# Patient Record
Sex: FEMALE | Race: WHITE | NOT HISPANIC OR LATINO
[De-identification: names, ages, dates, MRNs, and addresses within clinical notes are randomized per-mention and may not be internally consistent; named-entity substitution may affect disease eponyms.]

---

## 2017-02-10 ENCOUNTER — RESULT REVIEW (OUTPATIENT)
Age: 82
End: 2017-02-10

## 2017-04-18 ENCOUNTER — RESULT REVIEW (OUTPATIENT)
Age: 82
End: 2017-04-18

## 2017-04-19 ENCOUNTER — OUTPATIENT (OUTPATIENT)
Dept: OUTPATIENT SERVICES | Facility: HOSPITAL | Age: 82
LOS: 1 days | Discharge: HOME | End: 2017-04-19

## 2017-04-19 DIAGNOSIS — D64.9 ANEMIA, UNSPECIFIED: ICD-10-CM

## 2017-04-19 DIAGNOSIS — Z90.710 ACQUIRED ABSENCE OF BOTH CERVIX AND UTERUS: Chronic | ICD-10-CM

## 2017-04-23 ENCOUNTER — RESULT REVIEW (OUTPATIENT)
Age: 82
End: 2017-04-23

## 2017-04-24 ENCOUNTER — APPOINTMENT (OUTPATIENT)
Dept: HEMATOLOGY ONCOLOGY | Facility: CLINIC | Age: 82
End: 2017-04-24

## 2017-04-24 VITALS
SYSTOLIC BLOOD PRESSURE: 171 MMHG | RESPIRATION RATE: 16 BRPM | WEIGHT: 150.58 LBS | BODY MASS INDEX: 28.45 KG/M2 | OXYGEN SATURATION: 96 % | TEMPERATURE: 97.6 F | DIASTOLIC BLOOD PRESSURE: 81 MMHG | HEART RATE: 74 BPM

## 2017-04-24 VITALS — SYSTOLIC BLOOD PRESSURE: 140 MMHG | DIASTOLIC BLOOD PRESSURE: 82 MMHG

## 2017-04-24 LAB
BASOPHILS # BLD AUTO: 0 K/UL — SIGNIFICANT CHANGE UP (ref 0–0.2)
BASOPHILS NFR BLD AUTO: 0.4 % — SIGNIFICANT CHANGE UP (ref 0–2)
EOSINOPHIL # BLD AUTO: 0.1 K/UL — SIGNIFICANT CHANGE UP (ref 0–0.5)
EOSINOPHIL NFR BLD AUTO: 1.2 % — SIGNIFICANT CHANGE UP (ref 0–6)
HCT VFR BLD CALC: 35.2 % — SIGNIFICANT CHANGE UP (ref 34.5–45)
HGB BLD-MCNC: 12.2 G/DL — SIGNIFICANT CHANGE UP (ref 11.5–15.5)
LYMPHOCYTES # BLD AUTO: 1 K/UL — SIGNIFICANT CHANGE UP (ref 1–3.3)
LYMPHOCYTES # BLD AUTO: 19.2 % — SIGNIFICANT CHANGE UP (ref 13–44)
MCHC RBC-ENTMCNC: 30.7 PG — SIGNIFICANT CHANGE UP (ref 27–34)
MCHC RBC-ENTMCNC: 34.7 G/DL — SIGNIFICANT CHANGE UP (ref 32–36)
MCV RBC AUTO: 88.4 FL — SIGNIFICANT CHANGE UP (ref 80–100)
MONOCYTES # BLD AUTO: 0.4 K/UL — SIGNIFICANT CHANGE UP (ref 0–0.9)
MONOCYTES NFR BLD AUTO: 8.4 % — SIGNIFICANT CHANGE UP (ref 2–14)
NEUTROPHILS # BLD AUTO: 3.5 K/UL — SIGNIFICANT CHANGE UP (ref 1.8–7.4)
NEUTROPHILS NFR BLD AUTO: 70.8 % — SIGNIFICANT CHANGE UP (ref 43–77)
PLATELET # BLD AUTO: 171 K/UL — SIGNIFICANT CHANGE UP (ref 150–400)
RBC # BLD: 3.99 M/UL — SIGNIFICANT CHANGE UP (ref 3.8–5.2)
RBC # FLD: 11.1 % — SIGNIFICANT CHANGE UP (ref 10.3–14.5)
WBC # BLD: 5 K/UL — SIGNIFICANT CHANGE UP (ref 3.8–10.5)
WBC # FLD AUTO: 5 K/UL — SIGNIFICANT CHANGE UP (ref 3.8–10.5)

## 2017-04-25 ENCOUNTER — RESULT REVIEW (OUTPATIENT)
Age: 82
End: 2017-04-25

## 2017-04-25 LAB
ALBUMIN SERPL ELPH-MCNC: 4.1 G/DL
ALP BLD-CCNC: 79 U/L
ALT SERPL-CCNC: 18 U/L
ANION GAP SERPL CALC-SCNC: 20 MMOL/L
AST SERPL-CCNC: 27 U/L
BILIRUB SERPL-MCNC: 0.4 MG/DL
BUN SERPL-MCNC: 14 MG/DL
CALCIUM SERPL-MCNC: 9.6 MG/DL
CANCER AG125 SERPL-ACNC: 16 U/ML
CHLORIDE SERPL-SCNC: 93 MMOL/L
CO2 SERPL-SCNC: 19 MMOL/L
CREAT SERPL-MCNC: 1.19 MG/DL
GLUCOSE SERPL-MCNC: 59 MG/DL
LDH SERPL-CCNC: 274 U/L
POTASSIUM SERPL-SCNC: 4.6 MMOL/L
PROT SERPL-MCNC: 6.7 G/DL
SODIUM SERPL-SCNC: 132 MMOL/L

## 2017-04-30 ENCOUNTER — RESULT REVIEW (OUTPATIENT)
Age: 82
End: 2017-04-30

## 2017-05-01 ENCOUNTER — APPOINTMENT (OUTPATIENT)
Dept: HEMATOLOGY ONCOLOGY | Facility: CLINIC | Age: 82
End: 2017-05-01

## 2017-05-04 ENCOUNTER — OTHER (OUTPATIENT)
Age: 82
End: 2017-05-04

## 2017-05-04 ENCOUNTER — APPOINTMENT (OUTPATIENT)
Dept: OBGYN | Facility: CLINIC | Age: 82
End: 2017-05-04

## 2017-05-04 VITALS
HEART RATE: 74 BPM | BODY MASS INDEX: 28.18 KG/M2 | HEIGHT: 61 IN | WEIGHT: 149.25 LBS | DIASTOLIC BLOOD PRESSURE: 79 MMHG | SYSTOLIC BLOOD PRESSURE: 143 MMHG

## 2017-05-04 DIAGNOSIS — Z87.440 PERSONAL HISTORY OF URINARY (TRACT) INFECTIONS: ICD-10-CM

## 2017-05-04 LAB
BILIRUB UR QL STRIP: NEGATIVE
CLARITY UR: CLEAR
COLLECTION METHOD: NORMAL
GLUCOSE UR-MCNC: NEGATIVE
HCG UR QL: 0.2 EU/DL
HGB UR QL STRIP.AUTO: NEGATIVE
KETONES UR-MCNC: NEGATIVE
LEUKOCYTE ESTERASE UR QL STRIP: NORMAL
NITRITE UR QL STRIP: NEGATIVE
PH UR STRIP: 5.5
PROT UR STRIP-MCNC: NORMAL
SP GR UR STRIP: 1.01

## 2017-05-04 RX ORDER — CLOTRIMAZOLE 10 MG/ML
1 SOLUTION TOPICAL
Qty: 30 | Refills: 0 | Status: ACTIVE | COMMUNITY
Start: 2017-04-05

## 2017-05-04 RX ORDER — ERGOCALCIFEROL 1.25 MG/1
1.25 MG CAPSULE, LIQUID FILLED ORAL
Qty: 4 | Refills: 0 | Status: ACTIVE | COMMUNITY
Start: 2017-01-18

## 2017-05-08 ENCOUNTER — OTHER (OUTPATIENT)
Age: 82
End: 2017-05-08

## 2017-05-08 LAB — BACTERIA UR CULT: NORMAL

## 2017-07-05 ENCOUNTER — RESULT REVIEW (OUTPATIENT)
Age: 82
End: 2017-07-05

## 2017-07-05 LAB
ALBUMIN SERPL ELPH-MCNC: 3.9 G/DL
ALP BLD-CCNC: 71 U/L
ALT SERPL-CCNC: 22 U/L
ANION GAP SERPL CALC-SCNC: 13 MMOL/L
AST SERPL-CCNC: 31 U/L
BILIRUB SERPL-MCNC: 0.4 MG/DL
BUN SERPL-MCNC: 13 MG/DL
CALCIUM SERPL-MCNC: 9.2 MG/DL
CHLORIDE SERPL-SCNC: 93 MMOL/L
CO2 SERPL-SCNC: 27 MMOL/L
CREAT SERPL-MCNC: 1.1 MG/DL
GLUCOSE SERPL-MCNC: 84 MG/DL
LDH SERPL-CCNC: 229 U/L
POTASSIUM SERPL-SCNC: 4.9 MMOL/L
PROT SERPL-MCNC: 6.4 G/DL
SODIUM SERPL-SCNC: 133 MMOL/L

## 2017-08-10 ENCOUNTER — APPOINTMENT (OUTPATIENT)
Dept: OPHTHALMOLOGY | Facility: CLINIC | Age: 82
End: 2017-08-10
Payer: MEDICARE

## 2017-08-10 DIAGNOSIS — H25.11 AGE-RELATED NUCLEAR CATARACT, RIGHT EYE: ICD-10-CM

## 2017-08-10 PROCEDURE — 92136 OPHTHALMIC BIOMETRY: CPT | Mod: 26,RT

## 2017-08-10 PROCEDURE — 92014 COMPRE OPH EXAM EST PT 1/>: CPT

## 2017-10-17 ENCOUNTER — MEDICATION RENEWAL (OUTPATIENT)
Age: 82
End: 2017-10-17

## 2017-10-17 ENCOUNTER — OUTPATIENT (OUTPATIENT)
Dept: OUTPATIENT SERVICES | Facility: HOSPITAL | Age: 82
LOS: 1 days | Discharge: HOME | End: 2017-10-17

## 2017-10-17 DIAGNOSIS — D64.9 ANEMIA, UNSPECIFIED: ICD-10-CM

## 2017-10-17 DIAGNOSIS — Z90.710 ACQUIRED ABSENCE OF BOTH CERVIX AND UTERUS: Chronic | ICD-10-CM

## 2017-10-17 RX ORDER — PREDNISOLONE ACETATE 10 MG/ML
1 SUSPENSION/ DROPS OPHTHALMIC 4 TIMES DAILY
Qty: 10 | Refills: 1 | Status: ACTIVE | COMMUNITY
Start: 2017-10-17 | End: 1900-01-01

## 2017-10-17 RX ORDER — MOXIFLOXACIN OPHTHALMIC 5 MG/ML
0.5 SOLUTION/ DROPS OPHTHALMIC 4 TIMES DAILY
Qty: 3 | Refills: 1 | Status: ACTIVE | COMMUNITY
Start: 2017-10-17 | End: 1900-01-01

## 2017-10-19 ENCOUNTER — APPOINTMENT (OUTPATIENT)
Dept: OPHTHALMOLOGY | Facility: HOSPITAL | Age: 82
End: 2017-10-19

## 2017-10-19 ENCOUNTER — OUTPATIENT (OUTPATIENT)
Dept: OUTPATIENT SERVICES | Facility: HOSPITAL | Age: 82
LOS: 1 days | End: 2017-10-19
Payer: MEDICARE

## 2017-10-19 VITALS
HEART RATE: 55 BPM | TEMPERATURE: 98 F | HEIGHT: 60 IN | OXYGEN SATURATION: 100 % | SYSTOLIC BLOOD PRESSURE: 169 MMHG | WEIGHT: 149.47 LBS | DIASTOLIC BLOOD PRESSURE: 67 MMHG | RESPIRATION RATE: 14 BRPM

## 2017-10-19 VITALS
HEART RATE: 62 BPM | OXYGEN SATURATION: 100 % | SYSTOLIC BLOOD PRESSURE: 146 MMHG | DIASTOLIC BLOOD PRESSURE: 59 MMHG | RESPIRATION RATE: 20 BRPM

## 2017-10-19 DIAGNOSIS — Z90.710 ACQUIRED ABSENCE OF BOTH CERVIX AND UTERUS: Chronic | ICD-10-CM

## 2017-10-19 DIAGNOSIS — Z98.49 CATARACT EXTRACTION STATUS, UNSPECIFIED EYE: Chronic | ICD-10-CM

## 2017-10-19 DIAGNOSIS — H25.811 COMBINED FORMS OF AGE-RELATED CATARACT, RIGHT EYE: ICD-10-CM

## 2017-10-19 PROCEDURE — 66984 XCAPSL CTRC RMVL W/O ECP: CPT | Mod: RT

## 2017-10-19 PROCEDURE — V2632: CPT

## 2017-10-19 NOTE — ASU PATIENT PROFILE, ADULT - WHEN FALL OCCURRED
last six months/slipped on kitchen floor few months ago - did not sustain injury or seek medical attention

## 2017-10-19 NOTE — ASU DISCHARGE PLAN (ADULT/PEDIATRIC). - SPECIAL INSTRUCTIONS
You may take Tylenol for pain/discomfort. Do not take Aspirin, Aleve, Motrin, or Advil as these can increase your chances of bleeding. You may take Tylenol for pain/discomfort. Do not take Aspirin, Aleve, Motrin, or Advil as these can increase your chances of bleeding.    Begin drops - 3 times today in operative eye (Tan and pink tops - vigamox and prednisolone).

## 2017-10-19 NOTE — ASU PATIENT PROFILE, ADULT - PMH
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

## 2017-10-19 NOTE — ASU DISCHARGE PLAN (ADULT/PEDIATRIC). - NOTIFY
Fever greater than 101/Bleeding that does not stop/Persistent Nausea and Vomiting/Pain not relieved by Medications Swelling that continues/Persistent Nausea and Vomiting/Fever greater than 101/Bleeding that does not stop/Pain not relieved by Medications

## 2017-10-19 NOTE — ASU PATIENT PROFILE, ADULT - PSH
History of Breast Lump/Mass Excision- [facility]- left- 1971    History of Colonoscopy- 2011/Sept    History of D&C- 8/12/11    Status post cataract extraction  OS  Status post hysterectomy  endometrial cancer

## 2017-10-20 ENCOUNTER — TRANSCRIPTION ENCOUNTER (OUTPATIENT)
Age: 82
End: 2017-10-20

## 2017-10-20 ENCOUNTER — APPOINTMENT (OUTPATIENT)
Dept: OPHTHALMOLOGY | Facility: CLINIC | Age: 82
End: 2017-10-20
Payer: MEDICARE

## 2017-10-20 PROCEDURE — 99024 POSTOP FOLLOW-UP VISIT: CPT

## 2017-10-23 ENCOUNTER — RESULT REVIEW (OUTPATIENT)
Age: 82
End: 2017-10-23

## 2017-10-23 ENCOUNTER — APPOINTMENT (OUTPATIENT)
Dept: HEMATOLOGY ONCOLOGY | Facility: CLINIC | Age: 82
End: 2017-10-23
Payer: MEDICARE

## 2017-10-23 VITALS
TEMPERATURE: 97.3 F | HEART RATE: 61 BPM | RESPIRATION RATE: 16 BRPM | OXYGEN SATURATION: 98 % | WEIGHT: 149.91 LBS | SYSTOLIC BLOOD PRESSURE: 155 MMHG | DIASTOLIC BLOOD PRESSURE: 81 MMHG | BODY MASS INDEX: 28.33 KG/M2

## 2017-10-23 DIAGNOSIS — C54.3 MALIGNANT NEOPLASM OF FUNDUS UTERI: ICD-10-CM

## 2017-10-23 LAB
ALBUMIN SERPL ELPH-MCNC: 4.2 G/DL
ALP BLD-CCNC: 76 U/L
ALT SERPL-CCNC: 17 U/L
ANION GAP SERPL CALC-SCNC: 15 MMOL/L
AST SERPL-CCNC: 28 U/L
BASOPHILS # BLD AUTO: 0 K/UL — SIGNIFICANT CHANGE UP (ref 0–0.2)
BASOPHILS NFR BLD AUTO: 0.5 % — SIGNIFICANT CHANGE UP (ref 0–2)
BILIRUB SERPL-MCNC: 0.5 MG/DL
BUN SERPL-MCNC: 18 MG/DL
CALCIUM SERPL-MCNC: 9.5 MG/DL
CHLORIDE SERPL-SCNC: 94 MMOL/L
CO2 SERPL-SCNC: 25 MMOL/L
CREAT SERPL-MCNC: 1.21 MG/DL
EOSINOPHIL # BLD AUTO: 0.1 K/UL — SIGNIFICANT CHANGE UP (ref 0–0.5)
EOSINOPHIL NFR BLD AUTO: 1 % — SIGNIFICANT CHANGE UP (ref 0–6)
GLUCOSE SERPL-MCNC: 83 MG/DL
HCT VFR BLD CALC: 35.9 % — SIGNIFICANT CHANGE UP (ref 34.5–45)
HGB BLD-MCNC: 12.6 G/DL — SIGNIFICANT CHANGE UP (ref 11.5–15.5)
LYMPHOCYTES # BLD AUTO: 0.9 K/UL — LOW (ref 1–3.3)
LYMPHOCYTES # BLD AUTO: 14.2 % — SIGNIFICANT CHANGE UP (ref 13–44)
MCHC RBC-ENTMCNC: 31.6 PG — SIGNIFICANT CHANGE UP (ref 27–34)
MCHC RBC-ENTMCNC: 35 G/DL — SIGNIFICANT CHANGE UP (ref 32–36)
MCV RBC AUTO: 90.2 FL — SIGNIFICANT CHANGE UP (ref 80–100)
MONOCYTES # BLD AUTO: 0.4 K/UL — SIGNIFICANT CHANGE UP (ref 0–0.9)
MONOCYTES NFR BLD AUTO: 6.7 % — SIGNIFICANT CHANGE UP (ref 2–14)
NEUTROPHILS # BLD AUTO: 4.8 K/UL — SIGNIFICANT CHANGE UP (ref 1.8–7.4)
NEUTROPHILS NFR BLD AUTO: 77.6 % — HIGH (ref 43–77)
PLATELET # BLD AUTO: 158 K/UL — SIGNIFICANT CHANGE UP (ref 150–400)
POTASSIUM SERPL-SCNC: 5 MMOL/L
PROT SERPL-MCNC: 6.6 G/DL
RBC # BLD: 3.97 M/UL — SIGNIFICANT CHANGE UP (ref 3.8–5.2)
RBC # FLD: 11.1 % — SIGNIFICANT CHANGE UP (ref 10.3–14.5)
SODIUM SERPL-SCNC: 134 MMOL/L
WBC # BLD: 6.2 K/UL — SIGNIFICANT CHANGE UP (ref 3.8–10.5)
WBC # FLD AUTO: 6.2 K/UL — SIGNIFICANT CHANGE UP (ref 3.8–10.5)

## 2017-10-23 PROCEDURE — 99213 OFFICE O/P EST LOW 20 MIN: CPT

## 2017-10-26 ENCOUNTER — APPOINTMENT (OUTPATIENT)
Dept: OPHTHALMOLOGY | Facility: CLINIC | Age: 82
End: 2017-10-26
Payer: MEDICARE

## 2017-10-26 PROCEDURE — 99024 POSTOP FOLLOW-UP VISIT: CPT

## 2017-10-27 ENCOUNTER — RESULT REVIEW (OUTPATIENT)
Age: 82
End: 2017-10-27

## 2017-10-27 LAB
CANCER AG125 SERPL-ACNC: 15 U/ML
LDH SERPL-CCNC: 257 U/L

## 2017-12-05 ENCOUNTER — APPOINTMENT (OUTPATIENT)
Dept: OPHTHALMOLOGY | Facility: CLINIC | Age: 82
End: 2017-12-05
Payer: MEDICARE

## 2017-12-05 DIAGNOSIS — H25.11 AGE-RELATED NUCLEAR CATARACT, RIGHT EYE: ICD-10-CM

## 2017-12-05 DIAGNOSIS — H35.30 UNSPECIFIED MACULAR DEGENERATION: ICD-10-CM

## 2017-12-05 PROCEDURE — 99024 POSTOP FOLLOW-UP VISIT: CPT

## 2017-12-07 ENCOUNTER — APPOINTMENT (OUTPATIENT)
Dept: OPHTHALMOLOGY | Facility: CLINIC | Age: 82
End: 2017-12-07

## 2018-03-22 ENCOUNTER — INPATIENT (INPATIENT)
Facility: HOSPITAL | Age: 83
LOS: 7 days | Discharge: NURSING, REHAB OR LONG-TERM CARE | End: 2018-03-30
Attending: INTERNAL MEDICINE | Admitting: INTERNAL MEDICINE
Payer: MEDICARE

## 2018-03-22 VITALS
TEMPERATURE: 98 F | WEIGHT: 143.3 LBS | SYSTOLIC BLOOD PRESSURE: 118 MMHG | DIASTOLIC BLOOD PRESSURE: 69 MMHG | HEART RATE: 93 BPM | RESPIRATION RATE: 16 BRPM | OXYGEN SATURATION: 87 %

## 2018-03-22 DIAGNOSIS — R29.6 REPEATED FALLS: ICD-10-CM

## 2018-03-22 DIAGNOSIS — R50.9 FEVER, UNSPECIFIED: ICD-10-CM

## 2018-03-22 DIAGNOSIS — E78.4 OTHER HYPERLIPIDEMIA: ICD-10-CM

## 2018-03-22 DIAGNOSIS — R74.8 ABNORMAL LEVELS OF OTHER SERUM ENZYMES: ICD-10-CM

## 2018-03-22 DIAGNOSIS — F51.01 PRIMARY INSOMNIA: ICD-10-CM

## 2018-03-22 DIAGNOSIS — N39.0 URINARY TRACT INFECTION, SITE NOT SPECIFIED: ICD-10-CM

## 2018-03-22 DIAGNOSIS — F41.9 ANXIETY DISORDER, UNSPECIFIED: ICD-10-CM

## 2018-03-22 DIAGNOSIS — I10 ESSENTIAL (PRIMARY) HYPERTENSION: ICD-10-CM

## 2018-03-22 DIAGNOSIS — Z90.710 ACQUIRED ABSENCE OF BOTH CERVIX AND UTERUS: Chronic | ICD-10-CM

## 2018-03-22 DIAGNOSIS — Z98.49 CATARACT EXTRACTION STATUS, UNSPECIFIED EYE: Chronic | ICD-10-CM

## 2018-03-22 LAB
ALBUMIN SERPL ELPH-MCNC: 3.9 G/DL — SIGNIFICANT CHANGE UP (ref 3.3–5)
ALP SERPL-CCNC: 61 U/L — SIGNIFICANT CHANGE UP (ref 40–120)
ALT FLD-CCNC: 42 U/L — HIGH (ref 4–33)
APPEARANCE UR: CLEAR — SIGNIFICANT CHANGE UP
APTT BLD: 24.4 SEC — LOW (ref 27.5–37.4)
AST SERPL-CCNC: 129 U/L — HIGH (ref 4–32)
BASE EXCESS BLDV CALC-SCNC: 1.5 MMOL/L — SIGNIFICANT CHANGE UP
BASOPHILS # BLD AUTO: 0.02 K/UL — SIGNIFICANT CHANGE UP (ref 0–0.2)
BASOPHILS NFR BLD AUTO: 0.1 % — SIGNIFICANT CHANGE UP (ref 0–2)
BILIRUB SERPL-MCNC: 1.1 MG/DL — SIGNIFICANT CHANGE UP (ref 0.2–1.2)
BILIRUB UR-MCNC: NEGATIVE — SIGNIFICANT CHANGE UP
BLD GP AB SCN SERPL QL: NEGATIVE — SIGNIFICANT CHANGE UP
BLOOD GAS VENOUS - CREATININE: 1.18 MG/DL — SIGNIFICANT CHANGE UP (ref 0.5–1.3)
BLOOD UR QL VISUAL: HIGH
BUN SERPL-MCNC: 33 MG/DL — HIGH (ref 7–23)
CALCIUM SERPL-MCNC: 9.1 MG/DL — SIGNIFICANT CHANGE UP (ref 8.4–10.5)
CHLORIDE BLDV-SCNC: 94 MMOL/L — LOW (ref 96–108)
CHLORIDE SERPL-SCNC: 88 MMOL/L — LOW (ref 98–107)
CK MB BLD-MCNC: 1.2 — SIGNIFICANT CHANGE UP (ref 0–2.5)
CK MB BLD-MCNC: 1.7 — SIGNIFICANT CHANGE UP (ref 0–2.5)
CK MB BLD-MCNC: 85.27 NG/ML — HIGH (ref 1–4.7)
CK MB BLD-MCNC: 92.66 NG/ML — HIGH (ref 1–4.7)
CK SERPL-CCNC: 5364 U/L — HIGH (ref 25–170)
CK SERPL-CCNC: 7059 U/L — HIGH (ref 25–170)
CO2 SERPL-SCNC: 24 MMOL/L — SIGNIFICANT CHANGE UP (ref 22–31)
COLOR SPEC: YELLOW — SIGNIFICANT CHANGE UP
CREAT SERPL-MCNC: 1.26 MG/DL — SIGNIFICANT CHANGE UP (ref 0.5–1.3)
EOSINOPHIL # BLD AUTO: 0 K/UL — SIGNIFICANT CHANGE UP (ref 0–0.5)
EOSINOPHIL NFR BLD AUTO: 0 % — SIGNIFICANT CHANGE UP (ref 0–6)
GAS PNL BLDV: 123 MMOL/L — LOW (ref 136–146)
GLUCOSE BLDV-MCNC: 128 — HIGH (ref 70–99)
GLUCOSE SERPL-MCNC: 126 MG/DL — HIGH (ref 70–99)
GLUCOSE UR-MCNC: NEGATIVE — SIGNIFICANT CHANGE UP
HCO3 BLDV-SCNC: 24 MMOL/L — SIGNIFICANT CHANGE UP (ref 20–27)
HCT VFR BLD CALC: 33.6 % — LOW (ref 34.5–45)
HCT VFR BLDV CALC: 35.9 % — SIGNIFICANT CHANGE UP (ref 34.5–45)
HGB BLD-MCNC: 11.5 G/DL — SIGNIFICANT CHANGE UP (ref 11.5–15.5)
HGB BLDV-MCNC: 11.7 G/DL — SIGNIFICANT CHANGE UP (ref 11.5–15.5)
HYALINE CASTS # UR AUTO: SIGNIFICANT CHANGE UP (ref 0–?)
IMM GRANULOCYTES # BLD AUTO: 0.07 # — SIGNIFICANT CHANGE UP
IMM GRANULOCYTES NFR BLD AUTO: 0.5 % — SIGNIFICANT CHANGE UP (ref 0–1.5)
INR BLD: 1.14 — SIGNIFICANT CHANGE UP (ref 0.88–1.17)
KETONES UR-MCNC: SIGNIFICANT CHANGE UP
LACTATE BLDV-MCNC: 2 MMOL/L — SIGNIFICANT CHANGE UP (ref 0.5–2)
LEUKOCYTE ESTERASE UR-ACNC: SIGNIFICANT CHANGE UP
LYMPHOCYTES # BLD AUTO: 0.44 K/UL — LOW (ref 1–3.3)
LYMPHOCYTES # BLD AUTO: 2.9 % — LOW (ref 13–44)
MCHC RBC-ENTMCNC: 30.4 PG — SIGNIFICANT CHANGE UP (ref 27–34)
MCHC RBC-ENTMCNC: 34.2 % — SIGNIFICANT CHANGE UP (ref 32–36)
MCV RBC AUTO: 88.9 FL — SIGNIFICANT CHANGE UP (ref 80–100)
MONOCYTES # BLD AUTO: 0.96 K/UL — HIGH (ref 0–0.9)
MONOCYTES NFR BLD AUTO: 6.2 % — SIGNIFICANT CHANGE UP (ref 2–14)
MUCOUS THREADS # UR AUTO: SIGNIFICANT CHANGE UP
NEUTROPHILS # BLD AUTO: 13.93 K/UL — HIGH (ref 1.8–7.4)
NEUTROPHILS NFR BLD AUTO: 90.3 % — HIGH (ref 43–77)
NITRITE UR-MCNC: NEGATIVE — SIGNIFICANT CHANGE UP
NON-SQ EPI CELLS # UR AUTO: <1 — SIGNIFICANT CHANGE UP
NRBC # FLD: 0 — SIGNIFICANT CHANGE UP
NT-PROBNP SERPL-SCNC: SIGNIFICANT CHANGE UP PG/ML
PCO2 BLDV: 41 MMHG — SIGNIFICANT CHANGE UP (ref 41–51)
PH BLDV: 7.42 PH — SIGNIFICANT CHANGE UP (ref 7.32–7.43)
PH UR: 6 — SIGNIFICANT CHANGE UP (ref 4.6–8)
PLATELET # BLD AUTO: 194 K/UL — SIGNIFICANT CHANGE UP (ref 150–400)
PMV BLD: 10.7 FL — SIGNIFICANT CHANGE UP (ref 7–13)
PO2 BLDV: < 24 MMHG — LOW (ref 35–40)
POTASSIUM BLDV-SCNC: 4.4 MMOL/L — SIGNIFICANT CHANGE UP (ref 3.4–4.5)
POTASSIUM SERPL-MCNC: 5.1 MMOL/L — SIGNIFICANT CHANGE UP (ref 3.5–5.3)
POTASSIUM SERPL-SCNC: 5.1 MMOL/L — SIGNIFICANT CHANGE UP (ref 3.5–5.3)
PROT SERPL-MCNC: 6.8 G/DL — SIGNIFICANT CHANGE UP (ref 6–8.3)
PROT UR-MCNC: 100 MG/DL — HIGH
PROTHROM AB SERPL-ACNC: 13.1 SEC — SIGNIFICANT CHANGE UP (ref 9.8–13.1)
RBC # BLD: 3.78 M/UL — LOW (ref 3.8–5.2)
RBC # FLD: 12.8 % — SIGNIFICANT CHANGE UP (ref 10.3–14.5)
RBC CASTS # UR COMP ASSIST: SIGNIFICANT CHANGE UP (ref 0–?)
RH IG SCN BLD-IMP: NEGATIVE — SIGNIFICANT CHANGE UP
SAO2 % BLDV: 24 % — LOW (ref 60–85)
SODIUM SERPL-SCNC: 128 MMOL/L — LOW (ref 135–145)
SP GR SPEC: 1.02 — SIGNIFICANT CHANGE UP (ref 1–1.04)
TROPONIN T SERPL-MCNC: 0.1 NG/ML — HIGH (ref 0–0.06)
TROPONIN T SERPL-MCNC: 0.12 NG/ML — HIGH (ref 0–0.06)
UROBILINOGEN FLD QL: NORMAL MG/DL — SIGNIFICANT CHANGE UP
WBC # BLD: 15.42 K/UL — HIGH (ref 3.8–10.5)
WBC # FLD AUTO: 15.42 K/UL — HIGH (ref 3.8–10.5)
WBC UR QL: HIGH (ref 0–?)

## 2018-03-22 PROCEDURE — 73564 X-RAY EXAM KNEE 4 OR MORE: CPT | Mod: 26,LT

## 2018-03-22 PROCEDURE — 71045 X-RAY EXAM CHEST 1 VIEW: CPT | Mod: 26

## 2018-03-22 PROCEDURE — 73030 X-RAY EXAM OF SHOULDER: CPT | Mod: 26,RT

## 2018-03-22 PROCEDURE — 70450 CT HEAD/BRAIN W/O DYE: CPT | Mod: 26

## 2018-03-22 PROCEDURE — 73060 X-RAY EXAM OF HUMERUS: CPT | Mod: 26,RT

## 2018-03-22 PROCEDURE — 71250 CT THORAX DX C-: CPT | Mod: 26

## 2018-03-22 PROCEDURE — 72125 CT NECK SPINE W/O DYE: CPT | Mod: 26

## 2018-03-22 RX ORDER — ALPRAZOLAM 0.25 MG
0.5 TABLET ORAL THREE TIMES A DAY
Qty: 0 | Refills: 0 | Status: DISCONTINUED | OUTPATIENT
Start: 2018-03-22 | End: 2018-03-29

## 2018-03-22 RX ORDER — PIPERACILLIN AND TAZOBACTAM 4; .5 G/20ML; G/20ML
3.38 INJECTION, POWDER, LYOPHILIZED, FOR SOLUTION INTRAVENOUS ONCE
Qty: 0 | Refills: 0 | Status: COMPLETED | OUTPATIENT
Start: 2018-03-22 | End: 2018-03-22

## 2018-03-22 RX ORDER — ERGOCALCIFEROL 1.25 MG/1
50000 CAPSULE ORAL
Qty: 0 | Refills: 0 | Status: DISCONTINUED | OUTPATIENT
Start: 2018-03-22 | End: 2018-03-22

## 2018-03-22 RX ORDER — SODIUM CHLORIDE 9 MG/ML
1000 INJECTION INTRAMUSCULAR; INTRAVENOUS; SUBCUTANEOUS ONCE
Qty: 0 | Refills: 0 | Status: COMPLETED | OUTPATIENT
Start: 2018-03-22 | End: 2018-03-22

## 2018-03-22 RX ORDER — MIRTAZAPINE 45 MG/1
15 TABLET, ORALLY DISINTEGRATING ORAL AT BEDTIME
Qty: 0 | Refills: 0 | Status: DISCONTINUED | OUTPATIENT
Start: 2018-03-22 | End: 2018-03-30

## 2018-03-22 RX ORDER — CEFTRIAXONE 500 MG/1
1 INJECTION, POWDER, FOR SOLUTION INTRAMUSCULAR; INTRAVENOUS ONCE
Qty: 0 | Refills: 0 | Status: COMPLETED | OUTPATIENT
Start: 2018-03-22 | End: 2018-03-22

## 2018-03-22 RX ORDER — FUROSEMIDE 40 MG
20 TABLET ORAL ONCE
Qty: 0 | Refills: 0 | Status: COMPLETED | OUTPATIENT
Start: 2018-03-22 | End: 2018-03-22

## 2018-03-22 RX ORDER — ERGOCALCIFEROL 1.25 MG/1
50000 CAPSULE ORAL
Qty: 0 | Refills: 0 | Status: DISCONTINUED | OUTPATIENT
Start: 2018-03-22 | End: 2018-03-30

## 2018-03-22 RX ORDER — CEFTRIAXONE 500 MG/1
1 INJECTION, POWDER, FOR SOLUTION INTRAMUSCULAR; INTRAVENOUS EVERY 24 HOURS
Qty: 0 | Refills: 0 | Status: DISCONTINUED | OUTPATIENT
Start: 2018-03-23 | End: 2018-03-23

## 2018-03-22 RX ORDER — ZOLPIDEM TARTRATE 10 MG/1
5 TABLET ORAL AT BEDTIME
Qty: 0 | Refills: 0 | Status: DISCONTINUED | OUTPATIENT
Start: 2018-03-22 | End: 2018-03-26

## 2018-03-22 RX ORDER — ZOLPIDEM TARTRATE 10 MG/1
5 TABLET ORAL AT BEDTIME
Qty: 0 | Refills: 0 | Status: DISCONTINUED | OUTPATIENT
Start: 2018-03-22 | End: 2018-03-29

## 2018-03-22 RX ORDER — ASPIRIN/CALCIUM CARB/MAGNESIUM 324 MG
162 TABLET ORAL ONCE
Qty: 0 | Refills: 0 | Status: COMPLETED | OUTPATIENT
Start: 2018-03-22 | End: 2018-03-22

## 2018-03-22 RX ORDER — ACETAMINOPHEN 500 MG
1000 TABLET ORAL ONCE
Qty: 0 | Refills: 0 | Status: COMPLETED | OUTPATIENT
Start: 2018-03-22 | End: 2018-03-22

## 2018-03-22 RX ORDER — SODIUM CHLORIDE 9 MG/ML
1000 INJECTION INTRAMUSCULAR; INTRAVENOUS; SUBCUTANEOUS
Qty: 0 | Refills: 0 | Status: DISCONTINUED | OUTPATIENT
Start: 2018-03-22 | End: 2018-03-27

## 2018-03-22 RX ORDER — ASPIRIN/CALCIUM CARB/MAGNESIUM 324 MG
81 TABLET ORAL DAILY
Qty: 0 | Refills: 0 | Status: DISCONTINUED | OUTPATIENT
Start: 2018-03-22 | End: 2018-03-30

## 2018-03-22 RX ORDER — ATORVASTATIN CALCIUM 80 MG/1
10 TABLET, FILM COATED ORAL AT BEDTIME
Qty: 0 | Refills: 0 | Status: DISCONTINUED | OUTPATIENT
Start: 2018-03-22 | End: 2018-03-30

## 2018-03-22 RX ORDER — SODIUM CHLORIDE 9 MG/ML
3 INJECTION INTRAMUSCULAR; INTRAVENOUS; SUBCUTANEOUS EVERY 8 HOURS
Qty: 0 | Refills: 0 | Status: DISCONTINUED | OUTPATIENT
Start: 2018-03-22 | End: 2018-03-30

## 2018-03-22 RX ORDER — HEPARIN SODIUM 5000 [USP'U]/ML
5000 INJECTION INTRAVENOUS; SUBCUTANEOUS EVERY 12 HOURS
Qty: 0 | Refills: 0 | Status: DISCONTINUED | OUTPATIENT
Start: 2018-03-22 | End: 2018-03-27

## 2018-03-22 RX ADMIN — PIPERACILLIN AND TAZOBACTAM 200 GRAM(S): 4; .5 INJECTION, POWDER, LYOPHILIZED, FOR SOLUTION INTRAVENOUS at 15:44

## 2018-03-22 RX ADMIN — CEFTRIAXONE 100 GRAM(S): 500 INJECTION, POWDER, FOR SOLUTION INTRAMUSCULAR; INTRAVENOUS at 13:46

## 2018-03-22 RX ADMIN — SODIUM CHLORIDE 3 MILLILITER(S): 9 INJECTION INTRAMUSCULAR; INTRAVENOUS; SUBCUTANEOUS at 21:30

## 2018-03-22 RX ADMIN — SODIUM CHLORIDE 1000 MILLILITER(S): 9 INJECTION INTRAMUSCULAR; INTRAVENOUS; SUBCUTANEOUS at 12:13

## 2018-03-22 RX ADMIN — MIRTAZAPINE 15 MILLIGRAM(S): 45 TABLET, ORALLY DISINTEGRATING ORAL at 21:30

## 2018-03-22 RX ADMIN — ATORVASTATIN CALCIUM 10 MILLIGRAM(S): 80 TABLET, FILM COATED ORAL at 21:30

## 2018-03-22 RX ADMIN — ZOLPIDEM TARTRATE 5 MILLIGRAM(S): 10 TABLET ORAL at 21:49

## 2018-03-22 RX ADMIN — SODIUM CHLORIDE 100 MILLILITER(S): 9 INJECTION INTRAMUSCULAR; INTRAVENOUS; SUBCUTANEOUS at 19:42

## 2018-03-22 RX ADMIN — Medication 400 MILLIGRAM(S): at 12:13

## 2018-03-22 RX ADMIN — Medication 162 MILLIGRAM(S): at 15:44

## 2018-03-22 NOTE — H&P ADULT - PROBLEM SELECTOR PLAN 1
Patient with unwitnessed  fall, will monitor on telemetry for any arrhythmia, will give IVF with NS @ 100 cc/hr, Obtain Echo, Hold all BP meds for now, Do orthostatic BP when able

## 2018-03-22 NOTE — SOCIAL WORK INITIAL EVALUATION ADULT - PLAN
SW met with 89 year old , , Muslim female and her daughter at bedside.     Patient is alert and oriented x3.      Patient lives in a PH alone.      Patient reports that there are 4 steps from the pavement to the front door.     Patient reports she has several small flights of stairs in the house and daughter is investigating stairlift for flight to bedroom and bathroom.    Patient has 2 canes, a walker, shower chair and shower bars.       Patient's daughter provides transportation to the doctor's office.       Patient receives [employer] 5 days a week.      Patient's daughter, Sade ([phone]), is her HCP.  Family interested in [facility], [facility] and [facility] lists provided.  Patient's first choice is [facility], explained they will need additional choices and PT will need to evaluate.    Will need PT eval when appropriate.   Stair lift information and private hire home care lists given to daughter.  SW contact information provided.      Written by VIKTORIA Lenz  Reviewed by Concepción Barker LMSW

## 2018-03-22 NOTE — H&P ADULT - HISTORY OF PRESENT ILLNESS
89 y/o F with history of HTN, HLD, Palpitation, insomnia, anxiety present with unwitnessed fall. Patient was found on the bathroom floor by her daughter. Unknown how long patient was on the floor. Patient states she only remembers waking up from the floor. Patient c/o back, right shoulder pain and left knee pain. Patient denies chest pain, palpitation at the time, sob, cough, n/v/d/, fever, chills, no dizziness or weakness. In ED patient was found to have UTI and was given one dose of Ceftriaxone and Zosyn. Also was found to have CK of 5364 and Trop of 0.10 and was given bolus of 1L of NS.

## 2018-03-22 NOTE — H&P ADULT - PSH
History of Breast Lump/Mass Excision- [facility]- left- 1971    History of Colonoscopy- 2011/Sept    History of D&C- 8/12/11    Status post cataract extraction  OS  Status post hysterectomy  endometrial cancer

## 2018-03-22 NOTE — ED ADULT NURSE NOTE - CHIEF COMPLAINT QUOTE
ELVIRA c/o lower back pain s/p fall. Daughter states she found pt. on floor this AM. Unknown LOC or head trauma. Appears slightly confused to family. No blood thinner use. PMHx HTN, HLD

## 2018-03-22 NOTE — ED PROVIDER NOTE - PSH
History of Breast Lump/Mass Excision- [facility]- left- 1971    History of Colonoscopy- 2011/Sept    History of D&C- 8/12/11    Status post cataract extraction  OS  Status post hysterectomy  endometrial cancer

## 2018-03-22 NOTE — ED PROVIDER NOTE - PHYSICAL EXAMINATION
BACK: no midline tenderness, +pain to mid back/right side, no palpable step offs, NVI, sensate intact  R shoulder: + ecchymosis, ttp, FROM, NVI, sensate intact, no obvious deformity  L knee: no swelling or ecchymosis no deformity, generalized ttp able to ROM however c/o discomfort.   NEURO: EOMi, PERRLA, visual fields intact, tongue midline, negative romberg, strength 5/5 UE and LE bilaterally, unable to assess gait, facial expressions intact, point to point intact, rapid alternating movements intact, sensate intact to UE and LE bilaterally. NVI

## 2018-03-22 NOTE — ED PROVIDER NOTE - OBJECTIVE STATEMENT
89 yo F here for unwitnessed fall? pt reports that her daughter found her on the floor of the bathroom this morning. reports that she does not remember falling or passing out, only remembers waking up on the floor. Reports when she woke up she felt back pain, right shoulder pain, and left knee pain. Also notes urinary frequency with hesitancy. No meds taken. Denies chills vomiting diarrhea cp sob weakness ha dizziness rash 89 yo F here for unwitnessed fall? pt reports that her daughter found her on the floor of the bathroom this morning. reports that she does not remember falling or passing out, only remembers waking up on the floor. Reports when she woke up she felt back pain, right shoulder pain, and left knee pain. Also notes urinary frequency with hesitancy. No meds taken. Of note pt was 87% on room air upon arrival, unsure if this is baseline or new. Denies chills vomiting diarrhea cp sob weakness ha dizziness rash

## 2018-03-22 NOTE — ED PROVIDER NOTE - ATTENDING CONTRIBUTION TO CARE
I performed a face to face evaluation of this patient and obtained a history and performed a full exam.  I agree with the history, physical exam and plan of the PA.  Pt s/p fall c/o right humerus, knee, upper back pain, no weakness, +frequency at home.  No cp, awake with bruising rUE, no midline ttp, heart a1,s2 rrr, lungs poor effort, equal, abd soft nontender, neuro nonfocal strength and sensation wnl- right knee small abrasion, FROM- fall- r/o infection as cause, metabolic, bleed.  Labs, CT, xray, ua,  ekg, monitor, admit

## 2018-03-22 NOTE — H&P ADULT - PROBLEM SELECTOR PLAN 7
Will continue with Alprazolam PRN, Buspirone  Case discussed with Attending. continue with Alprazolam PRN, Buspirone

## 2018-03-22 NOTE — H&P ADULT - ASSESSMENT
89 y/o F with history of HTN, HLD, Palpitation, insomnia, anxiety present with unwitnessed fall. Patient was found on the bathroom floor by her daughter.  Patient c/o back, right shoulder pain and left knee pain.  In ED patient was found to have UTI and was given one dose of Ceftriaxone and Zosyn. Also was found to have CK of 5364 and Trop of 0.10 and was given bolus of 1L of NS

## 2018-03-22 NOTE — H&P ADULT - RS GEN PE MLT RESP DETAILS PC
clear to auscultation bilaterally/normal/breath sounds equal/respirations non-labored/good air movement

## 2018-03-22 NOTE — ED ADULT NURSE NOTE - OBJECTIVE STATEMENT
Receive pt in spot 8 alert and oriented x 3 s/p fall at home unwitnessed.  Pt with recent falls.  As per pt and daughter pt was found in the BR on the floor asleep.  Pt doesn't remember falling.  Denies chest pain, sob, dizziness.  Ecchymosis noted on R inner upper arm, skin tear noted to L pos scapula area and redness note to R knee.  Pt c/o pain on palpation of  mid back/rib area.  IV placed.  LAbs sent.  Respirations even and unlabored.  Lung sounds clear.  Pt straight cath for urine.

## 2018-03-22 NOTE — ED PROVIDER NOTE - PROGRESS NOTE DETAILS
FRANCES Moralez: call from lab for +troponin, will hold AC pending CT results. FRANCES Moralez: Ct negative for bleed, pending PT/PTT and will start heparin, ASA ordered, also BNP 16963's will give lasix for possible chf given noted desaturation upon arrival. 1L bolus given on arrival for fever but will hold any further fluids. Currently 97% on 3 L NC. FRANCES Moralez: pt doing well, however BP 100s over 50s, will hold lasix given that patient is satting appropriately on 3L NC and with not short of breath or having difficulty breathing. FRANCES Moralez: d/w tele doc of day Dr. Mccartney, recc holding heparin and will wait for 2nd set. accepted to tele for admission.

## 2018-03-22 NOTE — CONSULT NOTE ADULT - SUBJECTIVE AND OBJECTIVE BOX
CARDIOLOGY CONSULT NOTE - DR. HANSEN    CHIEF COMPLAINT/REASON FOR CONSULT:    HPI:      PAST MEDICAL & SURGICAL     Patient is a 89 year old woman with history of Hyperlipemia, Hypertension, Insomnia admitted with unwitnessed fall vs syncope.  Patient was found on the floor by her daughter.     Patient does not recall any  preceding events or symptoms   now feels at baseline  c/o some right arm pain at trauma site  in er, o2 sat 87%  ck elevated  + fever    Patient denies any chest pain, dyspnea, palpitations, cough, syncope, edema, exertional symptoms, nausea, abdominal pain, fever, chills,  or rash.  Denies any history of CAD, MI, valve disease, cardiomyopathy, or congenital heart disease.    Hypertension  Hyperlipidemia  Insomnia  Breast Lump  Seasonal Allergies  Hyperlipemia  Anxiety: palpitations  Status post cataract extraction: OS  Status post hysterectomy: endometrial cancer  History of D&C- 8/12/11  History of Colonoscopy- 2011/Sept  [facility]        PREVIOUS DIAGNOSTIC TESTING:    [ ] Echocardiogram:  [ ]  Catheterization:  [ ] Stress Test:  	    MEDICATIONS:  MEDICATIONS  (STANDING):      FAMILY HISTORY:      SOCIAL HISTORY:    [x ] Non-smoker  [ ] Smoker  [ ] Alcohol    Allergies    Eye cream from the 1960s Neocortef ?spelling caused eyes to becomes swollen (Unknown)  neomycin (Unknown)  soaps and creams causes rash uses only sensitive skin soap (Rash)  sulfa drugs (Unknown)  Voltaren (Rash)    Intolerances    	    REVIEW OF SYSTEMS:  CONSTITUTIONAL: No fever, weight loss, or fatigue  EYES: No eye pain, visual disturbances, or discharge  ENMT:  No difficulty hearing, tinnitus, vertigo; No sinus or throat pain  NECK: No pain or stiffness  RESPIRATORY: No cough, wheezing, chills or hemoptysis; No Shortness of Breath  CARDIOVASCULAR: No chest pain, palpitations, passing out, dizziness, or leg swelling  GASTROINTESTINAL: No abdominal or epigastric pain. No nausea, vomiting, or hematemesis; No diarrhea or constipation. No melena or hematochezia.  GENITOURINARY: No dysuria, frequency, hematuria, or incontinence  NEUROLOGICAL: No headaches, memory loss, loss of strength, numbness, or tremors  SKIN: No itching, burning, rashes, or lesions   	    [ ] All others negative	  [ ] Unable to obtain    PHYSICAL EXAM:  T(C): 36.8 (03-22-18 @ 13:53), Max: 38.3 (03-22-18 @ 11:42)  HR: 92 (03-22-18 @ 15:44) (86 - 97)  BP: 106/44 (03-22-18 @ 15:44) (106/44 - 157/61)  RR: 18 (03-22-18 @ 15:44) (16 - 20)  SpO2: 95% (03-22-18 @ 15:44) (87% - 99%)  Wt(kg): --  I&O's Summary      Appearance: Normal	  Psychiatry: A & O x 3, Mood & affect appropriate  HEENT:   Normal oral mucosa, PERRL, EOMI	  Lymphatic: No lymphadenopathy  Cardiovascular: Normal S1 S2,RRR, sm  Respiratory: scattered wheeze  Gastrointestinal:  Soft, Non-tender, + BS	  Skin: No rashes, No ecchymoses, No cyanosis	  Neurologic: Non-focal  Extremities: Normal range of motion, min edema b/l  TELEMETRY: 	    ECG:  	rbbb, fasic block   RADIOLOGY:  OTHER: 	  	  LABS:	 	    CARDIAC MARKERS:      CKMB: 92.66 ng/mL (03-22 @ 11:33)    CKMB Relative Index: 1.7 (03-22 @ 11:33)                            11.5   15.42 )-----------( 194      ( 22 Mar 2018 11:33 )             33.6     03-22    128<L>  |  88<L>  |  33<H>  ----------------------------<  126<H>  5.1   |  24  |  1.26    Ca    9.1      22 Mar 2018 11:33    TPro  6.8  /  Alb  3.9  /  TBili  1.1  /  DBili  x   /  AST  129<H>  /  ALT  42<H>  /  AlkPhos  61  03-22    PT/INR - ( 22 Mar 2018 14:50 )   PT: 13.1 SEC;   INR: 1.14          PTT - ( 22 Mar 2018 14:50 )  PTT:24.4 SEC  proBNP: Serum Pro-Brain Natriuretic Peptide: 79532 pg/mL (03-22 @ 11:33)    Lipid Profile:   HgA1c:   TSH:     ASSESSMENT/PLAN:

## 2018-03-22 NOTE — ED PROVIDER NOTE - MEDICAL DECISION MAKING DETAILS
89 yo F here for unwitnessed fall vs. syncope vs. other. pt found to be rectally febrile to 100.9, will obtain labs urine cultures CE CT xr and reassess, admit 89 yo F here for unwitnessed fall vs. syncope vs. other. pt found to be rectally febrile to 100.9, will obtain labs urine cultures CE CT xr and reassess, admit, will continue to monitor oxygen status, pro BNP ordered consider lasix if fluid overloaded considering desaturation although it is unclear what patients baseline is.

## 2018-03-22 NOTE — ED ADULT NURSE NOTE - PSH
History of Breast Lump/Mass Excision- [facility]- left- 1971    History of Colonoscopy- 2011/Sept    History of D&C- 8/12/11    Status post cataract extraction  OS  Status post hysterectomy  endometrial cancer

## 2018-03-23 DIAGNOSIS — R80.8 OTHER PROTEINURIA: ICD-10-CM

## 2018-03-23 DIAGNOSIS — N17.9 ACUTE KIDNEY FAILURE, UNSPECIFIED: ICD-10-CM

## 2018-03-23 DIAGNOSIS — E87.1 HYPO-OSMOLALITY AND HYPONATREMIA: ICD-10-CM

## 2018-03-23 DIAGNOSIS — R91.8 OTHER NONSPECIFIC ABNORMAL FINDING OF LUNG FIELD: ICD-10-CM

## 2018-03-23 DIAGNOSIS — T79.6XXS TRAUMATIC ISCHEMIA OF MUSCLE, SEQUELA: ICD-10-CM

## 2018-03-23 DIAGNOSIS — N18.3 CHRONIC KIDNEY DISEASE, STAGE 3 (MODERATE): ICD-10-CM

## 2018-03-23 DIAGNOSIS — I12.9 HYPERTENSIVE CHRONIC KIDNEY DISEASE WITH STAGE 1 THROUGH STAGE 4 CHRONIC KIDNEY DISEASE, OR UNSPECIFIED CHRONIC KIDNEY DISEASE: ICD-10-CM

## 2018-03-23 LAB
ALBUMIN SERPL ELPH-MCNC: 3.1 G/DL — LOW (ref 3.3–5)
ALP SERPL-CCNC: 85 U/L — SIGNIFICANT CHANGE UP (ref 40–120)
ALT FLD-CCNC: 95 U/L — HIGH (ref 4–33)
AST SERPL-CCNC: 222 U/L — HIGH (ref 4–32)
BACTERIA UR CULT: SIGNIFICANT CHANGE UP
BASOPHILS # BLD AUTO: 0.01 K/UL — SIGNIFICANT CHANGE UP (ref 0–0.2)
BASOPHILS NFR BLD AUTO: 0.1 % — SIGNIFICANT CHANGE UP (ref 0–2)
BILIRUB SERPL-MCNC: 0.6 MG/DL — SIGNIFICANT CHANGE UP (ref 0.2–1.2)
BUN SERPL-MCNC: 31 MG/DL — HIGH (ref 7–23)
CALCIUM SERPL-MCNC: 7.9 MG/DL — LOW (ref 8.4–10.5)
CHLORIDE SERPL-SCNC: 96 MMOL/L — LOW (ref 98–107)
CHOLEST SERPL-MCNC: 119 MG/DL — LOW (ref 120–199)
CK SERPL-CCNC: 6077 U/L — HIGH (ref 25–170)
CO2 SERPL-SCNC: 19 MMOL/L — LOW (ref 22–31)
CREAT SERPL-MCNC: 1.08 MG/DL — SIGNIFICANT CHANGE UP (ref 0.5–1.3)
EOSINOPHIL # BLD AUTO: 0.01 K/UL — SIGNIFICANT CHANGE UP (ref 0–0.5)
EOSINOPHIL NFR BLD AUTO: 0.1 % — SIGNIFICANT CHANGE UP (ref 0–6)
GLUCOSE SERPL-MCNC: 115 MG/DL — HIGH (ref 70–99)
HBA1C BLD-MCNC: 5.7 % — HIGH (ref 4–5.6)
HCT VFR BLD CALC: 30.5 % — LOW (ref 34.5–45)
HDLC SERPL-MCNC: 67 MG/DL — HIGH (ref 45–65)
HGB BLD-MCNC: 10.2 G/DL — LOW (ref 11.5–15.5)
IMM GRANULOCYTES # BLD AUTO: 0.05 # — SIGNIFICANT CHANGE UP
IMM GRANULOCYTES NFR BLD AUTO: 0.4 % — SIGNIFICANT CHANGE UP (ref 0–1.5)
LIPID PNL WITH DIRECT LDL SERPL: 39 MG/DL — SIGNIFICANT CHANGE UP
LYMPHOCYTES # BLD AUTO: 0.72 K/UL — LOW (ref 1–3.3)
LYMPHOCYTES # BLD AUTO: 6 % — LOW (ref 13–44)
MCHC RBC-ENTMCNC: 30.4 PG — SIGNIFICANT CHANGE UP (ref 27–34)
MCHC RBC-ENTMCNC: 33.4 % — SIGNIFICANT CHANGE UP (ref 32–36)
MCV RBC AUTO: 90.8 FL — SIGNIFICANT CHANGE UP (ref 80–100)
MONOCYTES # BLD AUTO: 1.04 K/UL — HIGH (ref 0–0.9)
MONOCYTES NFR BLD AUTO: 8.7 % — SIGNIFICANT CHANGE UP (ref 2–14)
NEUTROPHILS # BLD AUTO: 10.15 K/UL — HIGH (ref 1.8–7.4)
NEUTROPHILS NFR BLD AUTO: 84.7 % — HIGH (ref 43–77)
NRBC # FLD: 0 — SIGNIFICANT CHANGE UP
PLATELET # BLD AUTO: 150 K/UL — SIGNIFICANT CHANGE UP (ref 150–400)
PMV BLD: 11.4 FL — SIGNIFICANT CHANGE UP (ref 7–13)
POTASSIUM SERPL-MCNC: 4.4 MMOL/L — SIGNIFICANT CHANGE UP (ref 3.5–5.3)
POTASSIUM SERPL-SCNC: 4.4 MMOL/L — SIGNIFICANT CHANGE UP (ref 3.5–5.3)
PROT SERPL-MCNC: 5.8 G/DL — LOW (ref 6–8.3)
RBC # BLD: 3.36 M/UL — LOW (ref 3.8–5.2)
RBC # FLD: 12.9 % — SIGNIFICANT CHANGE UP (ref 10.3–14.5)
SODIUM SERPL-SCNC: 130 MMOL/L — LOW (ref 135–145)
SPECIMEN SOURCE: SIGNIFICANT CHANGE UP
TRIGL SERPL-MCNC: 63 MG/DL — SIGNIFICANT CHANGE UP (ref 10–149)
TROPONIN T SERPL-MCNC: 0.08 NG/ML — HIGH (ref 0–0.06)
WBC # BLD: 11.98 K/UL — HIGH (ref 3.8–10.5)
WBC # FLD AUTO: 11.98 K/UL — HIGH (ref 3.8–10.5)

## 2018-03-23 RX ORDER — METOPROLOL TARTRATE 50 MG
12.5 TABLET ORAL
Qty: 0 | Refills: 0 | Status: DISCONTINUED | OUTPATIENT
Start: 2018-03-23 | End: 2018-03-24

## 2018-03-23 RX ORDER — METOPROLOL TARTRATE 50 MG
5 TABLET ORAL ONCE
Qty: 0 | Refills: 0 | Status: COMPLETED | OUTPATIENT
Start: 2018-03-23 | End: 2018-03-23

## 2018-03-23 RX ORDER — METOPROLOL TARTRATE 50 MG
25 TABLET ORAL ONCE
Qty: 0 | Refills: 0 | Status: COMPLETED | OUTPATIENT
Start: 2018-03-23 | End: 2018-03-23

## 2018-03-23 RX ORDER — METOPROLOL TARTRATE 50 MG
25 TABLET ORAL DAILY
Qty: 0 | Refills: 0 | Status: DISCONTINUED | OUTPATIENT
Start: 2018-03-23 | End: 2018-03-23

## 2018-03-23 RX ADMIN — SODIUM CHLORIDE 3 MILLILITER(S): 9 INJECTION INTRAMUSCULAR; INTRAVENOUS; SUBCUTANEOUS at 13:31

## 2018-03-23 RX ADMIN — Medication 5 MILLIGRAM(S): at 01:25

## 2018-03-23 RX ADMIN — Medication 12.5 MILLIGRAM(S): at 17:20

## 2018-03-23 RX ADMIN — Medication 10 MILLIGRAM(S): at 17:20

## 2018-03-23 RX ADMIN — Medication 10 MILLIGRAM(S): at 05:37

## 2018-03-23 RX ADMIN — HEPARIN SODIUM 5000 UNIT(S): 5000 INJECTION INTRAVENOUS; SUBCUTANEOUS at 05:37

## 2018-03-23 RX ADMIN — Medication 0.5 MILLIGRAM(S): at 21:24

## 2018-03-23 RX ADMIN — Medication 25 MILLIGRAM(S): at 00:48

## 2018-03-23 RX ADMIN — Medication 0.5 MILLIGRAM(S): at 00:49

## 2018-03-23 RX ADMIN — HEPARIN SODIUM 5000 UNIT(S): 5000 INJECTION INTRAVENOUS; SUBCUTANEOUS at 17:20

## 2018-03-23 RX ADMIN — SODIUM CHLORIDE 3 MILLILITER(S): 9 INJECTION INTRAMUSCULAR; INTRAVENOUS; SUBCUTANEOUS at 05:29

## 2018-03-23 RX ADMIN — CEFTRIAXONE 100 GRAM(S): 500 INJECTION, POWDER, FOR SOLUTION INTRAMUSCULAR; INTRAVENOUS at 05:21

## 2018-03-23 RX ADMIN — Medication 81 MILLIGRAM(S): at 17:20

## 2018-03-23 RX ADMIN — Medication 0.5 MILLIGRAM(S): at 10:38

## 2018-03-23 RX ADMIN — ATORVASTATIN CALCIUM 10 MILLIGRAM(S): 80 TABLET, FILM COATED ORAL at 21:24

## 2018-03-23 RX ADMIN — SODIUM CHLORIDE 100 MILLILITER(S): 9 INJECTION INTRAMUSCULAR; INTRAVENOUS; SUBCUTANEOUS at 21:25

## 2018-03-23 RX ADMIN — SODIUM CHLORIDE 3 MILLILITER(S): 9 INJECTION INTRAMUSCULAR; INTRAVENOUS; SUBCUTANEOUS at 21:23

## 2018-03-23 RX ADMIN — SODIUM CHLORIDE 100 MILLILITER(S): 9 INJECTION INTRAMUSCULAR; INTRAVENOUS; SUBCUTANEOUS at 10:38

## 2018-03-23 RX ADMIN — MIRTAZAPINE 15 MILLIGRAM(S): 45 TABLET, ORALLY DISINTEGRATING ORAL at 21:24

## 2018-03-23 NOTE — CONSULT NOTE ADULT - PROBLEM SELECTOR PROBLEM 4
Acute UTI
Hypertensive kidney disease with chronic kidney disease, stage 1 through stage 4 or unspecified

## 2018-03-23 NOTE — CONSULT NOTE ADULT - SUBJECTIVE AND OBJECTIVE BOX
[facility] NEPHROLOGY- CONSULTATION NOTE    89 year old female with history of below presents with syncope. Nephrology consulted for elevated Scr.    Patient noted to have mild hyponatremia and ALLAN in setting of rhabdomyolysis on admission for which she was started on IVF. Scr and serum sodium noted to improve this morning however CK levels remain elevated. Patient denies any prior history of kidney disease or hyponatremia. Patient denies any diuretic use at home. Patient was found by daughter at home however unsure of how long she had remained on floor.    REVIEW OF SYSTEMS:  Gen: no changes in weight  HEENT: no rhinorrhea  Neck: no sore throat  Cards: no chest pain  Resp: no dyspnea  GI: no nausea or vomiting or diarrhea  : no dysuria or hematuria  Vascular: no LE edema  Derm: no rashes  Neuro: no numbness/tingling    Eye cream from the 1960s Neocortef ?spelling caused eyes to becomes swollen (Unknown)  neomycin (Unknown)  soaps and creams causes rash uses only sensitive skin soap (Rash)  sulfa drugs (Unknown)  Voltaren (Rash)      Home Medications Reviewed  Hospital Medications:   MEDICATIONS  (STANDING):  aspirin enteric coated 81 milliGRAM(s) Oral daily  atorvastatin 10 milliGRAM(s) Oral at bedtime  busPIRone 10 milliGRAM(s) Oral two times a day  cefTRIAXone   IVPB 1 Gram(s) IV Intermittent every 24 hours  ergocalciferol 86295 Unit(s) Oral <User Schedule>  heparin  Injectable 5000 Unit(s) SubCutaneous every 12 hours  metoprolol succinate ER 25 milliGRAM(s) Oral daily  mirtazapine 15 milliGRAM(s) Oral at bedtime  sodium chloride 0.9% lock flush 3 milliLiter(s) IV Push every 8 hours  sodium chloride 0.9%. 1000 milliLiter(s) (100 mL/Hr) IV Continuous <Continuous>      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  Hypertension  Hyperlipidemia  Insomnia  Breast Lump  Seasonal Allergies  Hyperlipemia  Anxiety: palpitations  Status post cataract extraction: OS  Status post hysterectomy: endometrial cancer  History of D&C- 11  History of Colonoscopy-   History of Breast Lump/Mass Excision- [facility]- left-       FAMILY HISTORY:  Noncontributory    SOCIAL HISTORY:  Denies toxic substance use     VITALS:  T(F): 97.9 (18 @ 13:51), Max: 98.6 (18 @ 18:59)  HR: 154 (18 @ 13:51)  BP: 119/82 (18 @ 13:51)  RR: 17 (18 @ 13:51)  SpO2: 94% (18 @ 13:51)  Wt(kg): --        PHYSICAL EXAM:  Gen: NAD, calm  HEENT: dry MM  Neck: no JVD  Cards: RRR, +S1/S2, no M/G/R  Resp: CTA B/L  GI: soft, NT/ND, NABS  : no CVA tenderness  Vascular: no LE edema B/L  Derm: no rashes  Neuro: non-focal    LABS:      130<L>  |  96<L>  |  31<H>  ----------------------------<  115<H>  4.4   |  19<L>  |  1.08    Ca    7.9<L>      23 Mar 2018 06:46    TPro  5.8<L>  /  Alb  3.1<L>  /  TBili  0.6  /  DBili      /  AST  222<H>  /  ALT  95<H>  /  AlkPhos  85      Creatinine Trend: 1.08 <--, 1.26 <--                        10.2   11.98 )-----------( 150      ( 23 Mar 2018 06:46 )             30.5     Urine Studies:  Urinalysis Basic - ( 22 Mar 2018 11:17 )    Color: YELLOW / Appearance: CLEAR / S.022 / pH: 6.0  Gluc: NEGATIVE / Ketone: TRACE  / Bili: NEGATIVE / Urobili: NORMAL mg/dL   Blood: MODERATE / Protein: 100 mg/dL / Nitrite: NEGATIVE   Leuk Esterase: TRACE / RBC: 0-2 / WBC 5-10   Sq Epi:  / Non Sq Epi:  / Bacteria:           RADIOLOGY & ADDITIONAL STUDIES:  < from: CT Chest No Cont (18 @ 12:57) >  IMPRESSION: No sequela of acute trauma in the chest.    < end of copied text >

## 2018-03-23 NOTE — CONSULT NOTE ADULT - SUBJECTIVE AND OBJECTIVE BOX
Patient is a 89y old  Female who presents with a chief complaint of S/P Fall- found on the floor by daughter (22 Mar 2018 16:49)      HPI:  89 y/o F with history of HTN, HLD, Palpitation, insomnia, anxiety present with unwitnessed fall. Patient was found on the bathroom floor by her daughter. Unknown how long patient was on the floor. Patient states she only remembers waking up from the floor. Patient c/o back, right shoulder pain and left knee pain. Patient denies chest pain, palpitation at the time, sob, cough, n/v/d/, fever, chills, no dizziness or weakness. In ED patient was found to have UTI and was given one dose of Ceftriaxone and Zosyn. Also was found to have CK of 5364 and Trop of 0.10 and was given bolus of 1L of NS. (22 Mar 2018 16:49)   I have seen and examined the patient and interviewed her " She has no underlying pulmonary history and came in afer she was found on floor by  her daughter: She never had any chest pain or coughing episoe prior to this event:     ?FOLLOWING PRESENT  [ ] Hx of PE/DVT, [ ] Hx COPD, [ ] Hx of Asthma, [ ] Hx of Hospitalization, [ ]  Hx of BiPAP/CPAP use, [ ] Hx of CHICO    Allergies    Eye cream from the 1960s Neocortef ?spelling caused eyes to becomes swollen (Unknown)  neomycin (Unknown)  soaps and creams causes rash uses only sensitive skin soap (Rash)  sulfa drugs (Unknown)  Voltaren (Rash)    Intolerances        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  Hypertension  Hyperlipidemia  Insomnia  Breast Lump  Seasonal Allergies  Hyperlipemia  Anxiety: palpitations  Status post cataract extraction: OS  Status post hysterectomy: endometrial cancer  History of D&C- 11  History of Colonoscopy-   History of Breast Lump/Mass Excision- benighn- left-       FAMILY HISTORY:      Social History: [ x ] TOBACCO                  [  x] ETOH                                 [  x] IVDA/DRUGS    REVIEW OF SYSTEMS      General:	x  x  Skin/Breast:  	  Ophthalmologic:x  	  ENMT:	x    Respiratory and Thorax:x  	  Cardiovascular:	x    Gastrointestinal:	x    Genitourinary:	x    Musculoskeletal:	x    Neurological:	syncope    Psychiatric:	x    Hematology/Lymphatics:	x    Endocrine:	x    Allergic/Immunologic:	x    MEDICATIONS  (STANDING):  aspirin enteric coated 81 milliGRAM(s) Oral daily  atorvastatin 10 milliGRAM(s) Oral at bedtime  busPIRone 10 milliGRAM(s) Oral two times a day  cefTRIAXone   IVPB 1 Gram(s) IV Intermittent every 24 hours  ergocalciferol 75613 Unit(s) Oral <User Schedule>  heparin  Injectable 5000 Unit(s) SubCutaneous every 12 hours  metoprolol succinate ER 25 milliGRAM(s) Oral daily  mirtazapine 15 milliGRAM(s) Oral at bedtime  sodium chloride 0.9% lock flush 3 milliLiter(s) IV Push every 8 hours  sodium chloride 0.9%. 1000 milliLiter(s) (100 mL/Hr) IV Continuous <Continuous>    MEDICATIONS  (PRN):  ALPRAZolam 0.5 milliGRAM(s) Oral three times a day PRN Anxiety  zolpidem 5 milliGRAM(s) Oral at bedtime PRN Insomnia  zolpidem 5 milliGRAM(s) Oral at bedtime PRN Insomnia       Vital Signs Last 24 Hrs  T(C): 36.7 (23 Mar 2018 12:02), Max: 37 (22 Mar 2018 18:59)  T(F): 98 (23 Mar 2018 12:02), Max: 98.6 (22 Mar 2018 18:59)  HR: 160 (23 Mar 2018 12:02) (86 - 160)  BP: 98/57 (23 Mar 2018 12:02) (98/57 - 149/85)  BP(mean): --  RR: 20 (23 Mar 2018 12:02) (16 - 20)  SpO2: 95% (23 Mar 2018 12:02) (95% - 98%)        I&O's Summary      Physical Exam:   GENERAL: NAD, well-groomed, well-developed  HEENT: SOCORRO/   Atraumatic, Normocephalic  ENMT: No tonsillar erythema, exudates, or enlargement; Moist mucous membranes, Good dentition, No lesions  NECK: Supple, No JVD, Normal thyroid  CHEST/LUNG: Clear to auscultation bilaterally; No rales, rhonchi, wheezing, or rubs  CVS: Regular rate and rhythm; No murmurs, rubs, or gallops  GI: : Soft, Nontender, Nondistended; Bowel sounds present  NERVOUS SYSTEM:  Alert & Oriented X3, Good concentration; Motor Strength 5/5 B/L upper and lower extremities; DTRs 2+ intact and symmetric  EXTREMITIES:  2+ Peripheral Pulses, No clubbing, cyanosis, or edema  LYMPH: No lymphadenopathy noted  SKIN: No rashes or lesions  ENDOCRINOLOGY: No Thyromegaly  PSYCH: Appropriate    Labs:  1.5<41<4>>< 24<<7.425>>1.5<<3><<4><<5<<< 249>>  CARDIAC MARKERS ( 23 Mar 2018 06:46 )  x     / 0.08 ng/mL / 6077 u/L / x     / x      CARDIAC MARKERS ( 22 Mar 2018 19:37 )  x     / 0.12 ng/mL / 7059 u/L / 85.27 ng/mL / x      CARDIAC MARKERS ( 22 Mar 2018 11:33 )  x     / 0.10 ng/mL / 5364 u/L / 92.66 ng/mL / x                                10.2   11.98 )-----------( 150      ( 23 Mar 2018 06:46 )             30.5                         11.5   15.42 )-----------( 194      ( 22 Mar 2018 11:33 )             33.6     03-23    130<L>  |  96<L>  |  31<H>  ----------------------------<  115<H>  4.4   |  19<L>  |  1.08  03-22    128<L>  |  88<L>  |  33<H>  ----------------------------<  126<H>  5.1   |  24  |  1.26    Ca    7.9<L>      23 Mar 2018 06:46  Ca    9.1      22 Mar 2018 11:33    TPro  5.8<L>  /  Alb  3.1<L>  /  TBili  0.6  /  DBili  x   /  AST  222<H>  /  ALT  95<H>  /  AlkPhos  85  03-23  TPro  6.8  /  Alb  3.9  /  TBili  1.1  /  DBili  x   /  AST  129<H>  /  ALT  42<H>  /  AlkPhos  61  03-22    CAPILLARY BLOOD GLUCOSE        LIVER FUNCTIONS - ( 23 Mar 2018 06:46 )  Alb: 3.1 g/dL / Pro: 5.8 g/dL / ALK PHOS: 85 u/L / ALT: 95 u/L / AST: 222 u/L / GGT: x           PT/INR - ( 22 Mar 2018 14:50 )   PT: 13.1 SEC;   INR: 1.14          PTT - ( 22 Mar 2018 14:50 )  PTT:24.4 SEC  Urinalysis Basic - ( 22 Mar 2018 11:17 )    Color: YELLOW / Appearance: CLEAR / S.022 / pH: 6.0  Gluc: NEGATIVE / Ketone: TRACE  / Bili: NEGATIVE / Urobili: NORMAL mg/dL   Blood: MODERATE / Protein: 100 mg/dL / Nitrite: NEGATIVE   Leuk Esterase: TRACE / RBC: 0-2 / WBC 5-10   Sq Epi: x / Non Sq Epi: x / Bacteria: x      D DImer  Serum Pro-Brain Natriuretic Peptide: 55800 pg/mL ( @ 11:33)    Cultures:           Wound culture:                 @ 12:50  Organism --  Culture w/ gram stain --  Specimen Source URINE MIDSTREAM      Abscess culture:              @ 12:50  Organism --  Gram Stain --  Specimen Source URINE MIDSTREAM        Tissue culture:            @ 12:50  Organism --  Gram Stain --  Specimen Source URINE MIDSTREAM      Body Fluid Smear & Culture:                         @ 12:50  AFB Smear  --  Culture Acid Fast Body Fluid w/ Smear  --  Culture Acid Fast Smear Concentrated   --    Culture Results:     --  Specimen Source URINE MIDSTREAM        < from: Xray Chest 1 View- PORTABLE-Urgent (18 @ 13:42) >  EXAM:  XR CHEST PORTABLE URGENT 1V        PROCEDURE DATE:  Mar 22 2018         INTERPRETATION:  CLINICAL INDICATION: fever    EXAM:  Supine frontal chest from 3/22/2013 at 1342. Compared to chest CT from   earlier the same day.    Rotated left.    IMPRESSION:  Stable cardiac and mediastinal silhouettes including left retrocardiac   hiatal hernia shadow.    Vague hazy patchy opacity again seen in medial right upper lung[. Clear   remaining visualized lungs. No pleural effusions or pneumothorax.    Trachea projects to left of midline but proportionate to degree of   patient rotation.    Generalized osteopenia and spinal degenerative changes. No acute or   focally aggressive appearing osseous abnormalities.              < from: CT Chest No Cont (18 @ 12:57) >        INTERPRETATION:  CLINICAL INFORMATION: Fall. Weakness.    COMPARISON: None.    PROCEDURE:   CT of the Chest was performed without intravenous contrast.  Sagittal and coronal reformats wereperformed.    FINDINGS:    LUNGS AND LARGE AIRWAYS: Patent central airways. Scattered subcentimeter   pulmonary nodules. For example:  *  Right lower lobe (series 2, image 72), measuring 0.3 cm.  PLEURA: Trace bilateral pleural effusions.  VESSELS: Atherosclerotic calcifications.   HEART: Heart size is normal. No pericardial effusion.  MEDIASTINUM AND CHERI: Small hiatal hernia.  CHEST WALL AND LOWER NECK: Subcentimeter hypodense lesions in both   thyroid lobes, too small to characterize.  VISUALIZED UPPER ABDOMEN: Within normal limits.  BONES: Degenerative changes of the spine.    IMPRESSION: No sequela of acute trauma in the chest.                  IDALMIS JOSEPH M.D., ATTENDING RADIOLOGIST  This document has been electronically signed. Mar 22 2018  1:34PM        < end of copied text >      KEE RODRIGUEZ M.D.,ATTENDING RADIOLOGIST  This document has been electronically signed. Mar 22 2018  2:03PM        < end of copied text >      Studies  Chest X-RAY  CT SCAN Chest   CT Abdomen  Venous Dopplers: LE:   Others

## 2018-03-23 NOTE — PHYSICAL THERAPY INITIAL EVALUATION ADULT - ADDITIONAL COMMENTS
Patient lives alone in a home with steps to enter; patient uses a Single Axis Cane or rolling walker

## 2018-03-23 NOTE — CONSULT NOTE ADULT - ATTENDING COMMENTS
[facility] NEPHROLOGY  Chester Rosa M.D.  Esteban Rouse D.O.  Marti Huber M.D.  Marielena Saenz, MSN, ANP-C    Telephone: [phone]  Facsimile: [phone]    [address] [place] [address]

## 2018-03-23 NOTE — PROGRESS NOTE ADULT - ASSESSMENT
89 year old woman with history of Hyperlipemia, Hypertension, Insomnia admitted with unwitnessed fall vs syncope.  Patient was found on the floor by her daughter.       1. Syncope vs fall  appears clinically hypovolemic  hydrate  tele, brief PAT noted, otherwise NSR/PAC  pending echo r/o cmp, r/o valve disease  will consider eps eval in setting of conduction  disease and possible  syncope     2.htn   bp low normal   continue to  Hold metoprolol    3. Rhabdo   hydrate  trend CK   renal eval  for jimmy, rhabdo    4. fever, uti  iv abx  med/id eval   blood cultured neg     5. pulmo f/u for hypoxia  ct chest noted   Pulmonary nodules/lesions  chronic ??  no other signs of pna  no overt chf    dvt ppx 89 year old woman with history of Hyperlipemia, Hypertension, Insomnia admitted with unwitnessed fall vs syncope.  Patient was found on the floor by her daughter.       1. Syncope vs fall  appears clinically hypovolemic  hydrate  tele, brief PAT noted, otherwise NSR/PAC  elevate trop and CK noted likely demand ischemia in the setting of recent fall/ rhabdo   pending echo r/o cmp, r/o valve disease  will consider eps eval in setting of conduction  disease and possible  syncope     2.htn   bp low normal   continue to  Hold metoprolol    3. Rhabdo   hydrate  trend CK   renal eval  for jimmy, rhabdo    4. fever, uti  iv abx  med/id eval   blood cultured neg     5. pulmo f/u for hypoxia  ct chest noted   Pulmonary nodules/lesions  chronic ??  no other signs of pna  no overt chf    dvt ppx 89 year old woman with history of Hyperlipemia, Hypertension, Insomnia admitted with unwitnessed fall vs syncope.  Patient was found on the floor by her daughter.       1. Syncope vs fall  appears clinically hypovolemic  hydrate  tele, brief PATs noted, otherwise NSR/PAC  restart metoprolol 12.5 mg BID   elevate trop and CK noted likely demand ischemia in the setting of recent fall/ rhabdo   pending echo r/o cmp, r/o valve disease  will consider eps eval in setting of conduction  disease and possible  syncope     2.htn   restarting BB at a lower dose of ectopy on tele   monitor BP     3. Rhabdo   hydrate  trend CK   renal eval  for jimmy, rhabdo    4. fever, uti  iv abx  med/id eval   blood cultured neg     5. pulmo f/u for hypoxia  ct chest noted   Pulmonary nodules/lesions  chronic ??  no other signs of pna  no overt chf    dvt ppx

## 2018-03-23 NOTE — CONSULT NOTE ADULT - ASSESSMENT
89 y/o F with history of HTN, HLD, Palpitation, insomnia, anxiety present with unwitnessed fall. Patient was found on the bathroom floor by her daughter.  Patient c/o back, right shoulder pain and left knee pain.  In ED patient was found to have UTI and was given one dose of Ceftriaxone and Zosyn. Also was found to have CK of 5364 and Trop of 0.10 and was given bolus of 1L of NS
89 year old female with history of HTN presents with syncope. Nephrology consulted for elevated Scr.
89 year old woman with history of Hyperlipemia, Hypertension, Insomnia admitted with unwitnessed fall vs syncope.  Patient was found on the floor by her daughter.       1. Syncope vs fall  appears clinically hypovolemic  hydrate  tele  check echo r/o cmp, r/o valve disease  will consider eps eval in setting of cond disease and poss syncope     2. Hold metoprolol  sbp borderline now    3. Rhabdo   hydrate  renal eval  for jimmy, rhabdo    4. fever, uti  iv abx  med/id eval   f/u bcx    5. pulmo eval for hypoxia  chronic ??  no other signs of pna  no overt chf    dvt ppx
89 y/o F with history of HTN, HLD, Palpitation, insomnia, anxiety present with unwitnessed fall. Patient was found on the bathroom floor by her daughter. Unknown how long patient was on the floor. Patient states she only remembers waking up from the floor. Patient c/o back, right shoulder pain and left knee pain. Patient denies chest pain, palpitation at the time, sob, cough, n/v/d/, fever, chills, no dizziness or weakness. In ED patient was found to have UTI and was given one dose of Ceftriaxone and Zosyn. Also was found to have CK of 5364 and Trop of 0.10 and was given bolus of 1L of NS. (22 Mar 2018 16:49)    Pt finished a 5 day course of cipro for UTI about a month ago.  She currently does not complaint of burning, urgency or increased frequency of urination.  No suprapubic discomfort.  UA (+) blood, neg for nit/trace LE.  Urine cultures have been negative.  WBC and temp curve improved.  CPK improved from yesterday.    Problem/Plan - 1:    ·	Fever/Leukocytosis    - UA and urine cultures negative for UTI.  CT chest without evidence for pna.  Pt currently without other focal signs on exam suggestive of infectious process.    - Suspect leukocytosis and mild fever likely reactive to rhabomyolysis.  D/C antibiotics for now observe off.    - Cont fluid hydration, monitor CPK levels.  Monitor CBC and temp curve.    Will follow,    Dara Mccartney  [phone]

## 2018-03-23 NOTE — CONSULT NOTE ADULT - SUBJECTIVE AND OBJECTIVE BOX
Patient is a 89y old  Female who presents with a chief complaint of S/P Fall- found on the floor by daughter (22 Mar 2018 16:49)      HPI:  89 y/o F with history of HTN, HLD, Palpitation, insomnia, anxiety present with unwitnessed fall. Patient was found on the bathroom floor by her daughter. Unknown how long patient was on the floor. Patient states she only remembers waking up from the floor. Patient c/o back, right shoulder pain and left knee pain. Patient denies chest pain, palpitation at the time, sob, cough, n/v/d/, fever, chills, no dizziness or weakness. In ED patient was found to have UTI and was given one dose of Ceftriaxone and Zosyn. Also was found to have CK of 5364 and Trop of 0.10 and was given bolus of 1L of NS. (22 Mar 2018 16:49)      REVIEW OF SYSTEMS:    CONSTITUTIONAL: No fever, weight loss, or fatigue  EYES: No eye pain, visual disturbances, or discharge  ENMT:  No sore throat  NECK: No pain or stiffness  RESPIRATORY: No cough, wheezing, chills or hemoptysis; No shortness of breath  CARDIOVASCULAR: No chest pain, palpitations, dizziness, or leg swelling  GASTROINTESTINAL: No abdominal or epigastric pain. No nausea, vomiting, or hematemesis; No diarrhea or constipation. No melena or hematochezia.  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  Hypertension  Hyperlipidemia  Insomnia  Breast Lump  Seasonal Allergies  Hyperlipemia  Anxiety: palpitations  Status post cataract extraction: OS  Status post hysterectomy: endometrial cancer  History of D&C- 11  History of Colonoscopy-   History of Breast Lump/Mass Excision- benighn- left-       Allergies    Eye cream from the  Neocortef ?spelling caused eyes to becomes swollen (Unknown)  neomycin (Unknown)  soaps and creams causes rash uses only sensitive skin soap (Rash)  sulfa drugs (Unknown)  Voltaren (Rash)    Intolerances        FAMILY HISTORY:  No sig history    SOCIAL HISTORY:    No reported ivdu, etoh, smoking    MEDICATIONS  (STANDING):  aspirin enteric coated 81 milliGRAM(s) Oral daily  atorvastatin 10 milliGRAM(s) Oral at bedtime  busPIRone 10 milliGRAM(s) Oral two times a day  cefTRIAXone   IVPB 1 Gram(s) IV Intermittent every 24 hours  ergocalciferol 15960 Unit(s) Oral <User Schedule>  heparin  Injectable 5000 Unit(s) SubCutaneous every 12 hours  metoprolol succinate ER 25 milliGRAM(s) Oral daily  mirtazapine 15 milliGRAM(s) Oral at bedtime  sodium chloride 0.9% lock flush 3 milliLiter(s) IV Push every 8 hours  sodium chloride 0.9%. 1000 milliLiter(s) (100 mL/Hr) IV Continuous <Continuous>    MEDICATIONS  (PRN):  ALPRAZolam 0.5 milliGRAM(s) Oral three times a day PRN Anxiety  zolpidem 5 milliGRAM(s) Oral at bedtime PRN Insomnia  zolpidem 5 milliGRAM(s) Oral at bedtime PRN Insomnia      Vital Signs Last 24 Hrs  T(C): 36.7 (23 Mar 2018 12:02), Max: 37 (22 Mar 2018 18:59)  T(F): 98 (23 Mar 2018 12:02), Max: 98.6 (22 Mar 2018 18:59)  HR: 160 (23 Mar 2018 12:02) (86 - 160)  BP: 98/57 (23 Mar 2018 12:02) (98/57 - 149/85)  BP(mean): --  RR: 20 (23 Mar 2018 12:02) (16 - 20)  SpO2: 95% (23 Mar 2018 12:02) (95% - 98%)    PHYSICAL EXAM:    GENERAL: NAD, well-groomed  HEAD:  Atraumatic, Normocephalic  EYES: EOMI, PERRLA, conjunctiva and sclera clear  ENMT: No tonsillar erythema, exudates, or enlargement; Moist mucous membranes  NECK: Supple, No JVD  CHEST/LUNG: Clear to percussion bilaterally; No rales, rhonchi, wheezing, or rubs  HEART: Regular rate and rhythm; No murmurs, rubs, or gallops  ABDOMEN: Soft, Nontender, Nondistended; Bowel sounds present  EXTREMITIES:  2+ Peripheral Pulses, No clubbing, cyanosis, or edema  LYMPH: No lymphadenopathy noted  SKIN: No rashes or lesions    LABS:  CBC Full  -  ( 23 Mar 2018 06:46 )  WBC Count : 11.98 K/uL  Hemoglobin : 10.2 g/dL  Hematocrit : 30.5 %  Platelet Count - Automated : 150 K/uL  Mean Cell Volume : 90.8 fL  Mean Cell Hemoglobin : 30.4 pg  Mean Cell Hemoglobin Concentration : 33.4 %  Auto Neutrophil # : 10.15 K/uL  Auto Lymphocyte # : 0.72 K/uL  Auto Monocyte # : 1.04 K/uL  Auto Eosinophil # : 0.01 K/uL  Auto Basophil # : 0.01 K/uL  Auto Neutrophil % : 84.7 %  Auto Lymphocyte % : 6.0 %  Auto Monocyte % : 8.7 %  Auto Eosinophil % : 0.1 %  Auto Basophil % : 0.1 %          130<L>  |  96<L>  |  31<H>  ----------------------------<  115<H>  4.4   |  19<L>  |  1.08    Ca    7.9<L>      23 Mar 2018 06:46    TPro  5.8<L>  /  Alb  3.1<L>  /  TBili  0.6  /  DBili  x   /  AST  222<H>  /  ALT  95<H>  /  AlkPhos  85  03-23      LIVER FUNCTIONS - ( 23 Mar 2018 06:46 )  Alb: 3.1 g/dL / Pro: 5.8 g/dL / ALK PHOS: 85 u/L / ALT: 95 u/L / AST: 222 u/L / GGT: x                 MICROBIOLOGY:    Urinalysis Basic - ( 22 Mar 2018 11:17 )  Color: YELLOW / Appearance: CLEAR / S.022 / pH: 6.0  Gluc: NEGATIVE / Ketone: TRACE  / Bili: NEGATIVE / Urobili: NORMAL mg/dL   Blood: MODERATE / Protein: 100 mg/dL / Nitrite: NEGATIVE   Leuk Esterase: TRACE / RBC: 0-2 / WBC 5-10   Sq Epi: x / Non Sq Epi: x / Bacteria: x    Culture - Urine (18 @ 12:50)    Culture - Urine:   NO GROWTH TO DATE    Specimen Source: URINE MIDSTREAM                RADIOLOGY:    < from: Xray Humerus, Right (03.22.18 @ 13:44) >  IMPRESSION:  No fractures, dislocations, or AC separation.    Preserved shoulder and elbow joint spaces.     Laterally downsloped acromial orientation could correlate with or   predispose to a subacromial impingement process.     Generalized osteopenia otherwise no discrete lytic or blastic lesions.    No periarticular soft tissue calcifications.     IV cannulaand antecubital region.    < end of copied text >      < from: Xray Knee 4 Views, Left (18 @ 13:43) >  IMPRESSION:  No fracture, dislocation, or joint effusion.    Preserved joint spaces with smooth and intact articular surfaces. No   joint margin erosions or intra-articular or periarticular calcifications.    Small superior patellar enthesophyte otherwise unremarkable quadriceps   and patellar tendon shadows.    No destructive osseous lesions or periosteal reaction.    < end of copied text >      < from: Xray Shoulder 2 Views, Right (18 @ 13:43) >  IMPRESSION:  No fractures, dislocations, or AC separation.    Preserved shoulder and elbow joint spaces.     Laterally downsloped acromial orientation could correlate with or   predispose to a subacromial impingement process.     Generalized osteopenia otherwise no discrete lytic or blastic lesions.    No periarticular soft tissue calcifications.     IV cannulaand antecubital region.    < end of copied text >      < from: Xray Chest 1 View- PORTABLE-Urgent (18 @ 13:42) >  IMPRESSION:  Stable cardiac and mediastinal silhouettes including left retrocardiac   hiatal hernia shadow.    Vague hazy patchy opacity again seen in medial right upper lung[. Clear   remaining visualized lungs. No pleural effusions or pneumothorax.    Trachea projects to left of midline but proportionate to degree of   patient rotation.    Generalized osteopenia and spinal degenerative changes. No acute or   focally aggressive appearing osseous abnormalities.    < end of copied text >      < from: CT Cervical Spine No Cont (18 @ 13:00) >  IMPRESSION:  CT head: No acute intracranial hemorrhage..  CT cervical spine: Normal alignment. No acute fracture.     For thoracic findings please see report of CT chest performed   concurrently.    < end of copied text >      < from: CT Chest No Cont (18 @ 12:57) >  FINDINGS:    LUNGS AND LARGE AIRWAYS: Patent central airways. Scattered subcentimeter   pulmonary nodules. For example:  *  Right lower lobe (series 2, image 72), measuring 0.3 cm.  PLEURA: Trace bilateral pleural effusions.  VESSELS: Atherosclerotic calcifications.   HEART: Heart size is normal. No pericardial effusion.  MEDIASTINUM AND CHERI: Small hiatal hernia.  CHEST WALL AND LOWER NECK: Subcentimeter hypodense lesions in both   thyroid lobes, too small to characterize.  VISUALIZED UPPER ABDOMEN: Within normal limits.  BONES: Degenerative changes of the spine.    IMPRESSION: No sequela of acute trauma in the chest.    < end of copied text >      < from: CT Head No Cont (18 @ 12:54) >    IMPRESSION:  CT head: No acute intracranial hemorrhage..  CT cervical spine: Normal alignment. No acute fracture.     For thoracic findings please see report of CT chest performed   concurrently.    < end of copied text > Patient is a 89y old  Female who presents with a chief complaint of S/P Fall- found on the floor by daughter (22 Mar 2018 16:49)      HPI:  89 y/o F with history of HTN, HLD, Palpitation, insomnia, anxiety present with unwitnessed fall. Patient was found on the bathroom floor by her daughter. Unknown how long patient was on the floor. Patient states she only remembers waking up from the floor. Patient c/o back, right shoulder pain and left knee pain. Patient denies chest pain, palpitation at the time, sob, cough, n/v/d/, fever, chills, no dizziness or weakness. In ED patient was found to have UTI and was given one dose of Ceftriaxone and Zosyn. Also was found to have CK of 5364 and Trop of 0.10 and was given bolus of 1L of NS. (22 Mar 2018 16:49)    Pt finished a 5 day course of cipro for UTI about a month ago.  She currently does not complaint of burning, urgency or increased frequency of urination.  No suprapubic discomfort.  UA (+) blood, neg for nit/trace LE.  Urine cultures have been negative.  WBC and temp curve improved.  CPK improved from yesterday.      REVIEW OF SYSTEMS:    CONSTITUTIONAL: No fever, weight loss, or fatigue  EYES: No eye pain, visual disturbances, or discharge  ENMT:  No sore throat  NECK: No pain or stiffness  RESPIRATORY: No cough, wheezing, chills or hemoptysis; No shortness of breath  CARDIOVASCULAR: No chest pain, palpitations, dizziness, or leg swelling  GASTROINTESTINAL: No abdominal or epigastric pain. No nausea, vomiting, or hematemesis; No diarrhea or constipation. No melena or hematochezia.  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  Hypertension  Hyperlipidemia  Insomnia  Breast Lump  Seasonal Allergies  Hyperlipemia  Anxiety: palpitations  Status post cataract extraction: OS  Status post hysterectomy: endometrial cancer  History of D&C- 11  History of Colonoscopy- 2011/Sept  History of Breast Lump/Mass Excision- benighn- left-       Allergies    Eye cream from the 1960s Neocortef ?spelling caused eyes to becomes swollen (Unknown)  neomycin (Unknown)  soaps and creams causes rash uses only sensitive skin soap (Rash)  sulfa drugs (Unknown)  Voltaren (Rash)    Intolerances        FAMILY HISTORY:  No sig history    SOCIAL HISTORY:    No reported ivdu, etoh, smoking    MEDICATIONS  (STANDING):  aspirin enteric coated 81 milliGRAM(s) Oral daily  atorvastatin 10 milliGRAM(s) Oral at bedtime  busPIRone 10 milliGRAM(s) Oral two times a day  cefTRIAXone   IVPB 1 Gram(s) IV Intermittent every 24 hours  ergocalciferol 50028 Unit(s) Oral <User Schedule>  heparin  Injectable 5000 Unit(s) SubCutaneous every 12 hours  metoprolol succinate ER 25 milliGRAM(s) Oral daily  mirtazapine 15 milliGRAM(s) Oral at bedtime  sodium chloride 0.9% lock flush 3 milliLiter(s) IV Push every 8 hours  sodium chloride 0.9%. 1000 milliLiter(s) (100 mL/Hr) IV Continuous <Continuous>    MEDICATIONS  (PRN):  ALPRAZolam 0.5 milliGRAM(s) Oral three times a day PRN Anxiety  zolpidem 5 milliGRAM(s) Oral at bedtime PRN Insomnia  zolpidem 5 milliGRAM(s) Oral at bedtime PRN Insomnia      Vital Signs Last 24 Hrs  T(C): 36.7 (23 Mar 2018 12:02), Max: 37 (22 Mar 2018 18:59)  T(F): 98 (23 Mar 2018 12:02), Max: 98.6 (22 Mar 2018 18:59)  HR: 160 (23 Mar 2018 12:02) (86 - 160)  BP: 98/57 (23 Mar 2018 12:02) (98/57 - 149/85)  BP(mean): --  RR: 20 (23 Mar 2018 12:02) (16 - 20)  SpO2: 95% (23 Mar 2018 12:02) (95% - 98%)    PHYSICAL EXAM:    GENERAL: NAD, well-groomed  HEAD:  Atraumatic, Normocephalic  EYES: EOMI, PERRLA, conjunctiva and sclera clear  ENMT: No tonsillar erythema, exudates, or enlargement; Moist mucous membranes  NECK: Supple, No JVD  CHEST/LUNG: Clear to percussion bilaterally; No rales, rhonchi, wheezing, or rubs  HEART: Regular rate and rhythm; No murmurs, rubs, or gallops  ABDOMEN: Soft, Nontender, Nondistended; Bowel sounds present  EXTREMITIES:  2+ Peripheral Pulses, No clubbing, cyanosis, or edema  LYMPH: No lymphadenopathy noted  SKIN: No rashes or lesions    LABS:  CBC Full  -  ( 23 Mar 2018 06:46 )  WBC Count : 11.98 K/uL  Hemoglobin : 10.2 g/dL  Hematocrit : 30.5 %  Platelet Count - Automated : 150 K/uL  Mean Cell Volume : 90.8 fL  Mean Cell Hemoglobin : 30.4 pg  Mean Cell Hemoglobin Concentration : 33.4 %  Auto Neutrophil # : 10.15 K/uL  Auto Lymphocyte # : 0.72 K/uL  Auto Monocyte # : 1.04 K/uL  Auto Eosinophil # : 0.01 K/uL  Auto Basophil # : 0.01 K/uL  Auto Neutrophil % : 84.7 %  Auto Lymphocyte % : 6.0 %  Auto Monocyte % : 8.7 %  Auto Eosinophil % : 0.1 %  Auto Basophil % : 0.1 %          130<L>  |  96<L>  |  31<H>  ----------------------------<  115<H>  4.4   |  19<L>  |  1.08    Ca    7.9<L>      23 Mar 2018 06:46    TPro  5.8<L>  /  Alb  3.1<L>  /  TBili  0.6  /  DBili  x   /  AST  222<H>  /  ALT  95<H>  /  AlkPhos  85  03-23      LIVER FUNCTIONS - ( 23 Mar 2018 06:46 )  Alb: 3.1 g/dL / Pro: 5.8 g/dL / ALK PHOS: 85 u/L / ALT: 95 u/L / AST: 222 u/L / GGT: x                 MICROBIOLOGY:    Urinalysis Basic - ( 22 Mar 2018 11:17 )  Color: YELLOW / Appearance: CLEAR / S.022 / pH: 6.0  Gluc: NEGATIVE / Ketone: TRACE  / Bili: NEGATIVE / Urobili: NORMAL mg/dL   Blood: MODERATE / Protein: 100 mg/dL / Nitrite: NEGATIVE   Leuk Esterase: TRACE / RBC: 0-2 / WBC 5-10   Sq Epi: x / Non Sq Epi: x / Bacteria: x    Culture - Urine (18 @ 12:50)    Culture - Urine:   NO GROWTH TO DATE    Specimen Source: URINE MIDSTREAM                RADIOLOGY:    < from: Xray Humerus, Right (18 @ 13:44) >  IMPRESSION:  No fractures, dislocations, or AC separation.    Preserved shoulder and elbow joint spaces.     Laterally downsloped acromial orientation could correlate with or   predispose to a subacromial impingement process.     Generalized osteopenia otherwise no discrete lytic or blastic lesions.    No periarticular soft tissue calcifications.     IV cannulaand antecubital region.    < end of copied text >      < from: Xray Knee 4 Views, Left (18 @ 13:43) >  IMPRESSION:  No fracture, dislocation, or joint effusion.    Preserved joint spaces with smooth and intact articular surfaces. No   joint margin erosions or intra-articular or periarticular calcifications.    Small superior patellar enthesophyte otherwise unremarkable quadriceps   and patellar tendon shadows.    No destructive osseous lesions or periosteal reaction.    < end of copied text >      < from: Xray Shoulder 2 Views, Right (18 @ 13:43) >  IMPRESSION:  No fractures, dislocations, or AC separation.    Preserved shoulder and elbow joint spaces.     Laterally downsloped acromial orientation could correlate with or   predispose to a subacromial impingement process.     Generalized osteopenia otherwise no discrete lytic or blastic lesions.    No periarticular soft tissue calcifications.     IV cannulaand antecubital region.    < end of copied text >      < from: Xray Chest 1 View- PORTABLE-Urgent (18 @ 13:42) >  IMPRESSION:  Stable cardiac and mediastinal silhouettes including left retrocardiac   hiatal hernia shadow.    Vague hazy patchy opacity again seen in medial right upper lung[. Clear   remaining visualized lungs. No pleural effusions or pneumothorax.    Trachea projects to left of midline but proportionate to degree of   patient rotation.    Generalized osteopenia and spinal degenerative changes. No acute or   focally aggressive appearing osseous abnormalities.    < end of copied text >      < from: CT Cervical Spine No Cont (18 @ 13:00) >  IMPRESSION:  CT head: No acute intracranial hemorrhage..  CT cervical spine: Normal alignment. No acute fracture.     For thoracic findings please see report of CT chest performed   concurrently.    < end of copied text >      < from: CT Chest No Cont (18 @ 12:57) >  FINDINGS:    LUNGS AND LARGE AIRWAYS: Patent central airways. Scattered subcentimeter   pulmonary nodules. For example:  *  Right lower lobe (series 2, image 72), measuring 0.3 cm.  PLEURA: Trace bilateral pleural effusions.  VESSELS: Atherosclerotic calcifications.   HEART: Heart size is normal. No pericardial effusion.  MEDIASTINUM AND CHERI: Small hiatal hernia.  CHEST WALL AND LOWER NECK: Subcentimeter hypodense lesions in both   thyroid lobes, too small to characterize.  VISUALIZED UPPER ABDOMEN: Within normal limits.  BONES: Degenerative changes of the spine.    IMPRESSION: No sequela of acute trauma in the chest.    < end of copied text >      < from: CT Head No Cont (18 @ 12:54) >    IMPRESSION:  CT head: No acute intracranial hemorrhage..  CT cervical spine: Normal alignment. No acute fracture.     For thoracic findings please see report of CT chest performed   concurrently.    < end of copied text >

## 2018-03-23 NOTE — CONSULT NOTE ADULT - PROBLEM SELECTOR RECOMMENDATION 3
Baseline Scr unknown however suspect patient with underlying CKD-3 likely age appropriate. Check spot urine TP/CR given proteinuria on UA. Avoid nephrotoxins. Monitor electrolytes. Baseline Scr unknown however suspect patient with underlying CKD-3 likely age appropriate. Check spot urine TP/CR given proteinuria on UA. UA with pyuria but urine culture negative and patient asymptomatic. Abx as per ID. Avoid nephrotoxins. Monitor electrolytes.

## 2018-03-23 NOTE — PROGRESS NOTE ADULT - SUBJECTIVE AND OBJECTIVE BOX
CARDIOLOGY FOLLOW UP - Dr. Mccartney    CC no chest pain or sob       PHYSICAL EXAM:  T(C): 36.6 (03-23-18 @ 13:51), Max: 37 (03-22-18 @ 18:59)  HR: 154 (03-23-18 @ 13:51) (86 - 160)  BP: 119/82 (03-23-18 @ 13:51) (98/57 - 149/85)  RR: 17 (03-23-18 @ 13:51) (16 - 20)  SpO2: 94% (03-23-18 @ 13:51) (94% - 98%)  Wt(kg): --  I&O's Summary      Appearance: Normal	  Cardiovascular: Normal S1 S2,RRR, No JVD, No murmurs  Respiratory: Lungs clear to auscultation	  Gastrointestinal:  Soft, Non-tender, + BS	  Extremities: Normal range of motion, No clubbing, cyanosis or edema        MEDICATIONS  (STANDING):  aspirin enteric coated 81 milliGRAM(s) Oral daily  atorvastatin 10 milliGRAM(s) Oral at bedtime  busPIRone 10 milliGRAM(s) Oral two times a day  cefTRIAXone   IVPB 1 Gram(s) IV Intermittent every 24 hours  ergocalciferol 98095 Unit(s) Oral <User Schedule>  heparin  Injectable 5000 Unit(s) SubCutaneous every 12 hours  metoprolol succinate ER 25 milliGRAM(s) Oral daily  mirtazapine 15 milliGRAM(s) Oral at bedtime  sodium chloride 0.9% lock flush 3 milliLiter(s) IV Push every 8 hours  sodium chloride 0.9%. 1000 milliLiter(s) (100 mL/Hr) IV Continuous <Continuous>      TELEMETRY: NSR  brief PAT 	    ECG:  	  RADIOLOGY:   DIAGNOSTIC TESTING:  [ ] Echocardiogram:  [ ]  Catheterization:  [ ] Stress Test:    OTHER: 	    LABS:	 	        CKMB: 85.27 ng/mL (03-22 @ 19:37)                          10.2   11.98 )-----------( 150      ( 23 Mar 2018 06:46 )             30.5     03-23    130<L>  |  96<L>  |  31<H>  ----------------------------<  115<H>  4.4   |  19<L>  |  1.08    Ca    7.9<L>      23 Mar 2018 06:46    TPro  5.8<L>  /  Alb  3.1<L>  /  TBili  0.6  /  DBili  x   /  AST  222<H>  /  ALT  95<H>  /  AlkPhos  85  03-23    PT/INR - ( 22 Mar 2018 14:50 )   PT: 13.1 SEC;   INR: 1.14          PTT - ( 22 Mar 2018 14:50 )  PTT:24.4 SEC

## 2018-03-24 DIAGNOSIS — E87.2 ACIDOSIS: ICD-10-CM

## 2018-03-24 LAB
BUN SERPL-MCNC: 36 MG/DL — HIGH (ref 7–23)
CALCIUM SERPL-MCNC: 8.2 MG/DL — LOW (ref 8.4–10.5)
CHLORIDE SERPL-SCNC: 98 MMOL/L — SIGNIFICANT CHANGE UP (ref 98–107)
CK SERPL-CCNC: 3661 U/L — HIGH (ref 25–170)
CO2 SERPL-SCNC: 16 MMOL/L — LOW (ref 22–31)
CREAT SERPL-MCNC: 1.04 MG/DL — SIGNIFICANT CHANGE UP (ref 0.5–1.3)
GLUCOSE SERPL-MCNC: 126 MG/DL — HIGH (ref 70–99)
HCT VFR BLD CALC: 30 % — LOW (ref 34.5–45)
HGB BLD-MCNC: 10.1 G/DL — LOW (ref 11.5–15.5)
MAGNESIUM SERPL-MCNC: 2.1 MG/DL — SIGNIFICANT CHANGE UP (ref 1.6–2.6)
MCHC RBC-ENTMCNC: 29.9 PG — SIGNIFICANT CHANGE UP (ref 27–34)
MCHC RBC-ENTMCNC: 33.7 % — SIGNIFICANT CHANGE UP (ref 32–36)
MCV RBC AUTO: 88.8 FL — SIGNIFICANT CHANGE UP (ref 80–100)
NRBC # FLD: 0 — SIGNIFICANT CHANGE UP
PHOSPHATE SERPL-MCNC: 3.2 MG/DL — SIGNIFICANT CHANGE UP (ref 2.5–4.5)
PLATELET # BLD AUTO: 214 K/UL — SIGNIFICANT CHANGE UP (ref 150–400)
PMV BLD: 11.5 FL — SIGNIFICANT CHANGE UP (ref 7–13)
POTASSIUM SERPL-MCNC: 4.7 MMOL/L — SIGNIFICANT CHANGE UP (ref 3.5–5.3)
POTASSIUM SERPL-SCNC: 4.7 MMOL/L — SIGNIFICANT CHANGE UP (ref 3.5–5.3)
RBC # BLD: 3.38 M/UL — LOW (ref 3.8–5.2)
RBC # FLD: 13.2 % — SIGNIFICANT CHANGE UP (ref 10.3–14.5)
SODIUM SERPL-SCNC: 133 MMOL/L — LOW (ref 135–145)
WBC # BLD: 18.51 K/UL — HIGH (ref 3.8–10.5)
WBC # FLD AUTO: 18.51 K/UL — HIGH (ref 3.8–10.5)

## 2018-03-24 PROCEDURE — 93010 ELECTROCARDIOGRAM REPORT: CPT

## 2018-03-24 RX ORDER — METOPROLOL TARTRATE 50 MG
25 TABLET ORAL
Qty: 0 | Refills: 0 | Status: DISCONTINUED | OUTPATIENT
Start: 2018-03-24 | End: 2018-03-30

## 2018-03-24 RX ORDER — METOPROLOL TARTRATE 50 MG
5 TABLET ORAL ONCE
Qty: 0 | Refills: 0 | Status: COMPLETED | OUTPATIENT
Start: 2018-03-24 | End: 2018-03-24

## 2018-03-24 RX ADMIN — ZOLPIDEM TARTRATE 5 MILLIGRAM(S): 10 TABLET ORAL at 00:53

## 2018-03-24 RX ADMIN — HEPARIN SODIUM 5000 UNIT(S): 5000 INJECTION INTRAVENOUS; SUBCUTANEOUS at 17:34

## 2018-03-24 RX ADMIN — Medication 10 MILLIGRAM(S): at 17:34

## 2018-03-24 RX ADMIN — SODIUM CHLORIDE 3 MILLILITER(S): 9 INJECTION INTRAMUSCULAR; INTRAVENOUS; SUBCUTANEOUS at 21:18

## 2018-03-24 RX ADMIN — ATORVASTATIN CALCIUM 10 MILLIGRAM(S): 80 TABLET, FILM COATED ORAL at 21:19

## 2018-03-24 RX ADMIN — Medication 5 MILLIGRAM(S): at 23:35

## 2018-03-24 RX ADMIN — SODIUM CHLORIDE 3 MILLILITER(S): 9 INJECTION INTRAMUSCULAR; INTRAVENOUS; SUBCUTANEOUS at 13:27

## 2018-03-24 RX ADMIN — Medication 25 MILLIGRAM(S): at 17:34

## 2018-03-24 RX ADMIN — MIRTAZAPINE 15 MILLIGRAM(S): 45 TABLET, ORALLY DISINTEGRATING ORAL at 21:19

## 2018-03-24 RX ADMIN — HEPARIN SODIUM 5000 UNIT(S): 5000 INJECTION INTRAVENOUS; SUBCUTANEOUS at 05:29

## 2018-03-24 RX ADMIN — Medication 5 MILLIGRAM(S): at 02:46

## 2018-03-24 RX ADMIN — Medication 81 MILLIGRAM(S): at 15:53

## 2018-03-24 RX ADMIN — Medication 5 MILLIGRAM(S): at 21:14

## 2018-03-24 RX ADMIN — Medication 0.5 MILLIGRAM(S): at 21:24

## 2018-03-24 RX ADMIN — SODIUM CHLORIDE 3 MILLILITER(S): 9 INJECTION INTRAMUSCULAR; INTRAVENOUS; SUBCUTANEOUS at 05:29

## 2018-03-24 RX ADMIN — Medication 12.5 MILLIGRAM(S): at 05:29

## 2018-03-24 RX ADMIN — Medication 10 MILLIGRAM(S): at 05:29

## 2018-03-24 NOTE — PROGRESS NOTE ADULT - ASSESSMENT
89 year old woman with history of Hyperlipemia, Hypertension, Insomnia admitted with unwitnessed fall vs syncope.  Patient was found on the floor by her daughter.       1. Syncope vs fall  hydrate  await echo     2. Tachycardia  Afl vs SVT  check EKG  iv caridizem 10mg ivp now  inc bb to 25mg bid  hold off on a/c for now  asa 81 qd  elevate trop and CK noted likely demand ischemia in the setting of recent fall/ rhabdo     3. Htn   cont bb, inc dose     4. Rhabdo   hydrate  trend CK   renal f/u    5. fever  off abx, id f/u    6. pulmo f/u   ct results noted     7. Elevated LFTS  ruq sono    dvt ppx  d./w dtr at bedside  d/w with floor pa

## 2018-03-24 NOTE — PROGRESS NOTE ADULT - ASSESSMENT
89 y/o F with history of HTN, HLD, Palpitation, insomnia, anxiety present with unwitnessed fall. Patient was found on the bathroom floor by her daughter.  Patient c/o back, right shoulder pain and left knee pain.  In ED patient was found to have UTI and was given one dose of Ceftriaxone and Zosyn. Also was found to have CK of 5364 and Trop of 0.10 and was given bolus of 1L of NS

## 2018-03-24 NOTE — PROGRESS NOTE ADULT - SUBJECTIVE AND OBJECTIVE BOX
Infectious Diseases progress note:    Subjective:  Pr reportedly more sleepy today "I'm trying to keep myself awake".  Denies fever, chills, abd pain, dysuria, diarrhea.  WBC elevated today.  Daughter at bedside    ROS:  CONSTITUTIONAL:  No fever, chills, rigors  CARDIOVASCULAR:  No chest pain or palpitations  RESPIRATORY:   No SOB, cough, dyspnea on exertion.  No wheezing  GASTROINTESTINAL:  No abd pain, N/V, diarrhea/constipation  EXTREMITIES:  No swelling or joint pain  GENITOURINARY:  No burning on urination, increased frequency or urgency.  No flank pain  NEUROLOGIC:  No HA, visual disturbances  SKIN: No rashes    Allergies    Eye cream from the 1960s Neocortef ?spelling caused eyes to becomes swollen (Unknown)  neomycin (Unknown)  soaps and creams causes rash uses only sensitive skin soap (Rash)  sulfa drugs (Unknown)  Voltaren (Rash)    Intolerances        ANTIBIOTICS/RELEVANT:  antimicrobials    immunologic:    OTHER:  ALPRAZolam 0.5 milliGRAM(s) Oral three times a day PRN  aspirin enteric coated 81 milliGRAM(s) Oral daily  atorvastatin 10 milliGRAM(s) Oral at bedtime  busPIRone 10 milliGRAM(s) Oral two times a day  ergocalciferol 04396 Unit(s) Oral <User Schedule>  heparin  Injectable 5000 Unit(s) SubCutaneous every 12 hours  metoprolol tartrate 25 milliGRAM(s) Oral two times a day  mirtazapine 15 milliGRAM(s) Oral at bedtime  sodium chloride 0.9% lock flush 3 milliLiter(s) IV Push every 8 hours  sodium chloride 0.9%. 1000 milliLiter(s) IV Continuous <Continuous>  zolpidem 5 milliGRAM(s) Oral at bedtime PRN  zolpidem 5 milliGRAM(s) Oral at bedtime PRN      Objective:  Vital Signs Last 24 Hrs  T(C): 36.6 (24 Mar 2018 14:20), Max: 36.6 (24 Mar 2018 14:20)  T(F): 97.9 (24 Mar 2018 14:20), Max: 97.9 (24 Mar 2018 14:20)  HR: 95 (24 Mar 2018 17:33) (78 - 172)  BP: 119/76 (24 Mar 2018 17:33) (118/78 - 153/74)  BP(mean): --  RR: 16 (24 Mar 2018 17:33) (16 - 18)  SpO2: 96% (24 Mar 2018 17:33) (93% - 100%)    PHYSICAL EXAM:  Constitutional:NAD  Eyes:SOCORRO, EOMI  Ear/Nose/Throat: no thrush, mucositis.  Moist mucous membranes	  Neck:no JVD, no lymphadenopathy, supple  Respiratory: CTA ada  Cardiovascular: S1S2 RRR, no murmurs  Gastrointestinal:soft, nontender,  nondistended (+) BS  Extremities:no e/e/c  Skin:  eccymosis b/l UE        LABS:                        10.1   18.51 )-----------( 214      ( 24 Mar 2018 06:20 )             30.0     03-24    133<L>  |  98  |  36<H>  ----------------------------<  126<H>  4.7   |  16<L>  |  1.04    Ca    8.2<L>      24 Mar 2018 06:20  Phos  3.2     03-24  Mg     2.1     03-24    TPro  5.8<L>  /  Alb  3.1<L>  /  TBili  0.6  /  DBili  x   /  AST  222<H>  /  ALT  95<H>  /  AlkPhos  85  03-23            MICROBIOLOGY:    Culture - Urine (03.22.18 @ 12:50)    Culture - Urine:   NO GROWTH AT 24 HOURS    Specimen Source: URINE MIDSTREAM    Culture - Blood (03.22.18 @ 12:43)    Culture - Blood:   NO ORGANISMS ISOLATED  NO ORGANISMS ISOLATED AT 48 HRS.    Specimen Source: BLOOD PERIPHERAL    Culture - Blood (03.22.18 @ 12:43)    Culture - Blood:   NO ORGANISMS ISOLATED  NO ORGANISMS ISOLATED AT 48 HRS.    Specimen Source: BLOOD          RADIOLOGY & ADDITIONAL STUDIES:

## 2018-03-24 NOTE — PROGRESS NOTE ADULT - SUBJECTIVE AND OBJECTIVE BOX
Patient is a 89y old  Female who presents with a chief complaint of S/P Fall- found on the floor by daughter (22 Mar 2018 16:49)      SUBJECTIVE / OVERNIGHT EVENTS:   Feels better.  Denies CP/SOB/Palpitation/HA.    MEDICATIONS  (STANDING):  aspirin enteric coated 81 milliGRAM(s) Oral daily  atorvastatin 10 milliGRAM(s) Oral at bedtime  busPIRone 10 milliGRAM(s) Oral two times a day  ergocalciferol 51414 Unit(s) Oral <User Schedule>  heparin  Injectable 5000 Unit(s) SubCutaneous every 12 hours  metoprolol tartrate 25 milliGRAM(s) Oral two times a day  mirtazapine 15 milliGRAM(s) Oral at bedtime  sodium chloride 0.9% lock flush 3 milliLiter(s) IV Push every 8 hours  sodium chloride 0.9%. 1000 milliLiter(s) (100 mL/Hr) IV Continuous <Continuous>    MEDICATIONS  (PRN):  ALPRAZolam 0.5 milliGRAM(s) Oral three times a day PRN Anxiety  zolpidem 5 milliGRAM(s) Oral at bedtime PRN Insomnia  zolpidem 5 milliGRAM(s) Oral at bedtime PRN Insomnia        CAPILLARY BLOOD GLUCOSE        I&O's Summary    23 Mar 2018 07:01  -  24 Mar 2018 07:00  --------------------------------------------------------  IN: 0 mL / OUT: 2 mL / NET: -2 mL        PHYSICAL EXAM:  GENERAL: NAD, well-developed  HEAD:  Atraumatic, Normocephalic  NECK: Supple, No JVD  CHEST/LUNG: Clear to auscultation bilaterally; No wheezing.  HEART: Regular rate and rhythm; No murmurs, rubs, or gallops  ABDOMEN: Soft, Nontender, Nondistended; Bowel sounds present  EXTREMITIES:   No clubbing, cyanosis, or edema  NEUROLOGY: AAO X 3  SKIN: No rashes    LABS:                        10.1   18.51 )-----------( 214      ( 24 Mar 2018 06:20 )             30.0     03-24    133<L>  |  98  |  36<H>  ----------------------------<  126<H>  4.7   |  16<L>  |  1.04    Ca    8.2<L>      24 Mar 2018 06:20  Phos  3.2     03-24  Mg     2.1     03-24    TPro  5.8<L>  /  Alb  3.1<L>  /  TBili  0.6  /  DBili  x   /  AST  222<H>  /  ALT  95<H>  /  AlkPhos  85  03-23      CARDIAC MARKERS ( 24 Mar 2018 06:20 )  x     / x     / 3661 u/L / x     / x      CARDIAC MARKERS ( 23 Mar 2018 06:46 )  x     / 0.08 ng/mL / 6077 u/L / x     / x            CAPILLARY BLOOD GLUCOSE        03-22 @ 12:50  Culture-urine   NO GROWTH AT 24 HOURS  Culture results --  method type --  Organism --  Organism Identification --  Specimen source URINE MIDSTREAM  03-22 @ 12:43  Culture-urine --  Culture results --  method type --  Organism --  Organism Identification --  Specimen source BLOOD           03-22 @ 12:50  Culture blood --  Culture results --  Gram stain --  Gram stain blood --  Method type --  Organism --  Organism identification --  Specimen source URINE MIDSTREAM   03-22 @ 12:43  Culture blood   NO ORGANISMS ISOLATED  NO ORGANISMS ISOLATED AT 48 HRS.  Culture results --  Gram stain --  Gram stain blood --  Method type --  Organism --  Organism identification --  Specimen source BLOOD      RADIOLOGY & ADDITIONAL TESTS:    Imaging Personally Reviewed:    Consultant(s) Notes Reviewed:      Care Discussed with Consultants/Other Providers:

## 2018-03-24 NOTE — PROGRESS NOTE ADULT - SUBJECTIVE AND OBJECTIVE BOX
[facility] NEPHROLOGY- PROGRESS NOTE, Follow up     Patient is a 89y Female with history of below presents with syncope. Nephrology consulted for elevated Scr. Patient noted to have mild hyponatremia and ALLAN in setting of rhabdomyolysis on admission for which she was started on IVF. Scr and serum sodium noted to improve this 3/23/18however CK levels remain elevated. Patient was found by daughter at home however unsure of how long she had remained on floor.     3/24/18 increased Lethargy     Allergy:  Eye cream from the 1960s Neocortef ?spelling caused eyes to becomes swollen (Unknown)  neomycin (Unknown)  soaps and creams causes rash uses only sensitive skin soap (Rash)  sulfa drugs (Unknown)  Voltaren (Rash)    Hospital Medications:   MEDICATIONS  (STANDING):  aspirin enteric coated 81 milliGRAM(s) Oral daily  atorvastatin 10 milliGRAM(s) Oral at bedtime  busPIRone 10 milliGRAM(s) Oral two times a day  diltiazem Injectable 10 milliGRAM(s) IV Push once  ergocalciferol 37754 Unit(s) Oral <User Schedule>  heparin  Injectable 5000 Unit(s) SubCutaneous every 12 hours  metoprolol tartrate 25 milliGRAM(s) Oral two times a day  mirtazapine 15 milliGRAM(s) Oral at bedtime  sodium chloride 0.9% lock flush 3 milliLiter(s) IV Push every 8 hours  sodium chloride 0.9%. 1000 milliLiter(s) (100 mL/Hr) IV Continuous <Continuous>    REVIEW OF SYSTEMS: Limited 2/2 lethargy and confusion.  Daughter at bedside. Per nurse at bedside awake most of the night, required prn Xanax . Denies pain.   VITALS:  T(F): 97.4 (18 @ 05:28), Max: 97.7 (18 @ 21:28)  HR: 78 (18 @ 14:20)  BP: 118/78 (18 @ 14:20)  RR: 16 (18 @ 14:20)  SpO2: 96% (18 @ 14:20)  Wt(kg): --     @ 07:01  -   @ 07:00  --------------------------------------------------------  IN: 0 mL / OUT: 2 mL / NET: -2 mL        PHYSICAL EXAM:  Gen: Lethargic but arousable and verbal. Disoriented to time place and recognition of her daughter   HEENT: dry MM  Neck: no JVD  Cards: RRR, +S1/S2, no M/G/R  Resp: CTA B/L  GI: soft, NT/ND, NABS  : no CVA tenderness  Vascular: no LE edema B/L  Derm: no rashes  Neuro: non-focal    LABS:      133<L>  |  98  |  36<H>  ----------------------------<  126<H>  4.7   |  16<L>  |  1.04    Ca    8.2<L>      24 Mar 2018 06:20  Phos  3.2       Mg     2.1     -24    TPro  5.8<L>  /  Alb  3.1<L>  /  TBili  0.6  /  DBili  x   /  AST  222<H>  /  ALT  95<H>  /  AlkPhos  85      Creatinine Trend: 1.04 <--, 1.08 <--, 1.26 <--                        10.1   18.51 )-----------( 214      ( 24 Mar 2018 06:20 )             30.0     Urine Studies:  Urinalysis Basic - ( 22 Mar 2018 11:17 )    Color: YELLOW / Appearance: CLEAR / S.022 / pH: 6.0  Gluc: NEGATIVE / Ketone: TRACE  / Bili: NEGATIVE / Urobili: NORMAL mg/dL   Blood: MODERATE / Protein: 100 mg/dL / Nitrite: NEGATIVE   Leuk Esterase: TRACE / RBC: 0-2 / WBC 5-10   Sq Epi:  / Non Sq Epi:  / Bacteria:         RADIOLOGY & ADDITIONAL STUDIES: [facility] NEPHROLOGY- PROGRESS NOTE, Follow up     Patient is a 89y Female with history of below presents with syncope. Nephrology consulted for elevated Scr. Patient noted to have mild hyponatremia and ALLAN in setting of rhabdomyolysis on admission for which she was started on IVF. Scr and serum sodium noted to improve this 3/23/18however CK levels remain elevated. Patient was found by daughter at home however unsure of how long she had remained on floor.     3/24/18 increased Lethargy     Allergy:  Eye cream from the 1960s Neocortef ?spelling caused eyes to becomes swollen (Unknown)  neomycin (Unknown)  soaps and creams causes rash uses only sensitive skin soap (Rash)  sulfa drugs (Unknown)  Voltaren (Rash)    Hospital Medications:   MEDICATIONS  (STANDING):  aspirin enteric coated 81 milliGRAM(s) Oral daily  atorvastatin 10 milliGRAM(s) Oral at bedtime  busPIRone 10 milliGRAM(s) Oral two times a day  diltiazem Injectable 10 milliGRAM(s) IV Push once  ergocalciferol 47049 Unit(s) Oral <User Schedule>  heparin  Injectable 5000 Unit(s) SubCutaneous every 12 hours  metoprolol tartrate 25 milliGRAM(s) Oral two times a day  mirtazapine 15 milliGRAM(s) Oral at bedtime  sodium chloride 0.9% lock flush 3 milliLiter(s) IV Push every 8 hours  sodium chloride 0.9%. 1000 milliLiter(s) (100 mL/Hr) IV Continuous <Continuous>    REVIEW OF SYSTEMS: Limited 2/2 lethargy and confusion.  Daughter at bedside. Per nurse at bedside awake most of the night, required prn Xanax . Denies pain.   VITALS:  T(F): 97.4 (18 @ 05:28), Max: 97.7 (18 @ 21:28)  HR: 78 (18 @ 14:20)  BP: 118/78 (18 @ 14:20)  RR: 16 (18 @ 14:20)  SpO2: 96% (18 @ 14:20)  Wt(kg): --     @ 07:01  -   @ 07:00  --------------------------------------------------------  IN: 0 mL / OUT: 2 mL / NET: -2 mL        PHYSICAL EXAM:  Gen: Lethargic but arousable and verbal. Disoriented to time place and recognition of her daughter   HEENT: dry MM  Neck: no JVD  Cards: RRR, +S1/S2, no M/G/R  Resp: CTA B/L  GI: soft, NT/ND, NABS  : no CVA tenderness  Vascular: no LE edema B/L  Derm: no rashes  Neuro: non-focal    LABS:      133<L>  |  98  |  36<H>  ----------------------------<  126<H>  4.7   |  16<L>  |  1.04    Ca    8.2<L>      24 Mar 2018 06:20  Phos  3.2     -  Mg     2.1     -24    Creatine Kinase, Serum (18 @ 06:20)    Creatine Kinase, Serum: 3661            TPro  5.8<L>  /  Alb  3.1<L>  /  TBili  0.6  /  DBili  x   /  AST  222<H>  /  ALT  95<H>  /  AlkPhos  85  03-23    Creatinine Trend: 1.04 <--, 1.08 <--, 1.26 <--                        10.1   18.51 )-----------( 214      ( 24 Mar 2018 06:20 )             30.0     Urine Studies:  Urinalysis Basic - ( 22 Mar 2018 11:17 )    Color: YELLOW / Appearance: CLEAR / S.022 / pH: 6.0  Gluc: NEGATIVE / Ketone: TRACE  / Bili: NEGATIVE / Urobili: NORMAL mg/dL   Blood: MODERATE / Protein: 100 mg/dL / Nitrite: NEGATIVE   Leuk Esterase: TRACE / RBC: 0-2 / WBC 5-10   Sq Epi:  / Non Sq Epi:  / Bacteria:         RADIOLOGY & ADDITIONAL STUDIES:

## 2018-03-24 NOTE — PROGRESS NOTE ADULT - ASSESSMENT
89 year old female with history of HTN presents with syncope. Nephrology consulted for elevated Scr.

## 2018-03-24 NOTE — PROGRESS NOTE ADULT - ASSESSMENT
89 y/o F with history of HTN, HLD, Palpitation, insomnia, anxiety present with unwitnessed fall. Patient was found on the bathroom floor by her daughter. Unknown how long patient was on the floor. Patient states she only remembers waking up from the floor. Patient c/o back, right shoulder pain and left knee pain. Patient denies chest pain, palpitation at the time, sob, cough, n/v/d/, fever, chills, no dizziness or weakness. In ED patient was found to have UTI and was given one dose of Ceftriaxone and Zosyn. Also was found to have CK of 5364 and Trop of 0.10 and was given bolus of 1L of NS. (22 Mar 2018 16:49)    Pt finished a 5 day course of cipro for UTI about a month ago.  She currently does not complaint of burning, urgency or increased frequency of urination.  No suprapubic discomfort.  UA (+) blood, neg for nit/trace LE.  Urine cultures have been negative.  WBC and temp curve improved.  CPK improved from yesterday.    Problem/Plan - 1:    ·	Fever/Leukocytosis    - UA and urine cultures negative for UTI.  CT chest without evidence for pna.  Pt currently without other focal signs on exam suggestive of infectious process.    - Suspect leukocytosis and mild fever likely reactive to rhabomyolysis.      -  WBC elevated today.  No new fevers.  f/u repeat cbc in AM    Will follow,    Dara Mccartney  [phone]

## 2018-03-24 NOTE — CHART NOTE - NSCHARTNOTEFT_GEN_A_CORE
Patient with HR sustaining over> 160 . currently laying in bed asymptomatic. Denies chest pain , palpitations or shortness of breath . Lopressor ordered

## 2018-03-24 NOTE — PROGRESS NOTE ADULT - PROBLEM SELECTOR PLAN 1
Likely hemodynamically mediated due to rhabdomyolysis and poor PO intake during episode of syncope. Patient appears dry despite elevated pro-BNP. Agree with continuing with IVF for now given persistently elevated CK. Monitor for volume overload. Avoid nephrotoxins.

## 2018-03-24 NOTE — PROGRESS NOTE ADULT - ASSESSMENT
· Assessment	  89 y/o F with history of HTN, HLD, Palpitation, insomnia, anxiety present with unwitnessed fall. Patient was found on the bathroom floor by her daughter.  Patient c/o back, right shoulder pain and left knee pain.  In ED patient was found to have UTI and was given one dose of Ceftriaxone and Zosyn. Also was found to have CK of 5364 and Trop of 0.10 and was given bolus of 1L of NS     Problem/Plan - 1:  ·  Problem: Unwitnessed fall.  Plan: Patient with unwitnessed  fall, will monitor on telemetry for any arrhythmia, Cardio f/up     Problem/Plan - 2:  IVF/ F/up with CPK.     Problem/Plan - 4:  ·  Problem: Essential hypertension.  Plan: Cont current Med.     Problem/Plan - 5:  ·  Problem: Other hyperlipidemia.  Plan: Continue with Lipitor.

## 2018-03-24 NOTE — PROGRESS NOTE ADULT - PROBLEM SELECTOR PLAN 1
I don't think there is any pulmonary reason falling down: She appeared dehydrated n admission: as HE HAS NO CHEST PAIN OR sob :  ct chest without any evidence of injury!  3/24 fall precaution.

## 2018-03-24 NOTE — PROGRESS NOTE ADULT - SUBJECTIVE AND OBJECTIVE BOX
CARDIOLOGY FOLLOW UP NOTE - DR. HANSEN    Subjective:    no cp, sob  no palps  tachy in 160's    PHYSICAL EXAM:  T(C): 36.3 (03-24-18 @ 05:28), Max: 36.6 (03-23-18 @ 13:51)  HR: 88 (03-24-18 @ 05:28) (85 - 172)  BP: 133/76 (03-24-18 @ 05:28) (108/61 - 153/74)  RR: 18 (03-24-18 @ 05:28) (17 - 18)  SpO2: 96% (03-24-18 @ 05:28) (93% - 100%)  Wt(kg): --  I&O's Summary    23 Mar 2018 07:01  -  24 Mar 2018 07:00  --------------------------------------------------------  IN: 0 mL / OUT: 2 mL / NET: -2 mL        Appearance: Normal	  Cardiovascular: Normal S1 S2, tachycardic  Respiratory: Lungs clear to auscultation	  Gastrointestinal:  Soft, Non-tender, + BS	  Extremities: Normal range of motion, No clubbing, cyanosis or edema  Vascular: Peripheral pulses palpable 2+ bilaterally    MEDICATIONS  (STANDING):  aspirin enteric coated 81 milliGRAM(s) Oral daily  atorvastatin 10 milliGRAM(s) Oral at bedtime  busPIRone 10 milliGRAM(s) Oral two times a day  diltiazem Injectable 10 milliGRAM(s) IV Push once  ergocalciferol 62349 Unit(s) Oral <User Schedule>  heparin  Injectable 5000 Unit(s) SubCutaneous every 12 hours  metoprolol tartrate 25 milliGRAM(s) Oral two times a day  mirtazapine 15 milliGRAM(s) Oral at bedtime  sodium chloride 0.9% lock flush 3 milliLiter(s) IV Push every 8 hours  sodium chloride 0.9%. 1000 milliLiter(s) (100 mL/Hr) IV Continuous <Continuous>      TELEMETRY: 	    ECG:  	  RADIOLOGY:   DIAGNOSTIC TESTING:  [ ] Echocardiogram:  [ ] Catheterization:  [ ] Stress Test:    OTHER: 	    LABS:	 	    CARDIAC MARKERS:                                10.1   18.51 )-----------( 214      ( 24 Mar 2018 06:20 )             30.0     03-24    133<L>  |  98  |  36<H>  ----------------------------<  126<H>  4.7   |  16<L>  |  1.04    Ca    8.2<L>      24 Mar 2018 06:20  Phos  3.2     03-24  Mg     2.1     03-24    TPro  5.8<L>  /  Alb  3.1<L>  /  TBili  0.6  /  DBili  x   /  AST  222<H>  /  ALT  95<H>  /  AlkPhos  85  03-23    proBNP:   PT/INR - ( 22 Mar 2018 14:50 )   PT: 13.1 SEC;   INR: 1.14          PTT - ( 22 Mar 2018 14:50 )  PTT:24.4 SEC  Lipid Profile:   HgA1c:

## 2018-03-24 NOTE — PROGRESS NOTE ADULT - SUBJECTIVE AND OBJECTIVE BOX
Patient is a 89y old  Female who presents with a chief complaint of S/P Fall- found on the floor by daughter (22 Mar 2018 16:49)    Any change in ROS: sleeping. drowsy. Pt's conditions discussed with a primary RN. No acute respiratory distress.     MEDICATIONS  (STANDING):  aspirin enteric coated 81 milliGRAM(s) Oral daily  atorvastatin 10 milliGRAM(s) Oral at bedtime  busPIRone 10 milliGRAM(s) Oral two times a day  ergocalciferol 69006 Unit(s) Oral <User Schedule>  heparin  Injectable 5000 Unit(s) SubCutaneous every 12 hours  metoprolol tartrate 12.5 milliGRAM(s) Oral two times a day  mirtazapine 15 milliGRAM(s) Oral at bedtime  sodium chloride 0.9% lock flush 3 milliLiter(s) IV Push every 8 hours  sodium chloride 0.9%. 1000 milliLiter(s) (100 mL/Hr) IV Continuous <Continuous>    MEDICATIONS  (PRN):  ALPRAZolam 0.5 milliGRAM(s) Oral three times a day PRN Anxiety  zolpidem 5 milliGRAM(s) Oral at bedtime PRN Insomnia  zolpidem 5 milliGRAM(s) Oral at bedtime PRN Insomnia    Vital Signs Last 24 Hrs  T(C): 36.3 (24 Mar 2018 05:28), Max: 36.6 (23 Mar 2018 13:51)  T(F): 97.4 (24 Mar 2018 05:28), Max: 97.9 (23 Mar 2018 13:51)  HR: 88 (24 Mar 2018 05:28) (85 - 172)  BP: 133/76 (24 Mar 2018 05:28) (108/61 - 153/74)  BP(mean): --  RR: 18 (24 Mar 2018 05:28) (17 - 18)  SpO2: 96% (24 Mar 2018 05:28) (93% - 100%)    I&O's Summary    23 Mar 2018 07:01  -  24 Mar 2018 07:00  --------------------------------------------------------  IN: 0 mL / OUT: 2 mL / NET: -2 mL          Physical Exam:   General: NAD, well developed, well nourished.   Eyes: PERRL, EOM intact; conjunctiva and sclera clear  Head: Normocephalic, atraumatic  ENMT: No nasal discharge, airway clear  Neck: Supple, non tender,  no masses, no JVD  Respiratory: Clear to auscultation bilaterally without wheezing, rhonchi or rales  Cardiovascular: Regular rate and rhythm, no murmurs.  Gastrointestinal: Soft non-tender non-distended, positive bowel sounds  Extremities: Normal range of motion, no clubbing, cyanosis or edema  Vascular: Peripheral pulses palpable 2+ bilaterally  Neurological: drowsy but able to answer her name and .   Skin: Warm and dry. No obvious rash  Lymph Nodes: No acute cervical or supraclavicular adenopathy  Musculoskeletal: kyphosis ( - ), Move all extremities,   Psychiatric: Calm     Labs:  24  CARDIAC MARKERS ( 24 Mar 2018 06:20 )  x     / x     / 3661 u/L / x     / x      CARDIAC MARKERS ( 23 Mar 2018 06:46 )  x     / 0.08 ng/mL / 6077 u/L / x     / x      CARDIAC MARKERS ( 22 Mar 2018 19:37 )  x     / 0.12 ng/mL / 7059 u/L / 85.27 ng/mL / x                                10.1   18.51 )-----------( 214      ( 24 Mar 2018 06:20 )             30.0                         10.2   11.98 )-----------( 150      ( 23 Mar 2018 06:46 )             30.5                         11.5   15.42 )-----------( 194      ( 22 Mar 2018 11:33 )             33.6     03-24    133<L>  |  98  |  36<H>  ----------------------------<  126<H>  4.7   |  16<L>  |  1.04  03-23    130<L>  |  96<L>  |  31<H>  ----------------------------<  115<H>  4.4   |  19<L>  |  1.08  03-22    128<L>  |  88<L>  |  33<H>  ----------------------------<  126<H>  5.1   |  24  |  1.26    Ca    8.2<L>      24 Mar 2018 06:20  Ca    7.9<L>      23 Mar 2018 06:46  Phos  3.2       Mg     2.1         TPro  5.8<L>  /  Alb  3.1<L>  /  TBili  0.6  /  DBili  x   /  AST  222<H>  /  ALT  95<H>  /  AlkPhos  85    TPro  6.8  /  Alb  3.9  /  TBili  1.1  /  DBili  x   /  AST  129<H>  /  ALT  42<H>  /  AlkPhos  61      CAPILLARY BLOOD GLUCOSE          LIVER FUNCTIONS - ( 23 Mar 2018 06:46 )  Alb: 3.1 g/dL / Pro: 5.8 g/dL / ALK PHOS: 85 u/L / ALT: 95 u/L / AST: 222 u/L / GGT: x           PT/INR - ( 22 Mar 2018 14:50 )   PT: 13.1 SEC;   INR: 1.14          PTT - ( 22 Mar 2018 14:50 )  PTT:24.4 SEC    Serum Pro-Brain Natriuretic Peptide: 09602 pg/mL ( @ 11:33)  Cultures:           Wound culture:                 @ 12:50  Organism --  Culture w/ gram stain --  Specimen Source URINE MIDSTREAM    Wound culture:                 @ 12:43  Organism --  Culture w/ gram stain --  Specimen Source BLOOD      Abscess culture:              @ 12:50  Organism --  Gram Stain --  Specimen Source URINE MIDSTREAM    Abscess culture:              @ 12:43  Organism --  Gram Stain --  Specimen Source BLOOD        Tissue culture:            @ 12:50  Organism --  Gram Stain --  Specimen Source URINE MIDSTREAM    Tissue culture:            @ 12:43  Organism --  Gram Stain --  Specimen Source BLOOD      Body Fluid Smear & Culture:                         @ 12:50  AFB Smear  --  Culture Acid Fast Body Fluid w/ Smear  --  Culture Acid Fast Smear Concentrated   --    Culture Results:     --  Specimen Source URINE MIDSTREAM    Body Fluid Smear & Culture:                         @ 12:43  AFB Smear  --  Culture Acid Fast Body Fluid w/ Smear  --  Culture Acid Fast Smear Concentrated   --    Culture Results:     --  Specimen Source BLOOD    Studies  Chest X-RAY  < from: Xray Chest 1 View- PORTABLE-Urgent (18 @ 13:42) >  EXAM:  XR CHEST PORTABLE URGENT 1V        PROCEDURE DATE:  Mar 22 2018         INTERPRETATION:  CLINICAL INDICATION: fever    EXAM:  Supine frontal chest from 3/22/2013 at 1342. Compared to chest CT from   earlier the same day.    Rotated left.    IMPRESSION:  Stable cardiac and mediastinal silhouettes including left retrocardiac   hiatal hernia shadow.    Vague hazy patchy opacity again seen in medial right upper lung[. Clear   remaining visualized lungs. No pleural effusions or pneumothorax.    Trachea projects to left of midline but proportionate to degree of   patient rotation.    Generalized osteopenia and spinal degenerative changes. No acute or   focally aggressive appearing osseous abnormalities.    < end of copied text >    CT SCAN Chest   < from: CT Chest No Cont (18 @ 12:57) >  EXAM:  CT CHEST        PROCEDURE DATE:  Mar 22 2018         INTERPRETATION:  CLINICAL INFORMATION: Fall. Weakness.    COMPARISON: None.    PROCEDURE:   CT of the Chest was performed without intravenous contrast.  Sagittal and coronal reformats wereperformed.    FINDINGS:    LUNGS AND LARGE AIRWAYS: Patent central airways. Scattered subcentimeter   pulmonary nodules. For example:  *  Right lower lobe (series 2, image 72), measuring 0.3 cm.  PLEURA: Trace bilateral pleural effusions.  VESSELS: Atherosclerotic calcifications.   HEART: Heart size is normal. No pericardial effusion.  MEDIASTINUM AND CHERI: Small hiatal hernia.  CHEST WALL AND LOWER NECK: Subcentimeter hypodense lesions in both   thyroid lobes, too small to characterize.  VISUALIZED UPPER ABDOMEN: Within normal limits.  BONES: Degenerative changes of the spine.    IMPRESSION: No sequela of acute trauma in the chest.      < end of copied text >    Venous Dopplers: LE:     CT Abdomen  Others

## 2018-03-25 LAB
APPEARANCE UR: CLEAR — SIGNIFICANT CHANGE UP
BACTERIA # UR AUTO: SIGNIFICANT CHANGE UP
BILIRUB UR-MCNC: NEGATIVE — SIGNIFICANT CHANGE UP
BLOOD UR QL VISUAL: HIGH
BUN SERPL-MCNC: 35 MG/DL — HIGH (ref 7–23)
CALCIUM SERPL-MCNC: 8 MG/DL — LOW (ref 8.4–10.5)
CHLORIDE SERPL-SCNC: 103 MMOL/L — SIGNIFICANT CHANGE UP (ref 98–107)
CK SERPL-CCNC: 1159 U/L — HIGH (ref 25–170)
CO2 SERPL-SCNC: 16 MMOL/L — LOW (ref 22–31)
COLOR SPEC: YELLOW — SIGNIFICANT CHANGE UP
CREAT SERPL-MCNC: 1.06 MG/DL — SIGNIFICANT CHANGE UP (ref 0.5–1.3)
GLUCOSE SERPL-MCNC: 105 MG/DL — HIGH (ref 70–99)
GLUCOSE UR-MCNC: NEGATIVE — SIGNIFICANT CHANGE UP
HCT VFR BLD CALC: 32.7 % — LOW (ref 34.5–45)
HGB BLD-MCNC: 9.8 G/DL — LOW (ref 11.5–15.5)
HYALINE CASTS # UR AUTO: SIGNIFICANT CHANGE UP (ref 0–?)
KETONES UR-MCNC: NEGATIVE — SIGNIFICANT CHANGE UP
LEUKOCYTE ESTERASE UR-ACNC: HIGH
MAGNESIUM SERPL-MCNC: 2.2 MG/DL — SIGNIFICANT CHANGE UP (ref 1.6–2.6)
MCHC RBC-ENTMCNC: 29.7 PG — SIGNIFICANT CHANGE UP (ref 27–34)
MCHC RBC-ENTMCNC: 30 % — LOW (ref 32–36)
MCV RBC AUTO: 99.1 FL — SIGNIFICANT CHANGE UP (ref 80–100)
MUCOUS THREADS # UR AUTO: SIGNIFICANT CHANGE UP
NITRITE UR-MCNC: NEGATIVE — SIGNIFICANT CHANGE UP
NRBC # FLD: 0 — SIGNIFICANT CHANGE UP
PH UR: 6 — SIGNIFICANT CHANGE UP (ref 4.6–8)
PHOSPHATE SERPL-MCNC: 3.3 MG/DL — SIGNIFICANT CHANGE UP (ref 2.5–4.5)
PLATELET # BLD AUTO: 165 K/UL — SIGNIFICANT CHANGE UP (ref 150–400)
PMV BLD: 10.8 FL — SIGNIFICANT CHANGE UP (ref 7–13)
POTASSIUM SERPL-MCNC: 4.8 MMOL/L — SIGNIFICANT CHANGE UP (ref 3.5–5.3)
POTASSIUM SERPL-SCNC: 4.8 MMOL/L — SIGNIFICANT CHANGE UP (ref 3.5–5.3)
PROT UR-MCNC: 100 MG/DL — HIGH
RBC # BLD: 3.3 M/UL — LOW (ref 3.8–5.2)
RBC # FLD: 13.3 % — SIGNIFICANT CHANGE UP (ref 10.3–14.5)
RBC CASTS # UR COMP ASSIST: HIGH (ref 0–?)
SODIUM SERPL-SCNC: 135 MMOL/L — SIGNIFICANT CHANGE UP (ref 135–145)
SP GR SPEC: 1.02 — SIGNIFICANT CHANGE UP (ref 1–1.04)
SQUAMOUS # UR AUTO: SIGNIFICANT CHANGE UP
UROBILINOGEN FLD QL: NORMAL MG/DL — SIGNIFICANT CHANGE UP
WBC # BLD: 11.65 K/UL — HIGH (ref 3.8–10.5)
WBC # FLD AUTO: 11.65 K/UL — HIGH (ref 3.8–10.5)
WBC UR QL: HIGH (ref 0–?)

## 2018-03-25 PROCEDURE — 71045 X-RAY EXAM CHEST 1 VIEW: CPT | Mod: 26

## 2018-03-25 RX ORDER — ACETAMINOPHEN 500 MG
650 TABLET ORAL ONCE
Qty: 0 | Refills: 0 | Status: COMPLETED | OUTPATIENT
Start: 2018-03-25 | End: 2018-03-25

## 2018-03-25 RX ORDER — SENNA PLUS 8.6 MG/1
2 TABLET ORAL AT BEDTIME
Qty: 0 | Refills: 0 | Status: DISCONTINUED | OUTPATIENT
Start: 2018-03-25 | End: 2018-03-30

## 2018-03-25 RX ORDER — POLYETHYLENE GLYCOL 3350 17 G/17G
17 POWDER, FOR SOLUTION ORAL DAILY
Qty: 0 | Refills: 0 | Status: DISCONTINUED | OUTPATIENT
Start: 2018-03-25 | End: 2018-03-30

## 2018-03-25 RX ORDER — DOCUSATE SODIUM 100 MG
100 CAPSULE ORAL THREE TIMES A DAY
Qty: 0 | Refills: 0 | Status: DISCONTINUED | OUTPATIENT
Start: 2018-03-25 | End: 2018-03-30

## 2018-03-25 RX ADMIN — MIRTAZAPINE 15 MILLIGRAM(S): 45 TABLET, ORALLY DISINTEGRATING ORAL at 21:49

## 2018-03-25 RX ADMIN — Medication 25 MILLIGRAM(S): at 17:38

## 2018-03-25 RX ADMIN — ATORVASTATIN CALCIUM 10 MILLIGRAM(S): 80 TABLET, FILM COATED ORAL at 21:49

## 2018-03-25 RX ADMIN — HEPARIN SODIUM 5000 UNIT(S): 5000 INJECTION INTRAVENOUS; SUBCUTANEOUS at 17:38

## 2018-03-25 RX ADMIN — ZOLPIDEM TARTRATE 5 MILLIGRAM(S): 10 TABLET ORAL at 01:29

## 2018-03-25 RX ADMIN — Medication 100 MILLIGRAM(S): at 21:49

## 2018-03-25 RX ADMIN — SODIUM CHLORIDE 3 MILLILITER(S): 9 INJECTION INTRAMUSCULAR; INTRAVENOUS; SUBCUTANEOUS at 21:42

## 2018-03-25 RX ADMIN — Medication 0.5 MILLIGRAM(S): at 21:45

## 2018-03-25 RX ADMIN — Medication 650 MILLIGRAM(S): at 01:29

## 2018-03-25 RX ADMIN — HEPARIN SODIUM 5000 UNIT(S): 5000 INJECTION INTRAVENOUS; SUBCUTANEOUS at 06:27

## 2018-03-25 RX ADMIN — SODIUM CHLORIDE 3 MILLILITER(S): 9 INJECTION INTRAMUSCULAR; INTRAVENOUS; SUBCUTANEOUS at 06:18

## 2018-03-25 RX ADMIN — Medication 25 MILLIGRAM(S): at 06:27

## 2018-03-25 RX ADMIN — Medication 81 MILLIGRAM(S): at 12:24

## 2018-03-25 RX ADMIN — SENNA PLUS 2 TABLET(S): 8.6 TABLET ORAL at 21:49

## 2018-03-25 RX ADMIN — SODIUM CHLORIDE 100 MILLILITER(S): 9 INJECTION INTRAMUSCULAR; INTRAVENOUS; SUBCUTANEOUS at 12:23

## 2018-03-25 RX ADMIN — SODIUM CHLORIDE 100 MILLILITER(S): 9 INJECTION INTRAMUSCULAR; INTRAVENOUS; SUBCUTANEOUS at 21:49

## 2018-03-25 RX ADMIN — Medication 10 MILLIGRAM(S): at 17:38

## 2018-03-25 RX ADMIN — SODIUM CHLORIDE 3 MILLILITER(S): 9 INJECTION INTRAMUSCULAR; INTRAVENOUS; SUBCUTANEOUS at 13:18

## 2018-03-25 RX ADMIN — Medication 10 MILLIGRAM(S): at 06:27

## 2018-03-25 NOTE — PROGRESS NOTE ADULT - ASSESSMENT
89 year old female with history of HTN presents with syncope. Nephrology consulted for elevated Scr.    3/25/18 Labs (P) 89 year old female with history of HTN presents with syncope. Nephrology consulted for elevated Scr.

## 2018-03-25 NOTE — PROGRESS NOTE ADULT - PROBLEM SELECTOR PLAN 1
I don't think there is any pulmonary reason falling down: She appeared dehydrated n admission: as HE HAS NO CHEST PAIN OR sob :  ct chest without any evidence of injury!  3/24 fall precaution.  3/25 s/p fall at home. fall precaution. I don't think there is any pulmonary reason falling down: She appeared dehydrated n admission: as HE HAS NO CHEST PAIN OR sob :  ct chest without any evidence of injury!  3/24 fall precaution.  3/25 s/p fall at home. fall precaution.: echo awaited

## 2018-03-25 NOTE — PROGRESS NOTE ADULT - SUBJECTIVE AND OBJECTIVE BOX
Patient is a 89y old  Female who presents with a chief complaint of S/P Fall- found on the floor by daughter (22 Mar 2018 16:49)      SUBJECTIVE / OVERNIGHT EVENTS:   Feels better.  Denies CP/SOB/Palpitation/HA.    MEDICATIONS  (STANDING):  aspirin enteric coated 81 milliGRAM(s) Oral daily  atorvastatin 10 milliGRAM(s) Oral at bedtime  busPIRone 10 milliGRAM(s) Oral two times a day  diltiazem    Tablet 30 milliGRAM(s) Oral four times a day  docusate sodium 100 milliGRAM(s) Oral three times a day  ergocalciferol 51910 Unit(s) Oral <User Schedule>  heparin  Injectable 5000 Unit(s) SubCutaneous every 12 hours  metoprolol tartrate 25 milliGRAM(s) Oral two times a day  mirtazapine 15 milliGRAM(s) Oral at bedtime  polyethylene glycol 3350 17 Gram(s) Oral daily  senna 2 Tablet(s) Oral at bedtime  sodium chloride 0.9% lock flush 3 milliLiter(s) IV Push every 8 hours  sodium chloride 0.9%. 1000 milliLiter(s) (100 mL/Hr) IV Continuous <Continuous>    MEDICATIONS  (PRN):  ALPRAZolam 0.5 milliGRAM(s) Oral three times a day PRN Anxiety  zolpidem 5 milliGRAM(s) Oral at bedtime PRN Insomnia  zolpidem 5 milliGRAM(s) Oral at bedtime PRN Insomnia        CAPILLARY BLOOD GLUCOSE        I&O's Summary      PHYSICAL EXAM:  GENERAL: NAD, well-developed  HEAD:  Atraumatic, Normocephalic  NECK: Supple, No JVD  CHEST/LUNG: Clear to auscultation bilaterally; No wheezing.  HEART: Regular rate and rhythm; No murmurs, rubs, or gallops  ABDOMEN: Soft, Nontender, Nondistended; Bowel sounds present  EXTREMITIES:   No clubbing, cyanosis, or edema  NEUROLOGY: AAO X 3  SKIN: No rashes    LABS:                        9.8    11.65 )-----------( 165      ( 25 Mar 2018 07:55 )             32.7         135  |  103  |  35<H>  ----------------------------<  105<H>  4.8   |  16<L>  |  1.06    Ca    8.0<L>      25 Mar 2018 07:50  Phos  3.3       Mg     2.2     -25        CARDIAC MARKERS ( 25 Mar 2018 07:50 )  x     / x     / 1159 u/L / x     / x      CARDIAC MARKERS ( 24 Mar 2018 06:20 )  x     / x     / 3661 u/L / x     / x          Urinalysis Basic - ( 25 Mar 2018 10:20 )    Color: YELLOW / Appearance: CLEAR / S.019 / pH: 6.0  Gluc: NEGATIVE / Ketone: NEGATIVE  / Bili: NEGATIVE / Urobili: NORMAL mg/dL   Blood: TRACE / Protein: 100 mg/dL / Nitrite: NEGATIVE   Leuk Esterase: MODERATE / RBC: 5-10 / WBC 5-10   Sq Epi: OCC / Non Sq Epi: x / Bacteria: FEW      CAPILLARY BLOOD GLUCOSE         @ 12:50  Culture-urine   NO GROWTH AT 24 HOURS  Culture results --  method type --  Organism --  Organism Identification --  Specimen source URINE MIDSTREAM   @ 12:43  Culture-urine --  Culture results --  method type --  Organism --  Organism Identification --  Specimen source BLOOD            @ 12:50  Culture blood --  Culture results --  Gram stain --  Gram stain blood --  Method type --  Organism --  Organism identification --  Specimen source URINE MIDSTREAM    @ 12:43  Culture blood   NO ORGANISMS ISOLATED  NO ORGANISMS ISOLATED AT 72 HRS.  Culture results --  Gram stain --  Gram stain blood --  Method type --  Organism --  Organism identification --  Specimen source BLOOD      RADIOLOGY & ADDITIONAL TESTS:    Imaging Personally Reviewed:    Consultant(s) Notes Reviewed:      Care Discussed with Consultants/Other Providers:

## 2018-03-25 NOTE — PROGRESS NOTE ADULT - SUBJECTIVE AND OBJECTIVE BOX
CARDIOLOGY FOLLOW UP NOTE - DR. HANSEN    Subjective:    no new complaints      PHYSICAL EXAM:  T(C): 36.7 (03-25-18 @ 09:10), Max: 37.9 (03-25-18 @ 00:59)  HR: 69 (03-25-18 @ 09:15) (60 - 171)  BP: 100/69 (03-25-18 @ 09:10) (100/69 - 139/66)  RR: 20 (03-25-18 @ 09:10) (16 - 20)  SpO2: 100% (03-25-18 @ 09:10) (96% - 100%)  Wt(kg): --  I&O's Summary      Appearance: Normal	  Cardiovascular: Normal S1 S2,RRR, No JVD, No murmurs  Respiratory: Lungs clear to auscultation	  Gastrointestinal:  Soft, Non-tender, + BS	  Extremities: Normal range of motion, no inc edema    MEDICATIONS  (STANDING):  aspirin enteric coated 81 milliGRAM(s) Oral daily  atorvastatin 10 milliGRAM(s) Oral at bedtime  busPIRone 10 milliGRAM(s) Oral two times a day  ergocalciferol 95762 Unit(s) Oral <User Schedule>  heparin  Injectable 5000 Unit(s) SubCutaneous every 12 hours  metoprolol tartrate 25 milliGRAM(s) Oral two times a day  mirtazapine 15 milliGRAM(s) Oral at bedtime  sodium chloride 0.9% lock flush 3 milliLiter(s) IV Push every 8 hours  sodium chloride 0.9%. 1000 milliLiter(s) (100 mL/Hr) IV Continuous <Continuous>      TELEMETRY: 	    ECG:  	  RADIOLOGY:   DIAGNOSTIC TESTING:  [ ] Echocardiogram:  [ ] Catheterization:  [ ] Stress Test:    OTHER: 	    LABS:	 	    CARDIAC MARKERS:                                9.8    11.65 )-----------( 165      ( 25 Mar 2018 07:55 )             32.7     03-25    135  |  103  |  35<H>  ----------------------------<  105<H>  4.8   |  16<L>  |  1.06    Ca    8.0<L>      25 Mar 2018 07:50  Phos  3.3     03-25  Mg     2.2     03-25      proBNP:     Lipid Profile:   HgA1c:

## 2018-03-25 NOTE — PROGRESS NOTE ADULT - SUBJECTIVE AND OBJECTIVE BOX
Patient is a 89y old  Female who presents with a chief complaint of S/P Fall- found on the floor by daughter (22 Mar 2018 16:49)    Any change in ROS: awake, alert. denies sob, cough, sputum. on nasal cannular     MEDICATIONS  (STANDING):  aspirin enteric coated 81 milliGRAM(s) Oral daily  atorvastatin 10 milliGRAM(s) Oral at bedtime  busPIRone 10 milliGRAM(s) Oral two times a day  diltiazem    Tablet 30 milliGRAM(s) Oral four times a day  ergocalciferol 09694 Unit(s) Oral <User Schedule>  heparin  Injectable 5000 Unit(s) SubCutaneous every 12 hours  metoprolol tartrate 25 milliGRAM(s) Oral two times a day  mirtazapine 15 milliGRAM(s) Oral at bedtime  sodium chloride 0.9% lock flush 3 milliLiter(s) IV Push every 8 hours  sodium chloride 0.9%. 1000 milliLiter(s) (100 mL/Hr) IV Continuous <Continuous>    MEDICATIONS  (PRN):  ALPRAZolam 0.5 milliGRAM(s) Oral three times a day PRN Anxiety  zolpidem 5 milliGRAM(s) Oral at bedtime PRN Insomnia  zolpidem 5 milliGRAM(s) Oral at bedtime PRN Insomnia    Vital Signs Last 24 Hrs  T(C): 36.7 (25 Mar 2018 09:10), Max: 37.9 (25 Mar 2018 00:59)  T(F): 98 (25 Mar 2018 09:10), Max: 100.3 (25 Mar 2018 00:59)  HR: 69 (25 Mar 2018 09:15) (60 - 171)  BP: 100/69 (25 Mar 2018 09:10) (100/69 - 139/66)  BP(mean): --  RR: 20 (25 Mar 2018 09:10) (16 - 20)  SpO2: 100% (25 Mar 2018 09:10) (96% - 100%)    I&O's Summary      Physical Exam:   General: NAD, well developed, well nourished. obese  Eyes: PERRL, EOM intact; conjunctiva and sclera clear  Head: Normocephalic, atraumatic  ENMT: No nasal discharge, airway clear  Neck: Supple, non tender,  no masses, no JVD  Respiratory: Clear to auscultation bilaterally without wheezing, rhonchi or rales  Cardiovascular: Regular rate and rhythm, no murmurs.  Gastrointestinal: Soft non-tender non-distended, positive bowel sounds  Extremities: Normal range of motion, no clubbing, cyanosis or edema  Vascular: Peripheral pulses palpable.   Neurological: Alert, able to tell her , follows commands, answers questions appropriately.   Skin: Warm and dry. No obvious rash, multiple bruise   Lymph Nodes: No acute cervical or supraclavicular adenopathy  Musculoskeletal: kyphosis ( - ), Move all extremities,   Psychiatric: Cooperative and appropriate mood    Labs:  24  CARDIAC MARKERS ( 25 Mar 2018 07:50 )  x     / x     / 1159 u/L / x     / x      CARDIAC MARKERS ( 24 Mar 2018 06:20 )  x     / x     / 3661 u/L / x     / x                                9.8    11.65 )-----------( 165      ( 25 Mar 2018 07:55 )             32.7                         10.1   18.51 )-----------( 214      ( 24 Mar 2018 06:20 )             30.0                         10.2   11.98 )-----------( 150      ( 23 Mar 2018 06:46 )             30.5                         11.5   15.42 )-----------( 194      ( 22 Mar 2018 11:33 )             33.6     03-25    135  |  103  |  35<H>  ----------------------------<  105<H>  4.8   |  16<L>  |  1.06  03-24    133<L>  |  98  |  36<H>  ----------------------------<  126<H>  4.7   |  16<L>  |  1.04  03-23    130<L>  |  96<L>  |  31<H>  ----------------------------<  115<H>  4.4   |  19<L>  |  1.08  03-22    128<L>  |  88<L>  |  33<H>  ----------------------------<  126<H>  5.1   |  24  |  1.26    Ca    8.0<L>      25 Mar 2018 07:50  Ca    8.2<L>      24 Mar 2018 06:20  Phos  3.3       Phos  3.2       Mg     2.2       Mg     2.1         TPro  5.8<L>  /  Alb  3.1<L>  /  TBili  0.6  /  DBili  x   /  AST  222<H>  /  ALT  95<H>  /  AlkPhos  85    TPro  6.8  /  Alb  3.9  /  TBili  1.1  /  DBili  x   /  AST  129<H>  /  ALT  42<H>  /  AlkPhos  61      CAPILLARY BLOOD GLUCOSE              Urinalysis Basic - ( 25 Mar 2018 10:20 )    Color: YELLOW / Appearance: CLEAR / S.019 / pH: 6.0  Gluc: NEGATIVE / Ketone: NEGATIVE  / Bili: NEGATIVE / Urobili: NORMAL mg/dL   Blood: TRACE / Protein: 100 mg/dL / Nitrite: NEGATIVE   Leuk Esterase: MODERATE / RBC: 5-10 / WBC 5-10   Sq Epi: OCC / Non Sq Epi: x / Bacteria: FEW      Cultures:           Wound culture:                 @ 12:50  Organism --  Culture w/ gram stain --  Specimen Source URINE MIDSTREAM    Wound culture:                 @ 12:43  Organism --  Culture w/ gram stain --  Specimen Source BLOOD      Abscess culture:              @ 12:50  Organism --  Gram Stain --  Specimen Source URINE MIDSTREAM    Abscess culture:              @ 12:43  Organism --  Gram Stain --  Specimen Source BLOOD        Tissue culture:            @ 12:50  Organism --  Gram Stain --  Specimen Source URINE MIDSTREAM    Tissue culture:            @ 12:43  Organism --  Gram Stain --  Specimen Source BLOOD      Body Fluid Smear & Culture:                         @ 12:50  AFB Smear  --  Culture Acid Fast Body Fluid w/ Smear  --  Culture Acid Fast Smear Concentrated   --    Culture Results:     --  Specimen Source URINE MIDSTREAM    Body Fluid Smear & Culture:                         @ 12:43  AFB Smear  --  Culture Acid Fast Body Fluid w/ Smear  --  Culture Acid Fast Smear Concentrated   --    Culture Results:     --  Specimen Source BLOOD      Studies  Chest X-RAY < from: Xray Chest 1 View- PORTABLE-Urgent (18 @ 13:42) >  EXAM:  XR CHEST PORTABLE URGENT 1V        PROCEDURE DATE:  Mar 22 2018         INTERPRETATION:  CLINICAL INDICATION: fever    EXAM:  Supine frontal chest from 3/22/2013 at 1342. Compared to chest CT from   earlier the same day.    Rotated left.    IMPRESSION:  Stable cardiac and mediastinal silhouettes including left retrocardiac   hiatal hernia shadow.    Vague hazy patchy opacity again seen in medial right upper lung[. Clear   remaining visualized lungs. No pleural effusions or pneumothorax.    Trachea projects to left of midline but proportionate to degree of   patient rotation.    Generalized osteopenia and spinal degenerative changes. No acute or   focally aggressive appearing osseous abnormalities.    < end of copied text >    CT SCAN Chest < from: CT Chest No Cont (18 @ 12:57) >  EXAM:  CT CHEST        PROCEDURE DATE:  Mar 22 2018         INTERPRETATION:  CLINICAL INFORMATION: Fall. Weakness.    COMPARISON: None.    PROCEDURE:   CT of the Chest was performed without intravenous contrast.  Sagittal and coronal reformats wereperformed.    FINDINGS:    LUNGS AND LARGE AIRWAYS: Patent central airways. Scattered subcentimeter   pulmonary nodules. For example:  *  Right lower lobe (series 2, image 72), measuring 0.3 cm.  PLEURA: Trace bilateral pleural effusions.  VESSELS: Atherosclerotic calcifications.   HEART: Heart size is normal. No pericardial effusion.  MEDIASTINUM AND CHERI: Small hiatal hernia.  CHEST WALL AND LOWER NECK: Subcentimeter hypodense lesions in both   thyroid lobes, too small to characterize.  VISUALIZED UPPER ABDOMEN: Within normal limits.  BONES: Degenerative changes of the spine.    IMPRESSION: No sequela of acute trauma in the chest.    < end of copied text >    Venous Dopplers: LE:   CT Abdomen   Others

## 2018-03-25 NOTE — PROGRESS NOTE ADULT - ASSESSMENT
89 year old woman with history of Hyperlipemia, Hypertension, Insomnia admitted with unwitnessed fall vs syncope.  Patient was found on the floor by her daughter.       1. Syncope vs fall  await echo     2. PSVT  cont metoprolol 25mg bid   asa 81 qd  start cardizem 30mg q6     3. Htn   cont bb, add ccb  monitor bp     4. Rhabdo   ck improved    5. fever  off abx, id f/u    6. Elevated LFTS  ruq sono    dvt ppx

## 2018-03-25 NOTE — PROGRESS NOTE ADULT - ASSESSMENT
89 y/o F with history of HTN, HLD, Palpitation, insomnia, anxiety present with unwitnessed fall. Patient was found on the bathroom floor by her daughter. Unknown how long patient was on the floor. Patient states she only remembers waking up from the floor. Patient c/o back, right shoulder pain and left knee pain. Patient denies chest pain, palpitation at the time, sob, cough, n/v/d/, fever, chills, no dizziness or weakness. In ED patient was found to have UTI and was given one dose of Ceftriaxone and Zosyn. Also was found to have CK of 5364 and Trop of 0.10 and was given bolus of 1L of NS. (22 Mar 2018 16:49)    Pt finished a 5 day course of cipro for UTI about a month ago.  She currently does not complaint of burning, urgency or increased frequency of urination.  No suprapubic discomfort.  UA (+) blood, neg for nit/trace LE.  Urine cultures have been negative.  WBC and temp curve improved.  CPK improved from yesterday.    Problem/Plan - 1:    ·	Fever/Leukocytosis    - UA and urine cultures negative for UTI.  CT chest without evidence for pna.  Pt currently without other focal signs on exam suggestive of infectious process.    - Suspect leukocytosis and mild fever likely reactive to rhabomyolysis.  CPK improving    - Pt with low grade temp 100.3 today.  Repeat UA, urine cultures, blood cultures sent.  Repeat chest xray without infiltrate/consolidations      Will follow,    Dara Mccartney  [phone] 89 y/o F with history of HTN, HLD, Palpitation, insomnia, anxiety present with unwitnessed fall. Patient was found on the bathroom floor by her daughter. Unknown how long patient was on the floor. Patient states she only remembers waking up from the floor. Patient c/o back, right shoulder pain and left knee pain. Patient denies chest pain, palpitation at the time, sob, cough, n/v/d/, fever, chills, no dizziness or weakness. In ED patient was found to have UTI and was given one dose of Ceftriaxone and Zosyn. Also was found to have CK of 5364 and Trop of 0.10 and was given bolus of 1L of NS. (22 Mar 2018 16:49)    Pt finished a 5 day course of cipro for UTI about a month ago.  She currently does not complaint of burning, urgency or increased frequency of urination.  No suprapubic discomfort.  UA (+) blood, neg for nit/trace LE.  Urine cultures have been negative.  WBC and temp curve improved.  CPK improved from yesterday.    Problem/Plan - 1:    ·	Fever/Leukocytosis    - UA and urine cultures negative for UTI.  CT chest without evidence for pna.  Pt currently without other focal signs on exam suggestive of infectious process.    - Suspect leukocytosis and mild fever likely reactive to rhabomyolysis.  CPK improving    - Pt with low grade temp 100.3 today.  Repeat UA, urine cultures, blood cultures sent.  Repeat chest xray without infiltrate/consolidations    - f/u RUQ sono. LFTS elevated in the setting of rhabdomyolysis - possibly secondary to skeletal muscle injury.  R/o liver pathology.      Will follow,    Dara Mccartney  [phone]

## 2018-03-25 NOTE — PROGRESS NOTE ADULT - ASSESSMENT
· Assessment	  89 y/o F with history of HTN, HLD, Palpitation, insomnia, anxiety present with unwitnessed fall. Patient was found on the bathroom floor by her daughter.  Patient c/o back, right shoulder pain and left knee pain.  In ED patient was found to have UTI and was given one dose of Ceftriaxone and Zosyn. Also was found to have CK of 5364 and Trop of 0.10 and was given bolus of 1L of NS     Problem/Plan - 1:  ·  Problem: Unwitnessed fall.  Plan: Patient with unwitnessed  fall, will monitor on telemetry for any arrhythmia, Cardio f/up     Problem/Plan - 2: Mild Rhabdomyolysis:    IVF/ F/up with CPK.     Problem/Plan - 4:  ·  Problem: Essential hypertension.  Plan: Cont current Med.     Problem/Plan - 5:  ·  Problem: Other hyperlipidemia.  Plan: Continue with Lipitor.

## 2018-03-25 NOTE — PROGRESS NOTE ADULT - PROBLEM SELECTOR PLAN 1
Likely hemodynamically mediated due to rhabdomyolysis and poor PO intake during episode of syncope. Patient appears dry despite elevated pro-BNP. Agree with continuing with IVF for now given persistently elevated CK. Monitor for volume overload. Avoid nephrotoxins. Monitor electrolytes and CPK downtrending.

## 2018-03-25 NOTE — PROGRESS NOTE ADULT - SUBJECTIVE AND OBJECTIVE BOX
[facility] NEPHROLOGY- PROGRESS NOTE, Follow up     Patient is a 89y Female with history of below presents with syncope. Nephrology consulted for elevated Scr. Patient noted to have mild hyponatremia and ALLAN in setting of rhabdomyolysis on admission for which she was started on IVF. Scr and serum sodium noted to improve this 3/23/18however CK levels remain elevated. Patient was found by daughter at home however unsure of how long she had remained on floor.   Allergy:  Eye cream from the 1960s Neocortef ?spelling caused eyes to becomes swollen (Unknown)  neomycin (Unknown)  soaps and creams causes rash uses only sensitive skin soap (Rash)  sulfa drugs (Unknown)  Voltaren (Rash)    Hospital Medications:   MEDICATIONS  (STANDING):  aspirin enteric coated 81 milliGRAM(s) Oral daily  atorvastatin 10 milliGRAM(s) Oral at bedtime  busPIRone 10 milliGRAM(s) Oral two times a day  ergocalciferol 41794 Unit(s) Oral <User Schedule>  heparin  Injectable 5000 Unit(s) SubCutaneous every 12 hours  metoprolol tartrate 25 milliGRAM(s) Oral two times a day  mirtazapine 15 milliGRAM(s) Oral at bedtime  sodium chloride 0.9% lock flush 3 milliLiter(s) IV Push every 8 hours  sodium chloride 0.9%. 1000 milliLiter(s) (100 mL/Hr) IV Continuous <Continuous>    REVIEW OF SYSTEMS:  Limited 2/2 lethargy and confusion.       VITALS:  T(F): 100.3 (18 @ 00:59), Max: 100.3 (18 @ 00:59)  HR: 64 (18 @ 23:44)  BP: 126/78 (18 @ 23:44)  RR: 18 (18 @ 21:04)  SpO2: 99% (18 @ 21:04)  Wt(kg): --     @ 07:01  -   @ 07:00  --------------------------------------------------------  IN: 0 mL / OUT: 2 mL / NET: -2 mL        PHYSICAL EXAM:  Gen: Lethargic but arousable and verbal. Disoriented to time and place    HEENT: dry MM  Neck: no JVD  Cards: RRR, +S1/S2, no M/G/R  Resp: CTA B/L  GI: soft, NT/ND, NABS  : no CVA tenderness  Vascular: no LE edema B/L  Derm: no rashes      LABS:      133<L>  |  98  |  36<H>  ----------------------------<  126<H>  4.7   |  16<L>  |  1.04    Ca    8.2<L>      24 Mar 2018 06:20  Phos  3.2     -  Mg     2.1     -24    TPro  5.8<L>  /  Alb  3.1<L>  /  TBili  0.6  /  DBili  x   /  AST  222<H>  /  ALT  95<H>  /  AlkPhos  85  03-    Creatinine Trend: 1.04 <--, 1.08 <--, 1.26 <--                        10.1   18.51 )-----------( 214      ( 24 Mar 2018 06:20 )             30.0     Urine Studies:  Urinalysis Basic - ( 22 Mar 2018 11:17 )    Color: YELLOW / Appearance: CLEAR / S.022 / pH: 6.0  Gluc: NEGATIVE / Ketone: TRACE  / Bili: NEGATIVE / Urobili: NORMAL mg/dL   Blood: MODERATE / Protein: 100 mg/dL / Nitrite: NEGATIVE   Leuk Esterase: TRACE / RBC: 0-2 / WBC 5-10   Sq Epi:  / Non Sq Epi:  / Bacteria:         RADIOLOGY & ADDITIONAL STUDIES: [facility] NEPHROLOGY- PROGRESS NOTE, Follow up     Patient is a 89y Female with history of below presents with syncope. Nephrology consulted for elevated Scr. Patient noted to have mild hyponatremia and ALLAN in setting of rhabdomyolysis on admission for which she was started on IVF. Scr and serum sodium noted to improve this 3/23/18however CK levels remain elevated. Patient was found by daughter at home however unsure of how long she had remained on floor.   Allergy:  Eye cream from the 1960s Neocortef ?spelling caused eyes to becomes swollen (Unknown)  neomycin (Unknown)  soaps and creams causes rash uses only sensitive skin soap (Rash)  sulfa drugs (Unknown)  Voltaren (Rash)    Hospital Medications:   MEDICATIONS  (STANDING):  aspirin enteric coated 81 milliGRAM(s) Oral daily  atorvastatin 10 milliGRAM(s) Oral at bedtime  busPIRone 10 milliGRAM(s) Oral two times a day  ergocalciferol 75615 Unit(s) Oral <User Schedule>  heparin  Injectable 5000 Unit(s) SubCutaneous every 12 hours  metoprolol tartrate 25 milliGRAM(s) Oral two times a day  mirtazapine 15 milliGRAM(s) Oral at bedtime  sodium chloride 0.9% lock flush 3 milliLiter(s) IV Push every 8 hours  sodium chloride 0.9%. 1000 milliLiter(s) (100 mL/Hr) IV Continuous <Continuous>    REVIEW OF SYSTEMS:  Limited 2/2 lethargy and confusion.       VITALS:  T(F): 100.3 (18 @ 00:59), Max: 100.3 (18 @ 00:59)  HR: 64 (18 @ 23:44)  BP: 126/78 (18 @ 23:44)  RR: 18 (18 @ 21:04)  SpO2: 99% (18 @ 21:04)  Wt(kg): --     @ 07:01  -   @ 07:00  --------------------------------------------------------  IN: 0 mL / OUT: 2 mL / NET: -2 mL        PHYSICAL EXAM:  Gen: Lethargic but arousable and verbal. Disoriented to time and place    HEENT: dry MM  Neck: no JVD  Cards: RRR, +S1/S2, no M/G/R  Resp: CTA B/L  GI: soft, NT/ND, NABS  : no CVA tenderness  Vascular: no LE edema B/L  Derm: no rashes      LABS:    LABS:      135  |  103  |  35<H>  ----------------------------<  105<H>  4.8   |  16<L>  |  1.06    Ca    8.0<L>      25 Mar 2018 07:50  Phos  3.3       Mg     2.2           Creatinine Trend: 1.06 <--, 1.04 <--, 1.08 <--, 1.26 <--                        9.8    11.65 )-----------( 165      ( 25 Mar 2018 07:55 )             32.7     Urine Studies:  Urinalysis Basic - ( 25 Mar 2018 10:20 )    Color: YELLOW / Appearance: CLEAR / S.019 / pH: 6.0  Gluc: NEGATIVE / Ketone: NEGATIVE  / Bili: NEGATIVE / Urobili: NORMAL mg/dL   Blood: TRACE / Protein: 100 mg/dL / Nitrite: NEGATIVE   Leuk Esterase: MODERATE / RBC: 5-10 / WBC 5-10   Sq Epi: OCC / Non Sq Epi:  / Bacteria: FEW          Creatine Kinase, Serum: 1159      RADIOLOGY & ADDITIONAL STUDIES:

## 2018-03-25 NOTE — PROGRESS NOTE ADULT - SUBJECTIVE AND OBJECTIVE BOX
Infectious Diseases progress note:    Subjective:   WBC improved today.  Had low grade temp 100.3 early today.   No new somatic complaints, however appears more lethargic, arousable and answers questions.  No bedside cough, abd pain, dysuria, diarrhea    ROS:  CONSTITUTIONAL:  No fever, chills, rigors  CARDIOVASCULAR:  No chest pain or palpitations  RESPIRATORY:   No SOB, cough, dyspnea on exertion.  No wheezing  GASTROINTESTINAL:  No abd pain, N/V, diarrhea/constipation  EXTREMITIES:  No swelling or joint pain  GENITOURINARY:  No burning on urination, increased frequency or urgency.  No flank pain  NEUROLOGIC:  No HA, visual disturbances  SKIN: No rashes    Allergies    Eye cream from the 1960s Neocortef ?spelling caused eyes to becomes swollen (Unknown)  neomycin (Unknown)  soaps and creams causes rash uses only sensitive skin soap (Rash)  sulfa drugs (Unknown)  Voltaren (Rash)    Intolerances        ANTIBIOTICS/RELEVANT:  antimicrobials    immunologic:    OTHER:  ALPRAZolam 0.5 milliGRAM(s) Oral three times a day PRN  aspirin enteric coated 81 milliGRAM(s) Oral daily  atorvastatin 10 milliGRAM(s) Oral at bedtime  busPIRone 10 milliGRAM(s) Oral two times a day  diltiazem    Tablet 30 milliGRAM(s) Oral four times a day  ergocalciferol 22568 Unit(s) Oral <User Schedule>  heparin  Injectable 5000 Unit(s) SubCutaneous every 12 hours  metoprolol tartrate 25 milliGRAM(s) Oral two times a day  mirtazapine 15 milliGRAM(s) Oral at bedtime  sodium chloride 0.9% lock flush 3 milliLiter(s) IV Push every 8 hours  sodium chloride 0.9%. 1000 milliLiter(s) IV Continuous <Continuous>  zolpidem 5 milliGRAM(s) Oral at bedtime PRN  zolpidem 5 milliGRAM(s) Oral at bedtime PRN      Objective:  Vital Signs Last 24 Hrs  T(C): 36.8 (25 Mar 2018 12:18), Max: 37.9 (25 Mar 2018 00:59)  T(F): 98.2 (25 Mar 2018 12:18), Max: 100.3 (25 Mar 2018 00:59)  HR: 72 (25 Mar 2018 17:33) (60 - 171)  BP: 140/60 (25 Mar 2018 17:33) (100/69 - 140/60)  BP(mean): --  RR: 18 (25 Mar 2018 12:18) (17 - 20)  SpO2: 100% (25 Mar 2018 12:18) (99% - 100%)    PHYSICAL EXAM:  Constitutional:NAD  Eyes:SOCORRO, EOMI  Ear/Nose/Throat: no thrush, mucositis.  Moist mucous membranes	  Neck:no JVD, no lymphadenopathy, supple  Respiratory: CTA ada  Cardiovascular: S1S2 RRR, no murmurs  Gastrointestinal:soft, nontender,  nondistended (+) BS  Extremities: RUE eccymosis  Skin:  no rashes, open wounds or ulcerations        LABS:                        9.8    11.65 )-----------( 165      ( 25 Mar 2018 07:55 )             32.7         135  |  103  |  35<H>  ----------------------------<  105<H>  4.8   |  16<L>  |  1.06    Ca    8.0<L>      25 Mar 2018 07:50  Phos  3.3       Mg     2.2             Urinalysis Basic - ( 25 Mar 2018 10:20 )    Color: YELLOW / Appearance: CLEAR / S.019 / pH: 6.0  Gluc: NEGATIVE / Ketone: NEGATIVE  / Bili: NEGATIVE / Urobili: NORMAL mg/dL   Blood: TRACE / Protein: 100 mg/dL / Nitrite: NEGATIVE   Leuk Esterase: MODERATE / RBC: 5-10 / WBC 5-10   Sq Epi: OCC / Non Sq Epi: x / Bacteria: FEW      MICROBIOLOGY:    Culture - Urine (18 @ 12:50)    Culture - Urine:   NO GROWTH AT 24 HOURS    Specimen Source: URINE MIDSTREAM    Culture - Blood (18 @ 12:43)    Culture - Blood:   NO ORGANISMS ISOLATED  NO ORGANISMS ISOLATED AT 72 HRS.    Specimen Source: BLOOD PERIPHERAL    Culture - Blood (18 @ 12:43)    Culture - Blood:   NO ORGANISMS ISOLATED  NO ORGANISMS ISOLATED AT 72 HRS.    Specimen Source: BLOOD          RADIOLOGY & ADDITIONAL STUDIES:    < from: Xray Chest 1 View- PORTABLE-Urgent (18 @ 11:54) >  IMPRESSION: The study is limited secondary to patient rotation. The heart   size is likely enlarged. There is a hiatal hernia. There is suggestion of   a trace left pleural effusion given the limitations of the study. There   is no pneumothorax.    < end of copied text >

## 2018-03-26 DIAGNOSIS — R06.02 SHORTNESS OF BREATH: ICD-10-CM

## 2018-03-26 LAB
BUN SERPL-MCNC: 25 MG/DL — HIGH (ref 7–23)
CALCIUM SERPL-MCNC: 7.8 MG/DL — LOW (ref 8.4–10.5)
CHLORIDE SERPL-SCNC: 103 MMOL/L — SIGNIFICANT CHANGE UP (ref 98–107)
CK SERPL-CCNC: 428 U/L — HIGH (ref 25–170)
CO2 SERPL-SCNC: 19 MMOL/L — LOW (ref 22–31)
CREAT SERPL-MCNC: 0.79 MG/DL — SIGNIFICANT CHANGE UP (ref 0.5–1.3)
GLUCOSE SERPL-MCNC: 98 MG/DL — SIGNIFICANT CHANGE UP (ref 70–99)
HCT VFR BLD CALC: 27.6 % — LOW (ref 34.5–45)
HGB BLD-MCNC: 9.1 G/DL — LOW (ref 11.5–15.5)
MAGNESIUM SERPL-MCNC: 2.1 MG/DL — SIGNIFICANT CHANGE UP (ref 1.6–2.6)
MCHC RBC-ENTMCNC: 30.7 PG — SIGNIFICANT CHANGE UP (ref 27–34)
MCHC RBC-ENTMCNC: 33 % — SIGNIFICANT CHANGE UP (ref 32–36)
MCV RBC AUTO: 93.2 FL — SIGNIFICANT CHANGE UP (ref 80–100)
NRBC # FLD: 0 — SIGNIFICANT CHANGE UP
PLATELET # BLD AUTO: 193 K/UL — SIGNIFICANT CHANGE UP (ref 150–400)
PMV BLD: 10.9 FL — SIGNIFICANT CHANGE UP (ref 7–13)
POTASSIUM SERPL-MCNC: 4 MMOL/L — SIGNIFICANT CHANGE UP (ref 3.5–5.3)
POTASSIUM SERPL-SCNC: 4 MMOL/L — SIGNIFICANT CHANGE UP (ref 3.5–5.3)
RBC # BLD: 2.96 M/UL — LOW (ref 3.8–5.2)
RBC # FLD: 13.4 % — SIGNIFICANT CHANGE UP (ref 10.3–14.5)
SODIUM SERPL-SCNC: 135 MMOL/L — SIGNIFICANT CHANGE UP (ref 135–145)
SPECIMEN SOURCE: SIGNIFICANT CHANGE UP
SPECIMEN SOURCE: SIGNIFICANT CHANGE UP
WBC # BLD: 11.36 K/UL — HIGH (ref 3.8–10.5)
WBC # FLD AUTO: 11.36 K/UL — HIGH (ref 3.8–10.5)

## 2018-03-26 RX ADMIN — ZOLPIDEM TARTRATE 5 MILLIGRAM(S): 10 TABLET ORAL at 22:42

## 2018-03-26 RX ADMIN — ATORVASTATIN CALCIUM 10 MILLIGRAM(S): 80 TABLET, FILM COATED ORAL at 21:58

## 2018-03-26 RX ADMIN — MIRTAZAPINE 15 MILLIGRAM(S): 45 TABLET, ORALLY DISINTEGRATING ORAL at 21:58

## 2018-03-26 RX ADMIN — Medication 25 MILLIGRAM(S): at 05:18

## 2018-03-26 RX ADMIN — POLYETHYLENE GLYCOL 3350 17 GRAM(S): 17 POWDER, FOR SOLUTION ORAL at 12:03

## 2018-03-26 RX ADMIN — Medication 10 MILLIGRAM(S): at 18:15

## 2018-03-26 RX ADMIN — SODIUM CHLORIDE 3 MILLILITER(S): 9 INJECTION INTRAMUSCULAR; INTRAVENOUS; SUBCUTANEOUS at 13:57

## 2018-03-26 RX ADMIN — SODIUM CHLORIDE 3 MILLILITER(S): 9 INJECTION INTRAMUSCULAR; INTRAVENOUS; SUBCUTANEOUS at 05:24

## 2018-03-26 RX ADMIN — Medication 100 MILLIGRAM(S): at 05:18

## 2018-03-26 RX ADMIN — Medication 10 MILLIGRAM(S): at 05:18

## 2018-03-26 RX ADMIN — Medication 0.5 MILLIGRAM(S): at 14:02

## 2018-03-26 RX ADMIN — ZOLPIDEM TARTRATE 5 MILLIGRAM(S): 10 TABLET ORAL at 00:01

## 2018-03-26 RX ADMIN — SODIUM CHLORIDE 3 MILLILITER(S): 9 INJECTION INTRAMUSCULAR; INTRAVENOUS; SUBCUTANEOUS at 21:00

## 2018-03-26 RX ADMIN — Medication 81 MILLIGRAM(S): at 12:03

## 2018-03-26 RX ADMIN — Medication 25 MILLIGRAM(S): at 18:15

## 2018-03-26 RX ADMIN — HEPARIN SODIUM 5000 UNIT(S): 5000 INJECTION INTRAVENOUS; SUBCUTANEOUS at 18:15

## 2018-03-26 RX ADMIN — HEPARIN SODIUM 5000 UNIT(S): 5000 INJECTION INTRAVENOUS; SUBCUTANEOUS at 05:19

## 2018-03-26 NOTE — PROGRESS NOTE ADULT - SUBJECTIVE AND OBJECTIVE BOX
CARDIOLOGY FOLLOW UP - Dr. Mccartney    CC c/o cough, denies cp/sob      PHYSICAL EXAM:  T(C): 36.9 (03-26-18 @ 05:16), Max: 36.9 (03-26-18 @ 05:16)  HR: 72 (03-26-18 @ 05:35) (63 - 72)  BP: 127/58 (03-26-18 @ 05:35) (104/62 - 140/60)  RR: 18 (03-26-18 @ 05:35) (17 - 18)  SpO2: 100% (03-26-18 @ 05:35) (98% - 100%)  Wt(kg): --  I&O's Summary    25 Mar 2018 07:01  -  26 Mar 2018 07:00  --------------------------------------------------------  IN: 0 mL / OUT: 500 mL / NET: -500 mL        Appearance: Normal	  Cardiovascular: Normal S1 S2,RRR, No JVD, No murmurs  Respiratory: Expiratory wheeze heard b/l bases	  Gastrointestinal:  Soft, Non-tender, + BS	  Extremities: Normal range of motion, No clubbing, cyanosis or edema        MEDICATIONS  (STANDING):  aspirin enteric coated 81 milliGRAM(s) Oral daily  atorvastatin 10 milliGRAM(s) Oral at bedtime  busPIRone 10 milliGRAM(s) Oral two times a day  diltiazem    Tablet 30 milliGRAM(s) Oral four times a day  docusate sodium 100 milliGRAM(s) Oral three times a day  ergocalciferol 64503 Unit(s) Oral <User Schedule>  heparin  Injectable 5000 Unit(s) SubCutaneous every 12 hours  metoprolol tartrate 25 milliGRAM(s) Oral two times a day  mirtazapine 15 milliGRAM(s) Oral at bedtime  polyethylene glycol 3350 17 Gram(s) Oral daily  senna 2 Tablet(s) Oral at bedtime  sodium chloride 0.9% lock flush 3 milliLiter(s) IV Push every 8 hours  sodium chloride 0.9%. 1000 milliLiter(s) (100 mL/Hr) IV Continuous <Continuous>      TELEMETRY: SR  brief SVT noted last night	    ECG:  	  RADIOLOGY:   DIAGNOSTIC TESTING:  [ ] Echocardiogram:  [ ]  Catheterization:  [ ] Stress Test:    OTHER: 	    LABS:	 	                                9.1    11.36 )-----------( 193      ( 26 Mar 2018 07:21 )             27.6     03-26    135  |  103  |  25<H>  ----------------------------<  98  4.0   |  19<L>  |  0.79    Ca    7.8<L>      26 Mar 2018 07:21  Phos  3.3     03-25  Mg     2.1     03-26 CARDIOLOGY FOLLOW UP - Dr. Mccartney    CC c/o cough, denies cp/sob      PHYSICAL EXAM:  T(C): 36.9 (03-26-18 @ 05:16), Max: 36.9 (03-26-18 @ 05:16)  HR: 72 (03-26-18 @ 05:35) (63 - 72)  BP: 127/58 (03-26-18 @ 05:35) (104/62 - 140/60)  RR: 18 (03-26-18 @ 05:35) (17 - 18)  SpO2: 100% (03-26-18 @ 05:35) (98% - 100%)  Wt(kg): --  I&O's Summary    25 Mar 2018 07:01  -  26 Mar 2018 07:00  --------------------------------------------------------  IN: 0 mL / OUT: 500 mL / NET: -500 mL        Appearance: Normal	  Cardiovascular: Normal S1 S2,RRR, No JVD, No murmurs  Respiratory: Expiratory wheeze heard b/l bases	  Gastrointestinal:  Soft, Non-tender, + BS	  Extremities: Normal range of motion, No clubbing, cyanosis or edema        MEDICATIONS  (STANDING):  aspirin enteric coated 81 milliGRAM(s) Oral daily  atorvastatin 10 milliGRAM(s) Oral at bedtime  busPIRone 10 milliGRAM(s) Oral two times a day  diltiazem    Tablet 30 milliGRAM(s) Oral four times a day  docusate sodium 100 milliGRAM(s) Oral three times a day  ergocalciferol 49284 Unit(s) Oral <User Schedule>  heparin  Injectable 5000 Unit(s) SubCutaneous every 12 hours  metoprolol tartrate 25 milliGRAM(s) Oral two times a day  mirtazapine 15 milliGRAM(s) Oral at bedtime  polyethylene glycol 3350 17 Gram(s) Oral daily  senna 2 Tablet(s) Oral at bedtime  sodium chloride 0.9% lock flush 3 milliLiter(s) IV Push every 8 hours  sodium chloride 0.9%. 1000 milliLiter(s) (100 mL/Hr) IV Continuous <Continuous>      TELEMETRY: SR  brief SVT noted last night	    ECG:  	  RADIOLOGY:   DIAGNOSTIC TESTING:  [ ] Echocardiogram:  [ ]  Catheterization:  [ ] Stress Test:    OTHER: 	    LABS:	 	    < from: Xray Chest 1 View- PORTABLE-Urgent (03.25.18 @ 11:54) >  IMPRESSION: The study is limited secondary to patient rotation. The heart   size is likely enlarged. There is a hiatal hernia. There is suggestion of   a trace left pleural effusion given the limitations of the study. There   is no pneumothorax.      < end of copied text >                              9.1    11.36 )-----------( 193      ( 26 Mar 2018 07:21 )             27.6     03-26    135  |  103  |  25<H>  ----------------------------<  98  4.0   |  19<L>  |  0.79    Ca    7.8<L>      26 Mar 2018 07:21  Phos  3.3     03-25  Mg     2.1     03-26

## 2018-03-26 NOTE — PROVIDER CONTACT NOTE (OTHER) - RECOMMENDATIONS
No further interventions at this time. Continuing to monitor.
As per catheter protocol, patient to be straight cathed for second time. Will continue to monitor for urine output.
IVP metoprolol ordered to be given. No further interventions at this time. Continuing to monitor.
No further interventions at this time. Continuing to monitor.
No further interventions at this time. Continuing to monitor.
continue to monitor.
follow [facility] protocol to straight cath
none at this time
straight cath as per protocol, continue to monitor.

## 2018-03-26 NOTE — PROVIDER CONTACT NOTE (OTHER) - SITUATION
Pt with sinus tachycardia to 150s bmp.
Patient has not voided in 8hours, >999 mL upon bladder scan.
Patient with burst of SVT to 175 bmp on tele monitor.
Patient with sinus tachy to 174 bpm on tele monitor
Pt with no urine output in 8 hours since last bladder scan and straight catheterization
Pt with quick burst of SVT to 170bpm
patient HR of 160 on tele
patient has not been able to void in 8 hours since last straight cath'ed. patient
patients HR intp 160s intermittently

## 2018-03-26 NOTE — PROVIDER CONTACT NOTE (OTHER) - ACTION/TREATMENT ORDERED:
No further interventions at this time. Continuing to monitor.
As per catheter protocol, patient to be straight cathed for second time. Will continue to monitor for urine output.
IVP metoprolol ordered to be given. No further interventions at this time. Continuing to monitor.
No further interventions at this time. Continuing to monitor.
No further interventions at this time. Continuing to monitor.
continue to monitor
continue to monitor.
follow [facility] protocol to straight cath, continue to monitor.
straight cath as per protocol, continue to monitor.

## 2018-03-26 NOTE — PROGRESS NOTE ADULT - ASSESSMENT
89 y/o F with history of HTN, HLD, Palpitation, insomnia, anxiety present with unwitnessed fall. Patient was found on the bathroom floor by her daughter. Unknown how long patient was on the floor. Patient states she only remembers waking up from the floor. Patient c/o back, right shoulder pain and left knee pain. Patient denies chest pain, palpitation at the time, sob, cough, n/v/d/, fever, chills, no dizziness or weakness. In ED patient was found to have UTI and was given one dose of Ceftriaxone and Zosyn. Also was found to have CK of 5364 and Trop of 0.10 and was given bolus of 1L of NS. (22 Mar 2018 16:49)    Pt finished a 5 day course of cipro for UTI about a month ago.  She currently does not complaint of burning, urgency or increased frequency of urination.  No suprapubic discomfort.  UA (+) blood, neg for nit/trace LE.  Urine cultures have been negative.  WBC and temp curve improved.  CPK improved from yesterday.    Problem/Plan - 1:    ·	Fever/Leukocytosis    - UA and urine cultures negative for UTI.  CT chest without evidence for pna.  Pt currently without other focal signs on exam suggestive of infectious process.    - Suspect leukocytosis and mild fever likely reactive to rhabomyolysis.  CPK improving    - Pt with low grade temp 100.3 today.  Repeat UA, urine cultures, blood cultures sent (negative so far).  Repeat chest xray without infiltrate/consolidations    - f/u RUQ sono. LFTS elevated in the setting of rhabdomyolysis - possibly secondary to skeletal muscle injury.  R/o liver pathology.    - No need for abx at this time.    Will follow,    Daar Mccartney  [phone]

## 2018-03-26 NOTE — PROGRESS NOTE ADULT - SUBJECTIVE AND OBJECTIVE BOX
Patient is a 89y old  Female who presents with a chief complaint of S/P Fall- found on the floor by daughter (22 Mar 2018 16:49)      Any change in ROS:     MEDICATIONS  (STANDING):  aspirin enteric coated 81 milliGRAM(s) Oral daily  atorvastatin 10 milliGRAM(s) Oral at bedtime  busPIRone 10 milliGRAM(s) Oral two times a day  diltiazem    Tablet 60 milliGRAM(s) Oral three times a day  docusate sodium 100 milliGRAM(s) Oral three times a day  ergocalciferol 20669 Unit(s) Oral <User Schedule>  heparin  Injectable 5000 Unit(s) SubCutaneous every 12 hours  metoprolol tartrate 25 milliGRAM(s) Oral two times a day  mirtazapine 15 milliGRAM(s) Oral at bedtime  polyethylene glycol 3350 17 Gram(s) Oral daily  senna 2 Tablet(s) Oral at bedtime  sodium chloride 0.9% lock flush 3 milliLiter(s) IV Push every 8 hours  sodium chloride 0.9%. 1000 milliLiter(s) (100 mL/Hr) IV Continuous <Continuous>    MEDICATIONS  (PRN):  ALPRAZolam 0.5 milliGRAM(s) Oral three times a day PRN Anxiety  zolpidem 5 milliGRAM(s) Oral at bedtime PRN Insomnia  zolpidem 5 milliGRAM(s) Oral at bedtime PRN Insomnia    Vital Signs Last 24 Hrs  T(C): 36.7 (26 Mar 2018 14:20), Max: 36.9 (26 Mar 2018 05:16)  T(F): 98 (26 Mar 2018 14:20), Max: 98.4 (26 Mar 2018 05:16)  HR: 170 (26 Mar 2018 14:20) (63 - 170)  BP: 120/67 (26 Mar 2018 14:20) (104/62 - 140/60)  BP(mean): --  RR: 18 (26 Mar 2018 14:20) (17 - 20)  SpO2: 97% (26 Mar 2018 14:20) (97% - 100%)    I&O's Summary    25 Mar 2018 07:01  -  26 Mar 2018 07:00  --------------------------------------------------------  IN: 0 mL / OUT: 500 mL / NET: -500 mL          Physical Exam:   GENERAL: NAD, well-groomed, well-developed  HEENT: SOCORRO/   Atraumatic, Normocephalic  ENMT: No tonsillar erythema, exudates, or enlargement; Moist mucous membranes, Good dentition, No lesions  NECK: Supple, No JVD, Normal thyroid  CHEST/LUNG: Clear to auscultaion, ; No rales, rhonchi, wheezing, or rubs  CVS: Regular rate and rhythm; No murmurs, rubs, or gallops  GI: : Soft, Nontender, Nondistended; Bowel sounds present  NERVOUS SYSTEM:  Alert & Oriented X3, Good concentration; Motor Strength 5/5 B/L upper and lower extremities; DTRs 2+ intact and symmetric  EXTREMITIES:  2+ Peripheral Pulses, No clubbing, cyanosis, or edema  LYMPH: No lymphadenopathy noted  SKIN: No rashes or lesions  ENDOCRINOLOGY: No Thyromegaly  PSYCH: Appropriate    Labs:  24  CARDIAC MARKERS ( 26 Mar 2018 07:21 )  x     / x     / 428 u/L / x     / x      CARDIAC MARKERS ( 25 Mar 2018 07:50 )  x     / x     / 1159 u/L / x     / x                                9.1    11.36 )-----------( 193      ( 26 Mar 2018 07:21 )             27.6                         9.8    11.65 )-----------( 165      ( 25 Mar 2018 07:55 )             32.7                         10.1   18.51 )-----------( 214      ( 24 Mar 2018 06:20 )             30.0                         10.2   11.98 )-----------( 150      ( 23 Mar 2018 06:46 )             30.5     03-    135  |  103  |  25<H>  ----------------------------<  98  4.0   |  19<L>  |  0.79  03-    135  |  103  |  35<H>  ----------------------------<  105<H>  4.8   |  16<L>  |  1.06  03-24    133<L>  |  98  |  36<H>  ----------------------------<  126<H>  4.7   |  16<L>  |  1.04  -23    130<L>  |  96<L>  |  31<H>  ----------------------------<  115<H>  4.4   |  19<L>  |  1.08    Ca    7.8<L>      26 Mar 2018 07:21  Ca    8.0<L>      25 Mar 2018 07:50  Phos  3.3       Mg     2.1       Mg     2.2         TPro  5.8<L>  /  Alb  3.1<L>  /  TBili  0.6  /  DBili  x   /  AST  222<H>  /  ALT  95<H>  /  AlkPhos  85      CAPILLARY BLOOD GLUCOSE              Urinalysis Basic - ( 25 Mar 2018 10:20 )    Color: YELLOW / Appearance: CLEAR / S.019 / pH: 6.0  Gluc: NEGATIVE / Ketone: NEGATIVE  / Bili: NEGATIVE / Urobili: NORMAL mg/dL   Blood: TRACE / Protein: 100 mg/dL / Nitrite: NEGATIVE   Leuk Esterase: MODERATE / RBC: 5-10 / WBC 5-10   Sq Epi: OCC / Non Sq Epi: x / Bacteria: FEW      Cultures:           Wound culture:                 @ 10:40  Organism --  Culture w/ gram stain --  Specimen Source URINE CATHETER    Wound culture:                 @ 02:23  Organism --  Culture w/ gram stain --  Specimen Source BLOOD    Wound culture:                 @ 12:50  Organism --  Culture w/ gram stain --  Specimen Source URINE MIDSTREAM    Wound culture:                 @ 12:43  Organism --  Culture w/ gram stain --  Specimen Source BLOOD      Abscess culture:              @ 10:40  Organism --  Gram Stain --  Specimen Source URINE CATHETER    Abscess culture:              @ 02:23  Organism --  Gram Stain --  Specimen Source BLOOD    Abscess culture:              @ 12:50  Organism --  Gram Stain --  Specimen Source URINE MIDSTREAM    Abscess culture:              @ 12:43  Organism --  Gram Stain --  Specimen Source BLOOD        Tissue culture:            @ 10:40  Organism --  Gram Stain --  Specimen Source URINE CATHETER    Tissue culture:            @ 02:23  Organism --  Gram Stain --  Specimen Source BLOOD    Tissue culture:            @ 12:50  Organism --  Gram Stain --  Specimen Source URINE MIDSTREAM    Tissue culture:            @ 12:43  Organism --  Gram Stain --  Specimen Source BLOOD      Body Fluid Smear & Culture:                         @ 10:40  AFB Smear  --  Culture Acid Fast Body Fluid w/ Smear  --  Culture Acid Fast Smear Concentrated   --    Culture Results:     --  Specimen Source URINE CATHETER    Body Fluid Smear & Culture:                         @ 02:23  AFB Smear  --  Culture Acid Fast Body Fluid w/ Smear  --  Culture Acid Fast Smear Concentrated   --    Culture Results:     --  Specimen Source BLOOD    Body Fluid Smear & Culture:                         @ 12:50  AFB Smear  --  Culture Acid Fast Body Fluid w/ Smear  --  Culture Acid Fast Smear Concentrated   --    Culture Results:     --  Specimen Source URINE MIDSTREAM    Body Fluid Smear & Culture:                         @ 12:43  AFB Smear  --  Culture Acid Fast Body Fluid w/ Smear  --  Culture Acid Fast Smear Concentrated   --    Culture Results:     --  Specimen Source BLOOD              Studies  Chest X-RAY  CT SCAN Chest   Venous Dopplers: LE:   CT Abdomen  Others      < from: CT Chest No Cont (18 @ 12:57) >  COMPARISON: None.    PROCEDURE:   CT of the Chest was performed without intravenous contrast.  Sagittal and coronal reformats wereperformed.    FINDINGS:    LUNGS AND LARGE AIRWAYS: Patent central airways. Scattered subcentimeter   pulmonary nodules. For example:  *  Right lower lobe (series 2, image 72), measuring 0.3 cm.  PLEURA: Trace bilateral pleural effusions.  VESSELS: Atherosclerotic calcifications.   HEART: Heart size is normal. No pericardial effusion.  MEDIASTINUM AND CHERI: Small hiatal hernia.  CHEST WALL AND LOWER NECK: Subcentimeter hypodense lesions in both   thyroid lobes, too small to characterize.  VISUALIZED UPPER ABDOMEN: Within normal limits.  BONES: Degenerative changes of the spine.    IMPRESSION: No sequela of acute trauma in the chest.          < from: CT Chest No Cont (18 @ 12:57) >  COMPARISON: None.    PROCEDURE:   CT of the Chest was performed without intravenous contrast.  Sagittal and coronal reformats wereperformed.    FINDINGS:    LUNGS AND LARGE AIRWAYS: Patent central airways. Scattered subcentimeter   pulmonary nodules. For example:  *  Right lower lobe (series 2, image 72), measuring 0.3 cm.  PLEURA: Trace bilateral pleural effusions.  VESSELS: Atherosclerotic calcifications.   HEART: Heart size is normal. No pericardial effusion.  MEDIASTINUM AND CHERI: Small hiatal hernia.  CHEST WALL AND LOWER NECK: Subcentimeter hypodense lesions in both   thyroid lobes, too small to characterize.  VISUALIZED UPPER ABDOMEN: Within normal limits.  BONES: Degenerative changes of the spine.    IMPRESSION: No sequela of acute trauma in the chest.                  IDALMIS JOSEPH M.D., ATTENDING RADIOLOGIST  This document has been electronically signed. Mar 22 2018  1:34PM        < end of copied text >          IDALMIS JOSEPH M.D., ATTENDING RADIOLOGIST  This document has been electronically signed. Mar 22 2018  1:34PM        < end of copied text >

## 2018-03-26 NOTE — PROGRESS NOTE ADULT - ASSESSMENT
· Assessment	  89 y/o F with history of HTN, HLD, Palpitation, insomnia, anxiety present with unwitnessed fall. Patient was found on the bathroom floor by her daughter.  Patient c/o back, right shoulder pain and left knee pain.  In ED patient was found to have UTI and was given one dose of Ceftriaxone and Zosyn. Also was found to have CK of 5364 and Trop of 0.10 and was given bolus of 1L of NS     Problem/Plan - 1:  ·  Problem: Unwitnessed fall.  Plan: Patient with unwitnessed  fall, will monitor on telemetry for any arrhythmia, Cardio f/up noted.    U. Retention:  [place].     Problem/Plan - 2: Rhabdomyolysis:    CPK improved.     Problem/Plan - 4:  ·  Problem: Essential hypertension.  Plan: Cont current Med.     Problem/Plan - 5:  ·  Problem: Other hyperlipidemia.  Plan: Continue with Lipitor.

## 2018-03-26 NOTE — PROGRESS NOTE ADULT - SUBJECTIVE AND OBJECTIVE BOX
Infectious Diseases progress note:    Subjective:  No new somatic complaints.  No fevers.  WBC stable    ROS:  CONSTITUTIONAL:  No fever, chills, rigors  CARDIOVASCULAR:  No chest pain or palpitations  RESPIRATORY:   No SOB, cough, dyspnea on exertion.  No wheezing  GASTROINTESTINAL:  No abd pain, N/V, diarrhea/constipation  EXTREMITIES:  No swelling or joint pain  GENITOURINARY:  No burning on urination, increased frequency or urgency.  No flank pain  NEUROLOGIC:  No HA, visual disturbances  SKIN: No rashes    Allergies    Eye cream from the 1960s Neocortef ?spelling caused eyes to becomes swollen (Unknown)  neomycin (Unknown)  soaps and creams causes rash uses only sensitive skin soap (Rash)  sulfa drugs (Unknown)  Voltaren (Rash)    Intolerances        ANTIBIOTICS/RELEVANT:  antimicrobials    immunologic:    OTHER:  ALPRAZolam 0.5 milliGRAM(s) Oral three times a day PRN  aspirin enteric coated 81 milliGRAM(s) Oral daily  atorvastatin 10 milliGRAM(s) Oral at bedtime  busPIRone 10 milliGRAM(s) Oral two times a day  diltiazem    Tablet 60 milliGRAM(s) Oral three times a day  docusate sodium 100 milliGRAM(s) Oral three times a day  ergocalciferol 35916 Unit(s) Oral <User Schedule>  heparin  Injectable 5000 Unit(s) SubCutaneous every 12 hours  metoprolol tartrate 25 milliGRAM(s) Oral two times a day  mirtazapine 15 milliGRAM(s) Oral at bedtime  polyethylene glycol 3350 17 Gram(s) Oral daily  senna 2 Tablet(s) Oral at bedtime  sodium chloride 0.9% lock flush 3 milliLiter(s) IV Push every 8 hours  sodium chloride 0.9%. 1000 milliLiter(s) IV Continuous <Continuous>  zolpidem 5 milliGRAM(s) Oral at bedtime PRN  zolpidem 5 milliGRAM(s) Oral at bedtime PRN      Objective:  Vital Signs Last 24 Hrs  T(C): 36.6 (26 Mar 2018 12:01), Max: 36.9 (26 Mar 2018 05:16)  T(F): 97.9 (26 Mar 2018 12:01), Max: 98.4 (26 Mar 2018 05:16)  HR: 88 (26 Mar 2018 13:57) (63 - 88)  BP: 135/72 (26 Mar 2018 13:57) (104/62 - 140/60)  BP(mean): --  RR: 20 (26 Mar 2018 12:01) (17 - 20)  SpO2: 100% (26 Mar 2018 12:01) (98% - 100%)    PHYSICAL EXAM:  Constitutional:NAD  Eyes:SOCORRO, EOMI  Ear/Nose/Throat: no thrush, mucositis.  Moist mucous membranes	  Neck:no JVD, no lymphadenopathy, supple  Respiratory: CTA ada  Cardiovascular: S1S2 RRR, no murmurs  Gastrointestinal:soft, nontender,  nondistended (+) BS  Extremities: RUE eccymosis  Skin:  no rashes, open wounds or ulcerations        LABS:                        9.1    11.36 )-----------( 193      ( 26 Mar 2018 07:21 )             27.6         135  |  103  |  25<H>  ----------------------------<  98  4.0   |  19<L>  |  0.79    Ca    7.8<L>      26 Mar 2018 07:21  Phos  3.3       Mg     2.1             Urinalysis Basic - ( 25 Mar 2018 10:20 )    Color: YELLOW / Appearance: CLEAR / S.019 / pH: 6.0  Gluc: NEGATIVE / Ketone: NEGATIVE  / Bili: NEGATIVE / Urobili: NORMAL mg/dL   Blood: TRACE / Protein: 100 mg/dL / Nitrite: NEGATIVE   Leuk Esterase: MODERATE / RBC: 5-10 / WBC 5-10   Sq Epi: OCC / Non Sq Epi: x / Bacteria: FEW          MICROBIOLOGY:    Culture - Urine (18 @ 10:40)    Culture - Urine:   NO GROWTH TO DATE    Specimen Source: URINE CATHETER    Culture - Blood (18 @ 02:23)    Culture - Blood:   NO ORGANISMS ISOLATED  NO ORGANISMS ISOLATED AT 24 HOURS    Specimen Source: BLOOD    Culture - Urine (18 @ 12:50)    Culture - Urine:   NO GROWTH AT 24 HOURS    Specimen Source: URINE MIDSTREAM    Culture - Blood (18 @ 12:43)    Culture - Blood:   NO ORGANISMS ISOLATED  NO ORGANISMS ISOLATED AT 96 HOURS    Specimen Source: BLOOD PERIPHERAL              RADIOLOGY & ADDITIONAL STUDIES:    < from: Xray Chest 1 View- PORTABLE-Urgent (18 @ 11:54) >  IMPRESSION: The study is limited secondary to patient rotation. The heart   size is likely enlarged. There is a hiatal hernia. There is suggestion of   a trace left pleural effusion given the limitations of the study. There   is no pneumothorax.    < end of copied text >

## 2018-03-26 NOTE — PROGRESS NOTE ADULT - SUBJECTIVE AND OBJECTIVE BOX
Patient is a 89y old  Female who presents with a chief complaint of S/P Fall- found on the floor by daughter (22 Mar 2018 16:49)      SUBJECTIVE / OVERNIGHT EVENTS:   Feels better.  Denies CP/SOB/Palpitation/HA.    MEDICATIONS  (STANDING):  aspirin enteric coated 81 milliGRAM(s) Oral daily  atorvastatin 10 milliGRAM(s) Oral at bedtime  busPIRone 10 milliGRAM(s) Oral two times a day  diltiazem    Tablet 60 milliGRAM(s) Oral three times a day  docusate sodium 100 milliGRAM(s) Oral three times a day  ergocalciferol 92262 Unit(s) Oral <User Schedule>  heparin  Injectable 5000 Unit(s) SubCutaneous every 12 hours  metoprolol tartrate 25 milliGRAM(s) Oral two times a day  mirtazapine 15 milliGRAM(s) Oral at bedtime  polyethylene glycol 3350 17 Gram(s) Oral daily  senna 2 Tablet(s) Oral at bedtime  sodium chloride 0.9% lock flush 3 milliLiter(s) IV Push every 8 hours  sodium chloride 0.9%. 1000 milliLiter(s) (100 mL/Hr) IV Continuous <Continuous>    MEDICATIONS  (PRN):  ALPRAZolam 0.5 milliGRAM(s) Oral three times a day PRN Anxiety  zolpidem 5 milliGRAM(s) Oral at bedtime PRN Insomnia  zolpidem 5 milliGRAM(s) Oral at bedtime PRN Insomnia        CAPILLARY BLOOD GLUCOSE        I&O's Summary    25 Mar 2018 07:01  -  26 Mar 2018 07:00  --------------------------------------------------------  IN: 0 mL / OUT: 500 mL / NET: -500 mL        PHYSICAL EXAM:  GENERAL: NAD, well-developed  HEAD:  Atraumatic, Normocephalic  NECK: Supple, No JVD  CHEST/LUNG: Clear to auscultation bilaterally; No wheezing.  HEART: Regular rate and rhythm; No murmurs, rubs, or gallops  ABDOMEN: Soft, Nontender, Nondistended; Bowel sounds present  EXTREMITIES:   No clubbing, cyanosis, or edema  NEUROLOGY: AAO X 3  SKIN: No rashes    LABS:                        9.1    11.36 )-----------( 193      ( 26 Mar 2018 07:21 )             27.6     03    135  |  103  |  25<H>  ----------------------------<  98  4.0   |  19<L>  |  0.79    Ca    7.8<L>      26 Mar 2018 07:21  Phos  3.3       Mg     2.1             CARDIAC MARKERS ( 26 Mar 2018 07:21 )  x     / x     / 428 u/L / x     / x      CARDIAC MARKERS ( 25 Mar 2018 07:50 )  x     / x     / 1159 u/L / x     / x          Urinalysis Basic - ( 25 Mar 2018 10:20 )    Color: YELLOW / Appearance: CLEAR / S.019 / pH: 6.0  Gluc: NEGATIVE / Ketone: NEGATIVE  / Bili: NEGATIVE / Urobili: NORMAL mg/dL   Blood: TRACE / Protein: 100 mg/dL / Nitrite: NEGATIVE   Leuk Esterase: MODERATE / RBC: 5-10 / WBC 5-10   Sq Epi: OCC / Non Sq Epi: x / Bacteria: FEW      CAPILLARY BLOOD GLUCOSE         @ 10:40  Culture-urine   NO GROWTH TO DATE  Culture results --  method type --  Organism --  Organism Identification --  Specimen source URINE CATHETER   @ 02:23  Culture-urine --  Culture results --  method type --  Organism --  Organism Identification --  Specimen source BLOOD   @ 12:50  Culture-urine   NO GROWTH AT 24 HOURS  Culture results --  method type --  Organism --  Organism Identification --  Specimen source URINE MIDSTREAM   @ 12:43  Culture-urine --  Culture results --  method type --  Organism --  Organism Identification --  Specimen source BLOOD            @ 10:40  Culture blood --  Culture results --  Gram stain --  Gram stain blood --  Method type --  Organism --  Organism identification --  Specimen source URINE CATHETER    @ 02:23  Culture blood   NO ORGANISMS ISOLATED  NO ORGANISMS ISOLATED AT 24 HOURS  Culture results --  Gram stain --  Gram stain blood --  Method type --  Organism --  Organism identification --  Specimen source BLOOD    @ 12:50  Culture blood --  Culture results --  Gram stain --  Gram stain blood --  Method type --  Organism --  Organism identification --  Specimen source URINE MIDSTREAM    @ 12:43  Culture blood   NO ORGANISMS ISOLATED  NO ORGANISMS ISOLATED AT 96 HOURS  Culture results --  Gram stain --  Gram stain blood --  Method type --  Organism --  Organism identification --  Specimen source BLOOD      RADIOLOGY & ADDITIONAL TESTS:    Imaging Personally Reviewed:    Consultant(s) Notes Reviewed:      Care Discussed with Consultants/Other Providers:

## 2018-03-26 NOTE — PROGRESS NOTE ADULT - PROBLEM SELECTOR PLAN 1
I don't think there is any pulmonary reason falling down: She appeared dehydrated n admission: as HE HAS NO CHEST PAIN OR sob :  ct chest without any evidence of injury!  3/24 fall precaution.  3/25 s/p fall at home. fall precaution.: echo awaited

## 2018-03-27 DIAGNOSIS — R05 COUGH: ICD-10-CM

## 2018-03-27 LAB
B PERT DNA SPEC QL NAA+PROBE: SIGNIFICANT CHANGE UP
BACTERIA BLD CULT: SIGNIFICANT CHANGE UP
BACTERIA BLD CULT: SIGNIFICANT CHANGE UP
BACTERIA UR CULT: SIGNIFICANT CHANGE UP
BASE EXCESS BLDV CALC-SCNC: -3.6 MMOL/L — SIGNIFICANT CHANGE UP
BUN SERPL-MCNC: 24 MG/DL — HIGH (ref 7–23)
C PNEUM DNA SPEC QL NAA+PROBE: NOT DETECTED — SIGNIFICANT CHANGE UP
CALCIUM SERPL-MCNC: 7.8 MG/DL — LOW (ref 8.4–10.5)
CHLORIDE SERPL-SCNC: 104 MMOL/L — SIGNIFICANT CHANGE UP (ref 98–107)
CK SERPL-CCNC: 233 U/L — HIGH (ref 25–170)
CO2 SERPL-SCNC: 20 MMOL/L — LOW (ref 22–31)
CREAT SERPL-MCNC: 0.94 MG/DL — SIGNIFICANT CHANGE UP (ref 0.5–1.3)
FLUAV H1 2009 PAND RNA SPEC QL NAA+PROBE: NOT DETECTED — SIGNIFICANT CHANGE UP
FLUAV H1 RNA SPEC QL NAA+PROBE: NOT DETECTED — SIGNIFICANT CHANGE UP
FLUAV H3 RNA SPEC QL NAA+PROBE: NOT DETECTED — SIGNIFICANT CHANGE UP
FLUAV SUBTYP SPEC NAA+PROBE: SIGNIFICANT CHANGE UP
FLUBV RNA SPEC QL NAA+PROBE: NOT DETECTED — SIGNIFICANT CHANGE UP
GLUCOSE SERPL-MCNC: 96 MG/DL — SIGNIFICANT CHANGE UP (ref 70–99)
HADV DNA SPEC QL NAA+PROBE: NOT DETECTED — SIGNIFICANT CHANGE UP
HCO3 BLDV-SCNC: 21 MMOL/L — SIGNIFICANT CHANGE UP (ref 20–27)
HCOV 229E RNA SPEC QL NAA+PROBE: NOT DETECTED — SIGNIFICANT CHANGE UP
HCOV HKU1 RNA SPEC QL NAA+PROBE: NOT DETECTED — SIGNIFICANT CHANGE UP
HCOV NL63 RNA SPEC QL NAA+PROBE: NOT DETECTED — SIGNIFICANT CHANGE UP
HCOV OC43 RNA SPEC QL NAA+PROBE: NOT DETECTED — SIGNIFICANT CHANGE UP
HCT VFR BLD CALC: 30 % — LOW (ref 34.5–45)
HGB BLD-MCNC: 9.7 G/DL — LOW (ref 11.5–15.5)
HMPV RNA SPEC QL NAA+PROBE: NOT DETECTED — SIGNIFICANT CHANGE UP
HPIV1 RNA SPEC QL NAA+PROBE: NOT DETECTED — SIGNIFICANT CHANGE UP
HPIV2 RNA SPEC QL NAA+PROBE: NOT DETECTED — SIGNIFICANT CHANGE UP
HPIV3 RNA SPEC QL NAA+PROBE: NOT DETECTED — SIGNIFICANT CHANGE UP
HPIV4 RNA SPEC QL NAA+PROBE: NOT DETECTED — SIGNIFICANT CHANGE UP
M PNEUMO DNA SPEC QL NAA+PROBE: NOT DETECTED — SIGNIFICANT CHANGE UP
MAGNESIUM SERPL-MCNC: 2 MG/DL — SIGNIFICANT CHANGE UP (ref 1.6–2.6)
MCHC RBC-ENTMCNC: 29.8 PG — SIGNIFICANT CHANGE UP (ref 27–34)
MCHC RBC-ENTMCNC: 32.3 % — SIGNIFICANT CHANGE UP (ref 32–36)
MCV RBC AUTO: 92.3 FL — SIGNIFICANT CHANGE UP (ref 80–100)
NRBC # FLD: 0 — SIGNIFICANT CHANGE UP
PCO2 BLDV: 39 MMHG — LOW (ref 41–51)
PH BLDV: 7.36 PH — SIGNIFICANT CHANGE UP (ref 7.32–7.43)
PLATELET # BLD AUTO: 219 K/UL — SIGNIFICANT CHANGE UP (ref 150–400)
PMV BLD: 10.8 FL — SIGNIFICANT CHANGE UP (ref 7–13)
PO2 BLDV: 40 MMHG — SIGNIFICANT CHANGE UP (ref 35–40)
POTASSIUM SERPL-MCNC: 3.7 MMOL/L — SIGNIFICANT CHANGE UP (ref 3.5–5.3)
POTASSIUM SERPL-SCNC: 3.7 MMOL/L — SIGNIFICANT CHANGE UP (ref 3.5–5.3)
RBC # BLD: 3.25 M/UL — LOW (ref 3.8–5.2)
RBC # FLD: 13.4 % — SIGNIFICANT CHANGE UP (ref 10.3–14.5)
RSV RNA SPEC QL NAA+PROBE: NOT DETECTED — SIGNIFICANT CHANGE UP
RV+EV RNA SPEC QL NAA+PROBE: NOT DETECTED — SIGNIFICANT CHANGE UP
SAO2 % BLDV: 67.5 % — SIGNIFICANT CHANGE UP (ref 60–85)
SODIUM SERPL-SCNC: 136 MMOL/L — SIGNIFICANT CHANGE UP (ref 135–145)
WBC # BLD: 8.7 K/UL — SIGNIFICANT CHANGE UP (ref 3.8–10.5)
WBC # FLD AUTO: 8.7 K/UL — SIGNIFICANT CHANGE UP (ref 3.8–10.5)

## 2018-03-27 PROCEDURE — 93306 TTE W/DOPPLER COMPLETE: CPT | Mod: 26

## 2018-03-27 PROCEDURE — 71045 X-RAY EXAM CHEST 1 VIEW: CPT | Mod: 26

## 2018-03-27 PROCEDURE — 93970 EXTREMITY STUDY: CPT | Mod: 26

## 2018-03-27 PROCEDURE — 76705 ECHO EXAM OF ABDOMEN: CPT | Mod: 26

## 2018-03-27 RX ORDER — RIVAROXABAN 15 MG-20MG
20 KIT ORAL EVERY 24 HOURS
Qty: 0 | Refills: 0 | Status: DISCONTINUED | OUTPATIENT
Start: 2018-04-18 | End: 2018-03-30

## 2018-03-27 RX ORDER — ACETAMINOPHEN 500 MG
650 TABLET ORAL ONCE
Qty: 0 | Refills: 0 | Status: COMPLETED | OUTPATIENT
Start: 2018-03-27 | End: 2018-03-27

## 2018-03-27 RX ORDER — RIVAROXABAN 15 MG-20MG
15 KIT ORAL
Qty: 0 | Refills: 0 | Status: DISCONTINUED | OUTPATIENT
Start: 2018-03-27 | End: 2018-03-30

## 2018-03-27 RX ORDER — IPRATROPIUM/ALBUTEROL SULFATE 18-103MCG
3 AEROSOL WITH ADAPTER (GRAM) INHALATION EVERY 6 HOURS
Qty: 0 | Refills: 0 | Status: DISCONTINUED | OUTPATIENT
Start: 2018-03-27 | End: 2018-03-30

## 2018-03-27 RX ADMIN — ZOLPIDEM TARTRATE 5 MILLIGRAM(S): 10 TABLET ORAL at 21:48

## 2018-03-27 RX ADMIN — SODIUM CHLORIDE 3 MILLILITER(S): 9 INJECTION INTRAMUSCULAR; INTRAVENOUS; SUBCUTANEOUS at 05:08

## 2018-03-27 RX ADMIN — Medication 0.5 MILLIGRAM(S): at 13:48

## 2018-03-27 RX ADMIN — SODIUM CHLORIDE 3 MILLILITER(S): 9 INJECTION INTRAMUSCULAR; INTRAVENOUS; SUBCUTANEOUS at 13:47

## 2018-03-27 RX ADMIN — Medication 10 MILLIGRAM(S): at 05:09

## 2018-03-27 RX ADMIN — Medication 650 MILLIGRAM(S): at 15:02

## 2018-03-27 RX ADMIN — Medication 25 MILLIGRAM(S): at 19:03

## 2018-03-27 RX ADMIN — MIRTAZAPINE 15 MILLIGRAM(S): 45 TABLET, ORALLY DISINTEGRATING ORAL at 21:47

## 2018-03-27 RX ADMIN — RIVAROXABAN 15 MILLIGRAM(S): KIT at 21:18

## 2018-03-27 RX ADMIN — Medication 650 MILLIGRAM(S): at 16:00

## 2018-03-27 RX ADMIN — ATORVASTATIN CALCIUM 10 MILLIGRAM(S): 80 TABLET, FILM COATED ORAL at 21:18

## 2018-03-27 RX ADMIN — SODIUM CHLORIDE 3 MILLILITER(S): 9 INJECTION INTRAMUSCULAR; INTRAVENOUS; SUBCUTANEOUS at 21:12

## 2018-03-27 RX ADMIN — Medication 81 MILLIGRAM(S): at 13:47

## 2018-03-27 RX ADMIN — Medication 10 MILLIGRAM(S): at 19:03

## 2018-03-27 RX ADMIN — HEPARIN SODIUM 5000 UNIT(S): 5000 INJECTION INTRAVENOUS; SUBCUTANEOUS at 05:09

## 2018-03-27 RX ADMIN — SODIUM CHLORIDE 100 MILLILITER(S): 9 INJECTION INTRAMUSCULAR; INTRAVENOUS; SUBCUTANEOUS at 05:11

## 2018-03-27 RX ADMIN — Medication 3 MILLILITER(S): at 10:16

## 2018-03-27 RX ADMIN — Medication 25 MILLIGRAM(S): at 05:09

## 2018-03-27 NOTE — PROGRESS NOTE ADULT - PROBLEM SELECTOR PLAN 1
new: chest ray reviewed: I really doubt there is any pneumonia: Would check RVP: For now symptomatic treatment. In fact her WBC has normalized and has not had fever:

## 2018-03-27 NOTE — PROGRESS NOTE ADULT - ASSESSMENT
89 y/o F with history of HTN, HLD, Palpitation, insomnia, anxiety present with unwitnessed fall. Patient was found on the bathroom floor by her daughter. Unknown how long patient was on the floor. Patient states she only remembers waking up from the floor. Patient c/o back, right shoulder pain and left knee pain. Patient denies chest pain, palpitation at the time, sob, cough, n/v/d/, fever, chills, no dizziness or weakness. In ED patient was found to have UTI and was given one dose of Ceftriaxone and Zosyn. Also was found to have CK of 5364 and Trop of 0.10 and was given bolus of 1L of NS. (22 Mar 2018 16:49)    Pt finished a 5 day course of cipro for UTI about a month ago.  She currently does not complaint of burning, urgency or increased frequency of urination.  No suprapubic discomfort.  UA (+) blood, neg for nit/trace LE.  Urine cultures have been negative.  WBC and temp curve improved.  CPK improved from yesterday.    Problem/Plan - 1:    ·	Fever/Leukocytosis    - UA and urine cultures negative for UTI.  CT chest without evidence for pna.  Pt currently without other focal signs on exam suggestive of infectious process.    - Suspect leukocytosis and mild fever likely reactive to rhabomyolysis.  CPK improving    - Pt with low grade temp 100.3 today.  Repeat UA, urine cultures, blood cultures sent (negative so far).  Repeat chest xray without infiltrate/consolidations.  RVP negative    - f/u RUQ sono. LFTS elevated in the setting of rhabdomyolysis - possibly secondary to skeletal muscle injury.  R/o liver pathology.    - No need for abx at this time.    - pt c/o RLE pain.  Check LE dopplers r/o dvt.    Will follow,    Daar Mccartney  [phone]

## 2018-03-27 NOTE — PROGRESS NOTE ADULT - ASSESSMENT
89 year old woman with history of Hyperlipemia, Hypertension, Insomnia admitted with unwitnessed fall vs syncope.  Patient was found on the floor by her daughter.       1. Syncope vs fall  await echo     2. PSVT  pending echo  cont metoprolol 25mg bid   asa 81 qd  cont  cardizem  60mg TID    3. Htn   cont bb/ccb  monitor bp     4. Rhabdo   ck improved    5. fever  off abx, id f/u  CXR noted, left trace pleural effusion   no sob, pending echo to evaluate LV function     6. Elevated LFTS  ruq sono pending    dvt ppx

## 2018-03-27 NOTE — PROGRESS NOTE ADULT - SUBJECTIVE AND OBJECTIVE BOX
CC: no events, c/o mild cough    TELEMETRY:     PHYSICAL EXAM:    T(C): 36.6 (03-27-18 @ 13:31), Max: 37 (03-27-18 @ 05:04)  HR: 76 (03-27-18 @ 13:31) (65 - 170)  BP: 124/54 (03-27-18 @ 13:31) (111/40 - 139/66)  RR: 17 (03-27-18 @ 13:31) (17 - 18)  SpO2: 99% (03-27-18 @ 13:31) (96% - 99%)  Wt(kg): --  I&O's Summary      Appearance: Normal	  Cardiovascular: Normal S1 S2,RRR, No JVD, No murmurs  Respiratory: Lungs clear to auscultation	  Gastrointestinal:  Soft, Non-tender, + BS	  Extremities: Normal range of motion, No clubbing, cyanosis or edema  Vascular: Peripheral pulses palpable 2+ bilaterally     LABS:	 	                          9.7    8.70  )-----------( 219      ( 27 Mar 2018 05:30 )             30.0     03-27    136  |  104  |  24<H>  ----------------------------<  96  3.7   |  20<L>  |  0.94    Ca    7.8<L>      27 Mar 2018 05:30  Mg     2.0     03-27            CARDIAC MARKERS:      MEDICATIONS  (STANDING):  aspirin enteric coated 81 milliGRAM(s) Oral daily  atorvastatin 10 milliGRAM(s) Oral at bedtime  busPIRone 10 milliGRAM(s) Oral two times a day  diltiazem    Tablet 60 milliGRAM(s) Oral three times a day  docusate sodium 100 milliGRAM(s) Oral three times a day  ergocalciferol 25040 Unit(s) Oral <User Schedule>  heparin  Injectable 5000 Unit(s) SubCutaneous every 12 hours  metoprolol tartrate 25 milliGRAM(s) Oral two times a day  mirtazapine 15 milliGRAM(s) Oral at bedtime  polyethylene glycol 3350 17 Gram(s) Oral daily  senna 2 Tablet(s) Oral at bedtime  sodium chloride 0.9% lock flush 3 milliLiter(s) IV Push every 8 hours

## 2018-03-27 NOTE — PROGRESS NOTE ADULT - SUBJECTIVE AND OBJECTIVE BOX
Patient is a 89y old  Female who presents with a chief complaint of S/P Fall- found on the floor by daughter (22 Mar 2018 16:49)      Any change in ROS: Started having some cough since yesterday   though remains afebrile    MEDICATIONS  (STANDING):  aspirin enteric coated 81 milliGRAM(s) Oral daily  atorvastatin 10 milliGRAM(s) Oral at bedtime  busPIRone 10 milliGRAM(s) Oral two times a day  diltiazem    Tablet 60 milliGRAM(s) Oral three times a day  docusate sodium 100 milliGRAM(s) Oral three times a day  ergocalciferol 71951 Unit(s) Oral <User Schedule>  heparin  Injectable 5000 Unit(s) SubCutaneous every 12 hours  metoprolol tartrate 25 milliGRAM(s) Oral two times a day  mirtazapine 15 milliGRAM(s) Oral at bedtime  polyethylene glycol 3350 17 Gram(s) Oral daily  senna 2 Tablet(s) Oral at bedtime  sodium chloride 0.9% lock flush 3 milliLiter(s) IV Push every 8 hours    MEDICATIONS  (PRN):  ALBUTerol/ipratropium for Nebulization 3 milliLiter(s) Nebulizer every 6 hours PRN Shortness of Breath and/or Wheezing  ALPRAZolam 0.5 milliGRAM(s) Oral three times a day PRN Anxiety  zolpidem 5 milliGRAM(s) Oral at bedtime PRN Insomnia  zolpidem 5 milliGRAM(s) Oral at bedtime PRN Insomnia    Vital Signs Last 24 Hrs  T(C): 37 (27 Mar 2018 05:04), Max: 37 (27 Mar 2018 05:04)  T(F): 98.6 (27 Mar 2018 05:04), Max: 98.6 (27 Mar 2018 05:04)  HR: 74 (27 Mar 2018 05:04) (65 - 170)  BP: 139/66 (27 Mar 2018 05:04) (111/40 - 139/66)  BP(mean): --  RR: 17 (27 Mar 2018 05:04) (17 - 18)  SpO2: 96% (27 Mar 2018 05:04) (96% - 98%)    I&O's Summary        Physical Exam:   GENERAL: NAD, well-groomed, well-developed  HEENT: SOCORRO/   Atraumatic, Normocephalic  ENMT: No tonsillar erythema, exudates, or enlargement; Moist mucous membranes, Good dentition, No lesions  NECK: Supple, No JVD, Normal thyroid  CHEST/LUNG: Clear to auscultaion, ; No rales, rhonchi, wheezing, or rubs  CVS: Regular rate and rhythm; No murmurs, rubs, or gallops  GI: : Soft, Nontender, Nondistended; Bowel sounds present  NERVOUS SYSTEM:  Alert & Oriented X3  EXTREMITIES:  2+ Peripheral Pulses, No clubbing, cyanosis, or edema  LYMPH: No lymphadenopathy noted  SKIN: No rashes or lesions  ENDOCRINOLOGY: No Thyromegaly  PSYCH: Appropriate    Labs:  21, 24  CARDIAC MARKERS ( 27 Mar 2018 05:30 )  x     / x     / 233 u/L / x     / x      CARDIAC MARKERS ( 26 Mar 2018 07:21 )  x     / x     / 428 u/L / x     / x                                9.7    8.70  )-----------( 219      ( 27 Mar 2018 05:30 )             30.0                         9.1    11.36 )-----------( 193      ( 26 Mar 2018 07:21 )             27.6                         9.8    11.65 )-----------( 165      ( 25 Mar 2018 07:55 )             32.7                         10.1   18.51 )-----------( 214      ( 24 Mar 2018 06:20 )             30.0     03-27    136  |  104  |  24<H>  ----------------------------<  96  3.7   |  20<L>  |  0.94  03-26    135  |  103  |  25<H>  ----------------------------<  98  4.0   |  19<L>  |  0.79  03-25    135  |  103  |  35<H>  ----------------------------<  105<H>  4.8   |  16<L>  |  1.06  03-24    133<L>  |  98  |  36<H>  ----------------------------<  126<H>  4.7   |  16<L>  |  1.04    Ca    7.8<L>      27 Mar 2018 05:30  Ca    7.8<L>      26 Mar 2018 07:21  Mg     2.0     03-27  Mg     2.1     03-26      CAPILLARY BLOOD GLUCOSE                Cultures:           Wound culture:                03-25 @ 10:40  Organism --  Culture w/ gram stain --  Specimen Source URINE CATHETER    Wound culture:                03-25 @ 02:23  Organism --  Culture w/ gram stain --  Specimen Source BLOOD    Wound culture:                03-22 @ 12:50  Organism --  Culture w/ gram stain --  Specimen Source URINE MIDSTREAM    Wound culture:                03-22 @ 12:43  Organism --  Culture w/ gram stain --  Specimen Source BLOOD      Abscess culture:             03-25 @ 10:40  Organism --  Gram Stain --  Specimen Source URINE CATHETER    Abscess culture:             03-25 @ 02:23  Organism --  Gram Stain --  Specimen Source BLOOD    Abscess culture:             03-22 @ 12:50  Organism --  Gram Stain --  Specimen Source URINE MIDSTREAM    Abscess culture:             03-22 @ 12:43  Organism --  Gram Stain --  Specimen Source BLOOD        Tissue culture:           03-25 @ 10:40  Organism --  Gram Stain --  Specimen Source URINE CATHETER    Tissue culture:           03-25 @ 02:23  Organism --  Gram Stain --  Specimen Source BLOOD    Tissue culture:           03-22 @ 12:50  Organism --  Gram Stain --  Specimen Source URINE MIDSTREAM    Tissue culture:           03-22 @ 12:43  Organism --  Gram Stain --  Specimen Source BLOOD      Body Fluid Smear & Culture:                        03-25 @ 10:40  AFB Smear  --  Culture Acid Fast Body Fluid w/ Smear  --  Culture Acid Fast Smear Concentrated   --    Culture Results:     --  Specimen Source URINE CATHETER    Body Fluid Smear & Culture:                        03-25 @ 02:23  AFB Smear  --  Culture Acid Fast Body Fluid w/ Smear  --  Culture Acid Fast Smear Concentrated   --    Culture Results:     --  Specimen Source BLOOD    Body Fluid Smear & Culture:                        03-22 @ 12:50  AFB Smear  --  Culture Acid Fast Body Fluid w/ Smear  --  Culture Acid Fast Smear Concentrated   --    Culture Results:     --  Specimen Source URINE MIDSTREAM    Body Fluid Smear & Culture:                        03-22 @ 12:43  AFB Smear  --  Culture Acid Fast Body Fluid w/ Smear  --  Culture Acid Fast Smear Concentrated   --    Culture Results:     --  Specimen Source BLOOD        Rapid Respiratory Viral Panel Result        03-27 @ 10:00  Rapid RVP --  Coronovirus --  Adenovirus NOT DETECTED  Bordetella Pertussis NOT DETECTED  Chlamydia Pneumonia NOT DETECTED  Entero/RhinovirusNOT DETECTED  HKU1 Coronovirus --  HMPV Coronovirus NOT DETECTED  Influenza A NOT DETECTED (any subtype)  Influenza AH1 NOT DETECTED  Influenza AH1 2009 NOT DETECTED  Influenza AH3 NOT DETECTED  Influenza B NOT DETECTED  Mycoplasma Pneumoniae NOT DETECTED This nucleic acid amplification assay was performed using  the [employer] FilmArray. The following pathogens are tested  for: Adenovirus, Coronavirus 229E, Coronavirus HKU1,  Coronavirus NL63, Coronavirus OC43, Human Metapneumovirus  (HMPV), Rhinovirus/Enterovirus, Influenza A H1, Influenza A  H1 2009 (Pandemic H1 2009), Influenza A H3, Influenza A (Flu  A) subtype not identified, Influenza B, Parainfluenza Virus  (types 1, 2, 3, 4), Respiratory Syncytial Virus (RSV),  Bordetella pertussis, Chlamydophila pneumoniae, and  Mycoplasma pneumoniae. A negative FilmArray result does not  always exclude the possibility of viral or bacterial  infection. Laboratory results should always be interpreted  in the context of clinical findings.  NL63 Coronovirus --  OC43 Coronovirus --  Parainfluenza 1 NOT DETECTED  Parainfluenza 2 NOT DETECTED  Parainfluenza 3 NOT DETECTED  Parainfluenza 4 NOT DETECTED  Resp Syncytial Virus NOT DETECTED      < from: Xray Chest 1 View- PORTABLE-Urgent (03.27.18 @ 10:30) >  CLINICAL INFORMATION: Shortness of breath.    COMPARISON:  March 25, 2018.    TECHNIQUE:   AP Portable chestx-ray. Lordotic.    INTERPRETATION:     Heart size and the mediastinum cannot be accurately evaluated on this   projection.  There is mild bibasilar subsegmental atelectasis/trace pleural effusions.  There is left retrocardiac opacity. Uncertain if this is due to a   fluid-filled hiatus hernia (noted on prior image), atelectasis, loculated   pleural effusion, and/or pneumonia.  Linear atelectasis versus scar in the left upper lung is unchanged.  No pneumothorax seen.              IMPRESSION:  Mild bibasilar subsegmental atelectasis/trace pleural   effusions.    Left retrocardiac opacity. Uncertain if this is due to a fluid-filled   hiatus hernia, atelectasis, loculated pleural effusion, and/or pneumonia.        < from: CT Chest No Cont (03.22.18 @ 12:57) >    INTERPRETATION:  CLINICAL INFORMATION: Fall. Weakness.    COMPARISON: None.    PROCEDURE:   CT of the Chest was performed without intravenous contrast.  Sagittal and coronal reformats wereperformed.    FINDINGS:    LUNGS AND LARGE AIRWAYS: Patent central airways. Scattered subcentimeter   pulmonary nodules. For example:  *  Right lower lobe (series 2, image 72), measuring 0.3 cm.  PLEURA: Trace bilateral pleural effusions.  VESSELS: Atherosclerotic calcifications.   HEART: Heart size is normal. No pericardial effusion.  MEDIASTINUM AND CHERI: Small hiatal hernia.  CHEST WALL AND LOWER NECK: Subcentimeter hypodense lesions in both   thyroid lobes, too small to characterize.  VISUALIZED UPPER ABDOMEN: Within normal limits.  BONES: Degenerative changes of the spine.    IMPRESSION: No sequela of acute trauma in the chest.                  IDALMIS JOSEPH M.D., ATTENDING RADIOLOGIST  This document has been electronically signed. Mar 22 2018  1:34PM        < end of copied text >            ESTHER WATERS M.D., ATTENDING RADIOLOGIST  This document has been electronically signed. Mar 27 2018 11:00AM        < end of copied text >      Studies  Chest X-RAY  CT SCAN Chest   Venous Dopplers: LE:   CT Abdomen  Others

## 2018-03-27 NOTE — PROGRESS NOTE ADULT - SUBJECTIVE AND OBJECTIVE BOX
Infectious Diseases progress note:    Subjective: No fevers or leukocytosis.  Pt c/o pain behind right calf, and using hot pack.  Denies cough, cp, abd pain, dysuria    ROS:  CONSTITUTIONAL:  No fever, chills, rigors  CARDIOVASCULAR:  No chest pain or palpitations  RESPIRATORY:   No SOB, cough, dyspnea on exertion.  No wheezing  GASTROINTESTINAL:  No abd pain, N/V, diarrhea/constipation  EXTREMITIES:  No swelling or joint pain  GENITOURINARY:  No burning on urination, increased frequency or urgency.  No flank pain  NEUROLOGIC:  No HA, visual disturbances  SKIN: No rashes    Allergies    Eye cream from the 1960s Neocortef ?spelling caused eyes to becomes swollen (Unknown)  neomycin (Unknown)  soaps and creams causes rash uses only sensitive skin soap (Rash)  sulfa drugs (Unknown)  Voltaren (Rash)    Intolerances        ANTIBIOTICS/RELEVANT:  antimicrobials    immunologic:    OTHER:  ALBUTerol/ipratropium for Nebulization 3 milliLiter(s) Nebulizer every 6 hours PRN  ALPRAZolam 0.5 milliGRAM(s) Oral three times a day PRN  aspirin enteric coated 81 milliGRAM(s) Oral daily  atorvastatin 10 milliGRAM(s) Oral at bedtime  busPIRone 10 milliGRAM(s) Oral two times a day  diltiazem    Tablet 60 milliGRAM(s) Oral three times a day  docusate sodium 100 milliGRAM(s) Oral three times a day  ergocalciferol 84827 Unit(s) Oral <User Schedule>  heparin  Injectable 5000 Unit(s) SubCutaneous every 12 hours  metoprolol tartrate 25 milliGRAM(s) Oral two times a day  mirtazapine 15 milliGRAM(s) Oral at bedtime  polyethylene glycol 3350 17 Gram(s) Oral daily  senna 2 Tablet(s) Oral at bedtime  sodium chloride 0.9% lock flush 3 milliLiter(s) IV Push every 8 hours  zolpidem 5 milliGRAM(s) Oral at bedtime PRN  zolpidem 5 milliGRAM(s) Oral at bedtime PRN      Objective:  Vital Signs Last 24 Hrs  T(C): 36.6 (27 Mar 2018 13:31), Max: 37 (27 Mar 2018 05:04)  T(F): 97.8 (27 Mar 2018 13:31), Max: 98.6 (27 Mar 2018 05:04)  HR: 76 (27 Mar 2018 13:31) (65 - 76)  BP: 124/54 (27 Mar 2018 13:31) (111/40 - 139/66)  BP(mean): --  RR: 17 (27 Mar 2018 13:31) (17 - 17)  SpO2: 99% (27 Mar 2018 13:31) (96% - 99%)    PHYSICAL EXAM:  Constitutional:NAD  Eyes:SOCORRO, EOMI  Ear/Nose/Throat: no thrush, mucositis.  Moist mucous membranes	  Neck:no JVD, no lymphadenopathy, supple  Respiratory: CTA ada  Cardiovascular: S1S2 RRR, no murmurs  Gastrointestinal:soft, nontender,  nondistended (+) BS  Extremities:no e/e/c  Skin:  no rashes, open wounds or ulcerations        LABS:                        9.7    8.70  )-----------( 219      ( 27 Mar 2018 05:30 )             30.0     03-27    136  |  104  |  24<H>  ----------------------------<  96  3.7   |  20<L>  |  0.94    Ca    7.8<L>      27 Mar 2018 05:30  Mg     2.0     03-27          MICROBIOLOGY:      Influenza B (RapRVP): NOT DETECTED (03-27 @ 10:00)      RADIOLOGY & ADDITIONAL STUDIES:    Culture - Urine (03.25.18 @ 10:40)    Culture - Urine:   NO GROWTH AT 24 HOURS    Specimen Source: URINE CATHETER    Culture - Blood (03.25.18 @ 02:23)    Culture - Blood:   NO ORGANISMS ISOLATED  NO ORGANISMS ISOLATED AT 48 HRS.    Specimen Source: BLOOD

## 2018-03-27 NOTE — PROGRESS NOTE ADULT - SUBJECTIVE AND OBJECTIVE BOX
Patient is a 89y old  Female who presents with a chief complaint of S/P Fall- found on the floor by daughter (22 Mar 2018 16:49)      SUBJECTIVE / OVERNIGHT EVENTS:   Feels better.  Denies CP/SOB/Palpitation/HA.    MEDICATIONS  (STANDING):  aspirin enteric coated 81 milliGRAM(s) Oral daily  atorvastatin 10 milliGRAM(s) Oral at bedtime  busPIRone 10 milliGRAM(s) Oral two times a day  diltiazem    Tablet 60 milliGRAM(s) Oral three times a day  docusate sodium 100 milliGRAM(s) Oral three times a day  ergocalciferol 95001 Unit(s) Oral <User Schedule>  heparin  Injectable 5000 Unit(s) SubCutaneous every 12 hours  metoprolol tartrate 25 milliGRAM(s) Oral two times a day  mirtazapine 15 milliGRAM(s) Oral at bedtime  polyethylene glycol 3350 17 Gram(s) Oral daily  senna 2 Tablet(s) Oral at bedtime  sodium chloride 0.9% lock flush 3 milliLiter(s) IV Push every 8 hours    MEDICATIONS  (PRN):  ALBUTerol/ipratropium for Nebulization 3 milliLiter(s) Nebulizer every 6 hours PRN Shortness of Breath and/or Wheezing  ALPRAZolam 0.5 milliGRAM(s) Oral three times a day PRN Anxiety  zolpidem 5 milliGRAM(s) Oral at bedtime PRN Insomnia  zolpidem 5 milliGRAM(s) Oral at bedtime PRN Insomnia        CAPILLARY BLOOD GLUCOSE        I&O's Summary      PHYSICAL EXAM:  GENERAL: NAD, well-developed  HEAD:  Atraumatic, Normocephalic  NECK: Supple, No JVD  CHEST/LUNG: Clear to auscultation bilaterally; No wheezing.  HEART: Regular rate and rhythm; No murmurs, rubs, or gallops  ABDOMEN: Soft, Nontender, Nondistended; Bowel sounds present  EXTREMITIES:   No clubbing, cyanosis, or edema  NEUROLOGY: AAO X 3  SKIN: No rashes    LABS:                        9.7    8.70  )-----------( 219      ( 27 Mar 2018 05:30 )             30.0     03-27    136  |  104  |  24<H>  ----------------------------<  96  3.7   |  20<L>  |  0.94    Ca    7.8<L>      27 Mar 2018 05:30  Mg     2.0     03-27        CARDIAC MARKERS ( 27 Mar 2018 05:30 )  x     / x     / 233 u/L / x     / x      CARDIAC MARKERS ( 26 Mar 2018 07:21 )  x     / x     / 428 u/L / x     / x            CAPILLARY BLOOD GLUCOSE        03-25 @ 10:40  Culture-urine   NO GROWTH AT 24 HOURS  Culture results --  method type --  Organism --  Organism Identification --  Specimen source URINE CATHETER  03-25 @ 02:23  Culture-urine --  Culture results --  method type --  Organism --  Organism Identification --  Specimen source BLOOD  03-22 @ 12:50  Culture-urine   NO GROWTH AT 24 HOURS  Culture results --  method type --  Organism --  Organism Identification --  Specimen source URINE MIDSTREAM  03-22 @ 12:43  Culture-urine --  Culture results --  method type --  Organism --  Organism Identification --  Specimen source BLOOD           03-25 @ 10:40  Culture blood --  Culture results --  Gram stain --  Gram stain blood --  Method type --  Organism --  Organism identification --  Specimen source URINE CATHETER   03-25 @ 02:23  Culture blood   NO ORGANISMS ISOLATED  NO ORGANISMS ISOLATED AT 48 HRS.  Culture results --  Gram stain --  Gram stain blood --  Method type --  Organism --  Organism identification --  Specimen source BLOOD   03-22 @ 12:50  Culture blood --  Culture results --  Gram stain --  Gram stain blood --  Method type --  Organism --  Organism identification --  Specimen source URINE MIDSTREAM   03-22 @ 12:43  Culture blood   NO ORGANISMS ISOLATED  Culture results --  Gram stain --  Gram stain blood --  Method type --  Organism --  Organism identification --  Specimen source BLOOD      RADIOLOGY & ADDITIONAL TESTS:    Imaging Personally Reviewed:    Consultant(s) Notes Reviewed:      Care Discussed with Consultants/Other Providers:

## 2018-03-27 NOTE — CHART NOTE - NSCHARTNOTEFT_GEN_A_CORE
Called by radiology #[id number].  LE dopplers are positive for bilateral LE acute DVTs in the calves.  Will f/u official report.  Discussed with Dr. Luis.  Recommended to start xarelto now.  Will continue to monitor.

## 2018-03-27 NOTE — PROGRESS NOTE ADULT - ASSESSMENT
· Assessment	  89 y/o F with history of HTN, HLD, Palpitation, insomnia, anxiety present with unwitnessed fall. Patient was found on the bathroom floor by her daughter.  Patient c/o back, right shoulder pain and left knee pain.  In ED patient was found to have UTI and was given one dose of Ceftriaxone and Zosyn. Also was found to have CK of 5364 and Trop of 0.10 and was given bolus of 1L of NS     Problem/Plan - 1:  ·  Problem: Unwitnessed fall.  Plan: Patient with unwitnessed  fall, will monitor on telemetry for any arrhythmia, Cardio f/up noted.    U. Retention:  [place].     Problem/Plan - 2: Rhabdomyolysis:    CPK improved.     Problem/Plan - 4:  ·  Problem: Essential hypertension.  Plan: Cont current Med.     Problem/Plan - 5:  ·  Problem: Other hyperlipidemia.  Plan: Continue with Lipitor.   Dc planning.

## 2018-03-28 DIAGNOSIS — R94.5 ABNORMAL RESULTS OF LIVER FUNCTION STUDIES: ICD-10-CM

## 2018-03-28 DIAGNOSIS — R19.5 OTHER FECAL ABNORMALITIES: ICD-10-CM

## 2018-03-28 LAB
ALBUMIN SERPL ELPH-MCNC: 2.4 G/DL — LOW (ref 3.3–5)
ALP SERPL-CCNC: 132 U/L — HIGH (ref 40–120)
ALT FLD-CCNC: 71 U/L — HIGH (ref 4–33)
APTT BLD: 31.1 SEC — SIGNIFICANT CHANGE UP (ref 27.5–37.4)
AST SERPL-CCNC: 40 U/L — HIGH (ref 4–32)
BILIRUB SERPL-MCNC: 0.4 MG/DL — SIGNIFICANT CHANGE UP (ref 0.2–1.2)
BUN SERPL-MCNC: 17 MG/DL — SIGNIFICANT CHANGE UP (ref 7–23)
CALCIUM SERPL-MCNC: 7.9 MG/DL — LOW (ref 8.4–10.5)
CHLORIDE SERPL-SCNC: 107 MMOL/L — SIGNIFICANT CHANGE UP (ref 98–107)
CK SERPL-CCNC: 122 U/L — SIGNIFICANT CHANGE UP (ref 25–170)
CO2 SERPL-SCNC: 23 MMOL/L — SIGNIFICANT CHANGE UP (ref 22–31)
CREAT SERPL-MCNC: 0.91 MG/DL — SIGNIFICANT CHANGE UP (ref 0.5–1.3)
GLUCOSE SERPL-MCNC: 99 MG/DL — SIGNIFICANT CHANGE UP (ref 70–99)
HCT VFR BLD CALC: 27.6 % — LOW (ref 34.5–45)
HCT VFR BLD CALC: 28.6 % — LOW (ref 34.5–45)
HGB BLD-MCNC: 8.9 G/DL — LOW (ref 11.5–15.5)
HGB BLD-MCNC: 9.1 G/DL — LOW (ref 11.5–15.5)
INR BLD: 1.68 — HIGH (ref 0.88–1.17)
MAGNESIUM SERPL-MCNC: 1.9 MG/DL — SIGNIFICANT CHANGE UP (ref 1.6–2.6)
MCHC RBC-ENTMCNC: 29.5 PG — SIGNIFICANT CHANGE UP (ref 27–34)
MCHC RBC-ENTMCNC: 29.6 PG — SIGNIFICANT CHANGE UP (ref 27–34)
MCHC RBC-ENTMCNC: 31.8 % — LOW (ref 32–36)
MCHC RBC-ENTMCNC: 32.2 % — SIGNIFICANT CHANGE UP (ref 32–36)
MCV RBC AUTO: 91.7 FL — SIGNIFICANT CHANGE UP (ref 80–100)
MCV RBC AUTO: 92.9 FL — SIGNIFICANT CHANGE UP (ref 80–100)
NRBC # FLD: 0 — SIGNIFICANT CHANGE UP
NRBC # FLD: 0 — SIGNIFICANT CHANGE UP
OB PNL STL: POSITIVE — SIGNIFICANT CHANGE UP
PHOSPHATE SERPL-MCNC: 3.8 MG/DL — SIGNIFICANT CHANGE UP (ref 2.5–4.5)
PLATELET # BLD AUTO: 250 K/UL — SIGNIFICANT CHANGE UP (ref 150–400)
PLATELET # BLD AUTO: 277 K/UL — SIGNIFICANT CHANGE UP (ref 150–400)
PMV BLD: 10.2 FL — SIGNIFICANT CHANGE UP (ref 7–13)
PMV BLD: 10.5 FL — SIGNIFICANT CHANGE UP (ref 7–13)
POTASSIUM SERPL-MCNC: 3.8 MMOL/L — SIGNIFICANT CHANGE UP (ref 3.5–5.3)
POTASSIUM SERPL-SCNC: 3.8 MMOL/L — SIGNIFICANT CHANGE UP (ref 3.5–5.3)
PROT SERPL-MCNC: 4.9 G/DL — LOW (ref 6–8.3)
PROTHROM AB SERPL-ACNC: 19.5 SEC — HIGH (ref 9.8–13.1)
RBC # BLD: 3.01 M/UL — LOW (ref 3.8–5.2)
RBC # BLD: 3.08 M/UL — LOW (ref 3.8–5.2)
RBC # FLD: 13.6 % — SIGNIFICANT CHANGE UP (ref 10.3–14.5)
RBC # FLD: 13.8 % — SIGNIFICANT CHANGE UP (ref 10.3–14.5)
SODIUM SERPL-SCNC: 142 MMOL/L — SIGNIFICANT CHANGE UP (ref 135–145)
WBC # BLD: 7.33 K/UL — SIGNIFICANT CHANGE UP (ref 3.8–10.5)
WBC # BLD: 8.32 K/UL — SIGNIFICANT CHANGE UP (ref 3.8–10.5)
WBC # FLD AUTO: 7.33 K/UL — SIGNIFICANT CHANGE UP (ref 3.8–10.5)
WBC # FLD AUTO: 8.32 K/UL — SIGNIFICANT CHANGE UP (ref 3.8–10.5)

## 2018-03-28 RX ORDER — RIVAROXABAN 15 MG-20MG
1 KIT ORAL
Qty: 0 | Refills: 0 | COMMUNITY
Start: 2018-03-28

## 2018-03-28 RX ORDER — ACETAMINOPHEN 500 MG
650 TABLET ORAL ONCE
Qty: 0 | Refills: 0 | Status: COMPLETED | OUTPATIENT
Start: 2018-03-28 | End: 2018-03-28

## 2018-03-28 RX ORDER — RIVAROXABAN 15 MG-20MG
1 KIT ORAL
Qty: 40 | Refills: 0 | OUTPATIENT
Start: 2018-03-28 | End: 2018-04-16

## 2018-03-28 RX ORDER — PANTOPRAZOLE SODIUM 20 MG/1
40 TABLET, DELAYED RELEASE ORAL
Qty: 0 | Refills: 0 | Status: DISCONTINUED | OUTPATIENT
Start: 2018-03-28 | End: 2018-03-30

## 2018-03-28 RX ADMIN — Medication 10 MILLIGRAM(S): at 06:28

## 2018-03-28 RX ADMIN — SODIUM CHLORIDE 3 MILLILITER(S): 9 INJECTION INTRAMUSCULAR; INTRAVENOUS; SUBCUTANEOUS at 06:21

## 2018-03-28 RX ADMIN — Medication 650 MILLIGRAM(S): at 01:00

## 2018-03-28 RX ADMIN — ATORVASTATIN CALCIUM 10 MILLIGRAM(S): 80 TABLET, FILM COATED ORAL at 22:16

## 2018-03-28 RX ADMIN — ZOLPIDEM TARTRATE 5 MILLIGRAM(S): 10 TABLET ORAL at 22:16

## 2018-03-28 RX ADMIN — SODIUM CHLORIDE 3 MILLILITER(S): 9 INJECTION INTRAMUSCULAR; INTRAVENOUS; SUBCUTANEOUS at 22:19

## 2018-03-28 RX ADMIN — MIRTAZAPINE 15 MILLIGRAM(S): 45 TABLET, ORALLY DISINTEGRATING ORAL at 22:16

## 2018-03-28 RX ADMIN — RIVAROXABAN 15 MILLIGRAM(S): KIT at 06:28

## 2018-03-28 RX ADMIN — Medication 650 MILLIGRAM(S): at 02:00

## 2018-03-28 RX ADMIN — Medication 25 MILLIGRAM(S): at 06:28

## 2018-03-28 RX ADMIN — Medication 10 MILLIGRAM(S): at 17:35

## 2018-03-28 RX ADMIN — RIVAROXABAN 15 MILLIGRAM(S): KIT at 17:35

## 2018-03-28 RX ADMIN — SODIUM CHLORIDE 3 MILLILITER(S): 9 INJECTION INTRAMUSCULAR; INTRAVENOUS; SUBCUTANEOUS at 12:18

## 2018-03-28 RX ADMIN — Medication 25 MILLIGRAM(S): at 17:35

## 2018-03-28 RX ADMIN — Medication 0.5 MILLIGRAM(S): at 18:47

## 2018-03-28 RX ADMIN — Medication 81 MILLIGRAM(S): at 12:21

## 2018-03-28 NOTE — PROGRESS NOTE ADULT - SUBJECTIVE AND OBJECTIVE BOX
CARDIOLOGY FOLLOW UP - Dr. Mccartney    CC no chest pain or sob        PHYSICAL EXAM:  T(C): 37.2 (03-28-18 @ 12:22), Max: 37.2 (03-28-18 @ 12:22)  HR: 68 (03-28-18 @ 12:22) (68 - 76)  BP: 120/59 (03-28-18 @ 12:22) (120/59 - 129/61)  RR: 18 (03-28-18 @ 12:22) (17 - 18)  SpO2: 98% (03-28-18 @ 12:22) (97% - 99%)  Wt(kg): --  I&O's Summary    27 Mar 2018 07:01  -  28 Mar 2018 07:00  --------------------------------------------------------  IN: 0 mL / OUT: 1250 mL / NET: -1250 mL        Appearance: Normal	  Cardiovascular: Normal S1 S2,RRR, No JVD, No murmurs  Respiratory: Lungs clear to auscultation	  Gastrointestinal:  Soft, Non-tender, + BS	  Extremities: Normal range of motion, No clubbing, cyanosis or edema        MEDICATIONS  (STANDING):  aspirin enteric coated 81 milliGRAM(s) Oral daily  atorvastatin 10 milliGRAM(s) Oral at bedtime  busPIRone 10 milliGRAM(s) Oral two times a day  diltiazem    Tablet 60 milliGRAM(s) Oral three times a day  docusate sodium 100 milliGRAM(s) Oral three times a day  ergocalciferol 58706 Unit(s) Oral <User Schedule>  metoprolol tartrate 25 milliGRAM(s) Oral two times a day  mirtazapine 15 milliGRAM(s) Oral at bedtime  polyethylene glycol 3350 17 Gram(s) Oral daily  rivaroxaban 15 milliGRAM(s) Oral two times a day  rivaroxaban 20 milliGRAM(s) Oral every 24 hours  senna 2 Tablet(s) Oral at bedtime  sodium chloride 0.9% lock flush 3 milliLiter(s) IV Push every 8 hours      TELEMETRY: 	    ECG:  NSR PAC 	  RADIOLOGY:   < from: US Duplex Venous Lower Ext Complete, Bilateral (03.27.18 @ 18:12) >    IMPRESSION:     Acute bilateral below the knee deep venous thrombosis involving the   bilateral peroneal veins and left soleal vein.     Discussed by Dr. Chaney with FRANCES Horne on March 27, 2018 at 6:36 PM with  read back.        < end of copied text >      < from: US Abdomen Limited (03.27.18 @ 18:13) >    IMPRESSION:     No abnormality in the abdomen.  Small right pleural effusion.      < end of copied text >    DIAGNOSTIC TESTING:  [x ] Echocardiogram:    < from: Transthoracic Echocardiogram (03.27.18 @ 15:06) >  CONCLUSIONS:  1. Mitral annular calcification and calcified mitral  leaflets with normal diastolic opening. Mild mitral  regurgitation.  2. Aortic valve leaflet morphology not well visualized.  Mild aortic regurgitation.  3. Endocardium not well visualized; grossly normal left  ventricular systolic function.  4. The right ventricle is not well visualized; grossly  normal right ventricular systolic function.  5. Estimated right ventricular systolic pressure equals 62  mm Hg, assuming right atrial pressure equals 10 mm Hg,  consistent with severe pulmonary hypertension.  ------------------------------------------------------------------------  Confirmed on  3/27/2018 - 16:37:36 by Jose A Mensah M.D.    < end of copied text >    [ ] Stress Test:    OTHER: 	    LABS:	 	                                8.9    7.33  )-----------( 250      ( 28 Mar 2018 07:02 )             27.6     03-28    142  |  107  |  17  ----------------------------<  99  3.8   |  23  |  0.91    Ca    7.9<L>      28 Mar 2018 07:02  Phos  3.8     03-28  Mg     1.9     03-28    TPro  4.9<L>  /  Alb  2.4<L>  /  TBili  0.4  /  DBili  x   /  AST  40<H>  /  ALT  71<H>  /  AlkPhos  132<H>  03-28

## 2018-03-28 NOTE — CONSULT NOTE ADULT - SUBJECTIVE AND OBJECTIVE BOX
Patient is a 89y old  Female who presents with a chief complaint of S/P Fall- found on the floor by daughter (22 Mar 2018 16:49)      HPI:  89 y/o F with history of HTN, HLD, Palpitation, insomnia, anxiety present with unwitnessed fall. Patient was found on the bathroom floor by her daughter. Unknown how long patient was on the floor. Patient states she only remembers waking up from the floor. Patient c/o back, right shoulder pain and left knee pain. Patient denies chest pain, palpitation at the time, sob, cough, n/v/d/, fever, chills, no dizziness or weakness. In ED patient was found to have UTI and was given one dose of Ceftriaxone and Zosyn. Also was found to have CK of 5364 and Trop of 0.10 and was given bolus of 1L of NS. (22 Mar 2018 16:49)          REVIEW OF SYSTEMS:    CONSTITUTIONAL: No fever, weight loss, or fatigue  EYES: No eye pain, visual disturbances, or discharge  ENMT:  No sore throat  NECK: No pain or stiffness  RESPIRATORY: No cough, wheezing, chills or hemoptysis; No shortness of breath  CARDIOVASCULAR: No chest pain, palpitations, dizziness, or leg swelling  GASTROINTESTINAL: No abdominal or epigastric pain. No nausea, vomiting, or hematemesis; No diarrhea or constipation. No melena or hematochezia.  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  Hypertension  Hyperlipidemia  Insomnia  Breast Lump  Seasonal Allergies  Hyperlipemia  Anxiety: palpitations  Status post cataract extraction: OS  Status post hysterectomy: endometrial cancer  History of D&C- 8/12/11  History of Colonoscopy- 2011/Sept  History of Breast Lump/Mass Excision- [facility]- left- 1971      Allergies    Eye cream from the 1960s Neocortef ?spelling caused eyes to becomes swollen (Unknown)  neomycin (Unknown)  soaps and creams causes rash uses only sensitive skin soap (Rash)  sulfa drugs (Unknown)  Voltaren (Rash)    Intolerances        FAMILY HISTORY:  No sig history    SOCIAL HISTORY:    No reported ivdu, etoh, smoking    MEDICATIONS  (STANDING):  aspirin enteric coated 81 milliGRAM(s) Oral daily  atorvastatin 10 milliGRAM(s) Oral at bedtime  busPIRone 10 milliGRAM(s) Oral two times a day  cefTRIAXone   IVPB 1 Gram(s) IV Intermittent every 24 hours  ergocalciferol 48761 Unit(s) Oral <User Schedule>  heparin  Injectable 5000 Unit(s) SubCutaneous every 12 hours  metoprolol succinate ER 25 milliGRAM(s) Oral daily  mirtazapine 15 milliGRAM(s) Oral at bedtime  sodium chloride 0.9% lock flush 3 milliLiter(s) IV Push every 8 hours  sodium chloride 0.9%. 1000 milliLiter(s) (100 mL/Hr) IV Continuous <Continuous>    MEDICATIONS  (PRN):  ALPRAZolam 0.5 milliGRAM(s) Oral three times a day PRN Anxiety  zolpidem 5 milliGRAM(s) Oral at bedtime PRN Insomnia  zolpidem 5 milliGRAM(s) Oral at bedtime PRN Insomnia      Vital Signs Last 24 Hrs  T(C): 36.7 (23 Mar 2018 12:02), Max: 37 (22 Mar 2018 18:59)  T(F): 98 (23 Mar 2018 12:02), Max: 98.6 (22 Mar 2018 18:59)  HR: 160 (23 Mar 2018 12:02) (86 - 160)  BP: 98/57 (23 Mar 2018 12:02) (98/57 - 149/85)  BP(mean): --  RR: 20 (23 Mar 2018 12:02) (16 - 20)  SpO2: 95% (23 Mar 2018 12:02) (95% - 98%)    PHYSICAL EXAM:    GENERAL: NAD, well-groomed  HEAD:  Atraumatic, Normocephalic  EYES: EOMI, PERRLA, conjunctiva and sclera clear  ENMT: No tonsillar erythema, exudates, or enlargement; Moist mucous membranes  NECK: Supple, No JVD  CHEST/LUNG: Clear to percussion bilaterally; No rales, rhonchi, wheezing, or rubs  HEART: Regular rate and rhythm; No murmurs, rubs, or gallops  ABDOMEN: Soft, Nontender, Nondistended; Bowel sounds present  EXTREMITIES:  2+ Peripheral Pulses, No clubbing, cyanosis, or edema  LYMPH: No lymphadenopathy noted  SKIN: No rashes or lesions    LABS:  CBC Full  -  ( 23 Mar 2018 06:46 )  WBC Count : 11.98 K/uL  Hemoglobin : 10.2 g/dL  Hematocrit : 30.5 %  Platelet Count - Automated : 150 K/uL  Mean Cell Volume : 90.8 fL  Mean Cell Hemoglobin : 30.4 pg  Mean Cell Hemoglobin Concentration : 33.4 %  Auto Neutrophil # : 10.15 K/uL  Auto Lymphocyte # : 0.72 K/uL  Auto Monocyte # : 1.04 K/uL  Auto Eosinophil # : 0.01 K/uL  Auto Basophil # : 0.01 K/uL  Auto Neutrophil % : 84.7 %  Auto Lymphocyte % : 6.0 %  Auto Monocyte % : 8.7 %  Auto Eosinophil % : 0.1 %  Auto Basophil % : 0.1 %      03-23    130<L>  |  96<L>  |  31<H>  ----------------------------<  115<H>  4.4   |  19<L>  |  1.08    Ca    7.9<L>      23 Mar 2018 06:46    TPro  5.8<L>  /  Alb  3.1<L>  /  TBili  0.6  /  DBili  x   /  AST  222<H>  /  ALT  95<H>  /  AlkPhos  85  03-23      LIVER FUNCTIONS - ( 23 Mar 2018 06:46 )  Alb: 3.1 g/dL / Pro: 5.8 g/dL / ALK PHOS: 85 u/L / ALT: 95 u/L / AST: 222 u/L / GGT: x                                 RADIOLOGY:    < from: Xray Humerus, Right (03.22.18 @ 13:44) >  IMPRESSION:  No fractures, dislocations, or AC separation.    Preserved shoulder and elbow joint spaces.     Laterally downsloped acromial orientation could correlate with or   predispose to a subacromial impingement process.     Generalized osteopenia otherwise no discrete lytic or blastic lesions.    No periarticular soft tissue calcifications.     IV cannulaand antecubital region.    < end of copied text >      < from: Xray Knee 4 Views, Left (03.22.18 @ 13:43) >  IMPRESSION:  No fracture, dislocation, or joint effusion.    Preserved joint spaces with smooth and intact articular surfaces. No   joint margin erosions or intra-articular or periarticular calcifications.    Small superior patellar enthesophyte otherwise unremarkable quadriceps   and patellar tendon shadows.    No destructive osseous lesions or periosteal reaction.    < end of copied text >      < from: Xray Shoulder 2 Views, Right (03.22.18 @ 13:43) >  IMPRESSION:  No fractures, dislocations, or AC separation.    Preserved shoulder and elbow joint spaces.     Laterally downsloped acromial orientation could correlate with or   predispose to a subacromial impingement process.     Generalized osteopenia otherwise no discrete lytic or blastic lesions.    No periarticular soft tissue calcifications.     IV cannulaand antecubital region.    < end of copied text >      < from: Xray Chest 1 View- PORTABLE-Urgent (03.22.18 @ 13:42) >  IMPRESSION:  Stable cardiac and mediastinal silhouettes including left retrocardiac   hiatal hernia shadow.    Vague hazy patchy opacity again seen in medial right upper lung[. Clear   remaining visualized lungs. No pleural effusions or pneumothorax.    Trachea projects to left of midline but proportionate to degree of   patient rotation.    Generalized osteopenia and spinal degenerative changes. No acute or   focally aggressive appearing osseous abnormalities.    < end of copied text >      < from: CT Cervical Spine No Cont (03.22.18 @ 13:00) >  IMPRESSION:  CT head: No acute intracranial hemorrhage..  CT cervical spine: Normal alignment. No acute fracture.     For thoracic findings please see report of CT chest performed   concurrently.    < end of copied text >      < from: CT Chest No Cont (03.22.18 @ 12:57) >  FINDINGS:    LUNGS AND LARGE AIRWAYS: Patent central airways. Scattered subcentimeter   pulmonary nodules. For example:  *  Right lower lobe (series 2, image 72), measuring 0.3 cm.  PLEURA: Trace bilateral pleural effusions.  VESSELS: Atherosclerotic calcifications.   HEART: Heart size is normal. No pericardial effusion.  MEDIASTINUM AND CHERI: Small hiatal hernia.  CHEST WALL AND LOWER NECK: Subcentimeter hypodense lesions in both   thyroid lobes, too small to characterize.  VISUALIZED UPPER ABDOMEN: Within normal limits.  BONES: Degenerative changes of the spine.    IMPRESSION: No sequela of acute trauma in the chest.    < end of copied text >      < from: CT Head No Cont (03.22.18 @ 12:54) >    IMPRESSION:  CT head: No acute intracranial hemorrhage..  CT cervical spine: Normal alignment. No acute fracture.     For thoracic findings please see report of CT chest performed   concurrently.    < end of copied text >

## 2018-03-28 NOTE — PROGRESS NOTE ADULT - SUBJECTIVE AND OBJECTIVE BOX
Infectious Diseases progress note:    Subjective:     ROS:  CONSTITUTIONAL:  No fever, chills, rigors  CARDIOVASCULAR:  No chest pain or palpitations  RESPIRATORY:   No SOB, cough, dyspnea on exertion.  No wheezing  GASTROINTESTINAL:  No abd pain, N/V, diarrhea/constipation  EXTREMITIES:  No swelling or joint pain  GENITOURINARY:  No burning on urination, increased frequency or urgency.  No flank pain  NEUROLOGIC:  No HA, visual disturbances  SKIN: No rashes    Allergies    Eye cream from the 1960s Neocortef ?spelling caused eyes to becomes swollen (Unknown)  neomycin (Unknown)  soaps and creams causes rash uses only sensitive skin soap (Rash)  sulfa drugs (Unknown)  Voltaren (Rash)    Intolerances        ANTIBIOTICS/RELEVANT:  antimicrobials    immunologic:    OTHER:  ALBUTerol/ipratropium for Nebulization 3 milliLiter(s) Nebulizer every 6 hours PRN  ALPRAZolam 0.5 milliGRAM(s) Oral three times a day PRN  aspirin enteric coated 81 milliGRAM(s) Oral daily  atorvastatin 10 milliGRAM(s) Oral at bedtime  busPIRone 10 milliGRAM(s) Oral two times a day  diltiazem    Tablet 60 milliGRAM(s) Oral three times a day  docusate sodium 100 milliGRAM(s) Oral three times a day  ergocalciferol 30565 Unit(s) Oral <User Schedule>  metoprolol tartrate 25 milliGRAM(s) Oral two times a day  mirtazapine 15 milliGRAM(s) Oral at bedtime  pantoprazole    Tablet 40 milliGRAM(s) Oral before breakfast  polyethylene glycol 3350 17 Gram(s) Oral daily  rivaroxaban 15 milliGRAM(s) Oral two times a day  rivaroxaban 20 milliGRAM(s) Oral every 24 hours  senna 2 Tablet(s) Oral at bedtime  sodium chloride 0.9% lock flush 3 milliLiter(s) IV Push every 8 hours  zolpidem 5 milliGRAM(s) Oral at bedtime PRN  zolpidem 5 milliGRAM(s) Oral at bedtime PRN      Objective:  Vital Signs Last 24 Hrs  T(C): 37.2 (28 Mar 2018 12:22), Max: 37.2 (28 Mar 2018 12:22)  T(F): 98.9 (28 Mar 2018 12:22), Max: 98.9 (28 Mar 2018 12:22)  HR: 68 (28 Mar 2018 12:22) (68 - 73)  BP: 120/59 (28 Mar 2018 12:22) (120/59 - 129/61)  BP(mean): --  RR: 18 (28 Mar 2018 12:22) (17 - 18)  SpO2: 98% (28 Mar 2018 12:22) (97% - 98%)    PHYSICAL EXAM:  Constitutional:NAD  Eyes:SOCORRO, EOMI  Ear/Nose/Throat: no thrush, mucositis.  Moist mucous membranes	  Neck:no JVD, no lymphadenopathy, supple  Respiratory: CTA ada  Cardiovascular: S1S2 RRR, no murmurs  Gastrointestinal:soft, nontender,  nondistended (+) BS  Extremities:no e/e/c  Skin:  no rashes, open wounds or ulcerations        LABS:                        8.9    7.33  )-----------( 250      ( 28 Mar 2018 07:02 )             27.6     03-28    142  |  107  |  17  ----------------------------<  99  3.8   |  23  |  0.91    Ca    7.9<L>      28 Mar 2018 07:02  Phos  3.8     03-28  Mg     1.9     03-28    TPro  4.9<L>  /  Alb  2.4<L>  /  TBili  0.4  /  DBili  x   /  AST  40<H>  /  ALT  71<H>  /  AlkPhos  132<H>  03-28                            MICROBIOLOGY:          RADIOLOGY & ADDITIONAL STUDIES: Infectious Diseases progress note:    Subjective:  (+) DVT, pt started on eliquis. No new somatic complaints.  Afebrile.  WBC stable    ROS:  CONSTITUTIONAL:  No fever, chills, rigors  CARDIOVASCULAR:  No chest pain or palpitations  RESPIRATORY:   No SOB, cough, dyspnea on exertion.  No wheezing  GASTROINTESTINAL:  No abd pain, N/V, diarrhea/constipation  EXTREMITIES:  No swelling or joint pain  GENITOURINARY:  No burning on urination, increased frequency or urgency.  No flank pain  NEUROLOGIC:  No HA, visual disturbances  SKIN: No rashes    Allergies    Eye cream from the 1960s Neocortef ?spelling caused eyes to becomes swollen (Unknown)  neomycin (Unknown)  soaps and creams causes rash uses only sensitive skin soap (Rash)  sulfa drugs (Unknown)  Voltaren (Rash)    Intolerances        ANTIBIOTICS/RELEVANT:  antimicrobials    immunologic:    OTHER:  ALBUTerol/ipratropium for Nebulization 3 milliLiter(s) Nebulizer every 6 hours PRN  ALPRAZolam 0.5 milliGRAM(s) Oral three times a day PRN  aspirin enteric coated 81 milliGRAM(s) Oral daily  atorvastatin 10 milliGRAM(s) Oral at bedtime  busPIRone 10 milliGRAM(s) Oral two times a day  diltiazem    Tablet 60 milliGRAM(s) Oral three times a day  docusate sodium 100 milliGRAM(s) Oral three times a day  ergocalciferol 68919 Unit(s) Oral <User Schedule>  metoprolol tartrate 25 milliGRAM(s) Oral two times a day  mirtazapine 15 milliGRAM(s) Oral at bedtime  pantoprazole    Tablet 40 milliGRAM(s) Oral before breakfast  polyethylene glycol 3350 17 Gram(s) Oral daily  rivaroxaban 15 milliGRAM(s) Oral two times a day  rivaroxaban 20 milliGRAM(s) Oral every 24 hours  senna 2 Tablet(s) Oral at bedtime  sodium chloride 0.9% lock flush 3 milliLiter(s) IV Push every 8 hours  zolpidem 5 milliGRAM(s) Oral at bedtime PRN  zolpidem 5 milliGRAM(s) Oral at bedtime PRN      Objective:  Vital Signs Last 24 Hrs  T(C): 37.2 (28 Mar 2018 12:22), Max: 37.2 (28 Mar 2018 12:22)  T(F): 98.9 (28 Mar 2018 12:22), Max: 98.9 (28 Mar 2018 12:22)  HR: 68 (28 Mar 2018 12:22) (68 - 73)  BP: 120/59 (28 Mar 2018 12:22) (120/59 - 129/61)  BP(mean): --  RR: 18 (28 Mar 2018 12:22) (17 - 18)  SpO2: 98% (28 Mar 2018 12:22) (97% - 98%)    PHYSICAL EXAM:  Constitutional:NAD  Eyes:SOCORRO, EOMI  Ear/Nose/Throat: no thrush, mucositis.  Moist mucous membranes	  Neck:no JVD, no lymphadenopathy, supple  Respiratory: CTA ada  Cardiovascular: S1S2 RRR, no murmurs  Gastrointestinal:soft, nontender,  nondistended (+) BS  Extremities:no e/e/c  Skin:  UE eccymosis        LABS:                        8.9    7.33  )-----------( 250      ( 28 Mar 2018 07:02 )             27.6     03-28    142  |  107  |  17  ----------------------------<  99  3.8   |  23  |  0.91    Ca    7.9<L>      28 Mar 2018 07:02  Phos  3.8     03-28  Mg     1.9     03-28    TPro  4.9<L>  /  Alb  2.4<L>  /  TBili  0.4  /  DBili  x   /  AST  40<H>  /  ALT  71<H>  /  AlkPhos  132<H>  03-28        MICROBIOLOGY:    Culture - Urine (03.25.18 @ 10:40)    Culture - Urine:   NO GROWTH AT 24 HOURS    Specimen Source: URINE CATHETER    Culture - Blood (03.25.18 @ 02:23)    Culture - Blood:   NO ORGANISMS ISOLATED  NO ORGANISMS ISOLATED AT 72 HRS.    Specimen Source: BLOOD          RADIOLOGY & ADDITIONAL STUDIES:    < from: US Abdomen Limited (03.27.18 @ 18:13) >    IMPRESSION:     No abnormality in the abdomen.  Small right pleural effusion.    < end of copied text > Infectious Diseases progress note:    Subjective:  (+) DVT, pt started on xarelto. No new somatic complaints.  Afebrile.  WBC stable    ROS:  CONSTITUTIONAL:  No fever, chills, rigors  CARDIOVASCULAR:  No chest pain or palpitations  RESPIRATORY:   No SOB, cough, dyspnea on exertion.  No wheezing  GASTROINTESTINAL:  No abd pain, N/V, diarrhea/constipation  EXTREMITIES:  No swelling or joint pain  GENITOURINARY:  No burning on urination, increased frequency or urgency.  No flank pain  NEUROLOGIC:  No HA, visual disturbances  SKIN: No rashes    Allergies    Eye cream from the 1960s Neocortef ?spelling caused eyes to becomes swollen (Unknown)  neomycin (Unknown)  soaps and creams causes rash uses only sensitive skin soap (Rash)  sulfa drugs (Unknown)  Voltaren (Rash)    Intolerances        ANTIBIOTICS/RELEVANT:  antimicrobials    immunologic:    OTHER:  ALBUTerol/ipratropium for Nebulization 3 milliLiter(s) Nebulizer every 6 hours PRN  ALPRAZolam 0.5 milliGRAM(s) Oral three times a day PRN  aspirin enteric coated 81 milliGRAM(s) Oral daily  atorvastatin 10 milliGRAM(s) Oral at bedtime  busPIRone 10 milliGRAM(s) Oral two times a day  diltiazem    Tablet 60 milliGRAM(s) Oral three times a day  docusate sodium 100 milliGRAM(s) Oral three times a day  ergocalciferol 60393 Unit(s) Oral <User Schedule>  metoprolol tartrate 25 milliGRAM(s) Oral two times a day  mirtazapine 15 milliGRAM(s) Oral at bedtime  pantoprazole    Tablet 40 milliGRAM(s) Oral before breakfast  polyethylene glycol 3350 17 Gram(s) Oral daily  rivaroxaban 15 milliGRAM(s) Oral two times a day  rivaroxaban 20 milliGRAM(s) Oral every 24 hours  senna 2 Tablet(s) Oral at bedtime  sodium chloride 0.9% lock flush 3 milliLiter(s) IV Push every 8 hours  zolpidem 5 milliGRAM(s) Oral at bedtime PRN  zolpidem 5 milliGRAM(s) Oral at bedtime PRN      Objective:  Vital Signs Last 24 Hrs  T(C): 37.2 (28 Mar 2018 12:22), Max: 37.2 (28 Mar 2018 12:22)  T(F): 98.9 (28 Mar 2018 12:22), Max: 98.9 (28 Mar 2018 12:22)  HR: 68 (28 Mar 2018 12:22) (68 - 73)  BP: 120/59 (28 Mar 2018 12:22) (120/59 - 129/61)  BP(mean): --  RR: 18 (28 Mar 2018 12:22) (17 - 18)  SpO2: 98% (28 Mar 2018 12:22) (97% - 98%)    PHYSICAL EXAM:  Constitutional:NAD  Eyes:SOCORRO, EOMI  Ear/Nose/Throat: no thrush, mucositis.  Moist mucous membranes	  Neck:no JVD, no lymphadenopathy, supple  Respiratory: CTA ada  Cardiovascular: S1S2 RRR, no murmurs  Gastrointestinal:soft, nontender,  nondistended (+) BS  Extremities:no e/e/c  Skin:  UE eccymosis        LABS:                        8.9    7.33  )-----------( 250      ( 28 Mar 2018 07:02 )             27.6     03-28    142  |  107  |  17  ----------------------------<  99  3.8   |  23  |  0.91    Ca    7.9<L>      28 Mar 2018 07:02  Phos  3.8     03-28  Mg     1.9     03-28    TPro  4.9<L>  /  Alb  2.4<L>  /  TBili  0.4  /  DBili  x   /  AST  40<H>  /  ALT  71<H>  /  AlkPhos  132<H>  03-28        MICROBIOLOGY:    Culture - Urine (03.25.18 @ 10:40)    Culture - Urine:   NO GROWTH AT 24 HOURS    Specimen Source: URINE CATHETER    Culture - Blood (03.25.18 @ 02:23)    Culture - Blood:   NO ORGANISMS ISOLATED  NO ORGANISMS ISOLATED AT 72 HRS.    Specimen Source: BLOOD          RADIOLOGY & ADDITIONAL STUDIES:    < from: US Abdomen Limited (03.27.18 @ 18:13) >    IMPRESSION:     No abnormality in the abdomen.  Small right pleural effusion.    < end of copied text >

## 2018-03-28 NOTE — PROGRESS NOTE ADULT - SUBJECTIVE AND OBJECTIVE BOX
Patient is a 89y old  Female who presents with a chief complaint of S/P Fall- found on the floor by daughter (22 Mar 2018 16:49)      SUBJECTIVE / OVERNIGHT EVENTS:    DVT BLE  Denies CP/SOB/Palpitation/HA.    MEDICATIONS  (STANDING):  aspirin enteric coated 81 milliGRAM(s) Oral daily  atorvastatin 10 milliGRAM(s) Oral at bedtime  busPIRone 10 milliGRAM(s) Oral two times a day  diltiazem    Tablet 60 milliGRAM(s) Oral three times a day  docusate sodium 100 milliGRAM(s) Oral three times a day  ergocalciferol 85894 Unit(s) Oral <User Schedule>  metoprolol tartrate 25 milliGRAM(s) Oral two times a day  mirtazapine 15 milliGRAM(s) Oral at bedtime  pantoprazole    Tablet 40 milliGRAM(s) Oral before breakfast  polyethylene glycol 3350 17 Gram(s) Oral daily  rivaroxaban 15 milliGRAM(s) Oral two times a day  rivaroxaban 20 milliGRAM(s) Oral every 24 hours  senna 2 Tablet(s) Oral at bedtime  sodium chloride 0.9% lock flush 3 milliLiter(s) IV Push every 8 hours    MEDICATIONS  (PRN):  ALBUTerol/ipratropium for Nebulization 3 milliLiter(s) Nebulizer every 6 hours PRN Shortness of Breath and/or Wheezing  ALPRAZolam 0.5 milliGRAM(s) Oral three times a day PRN Anxiety  zolpidem 5 milliGRAM(s) Oral at bedtime PRN Insomnia  zolpidem 5 milliGRAM(s) Oral at bedtime PRN Insomnia        CAPILLARY BLOOD GLUCOSE        I&O's Summary    27 Mar 2018 07:01  -  28 Mar 2018 07:00  --------------------------------------------------------  IN: 0 mL / OUT: 1250 mL / NET: -1250 mL    28 Mar 2018 07:01  -  28 Mar 2018 17:13  --------------------------------------------------------  IN: 0 mL / OUT: 1400 mL / NET: -1400 mL        PHYSICAL EXAM:    NECK: Supple, No JVD  CHEST/LUNG: Clear to auscultation bilaterally; No wheezing.  HEART: Regular rate and rhythm; No murmurs, rubs, or gallops  ABDOMEN: Soft, Nontender, Nondistended; Bowel sounds present  EXTREMITIES:   No clubbing, cyanosis, or edema  NEUROLOGY: Awake.  SKIN: No rashes    LABS:                        8.9    7.33  )-----------( 250      ( 28 Mar 2018 07:02 )             27.6     03-28    142  |  107  |  17  ----------------------------<  99  3.8   |  23  |  0.91    Ca    7.9<L>      28 Mar 2018 07:02  Phos  3.8     03-28  Mg     1.9     03-28    TPro  4.9<L>  /  Alb  2.4<L>  /  TBili  0.4  /  DBili  x   /  AST  40<H>  /  ALT  71<H>  /  AlkPhos  132<H>  03-28      CARDIAC MARKERS ( 28 Mar 2018 07:02 )  x     / x     / 122 u/L / x     / x      CARDIAC MARKERS ( 27 Mar 2018 05:30 )  x     / x     / 233 u/L / x     / x            CAPILLARY BLOOD GLUCOSE        03-25 @ 10:40  Culture-urine   NO GROWTH AT 24 HOURS  Culture results --  method type --  Organism --  Organism Identification --  Specimen source URINE CATHETER  03-25 @ 02:23  Culture-urine --  Culture results --  method type --  Organism --  Organism Identification --  Specimen source BLOOD  03-22 @ 12:50  Culture-urine   NO GROWTH AT 24 HOURS  Culture results --  method type --  Organism --  Organism Identification --  Specimen source URINE MIDSTREAM  03-22 @ 12:43  Culture-urine --  Culture results --  method type --  Organism --  Organism Identification --  Specimen source BLOOD           03-25 @ 10:40  Culture blood --  Culture results --  Gram stain --  Gram stain blood --  Method type --  Organism --  Organism identification --  Specimen source URINE CATHETER   03-25 @ 02:23  Culture blood   NO ORGANISMS ISOLATED  NO ORGANISMS ISOLATED AT 72 HRS.  Culture results --  Gram stain --  Gram stain blood --  Method type --  Organism --  Organism identification --  Specimen source BLOOD   03-22 @ 12:50  Culture blood --  Culture results --  Gram stain --  Gram stain blood --  Method type --  Organism --  Organism identification --  Specimen source URINE MIDSTREAM   03-22 @ 12:43  Culture blood   NO ORGANISMS ISOLATED  Culture results --  Gram stain --  Gram stain blood --  Method type --  Organism --  Organism identification --  Specimen source BLOOD      RADIOLOGY & ADDITIONAL TESTS:    Imaging Personally Reviewed:    Consultant(s) Notes Reviewed:      Care Discussed with Consultants/Other Providers:

## 2018-03-28 NOTE — PROGRESS NOTE ADULT - ASSESSMENT
89 y/o F with history of HTN, HLD, Palpitation, insomnia, anxiety present with unwitnessed fall. Patient was found on the bathroom floor by her daughter. Unknown how long patient was on the floor. Patient states she only remembers waking up from the floor. Patient c/o back, right shoulder pain and left knee pain. Patient denies chest pain, palpitation at the time, sob, cough, n/v/d/, fever, chills, no dizziness or weakness. In ED patient was found to have UTI and was given one dose of Ceftriaxone and Zosyn. Also was found to have CK of 5364 and Trop of 0.10 and was given bolus of 1L of NS. (22 Mar 2018 16:49)    Pt finished a 5 day course of cipro for UTI about a month ago.  She currently does not complaint of burning, urgency or increased frequency of urination.  No suprapubic discomfort.  UA (+) blood, neg for nit/trace LE.  Urine cultures have been negative.  WBC and temp curve improved.  CPK improved from yesterday.    Problem/Plan - 1:    ·	Fever/Leukocytosis    - UA and urine cultures negative for UTI.  CT chest without evidence for pna.  Pt currently without other focal signs on exam suggestive of infectious process.    - Suspect leukocytosis and mild fever likely reactive to rhabomyolysis.  CPK improving    - Pt with low grade temp 100.3 today.  Repeat UA, urine cultures, blood cultures sent (negative so far).  Repeat chest xray without infiltrate/consolidations.  RVP negative    - f/u RUQ sono. LFTS elevated in the setting of rhabdomyolysis - possibly secondary to skeletal muscle injury.  R/o liver pathology.    - No need for abx at this time.    - LE dopplers (+) DVT, pt started on anticoagulation    Will follow,    Dara Mccartney  [phone]

## 2018-03-28 NOTE — PROGRESS NOTE ADULT - SUBJECTIVE AND OBJECTIVE BOX
Patient is a 89y old  Female who presents with a chief complaint of S/P Fall- found on the floor by daughter (22 Mar 2018 16:49)      Any change in ROS: pt says , her cough has resolved today :  she is not SOB : no respiratory events overnight     MEDICATIONS  (STANDING):  aspirin enteric coated 81 milliGRAM(s) Oral daily  atorvastatin 10 milliGRAM(s) Oral at bedtime  busPIRone 10 milliGRAM(s) Oral two times a day  diltiazem    Tablet 60 milliGRAM(s) Oral three times a day  docusate sodium 100 milliGRAM(s) Oral three times a day  ergocalciferol 42448 Unit(s) Oral <User Schedule>  metoprolol tartrate 25 milliGRAM(s) Oral two times a day  mirtazapine 15 milliGRAM(s) Oral at bedtime  polyethylene glycol 3350 17 Gram(s) Oral daily  rivaroxaban 15 milliGRAM(s) Oral two times a day  rivaroxaban 20 milliGRAM(s) Oral every 24 hours  senna 2 Tablet(s) Oral at bedtime  sodium chloride 0.9% lock flush 3 milliLiter(s) IV Push every 8 hours    MEDICATIONS  (PRN):  ALBUTerol/ipratropium for Nebulization 3 milliLiter(s) Nebulizer every 6 hours PRN Shortness of Breath and/or Wheezing  ALPRAZolam 0.5 milliGRAM(s) Oral three times a day PRN Anxiety  zolpidem 5 milliGRAM(s) Oral at bedtime PRN Insomnia  zolpidem 5 milliGRAM(s) Oral at bedtime PRN Insomnia    Vital Signs Last 24 Hrs  T(C): 37.2 (28 Mar 2018 12:22), Max: 37.2 (28 Mar 2018 12:22)  T(F): 98.9 (28 Mar 2018 12:22), Max: 98.9 (28 Mar 2018 12:22)  HR: 68 (28 Mar 2018 12:22) (68 - 76)  BP: 120/59 (28 Mar 2018 12:22) (120/59 - 129/61)  BP(mean): --  RR: 18 (28 Mar 2018 12:22) (17 - 18)  SpO2: 98% (28 Mar 2018 12:22) (97% - 99%)    I&O's Summary    27 Mar 2018 07:01  -  28 Mar 2018 07:00  --------------------------------------------------------  IN: 0 mL / OUT: 1250 mL / NET: -1250 mL          Physical Exam:   GENERAL: NAD, well-groomed, well-developed  HEENT: SOCORRO/   Atraumatic, Normocephalic  ENMT: No tonsillar erythema, exudates, or enlargement; Moist mucous membranes, Good dentition, No lesions  NECK: Supple, No JVD, Normal thyroid  CHEST/LUNG: Clear to auscultaion, ; No rales, rhonchi, wheezing, or rubs  CVS: Regular rate and rhythm; No murmurs, rubs, or gallops  GI: : Soft, Nontender, Nondistended; Bowel sounds present  NERVOUS SYSTEM:  Alert & Oriented X3  EXTREMITIES:  -edema  LYMPH: No lymphadenopathy noted  SKIN: No rashes or lesions  ENDOCRINOLOGY: No Thyromegaly  PSYCH: Appropriate    Labs:  21, 24  CARDIAC MARKERS ( 28 Mar 2018 07:02 )  x     / x     / 122 u/L / x     / x      CARDIAC MARKERS ( 27 Mar 2018 05:30 )  x     / x     / 233 u/L / x     / x                                8.9    7.33  )-----------( 250      ( 28 Mar 2018 07:02 )             27.6                         9.7    8.70  )-----------( 219      ( 27 Mar 2018 05:30 )             30.0                         9.1    11.36 )-----------( 193      ( 26 Mar 2018 07:21 )             27.6                         9.8    11.65 )-----------( 165      ( 25 Mar 2018 07:55 )             32.7     03-28    142  |  107  |  17  ----------------------------<  99  3.8   |  23  |  0.91  03-27    136  |  104  |  24<H>  ----------------------------<  96  3.7   |  20<L>  |  0.94  03-26    135  |  103  |  25<H>  ----------------------------<  98  4.0   |  19<L>  |  0.79  03-25    135  |  103  |  35<H>  ----------------------------<  105<H>  4.8   |  16<L>  |  1.06    Ca    7.9<L>      28 Mar 2018 07:02  Ca    7.8<L>      27 Mar 2018 05:30  Phos  3.8     03-28  Mg     1.9     03-28  Mg     2.0     03-27    TPro  4.9<L>  /  Alb  2.4<L>  /  TBili  0.4  /  DBili  x   /  AST  40<H>  /  ALT  71<H>  /  AlkPhos  132<H>  03-28    CAPILLARY BLOOD GLUCOSE          LIVER FUNCTIONS - ( 28 Mar 2018 07:02 )  Alb: 2.4 g/dL / Pro: 4.9 g/dL / ALK PHOS: 132 u/L / ALT: 71 u/L / AST: 40 u/L / GGT: x               Cultures:           Wound culture:                03-25 @ 10:40  Organism --  Culture w/ gram stain --  Specimen Source URINE CATHETER    Wound culture:                03-25 @ 02:23  Organism --  Culture w/ gram stain --  Specimen Source BLOOD    Wound culture:                03-22 @ 12:50  Organism --  Culture w/ gram stain --  Specimen Source URINE MIDSTREAM    Wound culture:                03-22 @ 12:43  Organism --  Culture w/ gram stain --  Specimen Source BLOOD      Abscess culture:             03-25 @ 10:40  Organism --  Gram Stain --  Specimen Source URINE CATHETER    Abscess culture:             03-25 @ 02:23  Organism --  Gram Stain --  Specimen Source BLOOD    Abscess culture:             03-22 @ 12:50  Organism --  Gram Stain --  Specimen Source URINE MIDSTREAM    Abscess culture:             03-22 @ 12:43  Organism --  Gram Stain --  Specimen Source BLOOD        Tissue culture:           03-25 @ 10:40  Organism --  Gram Stain --  Specimen Source URINE CATHETER    Tissue culture:           03-25 @ 02:23  Organism --  Gram Stain --  Specimen Source BLOOD    Tissue culture:           03-22 @ 12:50  Organism --  Gram Stain --  Specimen Source URINE MIDSTREAM    Tissue culture:           03-22 @ 12:43  Organism --  Gram Stain --  Specimen Source BLOOD      Body Fluid Smear & Culture:                        03-25 @ 10:40  AFB Smear  --  Culture Acid Fast Body Fluid w/ Smear  --  Culture Acid Fast Smear Concentrated   --    Culture Results:     --  Specimen Source URINE CATHETER    Body Fluid Smear & Culture:                        03-25 @ 02:23  AFB Smear  --  Culture Acid Fast Body Fluid w/ Smear  --  Culture Acid Fast Smear Concentrated   --    Culture Results:     --  Specimen Source BLOOD    Body Fluid Smear & Culture:                        03-22 @ 12:50  AFB Smear  --  Culture Acid Fast Body Fluid w/ Smear  --  Culture Acid Fast Smear Concentrated   --    Culture Results:     --  Specimen Source URINE MIDSTREAM    Body Fluid Smear & Culture:                        03-22 @ 12:43  AFB Smear  --  Culture Acid Fast Body Fluid w/ Smear  --  Culture Acid Fast Smear Concentrated   --    Culture Results:     --  Specimen Source BLOOD        Rapid Respiratory Viral Panel Result        03-27 @ 10:00  Rapid RVP --  Coronovirus --  Adenovirus NOT DETECTED  Bordetella Pertussis NOT DETECTED  Chlamydia Pneumonia NOT DETECTED  Entero/RhinovirusNOT DETECTED  HKU1 Coronovirus --  HMPV Coronovirus NOT DETECTED  Influenza A NOT DETECTED (any subtype)  Influenza AH1 NOT DETECTED  Influenza AH1 2009 NOT DETECTED  Influenza AH3 NOT DETECTED  Influenza B NOT DETECTED  Mycoplasma Pneumoniae NOT DETECTED This nucleic acid amplification assay was performed using  the [employer]. The following pathogens are tested  for: Adenovirus, Coronavirus 229E, Coronavirus HKU1,  Coronavirus NL63, Coronavirus OC43, Human Metapneumovirus  (HMPV), Rhinovirus/Enterovirus, Influenza A H1, Influenza A  H1 2009 (Pandemic H1 2009), Influenza A H3, Influenza A (Flu  A) subtype not identified, Influenza B, Parainfluenza Virus  (types 1, 2, 3, 4), Respiratory Syncytial Virus (RSV),  Bordetella pertussis, Chlamydophila pneumoniae, and  Mycoplasma pneumoniae. A negative FilmArray result does not  always exclude the possibility of viral or bacterial  infection. Laboratory results should always be interpreted  in the context of clinical findings.  NL63 Coronovirus --  OC43 Coronovirus --  Parainfluenza 1 NOT DETECTED  Parainfluenza 2 NOT DETECTED  Parainfluenza 3 NOT DETECTED  Parainfluenza 4 NOT DETECTED  Resp Syncytial Virus NOT DETECTED      < from: US Abdomen Limited (03.27.18 @ 18:13) >  mal limits.    Bile ducts: Normal caliber. Common hepatic duct measures 2 mm.     Gallbladder: Within normal limits.        Pancreas: Visualized portions are within normal limits.    Right kidney: 10 cm. No hydronephrosis.    Ascites: None.  Small right pleural effusion.    IVC: Visualized portions are within normal limits.    IMPRESSION:     No abnormality in the abdomen.  Small right pleural effusion.        < from: CT Chest No Cont (03.22.18 @ 12:57) >  ON:  CLINICAL INFORMATION: Fall. Weakness.    COMPARISON: None.    PROCEDURE:   CT of the Chest was performed without intravenous contrast.  Sagittal and coronal reformats wereperformed.    FINDINGS:    LUNGS AND LARGE AIRWAYS: Patent central airways. Scattered subcentimeter   pulmonary nodules. For example:  *  Right lower lobe (series 2, image 72), measuring 0.3 cm.  PLEURA: Trace bilateral pleural effusions.  VESSELS: Atherosclerotic calcifications.   HEART: Heart size is normal. No pericardial effusion.  MEDIASTINUM AND CHERI: Small hiatal hernia.  CHEST WALL AND LOWER NECK: Subcentimeter hypodense lesions in both   thyroid lobes, too small to characterize.  VISUALIZED UPPER ABDOMEN: Within normal limits.  BONES: Degenerative changes of the spine.    IMPRESSION: No sequela of acute trauma in the chest.                  IDALMIS JOSEPH M.D., ATTENDING RADIOLOGIST  This document has been electronically signed. Mar 22 2018  1:34PM        < end of copied text >            ALISON GODOY M.D. ATTENDING RADIOLOGIST  This document has been electronically signed.Mar 28 2018  8:37AM        < end of copied text >      Studies  Chest X-RAY  CT SCAN Chest   Venous Dopplers: LE:   CT Abdomen  Others

## 2018-03-28 NOTE — PROGRESS NOTE ADULT - PROBLEM SELECTOR PLAN 1
new: chest ray reviewed: I really doubt there is any pneumonia: Would check RVP: For now symptomatic treatment. In fact her WBC has normalized and has not had fever:  3/28: resolved:

## 2018-03-28 NOTE — CONSULT NOTE ADULT - PROBLEM SELECTOR RECOMMENDATION 9
" reason: I don't think there is any pulmonary reason falling down: She appeared dehydrated n admission: as HE HAS NO CHEST PAIN OR sob :  ct chest without any evidence of injury!
fobt positive but no overt gi bleed  hemoglobin stable  given acute dvt cont a/c for now  no need for  upper gastrointestinal endoscopy   protonix 40mg /d
Likely hemodynamically mediated due to rhabdomyolysis and poor PO intake during episode of syncope. Patient appears dry despite elevated pro-BNP. Agree with continuing with IVF for now given persistently elevated CK. Monitor for volume overload. Avoid nephrotoxins.

## 2018-03-28 NOTE — CONSULT NOTE ADULT - PROBLEM SELECTOR PROBLEM 2
Pulmonary nodules/lesions, multiple
Liver function test abnormality
Traumatic rhabdomyolysis, sequela

## 2018-03-28 NOTE — PROGRESS NOTE ADULT - ASSESSMENT
89 year old woman with history of Hyperlipemia, Hypertension, Insomnia admitted with unwitnessed fall vs syncope. Noted to now have bl dvt     1. Syncope vs fall  echo revealing grossly normal lv sys fx , severe pulm htn , mild MR/AR   LE dopplers +acute bl DVT   a/c started  on Xarelto per med      2. PSVT  resolved , no events on tele   cont metoprolol 25mg bid   asa 81 qd  cont  cardizem  60mg TID    3. Htn   cont bb/ccb  monitor bp     4. Rhabdo   ck improved    5. fever  off abx, id f/u  CXR noted, left trace pleural effusion   no sob,  echo with normal LV sys fx     6. Elevated LFTS  abd US noted   med f/u     dvt ppx

## 2018-03-28 NOTE — PROGRESS NOTE ADULT - ASSESSMENT
· Assessment	  89 y/o F with history of HTN, HLD, Palpitation, insomnia, anxiety present with unwitnessed fall. Patient was found on the bathroom floor by her daughter.  Patient c/o back, right shoulder pain and left knee pain.  In ED patient was found to have UTI and was given one dose of Ceftriaxone and Zosyn. Also was found to have CK of 5364 and Trop of 0.10 and was given bolus of 1L of NS      BL LE DVT:    Xarelto.  Hem eval called.    FOBT +:    GI eval called.     Problem/Plan - 1:  ·  Problem: Unwitnessed fall.  Plan: Patient with unwitnessed  fall, will monitor on telemetry for any arrhythmia, Cardio f/up noted.    U. Retention:  [place].     Problem/Plan - 2: Rhabdomyolysis:    CPK improved.     Problem/Plan - 4:  ·  Problem: Essential hypertension.  Plan: Cont current Med.     Problem/Plan - 5:  ·  Problem: Other hyperlipidemia.  Plan: Continue with Lipitor.   Dc planning.

## 2018-03-29 DIAGNOSIS — I82.409 ACUTE EMBOLISM AND THROMBOSIS OF UNSPECIFIED DEEP VEINS OF UNSPECIFIED LOWER EXTREMITY: ICD-10-CM

## 2018-03-29 LAB
ALBUMIN SERPL ELPH-MCNC: 2.4 G/DL — LOW (ref 3.3–5)
ALP SERPL-CCNC: 122 U/L — HIGH (ref 40–120)
ALT FLD-CCNC: 55 U/L — HIGH (ref 4–33)
AST SERPL-CCNC: 31 U/L — SIGNIFICANT CHANGE UP (ref 4–32)
BASOPHILS # BLD AUTO: 0.02 K/UL — SIGNIFICANT CHANGE UP (ref 0–0.2)
BASOPHILS NFR BLD AUTO: 0.3 % — SIGNIFICANT CHANGE UP (ref 0–2)
BILIRUB SERPL-MCNC: 0.5 MG/DL — SIGNIFICANT CHANGE UP (ref 0.2–1.2)
BUN SERPL-MCNC: 15 MG/DL — SIGNIFICANT CHANGE UP (ref 7–23)
CALCIUM SERPL-MCNC: 8.1 MG/DL — LOW (ref 8.4–10.5)
CHLORIDE SERPL-SCNC: 104 MMOL/L — SIGNIFICANT CHANGE UP (ref 98–107)
CO2 SERPL-SCNC: 27 MMOL/L — SIGNIFICANT CHANGE UP (ref 22–31)
CREAT SERPL-MCNC: 0.88 MG/DL — SIGNIFICANT CHANGE UP (ref 0.5–1.3)
EOSINOPHIL # BLD AUTO: 0.35 K/UL — SIGNIFICANT CHANGE UP (ref 0–0.5)
EOSINOPHIL NFR BLD AUTO: 4.5 % — SIGNIFICANT CHANGE UP (ref 0–6)
GLUCOSE SERPL-MCNC: 92 MG/DL — SIGNIFICANT CHANGE UP (ref 70–99)
HCT VFR BLD CALC: 29 % — LOW (ref 34.5–45)
HGB BLD-MCNC: 9.3 G/DL — LOW (ref 11.5–15.5)
IMM GRANULOCYTES # BLD AUTO: 0.1 # — SIGNIFICANT CHANGE UP
IMM GRANULOCYTES NFR BLD AUTO: 1.3 % — SIGNIFICANT CHANGE UP (ref 0–1.5)
LYMPHOCYTES # BLD AUTO: 0.93 K/UL — LOW (ref 1–3.3)
LYMPHOCYTES # BLD AUTO: 12 % — LOW (ref 13–44)
MAGNESIUM SERPL-MCNC: 1.7 MG/DL — SIGNIFICANT CHANGE UP (ref 1.6–2.6)
MCHC RBC-ENTMCNC: 29.8 PG — SIGNIFICANT CHANGE UP (ref 27–34)
MCHC RBC-ENTMCNC: 32.1 % — SIGNIFICANT CHANGE UP (ref 32–36)
MCV RBC AUTO: 92.9 FL — SIGNIFICANT CHANGE UP (ref 80–100)
MONOCYTES # BLD AUTO: 0.64 K/UL — SIGNIFICANT CHANGE UP (ref 0–0.9)
MONOCYTES NFR BLD AUTO: 8.3 % — SIGNIFICANT CHANGE UP (ref 2–14)
NEUTROPHILS # BLD AUTO: 5.7 K/UL — SIGNIFICANT CHANGE UP (ref 1.8–7.4)
NEUTROPHILS NFR BLD AUTO: 73.6 % — SIGNIFICANT CHANGE UP (ref 43–77)
NRBC # FLD: 0 — SIGNIFICANT CHANGE UP
PLATELET # BLD AUTO: 294 K/UL — SIGNIFICANT CHANGE UP (ref 150–400)
PMV BLD: 10.3 FL — SIGNIFICANT CHANGE UP (ref 7–13)
POTASSIUM SERPL-MCNC: 3.9 MMOL/L — SIGNIFICANT CHANGE UP (ref 3.5–5.3)
POTASSIUM SERPL-SCNC: 3.9 MMOL/L — SIGNIFICANT CHANGE UP (ref 3.5–5.3)
PROT SERPL-MCNC: 4.9 G/DL — LOW (ref 6–8.3)
RBC # BLD: 3.12 M/UL — LOW (ref 3.8–5.2)
RBC # FLD: 13.6 % — SIGNIFICANT CHANGE UP (ref 10.3–14.5)
SODIUM SERPL-SCNC: 140 MMOL/L — SIGNIFICANT CHANGE UP (ref 135–145)
WBC # BLD: 7.74 K/UL — SIGNIFICANT CHANGE UP (ref 3.8–10.5)
WBC # FLD AUTO: 7.74 K/UL — SIGNIFICANT CHANGE UP (ref 3.8–10.5)

## 2018-03-29 PROCEDURE — 71045 X-RAY EXAM CHEST 1 VIEW: CPT | Mod: 26

## 2018-03-29 PROCEDURE — 78582 LUNG VENTILAT&PERFUS IMAGING: CPT | Mod: 26,GC

## 2018-03-29 RX ADMIN — SODIUM CHLORIDE 3 MILLILITER(S): 9 INJECTION INTRAMUSCULAR; INTRAVENOUS; SUBCUTANEOUS at 22:30

## 2018-03-29 RX ADMIN — RIVAROXABAN 15 MILLIGRAM(S): KIT at 05:51

## 2018-03-29 RX ADMIN — Medication 25 MILLIGRAM(S): at 05:51

## 2018-03-29 RX ADMIN — Medication 0.5 MILLIGRAM(S): at 12:27

## 2018-03-29 RX ADMIN — RIVAROXABAN 15 MILLIGRAM(S): KIT at 17:19

## 2018-03-29 RX ADMIN — Medication 81 MILLIGRAM(S): at 13:45

## 2018-03-29 RX ADMIN — Medication 10 MILLIGRAM(S): at 17:19

## 2018-03-29 RX ADMIN — SODIUM CHLORIDE 3 MILLILITER(S): 9 INJECTION INTRAMUSCULAR; INTRAVENOUS; SUBCUTANEOUS at 05:51

## 2018-03-29 RX ADMIN — Medication 10 MILLIGRAM(S): at 05:52

## 2018-03-29 RX ADMIN — PANTOPRAZOLE SODIUM 40 MILLIGRAM(S): 20 TABLET, DELAYED RELEASE ORAL at 05:55

## 2018-03-29 RX ADMIN — Medication 25 MILLIGRAM(S): at 17:19

## 2018-03-29 RX ADMIN — ATORVASTATIN CALCIUM 10 MILLIGRAM(S): 80 TABLET, FILM COATED ORAL at 22:30

## 2018-03-29 RX ADMIN — SODIUM CHLORIDE 3 MILLILITER(S): 9 INJECTION INTRAMUSCULAR; INTRAVENOUS; SUBCUTANEOUS at 14:14

## 2018-03-29 RX ADMIN — MIRTAZAPINE 15 MILLIGRAM(S): 45 TABLET, ORALLY DISINTEGRATING ORAL at 22:31

## 2018-03-29 RX ADMIN — ZOLPIDEM TARTRATE 5 MILLIGRAM(S): 10 TABLET ORAL at 22:31

## 2018-03-29 RX ADMIN — ERGOCALCIFEROL 50000 UNIT(S): 1.25 CAPSULE ORAL at 22:30

## 2018-03-29 RX ADMIN — Medication 100 MILLIGRAM(S): at 13:45

## 2018-03-29 NOTE — PROGRESS NOTE ADULT - SUBJECTIVE AND OBJECTIVE BOX
CARDIOLOGY FOLLOW UP - Dr. Mccartney    CC no chest pain or sob        PHYSICAL EXAM:  T(C): 36.7 (03-29-18 @ 13:58), Max: 36.8 (03-29-18 @ 05:49)  HR: 80 (03-29-18 @ 13:58) (67 - 83)  BP: 125/55 (03-29-18 @ 13:58) (125/55 - 154/73)  RR: 17 (03-29-18 @ 13:58) (17 - 18)  SpO2: 96% (03-29-18 @ 13:58) (96% - 100%)  Wt(kg): --  I&O's Summary    28 Mar 2018 07:01  -  29 Mar 2018 07:00  --------------------------------------------------------  IN: 0 mL / OUT: 1400 mL / NET: -1400 mL        Appearance: Normal	  Cardiovascular: Normal S1 S2,RRR, No JVD, No murmurs  Respiratory: Lungs clear to auscultation	  Gastrointestinal:  Soft, Non-tender, + BS	  Extremities: Normal range of motion, No clubbing, cyanosis or edema        MEDICATIONS  (STANDING):  aspirin enteric coated 81 milliGRAM(s) Oral daily  atorvastatin 10 milliGRAM(s) Oral at bedtime  busPIRone 10 milliGRAM(s) Oral two times a day  diltiazem    Tablet 60 milliGRAM(s) Oral three times a day  docusate sodium 100 milliGRAM(s) Oral three times a day  ergocalciferol 89874 Unit(s) Oral <User Schedule>  metoprolol tartrate 25 milliGRAM(s) Oral two times a day  mirtazapine 15 milliGRAM(s) Oral at bedtime  pantoprazole    Tablet 40 milliGRAM(s) Oral before breakfast  polyethylene glycol 3350 17 Gram(s) Oral daily  rivaroxaban 15 milliGRAM(s) Oral two times a day  rivaroxaban 20 milliGRAM(s) Oral every 24 hours  senna 2 Tablet(s) Oral at bedtime  sodium chloride 0.9% lock flush 3 milliLiter(s) IV Push every 8 hours      TELEMETRY: 	    ECG:  	  RADIOLOGY:   DIAGNOSTIC TESTING:  [ ] Echocardiogram:  [ ]  Catheterization:  [ ] Stress Test:    OTHER: 	    LABS:	 	                                9.3    7.74  )-----------( 294      ( 29 Mar 2018 06:10 )             29.0     03-29    140  |  104  |  15  ----------------------------<  92  3.9   |  27  |  0.88    Ca    8.1<L>      29 Mar 2018 06:10  Phos  3.8     03-28  Mg     1.7     03-29    TPro  4.9<L>  /  Alb  2.4<L>  /  TBili  0.5  /  DBili  x   /  AST  31  /  ALT  55<H>  /  AlkPhos  122<H>  03-29    PT/INR - ( 28 Mar 2018 16:30 )   PT: 19.5 SEC;   INR: 1.68          PTT - ( 28 Mar 2018 16:30 )  PTT:31.1 SEC

## 2018-03-29 NOTE — PROGRESS NOTE ADULT - ASSESSMENT
89 y/o F with history of HTN, HLD, Palpitation, insomnia, anxiety present with unwitnessed fall. Patient was found on the bathroom floor by her daughter. Unknown how long patient was on the floor. Patient states she only remembers waking up from the floor. Patient c/o back, right shoulder pain and left knee pain. Patient denies chest pain, palpitation at the time, sob, cough, n/v/d/, fever, chills, no dizziness or weakness. In ED patient was found to have UTI and was given one dose of Ceftriaxone and Zosyn. Also was found to have CK of 5364 and Trop of 0.10 and was given bolus of 1L of NS. (22 Mar 2018 16:49)    Pt finished a 5 day course of cipro for UTI about a month ago.  She currently does not complaint of burning, urgency or increased frequency of urination.  No suprapubic discomfort.  UA (+) blood, neg for nit/trace LE.  Urine cultures have been negative.  WBC and temp curve improved.  CPK improved from yesterday.    Problem/Plan - 1:    ·	Fever/Leukocytosis    - UA and urine cultures negative for UTI.  CT chest without evidence for pna.  Pt currently without other focal signs on exam suggestive of infectious process.    - Suspect leukocytosis and mild fever likely reactive to rhabomyolysis.  CPK improving    - Pt with low grade temp 100.3 today.  Repeat UA, urine cultures, blood cultures sent (negative so far).  Repeat chest xray without infiltrate/consolidations.  RVP negative    - f/u RUQ sono. LFTS elevated in the setting of rhabdomyolysis - possibly secondary to skeletal muscle injury. UA abd no acute pathology    - No need for abx at this time.    - LE dopplers (+) DVT, pt started on anticoagulation, for possible VQ scan.  Pulm f/u noted.    Will follow,    Dara Mccartney  [phone]

## 2018-03-29 NOTE — PROGRESS NOTE ADULT - SUBJECTIVE AND OBJECTIVE BOX
Infectious Diseases progress note:    Subjective:  No new fevers.  WBC stable.  Had fobt positive.  No occult GIB    ROS:  CONSTITUTIONAL:  No fever, chills, rigors  CARDIOVASCULAR:  No chest pain or palpitations  RESPIRATORY:   No SOB, cough, dyspnea on exertion.  No wheezing  GASTROINTESTINAL:  No abd pain, N/V, diarrhea/constipation  EXTREMITIES:  No swelling or joint pain  GENITOURINARY:  No burning on urination, increased frequency or urgency.  No flank pain  NEUROLOGIC:  No HA, visual disturbances  SKIN: No rashes    Allergies    Eye cream from the 1960s Neocortef ?spelling caused eyes to becomes swollen (Unknown)  neomycin (Unknown)  soaps and creams causes rash uses only sensitive skin soap (Rash)  sulfa drugs (Unknown)  Voltaren (Rash)    Intolerances        ANTIBIOTICS/RELEVANT:  antimicrobials    immunologic:    OTHER:  ALBUTerol/ipratropium for Nebulization 3 milliLiter(s) Nebulizer every 6 hours PRN  ALPRAZolam 0.5 milliGRAM(s) Oral three times a day PRN  aspirin enteric coated 81 milliGRAM(s) Oral daily  atorvastatin 10 milliGRAM(s) Oral at bedtime  busPIRone 10 milliGRAM(s) Oral two times a day  diltiazem    Tablet 60 milliGRAM(s) Oral three times a day  docusate sodium 100 milliGRAM(s) Oral three times a day  ergocalciferol 37526 Unit(s) Oral <User Schedule>  metoprolol tartrate 25 milliGRAM(s) Oral two times a day  mirtazapine 15 milliGRAM(s) Oral at bedtime  pantoprazole    Tablet 40 milliGRAM(s) Oral before breakfast  polyethylene glycol 3350 17 Gram(s) Oral daily  rivaroxaban 15 milliGRAM(s) Oral two times a day  rivaroxaban 20 milliGRAM(s) Oral every 24 hours  senna 2 Tablet(s) Oral at bedtime  sodium chloride 0.9% lock flush 3 milliLiter(s) IV Push every 8 hours  zolpidem 5 milliGRAM(s) Oral at bedtime PRN  zolpidem 5 milliGRAM(s) Oral at bedtime PRN      Objective:  Vital Signs Last 24 Hrs  T(C): 36.7 (29 Mar 2018 13:58), Max: 36.8 (29 Mar 2018 05:49)  T(F): 98 (29 Mar 2018 13:58), Max: 98.2 (29 Mar 2018 05:49)  HR: 80 (29 Mar 2018 13:58) (67 - 83)  BP: 125/55 (29 Mar 2018 13:58) (125/55 - 154/73)  BP(mean): --  RR: 17 (29 Mar 2018 13:58) (17 - 18)  SpO2: 96% (29 Mar 2018 13:58) (96% - 100%)    PHYSICAL EXAM:  Constitutional:NAD  Eyes:SOCORRO, EOMI  Ear/Nose/Throat: no thrush, mucositis.  Moist mucous membranes	  Neck:no JVD, no lymphadenopathy, supple  Respiratory: CTA ada  Cardiovascular: S1S2 RRR, no murmurs  Gastrointestinal:soft, nontender,  nondistended (+) BS  Extremities:no e/e/c  Skin:  no rashes, open wounds or ulcerations        LABS:                        9.3    7.74  )-----------( 294      ( 29 Mar 2018 06:10 )             29.0     03-29    140  |  104  |  15  ----------------------------<  92  3.9   |  27  |  0.88    Ca    8.1<L>      29 Mar 2018 06:10  Phos  3.8     03-28  Mg     1.7     03-29    TPro  4.9<L>  /  Alb  2.4<L>  /  TBili  0.5  /  DBili  x   /  AST  31  /  ALT  55<H>  /  AlkPhos  122<H>  03-29    PT/INR - ( 28 Mar 2018 16:30 )   PT: 19.5 SEC;   INR: 1.68          PTT - ( 28 Mar 2018 16:30 )  PTT:31.1 SEC            MICROBIOLOGY:          RADIOLOGY & ADDITIONAL STUDIES:

## 2018-03-29 NOTE — PROGRESS NOTE ADULT - PROBLEM SELECTOR PLAN 1
on AC : dopplers noted: They are below knee: However she has severe pulmonary hypertension: not much reserve: check vq scan:

## 2018-03-29 NOTE — PROGRESS NOTE ADULT - SUBJECTIVE AND OBJECTIVE BOX
Patient is a 89y old  Female who presents with a chief complaint of S/P Fall- found on the floor by daughter (22 Mar 2018 16:49)      SUBJECTIVE / OVERNIGHT EVENTS:   Feels better.  Denies CP/SOB/Palpitation/HA.    MEDICATIONS  (STANDING):  aspirin enteric coated 81 milliGRAM(s) Oral daily  atorvastatin 10 milliGRAM(s) Oral at bedtime  busPIRone 10 milliGRAM(s) Oral two times a day  diltiazem    Tablet 60 milliGRAM(s) Oral three times a day  docusate sodium 100 milliGRAM(s) Oral three times a day  ergocalciferol 29991 Unit(s) Oral <User Schedule>  metoprolol tartrate 25 milliGRAM(s) Oral two times a day  mirtazapine 15 milliGRAM(s) Oral at bedtime  pantoprazole    Tablet 40 milliGRAM(s) Oral before breakfast  polyethylene glycol 3350 17 Gram(s) Oral daily  rivaroxaban 15 milliGRAM(s) Oral two times a day  rivaroxaban 20 milliGRAM(s) Oral every 24 hours  senna 2 Tablet(s) Oral at bedtime  sodium chloride 0.9% lock flush 3 milliLiter(s) IV Push every 8 hours    MEDICATIONS  (PRN):  ALBUTerol/ipratropium for Nebulization 3 milliLiter(s) Nebulizer every 6 hours PRN Shortness of Breath and/or Wheezing  ALPRAZolam 0.5 milliGRAM(s) Oral three times a day PRN Anxiety  zolpidem 5 milliGRAM(s) Oral at bedtime PRN Insomnia  zolpidem 5 milliGRAM(s) Oral at bedtime PRN Insomnia        CAPILLARY BLOOD GLUCOSE        I&O's Summary    28 Mar 2018 07:01  -  29 Mar 2018 07:00  --------------------------------------------------------  IN: 0 mL / OUT: 1400 mL / NET: -1400 mL        PHYSICAL EXAM:  GENERAL: NAD, well-developed  HEAD:  Atraumatic, Normocephalic  NECK: Supple, No JVD  CHEST/LUNG: Clear to auscultation bilaterally; No wheezing.  HEART: Regular rate and rhythm; No murmurs, rubs, or gallops  ABDOMEN: Soft, Nontender, Nondistended; Bowel sounds present  EXTREMITIES:   No clubbing, cyanosis, or edema  NEUROLOGY: AAO X 3  SKIN: No rashes    LABS:                        9.3    7.74  )-----------( 294      ( 29 Mar 2018 06:10 )             29.0     03-29    140  |  104  |  15  ----------------------------<  92  3.9   |  27  |  0.88    Ca    8.1<L>      29 Mar 2018 06:10  Phos  3.8     03-28  Mg     1.7     03-29    TPro  4.9<L>  /  Alb  2.4<L>  /  TBili  0.5  /  DBili  x   /  AST  31  /  ALT  55<H>  /  AlkPhos  122<H>  03-29    PT/INR - ( 28 Mar 2018 16:30 )   PT: 19.5 SEC;   INR: 1.68          PTT - ( 28 Mar 2018 16:30 )  PTT:31.1 SEC  CARDIAC MARKERS ( 28 Mar 2018 07:02 )  x     / x     / 122 u/L / x     / x            CAPILLARY BLOOD GLUCOSE        03-25 @ 10:40  Culture-urine   NO GROWTH AT 24 HOURS  Culture results --  method type --  Organism --  Organism Identification --  Specimen source URINE CATHETER  03-25 @ 02:23  Culture-urine --  Culture results --  method type --  Organism --  Organism Identification --  Specimen source BLOOD           03-25 @ 10:40  Culture blood --  Culture results --  Gram stain --  Gram stain blood --  Method type --  Organism --  Organism identification --  Specimen source URINE CATHETER   03-25 @ 02:23  Culture blood   NO ORGANISMS ISOLATED  NO ORGANISMS ISOLATED AT 96 HOURS  Culture results --  Gram stain --  Gram stain blood --  Method type --  Organism --  Organism identification --  Specimen source BLOOD      RADIOLOGY & ADDITIONAL TESTS:    Imaging Personally Reviewed:    Consultant(s) Notes Reviewed:      Care Discussed with Consultants/Other Providers:

## 2018-03-29 NOTE — PROGRESS NOTE ADULT - PROBLEM SELECTOR PLAN 1
- fobt positive but no overt gi bleed  - hemoglobin stable  - given acute dvt cont a/c for now  - no need for upper gastrointestinal endoscopy   - cont protonix 40mg qd while on xarelto for gi ppx

## 2018-03-29 NOTE — PROGRESS NOTE ADULT - SUBJECTIVE AND OBJECTIVE BOX
Patient is a 89y old  Female who presents with a chief complaint of S/P Fall- found on the floor by daughter (22 Mar 2018 16:49)      Any change in ROS: doping ok : no SOB     MEDICATIONS  (STANDING):  aspirin enteric coated 81 milliGRAM(s) Oral daily  atorvastatin 10 milliGRAM(s) Oral at bedtime  busPIRone 10 milliGRAM(s) Oral two times a day  diltiazem    Tablet 60 milliGRAM(s) Oral three times a day  docusate sodium 100 milliGRAM(s) Oral three times a day  ergocalciferol 78911 Unit(s) Oral <User Schedule>  metoprolol tartrate 25 milliGRAM(s) Oral two times a day  mirtazapine 15 milliGRAM(s) Oral at bedtime  pantoprazole    Tablet 40 milliGRAM(s) Oral before breakfast  polyethylene glycol 3350 17 Gram(s) Oral daily  rivaroxaban 15 milliGRAM(s) Oral two times a day  rivaroxaban 20 milliGRAM(s) Oral every 24 hours  senna 2 Tablet(s) Oral at bedtime  sodium chloride 0.9% lock flush 3 milliLiter(s) IV Push every 8 hours    MEDICATIONS  (PRN):  ALBUTerol/ipratropium for Nebulization 3 milliLiter(s) Nebulizer every 6 hours PRN Shortness of Breath and/or Wheezing  ALPRAZolam 0.5 milliGRAM(s) Oral three times a day PRN Anxiety  zolpidem 5 milliGRAM(s) Oral at bedtime PRN Insomnia  zolpidem 5 milliGRAM(s) Oral at bedtime PRN Insomnia    Vital Signs Last 24 Hrs  T(C): 36.8 (29 Mar 2018 05:49), Max: 36.8 (29 Mar 2018 05:49)  T(F): 98.2 (29 Mar 2018 05:49), Max: 98.2 (29 Mar 2018 05:49)  HR: 83 (29 Mar 2018 05:49) (67 - 83)  BP: 141/61 (29 Mar 2018 05:49) (141/61 - 154/73)  BP(mean): --  RR: 17 (29 Mar 2018 05:49) (17 - 18)  SpO2: 97% (29 Mar 2018 05:49) (97% - 100%)    I&O's Summary    28 Mar 2018 07:01  -  29 Mar 2018 07:00  --------------------------------------------------------  IN: 0 mL / OUT: 1400 mL / NET: -1400 mL          Physical Exam:   GENERAL: NAD, well-groomed, well-developed  HEENT: [place]/   Atraumatic, Normocephalic  ENMT: No tonsillar erythema, exudates, or enlargement; Moist mucous membranes, Good dentition, No lesions  NECK: Supple, No JVD, Normal thyroid  CHEST/LUNG: Clear to auscultaion, ; No rales, rhonchi, wheezing, or rubs  CVS: Regular rate and rhythm; No murmurs, rubs, or gallops  GI: : Soft, Nontender, Nondistended; Bowel sounds present  NERVOUS SYSTEM:  Alert & Oriented X3  EXTREMITIES:  2+ Peripheral Pulses, No clubbing, cyanosis, or edema  LYMPH: No lymphadenopathy noted  SKIN: No rashes or lesions  ENDOCRINOLOGY: No Thyromegaly  PSYCH: Appropriate    Labs:  21  CARDIAC MARKERS ( 28 Mar 2018 07:02 )  x     / x     / 122 u/L / x     / x                                9.3    7.74  )-----------( 294      ( 29 Mar 2018 06:10 )             29.0                         9.1    8.32  )-----------( 277      ( 28 Mar 2018 16:30 )             28.6                         8.9    7.33  )-----------( 250      ( 28 Mar 2018 07:02 )             27.6                         9.7    8.70  )-----------( 219      ( 27 Mar 2018 05:30 )             30.0                         9.1    11.36 )-----------( 193      ( 26 Mar 2018 07:21 )             27.6     03-29    140  |  104  |  15  ----------------------------<  92  3.9   |  27  |  0.88  03-28    142  |  107  |  17  ----------------------------<  99  3.8   |  23  |  0.91  03-27    136  |  104  |  24<H>  ----------------------------<  96  3.7   |  20<L>  |  0.94  03-26    135  |  103  |  25<H>  ----------------------------<  98  4.0   |  19<L>  |  0.79    Ca    8.1<L>      29 Mar 2018 06:10  Ca    7.9<L>      28 Mar 2018 07:02  Phos  3.8     03-28  Mg     1.7     03-29  Mg     1.9     03-28    TPro  4.9<L>  /  Alb  2.4<L>  /  TBili  0.5  /  DBili  x   /  AST  31  /  ALT  55<H>  /  AlkPhos  122<H>  03-29  TPro  4.9<L>  /  Alb  2.4<L>  /  TBili  0.4  /  DBili  x   /  AST  40<H>  /  ALT  71<H>  /  AlkPhos  132<H>  03-28    CAPILLARY BLOOD GLUCOSE          LIVER FUNCTIONS - ( 29 Mar 2018 06:10 )  Alb: 2.4 g/dL / Pro: 4.9 g/dL / ALK PHOS: 122 u/L / ALT: 55 u/L / AST: 31 u/L / GGT: x           PT/INR - ( 28 Mar 2018 16:30 )   PT: 19.5 SEC;   INR: 1.68          PTT - ( 28 Mar 2018 16:30 )  PTT:31.1 SEC    Cultures:           Wound culture:                03-25 @ 10:40  Organism --  Culture w/ gram stain --  Specimen Source URINE CATHETER    Wound culture:                03-25 @ 02:23  Organism --  Culture w/ gram stain --  Specimen Source BLOOD      Abscess culture:             03-25 @ 10:40  Organism --  Gram Stain --  Specimen Source URINE CATHETER    Abscess culture:             03-25 @ 02:23  Organism --  Gram Stain --  Specimen Source BLOOD        Tissue culture:           03-25 @ 10:40  Organism --  Gram Stain --  Specimen Source URINE CATHETER    Tissue culture:           03-25 @ 02:23  Organism --  Gram Stain --  Specimen Source BLOOD      Body Fluid Smear & Culture:                        03-25 @ 10:40  AFB Smear  --  Culture Acid Fast Body Fluid w/ Smear  --  Culture Acid Fast Smear Concentrated   --    Culture Results:     --  Specimen Source URINE CATHETER    Body Fluid Smear & Culture:                        03-25 @ 02:23  AFB Smear  --  Culture Acid Fast Body Fluid w/ Smear  --  Culture Acid Fast Smear Concentrated   --    Culture Results:     --  Specimen Source BLOOD        Rapid Respiratory Viral Panel Result        03-27 @ 10:00  Rapid RVP --  Coronovirus --  Adenovirus NOT DETECTED  Bordetella Pertussis NOT DETECTED  Chlamydia Pneumonia NOT DETECTED  Entero/RhinovirusNOT DETECTED  HKU1 Coronovirus --  HMPV Coronovirus NOT DETECTED  Influenza A NOT DETECTED (any subtype)  Influenza AH1 NOT DETECTED  Influenza AH1 2009 NOT DETECTED  Influenza AH3 NOT DETECTED  Influenza B NOT DETECTED  Mycoplasma Pneumoniae NOT DETECTED This nucleic acid amplification assay was performed using  the [employer] Film[employer]. The following pathogens are tested  for: Adenovirus, Coronavirus 229E, Coronavirus HKU1,  Coronavirus NL63, Coronavirus OC43, Human Metapneumovirus  (HMPV), Rhinovirus/Enterovirus, Influenza A H1, Influenza A  H1 2009 (Pandemic H1 2009), Influenza A H3, Influenza A (Flu  A) subtype not identified, Influenza B, Parainfluenza Virus  (types 1, 2, 3, 4), Respiratory Syncytial Virus (RSV),  Bordetella pertussis, Chlamydophila pneumoniae, and  Mycoplasma pneumoniae. A negative FilmArray result does not  always exclude the possibility of viral or bacterial  infection. Laboratory results should always be interpreted  in the context of clinical findings.  NL63 Coronovirus --  OC43 Coronovirus --  Parainfluenza 1 NOT DETECTED  Parainfluenza 2 NOT DETECTED  Parainfluenza 3 NOT DETECTED  Parainfluenza 4 NOT DETECTED  Resp Syncytial Virus NOT DETECTED      < from: US Duplex Venous Lower Ext Complete, Bilateral (03.27.18 @ 18:12) >      INTERPRETATION:  CLINICAL INFORMATION: Bilateral leg pain. Shortness of   breath.    COMPARISON: None available.    TECHNIQUE: Duplex sonography of the BILATERAL LOWER extremities with   color and spectral Doppler, with and without compression.      FINDINGS:    Right lower extremity: Normal compressibility of the common femoral,   femoral, popliteal, and posterior tibial veins. There is acute occlusive   thrombosis of the right peroneal vein.     Left lower extremity: Normal compressibility of the common femoral,   femoral, popliteal and posterior tibial veins. There is acute occlusive   thrombosis of the left peroneal and soleal veins.     Miscellaneous: Bilateral lower cavity subcutaneous edema.    IMPRESSION:     Acute bilateral below the knee deep venous thrombosis involving the   bilateral peroneal veins and left soleal vein.     Discussed by Dr. Donahue with FRANCES Horne on March 27, 2018 at 6:36 PM with  read back.              CHERRY DONAHUE M.D., RADIOLOGY RESIDENT  This document has been electronically signed.  CHAVEZ DAVEY M.D., ATTENDING RADIOLOGIST  This document has been electronically signed. Mar 27 2018  6:51PM            < from: US Abdomen Limited (03.27.18 @ 18:13) >  EXAM:  US ABDOMEN LIMITED        PROCEDURE DATE:  Mar 27 2018         INTERPRETATION:  CLINICAL INFORMATION: Transaminitis.    COMPARISON: None available.    TECHNIQUE: Sonography of the right upper quadrant.     FINDINGS:    Liver: Within normal limits.    Bile ducts: Normal caliber. Common hepatic duct measures 2 mm.     Gallbladder: Within normal limits.        Pancreas: Visualized portions are within normal limits.    Right kidney: 10 cm. No hydronephrosis.    Ascites: None.  Small right pleural effusion.    IVC: Visualized portions are within normal limits.    IMPRESSION:     No abnormality in the abdomen.  Small right pleural effusion.                  ALISON GODOY M.D. ATTENDING RADIOLOGIST  This document has been electronically signed.Mar 28 2018  8:37AM              < end of copied text >    < end of copied text >      Studies  Chest X-RAY  CT SCAN Chest   Venous Dopplers: LE:   CT Abdomen  Others        < from: Transthoracic Echocardiogram (03.27.18 @ 15:06) >  mitral leaflets with normal diastolic opening. Mild mitral  regurgitation.  Aortic Root: Normal aortic root.  Aortic Valve: Aortic valve leaflet morphology not well  visualized. Mild aortic regurgitation.  Left Atrium: Dilated left atrium.  Left Ventricle: Endocardium not well visualized; grossly  normal left ventricular systolicfunction.  Right Heart: Normal right atrium. The right ventricle is  not well visualized; grossly normal right ventricular  systolic function. Normal tricuspid valve.  Moderate  tricuspid regurgitation. Normal pulmonic valve.  Pericardium/PleuraNormal pericardium with no pericardial  effusion.  Hemodynamic: Estimated right ventricular systolic pressure  equals 62 mm Hg, assuming right atrial pressure equals 10  mm Hg, consistent with severe pulmonary hypertension.  ------------------------------------------------------------------------  CONCLUSIONS:  1. Mitral annular calcification and calcified mitral  leaflets with normal diastolic opening. Mild mitral  regurgitation.  2. Aortic valve leaflet morphology not well visualized.  Mild aortic regurgitation.  3. Endocardium not well visualized; grossly normal left  ventricular systolic function.  4. The right ventricle is not well visualized; grossly  normal right ventricular systolic function.  5. Estimated right ventricular systolic pressure equals 62  mm Hg, assuming right atrial pressure equals 10 mm Hg,  consistent with severe pulmonary hypertension.  ------------------------------------------------------------------------  Confirmed on  3/27/2018 - 16:37:36 by Jose A Mensah M.D.  ------------------------------------------------------------------------    < end of copied text >

## 2018-03-29 NOTE — PROGRESS NOTE ADULT - ASSESSMENT
· Assessment	  89 y/o F with history of HTN, HLD, Palpitation, insomnia, anxiety present with unwitnessed fall. Patient was found on the bathroom floor by her daughter.  Patient c/o back, right shoulder pain and left knee pain.  In ED patient was found to have UTI and was given one dose of Ceftriaxone and Zosyn. Also was found to have CK of 5364 and Trop of 0.10 and was given bolus of 1L of NS      BL LE DVT:    Xarelto.  Hem eval called.    FOBT +:    GI eval noted.  No need for EGD at present.     Problem/Plan - 1:  ·  Problem: Unwitnessed fall.  Plan: Patient with unwitnessed  fall, will monitor on telemetry for any arrhythmia, Cardio f/up noted.    U. Retention:  [place].     Problem/Plan - 2: Rhabdomyolysis:    CPK improved.     Problem/Plan - 4:  ·  Problem: Essential hypertension.  Plan: Cont current Med.     Problem/Plan - 5:  ·  Problem: Other hyperlipidemia.  Plan: Continue with Lipitor.

## 2018-03-29 NOTE — PROGRESS NOTE ADULT - ASSESSMENT
89 year old woman with history of Hyperlipemia, Hypertension, Insomnia admitted with unwitnessed fall vs syncope. Noted to now have bl dvt     1. Syncope vs fall  echo revealing grossly normal lv sys fx , severe pulm htn , mild MR/AR   LE dopplers +acute bl DVT   a/c on Xarelto per med      2. PSVT  resolved , no events on tele   cont metoprolol 25mg bid   asa 81 qd  cont  cardizem  60mg TID    3. Htn   cont bb/ccb  monitor bp     4. Rhabdo   ck improved    5. fever  off abx, id f/u  CXR noted, left trace pleural effusion   no sob,  echo with normal LV sys fx     6. Elevated LFTS  abd US noted   med f/u     7. Acute DVT   a/c started  on Xarelto per med   pending VQ scan

## 2018-03-29 NOTE — PROGRESS NOTE ADULT - SUBJECTIVE AND OBJECTIVE BOX
INTERVAL HPI/OVERNIGHT EVENTS:    bm overnight, no gi bleeding   no abd pain   no n/v  eating well    MEDICATIONS  (STANDING):  aspirin enteric coated 81 milliGRAM(s) Oral daily  atorvastatin 10 milliGRAM(s) Oral at bedtime  busPIRone 10 milliGRAM(s) Oral two times a day  diltiazem    Tablet 60 milliGRAM(s) Oral three times a day  docusate sodium 100 milliGRAM(s) Oral three times a day  ergocalciferol 18905 Unit(s) Oral <User Schedule>  metoprolol tartrate 25 milliGRAM(s) Oral two times a day  mirtazapine 15 milliGRAM(s) Oral at bedtime  pantoprazole    Tablet 40 milliGRAM(s) Oral before breakfast  polyethylene glycol 3350 17 Gram(s) Oral daily  rivaroxaban 15 milliGRAM(s) Oral two times a day  rivaroxaban 20 milliGRAM(s) Oral every 24 hours  senna 2 Tablet(s) Oral at bedtime  sodium chloride 0.9% lock flush 3 milliLiter(s) IV Push every 8 hours    MEDICATIONS  (PRN):  ALBUTerol/ipratropium for Nebulization 3 milliLiter(s) Nebulizer every 6 hours PRN Shortness of Breath and/or Wheezing  ALPRAZolam 0.5 milliGRAM(s) Oral three times a day PRN Anxiety  zolpidem 5 milliGRAM(s) Oral at bedtime PRN Insomnia  zolpidem 5 milliGRAM(s) Oral at bedtime PRN Insomnia      Allergies    Eye cream from the 1960s Neocortef ?spelling caused eyes to becomes swollen (Unknown)  neomycin (Unknown)  soaps and creams causes rash uses only sensitive skin soap (Rash)  sulfa drugs (Unknown)  Voltaren (Rash)    Intolerances        Review of Systems:    General:  No wt loss, fevers, chills, night sweats, fatigue   Eyes:  Good vision, no reported pain  ENT:  No sore throat, pain, runny nose, dysphagia  CV:  No pain, palpitations, hypo/hypertension  Resp:  No dyspnea, cough, tachypnea, wheezing  GI:  No pain, No nausea, No vomiting, No diarrhea, No constipation, No weight loss, No fever, No pruritis, No rectal bleeding, No melena, No dysphagia  :  No pain, bleeding, incontinence, nocturia  Muscle:  No pain, weakness  Neuro:  No weakness, tingling, memory problems  Psych:  No fatigue, insomnia, mood problems, depression  Endocrine:  No polyuria, polydypsia, cold/heat intolerance  Heme:  No petechiae, ecchymosis, easy bruisability  Skin:  No rash, tattoos, scars, edema      Vital Signs Last 24 Hrs  T(C): 36.8 (29 Mar 2018 05:49), Max: 37.2 (28 Mar 2018 12:22)  T(F): 98.2 (29 Mar 2018 05:49), Max: 98.9 (28 Mar 2018 12:22)  HR: 83 (29 Mar 2018 05:49) (67 - 83)  BP: 141/61 (29 Mar 2018 05:49) (120/59 - 154/73)  BP(mean): --  RR: 17 (29 Mar 2018 05:49) (17 - 18)  SpO2: 97% (29 Mar 2018 05:49) (97% - 100%)    PHYSICAL EXAM:    Constitutional: NAD  HEENT: EOMI, throat clear  Neck: No LAD, supple  Respiratory: CTA and P  Cardiovascular: S1 and S2, RRR, no M  Gastrointestinal: BS+, soft, NT/ND, neg HSM,  Extremities: No peripheral edema, neg clubbing, cyanosis  Vascular: 2+ peripheral pulses  Neurological: A/O x 3, no focal deficits  Psychiatric: Normal mood, normal affect  Skin: No rashes      LABS:                        9.3    7.74  )-----------( 294      ( 29 Mar 2018 06:10 )             29.0     03-29    140  |  104  |  15  ----------------------------<  92  3.9   |  27  |  0.88    Ca    8.1<L>      29 Mar 2018 06:10  Phos  3.8     03-28  Mg     1.7     03-29    TPro  4.9<L>  /  Alb  2.4<L>  /  TBili  0.5  /  DBili  x   /  AST  31  /  ALT  55<H>  /  AlkPhos  122<H>  03-29    PT/INR - ( 28 Mar 2018 16:30 )   PT: 19.5 SEC;   INR: 1.68          PTT - ( 28 Mar 2018 16:30 )  PTT:31.1 SEC      RADIOLOGY & ADDITIONAL TESTS:

## 2018-03-30 ENCOUNTER — TRANSCRIPTION ENCOUNTER (OUTPATIENT)
Age: 83
End: 2018-03-30

## 2018-03-30 VITALS
TEMPERATURE: 98 F | DIASTOLIC BLOOD PRESSURE: 68 MMHG | SYSTOLIC BLOOD PRESSURE: 127 MMHG | RESPIRATION RATE: 18 BRPM | OXYGEN SATURATION: 98 % | HEART RATE: 88 BPM

## 2018-03-30 DIAGNOSIS — K59.01 SLOW TRANSIT CONSTIPATION: ICD-10-CM

## 2018-03-30 LAB
BACTERIA BLD CULT: SIGNIFICANT CHANGE UP
BASOPHILS # BLD AUTO: 0.02 K/UL — SIGNIFICANT CHANGE UP (ref 0–0.2)
BASOPHILS NFR BLD AUTO: 0.2 % — SIGNIFICANT CHANGE UP (ref 0–2)
BUN SERPL-MCNC: 14 MG/DL — SIGNIFICANT CHANGE UP (ref 7–23)
CALCIUM SERPL-MCNC: 7.9 MG/DL — LOW (ref 8.4–10.5)
CHLORIDE SERPL-SCNC: 103 MMOL/L — SIGNIFICANT CHANGE UP (ref 98–107)
CO2 SERPL-SCNC: 30 MMOL/L — SIGNIFICANT CHANGE UP (ref 22–31)
CREAT SERPL-MCNC: 0.93 MG/DL — SIGNIFICANT CHANGE UP (ref 0.5–1.3)
EOSINOPHIL # BLD AUTO: 0.34 K/UL — SIGNIFICANT CHANGE UP (ref 0–0.5)
EOSINOPHIL NFR BLD AUTO: 4.1 % — SIGNIFICANT CHANGE UP (ref 0–6)
GLUCOSE SERPL-MCNC: 102 MG/DL — HIGH (ref 70–99)
HCT VFR BLD CALC: 29.9 % — LOW (ref 34.5–45)
HGB BLD-MCNC: 9.7 G/DL — LOW (ref 11.5–15.5)
IMM GRANULOCYTES # BLD AUTO: 0.09 # — SIGNIFICANT CHANGE UP
IMM GRANULOCYTES NFR BLD AUTO: 1.1 % — SIGNIFICANT CHANGE UP (ref 0–1.5)
LYMPHOCYTES # BLD AUTO: 0.91 K/UL — LOW (ref 1–3.3)
LYMPHOCYTES # BLD AUTO: 10.9 % — LOW (ref 13–44)
MAGNESIUM SERPL-MCNC: 1.6 MG/DL — SIGNIFICANT CHANGE UP (ref 1.6–2.6)
MCHC RBC-ENTMCNC: 29.8 PG — SIGNIFICANT CHANGE UP (ref 27–34)
MCHC RBC-ENTMCNC: 32.4 % — SIGNIFICANT CHANGE UP (ref 32–36)
MCV RBC AUTO: 92 FL — SIGNIFICANT CHANGE UP (ref 80–100)
MONOCYTES # BLD AUTO: 0.63 K/UL — SIGNIFICANT CHANGE UP (ref 0–0.9)
MONOCYTES NFR BLD AUTO: 7.5 % — SIGNIFICANT CHANGE UP (ref 2–14)
NEUTROPHILS # BLD AUTO: 6.39 K/UL — SIGNIFICANT CHANGE UP (ref 1.8–7.4)
NEUTROPHILS NFR BLD AUTO: 76.2 % — SIGNIFICANT CHANGE UP (ref 43–77)
NRBC # FLD: 0 — SIGNIFICANT CHANGE UP
PLATELET # BLD AUTO: 305 K/UL — SIGNIFICANT CHANGE UP (ref 150–400)
PMV BLD: 9.9 FL — SIGNIFICANT CHANGE UP (ref 7–13)
POTASSIUM SERPL-MCNC: 3.4 MMOL/L — LOW (ref 3.5–5.3)
POTASSIUM SERPL-SCNC: 3.4 MMOL/L — LOW (ref 3.5–5.3)
RBC # BLD: 3.25 M/UL — LOW (ref 3.8–5.2)
RBC # FLD: 13.5 % — SIGNIFICANT CHANGE UP (ref 10.3–14.5)
SODIUM SERPL-SCNC: 142 MMOL/L — SIGNIFICANT CHANGE UP (ref 135–145)
WBC # BLD: 8.38 K/UL — SIGNIFICANT CHANGE UP (ref 3.8–10.5)
WBC # FLD AUTO: 8.38 K/UL — SIGNIFICANT CHANGE UP (ref 3.8–10.5)

## 2018-03-30 RX ORDER — METOPROLOL TARTRATE 50 MG
1 TABLET ORAL
Qty: 0 | Refills: 0 | COMMUNITY

## 2018-03-30 RX ORDER — POTASSIUM CHLORIDE 20 MEQ
40 PACKET (EA) ORAL ONCE
Qty: 0 | Refills: 0 | Status: COMPLETED | OUTPATIENT
Start: 2018-03-30 | End: 2018-03-30

## 2018-03-30 RX ORDER — MAGNESIUM OXIDE 400 MG ORAL TABLET 241.3 MG
400 TABLET ORAL
Qty: 0 | Refills: 0 | Status: DISCONTINUED | OUTPATIENT
Start: 2018-03-30 | End: 2018-03-30

## 2018-03-30 RX ORDER — GLYCERIN ADULT
1 SUPPOSITORY, RECTAL RECTAL DAILY
Qty: 0 | Refills: 0 | Status: DISCONTINUED | OUTPATIENT
Start: 2018-03-30 | End: 2018-03-30

## 2018-03-30 RX ORDER — POLYETHYLENE GLYCOL 3350 17 G/17G
17 POWDER, FOR SOLUTION ORAL
Qty: 0 | Refills: 0 | Status: DISCONTINUED | OUTPATIENT
Start: 2018-03-30 | End: 2018-03-30

## 2018-03-30 RX ORDER — PANTOPRAZOLE SODIUM 20 MG/1
1 TABLET, DELAYED RELEASE ORAL
Qty: 0 | Refills: 0 | COMMUNITY
Start: 2018-03-30

## 2018-03-30 RX ORDER — RIVAROXABAN 15 MG-20MG
1 KIT ORAL
Qty: 0 | Refills: 0 | COMMUNITY
Start: 2018-03-30

## 2018-03-30 RX ORDER — DOCUSATE SODIUM 100 MG
1 CAPSULE ORAL
Qty: 0 | Refills: 0 | DISCHARGE
Start: 2018-03-30

## 2018-03-30 RX ORDER — ASPIRIN/CALCIUM CARB/MAGNESIUM 324 MG
1 TABLET ORAL
Qty: 0 | Refills: 0 | COMMUNITY
Start: 2018-03-30

## 2018-03-30 RX ORDER — SENNA PLUS 8.6 MG/1
2 TABLET ORAL
Qty: 0 | Refills: 0 | DISCHARGE
Start: 2018-03-30

## 2018-03-30 RX ORDER — DILTIAZEM HCL 120 MG
1 CAPSULE, EXT RELEASE 24 HR ORAL
Qty: 0 | Refills: 0 | COMMUNITY
Start: 2018-03-30

## 2018-03-30 RX ORDER — POLYETHYLENE GLYCOL 3350 17 G/17G
17 POWDER, FOR SOLUTION ORAL
Qty: 0 | Refills: 0 | COMMUNITY
Start: 2018-03-30

## 2018-03-30 RX ORDER — ALPRAZOLAM 0.25 MG
1 TABLET ORAL
Qty: 0 | Refills: 0 | COMMUNITY

## 2018-03-30 RX ORDER — PANTOPRAZOLE SODIUM 20 MG/1
40 TABLET, DELAYED RELEASE ORAL
Qty: 0 | Refills: 0 | Status: DISCONTINUED | OUTPATIENT
Start: 2018-03-30 | End: 2018-03-30

## 2018-03-30 RX ORDER — ZOLPIDEM TARTRATE 10 MG/1
1 TABLET ORAL
Qty: 0 | Refills: 0 | COMMUNITY

## 2018-03-30 RX ORDER — METOPROLOL TARTRATE 50 MG
1 TABLET ORAL
Qty: 0 | Refills: 0 | COMMUNITY
Start: 2018-03-30

## 2018-03-30 RX ADMIN — Medication 40 MILLIEQUIVALENT(S): at 13:16

## 2018-03-30 RX ADMIN — MAGNESIUM OXIDE 400 MG ORAL TABLET 400 MILLIGRAM(S): 241.3 TABLET ORAL at 13:16

## 2018-03-30 RX ADMIN — SODIUM CHLORIDE 3 MILLILITER(S): 9 INJECTION INTRAMUSCULAR; INTRAVENOUS; SUBCUTANEOUS at 05:51

## 2018-03-30 RX ADMIN — Medication 100 MILLIGRAM(S): at 05:52

## 2018-03-30 RX ADMIN — Medication 25 MILLIGRAM(S): at 05:51

## 2018-03-30 RX ADMIN — PANTOPRAZOLE SODIUM 40 MILLIGRAM(S): 20 TABLET, DELAYED RELEASE ORAL at 05:52

## 2018-03-30 RX ADMIN — SODIUM CHLORIDE 3 MILLILITER(S): 9 INJECTION INTRAMUSCULAR; INTRAVENOUS; SUBCUTANEOUS at 13:12

## 2018-03-30 RX ADMIN — Medication 10 MILLIGRAM(S): at 05:52

## 2018-03-30 RX ADMIN — RIVAROXABAN 15 MILLIGRAM(S): KIT at 05:52

## 2018-03-30 RX ADMIN — Medication 81 MILLIGRAM(S): at 13:16

## 2018-03-30 NOTE — DISCHARGE NOTE ADULT - PATIENT PORTAL LINK FT
You can access the [facility] Patient Portal, offered by [facility], by registering with the following website: [email or web address]

## 2018-03-30 NOTE — PROGRESS NOTE ADULT - PROBLEM SELECTOR PROBLEM 5
Elevated CPK
Shortness of breath
Acute UTI
Elevated CPK
Essential hypertension
Essential hypertension
Nephrogenous proteinuria
Nephrogenous proteinuria
Shortness of breath

## 2018-03-30 NOTE — DISCHARGE NOTE ADULT - HOSPITAL COURSE
89 y/o F with history of HTN, HLD, Palpitation, insomnia, anxiety present with unwitnessed fall. Patient was found on the bathroom floor by her daughter. Unknown how long patient was on the floor. Patient states she only remembers waking up from the floor. Patient c/o back, right shoulder pain and left knee pain. Patient denies chest pain, palpitation at the time, sob, cough, n/v/d/, fever, chills, no dizziness or weakness. In ED patient was found to have UTI and was given one dose of Ceftriaxone and Zosyn. Also was found to have CK of 5364 and Trop of 0.10 and was given bolus of 1L of NS.    +Rhabdo-s/p IVF, Resolved  +Multiple episodes of SVTs would self convert-Cards following, metoprolol restarted 3/23  + Transaminitis  + Severe pulmonary HTN on echo   + B/L acute LE DVTs: started on xarelto  + Occult positive     CT head: No acute intracranial hemorrhage..  CT cervical spine: Normal alignment. No acute fracture.   CT Chest No sequela of acute trauma in the chest.  BNP 12830  CK 5314 > 7059 > 6077  Trop 0.07 > 0.12 > 0.08  WBC 15.52 > 11.98  CXR Vague hazy patchy opacity again seen in medial right upper lung. Clear remaining visualized lungs. No pleural effusions or pneumothorax.   -Knee Xray  No fracture, dislocation, or joint effusion.   -Humeral Xray No fractures, dislocations, or AC separation.  -R Shoulder XRay No fractures, dislocations, or AC separation.  Blood cx neg, urine cx neg  3/27 Repeat CXR: Mild bibasilar subsegmental atelectasis/trace pleural   effusions. Left retrocardiac opacity. Uncertain if this is due to a fluid-filled hiatus hernia, atelectasis, loculated pleural effusion, and/or pneumonia.  RVP negative   3/25 Blood cultures and Urine culture: ngtd  3/27 Echo: 1. Mitral annular calcification and calcified mitral leaflets with normal diastolic opening. Mild mitral regurgitation. 2. Aortic valve leaflet morphology not well visualized. Mild aortic regurgitation. 3. Endocardium not well visualized; grossly normal left ventricular systolic function. 4. The right ventricle is not well visualized; grossly normal right ventricular systolic function. 5. Estimated right ventricular systolic pressure equals 62 mm Hg, assuming right atrial pressure equals 10 mm Hg, consistent with severe pulmonary hypertension.  venous doppler: Acute bilateral below the knee deep venous thrombosis involving the bilateral peroneal veins and left soleal vein.   3/27 RUQ US: No abnormality in the abdomen.Small right pleural effusion.  3/27 LE dopplers: Acute bilateral below the knee deep venous thrombosis involving the bilateral peroneal veins and left soleal vein.   3/29 VQ scan: Abnormal ventilation/perfusion lung scan. Intermediate probability of pulmonary embolism.     3/23 Pulm([place]) I don't think there is any pulmonary reason falling down: She appeared dehydrated n admission  3/23 Cards: cv stable, restart bb, await echo, hydrate, med/pulmo/renal f/u.  3/23 Renal: ALLAN: Likely hemodynamically mediated due to rhabdomyolysis and poor PO intake during episode of syncope. Patient appears dry despite elevated pro-BNP. Agree with continuing with IVF for now given persistently elevated CK. Monitor for volume overload. Avoid nephrotoxins. Traumatic rhabdomyolysis: IVF as above. CKD stage III: Baseline Scr unknown however suspect patient with underlying CKD-3 likely age appropriate. Check spot urine TP/CR given proteinuria on UA. UA with pyuria but urine culture negative and patient asymptomatic. Abx as per ID. Avoid nephrotoxins. Monitor electrolytes.Nephrogenous proteinuria: Check spot urine TP/CR to quantify proteinuria on UA. Hyponatremia: Mild and likely due to hypovolemia as improving on IVF. Monitor serum sodium.    3/25 med;  Unwitnessed fall -  monitor on telemetry for any arrhythmia, Cardio f/up   Mild Rhabdomyolysis: IVF/ F/up with CPK.  3/25 ID: Fever/Leukocytosis - UA and urine cultures negative for UTI.  CT chest without evidence for pna.  Pt currently without other focal signs on exam suggestive of infectious process. - Suspect leukocytosis and mild fever likely reactive to rhabomyolysis.  CPK improving - Pt with low grade temp 100.3 today.  Repeat UA, urine cultures, blood cultures sent.  Repeat chest xray without infiltrate/consolidations - f/u RUQ sono. LFTS elevated in the setting of rhabdomyolysis - possibly secondary to skeletal muscle injury.  R/o liver pathology.  3/25 pulm;  no SOB : no acute pulmonary issues overnight: Work for fall is under progress!!    3/25 cardio [place];  1. Syncope vs fall await echo   2. PSVT cont metoprolol 25mg bid   asa 81 qd start cardizem 30mg q6  3. Htn  cont bb, add ccb monitor bp  4. Rhabdo   ck improved 5. fever off abx, id f/u  3/25 renal; ALLAN   Likely hemodynamically mediated due to rhabdomyolysis and poor PO intake during episode of syncope. Patient appears dry despite elevated pro-BNP. Agree with continuing with IVF for now given persistently elevated CK. Monitor for volume overload. Avoid nephrotoxins. Monitor electrolytes and CPK downtrending.   3/27 ID: Fever/Leukocytosis - UA and urine cultures negative for UTI.  CT chest without evidence for pna.  Pt currently without other focal signs on exam suggestive of infectious process. - Suspect leukocytosis and mild fever likely reactive to rhabomyolysis.  CPK improving - Pt with low grade temp 100.3 today.  Repeat UA, urine cultures, blood cultures sent (negative so far).  Repeat chest xray without infiltrate/consolidations.  RVP negative - f/u RUQ sono. LFTS elevated in the setting of rhabdomyolysis - possibly secondary to skeletal muscle injury.  R/o liver pathology. No need for abx at this time. pt c/o RLE pain.  Check LE dopplers r/o dvt.  3/29 GI: hemoglobin stable, given acute dvt cont a/c for now, no need for upper gastrointestinal endoscopy, cont protonix 40mg qd while on xarelto for gi ppx.   3/29 Pulm: do VQ scan.   3/29 Med: Heme eval called.  3/29 ID: No need for abx at this time.  3/29 Cardio: PSVT resolved , no events on tele, cont metoprolol 25mg bid, asa 81 qd, cont cardizem  60mg TID  3/30 D/w Dr. Luis patient stable for d/c to rehab

## 2018-03-30 NOTE — PROGRESS NOTE ADULT - ASSESSMENT
89 year old woman with history of Hyperlipemia, Hypertension, Insomnia admitted with unwitnessed fall vs syncope. Noted to now have bl dvt     1. Syncope vs fall  echo revealing grossly normal lv sys fx , severe pulm htn , mild MR/AR   LE dopplers +acute bl DVT   a/c on Xarelto per med      2. PSVT  resolved , no events on tele   cont metoprolol 25mg bid   asa 81 qd  cont  cardizem  60mg TID    3. Htn   cont bb/ccb  monitor bp     4. Rhabdo   ck improved    5. fever  off abx, id f/u  CXR noted, left trace pleural effusion   no sob,  echo with normal LV sys fx     6. Elevated LFTS  abd US noted   med f/u     7. Acute DVT   Vq scan abnormal - pulm f.u   a/c continue on Xarelto per med       dc planning per primary team

## 2018-03-30 NOTE — PROGRESS NOTE ADULT - PROBLEM SELECTOR PLAN 7
: on antibiotics:  3/24 urine culture so far negative. ABTs dc'd per ID. Management defer to ID  3/25 off ABT. afebrile. asymptomatic. ID on board
check orthostasis.  3/25 stable BP.
: on antibiotics:  3/24 urine culture so far negative. ABTs dc'd per ID. Management defer to ID  3/25 off ABT. afebrile. asymptomatic. ID on board
CO2 decreased to 16 3/24/18from  19 3/23/18. Ideally check ABG if unable check VBG. Check Lactate. Patient with leukocytosis WBC uptrending,  Tmax overnight 100.3 & long end expiration .
CO2 decreased to 16 today from 19 yesterday. Ideally check ABG if unable check VBG. Check Lactate.
check orthostasis.  3/25 stable BP.

## 2018-03-30 NOTE — PROGRESS NOTE ADULT - PROBLEM SELECTOR PLAN 6
: on antibiotics:  3/24 urine culture so far negative. ABTs dc'd per ID. Management defer to ID  3/25 off ABT. afebrile. asymptomatic. ID on board
?secondary to fall: trending down: iv hydration:  3/24 improving  3/25 continue to improve. 1159 today from 7059 on admission  3/26: CPK's have improved significantly:  3/27: Improved  3/28: Imrpoved  3/30: Resolved
: on antibiotics:  3/24 urine culture so far negative. ABTs dc'd per ID. Management defer to ID  3/25 off ABT. afebrile. asymptomatic. ID on board
?secondary to fall: trending down: iv hydration:  3/24 improving  3/25 continue to improve. 1159 today from 7059 on admission  3/26: CPK's have improved significantly:  3/27: Improved  3/28: Imrpoved
Mild and likely due to hypovolemia as improving on IVF .  Monitor serum sodium (P)
Mild and likely due to hypovolemia as improving on IVF today at 133 yesterday was 130. Monitor serum sodium.
check orthostasis.  3/25 stable BP.

## 2018-03-30 NOTE — PROGRESS NOTE ADULT - SUBJECTIVE AND OBJECTIVE BOX
Infectious Diseases progress note:    Subjective:  NAD, afebrile.  No cough, abd pain, dysuria    ROS:  CONSTITUTIONAL:  No fever, chills, rigors  CARDIOVASCULAR:  No chest pain or palpitations  RESPIRATORY:   No SOB, cough, dyspnea on exertion.  No wheezing  GASTROINTESTINAL:  No abd pain, N/V, diarrhea/constipation  EXTREMITIES:  No swelling or joint pain  GENITOURINARY:  No burning on urination, increased frequency or urgency.  No flank pain  NEUROLOGIC:  No HA, visual disturbances  SKIN: No rashes    Allergies    Eye cream from the 1960s Neocortef ?spelling caused eyes to becomes swollen (Unknown)  neomycin (Unknown)  soaps and creams causes rash uses only sensitive skin soap (Rash)  sulfa drugs (Unknown)  Voltaren (Rash)    Intolerances        ANTIBIOTICS/RELEVANT:  antimicrobials    immunologic:    OTHER:  ALBUTerol/ipratropium for Nebulization 3 milliLiter(s) Nebulizer every 6 hours PRN  aspirin enteric coated 81 milliGRAM(s) Oral daily  atorvastatin 10 milliGRAM(s) Oral at bedtime  busPIRone 10 milliGRAM(s) Oral two times a day  diltiazem    Tablet 60 milliGRAM(s) Oral three times a day  docusate sodium 100 milliGRAM(s) Oral three times a day  ergocalciferol 83822 Unit(s) Oral <User Schedule>  glycerin Suppository - Adult 1 Suppository(s) Rectal daily  magnesium oxide 400 milliGRAM(s) Oral three times a day with meals  metoprolol tartrate 25 milliGRAM(s) Oral two times a day  mirtazapine 15 milliGRAM(s) Oral at bedtime  pantoprazole    Tablet 40 milliGRAM(s) Oral two times a day  polyethylene glycol 3350 17 Gram(s) Oral two times a day  rivaroxaban 15 milliGRAM(s) Oral two times a day  rivaroxaban 20 milliGRAM(s) Oral every 24 hours  senna 2 Tablet(s) Oral at bedtime  sodium chloride 0.9% lock flush 3 milliLiter(s) IV Push every 8 hours      Objective:  Vital Signs Last 24 Hrs  T(C): 36.9 (30 Mar 2018 13:13), Max: 37 (30 Mar 2018 05:49)  T(F): 98.4 (30 Mar 2018 13:13), Max: 98.6 (30 Mar 2018 05:49)  HR: 74 (30 Mar 2018 13:13) (74 - 89)  BP: 142/60 (30 Mar 2018 13:13) (126/71 - 142/60)  BP(mean): --  RR: 18 (30 Mar 2018 13:13) (16 - 18)  SpO2: 99% (30 Mar 2018 13:13) (99% - 99%)    PHYSICAL EXAM:  Constitutional:NAD  Eyes:SOCORRO, EOMI  Ear/Nose/Throat: no thrush, mucositis.  Moist mucous membranes	  Neck:no JVD, no lymphadenopathy, supple  Respiratory: CTA ada  Cardiovascular: S1S2 RRR, no murmurs  Gastrointestinal:soft, nontender,  nondistended (+) BS  Extremities:no e/e/c  Skin:  no rashes, open wounds or ulcerations        LABS:                        9.7    8.38  )-----------( 305      ( 30 Mar 2018 07:13 )             29.9     03-30    142  |  103  |  14  ----------------------------<  102<H>  3.4<L>   |  30  |  0.93    Ca    7.9<L>      30 Mar 2018 07:13  Mg     1.6     03-30    TPro  4.9<L>  /  Alb  2.4<L>  /  TBili  0.5  /  DBili  x   /  AST  31  /  ALT  55<H>  /  AlkPhos  122<H>  03-29    PT/INR - ( 28 Mar 2018 16:30 )   PT: 19.5 SEC;   INR: 1.68          PTT - ( 28 Mar 2018 16:30 )  PTT:31.1 SEC                        MICROBIOLOGY:          RADIOLOGY & ADDITIONAL STUDIES:

## 2018-03-30 NOTE — PROGRESS NOTE ADULT - PROBLEM SELECTOR PROBLEM 4
Pulmonary nodules/lesions, multiple
Shortness of breath
DVT (deep venous thrombosis)
Acute UTI
Acute UTI
Elevated CPK
Hypertensive kidney disease with chronic kidney disease, stage 1 through stage 4 or unspecified 
Hypertensive kidney disease with chronic kidney disease, stage 1 through stage 4 or unspecified 
Pulmonary nodules/lesions, multiple
Shortness of breath

## 2018-03-30 NOTE — PROGRESS NOTE ADULT - PROVIDER SPECIALTY LIST ADULT
Cardiology
Gastroenterology
Infectious Disease
Internal Medicine
Nephrology
Nephrology
Pulmonology
Infectious Disease
Internal Medicine
Cardiology
Infectious Disease
Internal Medicine
Gastroenterology
Pulmonology
Pulmonology

## 2018-03-30 NOTE — DISCHARGE NOTE ADULT - CARE PROVIDERS DIRECT ADDRESSES
,DirectAddress_Unknown,DirectAddress_Unknown,DirectAddress_Unknown,[email or web address]

## 2018-03-30 NOTE — DISCHARGE NOTE ADULT - ADDITIONAL INSTRUCTIONS
follow up with Urologist Dr. Ruby in 1-2 weeks   follow up with PCP/ cardiologist in 1-2 week  follow up with Pulmonologist Dr. Ybarra in 1-2 weeks   follow up with GI Dr. Eaton in 1-2 weeks   follow up with Dr. Hobbs Oncologist in 1-2 weeks

## 2018-03-30 NOTE — PROGRESS NOTE ADULT - SUBJECTIVE AND OBJECTIVE BOX
INTERVAL HPI/OVERNIGHT EVENTS:    pt seen this am on bedpan c/o constipation   denies n/v, abdominal pain, melena or brbpr     MEDICATIONS  (STANDING):  aspirin enteric coated 81 milliGRAM(s) Oral daily  atorvastatin 10 milliGRAM(s) Oral at bedtime  busPIRone 10 milliGRAM(s) Oral two times a day  diltiazem    Tablet 60 milliGRAM(s) Oral three times a day  docusate sodium 100 milliGRAM(s) Oral three times a day  ergocalciferol 06727 Unit(s) Oral <User Schedule>  magnesium oxide 400 milliGRAM(s) Oral three times a day with meals  metoprolol tartrate 25 milliGRAM(s) Oral two times a day  mirtazapine 15 milliGRAM(s) Oral at bedtime  pantoprazole    Tablet 40 milliGRAM(s) Oral two times a day  polyethylene glycol 3350 17 Gram(s) Oral daily  potassium chloride    Tablet ER 40 milliEquivalent(s) Oral once  rivaroxaban 15 milliGRAM(s) Oral two times a day  rivaroxaban 20 milliGRAM(s) Oral every 24 hours  senna 2 Tablet(s) Oral at bedtime  sodium chloride 0.9% lock flush 3 milliLiter(s) IV Push every 8 hours    MEDICATIONS  (PRN):  ALBUTerol/ipratropium for Nebulization 3 milliLiter(s) Nebulizer every 6 hours PRN Shortness of Breath and/or Wheezing      Allergies    Eye cream from the 1960s Neocortef ?spelling caused eyes to becomes swollen (Unknown)  neomycin (Unknown)  soaps and creams causes rash uses only sensitive skin soap (Rash)  sulfa drugs (Unknown)  Voltaren (Rash)    Intolerances        Review of Systems:    General:  No wt loss, fevers, chills, night sweats, fatigue   Eyes:  Good vision, no reported pain  ENT:  No sore throat, pain, runny nose, dysphagia  CV:  No pain, palpitations, hypo/hypertension  Resp:  No dyspnea, cough, tachypnea, wheezing  GI:  No pain, No nausea, No vomiting, No diarrhea, +constipation, No weight loss, No fever, No pruritis, No rectal bleeding, No melena, No dysphagia  :  No pain, bleeding, incontinence, nocturia  Muscle:  No pain, weakness  Neuro:  No weakness, tingling, memory problems  Psych:  No fatigue, insomnia, mood problems, depression  Endocrine:  No polyuria, polydypsia, cold/heat intolerance  Heme:  No petechiae, ecchymosis, easy bruisability  Skin:  No rash, tattoos, scars, edema      Vital Signs Last 24 Hrs  T(C): 37 (30 Mar 2018 05:49), Max: 37 (30 Mar 2018 05:49)  T(F): 98.6 (30 Mar 2018 05:49), Max: 98.6 (30 Mar 2018 05:49)  HR: 89 (30 Mar 2018 05:49) (80 - 89)  BP: 132/52 (30 Mar 2018 05:49) (125/55 - 135/69)  BP(mean): --  RR: 18 (30 Mar 2018 05:49) (16 - 18)  SpO2: 99% (30 Mar 2018 05:49) (96% - 99%)    PHYSICAL EXAM:    Constitutional: NAD  HEENT: EOMI, throat clear  Neck: No LAD, supple  Respiratory: CTA and P  Cardiovascular: S1 and S2, RRR, no M  Gastrointestinal: BS+, soft, NT/ND, neg HSM,  Extremities: No peripheral edema, neg clubbing, cyanosis  Vascular: 2+ peripheral pulses  Neurological: A/O x 3, no focal deficits  Psychiatric: Normal mood, normal affect  Skin: No rashes      LABS:                        9.7    8.38  )-----------( 305      ( 30 Mar 2018 07:13 )             29.9     03-30    142  |  103  |  14  ----------------------------<  102<H>  3.4<L>   |  30  |  0.93    Ca    7.9<L>      30 Mar 2018 07:13  Mg     1.6     03-30    TPro  4.9<L>  /  Alb  2.4<L>  /  TBili  0.5  /  DBili  x   /  AST  31  /  ALT  55<H>  /  AlkPhos  122<H>  03-29    PT/INR - ( 28 Mar 2018 16:30 )   PT: 19.5 SEC;   INR: 1.68          PTT - ( 28 Mar 2018 16:30 )  PTT:31.1 SEC      RADIOLOGY & ADDITIONAL TESTS:

## 2018-03-30 NOTE — PROGRESS NOTE ADULT - ATTENDING COMMENTS
Agree with above NP note.  cv stable  cont current tx  d/c planning   cont a/c
Agree with above NP note.  cv stable  normal lv fxn on echo   ccb/bb for psvt  cont a/c for dvt per med/pulmo
Patient seen and examined, agree with the above assessment and plan by HITESH Crespo.  Continue AC for DVT  Continue BB for SVT
Agree with above NP note.  cv stable  cont ccb/bb for psvt  await echo
Agree with above NP note.  cv stable  restart bb  await echo   hydrate  med/pulmo/renal f./u
Advanced care planning was discussed with patient and family.  Advanced care planning forms were reviewed and discussed.  Risks, benefits and alternatives of gastroenterologic procedures were discussed in detail and all questions were answered.    25 minutes spent.
3/24: Little drowsy today: no respiratory distress: PER DAUGHTER SHE HAS NO PULMONARY ISSUES FROM BEFORE: For now cont curetn care: tHERE IS NO PNEUMONIA ON CT scan chest
3/25: no SOB : no acute pulmonary issues overnight: Work for fall is under progress!!
3/25: no SOB : no acute pulmonary issues overnight: Work for fall is under progress!!  3/26: check morning ABG  3/27: ABG today with mild hypoxia ? done on oxygen or Room air ? venous: check her room air o2 sao2  3/28: stable:
3/25: no SOB : no acute pulmonary issues overnight: Work for fall is under progress!!  3/26: check morning ABG
3/25: no SOB : no acute pulmonary issues overnight: Work for fall is under progress!!  3/26: check morning ABG  3/27: ABG today with mild hypoxia ? done on oxygen or Room air ? venous: check her room air o2 sao2  3/28: stable:  3/29;' do VQ scan  3/30: Intermediate prob   VQ scan: likely with PE
3/25: no SOB : no acute pulmonary issues overnight: Work for fall is under progress!!  3/26: check morning ABG  3/27: ABG today with mild hypoxia ? done on oxygen or Room air ? venous: check her room air o2 sao2
[facility] NEPHROLOGY  Chester Rosa M.D.  Esteban Rouse D.O.  Marti Huber M.D.  Marielena Saenz, MSN, ANP-C    Telephone: [phone]  Facsimile: [phone]    [address] [place] [address]
[facility] NEPHROLOGY  Chester Rosa M.D.  Esteban Rouse D.O.  Marti Huber M.D.  Marielena Saenz, MSN, ANP-C    Telephone: [phone]  Facsimile: [phone]    [address] [place] [address]
3/25: no SOB : no acute pulmonary issues overnight: Work for fall is under progress!!  3/26: check morning ABG  3/27: ABG today with mild hypoxia ? done on oxygen or Room air ? venous: check her room air o2 sao2  3/28: stable:  3/29;' do VQ scan

## 2018-03-30 NOTE — PROGRESS NOTE ADULT - SUBJECTIVE AND OBJECTIVE BOX
CARDIOLOGY FOLLOW UP - Dr. Mccartney    CC no chest pain or sob        PHYSICAL EXAM:  T(C): 36.9 (03-30-18 @ 13:13), Max: 37 (03-30-18 @ 05:49)  HR: 74 (03-30-18 @ 13:13) (74 - 89)  BP: 142/60 (03-30-18 @ 13:13) (125/55 - 142/60)  RR: 18 (03-30-18 @ 13:13) (16 - 18)  SpO2: 99% (03-30-18 @ 13:13) (96% - 99%)  Wt(kg): --  I&O's Summary    29 Mar 2018 07:01  -  30 Mar 2018 07:00  --------------------------------------------------------  IN: 0 mL / OUT: 600 mL / NET: -600 mL    30 Mar 2018 07:01  -  30 Mar 2018 13:56  --------------------------------------------------------  IN: 0 mL / OUT: 1000 mL / NET: -1000 mL        Appearance: Normal	  Cardiovascular: Normal S1 S2,RRR, No JVD, No murmurs  Respiratory: Lungs clear to auscultation	  Gastrointestinal:  Soft, Non-tender, + BS	  Extremities: Normal range of motion, No clubbing, cyanosis or edema        MEDICATIONS  (STANDING):  aspirin enteric coated 81 milliGRAM(s) Oral daily  atorvastatin 10 milliGRAM(s) Oral at bedtime  busPIRone 10 milliGRAM(s) Oral two times a day  diltiazem    Tablet 60 milliGRAM(s) Oral three times a day  docusate sodium 100 milliGRAM(s) Oral three times a day  ergocalciferol 60437 Unit(s) Oral <User Schedule>  glycerin Suppository - Adult 1 Suppository(s) Rectal daily  magnesium oxide 400 milliGRAM(s) Oral three times a day with meals  metoprolol tartrate 25 milliGRAM(s) Oral two times a day  mirtazapine 15 milliGRAM(s) Oral at bedtime  pantoprazole    Tablet 40 milliGRAM(s) Oral two times a day  polyethylene glycol 3350 17 Gram(s) Oral two times a day  rivaroxaban 15 milliGRAM(s) Oral two times a day  rivaroxaban 20 milliGRAM(s) Oral every 24 hours  senna 2 Tablet(s) Oral at bedtime  sodium chloride 0.9% lock flush 3 milliLiter(s) IV Push every 8 hours      TELEMETRY: 	    ECG:  	  RADIOLOGY:   DIAGNOSTIC TESTING:  [ ] Echocardiogram:  [ ]  Catheterization:  [ ] Stress Test:    OTHER: 	  < from: NM Pulmonary Ventilation/Perfusion Scan (03.29.18 @ 18:39) >  IMPRESSION: Abnormal ventilation/perfusion lung scan. Intermediate   probability of pulmonary embolism.       These findings were discussed with FRANCES Alexander at 3/29/2018 8:08 PM by Dr. Trejo with read back confirmation.           < end of copied text >    LABS:	 	                                9.7    8.38  )-----------( 305      ( 30 Mar 2018 07:13 )             29.9     03-30    142  |  103  |  14  ----------------------------<  102<H>  3.4<L>   |  30  |  0.93    Ca    7.9<L>      30 Mar 2018 07:13  Mg     1.6     03-30    TPro  4.9<L>  /  Alb  2.4<L>  /  TBili  0.5  /  DBili  x   /  AST  31  /  ALT  55<H>  /  AlkPhos  122<H>  03-29    PT/INR - ( 28 Mar 2018 16:30 )   PT: 19.5 SEC;   INR: 1.68          PTT - ( 28 Mar 2018 16:30 )  PTT:31.1 SEC

## 2018-03-30 NOTE — PROGRESS NOTE ADULT - PROBLEM SELECTOR PROBLEM 3
Pulmonary nodules/lesions, multiple
Unwitnessed fall
Constipation by delayed colonic transit
CKD (chronic kidney disease), stage III
CKD (chronic kidney disease), stage III
Elevated CPK
Elevated CPK
Pulmonary nodules/lesions, multiple
Shortness of breath
Unwitnessed fall

## 2018-03-30 NOTE — PROGRESS NOTE ADULT - PROBLEM SELECTOR PROBLEM 1
Fecal occult blood test positive
Fecal occult blood test positive
Acute kidney injury
Acute kidney injury
Cough
DVT (deep venous thrombosis)
Unwitnessed fall
Cough
DVT (deep venous thrombosis)

## 2018-03-30 NOTE — PROGRESS NOTE ADULT - PROBLEM SELECTOR PLAN 5
?secondary to fall: trending down: iv hydration:  3/24 improving  3/25 continue to improve. 1159 today from 7059 on admission  3/26: CPK's have improved significantly:  3/27: Improved  3/28: Imrpoved
seemed little SOB today : The VBG is noted: has normal ph: chest xray as well as ct noted: trace effusions: echo is awaited: No underlying pulmonary history: has scattered nodules which are not responsible for SOB :  3/27: echo awaited: she seems good: there is not much on ct scan chest to explain her SOB  3/28: Resolved  3/29: no SOB any more: She has DVT and has already been on AC   3/30: Resolved, likely has PE too, given vq scan finding: Cont AC.   :
: on antibiotics:  3/24 urine culture so far negative. ABTs dc'd per ID. Management defer to ID  3/25 off ABT. afebrile. asymptomatic. ID on board
?secondary to fall: trending down: iv hydration:  3/24 improving  3/25 continue to improve. 1159 today from 7059 on admission  3/26: CPK's have improved significantly:  3/27: Improved
Check spot urine TP/CR to quantify proteinuria on UA.
Check spot urine TP/CR to quantify proteinuria on UA.
check orthostasis.
check orthostasis.  3/25 stable BP.
seemed little SOB today : The VBG is noted: has normal ph: chest xray as well as ct noted: trace effusions: echo is awaited: No underlying pulmonary history: has scattered nodules which are not responsible for SOB :  3/27: echo awaited: she seems good: there is not much on ct scan chest to explain her SOB  3/28: Resolved  3/29: no SOB any more: She has DVT and has already been on AC   :

## 2018-03-30 NOTE — DISCHARGE NOTE ADULT - PLAN OF CARE
multiple episodes of SVT on telemetry continue cardizem and Metoprolol continue Protonix for GI ppx, monitor stools closely, follow up with GI Dr. Eaton tiwari placed, follow up with Urologist Dr. Ruby in 1-2 weeks resolution of clot continue anticoagulation as prescribed complete 21 days of [id number] BID then switch to xarelto 20mg qd daily continue anticoagulation as prescribed complete 21 days of [id number] BID then switch to xarelto 20mg qd daily

## 2018-03-30 NOTE — DISCHARGE NOTE ADULT - CARE PROVIDER_API CALL
Brian Ybarra), Critical Care Medicine; Internal Medicine; Pulmonary Disease  [address] [place] [address]  Phone: [phone]  Fax: [phone]    Stas Mccartney), Cardiology; Internal Medicine  [address] [facility]  [address]  [place] [address]  Phone: [phone]  Fax: [phone]    Aaron Arias (), Gastroenterology; Internal Medicine  [address] [place] [address]  Phone: [phone]  Fax: [phone]    Christian Luis), Internal Medicine  [address] [address] [address]  Phone: [phone]  Fax: [phone]

## 2018-03-30 NOTE — DISCHARGE NOTE ADULT - CARE PLAN
Principal Discharge DX:	DVT (deep venous thrombosis)  Goal:	resolution of clot  Assessment and plan of treatment:	continue anticoagulation as prescribed complete 21 days of [id number] BID then switch to xarelto 20mg qd daily  Secondary Diagnosis:	Pulmonary embolism  Assessment and plan of treatment:	continue anticoagulation as prescribed complete 21 days of [id number] BID then switch to xarelto 20mg qd daily  Secondary Diagnosis:	Supraventricular tachycardia  Assessment and plan of treatment:	multiple episodes of SVT on telemetry continue cardizem and Metoprolol  Secondary Diagnosis:	Fecal occult blood test positive  Assessment and plan of treatment:	continue Protonix for GI ppx, monitor stools closely, follow up with GI Dr. Eaton  Secondary Diagnosis:	Urinary retention  Assessment and plan of treatment:	tiwari placed, follow up with Urologist Dr. Ruby in 1-2 weeks

## 2018-03-30 NOTE — PROGRESS NOTE ADULT - PROBLEM SELECTOR PLAN 2
I don't think there is any pulmonary reason falling down: She appeared dehydrated n admission: as HE HAS NO CHEST PAIN OR sob :  ct chest without any evidence of injury!  3/24 fall precaution.  3/25 s/p fall at home. fall precaution.: echo awaited  3/28: PT/OT
new: chest ray reviewed: I really doubt there is any pneumonia: Would check RVP: For now symptomatic treatment. In fact her WBC has normalized and has not had fever:  3/28: resolved:  3/29: Resolved  3/30: No more coughing: no respiratory distress:
- likely 2/2 rhabdo  - US reviewed  - lfts trending down
- likely 2/2 rhabdo  - US reviewed  - lfts trending down
I don't think there is any pulmonary reason falling down: She appeared dehydrated n admission: as HE HAS NO CHEST PAIN OR sob :  ct chest without any evidence of injury!  3/24 fall precaution.  3/25 s/p fall at home. fall precaution.: echo awaited
IVF as ordered. Syncope work up as per cardiology.
IVF as ordered. Syncope work up as per cardiology.
new: chest ray reviewed: I really doubt there is any pneumonia: Would check RVP: For now symptomatic treatment. In fact her WBC has normalized and has not had fever:  3/28: resolved:  3/29: Resolved
und to have , scattered subcm , nodules, incidentally: pt has no underlying hx of cancer and a remote smoker: These can be followed up as an outpatient:  3/24 outpatient follow up
und to have , scattered subcm , nodules, incidentally: pt has no underlying hx of cancer and a remote smoker: These can be followed up as an outpatient:  3/24 outpatient follow up  3/25 outpatient follow up. no intervention
und to have , scattered subcm , nodules, incidentally: pt has no underlying hx of cancer and a remote smoker: These can be followed up as an outpatient:  3/24 outpatient follow up  3/25 outpatient follow up. no intervention

## 2018-03-30 NOTE — DISCHARGE NOTE ADULT - COMMUNITY RESOURCES
[facility] for rehab address: [address] [place] [address] tel # [phone], 2pm  via [facility] tel # [phone]

## 2018-03-30 NOTE — PROGRESS NOTE ADULT - PROBLEM SELECTOR PLAN 1
- fobt positive but no overt gi bleed  - hemoglobin remains stable  - given acute dvt cont a/c for now  - no need for upper gastrointestinal endoscopy   - cont protonix 40mg bid while on xarelto for gi ppx

## 2018-03-30 NOTE — PROGRESS NOTE ADULT - PROBLEM SELECTOR PROBLEM 2
Cough
Unwitnessed fall
Liver function test abnormality
Liver function test abnormality
Cough
Pulmonary nodules/lesions, multiple
Traumatic rhabdomyolysis, sequela
Traumatic rhabdomyolysis, sequela
Unwitnessed fall

## 2018-03-30 NOTE — PROGRESS NOTE ADULT - ASSESSMENT
89 y/o F with history of HTN, HLD, Palpitation, insomnia, anxiety present with unwitnessed fall. Patient was found on the bathroom floor by her daughter. Unknown how long patient was on the floor. Patient states she only remembers waking up from the floor. Patient c/o back, right shoulder pain and left knee pain. Patient denies chest pain, palpitation at the time, sob, cough, n/v/d/, fever, chills, no dizziness or weakness. In ED patient was found to have UTI and was given one dose of Ceftriaxone and Zosyn. Also was found to have CK of 5364 and Trop of 0.10 and was given bolus of 1L of NS. (22 Mar 2018 16:49)    Pt finished a 5 day course of cipro for UTI about a month ago.  She currently does not complaint of burning, urgency or increased frequency of urination.  No suprapubic discomfort.  UA (+) blood, neg for nit/trace LE.  Urine cultures have been negative.  WBC and temp curve improved.  CPK improved from yesterday.    Problem/Plan - 1:    ·	Fever/Leukocytosis    - UA and urine cultures negative for UTI.  CT chest without evidence for pna.  Pt currently without other focal signs on exam suggestive of infectious process.    - Suspect leukocytosis and mild fever likely reactive to rhabomyolysis.  CPK improving    - Pt with low grade temp 100.3 today.  Repeat UA, urine cultures, blood cultures sent (negative so far).  Repeat chest xray without infiltrate/consolidations.  RVP negative    - f/u RUQ sono. LFTS elevated in the setting of rhabdomyolysis - possibly secondary to skeletal muscle injury. UA abd no acute pathology    - No need for abx at this time.    - LE dopplers (+) DVT, pt started on anticoagulation, VQ scan with intermediate probability for PE.  Pulm f/u noted.    No further ID w/u at this time.    Will follow,    Dara Mccartney  [phone]

## 2018-03-30 NOTE — PROGRESS NOTE ADULT - SUBJECTIVE AND OBJECTIVE BOX
Patient is a 89y old  Female who presents with a chief complaint of S/P Fall- found on the floor by daughter (22 Mar 2018 16:49)      Any change in ROS: " I have no SOB "      MEDICATIONS  (STANDING):  aspirin enteric coated 81 milliGRAM(s) Oral daily  atorvastatin 10 milliGRAM(s) Oral at bedtime  busPIRone 10 milliGRAM(s) Oral two times a day  diltiazem    Tablet 60 milliGRAM(s) Oral three times a day  docusate sodium 100 milliGRAM(s) Oral three times a day  ergocalciferol 76088 Unit(s) Oral <User Schedule>  glycerin Suppository - Adult 1 Suppository(s) Rectal daily  magnesium oxide 400 milliGRAM(s) Oral three times a day with meals  metoprolol tartrate 25 milliGRAM(s) Oral two times a day  mirtazapine 15 milliGRAM(s) Oral at bedtime  pantoprazole    Tablet 40 milliGRAM(s) Oral two times a day  polyethylene glycol 3350 17 Gram(s) Oral two times a day  potassium chloride    Tablet ER 40 milliEquivalent(s) Oral once  rivaroxaban 15 milliGRAM(s) Oral two times a day  rivaroxaban 20 milliGRAM(s) Oral every 24 hours  senna 2 Tablet(s) Oral at bedtime  sodium chloride 0.9% lock flush 3 milliLiter(s) IV Push every 8 hours    MEDICATIONS  (PRN):  ALBUTerol/ipratropium for Nebulization 3 milliLiter(s) Nebulizer every 6 hours PRN Shortness of Breath and/or Wheezing    Vital Signs Last 24 Hrs  T(C): 37 (30 Mar 2018 05:49), Max: 37 (30 Mar 2018 05:49)  T(F): 98.6 (30 Mar 2018 05:49), Max: 98.6 (30 Mar 2018 05:49)  HR: 89 (30 Mar 2018 05:49) (80 - 89)  BP: 132/52 (30 Mar 2018 05:49) (125/55 - 135/69)  BP(mean): --  RR: 18 (30 Mar 2018 05:49) (16 - 18)  SpO2: 99% (30 Mar 2018 05:49) (96% - 99%)    I&O's Summary    29 Mar 2018 07:01  -  30 Mar 2018 07:00  --------------------------------------------------------  IN: 0 mL / OUT: 600 mL / NET: -600 mL          Physical Exam:   GENERAL: NAD, well-groomed, well-developed  HEENT: SOCORRO/   Atraumatic, Normocephalic  ENMT: No tonsillar erythema, exudates, or enlargement; Moist mucous membranes, Good dentition, No lesions  NECK: Supple, No JVD, Normal thyroid  CHEST/LUNG: - wheezing  CVS: Regular rate and rhythm; No murmurs, rubs, or gallops  GI: : Soft, Nontender, Nondistended; Bowel sounds present  NERVOUS SYSTEM:  Alert & Oriented X3  EXTREMITIES:  2+ Peripheral Pulses, No clubbing, cyanosis, or edema  LYMPH: No lymphadenopathy noted  SKIN: No rashes or lesions  ENDOCRINOLOGY: No Thyromegaly  PSYCH: Appropriate    Labs:  21                            9.7    8.38  )-----------( 305      ( 30 Mar 2018 07:13 )             29.9                         9.3    7.74  )-----------( 294      ( 29 Mar 2018 06:10 )             29.0                         9.1    8.32  )-----------( 277      ( 28 Mar 2018 16:30 )             28.6                         8.9    7.33  )-----------( 250      ( 28 Mar 2018 07:02 )             27.6                         9.7    8.70  )-----------( 219      ( 27 Mar 2018 05:30 )             30.0     03-30    142  |  103  |  14  ----------------------------<  102<H>  3.4<L>   |  30  |  0.93  03-29    140  |  104  |  15  ----------------------------<  92  3.9   |  27  |  0.88  03-28    142  |  107  |  17  ----------------------------<  99  3.8   |  23  |  0.91  03-27    136  |  104  |  24<H>  ----------------------------<  96  3.7   |  20<L>  |  0.94    Ca    7.9<L>      30 Mar 2018 07:13  Ca    8.1<L>      29 Mar 2018 06:10  Mg     1.6     03-30  Mg     1.7     03-29    TPro  4.9<L>  /  Alb  2.4<L>  /  TBili  0.5  /  DBili  x   /  AST  31  /  ALT  55<H>  /  AlkPhos  122<H>  03-29  TPro  4.9<L>  /  Alb  2.4<L>  /  TBili  0.4  /  DBili  x   /  AST  40<H>  /  ALT  71<H>  /  AlkPhos  132<H>  03-28    CAPILLARY BLOOD GLUCOSE          LIVER FUNCTIONS - ( 29 Mar 2018 06:10 )  Alb: 2.4 g/dL / Pro: 4.9 g/dL / ALK PHOS: 122 u/L / ALT: 55 u/L / AST: 31 u/L / GGT: x           PT/INR - ( 28 Mar 2018 16:30 )   PT: 19.5 SEC;   INR: 1.68          PTT - ( 28 Mar 2018 16:30 )  PTT:31.1 SEC    Cultures:           Wound culture:                03-25 @ 10:40  Organism --  Culture w/ gram stain --  Specimen Source URINE CATHETER    Wound culture:                03-25 @ 02:23  Organism --  Culture w/ gram stain --  Specimen Source BLOOD      Abscess culture:             03-25 @ 10:40  Organism --  Gram Stain --  Specimen Source URINE CATHETER    Abscess culture:             03-25 @ 02:23  Organism --  Gram Stain --  Specimen Source BLOOD        Tissue culture:           03-25 @ 10:40  Organism --  Gram Stain --  Specimen Source URINE CATHETER    Tissue culture:           03-25 @ 02:23  Organism --  Gram Stain --  Specimen Source BLOOD      Body Fluid Smear & Culture:                        03-25 @ 10:40  AFB Smear  --  Culture Acid Fast Body Fluid w/ Smear  --  Culture Acid Fast Smear Concentrated   --    Culture Results:     --  Specimen Source URINE CATHETER    Body Fluid Smear & Culture:                        03-25 @ 02:23  AFB Smear  --  Culture Acid Fast Body Fluid w/ Smear  --  Culture Acid Fast Smear Concentrated   --    Culture Results:     --  Specimen Source BLOOD        Rapid Respiratory Viral Panel Result        03-27 @ 10:00  Rapid RVP --  Coronovirus --  Adenovirus NOT DETECTED  Bordetella Pertussis NOT DETECTED  Chlamydia Pneumonia NOT DETECTED  Entero/RhinovirusNOT DETECTED  HKU1 Coronovirus --  HMPV Coronovirus NOT DETECTED  Influenza A NOT DETECTED (any subtype)  Influenza AH1 NOT DETECTED  Influenza AH1 2009 NOT DETECTED  Influenza AH3 NOT DETECTED  Influenza B NOT DETECTED  Mycoplasma Pneumoniae NOT DETECTED This nucleic acid amplification assay was performed using  the [employer] FilmArray. The following pathogens are tested  for: Adenovirus, Coronavirus 229E, Coronavirus HKU1,  Coronavirus NL63, Coronavirus OC43, Human Metapneumovirus  (HMPV), Rhinovirus/Enterovirus, Influenza A H1, Influenza A  H1 2009 (Pandemic H1 2009), Influenza A H3, Influenza A (Flu  A) subtype not identified, Influenza B, Parainfluenza Virus  (types 1, 2, 3, 4), Respiratory Syncytial Virus (RSV),  Bordetella pertussis, Chlamydophila pneumoniae, and  Mycoplasma pneumoniae. A negative FilmArray result does not  always exclude the possibility of viral or bacterial  infection. Laboratory results should always be interpreted  in the context of clinical findings.  NL63 Coronovirus --  OC43 Coronovirus --  Parainfluenza 1 NOT DETECTED  Parainfluenza 2 NOT DETECTED  Parainfluenza 3 NOT DETECTED  Parainfluenza 4 NOT DETECTED  Resp Syncytial Virus NOT DETECTED          Studies  Chest X-RAY  CT SCAN Chest   Venous Dopplers: LE:   CT Abdomen  Others    < from: NM Pulmonary Ventilation/Perfusion Scan (03.29.18 @ 18:39) >      INTERPRETATION:  RADIOPHARMACEUTICAL: 1.0 mCi Tc-99m-DTPA via inhalation;   6.6 mCi Tc-99m-MAA, I.V.    CLINICAL INFORMATION: 89 year old female with shortness of breath and   DVT; referred to evaluate for pulmonary embolism.    TECHNIQUE:  Ventilation and perfusion images of the lungs were obtained   following administration of Tc-99m-DTPA and Tc-99m-MAA. Images were   obtained in the anterior, posterior, both lateral, and all 4 oblique   projections. The study was interpreted in conjunction with a chest   radiograph of 3/29/2018.    COMPARISON: None.    FINDINGS: There is a mismatched defect in the superior segment of the   right lower lobe. There is heterogenous distribution of   radiopharmaceutical in both lungs on the ventilation and perfusion   images.     IMPRESSION: Abnormal ventilation/perfusion lung scan. Intermediate   probability of pulmonary embolism.       These findings were discussed with PA Benjamin at 3/29/2018 8:08 PM by Dr. Tavarez with read back confirmation.               ZAY TAVAREZ M.D., RADIOLOGY RESIDENT  This document has been electronically signed.  OLIVER RAMOS M.D., NUCLEAR MEDICINE ATTENDING  This document has been electronically signed. Mar 29 2018  8:50PM    < end of copied text >  < from: NM Pulmonary Ventilation/Perfusion Scan (03.29.18 @ 18:39) >      INTERPRETATION:  RADIOPHARMACEUTICAL: 1.0 mCi Tc-99m-DTPA via inhalation;   6.6 mCi Tc-99m-MAA, I.V.    CLINICAL INFORMATION: 89 year old female with shortness of breath and   DVT; referred to evaluate for pulmonary embolism.    TECHNIQUE:  Ventilation and perfusion images of the lungs were obtained   following administration of Tc-99m-DTPA and Tc-99m-MAA. Images were   obtained in the anterior, posterior, both lateral, and all 4 oblique   projections. The study was interpreted in conjunction with a chest   radiograph of 3/29/2018.    COMPARISON: None.    FINDINGS: There is a mismatched defect in the superior segment of the   right lower lobe. There is heterogenous distribution of   radiopharmaceutical in both lungs on the ventilation and perfusion   images.     IMPRESSION: Abnormal ventilation/perfusion lung scan. Intermediate   probability of pulmonary embolism.       These findings were discussed with PA Benjamin at 3/29/2018 8:08 PM by Dr. Tavarez with read back confirmation.               ZAY TAVAREZ M.D., RADIOLOGY RESIDENT  This document has been electronically signed.  OLIVER RAMOS M.D., NUCLEAR MEDICINE ATTENDING  This document has been electronically signed. Mar 29 2018  8:50PM    < end of copied text >

## 2018-03-30 NOTE — DISCHARGE NOTE ADULT - MEDICATION SUMMARY - MEDICATIONS TO TAKE
I will START or STAY ON the medications listed below when I get home from the hospital:    aspirin 81 mg oral delayed release tablet  -- 1 tab(s) by mouth once a day  -- Indication: For Cardio protective     dilTIAZem 60 mg oral tablet  -- 1 tab(s) by mouth 3 times a day  -- Indication: For SVT     rivaroxaban 20 mg oral tablet  -- 1 tab(s) by mouth every 24 hours  -- Indication: For PE/DVT     rivaroxaban 15 mg oral tablet  -- 1 tab(s) by mouth 2 times a day x 36 doses   -- Indication: For PE/ DVT     Remeron 15 mg oral tablet  -- 1 tab(s) by mouth once a day (at bedtime)  -- Indication: For Depression     Lipitor 10 mg oral tablet  -- 1 tab(s) by mouth once a day  -- Indication: For Hyperlipidemia     busPIRone 10 mg oral tablet  -- 1 tab(s) by mouth 2 times a day  -- Indication: For Anxiety    metoprolol tartrate 25 mg oral tablet  -- 1 tab(s) by mouth 2 times a day  -- Indication: For SVT    docusate sodium 100 mg oral capsule  -- 1 cap(s) by mouth 3 times a day  -- Indication: For Stool softner     polyethylene glycol 3350 oral powder for reconstitution  -- 17 gram(s) by mouth 2 times a day  -- Indication: For Stool softner     senna oral tablet  -- 2 tab(s) by mouth once a day (at bedtime)  -- Indication: For Stool softner     pantoprazole 40 mg oral delayed release tablet  -- 1 tab(s) by mouth 2 times a day  -- Indication: For GERD    Vitamin D3 50,000 intl units oral capsule  -- 1 cap(s) by mouth once a week  -- Indication: For Supplement

## 2018-03-30 NOTE — PROGRESS NOTE ADULT - PROBLEM SELECTOR PROBLEM 7
Acute UTI
Essential hypertension
Acute UTI
Essential hypertension
Metabolic acidosis
Metabolic acidosis

## 2018-03-30 NOTE — PROGRESS NOTE ADULT - PROBLEM SELECTOR PLAN 3
I don't think there is any pulmonary reason falling down: She appeared dehydrated n admission: as HE HAS NO CHEST PAIN OR sob :  ct chest without any evidence of injury!  3/24 fall precaution.  3/25 s/p fall at home. fall precaution.: echo awaited  3/28: PT/OT
und to have , scattered subcm , nodules, incidentally: pt has no underlying hx of cancer and a remote smoker: These can be followed up as an outpatient:  3/24 outpatient follow up  3/25 outpatient follow up. no intervention
- senna/colace  - miralax bid   - glycerin suppository
?secondary to fall: trending down: iv hydration:  3/24 improving
?secondary to fall: trending down: iv hydration:  3/24 improving  3/25 continue to improve. 1159 today from 7059 on admission
Baseline Scr unknown however suspect patient with underlying CKD-3 likely age appropriate. Check spot urine TP/CR given proteinuria on UA. UA with pyuria but urine culture negative and patient asymptomatic. Abx as per ID. Avoid nephrotoxins. Monitor electrolytes.
Baseline Scr unknown however suspect patient with underlying CKD-3 likely age appropriate. Check spot urine TP/CR given proteinuria on UA. UA with pyuria but urine culture negative and patient asymptomatic. Abx as per ID. Avoid nephrotoxins. Monitor electrolytes.
I don't think there is any pulmonary reason falling down: She appeared dehydrated n admission: as HE HAS NO CHEST PAIN OR sob :  ct chest without any evidence of injury!  3/24 fall precaution.  3/25 s/p fall at home. fall precaution.: echo awaited  3/28: PT/OT
seemed little SOB today : The VBG is noted: has normal ph: chest xray as well as ct noted: trace effusions: echo is awaited: No underlying pulmonary history: has scattered nodules which are not responsible for SOB :
und to have , scattered subcm , nodules, incidentally: pt has no underlying hx of cancer and a remote smoker: These can be followed up as an outpatient:  3/24 outpatient follow up  3/25 outpatient follow up. no intervention

## 2018-03-30 NOTE — PROGRESS NOTE ADULT - PROBLEM SELECTOR PLAN 1
on AC : dopplers noted: They are below knee: However she has severe pulmonary hypertension: not much reserve: check vq scan:  3/30: vq scan is intermediate probability: with DVT : She is already on AC: likely has PE too:

## 2018-04-02 ENCOUNTER — EMERGENCY (EMERGENCY)
Facility: HOSPITAL | Age: 83
LOS: 1 days | Discharge: HOME | End: 2018-04-02
Attending: EMERGENCY MEDICINE
Payer: MEDICARE

## 2018-04-02 VITALS
SYSTOLIC BLOOD PRESSURE: 135 MMHG | OXYGEN SATURATION: 100 % | HEART RATE: 91 BPM | DIASTOLIC BLOOD PRESSURE: 70 MMHG | TEMPERATURE: 98 F

## 2018-04-02 VITALS
SYSTOLIC BLOOD PRESSURE: 117 MMHG | OXYGEN SATURATION: 95 % | RESPIRATION RATE: 18 BRPM | DIASTOLIC BLOOD PRESSURE: 63 MMHG | HEART RATE: 78 BPM

## 2018-04-02 DIAGNOSIS — Z98.49 CATARACT EXTRACTION STATUS, UNSPECIFIED EYE: Chronic | ICD-10-CM

## 2018-04-02 DIAGNOSIS — Z90.710 ACQUIRED ABSENCE OF BOTH CERVIX AND UTERUS: Chronic | ICD-10-CM

## 2018-04-02 LAB
ALBUMIN SERPL ELPH-MCNC: 2.9 G/DL — LOW (ref 3.3–5)
ALP SERPL-CCNC: 119 U/L — SIGNIFICANT CHANGE UP (ref 40–120)
ALT FLD-CCNC: 32 U/L RC — SIGNIFICANT CHANGE UP (ref 10–45)
ANION GAP SERPL CALC-SCNC: 11 MMOL/L — SIGNIFICANT CHANGE UP (ref 5–17)
APPEARANCE UR: CLEAR — SIGNIFICANT CHANGE UP
APTT BLD: 33.8 SEC — SIGNIFICANT CHANGE UP (ref 27.5–37.4)
AST SERPL-CCNC: 34 U/L — SIGNIFICANT CHANGE UP (ref 10–40)
BACTERIA # UR AUTO: ABNORMAL /HPF
BASOPHILS # BLD AUTO: 0 K/UL — SIGNIFICANT CHANGE UP (ref 0–0.2)
BASOPHILS NFR BLD AUTO: 0.3 % — SIGNIFICANT CHANGE UP (ref 0–2)
BILIRUB SERPL-MCNC: 0.5 MG/DL — SIGNIFICANT CHANGE UP (ref 0.2–1.2)
BILIRUB UR-MCNC: NEGATIVE — SIGNIFICANT CHANGE UP
BUN SERPL-MCNC: 19 MG/DL — SIGNIFICANT CHANGE UP (ref 7–23)
CALCIUM SERPL-MCNC: 8.6 MG/DL — SIGNIFICANT CHANGE UP (ref 8.4–10.5)
CHLORIDE SERPL-SCNC: 97 MMOL/L — SIGNIFICANT CHANGE UP (ref 96–108)
CO2 SERPL-SCNC: 28 MMOL/L — SIGNIFICANT CHANGE UP (ref 22–31)
COLOR SPEC: COLORLESS — SIGNIFICANT CHANGE UP
CREAT SERPL-MCNC: 1.03 MG/DL — SIGNIFICANT CHANGE UP (ref 0.5–1.3)
DIFF PNL FLD: NEGATIVE — SIGNIFICANT CHANGE UP
EOSINOPHIL # BLD AUTO: 0.2 K/UL — SIGNIFICANT CHANGE UP (ref 0–0.5)
EOSINOPHIL NFR BLD AUTO: 1.4 % — SIGNIFICANT CHANGE UP (ref 0–6)
EPI CELLS # UR: SIGNIFICANT CHANGE UP /HPF
GAS PNL BLDV: SIGNIFICANT CHANGE UP
GLUCOSE SERPL-MCNC: 86 MG/DL — SIGNIFICANT CHANGE UP (ref 70–99)
GLUCOSE UR QL: NEGATIVE — SIGNIFICANT CHANGE UP
HCT VFR BLD CALC: 31.1 % — LOW (ref 34.5–45)
HGB BLD-MCNC: 10.3 G/DL — LOW (ref 11.5–15.5)
INR BLD: 1.98 RATIO — HIGH (ref 0.88–1.16)
KETONES UR-MCNC: NEGATIVE — SIGNIFICANT CHANGE UP
LEUKOCYTE ESTERASE UR-ACNC: NEGATIVE — SIGNIFICANT CHANGE UP
LYMPHOCYTES # BLD AUTO: 1 K/UL — SIGNIFICANT CHANGE UP (ref 1–3.3)
LYMPHOCYTES # BLD AUTO: 9.5 % — LOW (ref 13–44)
MCHC RBC-ENTMCNC: 30.5 PG — SIGNIFICANT CHANGE UP (ref 27–34)
MCHC RBC-ENTMCNC: 33 GM/DL — SIGNIFICANT CHANGE UP (ref 32–36)
MCV RBC AUTO: 92.5 FL — SIGNIFICANT CHANGE UP (ref 80–100)
MONOCYTES # BLD AUTO: 0.9 K/UL — SIGNIFICANT CHANGE UP (ref 0–0.9)
MONOCYTES NFR BLD AUTO: 8.1 % — SIGNIFICANT CHANGE UP (ref 2–14)
NEUTROPHILS # BLD AUTO: 8.5 K/UL — HIGH (ref 1.8–7.4)
NEUTROPHILS NFR BLD AUTO: 80.7 % — HIGH (ref 43–77)
NITRITE UR-MCNC: NEGATIVE — SIGNIFICANT CHANGE UP
PH UR: 7 — SIGNIFICANT CHANGE UP (ref 5–8)
PLATELET # BLD AUTO: 356 K/UL — SIGNIFICANT CHANGE UP (ref 150–400)
POTASSIUM SERPL-MCNC: 4 MMOL/L — SIGNIFICANT CHANGE UP (ref 3.5–5.3)
POTASSIUM SERPL-SCNC: 4 MMOL/L — SIGNIFICANT CHANGE UP (ref 3.5–5.3)
PROT SERPL-MCNC: 6 G/DL — SIGNIFICANT CHANGE UP (ref 6–8.3)
PROT UR-MCNC: NEGATIVE — SIGNIFICANT CHANGE UP
PROTHROM AB SERPL-ACNC: 21.9 SEC — HIGH (ref 9.8–12.7)
RBC # BLD: 3.37 M/UL — LOW (ref 3.8–5.2)
RBC # FLD: 12.6 % — SIGNIFICANT CHANGE UP (ref 10.3–14.5)
RBC CASTS # UR COMP ASSIST: SIGNIFICANT CHANGE UP /HPF (ref 0–2)
SODIUM SERPL-SCNC: 136 MMOL/L — SIGNIFICANT CHANGE UP (ref 135–145)
SP GR SPEC: 1 — LOW (ref 1.01–1.02)
UROBILINOGEN FLD QL: NEGATIVE — SIGNIFICANT CHANGE UP
WBC # BLD: 10.5 K/UL — SIGNIFICANT CHANGE UP (ref 3.8–10.5)
WBC # FLD AUTO: 10.5 K/UL — SIGNIFICANT CHANGE UP (ref 3.8–10.5)
WBC UR QL: SIGNIFICANT CHANGE UP /HPF (ref 0–5)

## 2018-04-02 PROCEDURE — 85027 COMPLETE CBC AUTOMATED: CPT

## 2018-04-02 PROCEDURE — 27786 TREATMENT OF ANKLE FRACTURE: CPT | Mod: LT

## 2018-04-02 PROCEDURE — 99284 EMERGENCY DEPT VISIT MOD MDM: CPT | Mod: 25

## 2018-04-02 PROCEDURE — 84295 ASSAY OF SERUM SODIUM: CPT

## 2018-04-02 PROCEDURE — 81001 URINALYSIS AUTO W/SCOPE: CPT

## 2018-04-02 PROCEDURE — 85014 HEMATOCRIT: CPT

## 2018-04-02 PROCEDURE — 73610 X-RAY EXAM OF ANKLE: CPT

## 2018-04-02 PROCEDURE — 82330 ASSAY OF CALCIUM: CPT

## 2018-04-02 PROCEDURE — 85730 THROMBOPLASTIN TIME PARTIAL: CPT

## 2018-04-02 PROCEDURE — 85610 PROTHROMBIN TIME: CPT

## 2018-04-02 PROCEDURE — 73630 X-RAY EXAM OF FOOT: CPT | Mod: 26,LT

## 2018-04-02 PROCEDURE — 99284 EMERGENCY DEPT VISIT MOD MDM: CPT | Mod: GC

## 2018-04-02 PROCEDURE — 83605 ASSAY OF LACTIC ACID: CPT

## 2018-04-02 PROCEDURE — 80053 COMPREHEN METABOLIC PANEL: CPT

## 2018-04-02 PROCEDURE — 73610 X-RAY EXAM OF ANKLE: CPT | Mod: 26,LT

## 2018-04-02 PROCEDURE — 73630 X-RAY EXAM OF FOOT: CPT

## 2018-04-02 PROCEDURE — 84132 ASSAY OF SERUM POTASSIUM: CPT

## 2018-04-02 PROCEDURE — 82435 ASSAY OF BLOOD CHLORIDE: CPT

## 2018-04-02 PROCEDURE — 82947 ASSAY GLUCOSE BLOOD QUANT: CPT

## 2018-04-02 PROCEDURE — 82803 BLOOD GASES ANY COMBINATION: CPT

## 2018-04-02 RX ORDER — MIRTAZAPINE 45 MG/1
15 TABLET, ORALLY DISINTEGRATING ORAL ONCE
Qty: 0 | Refills: 0 | Status: COMPLETED | OUTPATIENT
Start: 2018-04-02 | End: 2018-04-02

## 2018-04-02 RX ORDER — METOPROLOL TARTRATE 50 MG
25 TABLET ORAL ONCE
Qty: 0 | Refills: 0 | Status: COMPLETED | OUTPATIENT
Start: 2018-04-02 | End: 2018-04-02

## 2018-04-02 RX ORDER — ATORVASTATIN CALCIUM 80 MG/1
10 TABLET, FILM COATED ORAL AT BEDTIME
Qty: 0 | Refills: 0 | Status: DISCONTINUED | OUTPATIENT
Start: 2018-04-02 | End: 2018-04-06

## 2018-04-02 RX ADMIN — MIRTAZAPINE 15 MILLIGRAM(S): 45 TABLET, ORALLY DISINTEGRATING ORAL at 22:34

## 2018-04-02 RX ADMIN — Medication 25 MILLIGRAM(S): at 21:48

## 2018-04-02 RX ADMIN — Medication 10 MILLIGRAM(S): at 20:23

## 2018-04-02 NOTE — ED PROVIDER NOTE - PSH
History of Breast Lump/Mass Excision- [facility]- left- 1971    History of Colonoscopy- 2011/Sept    History of D&C- 8/12/11    Status post cataract extraction  OS  Status post hysterectomy  endometrial cancer

## 2018-04-02 NOTE — CONSULT NOTE ADULT - SUBJECTIVE AND OBJECTIVE BOX
89y Female community ambulatory with walker presents c/o L ankle pain. patient demented at baseline and can not remember how she received the injury but does note today her ankle pain was worse and was having trouble ambulating at rehab. Denies HS/LOC. Denies numbness/tingling. No other pain/injuries. Denies fevers/chills.     PAST MEDICAL & SURGICAL HISTORY:  Neuropathy   Hypertension  Hyperlipidemia  Insomnia  Seasonal Allergies  Hyperlipemia  Anxiety: palpitations  Status post cataract extraction: OS  Status post hysterectomy: endometrial cancer          MEDICATIONS  (STANDING):  atorvastatin 10 milliGRAM(s) Oral at bedtime  mirtazapine 15 milliGRAM(s) Oral Once    Allergies    Eye cream from the 1960s Neocortef ?spelling caused eyes to becomes swollen (Unknown)  neomycin (Unknown)  soaps and creams causes rash uses only sensitive skin soap (Rash)  sulfa drugs (Unknown)  Voltaren (Rash)    Intolerances                            10.3   10.5  )-----------( 356      ( 02 Apr 2018 18:51 )             31.1     02 Apr 2018 18:51    136    |  97     |  19     ----------------------------<  86     4.0     |  28     |  1.03     Ca    8.6        02 Apr 2018 18:51    TPro  6.0    /  Alb  2.9    /  TBili  0.5    /  DBili  x      /  AST  34     /  ALT  32     /  AlkPhos  119    02 Apr 2018 18:51    PT/INR - ( 02 Apr 2018 18:51 )   PT: 21.9 sec;   INR: 1.98 ratio         PTT - ( 02 Apr 2018 18:51 )  PTT:33.8 sec  Vital Signs Last 24 Hrs  T(C): 36.5 (04-02-18 @ 19:20), Max: 36.9 (04-02-18 @ 17:29)  T(F): 97.7 (04-02-18 @ 19:20), Max: 98.5 (04-02-18 @ 17:51)  HR: 91 (04-02-18 @ 19:20) (76 - 91)  BP: 135/70 (04-02-18 @ 19:20) (105/60 - 135/70)  BP(mean): --  RR: 20 (04-02-18 @ 17:51) (18 - 20)  SpO2: 100% (04-02-18 @ 19:20) (95% - 100%)    Imaging: XR L ankle: lateral mal fx  Stress view: view rotated but no opening with stress view, joint line preserved     Physical Exam  Gen: Nad  L LE: Skin intact, mild bruising over lateral ankle and dorsum of foot, +TTP over lateral malleolus, no TTP over medial mal, no bony ttp elsewhere along knee or hip or long bones, +ehl/fhl/ta/gs function, dp/pt pulse intact, negative log roll, able to SLR, compartments soft/compressive, extremity warm/well perfused  Secondary Survey: Benign, no bony ttp elsewhere, NV intact    Procedure: soft case applied to LLE, NVI post procedure    A/P: 89y Female with L ankle fracture  Pain control  DVT ppx  WBAT L LE in air cast, walker as need for ambulation  Ice/elevation  D/w Dr Hyman, no orthopedic sx intervention at this time  Follow up with Dr. Hyman within 1 week, call office for appointment, [phone]  Ortho stable

## 2018-04-02 NOTE — ED ADULT NURSE REASSESSMENT NOTE - NS ED NURSE REASSESS COMMENT FT1
Received report from OMAR Arevalo. Pt lying on assigned stretcher shows no signs of any distress, VS WNL. Awaiting proper disposition.

## 2018-04-02 NOTE — ED PROVIDER NOTE - MEDICAL DECISION MAKING DETAILS
Evaluated by orthopedics for ankle fracture. Splint placed, no weight bearing when splint not in place. Recommendations as per Orthopedics. Discharge back to [facility]. Evaluated by orthopedics for ankle fracture. Splint placed, no weight bearing when splint not in place. Recommendations as per Orthopedics. Discharge back to [facility].    Dr. Cortes: Female patient brought to ED from [facility] due to X-ray performed there showed left ankle fracture. Exam revealed neurovascularly intact left lower extremity. X-rays revealed distal fibula fracture. Orthopedics service consulted for evaluation/recommendations, with soft air cast placed by consultant, describing no surgical management required at this time. Patient will be discharged, with [facility].

## 2018-04-02 NOTE — ED PROVIDER NOTE - PROGRESS NOTE DETAILS
X-ray demonstrating distal fibula fracture. Seen by Orthopedics, splint applied. No role for surgery, admission, or other intervention. Plan to discharge back to [facility]. Patient was evaluated by me, found in no acute distress, calm, speaking in complete sentences. Exam revealed neurovascularly intact left lower extremity. X-rays revealed distal fibula fracture. Orthopedics service consulted for evaluation/recommendations, with soft air cast placed by consultant, describing no surgical management required at this time. Patient will be discharged, with patient to be taken back to [facility]. I agree with resident assessment and plan. -Dr. Karthikeyan Cortes

## 2018-04-02 NOTE — ED PROVIDER NOTE - PLAN OF CARE
Splint applied by ortho Plan for non-weight bearing on LLE when not wearing splint until further evaluation. Orthopedic note and recommendations sent with discharge paperwork. Plan for non-weight bearing on LLE when not wearing splint until further evaluation. Orthopedic evaluation with recommendation for Pain control  DVT ppx WBAT L LE in air cast, walker as need for ambulation. Ice/elevation. No orthopedic sx intervention at this time. Follow up with Dr. Hyman within 1 week, call office for appointment, [phone]

## 2018-04-02 NOTE — ED ADULT NURSE NOTE - PSH
History of Breast Lump/Mass Excision- [facility]- left- 1971    History of Colonoscopy- 2011/Sept    History of D&C- 8/12/11    Status post cataract extraction  OS  Status post hysterectomy  endometrial cancer

## 2018-04-02 NOTE — ED PROVIDER NOTE - CARE PLAN
Principal Discharge DX:	Fibula fracture  Goal:	Splint applied by ortho  Assessment and plan of treatment:	Plan for non-weight bearing on LLE when not wearing splint until further evaluation. Orthopedic note and recommendations sent with discharge paperwork. Principal Discharge DX:	Fibula fracture  Goal:	Splint applied by ortho  Assessment and plan of treatment:	Plan for non-weight bearing on LLE when not wearing splint until further evaluation. Orthopedic evaluation with recommendation for Pain control  DVT ppx WBAT L LE in air cast, walker as need for ambulation. Ice/elevation. No orthopedic sx intervention at this time. Follow up with Dr. Hyman within 1 week, call office for appointment, [phone] Principal Discharge DX:	Other closed fracture of distal end of left fibula, initial encounter  Goal:	Splint applied by ortho  Assessment and plan of treatment:	Plan for non-weight bearing on LLE when not wearing splint until further evaluation. Orthopedic evaluation with recommendation for Pain control  DVT ppx WBAT L LE in air cast, walker as need for ambulation. Ice/elevation. No orthopedic sx intervention at this time. Follow up with Dr. Hyman within 1 week, call office for appointment, [phone]

## 2018-04-02 NOTE — ED PROVIDER NOTE - OBJECTIVE STATEMENT
89F PMH of HTN, HLD, SVT, insomnia, anxiety, and recent admission (3/22 - 3/20) for unwitnessed fall hospital course c/b UTI, rhabdo, SVT, transaminitis, and bilateral DVT w/intermediate probability PE on V/Q scan started on Xarelto, she presents to the ED today for L ankle pain. She denies any fall since discharge from [facility] on Friday, but states that she has noted pain in her L ankle since Saturday. She feels that she can barely put any pressure on her leg, thus a bedside x-ray was performed at rehab and she reports it demonstrated a fracture and was sent in. Does not recall what she was doing when first noticing the pain. Denies CP, sob, abdominal pain, N/V, diarrhea (has chronic constipation), fevers, chills, new rash. Denies loss of sensation or color change in the LLE

## 2018-04-02 NOTE — ED ADULT NURSE NOTE - OBJECTIVE STATEMENT
89 year old female presents to the ED complaining of pain in the right ankle that was confirmed via xray this afternoon at [facility]. As per EMS the Pt fell at home in march and was treated at [facility] for a UTI, and 2 subsequent DVTs (on Xarelto). As per patient she's had pain in her left ankle and on xray today it showed a fracture. Pt was sent from [facility] for an orthopaedic evaluation. Pt arrived with indwelling catheter secured and drained, EMS unable to give the date of insertion, clear yellow urine draining from bag. Pt left ankle in soft spline, pt had PPP and neurosensory intact below site of injury. Pt is A&O x 4, VSS, afebrile, ambulating independently. Pt denies fever, chills, NVD, SOB, or chest pain. IV placed, labs drawn, bed in low position, safety measures in place.

## 2018-04-05 ENCOUNTER — APPOINTMENT (OUTPATIENT)
Dept: OBGYN | Facility: CLINIC | Age: 83
End: 2018-04-05

## 2018-04-13 ENCOUNTER — INPATIENT (INPATIENT)
Facility: HOSPITAL | Age: 83
LOS: 16 days | Discharge: HOME | DRG: 291 | End: 2018-04-30
Attending: INTERNAL MEDICINE | Admitting: INTERNAL MEDICINE
Payer: MEDICARE

## 2018-04-13 VITALS — OXYGEN SATURATION: 88 %

## 2018-04-13 DIAGNOSIS — I47.1 SUPRAVENTRICULAR TACHYCARDIA: ICD-10-CM

## 2018-04-13 DIAGNOSIS — Z90.710 ACQUIRED ABSENCE OF BOTH CERVIX AND UTERUS: Chronic | ICD-10-CM

## 2018-04-13 DIAGNOSIS — Z98.49 CATARACT EXTRACTION STATUS, UNSPECIFIED EYE: Chronic | ICD-10-CM

## 2018-04-13 DIAGNOSIS — J96.91 RESPIRATORY FAILURE, UNSPECIFIED WITH HYPOXIA: ICD-10-CM

## 2018-04-13 DIAGNOSIS — R06.02 SHORTNESS OF BREATH: ICD-10-CM

## 2018-04-13 DIAGNOSIS — E87.1 HYPO-OSMOLALITY AND HYPONATREMIA: ICD-10-CM

## 2018-04-13 DIAGNOSIS — G47.00 INSOMNIA, UNSPECIFIED: ICD-10-CM

## 2018-04-13 DIAGNOSIS — O22.30 DEEP PHLEBOTHROMBOSIS IN PREGNANCY, UNSPECIFIED TRIMESTER: ICD-10-CM

## 2018-04-13 DIAGNOSIS — F41.9 ANXIETY DISORDER, UNSPECIFIED: ICD-10-CM

## 2018-04-13 DIAGNOSIS — E78.5 HYPERLIPIDEMIA, UNSPECIFIED: ICD-10-CM

## 2018-04-13 DIAGNOSIS — I10 ESSENTIAL (PRIMARY) HYPERTENSION: ICD-10-CM

## 2018-04-13 DIAGNOSIS — Z29.9 ENCOUNTER FOR PROPHYLACTIC MEASURES, UNSPECIFIED: ICD-10-CM

## 2018-04-13 DIAGNOSIS — J11.1 INFLUENZA DUE TO UNIDENTIFIED INFLUENZA VIRUS WITH OTHER RESPIRATORY MANIFESTATIONS: ICD-10-CM

## 2018-04-13 LAB
ALBUMIN SERPL ELPH-MCNC: 2.6 G/DL — LOW (ref 3.3–5)
ALP SERPL-CCNC: 166 U/L — HIGH (ref 40–120)
ALT FLD-CCNC: 22 U/L RC — SIGNIFICANT CHANGE UP (ref 10–45)
ANION GAP SERPL CALC-SCNC: 10 MMOL/L — SIGNIFICANT CHANGE UP (ref 5–17)
ANION GAP SERPL CALC-SCNC: 9 MMOL/L — SIGNIFICANT CHANGE UP (ref 5–17)
ANION GAP SERPL CALC-SCNC: 9 MMOL/L — SIGNIFICANT CHANGE UP (ref 5–17)
APPEARANCE UR: ABNORMAL
APTT BLD: 34 SEC — SIGNIFICANT CHANGE UP (ref 27.5–37.4)
AST SERPL-CCNC: 26 U/L — SIGNIFICANT CHANGE UP (ref 10–40)
BASOPHILS # BLD AUTO: 0.1 K/UL — SIGNIFICANT CHANGE UP (ref 0–0.2)
BASOPHILS NFR BLD AUTO: 1 % — SIGNIFICANT CHANGE UP (ref 0–2)
BILIRUB SERPL-MCNC: 0.6 MG/DL — SIGNIFICANT CHANGE UP (ref 0.2–1.2)
BILIRUB UR-MCNC: NEGATIVE — SIGNIFICANT CHANGE UP
BUN SERPL-MCNC: 17 MG/DL — SIGNIFICANT CHANGE UP (ref 7–23)
BUN SERPL-MCNC: 18 MG/DL — SIGNIFICANT CHANGE UP (ref 7–23)
BUN SERPL-MCNC: 22 MG/DL — SIGNIFICANT CHANGE UP (ref 7–23)
CALCIUM SERPL-MCNC: 8.4 MG/DL — SIGNIFICANT CHANGE UP (ref 8.4–10.5)
CALCIUM SERPL-MCNC: 8.6 MG/DL — SIGNIFICANT CHANGE UP (ref 8.4–10.5)
CALCIUM SERPL-MCNC: 8.9 MG/DL — SIGNIFICANT CHANGE UP (ref 8.4–10.5)
CHLORIDE SERPL-SCNC: 82 MMOL/L — LOW (ref 96–108)
CHLORIDE SERPL-SCNC: 82 MMOL/L — LOW (ref 96–108)
CHLORIDE SERPL-SCNC: 84 MMOL/L — LOW (ref 96–108)
CO2 SERPL-SCNC: 30 MMOL/L — SIGNIFICANT CHANGE UP (ref 22–31)
CO2 SERPL-SCNC: 30 MMOL/L — SIGNIFICANT CHANGE UP (ref 22–31)
CO2 SERPL-SCNC: 31 MMOL/L — SIGNIFICANT CHANGE UP (ref 22–31)
COLOR SPEC: YELLOW — SIGNIFICANT CHANGE UP
CREAT ?TM UR-MCNC: 66 MG/DL — SIGNIFICANT CHANGE UP
CREAT SERPL-MCNC: 0.71 MG/DL — SIGNIFICANT CHANGE UP (ref 0.5–1.3)
CREAT SERPL-MCNC: 0.71 MG/DL — SIGNIFICANT CHANGE UP (ref 0.5–1.3)
CREAT SERPL-MCNC: 0.84 MG/DL — SIGNIFICANT CHANGE UP (ref 0.5–1.3)
DIFF PNL FLD: ABNORMAL
EOSINOPHIL # BLD AUTO: 0 K/UL — SIGNIFICANT CHANGE UP (ref 0–0.5)
EOSINOPHIL NFR BLD AUTO: 0.1 % — SIGNIFICANT CHANGE UP (ref 0–6)
GAS PNL BLDV: SIGNIFICANT CHANGE UP
GLUCOSE SERPL-MCNC: 105 MG/DL — HIGH (ref 70–99)
GLUCOSE SERPL-MCNC: 107 MG/DL — HIGH (ref 70–99)
GLUCOSE SERPL-MCNC: 143 MG/DL — HIGH (ref 70–99)
GLUCOSE UR QL: NEGATIVE — SIGNIFICANT CHANGE UP
HCT VFR BLD CALC: 26.6 % — LOW (ref 34.5–45)
HGB BLD-MCNC: 8.9 G/DL — LOW (ref 11.5–15.5)
INR BLD: 2.1 RATIO — HIGH (ref 0.88–1.16)
KETONES UR-MCNC: ABNORMAL
LEUKOCYTE ESTERASE UR-ACNC: ABNORMAL
LYMPHOCYTES # BLD AUTO: 0.5 K/UL — LOW (ref 1–3.3)
LYMPHOCYTES # BLD AUTO: 4.6 % — LOW (ref 13–44)
MCHC RBC-ENTMCNC: 29.6 PG — SIGNIFICANT CHANGE UP (ref 27–34)
MCHC RBC-ENTMCNC: 33.4 GM/DL — SIGNIFICANT CHANGE UP (ref 32–36)
MCV RBC AUTO: 88.8 FL — SIGNIFICANT CHANGE UP (ref 80–100)
MONOCYTES # BLD AUTO: 1.1 K/UL — HIGH (ref 0–0.9)
MONOCYTES NFR BLD AUTO: 9.7 % — SIGNIFICANT CHANGE UP (ref 2–14)
NEUTROPHILS # BLD AUTO: 9.7 K/UL — HIGH (ref 1.8–7.4)
NEUTROPHILS NFR BLD AUTO: 84.5 % — HIGH (ref 43–77)
NITRITE UR-MCNC: POSITIVE
NT-PROBNP SERPL-SCNC: 4053 PG/ML — HIGH (ref 0–300)
OSMOLALITY SERPL: 260 MOS/KG — LOW (ref 275–300)
OSMOLALITY UR: 453 MOS/KG — SIGNIFICANT CHANGE UP (ref 300–900)
PH UR: 6 — SIGNIFICANT CHANGE UP (ref 5–8)
PLAT MORPH BLD: NORMAL — SIGNIFICANT CHANGE UP
PLATELET # BLD AUTO: 522 K/UL — HIGH (ref 150–400)
POTASSIUM SERPL-MCNC: 4.4 MMOL/L — SIGNIFICANT CHANGE UP (ref 3.5–5.3)
POTASSIUM SERPL-MCNC: 4.5 MMOL/L — SIGNIFICANT CHANGE UP (ref 3.5–5.3)
POTASSIUM SERPL-MCNC: 4.7 MMOL/L — SIGNIFICANT CHANGE UP (ref 3.5–5.3)
POTASSIUM SERPL-SCNC: 4.4 MMOL/L — SIGNIFICANT CHANGE UP (ref 3.5–5.3)
POTASSIUM SERPL-SCNC: 4.5 MMOL/L — SIGNIFICANT CHANGE UP (ref 3.5–5.3)
POTASSIUM SERPL-SCNC: 4.7 MMOL/L — SIGNIFICANT CHANGE UP (ref 3.5–5.3)
PROT SERPL-MCNC: 6.5 G/DL — SIGNIFICANT CHANGE UP (ref 6–8.3)
PROT UR-MCNC: 100 MG/DL
PROTHROM AB SERPL-ACNC: 23 SEC — HIGH (ref 9.8–12.7)
RAPID RVP RESULT: SIGNIFICANT CHANGE UP
RBC # BLD: 3 M/UL — LOW (ref 3.8–5.2)
RBC # FLD: 13.1 % — SIGNIFICANT CHANGE UP (ref 10.3–14.5)
RBC BLD AUTO: SIGNIFICANT CHANGE UP
SODIUM SERPL-SCNC: 121 MMOL/L — LOW (ref 135–145)
SODIUM SERPL-SCNC: 122 MMOL/L — LOW (ref 135–145)
SODIUM SERPL-SCNC: 124 MMOL/L — LOW (ref 135–145)
SODIUM UR-SCNC: 21 MMOL/L — SIGNIFICANT CHANGE UP
SP GR SPEC: 1.02 — SIGNIFICANT CHANGE UP (ref 1.01–1.02)
TROPONIN T SERPL-MCNC: 0.04 NG/ML — SIGNIFICANT CHANGE UP (ref 0–0.06)
UROBILINOGEN FLD QL: NEGATIVE — SIGNIFICANT CHANGE UP
WBC # BLD: 11.5 K/UL — HIGH (ref 3.8–10.5)
WBC # FLD AUTO: 11.5 K/UL — HIGH (ref 3.8–10.5)

## 2018-04-13 PROCEDURE — 99223 1ST HOSP IP/OBS HIGH 75: CPT

## 2018-04-13 PROCEDURE — 71045 X-RAY EXAM CHEST 1 VIEW: CPT | Mod: 26

## 2018-04-13 PROCEDURE — 99285 EMERGENCY DEPT VISIT HI MDM: CPT | Mod: 25,GC

## 2018-04-13 PROCEDURE — 71275 CT ANGIOGRAPHY CHEST: CPT | Mod: 26

## 2018-04-13 PROCEDURE — 93010 ELECTROCARDIOGRAM REPORT: CPT

## 2018-04-13 RX ORDER — RIVAROXABAN 15 MG-20MG
20 KIT ORAL EVERY 24 HOURS
Qty: 0 | Refills: 0 | Status: DISCONTINUED | OUTPATIENT
Start: 2018-04-13 | End: 2018-04-30

## 2018-04-13 RX ORDER — FUROSEMIDE 40 MG
10 TABLET ORAL ONCE
Qty: 0 | Refills: 0 | Status: DISCONTINUED | OUTPATIENT
Start: 2018-04-13 | End: 2018-04-13

## 2018-04-13 RX ORDER — PIPERACILLIN AND TAZOBACTAM 4; .5 G/20ML; G/20ML
3.38 INJECTION, POWDER, LYOPHILIZED, FOR SOLUTION INTRAVENOUS ONCE
Qty: 0 | Refills: 0 | Status: DISCONTINUED | OUTPATIENT
Start: 2018-04-13 | End: 2018-04-13

## 2018-04-13 RX ORDER — MIRTAZAPINE 45 MG/1
15 TABLET, ORALLY DISINTEGRATING ORAL AT BEDTIME
Qty: 0 | Refills: 0 | Status: DISCONTINUED | OUTPATIENT
Start: 2018-04-13 | End: 2018-04-17

## 2018-04-13 RX ORDER — LEVALBUTEROL 1.25 MG/.5ML
0.63 SOLUTION, CONCENTRATE RESPIRATORY (INHALATION) ONCE
Qty: 0 | Refills: 0 | Status: COMPLETED | OUTPATIENT
Start: 2018-04-13 | End: 2018-04-13

## 2018-04-13 RX ORDER — FUROSEMIDE 40 MG
20 TABLET ORAL
Qty: 0 | Refills: 0 | Status: DISCONTINUED | OUTPATIENT
Start: 2018-04-13 | End: 2018-04-16

## 2018-04-13 RX ORDER — POLYETHYLENE GLYCOL 3350 17 G/17G
17 POWDER, FOR SOLUTION ORAL
Qty: 0 | Refills: 0 | Status: DISCONTINUED | OUTPATIENT
Start: 2018-04-13 | End: 2018-04-30

## 2018-04-13 RX ORDER — IPRATROPIUM/ALBUTEROL SULFATE 18-103MCG
3 AEROSOL WITH ADAPTER (GRAM) INHALATION ONCE
Qty: 0 | Refills: 0 | Status: DISCONTINUED | OUTPATIENT
Start: 2018-04-13 | End: 2018-04-13

## 2018-04-13 RX ORDER — VANCOMYCIN HCL 1 G
1000 VIAL (EA) INTRAVENOUS EVERY 24 HOURS
Qty: 0 | Refills: 0 | Status: DISCONTINUED | OUTPATIENT
Start: 2018-04-13 | End: 2018-04-15

## 2018-04-13 RX ORDER — PANTOPRAZOLE SODIUM 20 MG/1
40 TABLET, DELAYED RELEASE ORAL
Qty: 0 | Refills: 0 | Status: DISCONTINUED | OUTPATIENT
Start: 2018-04-13 | End: 2018-04-23

## 2018-04-13 RX ORDER — IPRATROPIUM/ALBUTEROL SULFATE 18-103MCG
3 AEROSOL WITH ADAPTER (GRAM) INHALATION ONCE
Qty: 0 | Refills: 0 | Status: COMPLETED | OUTPATIENT
Start: 2018-04-13 | End: 2018-04-13

## 2018-04-13 RX ORDER — FUROSEMIDE 40 MG
20 TABLET ORAL ONCE
Qty: 0 | Refills: 0 | Status: DISCONTINUED | OUTPATIENT
Start: 2018-04-13 | End: 2018-04-13

## 2018-04-13 RX ORDER — VANCOMYCIN HCL 1 G
1000 VIAL (EA) INTRAVENOUS ONCE
Qty: 0 | Refills: 0 | Status: DISCONTINUED | OUTPATIENT
Start: 2018-04-13 | End: 2018-04-13

## 2018-04-13 RX ORDER — ATORVASTATIN CALCIUM 80 MG/1
10 TABLET, FILM COATED ORAL AT BEDTIME
Qty: 0 | Refills: 0 | Status: DISCONTINUED | OUTPATIENT
Start: 2018-04-13 | End: 2018-04-30

## 2018-04-13 RX ORDER — ASPIRIN/CALCIUM CARB/MAGNESIUM 324 MG
81 TABLET ORAL DAILY
Qty: 0 | Refills: 0 | Status: DISCONTINUED | OUTPATIENT
Start: 2018-04-13 | End: 2018-04-30

## 2018-04-13 RX ORDER — CHOLECALCIFEROL (VITAMIN D3) 125 MCG
1000 CAPSULE ORAL DAILY
Qty: 0 | Refills: 0 | Status: DISCONTINUED | OUTPATIENT
Start: 2018-04-13 | End: 2018-04-30

## 2018-04-13 RX ORDER — DOCUSATE SODIUM 100 MG
100 CAPSULE ORAL THREE TIMES A DAY
Qty: 0 | Refills: 0 | Status: DISCONTINUED | OUTPATIENT
Start: 2018-04-13 | End: 2018-04-30

## 2018-04-13 RX ORDER — PIPERACILLIN AND TAZOBACTAM 4; .5 G/20ML; G/20ML
3.38 INJECTION, POWDER, LYOPHILIZED, FOR SOLUTION INTRAVENOUS EVERY 8 HOURS
Qty: 0 | Refills: 0 | Status: DISCONTINUED | OUTPATIENT
Start: 2018-04-13 | End: 2018-04-16

## 2018-04-13 RX ORDER — FUROSEMIDE 40 MG
10 TABLET ORAL
Qty: 0 | Refills: 0 | Status: DISCONTINUED | OUTPATIENT
Start: 2018-04-13 | End: 2018-04-13

## 2018-04-13 RX ORDER — VANCOMYCIN HCL 1 G
1000 VIAL (EA) INTRAVENOUS ONCE
Qty: 0 | Refills: 0 | Status: COMPLETED | OUTPATIENT
Start: 2018-04-13 | End: 2018-04-13

## 2018-04-13 RX ORDER — PIPERACILLIN AND TAZOBACTAM 4; .5 G/20ML; G/20ML
3.38 INJECTION, POWDER, LYOPHILIZED, FOR SOLUTION INTRAVENOUS ONCE
Qty: 0 | Refills: 0 | Status: COMPLETED | OUTPATIENT
Start: 2018-04-13 | End: 2018-04-13

## 2018-04-13 RX ORDER — SODIUM CHLORIDE 9 MG/ML
2000 INJECTION, SOLUTION INTRAVENOUS ONCE
Qty: 0 | Refills: 0 | Status: DISCONTINUED | OUTPATIENT
Start: 2018-04-13 | End: 2018-04-13

## 2018-04-13 RX ORDER — SENNA PLUS 8.6 MG/1
2 TABLET ORAL AT BEDTIME
Qty: 0 | Refills: 0 | Status: DISCONTINUED | OUTPATIENT
Start: 2018-04-13 | End: 2018-04-30

## 2018-04-13 RX ADMIN — ATORVASTATIN CALCIUM 10 MILLIGRAM(S): 80 TABLET, FILM COATED ORAL at 21:20

## 2018-04-13 RX ADMIN — PIPERACILLIN AND TAZOBACTAM 25 GRAM(S): 4; .5 INJECTION, POWDER, LYOPHILIZED, FOR SOLUTION INTRAVENOUS at 21:15

## 2018-04-13 RX ADMIN — MIRTAZAPINE 15 MILLIGRAM(S): 45 TABLET, ORALLY DISINTEGRATING ORAL at 21:21

## 2018-04-13 RX ADMIN — PIPERACILLIN AND TAZOBACTAM 200 GRAM(S): 4; .5 INJECTION, POWDER, LYOPHILIZED, FOR SOLUTION INTRAVENOUS at 10:21

## 2018-04-13 RX ADMIN — Medication 20 MILLIGRAM(S): at 16:51

## 2018-04-13 RX ADMIN — Medication 3 MILLILITER(S): at 09:09

## 2018-04-13 RX ADMIN — LEVALBUTEROL 0.63 MILLIGRAM(S): 1.25 SOLUTION, CONCENTRATE RESPIRATORY (INHALATION) at 21:36

## 2018-04-13 RX ADMIN — Medication 250 MILLIGRAM(S): at 11:18

## 2018-04-13 RX ADMIN — POLYETHYLENE GLYCOL 3350 17 GRAM(S): 17 POWDER, FOR SOLUTION ORAL at 21:15

## 2018-04-13 RX ADMIN — RIVAROXABAN 20 MILLIGRAM(S): KIT at 19:10

## 2018-04-13 RX ADMIN — Medication 100 MILLIGRAM(S): at 21:22

## 2018-04-13 RX ADMIN — Medication 10 MILLIGRAM(S): at 21:35

## 2018-04-13 RX ADMIN — SENNA PLUS 2 TABLET(S): 8.6 TABLET ORAL at 21:21

## 2018-04-13 RX ADMIN — Medication 100 MILLIGRAM(S): at 21:29

## 2018-04-13 NOTE — ED PROVIDER NOTE - PSH
History of Breast Lump/Mass Excision- [facility]- left- 1971    History of Colonoscopy- 2011/Sept    History of D&C- 8/12/11    Status post cataract extraction  OS  Status post hysterectomy  endometrial cancer

## 2018-04-13 NOTE — H&P ADULT - PROBLEM SELECTOR PLAN 3
Pt in aflutter last admission, currently in sinus rhythm  - may continue diltiazem 60mg po tid with hold parameters hold for sbp < 110 and/or HR < 60  - hold metoprolol for now  - continue xarelto 20mg po daily

## 2018-04-13 NOTE — ED PROVIDER NOTE - MEDICAL DECISION MAKING DETAILS
89 Y F tx from rehab with increasing SOB and WOB in setting of recent flu positive illness. DDx: MRSA PNA, Viral syndrome, intersitial lung disease. Will get imaging, labs, start abx given level of dyspnea and recent flu positive concern for MRSA pna.

## 2018-04-13 NOTE — ED PROVIDER NOTE - CARE PLAN
Principal Discharge DX:	Shortness of breath Principal Discharge DX:	Shortness of breath  Secondary Diagnosis:	Hyponatremia

## 2018-04-13 NOTE — H&P ADULT - HISTORY OF PRESENT ILLNESS
89f hx HTN, HLD, insomnia, recent admission for syncopal fall and rhabdomyolysis, recent dx of aflutter, recently diagnosed bilateral DVT and L ankle fracture, presenting from [facility] for increasing work of breathing and shortness of breath over the last 3-4 days. History gathered from patient and rehab documentation, which reveals patient had been recently diagnosed with flu, treated with tamiflu which finished on 4/9/18, and subsequently developed increased work of breathing and shortness of breath associated with cough productive of whitish sputum and wheezing. No changes to mentation, no documented or reported fevers/chills. No chest pain/palpitations.     In ED: 98.6, 146/85, 18, 99 RA. Given zosyn 3.375gmiv x 1 (1000), vancomycin 1gm iv (1100), duoneb x 1, placed on nonrebreather mask.

## 2018-04-13 NOTE — H&P ADULT - PROBLEM SELECTOR PLAN 10
- ppx: continue xarelto  - diet: fluid restriction, dash/tlc  - dispo: telemetry floor - ppx: continue xarelto  - diet: fluid restriction, dash/tlc  - dispo: telemetry floor    11. L ankle fracture- likely due to prior fall. s/p ankle brace placed by ortho last admission  - continue current care  - recommend OP ortho follow up

## 2018-04-13 NOTE — H&P ADULT - PROBLEM SELECTOR PLAN 1
Pt hypoxic with ronchi and wheezes on exam, cxr with pleural effusions and pulmonary edema, progressive hyponatremia over the last several days and hepatojugular reflux with edema all pointing toward volume overload and CHF vs. possible bacterial pneumonia superimposed on recent flu illness vs. possibility of xarelto failure and PE given recent bilatearl DVTs  - would treat with gentle diuresis given hyponatremia and mixed clinical picture- start with lasix 10mg iv bid, monitor i/o, monitor respiratory exam, monitor bmp q6 hours  - would continue antibiotics for now, zosyn 3.375gm iv q8, vancomycin 1gm iv q12, check vanc trough before 4th dose, f/u blood and sputum cultures, may de-escalate if patient improves with diuresis  - would not fluid hydrate at this time as exam more consistent with volume overload  - continue nonrebreather for now, may use bipap if additional ventilatory support needed  - would check CTA r/o PE and also better evaluate lung parenchyma  - monitor on continuous pulse ox, low threshold for ICU consult if decompensates Pt hypoxic with ronchi and wheezes on exam, cxr with pleural effusions and pulmonary edema, progressive hyponatremia over the last several days and hepatojugular reflux with edema all pointing toward volume overload and CHF vs. possible bacterial pneumonia superimposed on recent flu illness vs. possibility of xarelto failure and PE given recent bilatearl DVTs  - would treat with gentle diuresis given hyponatremia and mixed clinical picture- start with lasix 10mg iv bid, monitor i/o, monitor respiratory exam, monitor bmp q6 hours  - would continue antibiotics for now, zosyn 3.375gm iv q8, vancomycin 1gm iv q24 renally dosed, check vanc trough before 4th dose, f/u blood and sputum cultures, may de-escalate if patient improves with diuresis  - would not fluid hydrate at this time as exam more consistent with volume overload  - continue nonrebreather for now, may use bipap if additional ventilatory support needed  - would check CTA r/o PE and also better evaluate lung parenchyma  - monitor on continuous pulse ox, low threshold for ICU consult if decompensates

## 2018-04-13 NOTE — ED PROVIDER NOTE - OBJECTIVE STATEMENT
Pt is 89 Y F with hx or resp issues on O2 at nursing home, HTN, HLD who presents from [facility] for rehabilitation for increasing SOB. Per Ems pt was diagnosed with flu recently, note dated 4/11 states that she finished a course of Tamiflu med rec appears she finished on 9th, started on the 5th, appears that supplemental O2 may have been started for Flu and not underlying lung disease. Reason for transfer here not document on tx note, EMS says that she was having increasing WOB and decreasing saturations so she was sent. She was in the facility for L ankle fx, she is on blood Xarelto pt states for hx of blood clots. She admits to cough with productive sputum, SOB, wheezing. She has a Campo in place. She denies fever, chills, nausea, vomiting, LOC. She says that he whole body feels weak.

## 2018-04-13 NOTE — H&P ADULT - PSH
History of Breast Lump/Mass Excision- [facility]- left- 1971    History of Colonoscopy- 2011/Sept    History of D&C- 8/12/11    Status post cataract extraction  OS  Status post hysterectomy  endometrial cancer

## 2018-04-13 NOTE — H&P ADULT - PROBLEM SELECTOR PLAN 6
normotensive at present  - may continue diltiazem with hold parameters  - would hold metoprolol for now

## 2018-04-13 NOTE — ED ADULT NURSE NOTE - OBJECTIVE STATEMENT
89 y/o female presents to ED via EMS c/o SOB from [facility]. Patient who is Flu +, has dyspnea on exertion, unable to complete full sentences b/l crackles in lungs, nonproductive cough. Patient has recent history of blood clots. Patient denies CP, n/v/d. Patient A&Ox4, labored breathing, airway patent, b/l crackles in lungs, abdomen nontender, +pulses, cap refill <2 seconds. Patient on NRB @ 10L receiving breathing treatment, EKG completed. MDs at bedside. Plan of care explained. 89 y/o female presents to ED via EMS c/o SOB from [facility]. Patient who is Flu +, has dyspnea on exertion, unable to complete full sentences b/l crackles in lungs, nonproductive cough. Patient has recent history of blood clots. Patient denies CP, n/v/d. Patient A&Ox4, labored breathing, airway patent, b/l crackles in lungs, abdomen nontender, +pulses, cap refill <2 seconds. Patient arrived with indwelling tiwari catheter and stage 2 pressure ulcers to sacrum and lower back. Patient on NRB @ 10L receiving breathing treatment, EKG completed. MDs at bedside. Plan of care explained. 89 y/o female presents to ED via EMS c/o SOB from [facility]. Patient who is Flu +, has dyspnea on exertion, unable to complete full sentences b/l crackles in lungs, nonproductive cough. Patient has recent history of blood clots, patient on Xarelto, recently hospitalized at [facility] for unwitnessed fall with LOC. Patient denies CP, n/v/d. Patient A&Ox4, labored breathing, airway patent, b/l crackles in lungs, abdomen nontender, +pulses, cap refill <2 seconds. Patient arrived with indwelling Campo catheter and stage 2 pressure ulcers to sacrum and lower back. Patient on NRB @ 10L receiving breathing treatment, EKG completed. MDs at bedside. Plan of care explained.

## 2018-04-13 NOTE — H&P ADULT - PROBLEM SELECTOR PLAN 5
Severe at 121, without focal neurologic signs or changes to mentation. Chronic given progression over < 48 hours. Picture consistent with hypervolemic hyponatremia likely from CHF  - continue gentle diuresis lasix 10mg iv bid  - monitor bmp q6 hours, aim for Na increase of 8-10 mmol q day for now  - f/u renal consult

## 2018-04-13 NOTE — ED ADULT NURSE REASSESSMENT NOTE - NS ED NURSE REASSESS COMMENT FT1
Upon arrival, patient had feces in diaper and on tiwari catheter tube. New indwelling tiwari catheter placed with 2nd RN at bedside, sterile technique maintained, patient tolerated well. Tiwari checklist placed in binder.

## 2018-04-13 NOTE — ED PROVIDER NOTE - ATTENDING CONTRIBUTION TO CARE
Patient is a 89 yo F with history of HTN, hyperlipidemia sent in from [facility] for evaluation of shortness of breath. Patient was recently diagnosed and treated for the flu (completed a course of Tamiflu on 4/9/18). Patient was noted to be desaturating at [facility]. She is not on oxygen at baseline. She has been receiving O2 via nasal cannula however since flu diagnosis as well as neb treatments. Patient arrives, appearing elderly and fatigued. She is able to answer questions. + cough, shortness of breath. Denies f/c/n/v/chest pain.  VS noted  Gen. elderly, fatigued  HEENT: EOMI, mmm  Lungs: wheezes  CVS: RRR   Abd; Soft non tender, non distended   Ext: no edema  Skin: no rash  Neuro AAOx3 non focal clear speech  a/p:   - Graham YOUNG Patient is a 89 yo F with history of HTN, hyperlipidemia sent in from [facility] for evaluation of shortness of breath. Patient was recently diagnosed and treated for the flu (completed a course of Tamiflu on 4/9/18). Patient was noted to be desaturating at [facility]. She is not on oxygen at baseline. She has been receiving O2 via nasal cannula however since flu diagnosis as well as neb treatments. Patient arrives, appearing elderly and fatigued. She is able to answer questions. + cough, shortness of breath. Denies f/c/n/v/chest pain.  VS noted  Gen. elderly, fatigued  HEENT: EOMI, mmm  Lungs: diffuse rhonchi  CVS: RRR   Abd; Soft non tender, non distended   Ext: +2 pitting edema  Skin: no rash  Neuro AAOx3 non focal clear speech  a/p: respiratory distress, r/o pneumonia given recent influenza. Sepsis workup - labs, abx, cultures, CXR, u/a. Will check cardiac enzymes, trop/probnp and ekg. Pt to be admitted.   - Graham YOUNG

## 2018-04-13 NOTE — ED ADULT NURSE NOTE - PSH
History of Breast Lump/Mass Excision- [facility]- left- 1971    History of Colonoscopy- 2011/Sept    History of D&C- 8/12/11    Status post cataract extraction  OS  Status post hysterectomy  endometrial cancer

## 2018-04-13 NOTE — H&P ADULT - NSHPLABSRESULTS_GEN_ALL_CORE
.  LABS:                         8.9    11.5  )-----------( 522      ( 2018 09:23 )             26.6         121<L>  |  82<L>  |  22  ----------------------------<  105<H>  4.7   |  30  |  0.71    Ca    8.9      2018 09:23    TPro  6.5  /  Alb  2.6<L>  /  TBili  0.6  /  DBili  x   /  AST  26  /  ALT  22  /  AlkPhos  166<H>      PT/INR - ( 2018 09:23 )   PT: 23.0 sec;   INR: 2.10 ratio         PTT - ( 2018 09:23 )  PTT:34.0 sec  Urinalysis Basic - ( 2018 09:23 )    Color: Yellow / Appearance: SL Turbid / S.024 / pH: x  Gluc: x / Ketone: Trace  / Bili: Negative / Urobili: Negative   Blood: x / Protein: 100 mg/dL / Nitrite: Positive   Leuk Esterase: Large / RBC: 2-5 /HPF / WBC >50 /HPF   Sq Epi: x / Non Sq Epi: x / Bacteria: Few /HPF      Serum Pro-Brain Natriuretic Peptide: 4053 pg/mL ( @ 09:23)        CXR personally reviewed: bilateral pleural effusions and pulmonary edema  EKG personally reviewed: NSR with LAFB and incomplete RBBB

## 2018-04-14 LAB
ANION GAP SERPL CALC-SCNC: 11 MMOL/L — SIGNIFICANT CHANGE UP (ref 5–17)
ANION GAP SERPL CALC-SCNC: 9 MMOL/L — SIGNIFICANT CHANGE UP (ref 5–17)
BUN SERPL-MCNC: 13 MG/DL — SIGNIFICANT CHANGE UP (ref 7–23)
BUN SERPL-MCNC: 16 MG/DL — SIGNIFICANT CHANGE UP (ref 7–23)
CALCIUM SERPL-MCNC: 8.7 MG/DL — SIGNIFICANT CHANGE UP (ref 8.4–10.5)
CALCIUM SERPL-MCNC: 9.1 MG/DL — SIGNIFICANT CHANGE UP (ref 8.4–10.5)
CHLORIDE SERPL-SCNC: 80 MMOL/L — LOW (ref 96–108)
CHLORIDE SERPL-SCNC: 84 MMOL/L — LOW (ref 96–108)
CO2 SERPL-SCNC: 32 MMOL/L — HIGH (ref 22–31)
CO2 SERPL-SCNC: 33 MMOL/L — HIGH (ref 22–31)
CREAT SERPL-MCNC: 0.81 MG/DL — SIGNIFICANT CHANGE UP (ref 0.5–1.3)
CREAT SERPL-MCNC: 0.81 MG/DL — SIGNIFICANT CHANGE UP (ref 0.5–1.3)
GLUCOSE SERPL-MCNC: 102 MG/DL — HIGH (ref 70–99)
GLUCOSE SERPL-MCNC: 113 MG/DL — HIGH (ref 70–99)
HCT VFR BLD CALC: 24.8 % — LOW (ref 34.5–45)
HGB BLD-MCNC: 8.2 G/DL — LOW (ref 11.5–15.5)
MCHC RBC-ENTMCNC: 29.4 PG — SIGNIFICANT CHANGE UP (ref 27–34)
MCHC RBC-ENTMCNC: 33.2 GM/DL — SIGNIFICANT CHANGE UP (ref 32–36)
MCV RBC AUTO: 88.5 FL — SIGNIFICANT CHANGE UP (ref 80–100)
PLATELET # BLD AUTO: 512 K/UL — HIGH (ref 150–400)
POTASSIUM SERPL-MCNC: 4.1 MMOL/L — SIGNIFICANT CHANGE UP (ref 3.5–5.3)
POTASSIUM SERPL-MCNC: 4.4 MMOL/L — SIGNIFICANT CHANGE UP (ref 3.5–5.3)
POTASSIUM SERPL-SCNC: 4.1 MMOL/L — SIGNIFICANT CHANGE UP (ref 3.5–5.3)
POTASSIUM SERPL-SCNC: 4.4 MMOL/L — SIGNIFICANT CHANGE UP (ref 3.5–5.3)
RBC # BLD: 2.8 M/UL — LOW (ref 3.8–5.2)
RBC # FLD: 13.2 % — SIGNIFICANT CHANGE UP (ref 10.3–14.5)
SODIUM SERPL-SCNC: 124 MMOL/L — LOW (ref 135–145)
SODIUM SERPL-SCNC: 125 MMOL/L — LOW (ref 135–145)
WBC # BLD: 10.3 K/UL — SIGNIFICANT CHANGE UP (ref 3.8–10.5)
WBC # FLD AUTO: 10.3 K/UL — SIGNIFICANT CHANGE UP (ref 3.8–10.5)

## 2018-04-14 RX ORDER — LANOLIN ALCOHOL/MO/W.PET/CERES
3 CREAM (GRAM) TOPICAL ONCE
Qty: 0 | Refills: 0 | Status: COMPLETED | OUTPATIENT
Start: 2018-04-14 | End: 2018-04-14

## 2018-04-14 RX ADMIN — Medication 20 MILLIGRAM(S): at 05:52

## 2018-04-14 RX ADMIN — MIRTAZAPINE 15 MILLIGRAM(S): 45 TABLET, ORALLY DISINTEGRATING ORAL at 21:29

## 2018-04-14 RX ADMIN — RIVAROXABAN 20 MILLIGRAM(S): KIT at 17:36

## 2018-04-14 RX ADMIN — Medication 20 MILLIGRAM(S): at 17:37

## 2018-04-14 RX ADMIN — ATORVASTATIN CALCIUM 10 MILLIGRAM(S): 80 TABLET, FILM COATED ORAL at 21:29

## 2018-04-14 RX ADMIN — Medication 1000 UNIT(S): at 12:19

## 2018-04-14 RX ADMIN — Medication 81 MILLIGRAM(S): at 12:16

## 2018-04-14 RX ADMIN — PANTOPRAZOLE SODIUM 40 MILLIGRAM(S): 20 TABLET, DELAYED RELEASE ORAL at 05:52

## 2018-04-14 RX ADMIN — PIPERACILLIN AND TAZOBACTAM 25 GRAM(S): 4; .5 INJECTION, POWDER, LYOPHILIZED, FOR SOLUTION INTRAVENOUS at 05:52

## 2018-04-14 RX ADMIN — PIPERACILLIN AND TAZOBACTAM 25 GRAM(S): 4; .5 INJECTION, POWDER, LYOPHILIZED, FOR SOLUTION INTRAVENOUS at 12:17

## 2018-04-14 RX ADMIN — Medication 100 MILLIGRAM(S): at 21:29

## 2018-04-14 RX ADMIN — Medication 100 MILLIGRAM(S): at 05:56

## 2018-04-14 RX ADMIN — Medication 10 MILLIGRAM(S): at 21:29

## 2018-04-14 RX ADMIN — Medication 3 MILLIGRAM(S): at 21:32

## 2018-04-14 RX ADMIN — Medication 250 MILLIGRAM(S): at 11:15

## 2018-04-14 RX ADMIN — Medication 10 MILLIGRAM(S): at 12:18

## 2018-04-14 RX ADMIN — PIPERACILLIN AND TAZOBACTAM 25 GRAM(S): 4; .5 INJECTION, POWDER, LYOPHILIZED, FOR SOLUTION INTRAVENOUS at 21:37

## 2018-04-14 NOTE — PROGRESS NOTE ADULT - SUBJECTIVE AND OBJECTIVE BOX
Patient is a 89y old  Female who presents with a chief complaint of SOB (2018 20:35)      SUBJECTIVE / OVERNIGHT EVENTS:   Feels better.  Denies CP/SOB/Palpitation/HA.    MEDICATIONS  (STANDING):  aspirin enteric coated 81 milliGRAM(s) Oral daily  atorvastatin 10 milliGRAM(s) Oral at bedtime  busPIRone 10 milliGRAM(s) Oral two times a day  cholecalciferol 1000 Unit(s) Oral daily  diltiazem    Tablet 60 milliGRAM(s) Oral three times a day  docusate sodium 100 milliGRAM(s) Oral three times a day  furosemide   Injectable 20 milliGRAM(s) IV Push two times a day  mirtazapine 15 milliGRAM(s) Oral at bedtime  pantoprazole    Tablet 40 milliGRAM(s) Oral before breakfast  piperacillin/tazobactam IVPB. 3.375 Gram(s) IV Intermittent every 8 hours  polyethylene glycol 3350 17 Gram(s) Oral two times a day  rivaroxaban 20 milliGRAM(s) Oral every 24 hours  senna 2 Tablet(s) Oral at bedtime  vancomycin  IVPB 1000 milliGRAM(s) IV Intermittent every 24 hours    MEDICATIONS  (PRN):        CAPILLARY BLOOD GLUCOSE        I&O's Summary    2018 07:  -  2018 07:00  --------------------------------------------------------  IN: 320 mL / OUT: 2000 mL / NET: -1680 mL    2018 07:  -  15 Apr 2018 00:45  --------------------------------------------------------  IN: 810 mL / OUT: 2750 mL / NET: -1940 mL        PHYSICAL EXAM:  GENERAL: NAD, well-developed  HEAD:  Atraumatic, Normocephalic  NECK: Supple, No JVD  CHEST/LUNG: Clear to auscultation bilaterally; No wheezing.  HEART: Regular rate and rhythm; No murmurs, rubs, or gallops  ABDOMEN: Soft, Nontender, Nondistended; Bowel sounds present  EXTREMITIES:   No clubbing, cyanosis, or edema  NEUROLOGY: AAO X 3  SKIN: No rashes    LABS:                        8.2    10.3  )-----------( 512      ( 2018 05:44 )             24.8         124<L>  |  80<L>  |  13  ----------------------------<  113<H>  4.1   |  33<H>  |  0.81    Ca    8.7      2018 18:58    TPro  6.5  /  Alb  2.6<L>  /  TBili  0.6  /  DBili  x   /  AST  26  /  ALT  22  /  AlkPhos  166<H>      PT/INR - ( 2018 09:23 )   PT: 23.0 sec;   INR: 2.10 ratio         PTT - ( 2018 09:23 )  PTT:34.0 sec  CARDIAC MARKERS ( 2018 09:23 )  x     / 0.04 ng/mL / x     / x     / x          Urinalysis Basic - ( 2018 09:23 )    Color: Yellow / Appearance: SL Turbid / S.024 / pH: x  Gluc: x / Ketone: Trace  / Bili: Negative / Urobili: Negative   Blood: x / Protein: 100 mg/dL / Nitrite: Positive   Leuk Esterase: Large / RBC: 2-5 /HPF / WBC >50 /HPF   Sq Epi: x / Non Sq Epi: x / Bacteria: Few /HPF      CAPILLARY BLOOD GLUCOSE         @ 12:01  Culture-urine --  Culture results   >100,000 CFU/ml Escherichia coli  method type --  Organism --  Organism Identification --  Specimen source .Urine Catheterized            @ 12:01  Culture blood --  Culture results   >100,000 CFU/ml Escherichia coli  Gram stain --  Gram stain blood --  Method type --  Organism --  Organism identification --  Specimen source .Urine Catheterized      RADIOLOGY & ADDITIONAL TESTS:    Imaging Personally Reviewed:    Consultant(s) Notes Reviewed:      Care Discussed with Consultants/Other Providers:

## 2018-04-14 NOTE — PROGRESS NOTE ADULT - ASSESSMENT
· Assessment	  89f hx as above presenting with hypoxic respiratory failure     Problem/Plan - 1:  ·  Problem: Respiratory failure with hypoxia.  Plan: Pt hypoxic with ronchi and wheezes on exam, cxr with pleural effusions and pulmonary edema, progressive hyponatremia over the last several days and hepatojugular reflux with edema all pointing toward volume overload and CHF vs. possible bacterial pneumonia superimposed on recent flu illness vs. possibility of xarelto failure and PE given recent bilatearl DVTs  - would treat with gentle diuresis given hyponatremia and mixed clinical picture- start with lasix 10mg iv bid, monitor i/o, monitor respiratory exam, monitor bmp q6 hours  - would continue antibiotics for now, zosyn 3.375gm iv q8, vancomycin 1gm iv q24 renally dosed, check vanc trough before 4th dose, f/u blood and sputum cultures, may de-escalate if patient improves with diuresis  - would not fluid hydrate at this time as exam more consistent with volume overload  - continue nonrebreather for now, may use bipap if additional ventilatory support needed  - would check CTA r/o PE and also better evaluate lung parenchyma  - monitor on continuous pulse ox, low threshold for ICU consult if decompensates.     Problem/Plan - 2:  ·  Problem: Flu.  Plan: s/p tamiflu as documented in rehab notes  - continue monitoring respiratory status as above.      Problem/Plan - 3:  ·  Problem: SVT (supraventricular tachycardia).  Plan: Pt in aflutter last admission, currently in sinus rhythm  - may continue diltiazem 60mg po tid with hold parameters hold for sbp < 110 and/or HR < 60  - hold metoprolol for now  - continue xarelto 20mg po daily.      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  Plan: - continue xarelto 20mg po daily.      Problem/Plan - 5:  ·  Problem: Hyponatremia.  Plan: Severe at 121, without focal neurologic signs or changes to mentation. Chronic given progression over < 48 hours. Picture consistent with hypervolemic hyponatremia likely from CHF  - continue gentle diuresis lasix 10mg iv bid  - monitor bmp q6 hours, aim for Na increase of 8-10 mmol q day for now  - f/u renal consult.      Problem/Plan - 6:  Problem: Hypertension. Plan: normotensive at present  - may continue diltiazem with hold parameters  - would hold metoprolol for now.     Problem/Plan - 7:  ·  Problem: Anxiety.  Plan: continue buspirone and remeron.      Problem/Plan - 8:  ·  Problem: Hyperlipidemia.  Plan: continue lipitor 10mg po qhs.      Problem/Plan - 9:  ·  Problem: Insomnia.  Plan: Would hold ambien while inpatient, may trial melatonin 3mg qhs PRN if pt experiences insomnia.      Problem/Plan - 10:  Problem: Prophylactic measure. Plan; - ppx: continue xarelto  - diet: fluid restriction, dash/tlc  - dispo: telemetry floor    11. L ankle fracture- likely due to prior fall. s/p ankle brace placed by ortho last admission  - continue current care  - recommend OP ortho follow up.

## 2018-04-15 DIAGNOSIS — I10 ESSENTIAL (PRIMARY) HYPERTENSION: ICD-10-CM

## 2018-04-15 DIAGNOSIS — N39.0 URINARY TRACT INFECTION, SITE NOT SPECIFIED: ICD-10-CM

## 2018-04-15 DIAGNOSIS — R60.0 LOCALIZED EDEMA: ICD-10-CM

## 2018-04-15 LAB
-  AMIKACIN: SIGNIFICANT CHANGE UP
-  AMOXICILLIN/CLAVULANIC ACID: SIGNIFICANT CHANGE UP
-  AMPICILLIN/SULBACTAM: SIGNIFICANT CHANGE UP
-  AMPICILLIN: SIGNIFICANT CHANGE UP
-  AZTREONAM: SIGNIFICANT CHANGE UP
-  CEFAZOLIN: SIGNIFICANT CHANGE UP
-  CEFEPIME: SIGNIFICANT CHANGE UP
-  CEFOXITIN: SIGNIFICANT CHANGE UP
-  CEFTRIAXONE: SIGNIFICANT CHANGE UP
-  CIPROFLOXACIN: SIGNIFICANT CHANGE UP
-  ERTAPENEM: SIGNIFICANT CHANGE UP
-  GENTAMICIN: SIGNIFICANT CHANGE UP
-  IMIPENEM: SIGNIFICANT CHANGE UP
-  LEVOFLOXACIN: SIGNIFICANT CHANGE UP
-  MEROPENEM: SIGNIFICANT CHANGE UP
-  NITROFURANTOIN: SIGNIFICANT CHANGE UP
-  PIPERACILLIN/TAZOBACTAM: SIGNIFICANT CHANGE UP
-  TIGECYCLINE: SIGNIFICANT CHANGE UP
-  TOBRAMYCIN: SIGNIFICANT CHANGE UP
-  TRIMETHOPRIM/SULFAMETHOXAZOLE: SIGNIFICANT CHANGE UP
ANION GAP SERPL CALC-SCNC: 14 MMOL/L — SIGNIFICANT CHANGE UP (ref 5–17)
ANION GAP SERPL CALC-SCNC: 22 MMOL/L — HIGH (ref 5–17)
BUN SERPL-MCNC: 11 MG/DL — SIGNIFICANT CHANGE UP (ref 7–23)
BUN SERPL-MCNC: 11 MG/DL — SIGNIFICANT CHANGE UP (ref 7–23)
CALCIUM SERPL-MCNC: 7.2 MG/DL — LOW (ref 8.4–10.5)
CALCIUM SERPL-MCNC: 8.1 MG/DL — LOW (ref 8.4–10.5)
CHLORIDE SERPL-SCNC: 82 MMOL/L — LOW (ref 96–108)
CHLORIDE SERPL-SCNC: 85 MMOL/L — LOW (ref 96–108)
CO2 SERPL-SCNC: 28 MMOL/L — SIGNIFICANT CHANGE UP (ref 22–31)
CO2 SERPL-SCNC: 32 MMOL/L — HIGH (ref 22–31)
CREAT SERPL-MCNC: 0.71 MG/DL — SIGNIFICANT CHANGE UP (ref 0.5–1.3)
CREAT SERPL-MCNC: 0.77 MG/DL — SIGNIFICANT CHANGE UP (ref 0.5–1.3)
CULTURE RESULTS: SIGNIFICANT CHANGE UP
GLUCOSE SERPL-MCNC: 104 MG/DL — HIGH (ref 70–99)
GLUCOSE SERPL-MCNC: 91 MG/DL — SIGNIFICANT CHANGE UP (ref 70–99)
HCT VFR BLD CALC: 24.2 % — LOW (ref 34.5–45)
HGB BLD-MCNC: 8.2 G/DL — LOW (ref 11.5–15.5)
MCHC RBC-ENTMCNC: 29.9 PG — SIGNIFICANT CHANGE UP (ref 27–34)
MCHC RBC-ENTMCNC: 33.8 GM/DL — SIGNIFICANT CHANGE UP (ref 32–36)
MCV RBC AUTO: 88.6 FL — SIGNIFICANT CHANGE UP (ref 80–100)
METHOD TYPE: SIGNIFICANT CHANGE UP
ORGANISM # SPEC MICROSCOPIC CNT: SIGNIFICANT CHANGE UP
ORGANISM # SPEC MICROSCOPIC CNT: SIGNIFICANT CHANGE UP
PLATELET # BLD AUTO: 524 K/UL — HIGH (ref 150–400)
POTASSIUM SERPL-MCNC: 3.2 MMOL/L — LOW (ref 3.5–5.3)
POTASSIUM SERPL-MCNC: 3.6 MMOL/L — SIGNIFICANT CHANGE UP (ref 3.5–5.3)
POTASSIUM SERPL-SCNC: 3.2 MMOL/L — LOW (ref 3.5–5.3)
POTASSIUM SERPL-SCNC: 3.6 MMOL/L — SIGNIFICANT CHANGE UP (ref 3.5–5.3)
RBC # BLD: 2.73 M/UL — LOW (ref 3.8–5.2)
RBC # FLD: 13.4 % — SIGNIFICANT CHANGE UP (ref 10.3–14.5)
SODIUM SERPL-SCNC: 128 MMOL/L — LOW (ref 135–145)
SODIUM SERPL-SCNC: 135 MMOL/L — SIGNIFICANT CHANGE UP (ref 135–145)
SPECIMEN SOURCE: SIGNIFICANT CHANGE UP
WBC # BLD: 12.6 K/UL — HIGH (ref 3.8–10.5)
WBC # FLD AUTO: 12.6 K/UL — HIGH (ref 3.8–10.5)

## 2018-04-15 RX ORDER — POTASSIUM CHLORIDE 20 MEQ
40 PACKET (EA) ORAL EVERY 4 HOURS
Qty: 0 | Refills: 0 | Status: COMPLETED | OUTPATIENT
Start: 2018-04-15 | End: 2018-04-15

## 2018-04-15 RX ORDER — ONDANSETRON 8 MG/1
4 TABLET, FILM COATED ORAL EVERY 8 HOURS
Qty: 0 | Refills: 0 | Status: DISCONTINUED | OUTPATIENT
Start: 2018-04-15 | End: 2018-04-30

## 2018-04-15 RX ORDER — VANCOMYCIN HCL 1 G
1000 VIAL (EA) INTRAVENOUS EVERY 24 HOURS
Qty: 0 | Refills: 0 | Status: DISCONTINUED | OUTPATIENT
Start: 2018-04-16 | End: 2018-04-16

## 2018-04-15 RX ORDER — ONDANSETRON 8 MG/1
4 TABLET, FILM COATED ORAL ONCE
Qty: 0 | Refills: 0 | Status: COMPLETED | OUTPATIENT
Start: 2018-04-15 | End: 2018-04-15

## 2018-04-15 RX ADMIN — ONDANSETRON 4 MILLIGRAM(S): 8 TABLET, FILM COATED ORAL at 15:25

## 2018-04-15 RX ADMIN — Medication 1000 UNIT(S): at 13:08

## 2018-04-15 RX ADMIN — PIPERACILLIN AND TAZOBACTAM 25 GRAM(S): 4; .5 INJECTION, POWDER, LYOPHILIZED, FOR SOLUTION INTRAVENOUS at 05:36

## 2018-04-15 RX ADMIN — Medication 10 MILLIGRAM(S): at 08:48

## 2018-04-15 RX ADMIN — Medication 100 MILLIGRAM(S): at 00:57

## 2018-04-15 RX ADMIN — Medication 20 MILLIGRAM(S): at 17:24

## 2018-04-15 RX ADMIN — Medication 10 MILLIGRAM(S): at 22:00

## 2018-04-15 RX ADMIN — PIPERACILLIN AND TAZOBACTAM 25 GRAM(S): 4; .5 INJECTION, POWDER, LYOPHILIZED, FOR SOLUTION INTRAVENOUS at 13:09

## 2018-04-15 RX ADMIN — ONDANSETRON 4 MILLIGRAM(S): 8 TABLET, FILM COATED ORAL at 03:37

## 2018-04-15 RX ADMIN — ATORVASTATIN CALCIUM 10 MILLIGRAM(S): 80 TABLET, FILM COATED ORAL at 22:00

## 2018-04-15 RX ADMIN — MIRTAZAPINE 15 MILLIGRAM(S): 45 TABLET, ORALLY DISINTEGRATING ORAL at 22:00

## 2018-04-15 RX ADMIN — PIPERACILLIN AND TAZOBACTAM 25 GRAM(S): 4; .5 INJECTION, POWDER, LYOPHILIZED, FOR SOLUTION INTRAVENOUS at 21:59

## 2018-04-15 RX ADMIN — Medication 20 MILLIGRAM(S): at 05:36

## 2018-04-15 RX ADMIN — Medication 250 MILLIGRAM(S): at 11:51

## 2018-04-15 RX ADMIN — Medication 40 MILLIEQUIVALENT(S): at 03:39

## 2018-04-15 RX ADMIN — RIVAROXABAN 20 MILLIGRAM(S): KIT at 17:25

## 2018-04-15 RX ADMIN — PANTOPRAZOLE SODIUM 40 MILLIGRAM(S): 20 TABLET, DELAYED RELEASE ORAL at 05:39

## 2018-04-15 RX ADMIN — Medication 40 MILLIEQUIVALENT(S): at 07:26

## 2018-04-15 RX ADMIN — Medication 81 MILLIGRAM(S): at 13:08

## 2018-04-15 NOTE — PROGRESS NOTE ADULT - ASSESSMENT
· Assessment	  89f hx as above presenting with hypoxic respiratory failure     Problem/Plan - 1:  ·  Problem: Respiratory failure with hypoxia.  Plan:  Clinically improving.                                                                  Likely from HCAP                                                                    IV Abx     Problem/Plan - 2:  ·  Problem: Flu.  Plan: s/p tamiflu as documented in rehab notes  - continue monitoring respiratory status as above.      Problem/Plan - 3:  ·  Problem: SVT (supraventricular tachycardia).  Plan: Pt in aflutter last admission, currently in sinus rhythm  - may continue diltiazem 60mg po tid with hold parameters hold for sbp < 110 and/or HR < 60  - hold metoprolol for now  - continue xarelto 20mg po daily.      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  Plan: - continue xarelto 20mg po daily.      Problem/Plan - 5:  ·  Problem: Hyponatremia.  Plan: Nephro eval noted. BMP     Problem/Plan - 6:  Problem: Hypertension. Plan:   - continue diltiazem with hold parameters       Problem/Plan - 7:  ·  Problem: Anxiety.  Plan: continue buspirone and remeron.      Problem/Plan - 8:  ·  Problem: Hyperlipidemia.  Plan: continue lipitor 10mg po qhs.       L ankle fracture- likely due to prior fall. s/p ankle brace placed by ortho last admission  - continue current care  -  OP ortho follow up.

## 2018-04-15 NOTE — CONSULT NOTE ADULT - SUBJECTIVE AND OBJECTIVE BOX
[facility] NEPHROLOGY- CONSULTATION NOTE    89 year old female with history of below presents with SOB. Nephrology consulted for hyponatremia.    Patient appears to have hyponatremia on prior admission. Hyponatremia on current admission thought to be due to fluid overload for which patient started on IV lasix with appropriate improvement in serum sodium to normal this morning. Patient does not appear to be on diuretic as outpatient or SSRI. Patient is on remeron as outpatient however urine sodium noted to be low on admission.    REVIEW OF SYSTEMS:  Gen: no changes in weight  HEENT: no rhinorrhea  Neck: no sore throat  Cards: no chest pain  Resp: + dyspnea improving  GI: no nausea or vomiting or diarrhea  : no dysuria or hematuria  Vascular: + LE edema improving  Derm: no rashes  Neuro: no numbness/tingling    Eye cream from the 1960s Neocortef ?spelling caused eyes to becomes swollen (Unknown)  neomycin (Unknown)  soaps and creams causes rash uses only sensitive skin soap (Rash)  sulfa drugs (Unknown)  Voltaren (Rash)      Home Medications Reviewed  Hospital Medications:   MEDICATIONS  (STANDING):  aspirin enteric coated 81 milliGRAM(s) Oral daily  atorvastatin 10 milliGRAM(s) Oral at bedtime  busPIRone 10 milliGRAM(s) Oral two times a day  cholecalciferol 1000 Unit(s) Oral daily  diltiazem    Tablet 60 milliGRAM(s) Oral three times a day  docusate sodium 100 milliGRAM(s) Oral three times a day  furosemide   Injectable 20 milliGRAM(s) IV Push two times a day  mirtazapine 15 milliGRAM(s) Oral at bedtime  pantoprazole    Tablet 40 milliGRAM(s) Oral before breakfast  piperacillin/tazobactam IVPB. 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  Hypertension  Hyperlipidemia  Insomnia  Breast Lump  Seasonal Allergies  Hyperlipemia  Anxiety: palpitations  Status post cataract extraction: OS  Status post hysterectomy: endometrial cancer  History of D&C- 11  History of Colonoscopy-   [facility]       FAMILY HISTORY:  No pertinent family history in first degree relatives      SOCIAL HISTORY:  Denies toxic substance use     VITALS:  T(F): 98.1 (04-15-18 @ 04:18), Max: 98.1 (18 @ 20:41)  HR: 83 (04-15-18 @ 04:18)  BP: 129/67 (04-15-18 @ 04:18)  RR: 20 (04-15-18 @ 04:18)  SpO2: 94% (04-15-18 @ 04:18)  Wt(kg): --     @ 07:01  -  04-15 @ 07:00  --------------------------------------------------------  IN: 1030 mL / OUT: 3025 mL / NET: -1995 mL      PHYSICAL EXAM:  Gen: NAD, calm  HEENT: MMM  Neck: no JVD  Cards: RRR, +S1/S2, no M/G/R  Resp: Decreased BS @ bases B/L  GI: soft, NT/ND, NABS  : no CVA tenderness  Vascular: no LE edema B/L  Derm: no rashes  Neuro: non-focal    LABS:  04-15    135  |  85<L>  |  11  ----------------------------<  91  3.6   |  28  |  0.71    Ca    7.2<L>      15 Apr 2018 07:12      Creatinine Trend: 0.71 <--, 0.77 <--, 0.81 <--, 0.81 <--, 0.84 <--, 0.71 <--, 0.71 <--, 0.71 <--, 0.68 <--                        8.2    12.6  )-----------( 524      ( 15 Apr 2018 07:12 )             24.2     Urine Studies:  Urinalysis Basic - ( 2018 09:23 )    Color: Yellow / Appearance: SL Turbid / S.024 / pH:   Gluc:  / Ketone: Trace  / Bili: Negative / Urobili: Negative   Blood:  / Protein: 100 mg/dL / Nitrite: Positive   Leuk Esterase: Large / RBC: 2-5 /HPF / WBC >50 /HPF   Sq Epi:  / Non Sq Epi:  / Bacteria: Few /HPF      Sodium, Random Urine: 21 mmol/L ( @ 11:44)  Osmolality, Random Urine: 453 mos/kg ( @ 11:44)  Creatinine, Random Urine: 66 mg/dL ( @ 11:44)      RADIOLOGY & ADDITIONAL STUDIES:  < from: CT Angio Chest w/ IV Cont (18 @ 17:57) >  IMPRESSION:     Pulmonary edema. Small right and small-to-moderate loculated left pleural   effusions associated with compressive atelectasis.    Filling defect in the right middle lobar pulmonary artery at the   bifurcation of the segmental pulmonary arteries which is thought to   represent unopacified pulmonary arteries secondary to inadequate contrast   opacification and less likely pulmonary embolism.     < end of copied text >

## 2018-04-15 NOTE — CONSULT NOTE ADULT - PROBLEM SELECTOR RECOMMENDATION 3
Improving. Continue with IV lasix today. Anticipate change to PO in next 24 hours for which patient would need to be discharged on medication. Monitor UO.

## 2018-04-15 NOTE — PROGRESS NOTE ADULT - SUBJECTIVE AND OBJECTIVE BOX
Patient is a 89y old  Female who presents with a chief complaint of SOB (13 Apr 2018 20:35)      SUBJECTIVE / OVERNIGHT EVENTS:   Feels better.  Denies CP/SOB/Palpitation/HA.    MEDICATIONS  (STANDING):  aspirin enteric coated 81 milliGRAM(s) Oral daily  atorvastatin 10 milliGRAM(s) Oral at bedtime  busPIRone 10 milliGRAM(s) Oral two times a day  cholecalciferol 1000 Unit(s) Oral daily  diltiazem    Tablet 60 milliGRAM(s) Oral three times a day  docusate sodium 100 milliGRAM(s) Oral three times a day  furosemide   Injectable 20 milliGRAM(s) IV Push two times a day  mirtazapine 15 milliGRAM(s) Oral at bedtime  pantoprazole    Tablet 40 milliGRAM(s) Oral before breakfast  piperacillin/tazobactam IVPB. 3.375 Gram(s) IV Intermittent every 8 hours  polyethylene glycol 3350 17 Gram(s) Oral two times a day  rivaroxaban 20 milliGRAM(s) Oral every 24 hours  senna 2 Tablet(s) Oral at bedtime    MEDICATIONS  (PRN):  ondansetron Injectable 4 milliGRAM(s) IV Push every 8 hours PRN Nausea and/or Vomiting        CAPILLARY BLOOD GLUCOSE        I&O's Summary    14 Apr 2018 07:01  -  15 Apr 2018 07:00  --------------------------------------------------------  IN: 1030 mL / OUT: 3025 mL / NET: -1995 mL    15 Apr 2018 07:01  -  15 Apr 2018 22:14  --------------------------------------------------------  IN: 710 mL / OUT: 1550 mL / NET: -840 mL        PHYSICAL EXAM:  GENERAL: NAD, well-developed  HEAD:  Atraumatic, Normocephalic  NECK: Supple, No JVD  CHEST/LUNG: Clear to auscultation bilaterally; No wheezing.  HEART: Regular rate and rhythm; No murmurs, rubs, or gallops  ABDOMEN: Soft, Nontender, Nondistended; Bowel sounds present  EXTREMITIES:   No clubbing, cyanosis, or edema  NEUROLOGY: AAO X 3  SKIN: No rashes    LABS:                        8.2    12.6  )-----------( 524      ( 15 Apr 2018 07:12 )             24.2     04-15    135  |  85<L>  |  11  ----------------------------<  91  3.6   |  28  |  0.71    Ca    7.2<L>      15 Apr 2018 07:12              CAPILLARY BLOOD GLUCOSE        04-13 @ 12:01  Culture-urine --  Culture results   >100,000 CFU/ml Escherichia coli  <10,000 CFU/ml Normal Urogenital aristeo present  method type MENA  Organism Escherichia coli  Organism Identification Escherichia coli  Specimen source .Urine Catheterized           04-13 @ 12:01  Culture blood --  Culture results   >100,000 CFU/ml Escherichia coli  <10,000 CFU/ml Normal Urogenital aristeo present  Gram stain --  Gram stain blood --  Method type MENA  Organism Escherichia coli  Organism identification Escherichia coli  Specimen source .Urine Catheterized      RADIOLOGY & ADDITIONAL TESTS:    Imaging Personally Reviewed:    Consultant(s) Notes Reviewed:      Care Discussed with Consultants/Other Providers:

## 2018-04-15 NOTE — CONSULT NOTE ADULT - PROBLEM SELECTOR RECOMMENDATION 9
Patient with hyponatremia likely due to volume overload given appropriate improvement in serum sodium to normal today on IV diuretic therapy. Patient also on remeron which can cause hyponatremia in less than 1% of patients however unlikely cause of hyponatremia given volume status on admission and low urine sodium. Change vancomycin to NS to limit free water intake. Monitor serum sodium.

## 2018-04-16 ENCOUNTER — APPOINTMENT (OUTPATIENT)
Dept: HEMATOLOGY ONCOLOGY | Facility: CLINIC | Age: 83
End: 2018-04-16

## 2018-04-16 DIAGNOSIS — R82.79 OTHER ABNORMAL FINDINGS ON MICROBIOLOGICAL EXAMINATION OF URINE: ICD-10-CM

## 2018-04-16 DIAGNOSIS — I50.9 HEART FAILURE, UNSPECIFIED: ICD-10-CM

## 2018-04-16 LAB
ANION GAP SERPL CALC-SCNC: 8 MMOL/L — SIGNIFICANT CHANGE UP (ref 5–17)
ANION GAP SERPL CALC-SCNC: 9 MMOL/L — SIGNIFICANT CHANGE UP (ref 5–17)
BUN SERPL-MCNC: 12 MG/DL — SIGNIFICANT CHANGE UP (ref 7–23)
BUN SERPL-MCNC: 12 MG/DL — SIGNIFICANT CHANGE UP (ref 7–23)
CALCIUM SERPL-MCNC: 8.4 MG/DL — SIGNIFICANT CHANGE UP (ref 8.4–10.5)
CALCIUM SERPL-MCNC: 8.8 MG/DL — SIGNIFICANT CHANGE UP (ref 8.4–10.5)
CHLORIDE SERPL-SCNC: 77 MMOL/L — LOW (ref 96–108)
CHLORIDE SERPL-SCNC: 78 MMOL/L — LOW (ref 96–108)
CO2 SERPL-SCNC: 35 MMOL/L — HIGH (ref 22–31)
CO2 SERPL-SCNC: 36 MMOL/L — HIGH (ref 22–31)
CREAT SERPL-MCNC: 0.81 MG/DL — SIGNIFICANT CHANGE UP (ref 0.5–1.3)
CREAT SERPL-MCNC: 0.82 MG/DL — SIGNIFICANT CHANGE UP (ref 0.5–1.3)
GLUCOSE SERPL-MCNC: 179 MG/DL — HIGH (ref 70–99)
GLUCOSE SERPL-MCNC: 90 MG/DL — SIGNIFICANT CHANGE UP (ref 70–99)
HCT VFR BLD CALC: 24.9 % — LOW (ref 34.5–45)
HGB BLD-MCNC: 8.3 G/DL — LOW (ref 11.5–15.5)
MCHC RBC-ENTMCNC: 29.3 PG — SIGNIFICANT CHANGE UP (ref 27–34)
MCHC RBC-ENTMCNC: 33.4 GM/DL — SIGNIFICANT CHANGE UP (ref 32–36)
MCV RBC AUTO: 87.8 FL — SIGNIFICANT CHANGE UP (ref 80–100)
NT-PROBNP SERPL-SCNC: 3575 PG/ML — HIGH (ref 0–300)
OSMOLALITY UR: 227 MOS/KG — LOW (ref 300–900)
PLATELET # BLD AUTO: 508 K/UL — HIGH (ref 150–400)
POTASSIUM SERPL-MCNC: 3.7 MMOL/L — SIGNIFICANT CHANGE UP (ref 3.5–5.3)
POTASSIUM SERPL-MCNC: 4.4 MMOL/L — SIGNIFICANT CHANGE UP (ref 3.5–5.3)
POTASSIUM SERPL-SCNC: 3.7 MMOL/L — SIGNIFICANT CHANGE UP (ref 3.5–5.3)
POTASSIUM SERPL-SCNC: 4.4 MMOL/L — SIGNIFICANT CHANGE UP (ref 3.5–5.3)
RBC # BLD: 2.83 M/UL — LOW (ref 3.8–5.2)
RBC # FLD: 13.5 % — SIGNIFICANT CHANGE UP (ref 10.3–14.5)
SODIUM SERPL-SCNC: 121 MMOL/L — LOW (ref 135–145)
SODIUM SERPL-SCNC: 122 MMOL/L — LOW (ref 135–145)
SODIUM UR-SCNC: 69 MMOL/L — SIGNIFICANT CHANGE UP
VANCOMYCIN TROUGH SERPL-MCNC: 9.7 UG/ML — LOW (ref 10–20)
WBC # BLD: 9.7 K/UL — SIGNIFICANT CHANGE UP (ref 3.8–10.5)
WBC # FLD AUTO: 9.7 K/UL — SIGNIFICANT CHANGE UP (ref 3.8–10.5)

## 2018-04-16 PROCEDURE — 99222 1ST HOSP IP/OBS MODERATE 55: CPT

## 2018-04-16 RX ORDER — SODIUM CHLORIDE 9 MG/ML
2 INJECTION INTRAMUSCULAR; INTRAVENOUS; SUBCUTANEOUS THREE TIMES A DAY
Qty: 0 | Refills: 0 | Status: DISCONTINUED | OUTPATIENT
Start: 2018-04-16 | End: 2018-04-17

## 2018-04-16 RX ORDER — ALBUTEROL 90 UG/1
2.5 AEROSOL, METERED ORAL ONCE
Qty: 0 | Refills: 0 | Status: COMPLETED | OUTPATIENT
Start: 2018-04-16 | End: 2018-04-16

## 2018-04-16 RX ORDER — ACETAMINOPHEN 500 MG
1000 TABLET ORAL ONCE
Qty: 0 | Refills: 0 | Status: COMPLETED | OUTPATIENT
Start: 2018-04-16 | End: 2018-04-16

## 2018-04-16 RX ADMIN — SODIUM CHLORIDE 2 GRAM(S): 9 INJECTION INTRAMUSCULAR; INTRAVENOUS; SUBCUTANEOUS at 17:25

## 2018-04-16 RX ADMIN — MIRTAZAPINE 15 MILLIGRAM(S): 45 TABLET, ORALLY DISINTEGRATING ORAL at 21:54

## 2018-04-16 RX ADMIN — Medication 1000 UNIT(S): at 11:57

## 2018-04-16 RX ADMIN — Medication 400 MILLIGRAM(S): at 22:59

## 2018-04-16 RX ADMIN — ONDANSETRON 4 MILLIGRAM(S): 8 TABLET, FILM COATED ORAL at 17:25

## 2018-04-16 RX ADMIN — Medication 81 MILLIGRAM(S): at 11:57

## 2018-04-16 RX ADMIN — Medication 10 MILLIGRAM(S): at 21:54

## 2018-04-16 RX ADMIN — Medication 20 MILLIGRAM(S): at 05:37

## 2018-04-16 RX ADMIN — Medication 10 MILLIGRAM(S): at 11:58

## 2018-04-16 RX ADMIN — Medication 250 MILLIGRAM(S): at 11:55

## 2018-04-16 RX ADMIN — ALBUTEROL 2.5 MILLIGRAM(S): 90 AEROSOL, METERED ORAL at 01:26

## 2018-04-16 RX ADMIN — SODIUM CHLORIDE 2 GRAM(S): 9 INJECTION INTRAMUSCULAR; INTRAVENOUS; SUBCUTANEOUS at 21:54

## 2018-04-16 RX ADMIN — PANTOPRAZOLE SODIUM 40 MILLIGRAM(S): 20 TABLET, DELAYED RELEASE ORAL at 05:36

## 2018-04-16 RX ADMIN — POLYETHYLENE GLYCOL 3350 17 GRAM(S): 17 POWDER, FOR SOLUTION ORAL at 11:57

## 2018-04-16 RX ADMIN — PIPERACILLIN AND TAZOBACTAM 25 GRAM(S): 4; .5 INJECTION, POWDER, LYOPHILIZED, FOR SOLUTION INTRAVENOUS at 05:35

## 2018-04-16 RX ADMIN — ATORVASTATIN CALCIUM 10 MILLIGRAM(S): 80 TABLET, FILM COATED ORAL at 21:54

## 2018-04-16 RX ADMIN — Medication 100 MILLIGRAM(S): at 05:37

## 2018-04-16 RX ADMIN — PIPERACILLIN AND TAZOBACTAM 25 GRAM(S): 4; .5 INJECTION, POWDER, LYOPHILIZED, FOR SOLUTION INTRAVENOUS at 13:17

## 2018-04-16 RX ADMIN — RIVAROXABAN 20 MILLIGRAM(S): KIT at 17:25

## 2018-04-16 NOTE — CONSULT NOTE ADULT - PROBLEM SELECTOR RECOMMENDATION 2
BP controlled. Continue with current medications. Monitor BP.
-no  symptoms  -likely asymptomatic bacteruria   -got more than 3 days zosyn already  -DC abx

## 2018-04-16 NOTE — PROGRESS NOTE ADULT - ASSESSMENT
· Assessment	  89f hx as above presenting with hypoxic respiratory failure     Problem/Plan - 1:  ·  Problem: Respiratory failure with hypoxia.  Plan:  Clinically improving.                                                                  Suspected HCAP                                                                    IV Abx     Problem/Plan - 2:  ·  Problem: Flu.  Plan: s/p tamiflu as documented in rehab notes  - continue monitoring respiratory status as above.      Problem/Plan - 3:  ·  Problem: SVT (supraventricular tachycardia).  Plan:   - may continue diltiazem 60mg po tid with hold parameters hold for sbp < 110 and/or HR < 60  - hold metoprolol for now  - continue xarelto 20mg po daily.      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  Plan: - continue xarelto 20mg po daily.      Problem/Plan - 5:  ·  Problem: Hyponatremia.  Plan: Nephro eval noted. BMP     Problem/Plan - 6:  Problem: Hypertension. Plan:   - continue diltiazem with hold parameters       Problem/Plan - 7:  ·  Problem: Anxiety.  Plan: continue buspirone and remeron.      Problem/Plan - 8:  ·  Problem: Hyperlipidemia.  Plan: continue lipitor 10mg po qhs.       L ankle fracture- likely due to prior fall. s/p ankle brace placed by ortho last admission  - continue current care  -  OP ortho follow up.

## 2018-04-16 NOTE — PROGRESS NOTE ADULT - SUBJECTIVE AND OBJECTIVE BOX
Patient is a 89y old  Female who presents with a chief complaint of SOB (13 Apr 2018 20:35)      SUBJECTIVE / OVERNIGHT EVENTS:   Feels better.  Denies CP/SOB/Palpitation/HA.    MEDICATIONS  (STANDING):  aspirin enteric coated 81 milliGRAM(s) Oral daily  atorvastatin 10 milliGRAM(s) Oral at bedtime  busPIRone 10 milliGRAM(s) Oral two times a day  cholecalciferol 1000 Unit(s) Oral daily  diltiazem    Tablet 60 milliGRAM(s) Oral three times a day  docusate sodium 100 milliGRAM(s) Oral three times a day  mirtazapine 15 milliGRAM(s) Oral at bedtime  pantoprazole    Tablet 40 milliGRAM(s) Oral before breakfast  piperacillin/tazobactam IVPB. 3.375 Gram(s) IV Intermittent every 8 hours  polyethylene glycol 3350 17 Gram(s) Oral two times a day  rivaroxaban 20 milliGRAM(s) Oral every 24 hours  senna 2 Tablet(s) Oral at bedtime  vancomycin  IVPB 1000 milliGRAM(s) IV Intermittent every 24 hours    MEDICATIONS  (PRN):  ondansetron Injectable 4 milliGRAM(s) IV Push every 8 hours PRN Nausea and/or Vomiting        CAPILLARY BLOOD GLUCOSE        I&O's Summary    15 Apr 2018 07:01  -  16 Apr 2018 07:00  --------------------------------------------------------  IN: 930 mL / OUT: 1900 mL / NET: -970 mL    16 Apr 2018 07:01  -  16 Apr 2018 16:01  --------------------------------------------------------  IN: 950 mL / OUT: 1000 mL / NET: -50 mL        PHYSICAL EXAM:  GENERAL: NAD, well-developed  HEAD:  Atraumatic, Normocephalic  NECK: Supple, No JVD  CHEST/LUNG: Clear to auscultation bilaterally; No wheezing.  HEART: Regular rate and rhythm; No murmurs, rubs, or gallops  ABDOMEN: Soft, Nontender, Nondistended; Bowel sounds present  EXTREMITIES:   No clubbing, cyanosis, or edema  NEUROLOGY: AAO X 3  SKIN: No rashes    LABS:                        8.3    9.7   )-----------( 508      ( 16 Apr 2018 05:40 )             24.9     04-16    121<L>  |  77<L>  |  12  ----------------------------<  179<H>  3.7   |  36<H>  |  0.82    Ca    8.8      16 Apr 2018 11:41              CAPILLARY BLOOD GLUCOSE        04-13 @ 12:01  Culture-urine --  Culture results   >100,000 CFU/ml Escherichia coli  <10,000 CFU/ml Normal Urogenital aristeo present  method type MENA  Organism Escherichia coli  Organism Identification Escherichia coli  Specimen source .Urine Catheterized           04-13 @ 12:01  Culture blood --  Culture results   >100,000 CFU/ml Escherichia coli  <10,000 CFU/ml Normal Urogenital aristeo present  Gram stain --  Gram stain blood --  Method type MENA  Organism Escherichia coli  Organism identification Escherichia coli  Specimen source .Urine Catheterized      RADIOLOGY & ADDITIONAL TESTS:    Imaging Personally Reviewed:    Consultant(s) Notes Reviewed:      Care Discussed with Consultants/Other Providers:

## 2018-04-16 NOTE — CONSULT NOTE ADULT - ASSESSMENT
89 year old female with history of Hyperlipemia, Hypertension, Insomnia, PSVT, bl DVT  admitted with SOB     1. SOB   multifactorial, recent viral illness/ acute CHF   clinically improving   abx per primary team   IV diuretics   med f/u    ekg no evidence of ACS     2. Acute Diastolic congestive heart failure   likely in the setting of recent viral illness   CTA chest revealing pulm edema , less likely PE   chest xray revealing pulm edema   clinically improving   continue with lasix 20 mg IVP BID, change to PO tomorrow   f/u echo to reeval LV fx       3. PSVT ( HX)  currently in NSR, cv stable   asa 81 qd  cont  cardizem  60mg TID    4. Htn   cont ccb  monitor bp     5. bl DVT (hx)  continue with xarelto

## 2018-04-16 NOTE — PROGRESS NOTE ADULT - SUBJECTIVE AND OBJECTIVE BOX
Patient is a 89y old  Female who presents with a chief complaint of SOB (13 Apr 2018 20:35)      SUBJECTIVE / OVERNIGHT EVENTS:   Feels better.  Denies CP/SOB/Palpitation/HA.    MEDICATIONS  (STANDING):  aspirin enteric coated 81 milliGRAM(s) Oral daily  atorvastatin 10 milliGRAM(s) Oral at bedtime  busPIRone 10 milliGRAM(s) Oral two times a day  cholecalciferol 1000 Unit(s) Oral daily  diltiazem    Tablet 60 milliGRAM(s) Oral three times a day  docusate sodium 100 milliGRAM(s) Oral three times a day  mirtazapine 15 milliGRAM(s) Oral at bedtime  pantoprazole    Tablet 40 milliGRAM(s) Oral before breakfast  piperacillin/tazobactam IVPB. 3.375 Gram(s) IV Intermittent every 8 hours  polyethylene glycol 3350 17 Gram(s) Oral two times a day  rivaroxaban 20 milliGRAM(s) Oral every 24 hours  senna 2 Tablet(s) Oral at bedtime  vancomycin  IVPB 1000 milliGRAM(s) IV Intermittent every 24 hours    MEDICATIONS  (PRN):  ondansetron Injectable 4 milliGRAM(s) IV Push every 8 hours PRN Nausea and/or Vomiting        CAPILLARY BLOOD GLUCOSE        I&O's Summary    15 Apr 2018 07:01  -  16 Apr 2018 07:00  --------------------------------------------------------  IN: 930 mL / OUT: 1900 mL / NET: -970 mL    16 Apr 2018 07:01  -  16 Apr 2018 16:05  --------------------------------------------------------  IN: 950 mL / OUT: 1000 mL / NET: -50 mL        PHYSICAL EXAM:  GENERAL: NAD, well-developed  HEAD:  Atraumatic, Normocephalic  NECK: Supple, No JVD  CHEST/LUNG: Clear to auscultation bilaterally; No wheezing.  HEART: Regular rate and rhythm; No murmurs, rubs, or gallops  ABDOMEN: Soft, Nontender, Nondistended; Bowel sounds present  EXTREMITIES:   No clubbing, cyanosis, or edema  NEUROLOGY: AAO X 3  SKIN: No rashes    LABS:                        8.3    9.7   )-----------( 508      ( 16 Apr 2018 05:40 )             24.9     04-16    121<L>  |  77<L>  |  12  ----------------------------<  179<H>  3.7   |  36<H>  |  0.82    Ca    8.8      16 Apr 2018 11:41              CAPILLARY BLOOD GLUCOSE        04-13 @ 12:01  Culture-urine --  Culture results   >100,000 CFU/ml Escherichia coli  <10,000 CFU/ml Normal Urogenital aristeo present  method type MENA  Organism Escherichia coli  Organism Identification Escherichia coli  Specimen source .Urine Catheterized           04-13 @ 12:01  Culture blood --  Culture results   >100,000 CFU/ml Escherichia coli  <10,000 CFU/ml Normal Urogenital aristeo present  Gram stain --  Gram stain blood --  Method type MENA  Organism Escherichia coli  Organism identification Escherichia coli  Specimen source .Urine Catheterized      RADIOLOGY & ADDITIONAL TESTS:    Imaging Personally Reviewed:    Consultant(s) Notes Reviewed:      Care Discussed with Consultants/Other Providers:

## 2018-04-16 NOTE — CONSULT NOTE ADULT - SUBJECTIVE AND OBJECTIVE BOX
DELVIS MCBRIDE 89y Female  MRN-[id number]    Patient is a 89y old  Female who presents with a chief complaint of SOB (13 Apr 2018 20:35)      HPI:  89f hx HTN, HLD, insomnia, recent admission for syncopal fall and rhabdomyolysis, recent dx of aflutter, recently diagnosed bilateral DVT and L ankle fracture, presenting from [facility] for increasing work of breathing and shortness of breath over the last 3-4 days. History gathered from patient and rehab documentation, which reveals patient had been recently diagnosed with flu, treated with tamiflu which finished on 4/9/18, and subsequently developed increased work of breathing and shortness of breath associated with cough productive of whitish sputum and wheezing. No changes to mentation, no documented or reported fevers/chills. No chest pain/palpitations.     In ED: 98.6, 146/85, 18, 99 RA. Given zosyn 3.375gmiv x 1 (1000), vancomycin 1gm iv (1100), duoneb x 1, placed on nonrebreather mask. (13 Apr 2018 14:45)    No fever.    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  Hypertension  Hyperlipidemia  Insomnia  Breast Lump  Seasonal Allergies  Hyperlipemia  Anxiety: palpitations  Status post cataract extraction: OS  Status post hysterectomy: endometrial cancer  History of D&C- 8/12/11  History of Colonoscopy- 2011/Sept  [facility]      Allergies    Eye cream from the 1960s Neocortef ?spelling caused eyes to becomes swollen (Unknown)  neomycin (Unknown)  soaps and creams causes rash uses only sensitive skin soap (Rash)  sulfa drugs (Unknown)  Voltaren (Rash)    Intolerances        ANTIMICROBIALS:  piperacillin/tazobactam IVPB. 3.375 every 8 hours  vancomycin  IVPB 1000 every 24 hours      MEDICATIONS  (STANDING):  aspirin enteric coated 81 milliGRAM(s) Oral daily  atorvastatin 10 milliGRAM(s) Oral at bedtime  busPIRone 10 milliGRAM(s) Oral two times a day  cholecalciferol 1000 Unit(s) Oral daily  diltiazem    Tablet 60 milliGRAM(s) Oral three times a day  docusate sodium 100 milliGRAM(s) Oral three times a day  mirtazapine 15 milliGRAM(s) Oral at bedtime  pantoprazole    Tablet 40 milliGRAM(s) Oral before breakfast  piperacillin/tazobactam IVPB. 3.375 Gram(s) IV Intermittent every 8 hours  polyethylene glycol 3350 17 Gram(s) Oral two times a day  rivaroxaban 20 milliGRAM(s) Oral every 24 hours  senna 2 Tablet(s) Oral at bedtime  vancomycin  IVPB 1000 milliGRAM(s) IV Intermittent every 24 hours      Social History  Smoking: former  Etoh: no  Drug use: no      FAMILY HISTORY:  No pertinent family history in first degree relatives      Vital Signs Last 24 Hrs  T(C): 36.8 (16 Apr 2018 14:48), Max: 37.1 (16 Apr 2018 04:13)  T(F): 98.2 (16 Apr 2018 14:48), Max: 98.7 (16 Apr 2018 04:13)  HR: 80 (16 Apr 2018 14:48) (80 - 85)  BP: 112/60 (16 Apr 2018 14:48) (105/59 - 138/79)  BP(mean): --  RR: 19 (16 Apr 2018 14:48) (18 - 20)  SpO2: 95% (16 Apr 2018 14:48) (92% - 95%)    CBC Full  -  ( 16 Apr 2018 05:40 )  WBC Count : 9.7 K/uL  Hemoglobin : 8.3 g/dL  Hematocrit : 24.9 %  Platelet Count - Automated : 508 K/uL  Mean Cell Volume : 87.8 fl  Mean Cell Hemoglobin : 29.3 pg  Mean Cell Hemoglobin Concentration : 33.4 gm/dL  Auto Neutrophil # : x  Auto Lymphocyte # : x  Auto Monocyte # : x  Auto Eosinophil # : x  Auto Basophil # : x  Auto Neutrophil % : x  Auto Lymphocyte % : x  Auto Monocyte % : x  Auto Eosinophil % : x  Auto Basophil % : x    04-16    121<L>  |  77<L>  |  12  ----------------------------<  179<H>  3.7   |  36<H>  |  0.82    Ca    8.8      16 Apr 2018 11:41      Serum Pro-Brain Natriuretic Peptide (04.16.18 @ 05:40)    Serum Pro-Brain Natriuretic Peptide: 3575 pg/mL          MICROBIOLOGY:  .Urine Catheterized  04-13-18   >100,000 CFU/ml Escherichia coli  <10,000 CFU/ml Normal Urogenital aristeo present  --  Escherichia coli      URINE CATHETER  03-25-18 --  --  --      BLOOD  03-25-18 --  --  --      URINE MIDSTREAM  03-22-18 --  --  --      BLOOD  03-22-18 --  --  --              Vancomycin Level, Trough: 9.7 ug/mL (04-16-18 @ 11:41)  v    Rapid RVP Result: NotDetec (04-13 @ 09:23)    RADIOLOGY  < from: CT Angio Chest w/ IV Cont (04.13.18 @ 17:57) >  Pulmonary edema. Small right and small-to-moderate loculated left pleural   effusions associated with compressive atelectasis.    Filling defect in the right middle lobar pulmonary artery at the   bifurcation of the segmental pulmonary arteries which is thought to   represent unopacified pulmonary arteries secondary to inadequate contrast   opacification and less likely pulmonary embolism.

## 2018-04-16 NOTE — CHART NOTE - NSCHARTNOTEFT_GEN_A_CORE
DELVIS MCBRIDE    Noted patient  sodium 122 in am repeat 121 p  seen and examined patient Axox3   no focal deficit  Denies CP , SOB, palpitations, lightheadness, N/v/D  Vital Signs Last 24 Hrs  T(C): 36.8 (16 Apr 2018 14:48), Max: 37.1 (16 Apr 2018 04:13)  T(F): 98.2 (16 Apr 2018 14:48), Max: 98.7 (16 Apr 2018 04:13)  HR: 80 (16 Apr 2018 14:48) (80 - 85)  BP: 112/60 (16 Apr 2018 14:48) (105/59 - 138/79)  BP(mean): --  RR: 19 (16 Apr 2018 14:48) (18 - 20)  SpO2: 95% (16 Apr 2018 14:48) (92% - 95%)                        8.3    9.7   )-----------( 508      ( 16 Apr 2018 05:40 )             24.9   04-16    121<L>  |  77<L>  |  12  ----------------------------<  179<H>  3.7   |  36<H>  |  0.82    Ca    8.8      16 Apr 2018 11:41        Interventions taken   repeat [facility]  Nephrology appreciated  Stat Urine Osm, urine Sodium   Discussed with Dr Luis above result made aware   will sign out to night HITESH MALAGON

## 2018-04-16 NOTE — CONSULT NOTE ADULT - ASSESSMENT
89f hx HTN, HLD, insomnia, recent admission for syncopal fall and rhabdomyolysis, recent dx of aflutter, recently diagnosed bilateral DVT and L ankle fracture, presenting from [facility] for increasing work of breathing and shortness of breath over the last 3-4 days. Suspect CHF more than PNA with positive urine cx

## 2018-04-16 NOTE — PROGRESS NOTE ADULT - SUBJECTIVE AND OBJECTIVE BOX
[facility] NEPHROLOGY- PROGRESS NOTE    89 year old female with history of HTN presents with SOB. Nephrology consulted for hyponatremia.    REVIEW OF SYSTEMS:  Gen: no changes in weight  Cards: no chest pain  Resp: no dyspnea  GI: + nausea without vomiting or diarrhea, + poor PO intake  Vascular: no LE edema    Eye cream from the 1960s Neocortef ?spelling caused eyes to becomes swollen (Unknown)  neomycin (Unknown)  soaps and creams causes rash uses only sensitive skin soap (Rash)  sulfa drugs (Unknown)  Voltaren (Rash)      Hospital Medications: Medications reviewed    VITALS:  T(F): 98.7 (18 @ 04:13), Max: 98.7 (18 @ 04:13)  HR: 84 (18 @ 05:44)  BP: 131/72 (18 @ 05:44)  RR: 18 (18 @ 05:44)  SpO2: 94% (18 @ 05:44)  Wt(kg): --  Height (cm): 157.48 ( @ 20:30), 157.48 ( @ 12:18)  Weight (kg): 72 ( @ 20:30), 65 ( @ 22:05), 65 ( @ 10:20)  BMI (kg/m2): 29 ( @ 20:30), 26.2 ( @ 22:05), 26.2 ( @ 12:18)  BSA (m2): 1.73 ( @ 20:30), 1.66 ( @ 22:05), 1.66 ( @ 12:18)    04-15 @ 07:01  -   @ 07:00  --------------------------------------------------------  IN: 930 mL / OUT: 1900 mL / NET: -970 mL     @ 07:01  -   @ 14:43  --------------------------------------------------------  IN: 360 mL / OUT: 0 mL / NET: 360 mL        PHYSICAL EXAM:    Gen: NAD, calm  Cards: RRR, +S1/S2, no M/G/R  Resp: CTA B/L  GI: soft, NT/ND, NABS  Vascular: no LE edema B/L    LABS:      121<L>  |  77<L>  |  12  ----------------------------<  179<H>  3.7   |  36<H>  |  0.82    Ca    8.8      2018 11:41      Creatinine Trend: 0.82 <--, 0.81 <--, 0.71 <--, 0.77 <--, 0.81 <--, 0.81 <--, 0.84 <--, 0.71 <--, 0.71 <--, 0.71 <--, 0.68 <--                        8.3    9.7   )-----------( 508      ( 2018 05:40 )             24.9     Urine Studies:  Urinalysis Basic - ( 2018 09:23 )    Color: Yellow / Appearance: SL Turbid / S.024 / pH:   Gluc:  / Ketone: Trace  / Bili: Negative / Urobili: Negative   Blood:  / Protein: 100 mg/dL / Nitrite: Positive   Leuk Esterase: Large / RBC: 2-5 /HPF / WBC >50 /HPF   Sq Epi:  / Non Sq Epi:  / Bacteria: Few /HPF      Sodium, Random Urine: 21 mmol/L ( @ 11:44)  Osmolality, Random Urine: 453 mos/kg ( @ 11:44)  Creatinine, Random Urine: 66 mg/dL ( @ 11:44)

## 2018-04-16 NOTE — CONSULT NOTE ADULT - SUBJECTIVE AND OBJECTIVE BOX
CARDIOLOGY CONSULT - Dr. Mccartney     CHIEF COMPLAINT:    HPI:  89f hx Pmed hx as mentioned below, recent admission for syncopal fall and rhabdomyolysis, presenting from [facility] for increasing work of breathing and shortness of breath over the last 3-4 days. History gathered from patient and rehab documentation, which reveals patient had been recently diagnosed with flu, treated with tamiflu which finished on 4/9/18, and subsequently developed increased work of breathing and shortness of breath associated with cough productive of whitish sputum and wheezing. No changes to mentation, no documented or reported fevers/chills. No chest pain/palpitations.         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 Mitral annular calcification and calcified mitral  leaflets with normal diastolic opening. Mild mitral  regurgitation.  2. Aortic valve leaflet morphology not well visualized.  Mild aortic regurgitation.  3. Endocardium not well visualized; grossly normal left  ventricular systolic function.  4. The right ventricle is not well visualized; grossly  normal right ventricular systolic function.  5. Estimated right ventricular systolic pressure equals 62  mm Hg, assuming right atrial pressure equals 10 mm Hg,  consistent with severe pulmonary hypertension.    < end of copied text >  [ ]  Catheterization:    [ ] Stress Test:  	    Home Medications:   * Patient Currently Takes Medications as of 30-Mar-2018 12:46 documented in Structured Notes  ·rivaroxaban 15 mg oral tablet: 1 tab(s) orally 2 times a day x 36 doses   ·busPIRone 10 mg oral tablet: 1 tab(s) orally 2 times a day  ·metoprolol tartrate 25 mg oral tablet: 1 tab(s) orally 2 times a day  ·pantoprazole 40 mg oral delayed release tablet: 1 tab(s) orally 2 times a day  · senna oral tablet: 2 tab(s) orally once a day (at bedtime)  ·polyethylene glycol 3350 oral powder for reconstitution: 17 gram(s) orally 2 times a day  ·docusate sodium 100 mg oral capsule: 1 cap(s) orally 3 times a day  ·dilTIAZem 60 mg oral tablet: 1 tab(s) orally 3 times a day  ·aspirin 81 mg oral delayed release tablet: 1 tab(s) orally once a day   roxaban 20 mg oral tablet: 1 tab(s) orally every 24 hours  ·Remeron 15 mg oral tablet: 1 tab(s) orally once a day (at bedtime)  · Vitamin D3 50,000 intl units oral capsule: 1 cap(s) orally once a week  ·Lipitor 10 mg oral tablet: 1 tab(s) orally once a day    MEDICATIONS:  MEDICATIONS  (STANDING):  aspirin enteric coated 81 milliGRAM(s) Oral daily  atorvastatin 10 milliGRAM(s) Oral at bedtime  busPIRone 10 milliGRAM(s) Oral two times a day  cholecalciferol 1000 Unit(s) Oral daily  diltiazem    Tablet 60 milliGRAM(s) Oral three times a day  docusate sodium 100 milliGRAM(s) Oral three times a day  furosemide   Injectable 20 milliGRAM(s) IV Push two times a day  mirtazapine 15 milliGRAM(s) Oral at bedtime  pantoprazole    Tablet 40 milliGRAM(s) Oral before breakfast  piperacillin/tazobactam IVPB. 3.375 Gram(s) IV Intermittent every 8 hours  polyethylene glycol 3350 17 Gram(s) Oral two times a day  rivaroxaban 20 milliGRAM(s) Oral every 24 hours  senna 2 Tablet(s) Oral at bedtime  vancomycin  IVPB 1000 milliGRAM(s) IV Intermittent every 24 hours      FAMILY HISTORY:  No pertinent family history in first degree relatives      SOCIAL HISTORY:    [ ] Non-smoker  [ ] Smoker  [ ] Alcohol    Allergies    Eye cream from the 1960s Neocortef ?spelling caused eyes to becomes swollen (Unknown)  neomycin (Unknown)  soaps and creams causes rash uses only sensitive skin soap (Rash)  sulfa drugs (Unknown)  Voltaren (Rash)    Intolerances    	    REVIEW OF SYSTEMS:  CONSTITUTIONAL: No fever, weight loss, or fatigue  EYES: No eye pain, visual disturbances, or discharge  ENMT:  No difficulty hearing, tinnitus, vertigo; No sinus or throat pain  NECK: No pain or stiffness  RESPIRATORY: No cough, wheezing, chills or hemoptysis; No Shortness of Breath  CARDIOVASCULAR: No chest pain, palpitations, passing out, dizziness, or leg swelling  GASTROINTESTINAL: No abdominal or epigastric pain. No nausea, vomiting, or hematemesis; No diarrhea or constipation. No melena or hematochezia.  GENITOURINARY: No dysuria, frequency, hematuria, or incontinence  NEUROLOGICAL: No headaches, memory loss, loss of strength, numbness, or tremors  SKIN: No itching, burning, rashes, or lesions   	    [ ] All others negative	  [ ] Unable to obtain    PHYSICAL EXAM:  T(C): 37.1 (04-16-18 @ 04:13), Max: 37.1 (04-16-18 @ 04:13)  HR: 84 (04-16-18 @ 05:44) (80 - 85)  BP: 131/72 (04-16-18 @ 05:44) (105/59 - 138/79)  RR: 18 (04-16-18 @ 05:44) (18 - 20)  SpO2: 94% (04-16-18 @ 05:44) (92% - 95%)  Wt(kg): --  I&O's Summary    15 Apr 2018 07:01  -  16 Apr 2018 07:00  --------------------------------------------------------  IN: 930 mL / OUT: 1900 mL / NET: -970 mL        Appearance: Normal	  Psychiatry: A & O x 3, Mood & affect appropriate  HEENT:   Normal oral mucosa, PERRL, EOMI	  Lymphatic: No lymphadenopathy  Cardiovascular: Normal S1 S2,RRR, No JVD, No murmurs  Respiratory: Lungs clear to auscultation	  Gastrointestinal:  Soft, Non-tender, + BS	  Skin: No rashes, No ecchymoses, No cyanosis	  Neurologic: Non-focal  Extremities: Normal range of motion, No clubbing, cyanosis or edema  Vascular: Peripheral pulses palpable 2+ bilaterally    TELEMETRY: 	    ECG:  	  RADIOLOGY:  < from: Xray Chest 1 View- PORTABLE-Urgent (04.13.18 @ 10:07) >    Impression: The cardiac silhouette is normal in size. There are small   bilateral pleural effusions. There is moderate pulmonary edema.      < end of copied text >    OTHER: 	  < from: CT Angio Chest w/ IV Cont (04.13.18 @ 17:57) >      CHEST:     LUNGS AND LARGE AIRWAYS: Patent central airways.  Groundglass opacities   and interlobular septal thickening, most predominant in the upper lobes   compatible with pulmonary edema.  PLEURA: Small right and small-to-moderate loculated left pleural   effusions associated withcompressive atelectasis.  VESSELS: Filling defect in the right middle lobar pulmonary artery at the   bifurcation of the segmental pulmonary arteries (6:304) which is thought   to represent unopacified pulmonary arteries secondary to inadequate   contrast opacification and less likely pulmonary embolism. Mildly   enlarged main pulmonary artery which can be visualized and pulmonary   arterial hypertension. Atheromatous disease of the aorta and its branches.  HEART: Cardiomegaly with left atrial enlargement. Aortic valvular and   mitral annular calcifications. Calcified coronary artery plaque. No   pericardial effusion.  MEDIASTINUM AND CHERI: No lymphadenopathy.  CHEST WALL AND LOWER NECK: Within normal limits.  VISUALIZED UPPER ABDOMEN: Moderate hiatal hernia.  BONES: Spinal degenerative changes          IMPRESSION:     Pulmonary edema. Small right and small-to-moderate loculated left pleural   effusions associated with compressive atelectasis.    Filling defect in the right middle lobar pulmonary artery at the   bifurcation of the segmental pulmonary arteries which is thought to   represent unopacified pulmonary arteries secondary to inadequate contrast   opacification and less likely pulmonary embolism.               < end of copied text >  	  LABS:	 	    CARDIAC MARKERS:          Serum Pro-Brain Natriuretic Peptide (04.16.18 @ 05:40) 3575 pg/mL                            8.3    9.7   )-----------( 508      ( 16 Apr 2018 05:40 )             24.9     04-16    122<L>  |  78<L>  |  12  ----------------------------<  90  4.4   |  35<H>  |  0.81    Ca    8.4      16 Apr 2018 05:40        proBNP: Serum Pro-Brain Natriuretic Peptide: 3575 pg/mL (04-16 @ 05:40)    Lipid Profile:   HgA1c:   TSH: CARDIOLOGY CONSULT - Dr. Mccartney     CHIEF COMPLAINT:    HPI:  89f hx Pmed hx as mentioned below, recent admission for syncopal fall and rhabdomyolysis, presenting from [facility] for increasing work of breathing and shortness of breath over the last 3-4 days. Patient forgetful, history gathered from h/p which reveals patient had been recently diagnosed with flu, treated with tamiflu which finished on 4/9/18, and subsequently developed increased work of breathing and shortness of breath associated with cough productive of whitish sputum and wheezing. cardiac history includes   PSVT, recently diagnoses with dvt on ·rivaroxaban , htn, hld. recent echo revealing normal LV sys fx, severe pulm htn. Denies  chest pain, dizziness orthopnea, le edema, fever or chills. 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 Mitral annular calcification and calcified mitral  leaflets with normal diastolic opening. Mild mitral  regurgitation.  2. Aortic valve leaflet morphology not well visualized.  Mild aortic regurgitation.  3. Endocardium not well visualized; grossly normal left  ventricular systolic function.  4. The right ventricle is not well visualized; grossly  normal right ventricular systolic function.  5. Estimated right ventricular systolic pressure equals 62  mm Hg, assuming right atrial pressure equals 10 mm Hg,  consistent with severe pulmonary hypertension.    < end of copied text >  [ ]  Catheterization:    [ ] Stress Test:  	    Home Medications:   * Patient Currently Takes Medications as of 30-Mar-2018 12:46 documented in Structured Notes  ·rivaroxaban 15 mg oral tablet: 1 tab(s) orally 2 times a day x 36 doses   ·busPIRone 10 mg oral tablet: 1 tab(s) orally 2 times a day  ·metoprolol tartrate 25 mg oral tablet: 1 tab(s) orally 2 times a day  ·pantoprazole 40 mg oral delayed release tablet: 1 tab(s) orally 2 times a day  · senna oral tablet: 2 tab(s) orally once a day (at bedtime)  ·polyethylene glycol 3350 oral powder for reconstitution: 17 gram(s) orally 2 times a day  ·docusate sodium 100 mg oral capsule: 1 cap(s) orally 3 times a day  ·dilTIAZem 60 mg oral tablet: 1 tab(s) orally 3 times a day  ·aspirin 81 mg oral delayed release tablet: 1 tab(s) orally once a day   roxaban 20 mg oral tablet: 1 tab(s) orally every 24 hours  ·Remeron 15 mg oral tablet: 1 tab(s) orally once a day (at bedtime)  · Vitamin D3 50,000 intl units oral capsule: 1 cap(s) orally once a week  ·Lipitor 10 mg oral tablet: 1 tab(s) orally once a day    MEDICATIONS:  MEDICATIONS  (STANDING):  aspirin enteric coated 81 milliGRAM(s) Oral daily  atorvastatin 10 milliGRAM(s) Oral at bedtime  busPIRone 10 milliGRAM(s) Oral two times a day  cholecalciferol 1000 Unit(s) Oral daily  diltiazem    Tablet 60 milliGRAM(s) Oral three times a day  docusate sodium 100 milliGRAM(s) Oral three times a day  furosemide   Injectable 20 milliGRAM(s) IV Push two times a day  mirtazapine 15 milliGRAM(s) Oral at bedtime  pantoprazole    Tablet 40 milliGRAM(s) Oral before breakfast  piperacillin/tazobactam IVPB. 3.375 Gram(s) IV Intermittent every 8 hours  polyethylene glycol 3350 17 Gram(s) Oral two times a day  rivaroxaban 20 milliGRAM(s) Oral every 24 hours  senna 2 Tablet(s) Oral at bedtime  vancomycin  IVPB 1000 milliGRAM(s) IV Intermittent every 24 hours      FAMILY HISTORY:  No pertinent family history in first degree relatives      SOCIAL HISTORY:    [ ] Non-smoker  [x ] Smoker : former   [ ] Alcohol    Allergies    Eye cream from the 1960s Neocortef ?spelling caused eyes to becomes swollen (Unknown)  neomycin (Unknown)  soaps and creams causes rash uses only sensitive skin soap (Rash)  sulfa drugs (Unknown)  Voltaren (Rash)    Intolerances    	    REVIEW OF SYSTEMS:  CONSTITUTIONAL: No fever, weight loss, or fatigue  EYES: No eye pain, visual disturbances, or discharge  ENMT:  No difficulty hearing, tinnitus, vertigo; No sinus or throat pain  NECK: No pain or stiffness  RESPIRATORY: No cough, wheezing, chills or hemoptysis; + Shortness of Breath  CARDIOVASCULAR: No chest pain, palpitations, passing out, dizziness, or leg swelling  GASTROINTESTINAL: No abdominal or epigastric pain. No nausea, vomiting, or hematemesis; No diarrhea or constipation. No melena or hematochezia.  GENITOURINARY: No dysuria, frequency, hematuria, or incontinence  NEUROLOGICAL: No headaches, memory loss, loss of strength, numbness, or tremors  SKIN: No itching, burning, rashes, or lesions   	    [ x] All others negative	  [ ] Unable to obtain    PHYSICAL EXAM:  T(C): 37.1 (04-16-18 @ 04:13), Max: 37.1 (04-16-18 @ 04:13)  HR: 84 (04-16-18 @ 05:44) (80 - 85)  BP: 131/72 (04-16-18 @ 05:44) (105/59 - 138/79)  RR: 18 (04-16-18 @ 05:44) (18 - 20)  SpO2: 94% (04-16-18 @ 05:44) (92% - 95%)  Wt(kg): --  I&O's Summary    15 Apr 2018 07:01  -  16 Apr 2018 07:00  --------------------------------------------------------  IN: 930 mL / OUT: 1900 mL / NET: -970 mL        Appearance: Normal	  Psychiatry: A & O x 2, Mood & affect appropriate  HEENT:   Normal oral mucosa, PERRL, EOMI	  Lymphatic: No lymphadenopathy  Cardiovascular: Normal S1 S2,RRR   Respiratory: Lungs clear to auscultation/ diminished at base 	  Gastrointestinal:  Soft, Non-tender, + BS	  Skin: No rashes, No ecchymoses, No cyanosis	  Neurologic: Non-focal  Extremities: Normal range of motion, No clubbing, cyanosis or edema + Left LE   splint   Vascular: Peripheral pulses palpable 2+ bilaterally    TELEMETRY: NSR	    ECG:  NSR with incomplete RBBB 	  RADIOLOGY:  < from: Xray Chest 1 View- PORTABLE-Urgent (04.13.18 @ 10:07) >    Impression: The cardiac silhouette is normal in size. There are small   bilateral pleural effusions. There is moderate pulmonary edema.      < end of copied text >    OTHER: 	  < from: CT Angio Chest w/ IV Cont (04.13.18 @ 17:57) >      CHEST:     LUNGS AND LARGE AIRWAYS: Patent central airways.  Groundglass opacities   and interlobular septal thickening, most predominant in the upper lobes   compatible with pulmonary edema.  PLEURA: Small right and small-to-moderate loculated left pleural   effusions associated withcompressive atelectasis.  VESSELS: Filling defect in the right middle lobar pulmonary artery at the   bifurcation of the segmental pulmonary arteries (6:304) which is thought   to represent unopacified pulmonary arteries secondary to inadequate   contrast opacification and less likely pulmonary embolism. Mildly   enlarged main pulmonary artery which can be visualized and pulmonary   arterial hypertension. Atheromatous disease of the aorta and its branches.  HEART: Cardiomegaly with left atrial enlargement. Aortic valvular and   mitral annular calcifications. Calcified coronary artery plaque. No   pericardial effusion.  MEDIASTINUM AND CHERI: No lymphadenopathy.  CHEST WALL AND LOWER NECK: Within normal limits.  VISUALIZED UPPER ABDOMEN: Moderate hiatal hernia.  BONES: Spinal degenerative changes          IMPRESSION:     Pulmonary edema. Small right and small-to-moderate loculated left pleural   effusions associated with compressive atelectasis.    Filling defect in the right middle lobar pulmonary artery at the   bifurcation of the segmental pulmonary arteries which is thought to   represent unopacified pulmonary arteries secondary to inadequate contrast   opacification and less likely pulmonary embolism.               < end of copied text >  	  LABS:	 	    CARDIAC MARKERS:          Serum Pro-Brain Natriuretic Peptide (04.16.18 @ 05:40) 3575 pg/mL                            8.3    9.7   )-----------( 508      ( 16 Apr 2018 05:40 )             24.9     04-16    122<L>  |  78<L>  |  12  ----------------------------<  90  4.4   |  35<H>  |  0.81    Ca    8.4      16 Apr 2018 05:40        proBNP: Serum Pro-Brain Natriuretic Peptide: 3575 pg/mL (04-16 @ 05:40)    Lipid Profile:   HgA1c:   TSH: CARDIOLOGY CONSULT - Dr. Mccartney     CHIEF COMPLAINT: SOB      HPI:  89f hx Pmed hx as mentioned below, recent admission for syncopal fall and rhabdomyolysis, presenting from [facility] for increasing work of breathing and shortness of breath over the last 3-4 days. Patient forgetful, history gathered from h/p which reveals patient had been recently diagnosed with flu, treated with tamiflu which finished on 4/9/18, and subsequently developed increased work of breathing and shortness of breath associated with cough productive of whitish sputum and wheezing. cardiac history includes   PSVT, recently diagnoses with dvt on ·rivaroxaban , htn, hld. recent echo revealing normal LV sys fx, severe pulm htn. Denies  chest pain, dizziness orthopnea, le edema, fever or chills. 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 Mitral annular calcification and calcified mitral  leaflets with normal diastolic opening. Mild mitral  regurgitation.  2. Aortic valve leaflet morphology not well visualized.  Mild aortic regurgitation.  3. Endocardium not well visualized; grossly normal left  ventricular systolic function.  4. The right ventricle is not well visualized; grossly  normal right ventricular systolic function.  5. Estimated right ventricular systolic pressure equals 62  mm Hg, assuming right atrial pressure equals 10 mm Hg,  consistent with severe pulmonary hypertension.    < end of copied text >  [ ]  Catheterization:    [ ] Stress Test:  	    Home Medications:   * Patient Currently Takes Medications as of 30-Mar-2018 12:46 documented in Structured Notes  ·rivaroxaban 15 mg oral tablet: 1 tab(s) orally 2 times a day x 36 doses   ·busPIRone 10 mg oral tablet: 1 tab(s) orally 2 times a day  ·metoprolol tartrate 25 mg oral tablet: 1 tab(s) orally 2 times a day  ·pantoprazole 40 mg oral delayed release tablet: 1 tab(s) orally 2 times a day  · senna oral tablet: 2 tab(s) orally once a day (at bedtime)  ·polyethylene glycol 3350 oral powder for reconstitution: 17 gram(s) orally 2 times a day  ·docusate sodium 100 mg oral capsule: 1 cap(s) orally 3 times a day  ·dilTIAZem 60 mg oral tablet: 1 tab(s) orally 3 times a day  ·aspirin 81 mg oral delayed release tablet: 1 tab(s) orally once a day   roxaban 20 mg oral tablet: 1 tab(s) orally every 24 hours  ·Remeron 15 mg oral tablet: 1 tab(s) orally once a day (at bedtime)  · Vitamin D3 50,000 intl units oral capsule: 1 cap(s) orally once a week  ·Lipitor 10 mg oral tablet: 1 tab(s) orally once a day    MEDICATIONS:  MEDICATIONS  (STANDING):  aspirin enteric coated 81 milliGRAM(s) Oral daily  atorvastatin 10 milliGRAM(s) Oral at bedtime  busPIRone 10 milliGRAM(s) Oral two times a day  cholecalciferol 1000 Unit(s) Oral daily  diltiazem    Tablet 60 milliGRAM(s) Oral three times a day  docusate sodium 100 milliGRAM(s) Oral three times a day  furosemide   Injectable 20 milliGRAM(s) IV Push two times a day  mirtazapine 15 milliGRAM(s) Oral at bedtime  pantoprazole    Tablet 40 milliGRAM(s) Oral before breakfast  piperacillin/tazobactam IVPB. 3.375 Gram(s) IV Intermittent every 8 hours  polyethylene glycol 3350 17 Gram(s) Oral two times a day  rivaroxaban 20 milliGRAM(s) Oral every 24 hours  senna 2 Tablet(s) Oral at bedtime  vancomycin  IVPB 1000 milliGRAM(s) IV Intermittent every 24 hours      FAMILY HISTORY:  No pertinent family history in first degree relatives      SOCIAL HISTORY:    [ ] Non-smoker  [x ] Smoker : former   [ ] Alcohol    Allergies    Eye cream from the 1960s Neocortef ?spelling caused eyes to becomes swollen (Unknown)  neomycin (Unknown)  soaps and creams causes rash uses only sensitive skin soap (Rash)  sulfa drugs (Unknown)  Voltaren (Rash)    Intolerances    	    REVIEW OF SYSTEMS:  CONSTITUTIONAL: No fever, weight loss, or fatigue  EYES: No eye pain, visual disturbances, or discharge  ENMT:  No difficulty hearing, tinnitus, vertigo; No sinus or throat pain  NECK: No pain or stiffness  RESPIRATORY: No cough, wheezing, chills or hemoptysis; + Shortness of Breath  CARDIOVASCULAR: No chest pain, palpitations, passing out, dizziness, or leg swelling  GASTROINTESTINAL: No abdominal or epigastric pain. No nausea, vomiting, or hematemesis; No diarrhea or constipation. No melena or hematochezia.  GENITOURINARY: No dysuria, frequency, hematuria, or incontinence  NEUROLOGICAL: No headaches, memory loss, loss of strength, numbness, or tremors  SKIN: No itching, burning, rashes, or lesions   	    [ x] All others negative	  [ ] Unable to obtain    PHYSICAL EXAM:  T(C): 37.1 (04-16-18 @ 04:13), Max: 37.1 (04-16-18 @ 04:13)  HR: 84 (04-16-18 @ 05:44) (80 - 85)  BP: 131/72 (04-16-18 @ 05:44) (105/59 - 138/79)  RR: 18 (04-16-18 @ 05:44) (18 - 20)  SpO2: 94% (04-16-18 @ 05:44) (92% - 95%)  Wt(kg): --  I&O's Summary    15 Apr 2018 07:01  -  16 Apr 2018 07:00  --------------------------------------------------------  IN: 930 mL / OUT: 1900 mL / NET: -970 mL        Appearance: Normal	  Psychiatry: A & O x 2, Mood & affect appropriate  HEENT:   Normal oral mucosa, PERRL, EOMI	  Lymphatic: No lymphadenopathy  Cardiovascular: Normal S1 S2,RRR   Respiratory: Lungs clear to auscultation/ diminished at base 	  Gastrointestinal:  Soft, Non-tender, + BS	  Skin: No rashes, No ecchymoses, No cyanosis	  Neurologic: Non-focal  Extremities: Normal range of motion, No clubbing, cyanosis or edema + Left LE   splint   Vascular: Peripheral pulses palpable 2+ bilaterally    TELEMETRY: NSR	    ECG:  NSR with incomplete RBBB   left ant fascicular block 	  RADIOLOGY:  < from: Xray Chest 1 View- PORTABLE-Urgent (04.13.18 @ 10:07) >    Impression: The cardiac silhouette is normal in size. There are small   bilateral pleural effusions. There is moderate pulmonary edema.      < end of copied text >    OTHER: 	  < from: CT Angio Chest w/ IV Cont (04.13.18 @ 17:57) >      CHEST:     LUNGS AND LARGE AIRWAYS: Patent central airways.  Groundglass opacities   and interlobular septal thickening, most predominant in the upper lobes   compatible with pulmonary edema.  PLEURA: Small right and small-to-moderate loculated left pleural   effusions associated withcompressive atelectasis.  VESSELS: Filling defect in the right middle lobar pulmonary artery at the   bifurcation of the segmental pulmonary arteries (6:304) which is thought   to represent unopacified pulmonary arteries secondary to inadequate   contrast opacification and less likely pulmonary embolism. Mildly   enlarged main pulmonary artery which can be visualized and pulmonary   arterial hypertension. Atheromatous disease of the aorta and its branches.  HEART: Cardiomegaly with left atrial enlargement. Aortic valvular and   mitral annular calcifications. Calcified coronary artery plaque. No   pericardial effusion.  MEDIASTINUM AND CHERI: No lymphadenopathy.  CHEST WALL AND LOWER NECK: Within normal limits.  VISUALIZED UPPER ABDOMEN: Moderate hiatal hernia.  BONES: Spinal degenerative changes          IMPRESSION:     Pulmonary edema. Small right and small-to-moderate loculated left pleural   effusions associated with compressive atelectasis.    Filling defect in the right middle lobar pulmonary artery at the   bifurcation of the segmental pulmonary arteries which is thought to   represent unopacified pulmonary arteries secondary to inadequate contrast   opacification and less likely pulmonary embolism.               < end of copied text >  	  LABS:	 	    CARDIAC MARKERS:          Serum Pro-Brain Natriuretic Peptide (04.16.18 @ 05:40) 3575 pg/mL                            8.3    9.7   )-----------( 508      ( 16 Apr 2018 05:40 )             24.9     04-16    122<L>  |  78<L>  |  12  ----------------------------<  90  4.4   |  35<H>  |  0.81    Ca    8.4      16 Apr 2018 05:40        proBNP: Serum Pro-Brain Natriuretic Peptide: 3575 pg/mL (04-16 @ 05:40)    Lipid Profile:   HgA1c:   TSH: CARDIOLOGY CONSULT - Dr. Mccartney     CHIEF COMPLAINT: SOB      HPI:  89f hx Pmed hx as mentioned below, recent admission for syncopal fall and rhabdomyolysis, presenting from [facility] for increasing work of breathing and shortness of breath over the last 3-4 days. Patient forgetful, history gathered from h/p which reveals patient had been recently diagnosed with flu, treated with tamiflu which finished on 4/9/18, and subsequently developed increased work of breathing and shortness of breath associated with cough productive of whitish sputum and wheezing. cardiac history includes   PSVT, recently diagnosed with dvt on ·rivaroxaban , htn and hld. Recent echo revealing normal LV sys fx, severe pulm htn. Denies  chest pain, dizziness orthopnea, le edema, fever or chills. 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 Mitral annular calcification and calcified mitral  leaflets with normal diastolic opening. Mild mitral  regurgitation.  2. Aortic valve leaflet morphology not well visualized.  Mild aortic regurgitation.  3. Endocardium not well visualized; grossly normal left  ventricular systolic function.  4. The right ventricle is not well visualized; grossly  normal right ventricular systolic function.  5. Estimated right ventricular systolic pressure equals 62  mm Hg, assuming right atrial pressure equals 10 mm Hg,  consistent with severe pulmonary hypertension.    < end of copied text >  [ ]  Catheterization:    [ ] Stress Test:  	    Home Medications:   * Patient Currently Takes Medications as of 30-Mar-2018 12:46 documented in Structured Notes  ·rivaroxaban 15 mg oral tablet: 1 tab(s) orally 2 times a day x 36 doses   ·busPIRone 10 mg oral tablet: 1 tab(s) orally 2 times a day  ·metoprolol tartrate 25 mg oral tablet: 1 tab(s) orally 2 times a day  ·pantoprazole 40 mg oral delayed release tablet: 1 tab(s) orally 2 times a day  · senna oral tablet: 2 tab(s) orally once a day (at bedtime)  ·polyethylene glycol 3350 oral powder for reconstitution: 17 gram(s) orally 2 times a day  ·docusate sodium 100 mg oral capsule: 1 cap(s) orally 3 times a day  ·dilTIAZem 60 mg oral tablet: 1 tab(s) orally 3 times a day  ·aspirin 81 mg oral delayed release tablet: 1 tab(s) orally once a day   roxaban 20 mg oral tablet: 1 tab(s) orally every 24 hours  ·Remeron 15 mg oral tablet: 1 tab(s) orally once a day (at bedtime)  · Vitamin D3 50,000 intl units oral capsule: 1 cap(s) orally once a week  ·Lipitor 10 mg oral tablet: 1 tab(s) orally once a day    MEDICATIONS:  MEDICATIONS  (STANDING):  aspirin enteric coated 81 milliGRAM(s) Oral daily  atorvastatin 10 milliGRAM(s) Oral at bedtime  busPIRone 10 milliGRAM(s) Oral two times a day  cholecalciferol 1000 Unit(s) Oral daily  diltiazem    Tablet 60 milliGRAM(s) Oral three times a day  docusate sodium 100 milliGRAM(s) Oral three times a day  furosemide   Injectable 20 milliGRAM(s) IV Push two times a day  mirtazapine 15 milliGRAM(s) Oral at bedtime  pantoprazole    Tablet 40 milliGRAM(s) Oral before breakfast  piperacillin/tazobactam IVPB. 3.375 Gram(s) IV Intermittent every 8 hours  polyethylene glycol 3350 17 Gram(s) Oral two times a day  rivaroxaban 20 milliGRAM(s) Oral every 24 hours  senna 2 Tablet(s) Oral at bedtime  vancomycin  IVPB 1000 milliGRAM(s) IV Intermittent every 24 hours      FAMILY HISTORY:  No pertinent family history in first degree relatives      SOCIAL HISTORY:    [ ] Non-smoker  [x ] Smoker : former   [ ] Alcohol    Allergies    Eye cream from the 1960s Neocortef ?spelling caused eyes to becomes swollen (Unknown)  neomycin (Unknown)  soaps and creams causes rash uses only sensitive skin soap (Rash)  sulfa drugs (Unknown)  Voltaren (Rash)    Intolerances    	    REVIEW OF SYSTEMS:  CONSTITUTIONAL: No fever, weight loss, or fatigue  EYES: No eye pain, visual disturbances, or discharge  ENMT:  No difficulty hearing, tinnitus, vertigo; No sinus or throat pain  NECK: No pain or stiffness  RESPIRATORY: No cough, wheezing, chills or hemoptysis; + Shortness of Breath  CARDIOVASCULAR: No chest pain, palpitations, passing out, dizziness, or leg swelling  GASTROINTESTINAL: No abdominal or epigastric pain. No nausea, vomiting, or hematemesis; No diarrhea or constipation. No melena or hematochezia.  GENITOURINARY: No dysuria, frequency, hematuria, or incontinence  NEUROLOGICAL: No headaches, memory loss, loss of strength, numbness, or tremors  SKIN: No itching, burning, rashes, or lesions   	    [ x] All others negative	  [ ] Unable to obtain    PHYSICAL EXAM:  T(C): 37.1 (04-16-18 @ 04:13), Max: 37.1 (04-16-18 @ 04:13)  HR: 84 (04-16-18 @ 05:44) (80 - 85)  BP: 131/72 (04-16-18 @ 05:44) (105/59 - 138/79)  RR: 18 (04-16-18 @ 05:44) (18 - 20)  SpO2: 94% (04-16-18 @ 05:44) (92% - 95%)  Wt(kg): --  I&O's Summary    15 Apr 2018 07:01  -  16 Apr 2018 07:00  --------------------------------------------------------  IN: 930 mL / OUT: 1900 mL / NET: -970 mL        Appearance: Normal	  Psychiatry: A & O x 2, Mood & affect appropriate  HEENT:   Normal oral mucosa, PERRL, EOMI	  Lymphatic: No lymphadenopathy  Cardiovascular: Normal S1 S2,RRR   Respiratory: Lungs clear to auscultation/ diminished at base 	  Gastrointestinal:  Soft, Non-tender, + BS	  Skin: No rashes, No ecchymoses, No cyanosis	  Neurologic: Non-focal  Extremities: Normal range of motion, No clubbing, cyanosis or edema + Left LE   splint   Vascular: Peripheral pulses palpable 2+ bilaterally    TELEMETRY: NSR	    ECG:  NSR with incomplete RBBB   left ant fascicular block 	  RADIOLOGY:  < from: Xray Chest 1 View- PORTABLE-Urgent (04.13.18 @ 10:07) >    Impression: The cardiac silhouette is normal in size. There are small   bilateral pleural effusions. There is moderate pulmonary edema.      < end of copied text >    OTHER: 	  < from: CT Angio Chest w/ IV Cont (04.13.18 @ 17:57) >      CHEST:     LUNGS AND LARGE AIRWAYS: Patent central airways.  Groundglass opacities   and interlobular septal thickening, most predominant in the upper lobes   compatible with pulmonary edema.  PLEURA: Small right and small-to-moderate loculated left pleural   effusions associated withcompressive atelectasis.  VESSELS: Filling defect in the right middle lobar pulmonary artery at the   bifurcation of the segmental pulmonary arteries (6:304) which is thought   to represent unopacified pulmonary arteries secondary to inadequate   contrast opacification and less likely pulmonary embolism. Mildly   enlarged main pulmonary artery which can be visualized and pulmonary   arterial hypertension. Atheromatous disease of the aorta and its branches.  HEART: Cardiomegaly with left atrial enlargement. Aortic valvular and   mitral annular calcifications. Calcified coronary artery plaque. No   pericardial effusion.  MEDIASTINUM AND CHERI: No lymphadenopathy.  CHEST WALL AND LOWER NECK: Within normal limits.  VISUALIZED UPPER ABDOMEN: Moderate hiatal hernia.  BONES: Spinal degenerative changes          IMPRESSION:     Pulmonary edema. Small right and small-to-moderate loculated left pleural   effusions associated with compressive atelectasis.    Filling defect in the right middle lobar pulmonary artery at the   bifurcation of the segmental pulmonary arteries which is thought to   represent unopacified pulmonary arteries secondary to inadequate contrast   opacification and less likely pulmonary embolism.               < end of copied text >  	  LABS:	 	    CARDIAC MARKERS:          Serum Pro-Brain Natriuretic Peptide (04.16.18 @ 05:40) 3575 pg/mL                            8.3    9.7   )-----------( 508      ( 16 Apr 2018 05:40 )             24.9     04-16    122<L>  |  78<L>  |  12  ----------------------------<  90  4.4   |  35<H>  |  0.81    Ca    8.4      16 Apr 2018 05:40        proBNP: Serum Pro-Brain Natriuretic Peptide: 3575 pg/mL (04-16 @ 05:40)    Lipid Profile:   HgA1c:   TSH:

## 2018-04-16 NOTE — CONSULT NOTE ADULT - CONSTITUTIONAL
detailed exam 18 y/o M with no significant PMHx presents to ED c/o cough and congestion x 2 days. Pt denies any history of asthma or smoking. Pt is in ED with concern for bronchitis. Pt also denies fever, chills, shortness of breath or any other complaints. NKDA.

## 2018-04-16 NOTE — PROGRESS NOTE ADULT - PROBLEM SELECTOR PLAN 1
Initially thought to be due to volume overload given improvement in serum sodium with IV diuretic therapy with acute decrease today in setting of nausea and poor PO intake. Differential diagnosis includes secondary to pre-renal azotemia (as has been diuresed and now appears dry) versus poor osmolar intake given poor PO intake versus nausea induced increased ADH state. Patient also on remeron which can cause hyponatremia in less than 1% of patients however doubt contributing to current hyponatremia. Check urine sodium and urine osm STAT (fresh sample). Further management pending results. Monitor serum sodium.

## 2018-04-17 LAB
ANION GAP SERPL CALC-SCNC: 8 MMOL/L — SIGNIFICANT CHANGE UP (ref 5–17)
ANION GAP SERPL CALC-SCNC: 8 MMOL/L — SIGNIFICANT CHANGE UP (ref 5–17)
BUN SERPL-MCNC: 11 MG/DL — SIGNIFICANT CHANGE UP (ref 7–23)
BUN SERPL-MCNC: 14 MG/DL — SIGNIFICANT CHANGE UP (ref 7–23)
CALCIUM SERPL-MCNC: 8.5 MG/DL — SIGNIFICANT CHANGE UP (ref 8.4–10.5)
CALCIUM SERPL-MCNC: 9.2 MG/DL — SIGNIFICANT CHANGE UP (ref 8.4–10.5)
CHLORIDE SERPL-SCNC: 77 MMOL/L — LOW (ref 96–108)
CHLORIDE SERPL-SCNC: 79 MMOL/L — LOW (ref 96–108)
CO2 SERPL-SCNC: 35 MMOL/L — HIGH (ref 22–31)
CO2 SERPL-SCNC: 37 MMOL/L — HIGH (ref 22–31)
CREAT SERPL-MCNC: 0.64 MG/DL — SIGNIFICANT CHANGE UP (ref 0.5–1.3)
CREAT SERPL-MCNC: 0.71 MG/DL — SIGNIFICANT CHANGE UP (ref 0.5–1.3)
GLUCOSE SERPL-MCNC: 115 MG/DL — HIGH (ref 70–99)
GLUCOSE SERPL-MCNC: 85 MG/DL — SIGNIFICANT CHANGE UP (ref 70–99)
HCT VFR BLD CALC: 24.9 % — LOW (ref 34.5–45)
HGB BLD-MCNC: 8.5 G/DL — LOW (ref 11.5–15.5)
MCHC RBC-ENTMCNC: 29.8 PG — SIGNIFICANT CHANGE UP (ref 27–34)
MCHC RBC-ENTMCNC: 34.2 GM/DL — SIGNIFICANT CHANGE UP (ref 32–36)
MCV RBC AUTO: 87.1 FL — SIGNIFICANT CHANGE UP (ref 80–100)
OSMOLALITY UR: 391 MOS/KG — SIGNIFICANT CHANGE UP (ref 300–900)
PLATELET # BLD AUTO: 514 K/UL — HIGH (ref 150–400)
POTASSIUM SERPL-MCNC: 3.7 MMOL/L — SIGNIFICANT CHANGE UP (ref 3.5–5.3)
POTASSIUM SERPL-MCNC: 3.9 MMOL/L — SIGNIFICANT CHANGE UP (ref 3.5–5.3)
POTASSIUM SERPL-SCNC: 3.7 MMOL/L — SIGNIFICANT CHANGE UP (ref 3.5–5.3)
POTASSIUM SERPL-SCNC: 3.9 MMOL/L — SIGNIFICANT CHANGE UP (ref 3.5–5.3)
RBC # BLD: 2.86 M/UL — LOW (ref 3.8–5.2)
RBC # FLD: 13.7 % — SIGNIFICANT CHANGE UP (ref 10.3–14.5)
SODIUM SERPL-SCNC: 120 MMOL/L — CRITICAL LOW (ref 135–145)
SODIUM SERPL-SCNC: 124 MMOL/L — LOW (ref 135–145)
SODIUM UR-SCNC: 26 MMOL/L — SIGNIFICANT CHANGE UP
WBC # BLD: 9.9 K/UL — SIGNIFICANT CHANGE UP (ref 3.8–10.5)
WBC # FLD AUTO: 9.9 K/UL — SIGNIFICANT CHANGE UP (ref 3.8–10.5)

## 2018-04-17 PROCEDURE — 99232 SBSQ HOSP IP/OBS MODERATE 35: CPT

## 2018-04-17 PROCEDURE — 93306 TTE W/DOPPLER COMPLETE: CPT | Mod: 26

## 2018-04-17 RX ORDER — ACETAMINOPHEN 500 MG
650 TABLET ORAL ONCE
Qty: 0 | Refills: 0 | Status: COMPLETED | OUTPATIENT
Start: 2018-04-17 | End: 2018-04-17

## 2018-04-17 RX ORDER — SODIUM CHLORIDE 5 G/100ML
1000 INJECTION, SOLUTION INTRAVENOUS
Qty: 0 | Refills: 0 | Status: DISCONTINUED | OUTPATIENT
Start: 2018-04-17 | End: 2018-04-18

## 2018-04-17 RX ADMIN — PANTOPRAZOLE SODIUM 40 MILLIGRAM(S): 20 TABLET, DELAYED RELEASE ORAL at 06:04

## 2018-04-17 RX ADMIN — SODIUM CHLORIDE 2 GRAM(S): 9 INJECTION INTRAMUSCULAR; INTRAVENOUS; SUBCUTANEOUS at 06:04

## 2018-04-17 RX ADMIN — Medication 100 MILLIGRAM(S): at 22:00

## 2018-04-17 RX ADMIN — ONDANSETRON 4 MILLIGRAM(S): 8 TABLET, FILM COATED ORAL at 17:11

## 2018-04-17 RX ADMIN — Medication 650 MILLIGRAM(S): at 23:00

## 2018-04-17 RX ADMIN — SODIUM CHLORIDE 2 GRAM(S): 9 INJECTION INTRAMUSCULAR; INTRAVENOUS; SUBCUTANEOUS at 17:10

## 2018-04-17 RX ADMIN — Medication 100 MILLIGRAM(S): at 23:48

## 2018-04-17 RX ADMIN — Medication 1000 MILLIGRAM(S): at 00:07

## 2018-04-17 RX ADMIN — Medication 10 MILLIGRAM(S): at 13:09

## 2018-04-17 RX ADMIN — Medication 81 MILLIGRAM(S): at 13:09

## 2018-04-17 RX ADMIN — Medication 10 MILLIGRAM(S): at 23:47

## 2018-04-17 RX ADMIN — Medication 1000 UNIT(S): at 13:09

## 2018-04-17 RX ADMIN — RIVAROXABAN 20 MILLIGRAM(S): KIT at 17:11

## 2018-04-17 RX ADMIN — SODIUM CHLORIDE 50 MILLILITER(S): 5 INJECTION, SOLUTION INTRAVENOUS at 23:48

## 2018-04-17 RX ADMIN — ATORVASTATIN CALCIUM 10 MILLIGRAM(S): 80 TABLET, FILM COATED ORAL at 23:47

## 2018-04-17 RX ADMIN — Medication 650 MILLIGRAM(S): at 22:00

## 2018-04-17 NOTE — PROGRESS NOTE ADULT - SUBJECTIVE AND OBJECTIVE BOX
[facility] NEPHROLOGY- PROGRESS NOTE    89 year old female with history of HTN presents with SOB. Nephrology consulted for hyponatremia.    REVIEW OF SYSTEMS:  Gen: no changes in weight  Cards: no chest pain  Resp: no dyspnea  GI: + nausea without vomiting or diarrhea, + poor PO intake  Vascular: no LE edema    Eye cream from the 1960s Neocortef ?spelling caused eyes to becomes swollen (Unknown)  neomycin (Unknown)  soaps and creams causes rash uses only sensitive skin soap (Rash)  sulfa drugs (Unknown)  Voltaren (Rash)      Hospital Medications: Medications reviewed      VITALS:  T(F): 97.2 (18 @ 04:29), Max: 98.2 (18 @ 14:48)  HR: 80 (18 @ 04:29)  BP: 129/70 (18 @ 04:29)  RR: 18 (18 @ 04:29)  SpO2: 98% (18 @ 04:29)  Wt(kg): --     @ 07:01  -   @ 07:00  --------------------------------------------------------  IN: 1410 mL / OUT: 1200 mL / NET: 210 mL        PHYSICAL EXAM:    Gen: NAD, calm  Cards: RRR, +S1/S2, no M/G/R  Resp: CTA B/L  GI: soft, NT/ND, NABS  Vascular: no LE edema B/L      LABS:      124<L>  |  79<L>  |  11  ----------------------------<  85  3.7   |  37<H>  |  0.71    Ca    9.2      2018 06:51      Creatinine Trend: 0.71 <--, 0.82 <--, 0.81 <--, 0.71 <--, 0.77 <--, 0.81 <--, 0.81 <--, 0.84 <--, 0.71 <--, 0.71 <--, 0.71 <--                        8.5    9.9   )-----------( 514      ( 2018 06:51 )             24.9     Urine Studies:  Urinalysis Basic - ( 2018 09:23 )    Color: Yellow / Appearance: SL Turbid / S.024 / pH:   Gluc:  / Ketone: Trace  / Bili: Negative / Urobili: Negative   Blood:  / Protein: 100 mg/dL / Nitrite: Positive   Leuk Esterase: Large / RBC: 2-5 /HPF / WBC >50 /HPF   Sq Epi:  / Non Sq Epi:  / Bacteria: Few /HPF      Sodium, Random Urine: 69 mmol/L ( @ 14:31)  Osmolality, Random Urine: 227 mos/kg ( @ 14:31)  Sodium, Random Urine: 21 mmol/L ( @ 11:44)  Osmolality, Random Urine: 453 mos/kg ( @ 11:44)  Creatinine, Random Urine: 66 mg/dL ( @ 11:44)

## 2018-04-17 NOTE — PROGRESS NOTE ADULT - SUBJECTIVE AND OBJECTIVE BOX
DELVIS MCBRIDE 89y [id number]    Patient is a 89y old  Female who presents with a chief complaint of SOB (13 Apr 2018 20:35)      Follow Up/CC:  ID following for SOB    Interval History/ROS: feels ok, cough better    Allergies    Eye cream from the 1960s Neocortef ?spelling caused eyes to becomes swollen (Unknown)  neomycin (Unknown)  soaps and creams causes rash uses only sensitive skin soap (Rash)  sulfa drugs (Unknown)  Voltaren (Rash)    Intolerances        ANTIMICROBIALS:      MEDICATIONS  (STANDING):  aspirin enteric coated 81 milliGRAM(s) Oral daily  atorvastatin 10 milliGRAM(s) Oral at bedtime  busPIRone 10 milliGRAM(s) Oral two times a day  cholecalciferol 1000 Unit(s) Oral daily  diltiazem    Tablet 90 milliGRAM(s) Oral three times a day  docusate sodium 100 milliGRAM(s) Oral three times a day  mirtazapine 15 milliGRAM(s) Oral at bedtime  pantoprazole    Tablet 40 milliGRAM(s) Oral before breakfast  polyethylene glycol 3350 17 Gram(s) Oral two times a day  rivaroxaban 20 milliGRAM(s) Oral every 24 hours  senna 2 Tablet(s) Oral at bedtime  sodium chloride 2 Gram(s) Oral three times a day    MEDICATIONS  (PRN):  ondansetron Injectable 4 milliGRAM(s) IV Push every 8 hours PRN Nausea and/or Vomiting        Vital Signs Last 24 Hrs  T(C): 36.2 (17 Apr 2018 04:29), Max: 36.8 (16 Apr 2018 14:48)  T(F): 97.2 (17 Apr 2018 04:29), Max: 98.2 (16 Apr 2018 14:48)  HR: 80 (17 Apr 2018 04:29) (80 - 80)  BP: 129/70 (17 Apr 2018 04:29) (112/60 - 129/70)  BP(mean): --  RR: 18 (17 Apr 2018 04:29) (18 - 19)  SpO2: 98% (17 Apr 2018 04:29) (95% - 98%)    CBC Full  -  ( 17 Apr 2018 06:51 )  WBC Count : 9.9 K/uL  Hemoglobin : 8.5 g/dL  Hematocrit : 24.9 %  Platelet Count - Automated : 514 K/uL  Mean Cell Volume : 87.1 fl  Mean Cell Hemoglobin : 29.8 pg  Mean Cell Hemoglobin Concentration : 34.2 gm/dL  Auto Neutrophil # : x  Auto Lymphocyte # : x  Auto Monocyte # : x  Auto Eosinophil # : x  Auto Basophil # : x  Auto Neutrophil % : x  Auto Lymphocyte % : x  Auto Monocyte % : x  Auto Eosinophil % : x  Auto Basophil % : x    04-17    124<L>  |  79<L>  |  11  ----------------------------<  85  3.7   |  37<H>  |  0.71    Ca    9.2      17 Apr 2018 06:51            MICROBIOLOGY:  .Urine Catheterized  04-13-18   >100,000 CFU/ml Escherichia coli  <10,000 CFU/ml Normal Urogenital aristeo present  --  Escherichia coli      URINE CATHETER  03-25-18 --  --  --      BLOOD  03-25-18 --  --  --      URINE MIDSTREAM  03-22-18 --  --  --      BLOOD  03-22-18 --  --  --    Rapid RVP Result: Dagmar (04-13 @ 09:23)      RADIOLOGY

## 2018-04-17 NOTE — PROGRESS NOTE ADULT - PROBLEM SELECTOR PLAN 1
-per primary/cardio team  -low suspicion of PNA - CTA without consolidation, no fever or leukocytosis  -hold off abx

## 2018-04-17 NOTE — PROGRESS NOTE ADULT - SUBJECTIVE AND OBJECTIVE BOX
Patient is a 89y old  Female who presents with a chief complaint of SOB (13 Apr 2018 20:35)      SUBJECTIVE / OVERNIGHT EVENTS:   Feels better.  Denies CP/SOB/Palpitation/HA.    MEDICATIONS  (STANDING):  acetaminophen   Tablet. 650 milliGRAM(s) Oral once  aspirin enteric coated 81 milliGRAM(s) Oral daily  atorvastatin 10 milliGRAM(s) Oral at bedtime  benzonatate 100 milliGRAM(s) Oral once  busPIRone 10 milliGRAM(s) Oral two times a day  cholecalciferol 1000 Unit(s) Oral daily  diltiazem    Tablet 90 milliGRAM(s) Oral three times a day  docusate sodium 100 milliGRAM(s) Oral three times a day  pantoprazole    Tablet 40 milliGRAM(s) Oral before breakfast  polyethylene glycol 3350 17 Gram(s) Oral two times a day  rivaroxaban 20 milliGRAM(s) Oral every 24 hours  senna 2 Tablet(s) Oral at bedtime  sodium chloride 2% . 1000 milliLiter(s) (50 mL/Hr) IV Continuous <Continuous>    MEDICATIONS  (PRN):  ondansetron Injectable 4 milliGRAM(s) IV Push every 8 hours PRN Nausea and/or Vomiting        CAPILLARY BLOOD GLUCOSE        I&O's Summary    16 Apr 2018 07:01  -  17 Apr 2018 07:00  --------------------------------------------------------  IN: 1410 mL / OUT: 1200 mL / NET: 210 mL    17 Apr 2018 07:01  -  17 Apr 2018 22:16  --------------------------------------------------------  IN: 480 mL / OUT: 0 mL / NET: 480 mL        PHYSICAL EXAM:  GENERAL: NAD, well-developed  HEAD:  Atraumatic, Normocephalic  NECK: Supple, No JVD  CHEST/LUNG: Clear to auscultation bilaterally; No wheezing.  HEART: Regular rate and rhythm; No murmurs, rubs, or gallops  ABDOMEN: Soft, Nontender, Nondistended; Bowel sounds present  EXTREMITIES:   No clubbing, cyanosis, or edema  NEUROLOGY: AAO X 3  SKIN: No rashes    LABS:                        8.5    9.9   )-----------( 514      ( 17 Apr 2018 06:51 )             24.9     04-17    120<LL>  |  77<L>  |  14  ----------------------------<  115<H>  3.9   |  35<H>  |  0.64    Ca    8.5      17 Apr 2018 18:44              CAPILLARY BLOOD GLUCOSE        04-13 @ 12:01  Culture-urine --  Culture results   >100,000 CFU/ml Escherichia coli  <10,000 CFU/ml Normal Urogenital aristeo present  method type MENA  Organism Escherichia coli  Organism Identification Escherichia coli  Specimen source .Urine Catheterized           04-13 @ 12:01  Culture blood --  Culture results   >100,000 CFU/ml Escherichia coli  <10,000 CFU/ml Normal Urogenital aristeo present  Gram stain --  Gram stain blood --  Method type MENA  Organism Escherichia coli  Organism identification Escherichia coli  Specimen source .Urine Catheterized      RADIOLOGY & ADDITIONAL TESTS:    Imaging Personally Reviewed:    Consultant(s) Notes Reviewed:      Care Discussed with Consultants/Other Providers:

## 2018-04-17 NOTE — PROGRESS NOTE ADULT - PROBLEM SELECTOR PLAN 3
Greatly improved. Holding further diuretics for now. Can check repeat CXR to assess for improvement in pulmonary edema. Monitor UO.

## 2018-04-17 NOTE — CONSULT NOTE ADULT - SUBJECTIVE AND OBJECTIVE BOX
pt is a 89 y/o female seen for lesion left foot  pedal pulses weak TG WNL Cft 3 sec x 10  epciritic sensation intact  left lateral 5ht met head and base darken lesions noted hemmorahgic lesions intact skin intact no erythema no edema  recommend betadine paint with ABD and ace bandage and Air cast for left ankle fx  thank you for consult   will follow weekly if foot worsen please call [phone] ASAP

## 2018-04-17 NOTE — PROGRESS NOTE ADULT - SUBJECTIVE AND OBJECTIVE BOX
CARDIOLOGY FOLLOW UP - Dr. Mccartney    CC no cp/sob  events noted - sodium 122 - nephro f/u       PHYSICAL EXAM:  T(C): 36.2 (04-17-18 @ 04:29), Max: 36.8 (04-16-18 @ 14:48)  HR: 80 (04-17-18 @ 04:29) (80 - 80)  BP: 129/70 (04-17-18 @ 04:29) (112/60 - 129/70)  RR: 18 (04-17-18 @ 04:29) (18 - 19)  SpO2: 98% (04-17-18 @ 04:29) (95% - 98%)  Wt(kg): --  I&O's Summary    16 Apr 2018 07:01  -  17 Apr 2018 07:00  --------------------------------------------------------  IN: 1410 mL / OUT: 1200 mL / NET: 210 mL        Appearance: Normal	  Cardiovascular: Normal S1 S2,RRR, No JVD, No murmurs  Respiratory: Lungs clear to auscultation/diminished at bases   Gastrointestinal:  Soft, Non-tender, + BS	  Extremities: Normal range of motion, No clubbing, cyanosis or edema        MEDICATIONS  (STANDING):  aspirin enteric coated 81 milliGRAM(s) Oral daily  atorvastatin 10 milliGRAM(s) Oral at bedtime  busPIRone 10 milliGRAM(s) Oral two times a day  cholecalciferol 1000 Unit(s) Oral daily  diltiazem    Tablet 60 milliGRAM(s) Oral three times a day  docusate sodium 100 milliGRAM(s) Oral three times a day  mirtazapine 15 milliGRAM(s) Oral at bedtime  pantoprazole    Tablet 40 milliGRAM(s) Oral before breakfast  polyethylene glycol 3350 17 Gram(s) Oral two times a day  rivaroxaban 20 milliGRAM(s) Oral every 24 hours  senna 2 Tablet(s) Oral at bedtime  sodium chloride 2 Gram(s) Oral three times a day      TELEMETRY: SR 80s , brief episode of PAT noted     ECG:  	  RADIOLOGY:   DIAGNOSTIC TESTING:  [ ] Echocardiogram:   [ ]  Catheterization:  [ ] Stress Test:    OTHER: 	    LABS:	 	                                8.5    9.9   )-----------( 514      ( 17 Apr 2018 06:51 )             24.9     04-17    124<L>  |  79<L>  |  11  ----------------------------<  85  3.7   |  37<H>  |  0.71    Ca    9.2      17 Apr 2018 06:51

## 2018-04-17 NOTE — PROGRESS NOTE ADULT - ASSESSMENT
89 year old female with history of Hyperlipemia, Hypertension, Insomnia, PSVT, bl DVT  admitted with SOB     1. SOB   multifactorial, recent viral illness/ acute CHF   clinically improving   abx per primary team   hold diuretics per nephrology   med f/u    ekg no evidence of ACS     2. Acute Diastolic congestive heart failure   likely in the setting of recent viral illness   CTA chest revealing pulm edema , less likely PE   chest xray revealing pulm edema   clinically improving, no s/s of fluid overload on exam   sodium noted to be low, hold further diuresis per neprhology  f/u echo to reeval LV fx       3. PSVT ( HX)  currently in NSR, brief episode of PAT noted, pt. asymptomatic   asa 81 qd  cont  cardizem  60mg TID    4. Htn   cont ccb  monitor bp     5. bl DVT (hx)  continue with xarelto 89 year old female with history of Hyperlipemia, Hypertension, Insomnia, PSVT, bl DVT  admitted with SOB     1. SOB   multifactorial, recent viral illness/ acute CHF   clinically improving   abx per primary team   hold diuretics per nephrology   med f/u    ekg no evidence of ACS     2. Acute Diastolic congestive heart failure   likely in the setting of recent viral illness   CTA chest revealing pulm edema , less likely PE   chest xray revealing pulm edema   clinically improving, no s/s of fluid overload on exam   sodium noted to be low, hold further diuresis per neprhology  f/u echo to reeval LV fx       3. PSVT ( HX)  currently in NSR, frequent episodes of PAT noted, pt. asymptomatic   asa 81 qd  increase cardizem  90mg TID    4. Htn   cont ccb  monitor bp     5. bl DVT (hx)  continue with xarelto

## 2018-04-17 NOTE — CHART NOTE - NSCHARTNOTEFT_GEN_A_CORE
Afternoon BMP with NA of 120. Dr. Rouse notified and aware. Patient ordered for 2% NS x 6 hours, was instructed to recheck BMP s/p IVF and was also instructed to give IV lasix 20mg x 1 if patient has complaints of SOB during IVF administration.     - will continue to monitor   - will endorse to day team     Aman Flores PA-C   Department of Medicine Afternoon BMP with NA of 120. Dr. Rouse notified and aware. Patient ordered for 2% NS x 6 hours, was instructed to recheck BMP s/p IVF and was also instructed to give IV lasix 20mg x 1 if patient has complaints of SOB during IVF administration.     - will continue to monitor   - RN made aware of plan   - will endorse to day team     Aman Flores PA-C   Department of Medicine

## 2018-04-17 NOTE — PROGRESS NOTE ADULT - PROBLEM SELECTOR PLAN 1
Currently secondary to low osmolar intake given low urine osm in setting of poor PO intake and excess free water intake. Continue with sodium chloride 2 grams TID and ensure to increase osmolar intake. Patient advised to limit free water intake. Check urine sodium and urine osm (fresh sample) and repeat serum BMP at 6PM. Monitor serum sodium.

## 2018-04-17 NOTE — PROGRESS NOTE ADULT - ASSESSMENT
· Assessment	  89f hx as above presenting with hypoxic respiratory failure     Problem/Plan - 1:  ·  Problem: Respiratory failure with hypoxia.  Plan:  Clinically improving.                                                               Dc abx as per ID                                                                     Problem/Plan -   ·  Problem: Hyponatremia.  Plan: Nephro f/up noted. BMP /IVF NS 2%.        Problem/Plan - 3:  ·  Problem: SVT (supraventricular tachycardia).  Plan:   - may continue diltiazem 60mg po tid with hold parameters hold for sbp < 110 and/or HR < 60  - hold metoprolol for now  - continue xarelto 20mg po daily.      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  Plan: - continue xarelto 20mg po daily.        Problem/Plan - 6:  Problem: Hypertension. Plan:   - continue diltiazem with hold parameters       Problem/Plan - 7:  ·  Problem: Anxiety.  Plan: continue buspirone and remeron.      Problem/Plan - 8:  ·  Problem: Hyperlipidemia.  Plan: continue lipitor 10mg po qhs.       L ankle fracture- likely due to prior fall. s/p ankle brace placed by ortho last admission  - continue current care  -  OP ortho follow up.

## 2018-04-18 DIAGNOSIS — E87.2 ACIDOSIS: ICD-10-CM

## 2018-04-18 LAB
ANION GAP SERPL CALC-SCNC: 11 MMOL/L — SIGNIFICANT CHANGE UP (ref 5–17)
ANION GAP SERPL CALC-SCNC: 12 MMOL/L — SIGNIFICANT CHANGE UP (ref 5–17)
BASE EXCESS BLDV CALC-SCNC: 10.6 MMOL/L — HIGH (ref -2–2)
BUN SERPL-MCNC: 13 MG/DL — SIGNIFICANT CHANGE UP (ref 7–23)
BUN SERPL-MCNC: 15 MG/DL — SIGNIFICANT CHANGE UP (ref 7–23)
CALCIUM SERPL-MCNC: 8.6 MG/DL — SIGNIFICANT CHANGE UP (ref 8.4–10.5)
CALCIUM SERPL-MCNC: 8.9 MG/DL — SIGNIFICANT CHANGE UP (ref 8.4–10.5)
CHLORIDE SERPL-SCNC: 78 MMOL/L — LOW (ref 96–108)
CHLORIDE SERPL-SCNC: 80 MMOL/L — LOW (ref 96–108)
CO2 BLDV-SCNC: 39 MMOL/L — HIGH (ref 22–30)
CO2 SERPL-SCNC: 31 MMOL/L — SIGNIFICANT CHANGE UP (ref 22–31)
CO2 SERPL-SCNC: 32 MMOL/L — HIGH (ref 22–31)
CREAT SERPL-MCNC: 0.61 MG/DL — SIGNIFICANT CHANGE UP (ref 0.5–1.3)
CREAT SERPL-MCNC: 0.67 MG/DL — SIGNIFICANT CHANGE UP (ref 0.5–1.3)
GAS PNL BLDV: SIGNIFICANT CHANGE UP
GLUCOSE SERPL-MCNC: 140 MG/DL — HIGH (ref 70–99)
GLUCOSE SERPL-MCNC: 88 MG/DL — SIGNIFICANT CHANGE UP (ref 70–99)
HCO3 BLDV-SCNC: 37 MMOL/L — HIGH (ref 21–29)
HCT VFR BLD CALC: 25.7 % — LOW (ref 34.5–45)
HGB BLD-MCNC: 8.6 G/DL — LOW (ref 11.5–15.5)
MCHC RBC-ENTMCNC: 29.1 PG — SIGNIFICANT CHANGE UP (ref 27–34)
MCHC RBC-ENTMCNC: 33.4 GM/DL — SIGNIFICANT CHANGE UP (ref 32–36)
MCV RBC AUTO: 87.1 FL — SIGNIFICANT CHANGE UP (ref 80–100)
OSMOLALITY UR: 395 MOS/KG — SIGNIFICANT CHANGE UP (ref 300–900)
PCO2 BLDV: 68 MMHG — HIGH (ref 35–50)
PH BLDV: 7.36 — SIGNIFICANT CHANGE UP (ref 7.35–7.45)
PLATELET # BLD AUTO: 578 K/UL — HIGH (ref 150–400)
PO2 BLDV: 27 MMHG — SIGNIFICANT CHANGE UP (ref 25–45)
POTASSIUM SERPL-MCNC: 3.9 MMOL/L — SIGNIFICANT CHANGE UP (ref 3.5–5.3)
POTASSIUM SERPL-MCNC: 4.1 MMOL/L — SIGNIFICANT CHANGE UP (ref 3.5–5.3)
POTASSIUM SERPL-SCNC: 3.9 MMOL/L — SIGNIFICANT CHANGE UP (ref 3.5–5.3)
POTASSIUM SERPL-SCNC: 4.1 MMOL/L — SIGNIFICANT CHANGE UP (ref 3.5–5.3)
RBC # BLD: 2.95 M/UL — LOW (ref 3.8–5.2)
RBC # FLD: 13.6 % — SIGNIFICANT CHANGE UP (ref 10.3–14.5)
SAO2 % BLDV: 38 % — LOW (ref 67–88)
SODIUM SERPL-SCNC: 122 MMOL/L — LOW (ref 135–145)
SODIUM SERPL-SCNC: 122 MMOL/L — LOW (ref 135–145)
SODIUM UR-SCNC: 47 MMOL/L — SIGNIFICANT CHANGE UP
WBC # BLD: 12.3 K/UL — HIGH (ref 3.8–10.5)
WBC # FLD AUTO: 12.3 K/UL — HIGH (ref 3.8–10.5)

## 2018-04-18 PROCEDURE — 71045 X-RAY EXAM CHEST 1 VIEW: CPT | Mod: 26

## 2018-04-18 RX ORDER — TOLVAPTAN 15 MG/1
15 TABLET ORAL ONCE
Qty: 0 | Refills: 0 | Status: COMPLETED | OUTPATIENT
Start: 2018-04-18 | End: 2018-04-18

## 2018-04-18 RX ADMIN — TOLVAPTAN 15 MILLIGRAM(S): 15 TABLET ORAL at 17:07

## 2018-04-18 RX ADMIN — Medication 81 MILLIGRAM(S): at 13:57

## 2018-04-18 RX ADMIN — ATORVASTATIN CALCIUM 10 MILLIGRAM(S): 80 TABLET, FILM COATED ORAL at 21:59

## 2018-04-18 RX ADMIN — PANTOPRAZOLE SODIUM 40 MILLIGRAM(S): 20 TABLET, DELAYED RELEASE ORAL at 05:40

## 2018-04-18 RX ADMIN — Medication 10 MILLIGRAM(S): at 21:59

## 2018-04-18 RX ADMIN — RIVAROXABAN 20 MILLIGRAM(S): KIT at 17:09

## 2018-04-18 RX ADMIN — ONDANSETRON 4 MILLIGRAM(S): 8 TABLET, FILM COATED ORAL at 19:06

## 2018-04-18 RX ADMIN — Medication 10 MILLIGRAM(S): at 13:49

## 2018-04-18 RX ADMIN — Medication 1000 UNIT(S): at 13:56

## 2018-04-18 RX ADMIN — Medication 100 MILLIGRAM(S): at 05:40

## 2018-04-18 RX ADMIN — ONDANSETRON 4 MILLIGRAM(S): 8 TABLET, FILM COATED ORAL at 10:00

## 2018-04-18 NOTE — PROGRESS NOTE ADULT - PROBLEM SELECTOR PLAN 3
Greatly improved. Holding further diuretics for now as plan for tolvaptan today. Follow up repeat CXR performed this morning. Monitor UO.

## 2018-04-18 NOTE — PHYSICAL THERAPY INITIAL EVALUATION ADULT - IMPAIRMENTS CONTRIBUTING TO GAIT DEVIATIONS, PT EVAL
cognition/narrow base of support/impaired motor control/impaired balance/impaired postural control/decreased flexibility/decreased strength/impaired coordination

## 2018-04-18 NOTE — PHYSICAL THERAPY INITIAL EVALUATION ADULT - TRANSFER SAFETY CONCERNS NOTED: SIT/STAND, REHAB EVAL
decreased weight-shifting ability/decreased sequencing ability/decreased step length/decreased balance during turns

## 2018-04-18 NOTE — PROGRESS NOTE ADULT - ASSESSMENT
89 year old female with history of Hyperlipemia, Hypertension, Insomnia, PSVT, bl DVT  admitted with SOB     1. SOB   multifactorial, recent viral illness/ acute CHF   clinically improving   abx per primary team   hold diuretics per nephrology   med f/u    ekg no evidence of ACS     2. Acute Diastolic congestive heart failure   likely in the setting of recent viral illness   CTA chest revealing pulm edema , less likely PE   chest xray revealing pulm edema   clinically improving, no s/s of fluid overload on exam   sodium noted to be low, s/p IVF, slightly improved  hold further diuresis, renal f/u   f/u echo to reeval LV fx       3. PSVT ( HX)  currently in NSR, less episodes of PAT noted   asa 81 qd  continue cardizem  90mg TID    4. Htn   cont ccb  monitor bp     5. bl DVT (hx)  continue with xarelto

## 2018-04-18 NOTE — PHYSICAL THERAPY INITIAL EVALUATION ADULT - PERTINENT HX OF CURRENT PROBLEM, REHAB EVAL
Pt is 89 Y F with hx or resp issues on O2 at nursing home, HTN, HLD who presents from [facility] for rehabilitation for increasing SOB. Per Ems pt was diagnosed with flu recently, note dated 4/11 states that she finished a course of Tamiflu

## 2018-04-18 NOTE — PHYSICAL THERAPY INITIAL EVALUATION ADULT - ADDITIONAL COMMENTS
Pt. admitted from [facility] ([facility]). Prior to [facility] Patient was living at home alone.  was independent in ADL's and ambulation.

## 2018-04-18 NOTE — PROGRESS NOTE ADULT - SUBJECTIVE AND OBJECTIVE BOX
CARDIOLOGY FOLLOW UP - Dr. Mccartney    CC no cp/sob,   sodium 120 noted, renal f/u       PHYSICAL EXAM:  T(C): 36.7 (04-18-18 @ 04:14), Max: 36.7 (04-17-18 @ 13:51)  HR: 82 (04-18-18 @ 04:14) (73 - 94)  BP: 138/60 (04-18-18 @ 04:14) (119/59 - 138/60)  RR: 18 (04-18-18 @ 04:14) (16 - 18)  SpO2: 97% (04-18-18 @ 04:14) (76% - 97%)  Wt(kg): --  I&O's Summary    17 Apr 2018 07:01  -  18 Apr 2018 07:00  --------------------------------------------------------  IN: 1120 mL / OUT: 1200 mL / NET: -80 mL        Appearance: Normal	  Cardiovascular: Normal S1 S2,RRR, No JVD, No murmurs  Respiratory: Lungs clear to auscultation	  Gastrointestinal:  Soft, Non-tender, + BS	  Extremities: Normal range of motion, No clubbing, cyanosis or edema        MEDICATIONS  (STANDING):  aspirin enteric coated 81 milliGRAM(s) Oral daily  atorvastatin 10 milliGRAM(s) Oral at bedtime  busPIRone 10 milliGRAM(s) Oral two times a day  cholecalciferol 1000 Unit(s) Oral daily  diltiazem    Tablet 90 milliGRAM(s) Oral three times a day  docusate sodium 100 milliGRAM(s) Oral three times a day  pantoprazole    Tablet 40 milliGRAM(s) Oral before breakfast  polyethylene glycol 3350 17 Gram(s) Oral two times a day  rivaroxaban 20 milliGRAM(s) Oral every 24 hours  senna 2 Tablet(s) Oral at bedtime  sodium chloride 2% . 1000 milliLiter(s) (50 mL/Hr) IV Continuous <Continuous>      TELEMETRY: SR 80-90s, PACs 	    ECG:  	  RADIOLOGY:   DIAGNOSTIC TESTING:  [ ] Echocardiogram:  [ ]  Catheterization:  [ ] Stress Test:    OTHER: 	    LABS:	 	                                8.6    12.3  )-----------( 578      ( 18 Apr 2018 06:32 )             25.7     04-18    122<L>  |  80<L>  |  13  ----------------------------<  88  3.9   |  31  |  0.61    Ca    8.6      18 Apr 2018 06:23

## 2018-04-18 NOTE — PROGRESS NOTE ADULT - ASSESSMENT
· Assessment	  89f hx as above presenting with hypoxic respiratory failure                                                                     Problem/Plan -   ·  Problem: Hyponatremia.  Plan: Nephro f/up noted. BMP /IVF NS 2%.        Problem/Plan - 3:  ·  Problem: SVT (supraventricular tachycardia).  Plan:   - may continue diltiazem 60mg po tid with hold parameters hold for sbp < 110 and/or HR < 60  - continue xarelto 20mg po daily.      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  Plan: - continue xarelto 20mg po daily.        Problem/Plan - 6:  Problem: Hypertension. Plan:   - continue diltiazem with hold parameters       Problem/Plan - 7:  ·  Problem: Anxiety.  Plan: continue buspirone and remeron.      Problem/Plan - 8:  ·  Problem: Hyperlipidemia.  Plan: continue lipitor 10mg po qhs.     Dw family.

## 2018-04-18 NOTE — PROGRESS NOTE ADULT - SUBJECTIVE AND OBJECTIVE BOX
Patient is a 89y old  Female who presents with a chief complaint of SOB (13 Apr 2018 20:35)      SUBJECTIVE / OVERNIGHT EVENTS:   Feels better.  Denies CP/SOB/Palpitation/HA.    MEDICATIONS  (STANDING):  aspirin enteric coated 81 milliGRAM(s) Oral daily  atorvastatin 10 milliGRAM(s) Oral at bedtime  busPIRone 10 milliGRAM(s) Oral two times a day  cholecalciferol 1000 Unit(s) Oral daily  diltiazem    Tablet 90 milliGRAM(s) Oral three times a day  docusate sodium 100 milliGRAM(s) Oral three times a day  pantoprazole    Tablet 40 milliGRAM(s) Oral before breakfast  polyethylene glycol 3350 17 Gram(s) Oral two times a day  rivaroxaban 20 milliGRAM(s) Oral every 24 hours  senna 2 Tablet(s) Oral at bedtime    MEDICATIONS  (PRN):  ondansetron Injectable 4 milliGRAM(s) IV Push every 8 hours PRN Nausea and/or Vomiting        CAPILLARY BLOOD GLUCOSE        I&O's Summary    17 Apr 2018 07:01  -  18 Apr 2018 07:00  --------------------------------------------------------  IN: 1120 mL / OUT: 1200 mL / NET: -80 mL    18 Apr 2018 07:01  -  18 Apr 2018 22:57  --------------------------------------------------------  IN: 970 mL / OUT: 850 mL / NET: 120 mL        PHYSICAL EXAM:    NECK: Supple, No JVD  CHEST/LUNG: Clear to auscultation bilaterally; No wheezing.  HEART: Regular rate and rhythm; No murmurs, rubs, or gallops  ABDOMEN: Soft, Nontender, Nondistended; Bowel sounds present  EXTREMITIES:   No clubbing, cyanosis, or edema  NEUROLOGY: AAO   SKIN: No rashes    LABS:                        8.6    12.3  )-----------( 578      ( 18 Apr 2018 06:32 )             25.7     04-18    122<L>  |  78<L>  |  15  ----------------------------<  140<H>  4.1   |  32<H>  |  0.67    Ca    8.9      18 Apr 2018 19:22              CAPILLARY BLOOD GLUCOSE        04-13 @ 12:01  Culture-urine --  Culture results   >100,000 CFU/ml Escherichia coli  <10,000 CFU/ml Normal Urogenital aristeo present  method type MENA  Organism Escherichia coli  Organism Identification Escherichia coli  Specimen source .Urine Catheterized           04-13 @ 12:01  Culture blood --  Culture results   >100,000 CFU/ml Escherichia coli  <10,000 CFU/ml Normal Urogenital aristeo present  Gram stain --  Gram stain blood --  Method type MENA  Organism Escherichia coli  Organism identification Escherichia coli  Specimen source .Urine Catheterized      RADIOLOGY & ADDITIONAL TESTS:    Imaging Personally Reviewed:    Consultant(s) Notes Reviewed:      Care Discussed with Consultants/Other Providers:

## 2018-04-18 NOTE — PROGRESS NOTE ADULT - SUBJECTIVE AND OBJECTIVE BOX
[facility] NEPHROLOGY- PROGRESS NOTE    89 year old female with history of HTN presents with SOB. Nephrology consulted for hyponatremia.    REVIEW OF SYSTEMS:  Gen: no changes in weight  Cards: no chest pain  Resp: no dyspnea  GI: + nausea without vomiting or diarrhea  Vascular: no LE edema    Eye cream from the 1960s Neocortef ?spelling caused eyes to becomes swollen (Unknown)  neomycin (Unknown)  soaps and creams causes rash uses only sensitive skin soap (Rash)  sulfa drugs (Unknown)  Voltaren (Rash)      Hospital Medications: Medications reviewed      VITALS:  T(F): 98 (18 @ 04:14), Max: 98.1 (18 @ 13:51)  HR: 82 (18 @ 04:14)  BP: 138/60 (18 @ 04:14)  RR: 18 (18 @ 04:14)  SpO2: 97% (18 @ 04:14)  Wt(kg): --     @ 07:01  -   @ 07:00  --------------------------------------------------------  IN: 1120 mL / OUT: 1200 mL / NET: -80 mL      PHYSICAL EXAM:    Gen: NAD, calm  Cards: RRR, +S1/S2, no M/G/R  Resp: Decreased BS @ bases B/L  GI: soft, NT/ND, NABS  Vascular: no LE edema B/L      LABS:      122<L>  |  80<L>  |  13  ----------------------------<  88  3.9   |  31  |  0.61    Ca    8.6      2018 06:23      Creatinine Trend: 0.61 <--, 0.64 <--, 0.71 <--, 0.82 <--, 0.81 <--, 0.71 <--, 0.77 <--, 0.81 <--, 0.81 <--, 0.84 <--, 0.71 <--, 0.71 <--, 0.71 <--                        8.6    12.3  )-----------( 578      ( 2018 06:32 )             25.7     Urine Studies:  Urinalysis Basic - ( 2018 09:23 )    Color: Yellow / Appearance: SL Turbid / S.024 / pH:   Gluc:  / Ketone: Trace  / Bili: Negative / Urobili: Negative   Blood:  / Protein: 100 mg/dL / Nitrite: Positive   Leuk Esterase: Large / RBC: 2-5 /HPF / WBC >50 /HPF   Sq Epi:  / Non Sq Epi:  / Bacteria: Few /HPF      Osmolality, Random Urine: 395 mos/kg ( @ 06:22)  Sodium, Random Urine: 47 mmol/L ( @ 06:22)  Osmolality, Random Urine: 391 mos/kg ( @ 15:33)  Sodium, Random Urine: 26 mmol/L ( @ 15:33)  Sodium, Random Urine: 69 mmol/L ( @ 14:31)  Osmolality, Random Urine: 227 mos/kg ( @ 14:31)  Sodium, Random Urine: 21 mmol/L ( @ 11:44)  Osmolality, Random Urine: 453 mos/kg ( @ 11:44)  Creatinine, Random Urine: 66 mg/dL ( @ 11:44)

## 2018-04-18 NOTE — PHYSICAL THERAPY INITIAL EVALUATION ADULT - IMPAIRMENTS CONTRIBUTING IMPAIRED BED MOBILITY, REHAB EVAL
decreased flexibility/pain/impaired postural control/cognition/decreased strength/impaired coordination

## 2018-04-18 NOTE — PHYSICAL THERAPY INITIAL EVALUATION ADULT - DIAGNOSIS, PT EVAL
difficulty ambulating, decreased transfers, endurance, bed mobility and overall decline in functional mobility

## 2018-04-19 LAB
ANION GAP SERPL CALC-SCNC: 10 MMOL/L — SIGNIFICANT CHANGE UP (ref 5–17)
ANION GAP SERPL CALC-SCNC: 9 MMOL/L — SIGNIFICANT CHANGE UP (ref 5–17)
BUN SERPL-MCNC: 13 MG/DL — SIGNIFICANT CHANGE UP (ref 7–23)
BUN SERPL-MCNC: 18 MG/DL — SIGNIFICANT CHANGE UP (ref 7–23)
CALCIUM SERPL-MCNC: 9.1 MG/DL — SIGNIFICANT CHANGE UP (ref 8.4–10.5)
CALCIUM SERPL-MCNC: 9.5 MG/DL — SIGNIFICANT CHANGE UP (ref 8.4–10.5)
CHLORIDE SERPL-SCNC: 88 MMOL/L — LOW (ref 96–108)
CHLORIDE SERPL-SCNC: 90 MMOL/L — LOW (ref 96–108)
CO2 SERPL-SCNC: 34 MMOL/L — HIGH (ref 22–31)
CO2 SERPL-SCNC: 36 MMOL/L — HIGH (ref 22–31)
CREAT SERPL-MCNC: 0.69 MG/DL — SIGNIFICANT CHANGE UP (ref 0.5–1.3)
CREAT SERPL-MCNC: 0.77 MG/DL — SIGNIFICANT CHANGE UP (ref 0.5–1.3)
GLUCOSE SERPL-MCNC: 110 MG/DL — HIGH (ref 70–99)
GLUCOSE SERPL-MCNC: 90 MG/DL — SIGNIFICANT CHANGE UP (ref 70–99)
HCT VFR BLD CALC: 25.3 % — LOW (ref 34.5–45)
HGB BLD-MCNC: 8.4 G/DL — LOW (ref 11.5–15.5)
MCHC RBC-ENTMCNC: 29.2 PG — SIGNIFICANT CHANGE UP (ref 27–34)
MCHC RBC-ENTMCNC: 33.4 GM/DL — SIGNIFICANT CHANGE UP (ref 32–36)
MCV RBC AUTO: 87.5 FL — SIGNIFICANT CHANGE UP (ref 80–100)
OSMOLALITY UR: 62 MOS/KG — LOW (ref 300–900)
PLATELET # BLD AUTO: 551 K/UL — HIGH (ref 150–400)
POTASSIUM SERPL-MCNC: 4.1 MMOL/L — SIGNIFICANT CHANGE UP (ref 3.5–5.3)
POTASSIUM SERPL-MCNC: 4.2 MMOL/L — SIGNIFICANT CHANGE UP (ref 3.5–5.3)
POTASSIUM SERPL-SCNC: 4.1 MMOL/L — SIGNIFICANT CHANGE UP (ref 3.5–5.3)
POTASSIUM SERPL-SCNC: 4.2 MMOL/L — SIGNIFICANT CHANGE UP (ref 3.5–5.3)
RBC # BLD: 2.89 M/UL — LOW (ref 3.8–5.2)
RBC # FLD: 13.7 % — SIGNIFICANT CHANGE UP (ref 10.3–14.5)
SODIUM SERPL-SCNC: 131 MMOL/L — LOW (ref 135–145)
SODIUM SERPL-SCNC: 136 MMOL/L — SIGNIFICANT CHANGE UP (ref 135–145)
SODIUM UR-SCNC: <20 MMOL/L — SIGNIFICANT CHANGE UP
WBC # BLD: 12.2 K/UL — HIGH (ref 3.8–10.5)
WBC # FLD AUTO: 12.2 K/UL — HIGH (ref 3.8–10.5)

## 2018-04-19 PROCEDURE — 99232 SBSQ HOSP IP/OBS MODERATE 35: CPT

## 2018-04-19 RX ORDER — SODIUM CHLORIDE 9 MG/ML
1000 INJECTION, SOLUTION INTRAVENOUS
Qty: 0 | Refills: 0 | Status: DISCONTINUED | OUTPATIENT
Start: 2018-04-19 | End: 2018-04-20

## 2018-04-19 RX ORDER — ACETAMINOPHEN 500 MG
650 TABLET ORAL ONCE
Qty: 0 | Refills: 0 | Status: COMPLETED | OUTPATIENT
Start: 2018-04-19 | End: 2018-04-19

## 2018-04-19 RX ADMIN — RIVAROXABAN 20 MILLIGRAM(S): KIT at 18:42

## 2018-04-19 RX ADMIN — PANTOPRAZOLE SODIUM 40 MILLIGRAM(S): 20 TABLET, DELAYED RELEASE ORAL at 06:10

## 2018-04-19 RX ADMIN — Medication 10 MILLIGRAM(S): at 09:49

## 2018-04-19 RX ADMIN — Medication 650 MILLIGRAM(S): at 23:11

## 2018-04-19 RX ADMIN — Medication 1000 UNIT(S): at 13:11

## 2018-04-19 RX ADMIN — Medication 100 MILLIGRAM(S): at 06:10

## 2018-04-19 RX ADMIN — Medication 10 MILLIGRAM(S): at 21:16

## 2018-04-19 RX ADMIN — Medication 1 TABLET(S): at 18:45

## 2018-04-19 RX ADMIN — SODIUM CHLORIDE 50 MILLILITER(S): 9 INJECTION, SOLUTION INTRAVENOUS at 21:17

## 2018-04-19 RX ADMIN — Medication 81 MILLIGRAM(S): at 13:09

## 2018-04-19 RX ADMIN — ATORVASTATIN CALCIUM 10 MILLIGRAM(S): 80 TABLET, FILM COATED ORAL at 21:16

## 2018-04-19 NOTE — PROGRESS NOTE ADULT - PROBLEM SELECTOR PLAN 1
Initially due to volume overload with partial component of SIADH for which patient given tolvaptan at 5PM no 4/18 with subsequent dilute urine and increase in serum sodium. Please check serum sodium this afternoon at 5PM to monitor for over correction. No diuretics or further tolvaptan at this time.

## 2018-04-19 NOTE — PROGRESS NOTE ADULT - SUBJECTIVE AND OBJECTIVE BOX
[facility] NEPHROLOGY- PROGRESS NOTE    89 year old female with history of HTN presents with SOB. Nephrology consulted for hyponatremia.    REVIEW OF SYSTEMS:  Gen: no changes in weight  Cards: no chest pain  Resp: no dyspnea  GI: + nausea without vomiting or diarrhea  Vascular: no LE edema    Eye cream from the 1960s Neocortef ?spelling caused eyes to becomes swollen (Unknown)  neomycin (Unknown)  soaps and creams causes rash uses only sensitive skin soap (Rash)  sulfa drugs (Unknown)  Voltaren (Rash)      Hospital Medications: Medications reviewed      VITALS:  T(F): 98 (04-19-18 @ 12:30), Max: 98 (04-18-18 @ 21:05)  HR: 93 (04-19-18 @ 12:30)  BP: 114/70 (04-19-18 @ 12:30)  RR: 18 (04-19-18 @ 12:30)  SpO2: 95% (04-19-18 @ 12:30)  Wt(kg): --    04-18 @ 07:01  -  04-19 @ 07:00  --------------------------------------------------------  IN: 1210 mL / OUT: 2375 mL / NET: -1165 mL    04-19 @ 07:01  -  04-19 @ 14:49  --------------------------------------------------------  IN: 480 mL / OUT: 1300 mL / NET: -820 mL        PHYSICAL EXAM:    Gen: NAD, calm  Cards: RRR, +S1/S2, no M/G/R  Resp: Decreased BS @ bases B/L  GI: soft, NT/ND, NABS  Vascular: 1+ LE edema B/L      LABS:  04-19    131<L>  |  88<L>  |  13  ----------------------------<  90  4.1   |  34<H>  |  0.69    Ca    9.1      19 Apr 2018 06:48      Creatinine Trend: 0.69 <--, 0.67 <--, 0.61 <--, 0.64 <--, 0.71 <--, 0.82 <--, 0.81 <--, 0.71 <--, 0.77 <--, 0.81 <--, 0.81 <--, 0.84 <--, 0.71 <--, 0.71 <--, 0.71 <--                        8.4    12.2  )-----------( 551      ( 19 Apr 2018 06:48 )             25.3      < from: Xray Chest 1 View- PORTABLE-Routine (04.18.18 @ 09:49) >  IMPRESSION:  Bilateral pleural effusions with adjacent atelectasis and pulmonary edema   slightly progressed on the right and may have improved on the left.    < end of copied text >

## 2018-04-19 NOTE — PROGRESS NOTE ADULT - SUBJECTIVE AND OBJECTIVE BOX
Pt was admitted with SIRS, non infectious, with acute organ dysfunction(Acute Respiratory failure).  PNA/UTI ruled out.

## 2018-04-19 NOTE — DIETITIAN INITIAL EVALUATION ADULT. - PERTINENT MEDS FT
Zofran, Lipitor, VitD3, colace, Protonix, Miralax, senna Zofran, Lipitor, VitD3, colace, Protonix, Miralax, senna. remeron was discontinued secondary to serum sodium being low, monitor for reinitiation

## 2018-04-19 NOTE — DIETITIAN INITIAL EVALUATION ADULT. - PROBLEM SELECTOR PLAN 10
- ppx: continue xarelto  - diet: fluid restriction, dash/tlc  - dispo: telemetry floor    11. L ankle fracture- likely due to prior fall. s/p ankle brace placed by ortho last admission  - continue current care  - recommend OP ortho follow up

## 2018-04-19 NOTE — PROGRESS NOTE ADULT - SUBJECTIVE AND OBJECTIVE BOX
CARDIOLOGY FOLLOW UP - Dr. Mccartney    CC no cp/sob       PHYSICAL EXAM:  T(C): 36.4 (04-19-18 @ 04:46), Max: 36.7 (04-18-18 @ 21:05)  HR: 89 (04-19-18 @ 04:46) (79 - 89)  BP: 137/72 (04-19-18 @ 04:46) (116/72 - 137/72)  RR: 18 (04-19-18 @ 04:46) (16 - 18)  SpO2: 96% (04-19-18 @ 04:46) (95% - 96%)  Wt(kg): --  I&O's Summary    18 Apr 2018 07:01  -  19 Apr 2018 07:00  --------------------------------------------------------  IN: 1210 mL / OUT: 2375 mL / NET: -1165 mL        Appearance: Normal	  Cardiovascular: Normal S1 S2,RRR, No JVD, No murmurs  Respiratory: Lungs clear to auscultation	  Gastrointestinal:  Soft, Non-tender, + BS	  Extremities: Normal range of motion, No clubbing, cyanosis or edema        MEDICATIONS  (STANDING):  aspirin enteric coated 81 milliGRAM(s) Oral daily  atorvastatin 10 milliGRAM(s) Oral at bedtime  busPIRone 10 milliGRAM(s) Oral two times a day  cholecalciferol 1000 Unit(s) Oral daily  diltiazem    Tablet 90 milliGRAM(s) Oral three times a day  docusate sodium 100 milliGRAM(s) Oral three times a day  pantoprazole    Tablet 40 milliGRAM(s) Oral before breakfast  polyethylene glycol 3350 17 Gram(s) Oral two times a day  rivaroxaban 20 milliGRAM(s) Oral every 24 hours  senna 2 Tablet(s) Oral at bedtime      TELEMETRY: SR 70-90s     ECG:  	  RADIOLOGY:   DIAGNOSTIC TESTING:  [ ] Echocardiogram: < from: Transthoracic Echocardiogram (04.17.18 @ 20:16) >  Conclusions:  1. Mitral annular calcification and calcified mitral  leaflets with decreased diastolic opening. Moderate mitral  regurgitation. Peak mitral valve gradient equals 14 mm Hg,  mean transmitral valve gradient equals 6 mm Hg, consistent  with moderate to severe mitral stenosis.  2. Calcified aortic valve with normal opening. Peak  transaortic valve gradient equals 13 mm Hg, aortic valve  velocity time integral equals 37 cm, consistent with mild  aortic stenosis. Mild-moderate aortic regurgitation.  3. Normal left ventricular systolic function. No segmental  wall motion abnormalities. Visual estimation of EF 55-60%.  4. Normal right ventricular size and function.  5. Bilateral pleural effusions.    < end of copied text >    [ ]  Catheterization:  [ ] Stress Test:    OTHER: 	    LABS:	 	                                8.4    12.2  )-----------( 551      ( 19 Apr 2018 06:48 )             25.3     04-19    131<L>  |  88<L>  |  13  ----------------------------<  90  4.1   |  34<H>  |  0.69    Ca    9.1      19 Apr 2018 06:48

## 2018-04-19 NOTE — DIETITIAN INITIAL EVALUATION ADULT. - ETIOLOGY
daughter believes that it may be related to the chemo that she received 6 years ago and/or depression

## 2018-04-19 NOTE — DIETITIAN INITIAL EVALUATION ADULT. - PROBLEM SELECTOR PLAN 1
Pt hypoxic with ronchi and wheezes on exam, cxr with pleural effusions and pulmonary edema, progressive hyponatremia over the last several days and hepatojugular reflux with edema all pointing toward volume overload and CHF vs. possible bacterial pneumonia superimposed on recent flu illness vs. possibility of xarelto failure and PE given recent bilatearl DVTs  - would treat with gentle diuresis given hyponatremia and mixed clinical picture- start with lasix 10mg iv bid, monitor i/o, monitor respiratory exam, monitor bmp q6 hours  - would continue antibiotics for now, zosyn 3.375gm iv q8, vancomycin 1gm iv q24 renally dosed, check vanc trough before 4th dose, f/u blood and sputum cultures, may de-escalate if patient improves with diuresis  - would not fluid hydrate at this time as exam more consistent with volume overload  - continue nonrebreather for now, may use bipap if additional ventilatory support needed  - would check CTA r/o PE and also better evaluate lung parenchyma  - monitor on continuous pulse ox, low threshold for ICU consult if decompensates

## 2018-04-19 NOTE — DIETITIAN INITIAL EVALUATION ADULT. - DIET TYPE
soft/pt believes she does not need Soft diet/DASH/TLC (sodium and cholesterol restricted diet) soft/DASH/TLC (sodium and cholesterol restricted diet)

## 2018-04-19 NOTE — DIETITIAN INITIAL EVALUATION ADULT. - ORAL INTAKE PTA
poor/poor appetite; as per daughter she would pick on store bought cooked chicken, tea, fruit salad, muffin, yogurt, vegetable tuna, he dughter bring her shopping at [facility]. they live around the block from eachother. poor appetite; as per daughter she would pick on store bought cooked chicken, tea, fruit salad, muffin, yogurt, vegetable tuna, he daughter [facility]. they live around the block from eachother./poor

## 2018-04-19 NOTE — PROGRESS NOTE ADULT - SUBJECTIVE AND OBJECTIVE BOX
DELVIS MCBRIDE 89y [id number]    Patient is a 89y old  Female who presents with a chief complaint of SOB (13 Apr 2018 20:35)      Follow Up/CC:  ID following for CHF    Interval History/ROS: feels ok    Allergies    Eye cream from the 1960s Neocortef ?spelling caused eyes to becomes swollen (Unknown)  neomycin (Unknown)  soaps and creams causes rash uses only sensitive skin soap (Rash)  sulfa drugs (Unknown)  Voltaren (Rash)    Intolerances        ANTIMICROBIALS:      MEDICATIONS  (STANDING):  aspirin enteric coated 81 milliGRAM(s) Oral daily  atorvastatin 10 milliGRAM(s) Oral at bedtime  busPIRone 10 milliGRAM(s) Oral two times a day  cholecalciferol 1000 Unit(s) Oral daily  diltiazem    Tablet 90 milliGRAM(s) Oral three times a day  docusate sodium 100 milliGRAM(s) Oral three times a day  multivitamin 1 Tablet(s) Oral daily  pantoprazole    Tablet 40 milliGRAM(s) Oral before breakfast  polyethylene glycol 3350 17 Gram(s) Oral two times a day  rivaroxaban 20 milliGRAM(s) Oral every 24 hours  senna 2 Tablet(s) Oral at bedtime    MEDICATIONS  (PRN):  ondansetron Injectable 4 milliGRAM(s) IV Push every 8 hours PRN Nausea and/or Vomiting        Vital Signs Last 24 Hrs  T(C): 36.7 (19 Apr 2018 12:30), Max: 36.7 (18 Apr 2018 21:05)  T(F): 98 (19 Apr 2018 12:30), Max: 98 (18 Apr 2018 21:05)  HR: 93 (19 Apr 2018 12:30) (88 - 93)  BP: 114/70 (19 Apr 2018 12:30) (114/70 - 137/72)  BP(mean): --  RR: 18 (19 Apr 2018 12:30) (17 - 18)  SpO2: 95% (19 Apr 2018 12:30) (95% - 96%)    CBC Full  -  ( 19 Apr 2018 06:48 )  WBC Count : 12.2 K/uL  Hemoglobin : 8.4 g/dL  Hematocrit : 25.3 %  Platelet Count - Automated : 551 K/uL  Mean Cell Volume : 87.5 fl  Mean Cell Hemoglobin : 29.2 pg  Mean Cell Hemoglobin Concentration : 33.4 gm/dL  Auto Neutrophil # : x  Auto Lymphocyte # : x  Auto Monocyte # : x  Auto Eosinophil # : x  Auto Basophil # : x  Auto Neutrophil % : x  Auto Lymphocyte % : x  Auto Monocyte % : x  Auto Eosinophil % : x  Auto Basophil % : x    04-19    131<L>  |  88<L>  |  13  ----------------------------<  90  4.1   |  34<H>  |  0.69    Ca    9.1      19 Apr 2018 06:48            MICROBIOLOGY:  .Urine Catheterized  04-13-18   >100,000 CFU/ml Escherichia coli  <10,000 CFU/ml Normal Urogenital aristeo present  --  Escherichia coli      URINE CATHETER  03-25-18 --  --  --      BLOOD  03-25-18 --  --  --      URINE MIDSTREAM  03-22-18 --  --  --      BLOOD  03-22-18 --  --  --    Rapid RVP Result: NotDete (04-13 @ 09:23)      RADIOLOGY    Xray Chest 1 View- PORTABLE-Routine (04.18.18 @ 09:49) >  Bilateral pleural effusions with adjacent atelectasis and pulmonary edema   slightly progressed on the right and may have improved on th      Transthoracic Echocardiogram (04.17.18 @ 20:16) >  1. Mitral annular calcification and calcified mitral  leaflets with decreased diastolic opening. Moderate mitral  regurgitation. Peak mitral valve gradient equals 14 mm Hg,  mean transmitral valve gradient equals 6 mm Hg, consistent  with moderate to severe mitral stenosis.  2. Calcified aortic valve with normal opening. Peak  transaortic valve gradient equals 13 mm Hg, aortic valve  velocity time integral equals 37 cm, consistent with mild  aortic stenosis. Mild-moderate aortic regurgitation.  3. Normal left ventricular systolic function. No segmental  wall motion abnormalities. Visual estimation of EF 55-60%.  4. Normal right ventricular size and function.  5. Bilateral pleural effusions.

## 2018-04-19 NOTE — PROGRESS NOTE ADULT - ASSESSMENT
· Assessment	  89f hx as above presenting with hypoxic respiratory failure                                                                     Problem/Plan -   ·  Problem: Hyponatremia.  Plan: Nephro f/up noted. BMP /s/p IVF NS 2%.        Problem/Plan - 3:  ·  Problem: SVT (supraventricular tachycardia).  Plan:   - may continue diltiazem 60mg po tid with hold parameters hold for sbp < 110 and/or HR < 60  - continue xarelto 20mg po daily.      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  Plan: - continue xarelto 20mg po daily.        Problem/Plan - 6:  Problem: Hypertension. Plan:   - continue diltiazem with hold parameters       Problem/Plan - 7:  ·  Problem: Anxiety.  Plan: continue buspirone and remeron.      Problem/Plan - 8:  ·  Problem: Hyperlipidemia.  Plan: continue lipitor 10mg po qhs.     Dw family.

## 2018-04-19 NOTE — DIETITIAN INITIAL EVALUATION ADULT. - NS AS NUTRI INTERV MEALS SNACK
continue current diet of DASH/SOFT-spoke with PA. he choses to keep pt on Low Sodium diet even though her serum sodium is low-it is trending up. pt believes she does not need a Soft diet. PTA pt was consuming Soft foods. PA would rather leave her on a Soft diet. recommend continue to discuss the need to liberalize sodium and Soft restrictions

## 2018-04-19 NOTE — PROGRESS NOTE ADULT - ASSESSMENT
89 year old female with history of Hyperlipemia, Hypertension, Insomnia, PSVT, bl DVT  admitted with SOB     1. SOB   multifactorial, recent viral illness/ acute CHF   clinically improving   s/p abx    diuretics on hold per nephrology   med f/u    ekg no evidence of ACS     2. Acute Diastolic congestive heart failure   likely in the setting of recent viral illness   CTA chest revealing pulm edema , less likely PE   chest xray revealing pulm edema   clinically improving, no s/s of fluid overload on exam   sodium noted to be low, s/p IVF/,tolvaptan, improving  hold further diuresis, renal f/u   echo - normal LV sys function, EF 55-60%, mod-sev mitral stenosis       3. PSVT ( HX)  currently in NSR  asa 81 qd  continue cardizem  90mg TID    4. Htn   cont ccb  monitor bp     5. bl DVT (hx)  continue with xarelto

## 2018-04-19 NOTE — PROGRESS NOTE ADULT - PROBLEM SELECTOR PLAN 3
Greatly improved. Holding further diuretics given tolvaptan given on 4/18 and risk of over correction. CXR reviewed. Monitor UO.

## 2018-04-19 NOTE — PROGRESS NOTE ADULT - SUBJECTIVE AND OBJECTIVE BOX
Patient is a 89y old  Female who presents with a chief complaint of SOB (13 Apr 2018 20:35)      SUBJECTIVE / OVERNIGHT EVENTS:  C/o weakness  Denies CP/SOB/Palpitation/HA.    MEDICATIONS  (STANDING):  aspirin enteric coated 81 milliGRAM(s) Oral daily  atorvastatin 10 milliGRAM(s) Oral at bedtime  busPIRone 10 milliGRAM(s) Oral two times a day  cholecalciferol 1000 Unit(s) Oral daily  diltiazem    Tablet 90 milliGRAM(s) Oral three times a day  docusate sodium 100 milliGRAM(s) Oral three times a day  pantoprazole    Tablet 40 milliGRAM(s) Oral before breakfast  polyethylene glycol 3350 17 Gram(s) Oral two times a day  rivaroxaban 20 milliGRAM(s) Oral every 24 hours  senna 2 Tablet(s) Oral at bedtime    MEDICATIONS  (PRN):  ondansetron Injectable 4 milliGRAM(s) IV Push every 8 hours PRN Nausea and/or Vomiting        CAPILLARY BLOOD GLUCOSE        I&O's Summary    18 Apr 2018 07:01  -  19 Apr 2018 07:00  --------------------------------------------------------  IN: 1210 mL / OUT: 2375 mL / NET: -1165 mL    19 Apr 2018 07:01  -  19 Apr 2018 14:36  --------------------------------------------------------  IN: 480 mL / OUT: 1300 mL / NET: -820 mL        PHYSICAL EXAM:    NECK: Supple, No JVD  CHEST/LUNG: Clear to auscultation bilaterally; No wheezing.  HEART: Regular rate and rhythm; No murmurs, rubs, or gallops  ABDOMEN: Soft, Nontender, Nondistended; Bowel sounds present  EXTREMITIES:   No clubbing, cyanosis, or edema  NEUROLOGY: AAO   SKIN: No rashes    LABS:                        8.4    12.2  )-----------( 551      ( 19 Apr 2018 06:48 )             25.3     04-19    131<L>  |  88<L>  |  13  ----------------------------<  90  4.1   |  34<H>  |  0.69    Ca    9.1      19 Apr 2018 06:48              CAPILLARY BLOOD GLUCOSE        04-13 @ 12:01  Culture-urine --  Culture results   >100,000 CFU/ml Escherichia coli  <10,000 CFU/ml Normal Urogenital aristeo present  method type MENA  Organism Escherichia coli  Organism Identification Escherichia coli  Specimen source .Urine Catheterized           04-13 @ 12:01  Culture blood --  Culture results   >100,000 CFU/ml Escherichia coli  <10,000 CFU/ml Normal Urogenital aristeo present  Gram stain --  Gram stain blood --  Method type MENA  Organism Escherichia coli  Organism identification Escherichia coli  Specimen source .Urine Catheterized      RADIOLOGY & ADDITIONAL TESTS:    Imaging Personally Reviewed:    Consultant(s) Notes Reviewed:      Care Discussed with Consultants/Other Providers:

## 2018-04-20 LAB
ANION GAP SERPL CALC-SCNC: 9 MMOL/L — SIGNIFICANT CHANGE UP (ref 5–17)
BUN SERPL-MCNC: 16 MG/DL — SIGNIFICANT CHANGE UP (ref 7–23)
CALCIUM SERPL-MCNC: 9 MG/DL — SIGNIFICANT CHANGE UP (ref 8.4–10.5)
CHLORIDE SERPL-SCNC: 92 MMOL/L — LOW (ref 96–108)
CO2 SERPL-SCNC: 35 MMOL/L — HIGH (ref 22–31)
CORTIS AM PEAK SERPL-MCNC: 18.5 UG/DL — HIGH (ref 6–18.4)
CREAT SERPL-MCNC: 0.81 MG/DL — SIGNIFICANT CHANGE UP (ref 0.5–1.3)
GLUCOSE SERPL-MCNC: 118 MG/DL — HIGH (ref 70–99)
HCT VFR BLD CALC: 27 % — LOW (ref 34.5–45)
HGB BLD-MCNC: 8.8 G/DL — LOW (ref 11.5–15.5)
MCHC RBC-ENTMCNC: 29.2 PG — SIGNIFICANT CHANGE UP (ref 27–34)
MCHC RBC-ENTMCNC: 32.7 GM/DL — SIGNIFICANT CHANGE UP (ref 32–36)
MCV RBC AUTO: 89.2 FL — SIGNIFICANT CHANGE UP (ref 80–100)
PLATELET # BLD AUTO: 465 K/UL — HIGH (ref 150–400)
POTASSIUM SERPL-MCNC: 4.1 MMOL/L — SIGNIFICANT CHANGE UP (ref 3.5–5.3)
POTASSIUM SERPL-SCNC: 4.1 MMOL/L — SIGNIFICANT CHANGE UP (ref 3.5–5.3)
RBC # BLD: 3.03 M/UL — LOW (ref 3.8–5.2)
RBC # FLD: 13.9 % — SIGNIFICANT CHANGE UP (ref 10.3–14.5)
SODIUM SERPL-SCNC: 136 MMOL/L — SIGNIFICANT CHANGE UP (ref 135–145)
TSH SERPL-MCNC: 1.32 UIU/ML — SIGNIFICANT CHANGE UP (ref 0.27–4.2)
WBC # BLD: 11.7 K/UL — HIGH (ref 3.8–10.5)
WBC # FLD AUTO: 11.7 K/UL — HIGH (ref 3.8–10.5)

## 2018-04-20 RX ORDER — ZALEPLON 10 MG
5 CAPSULE ORAL AT BEDTIME
Qty: 0 | Refills: 0 | Status: DISCONTINUED | OUTPATIENT
Start: 2018-04-20 | End: 2018-04-21

## 2018-04-20 RX ORDER — FUROSEMIDE 40 MG
40 TABLET ORAL DAILY
Qty: 0 | Refills: 0 | Status: DISCONTINUED | OUTPATIENT
Start: 2018-04-20 | End: 2018-04-21

## 2018-04-20 RX ORDER — METOPROLOL TARTRATE 50 MG
25 TABLET ORAL DAILY
Qty: 0 | Refills: 0 | Status: DISCONTINUED | OUTPATIENT
Start: 2018-04-20 | End: 2018-04-30

## 2018-04-20 RX ADMIN — Medication 5 MILLIGRAM(S): at 21:48

## 2018-04-20 RX ADMIN — POLYETHYLENE GLYCOL 3350 17 GRAM(S): 17 POWDER, FOR SOLUTION ORAL at 13:18

## 2018-04-20 RX ADMIN — POLYETHYLENE GLYCOL 3350 17 GRAM(S): 17 POWDER, FOR SOLUTION ORAL at 21:30

## 2018-04-20 RX ADMIN — Medication 25 MILLIGRAM(S): at 18:07

## 2018-04-20 RX ADMIN — SENNA PLUS 2 TABLET(S): 8.6 TABLET ORAL at 21:29

## 2018-04-20 RX ADMIN — Medication 81 MILLIGRAM(S): at 13:19

## 2018-04-20 RX ADMIN — Medication 10 MILLIGRAM(S): at 21:29

## 2018-04-20 RX ADMIN — Medication 650 MILLIGRAM(S): at 00:10

## 2018-04-20 RX ADMIN — Medication 10 MILLIGRAM(S): at 13:20

## 2018-04-20 RX ADMIN — Medication 100 MILLIGRAM(S): at 13:21

## 2018-04-20 RX ADMIN — ATORVASTATIN CALCIUM 10 MILLIGRAM(S): 80 TABLET, FILM COATED ORAL at 21:29

## 2018-04-20 RX ADMIN — Medication 1000 UNIT(S): at 13:19

## 2018-04-20 RX ADMIN — Medication 100 MILLIGRAM(S): at 21:29

## 2018-04-20 RX ADMIN — Medication 1 TABLET(S): at 13:20

## 2018-04-20 RX ADMIN — PANTOPRAZOLE SODIUM 40 MILLIGRAM(S): 20 TABLET, DELAYED RELEASE ORAL at 05:32

## 2018-04-20 RX ADMIN — RIVAROXABAN 20 MILLIGRAM(S): KIT at 18:04

## 2018-04-20 RX ADMIN — Medication 40 MILLIGRAM(S): at 18:07

## 2018-04-20 NOTE — PROGRESS NOTE ADULT - SUBJECTIVE AND OBJECTIVE BOX
[facility] NEPHROLOGY- PROGRESS NOTE    89 year old female with history of HTN presents with SOB. Nephrology consulted for hyponatremia.    REVIEW OF SYSTEMS:  Gen: no changes in weight  Cards: no chest pain  Resp: no dyspnea  GI: + nausea without vomiting or diarrhea  Vascular: no LE edema    Eye cream from the 1960s Neocortef ?spelling caused eyes to becomes swollen (Unknown)  neomycin (Unknown)  soaps and creams causes rash uses only sensitive skin soap (Rash)  sulfa drugs (Unknown)  Voltaren (Rash)      Hospital Medications: Medications reviewed      VITALS:  T(F): 97.5 (04-20-18 @ 12:40), Max: 98.3 (04-20-18 @ 04:49)  HR: 100 (04-20-18 @ 12:40)  BP: 132/64 (04-20-18 @ 12:40)  RR: 17 (04-20-18 @ 12:40)  SpO2: 96% (04-20-18 @ 12:40)  Wt(kg): --    04-19 @ 07:01  -  04-20 @ 07:00  --------------------------------------------------------  IN: 1370 mL / OUT: 2300 mL / NET: -930 mL    04-20 @ 07:01  -  04-20 @ 15:23  --------------------------------------------------------  IN: 540 mL / OUT: 0 mL / NET: 540 mL      PHYSICAL EXAM:    Gen: NAD, calm  Cards: RRR, +S1/S2, no M/G/R  Resp: Decreased BS @ bases B/L, scattered wheezing  GI: soft, NT/ND, NABS  Vascular: 1+ LE edema B/L      LABS:  04-20    136  |  92<L>  |  16  ----------------------------<  118<H>  4.1   |  35<H>  |  0.81    Ca    9.0      20 Apr 2018 05:12      Creatinine Trend: 0.81 <--, 0.77 <--, 0.69 <--, 0.67 <--, 0.61 <--, 0.64 <--, 0.71 <--, 0.82 <--, 0.81 <--, 0.71 <--, 0.77 <--, 0.81 <--, 0.81 <--, 0.84 <--, 0.71 <--                        8.8    11.7  )-----------( 465      ( 20 Apr 2018 05:12 )             27.0

## 2018-04-20 NOTE — CHART NOTE - NSCHARTNOTEFT_GEN_A_CORE
MEDICINE NP    Notified by RN patient converted to Afib. Seen and examined patient at bedside. Patient is alert, NAD. Denies HA, CP, SOB, cough, N/V, or abd pain. Called Dr Wilkinson to inform him patient converted to afib. Vs: 102/66 heart rate 89 O2 saturation 96%.Patient is currently on cardizem 90 TID and lopressor 25 mg daily and zarelto 20mg q 24. No further treatment as per Dr. Wilkinson.     VITAL SIGNS:  T(C): 36.6 (04-20-18 @ 20:00), Max: 36.8 (04-20-18 @ 04:49)  HR: 89 (04-20-18 @ 20:00) (81 - 100)  BP: 102/66 (04-20-18 @ 20:00) (102/66 - 132/64)  RR: 16 (04-20-18 @ 20:00) (16 - 18)  SpO2: 96% (04-20-18 @ 20:00) (96% - 99%)  Wt(kg): --      LABORATORY:                          8.8    11.7  )-----------( 465      ( 20 Apr 2018 05:12 )             27.0       04-20    136  |  92<L>  |  16  ----------------------------<  118<H>  4.1   |  35<H>  |  0.81    Ca    9.0      20 Apr 2018 05:12            MICROBIOLOGY:           RADIOLOGY:          PHYSICAL EXAM:    Constitutional: AOx3. NAD.    Respiratory: clear lungs bilaterally. No wheezing, rhonchi, or crackles.    Cardiovascular: S1 S2. No murmurs.    Gastrointestinal: BS X4 active. soft. nontender.    Extremities/Vascular: +2 pulses bilaterally. No BLE edema.      ASSESSMENT/PLAN:   HPI:  89f hx HTN, HLD, insomnia, recent admission for syncopal fall and rhabdomyolysis, recent dx of aflutter, recently diagnosed bilateral DVT and L ankle fracture, presenting from [facility] for increasing work of breathing and shortness of breath over the last 3-4 days. History gathered from patient and rehab documentation, which reveals patient had been recently diagnosed with flu, treated with tamiflu which finished on 4/9/18, and subsequently developed increased work of breathing and shortness of breath associated with cough productive of whitish sputum and wheezing. No changes to mentation, no documented or reported fevers/chills. No chest pain/palpitations. Now in Afib    In ED: 98.6, 146/85, 18, 99 RA. Given zosyn 3.375gmiv x 1 (1000), vancomycin 1gm iv (1100), duoneb x 1, placed on nonrebreather mask. (13 Apr 2018 14:45)        1) Afib  -C/w zarelto  -C/w Metoprolol  -C/w cardizem  - C/w Tele monitor  - Dr. wilkinson ([facility]) aware no further treatment.

## 2018-04-20 NOTE — PROGRESS NOTE ADULT - ASSESSMENT
89 year old female with history of Hyperlipemia, Hypertension, Insomnia, PSVT, bl DVT  admitted with SOB     1. SOB   multifactorial, recent viral illness/ acute CHF   no evidence of ACS, clinically improving   s/p abx    diuretics on hold per nephrology   med f/u      2. Acute Diastolic congestive heart failure   likely in the setting of recent viral illness   repeat chest xray noted   clinically improving,  hold further diuresis due to Hyponatremia (s/p tolvaptan ) per renal   echo - normal LV sys function, EF 55-60%, mod-sev mitral stenosis       3. PSVT ( HX)  currently in NSR  asa 81 qd  episodes of PAT noted today, asymptomatic   can uptitrate Cardizem to 120 mg PO TID if PAT persists     4. Htn   cont ccb  monitor bp     5. bl DVT (hx)  continue with xarelto 89 year old female with history of Hyperlipemia, Hypertension, Insomnia, PSVT, bl DVT  admitted with SOB     1. SOB   multifactorial, recent viral illness/ acute CHF   no evidence of ACS, clinically improving   s/p abx    diuretics on hold per nephrology   med f/u      2. Acute Diastolic congestive heart failure   likely in the setting of recent viral illness   repeat chest xray noted   clinically improving,  hold further diuresis due to Hyponatremia (s/p tolvaptan ) per renal   echo - normal LV sys function, EF 55-60%, mod-sev mitral stenosis       3. PSVT ( HX)  currently in NSR  asa 81 qd  episodes of PAT noted today, asymptomatic   start Toprol 25 mg PO daily     4. Htn   cont ccb  monitor bp     5. bl DVT (hx)  continue with xarelto

## 2018-04-20 NOTE — PROGRESS NOTE ADULT - PROBLEM SELECTOR PLAN 1
Secondary to volume overload s/p tolvaptan at 5PM no 4/18 with subsequent dilute urine and increase in serum sodium. Serum sodium with rapid improvement for which patient was given D5W overnight to prevent further correction of sodium. Sodium now acceptable. D/C D5W. Monitor serum sodium daily.

## 2018-04-20 NOTE — PROGRESS NOTE ADULT - SUBJECTIVE AND OBJECTIVE BOX
Patient is a 89y old  Female who presents with a chief complaint of SOB (13 Apr 2018 20:35)      SUBJECTIVE / OVERNIGHT EVENTS:   Feels better.  Denies CP/SOB/Palpitation/HA.    MEDICATIONS  (STANDING):  aspirin enteric coated 81 milliGRAM(s) Oral daily  atorvastatin 10 milliGRAM(s) Oral at bedtime  busPIRone 10 milliGRAM(s) Oral two times a day  cholecalciferol 1000 Unit(s) Oral daily  diltiazem    Tablet 90 milliGRAM(s) Oral three times a day  docusate sodium 100 milliGRAM(s) Oral three times a day  furosemide    Tablet 40 milliGRAM(s) Oral daily  metoprolol succinate ER 25 milliGRAM(s) Oral daily  multivitamin 1 Tablet(s) Oral daily  pantoprazole    Tablet 40 milliGRAM(s) Oral before breakfast  polyethylene glycol 3350 17 Gram(s) Oral two times a day  rivaroxaban 20 milliGRAM(s) Oral every 24 hours  senna 2 Tablet(s) Oral at bedtime    MEDICATIONS  (PRN):  ondansetron Injectable 4 milliGRAM(s) IV Push every 8 hours PRN Nausea and/or Vomiting        CAPILLARY BLOOD GLUCOSE        I&O's Summary    19 Apr 2018 07:01  -  20 Apr 2018 07:00  --------------------------------------------------------  IN: 1370 mL / OUT: 2300 mL / NET: -930 mL    20 Apr 2018 07:01  -  20 Apr 2018 16:10  --------------------------------------------------------  IN: 540 mL / OUT: 0 mL / NET: 540 mL        PHYSICAL EXAM:  GENERAL: NAD, well-developed  HEAD:  Atraumatic, Normocephalic  NECK: Supple, No JVD  CHEST/LUNG: Clear to auscultation bilaterally; No wheezing.  HEART: Regular rate and rhythm; No murmurs, rubs, or gallops  ABDOMEN: Soft, Nontender, Nondistended; Bowel sounds present  EXTREMITIES:   No clubbing, cyanosis, or edema  NEUROLOGY: AAO X 3  SKIN: No rashes    LABS:                        8.8    11.7  )-----------( 465      ( 20 Apr 2018 05:12 )             27.0     04-20    136  |  92<L>  |  16  ----------------------------<  118<H>  4.1   |  35<H>  |  0.81    Ca    9.0      20 Apr 2018 05:12              CAPILLARY BLOOD GLUCOSE                    RADIOLOGY & ADDITIONAL TESTS:    Imaging Personally Reviewed:    Consultant(s) Notes Reviewed:      Care Discussed with Consultants/Other Providers:

## 2018-04-20 NOTE — PROGRESS NOTE ADULT - SUBJECTIVE AND OBJECTIVE BOX
CARDIOLOGY FOLLOW UP - Dr. Mccartney    CC no chest pain or sob   c.o left foot pain       PHYSICAL EXAM:  T(C): 36.4 (04-20-18 @ 12:40), Max: 36.8 (04-20-18 @ 04:49)  HR: 100 (04-20-18 @ 12:40) (81 - 100)  BP: 132/64 (04-20-18 @ 12:40) (117/62 - 132/64)  RR: 17 (04-20-18 @ 12:40) (17 - 18)  SpO2: 96% (04-20-18 @ 12:40) (96% - 99%)  Wt(kg): --  I&O's Summary    19 Apr 2018 07:01  -  20 Apr 2018 07:00  --------------------------------------------------------  IN: 1370 mL / OUT: 2300 mL / NET: -930 mL    20 Apr 2018 07:01  -  20 Apr 2018 13:14  --------------------------------------------------------  IN: 300 mL / OUT: 0 mL / NET: 300 mL        Appearance: Normal	  Cardiovascular: Normal S1 S2,RRR, No JVD, No murmurs  Respiratory: Diminished at  base 	  Gastrointestinal:  Soft, Non-tender, + BS	  Extremities: bl + 1-2 LE edema        MEDICATIONS  (STANDING):  aspirin enteric coated 81 milliGRAM(s) Oral daily  atorvastatin 10 milliGRAM(s) Oral at bedtime  busPIRone 10 milliGRAM(s) Oral two times a day  cholecalciferol 1000 Unit(s) Oral daily  dextrose 5%. 1000 milliLiter(s) (50 mL/Hr) IV Continuous <Continuous>  diltiazem    Tablet 90 milliGRAM(s) Oral three times a day  docusate sodium 100 milliGRAM(s) Oral three times a day  multivitamin 1 Tablet(s) Oral daily  pantoprazole    Tablet 40 milliGRAM(s) Oral before breakfast  polyethylene glycol 3350 17 Gram(s) Oral two times a day  rivaroxaban 20 milliGRAM(s) Oral every 24 hours  senna 2 Tablet(s) Oral at bedtime      TELEMETRY: NSR   episodes of PAT noted today 	    ECG:  	  RADIOLOGY:   DIAGNOSTIC TESTING:  [ ] Echocardiogram:  [ ]  Catheterization:  [ ] Stress Test:    OTHER: 	    LABS:	 	                                8.8    11.7  )-----------( 465      ( 20 Apr 2018 05:12 )             27.0     04-20    136  |  92<L>  |  16  ----------------------------<  118<H>  4.1   |  35<H>  |  0.81    Ca    9.0      20 Apr 2018 05:12

## 2018-04-21 DIAGNOSIS — L27.0 GENERALIZED SKIN ERUPTION DUE TO DRUGS AND MEDICAMENTS TAKEN INTERNALLY: ICD-10-CM

## 2018-04-21 LAB
ALBUMIN SERPL ELPH-MCNC: 2.4 G/DL — LOW (ref 3.3–5)
ALP SERPL-CCNC: 112 U/L — SIGNIFICANT CHANGE UP (ref 40–120)
ALT FLD-CCNC: 17 U/L — SIGNIFICANT CHANGE UP (ref 10–45)
ANION GAP SERPL CALC-SCNC: 11 MMOL/L — SIGNIFICANT CHANGE UP (ref 5–17)
ANION GAP SERPL CALC-SCNC: 13 MMOL/L — SIGNIFICANT CHANGE UP (ref 5–17)
AST SERPL-CCNC: 40 U/L — SIGNIFICANT CHANGE UP (ref 10–40)
BILIRUB SERPL-MCNC: 0.5 MG/DL — SIGNIFICANT CHANGE UP (ref 0.2–1.2)
BUN SERPL-MCNC: 23 MG/DL — SIGNIFICANT CHANGE UP (ref 7–23)
BUN SERPL-MCNC: 28 MG/DL — HIGH (ref 7–23)
CALCIUM SERPL-MCNC: 9.3 MG/DL — SIGNIFICANT CHANGE UP (ref 8.4–10.5)
CALCIUM SERPL-MCNC: 9.3 MG/DL — SIGNIFICANT CHANGE UP (ref 8.4–10.5)
CHLORIDE SERPL-SCNC: 85 MMOL/L — LOW (ref 96–108)
CHLORIDE SERPL-SCNC: 89 MMOL/L — LOW (ref 96–108)
CO2 SERPL-SCNC: 30 MMOL/L — SIGNIFICANT CHANGE UP (ref 22–31)
CO2 SERPL-SCNC: 35 MMOL/L — HIGH (ref 22–31)
CREAT SERPL-MCNC: 0.85 MG/DL — SIGNIFICANT CHANGE UP (ref 0.5–1.3)
CREAT SERPL-MCNC: 1.01 MG/DL — SIGNIFICANT CHANGE UP (ref 0.5–1.3)
GLUCOSE BLDC GLUCOMTR-MCNC: 125 MG/DL — HIGH (ref 70–99)
GLUCOSE SERPL-MCNC: 105 MG/DL — HIGH (ref 70–99)
GLUCOSE SERPL-MCNC: 142 MG/DL — HIGH (ref 70–99)
HCT VFR BLD CALC: 29.2 % — LOW (ref 34.5–45)
HCT VFR BLD CALC: 31.1 % — LOW (ref 34.5–45)
HGB BLD-MCNC: 10.2 G/DL — LOW (ref 11.5–15.5)
HGB BLD-MCNC: 9.6 G/DL — LOW (ref 11.5–15.5)
MCHC RBC-ENTMCNC: 29.3 PG — SIGNIFICANT CHANGE UP (ref 27–34)
MCHC RBC-ENTMCNC: 29.4 PG — SIGNIFICANT CHANGE UP (ref 27–34)
MCHC RBC-ENTMCNC: 32.7 GM/DL — SIGNIFICANT CHANGE UP (ref 32–36)
MCHC RBC-ENTMCNC: 33 GM/DL — SIGNIFICANT CHANGE UP (ref 32–36)
MCV RBC AUTO: 88.9 FL — SIGNIFICANT CHANGE UP (ref 80–100)
MCV RBC AUTO: 89.7 FL — SIGNIFICANT CHANGE UP (ref 80–100)
PLATELET # BLD AUTO: 374 K/UL — SIGNIFICANT CHANGE UP (ref 150–400)
PLATELET # BLD AUTO: 485 K/UL — HIGH (ref 150–400)
POTASSIUM SERPL-MCNC: 4 MMOL/L — SIGNIFICANT CHANGE UP (ref 3.5–5.3)
POTASSIUM SERPL-MCNC: 5 MMOL/L — SIGNIFICANT CHANGE UP (ref 3.5–5.3)
POTASSIUM SERPL-SCNC: 4 MMOL/L — SIGNIFICANT CHANGE UP (ref 3.5–5.3)
POTASSIUM SERPL-SCNC: 5 MMOL/L — SIGNIFICANT CHANGE UP (ref 3.5–5.3)
PROT SERPL-MCNC: 6.7 G/DL — SIGNIFICANT CHANGE UP (ref 6–8.3)
RBC # BLD: 3.26 M/UL — LOW (ref 3.8–5.2)
RBC # BLD: 3.49 M/UL — LOW (ref 3.8–5.2)
RBC # FLD: 14 % — SIGNIFICANT CHANGE UP (ref 10.3–14.5)
RBC # FLD: 14.1 % — SIGNIFICANT CHANGE UP (ref 10.3–14.5)
SODIUM SERPL-SCNC: 128 MMOL/L — LOW (ref 135–145)
SODIUM SERPL-SCNC: 135 MMOL/L — SIGNIFICANT CHANGE UP (ref 135–145)
WBC # BLD: 11.6 K/UL — HIGH (ref 3.8–10.5)
WBC # BLD: 20 K/UL — HIGH (ref 3.8–10.5)
WBC # FLD AUTO: 11.6 K/UL — HIGH (ref 3.8–10.5)
WBC # FLD AUTO: 20 K/UL — HIGH (ref 3.8–10.5)

## 2018-04-21 PROCEDURE — 99231 SBSQ HOSP IP/OBS SF/LOW 25: CPT

## 2018-04-21 RX ORDER — FAMOTIDINE 10 MG/ML
20 INJECTION INTRAVENOUS ONCE
Qty: 0 | Refills: 0 | Status: COMPLETED | OUTPATIENT
Start: 2018-04-21 | End: 2018-04-21

## 2018-04-21 RX ORDER — ETHACRYNIC ACID 25 MG/1
50 TABLET ORAL DAILY
Qty: 0 | Refills: 0 | Status: DISCONTINUED | OUTPATIENT
Start: 2018-04-22 | End: 2018-04-22

## 2018-04-21 RX ADMIN — Medication 1 TABLET(S): at 09:20

## 2018-04-21 RX ADMIN — Medication 81 MILLIGRAM(S): at 09:20

## 2018-04-21 RX ADMIN — Medication 25 MILLIGRAM(S): at 05:06

## 2018-04-21 RX ADMIN — Medication 10 MILLIGRAM(S): at 13:21

## 2018-04-21 RX ADMIN — SENNA PLUS 2 TABLET(S): 8.6 TABLET ORAL at 21:58

## 2018-04-21 RX ADMIN — Medication 40 MILLIGRAM(S): at 05:06

## 2018-04-21 RX ADMIN — ATORVASTATIN CALCIUM 10 MILLIGRAM(S): 80 TABLET, FILM COATED ORAL at 21:58

## 2018-04-21 RX ADMIN — FAMOTIDINE 20 MILLIGRAM(S): 10 INJECTION INTRAVENOUS at 17:05

## 2018-04-21 RX ADMIN — Medication 100 MILLIGRAM(S): at 21:58

## 2018-04-21 RX ADMIN — Medication 1000 UNIT(S): at 09:20

## 2018-04-21 RX ADMIN — Medication 100 MILLIGRAM(S): at 05:06

## 2018-04-21 RX ADMIN — Medication 10 MILLIGRAM(S): at 21:58

## 2018-04-21 RX ADMIN — PANTOPRAZOLE SODIUM 40 MILLIGRAM(S): 20 TABLET, DELAYED RELEASE ORAL at 05:06

## 2018-04-21 RX ADMIN — POLYETHYLENE GLYCOL 3350 17 GRAM(S): 17 POWDER, FOR SOLUTION ORAL at 21:59

## 2018-04-21 RX ADMIN — RIVAROXABAN 20 MILLIGRAM(S): KIT at 17:05

## 2018-04-21 NOTE — PROGRESS NOTE ADULT - SUBJECTIVE AND OBJECTIVE BOX
CARDIOLOGY FOLLOW UP NOTE - DR. HANSEN    Subjective:  stable without sob, cp    PHYSICAL EXAM:  T(C): 36.4 (04-21-18 @ 04:40), Max: 36.7 (04-20-18 @ 21:07)  HR: 86 (04-21-18 @ 04:40) (86 - 100)  BP: 114/50 (04-21-18 @ 04:40) (98/57 - 132/64)  RR: 18 (04-21-18 @ 04:40) (16 - 18)  SpO2: 95% (04-21-18 @ 04:40) (95% - 97%)  Wt(kg): --  I&O's Summary    20 Apr 2018 07:01  -  21 Apr 2018 07:00  --------------------------------------------------------  IN: 980 mL / OUT: 280 mL / NET: 700 mL        Appearance: Normal	  Cardiovascular: Normal S1 S2,RRR, No JVD, No murmurs  Respiratory: Lungs clear to auscultation dec bs bases  Gastrointestinal:  Soft, Non-tender, + BS	  Extremities: Normal range of motion, edema    MEDICATIONS  (STANDING):  aspirin enteric coated 81 milliGRAM(s) Oral daily  atorvastatin 10 milliGRAM(s) Oral at bedtime  busPIRone 10 milliGRAM(s) Oral two times a day  cholecalciferol 1000 Unit(s) Oral daily  diltiazem    Tablet 90 milliGRAM(s) Oral three times a day  docusate sodium 100 milliGRAM(s) Oral three times a day  furosemide    Tablet 40 milliGRAM(s) Oral daily  metoprolol succinate ER 25 milliGRAM(s) Oral daily  multivitamin 1 Tablet(s) Oral daily  pantoprazole    Tablet 40 milliGRAM(s) Oral before breakfast  polyethylene glycol 3350 17 Gram(s) Oral two times a day  rivaroxaban 20 milliGRAM(s) Oral every 24 hours  senna 2 Tablet(s) Oral at bedtime      TELEMETRY: 	    ECG:  	  RADIOLOGY:   DIAGNOSTIC TESTING:  [ ] Echocardiogram:  [ ] Catheterization:  [ ] Stress Test:    OTHER: 	    LABS:	 	    CARDIAC MARKERS:                                9.6    11.6  )-----------( 485      ( 21 Apr 2018 06:51 )             29.2     04-21    135  |  89<L>  |  23  ----------------------------<  105<H>  4.0   |  35<H>  |  0.85    Ca    9.3      21 Apr 2018 06:51      proBNP:     Lipid Profile:   HgA1c:

## 2018-04-21 NOTE — PROGRESS NOTE ADULT - ASSESSMENT
89 year old female with history of Hyperlipemia, Hypertension, Insomnia, PSVT, bl DVT  admitted with SOB     1. SOB   multifactorial, recent viral illness/ acute CHF   no evidence of ACS, clinically improving     2. Acute Diastolic congestive heart failure   improved   cont lasix as ordered  echo - normal LV sys function, EF 55-60%, mod-sev mitral stenosis     3. PAT  asa 81 qd  cont Toprol, ccb     4. Mitral Stenosis  cont ccb, bb to decrease hr and increase devries filling time     5. Htn   cont ccb  monitor bp     6. bl DVT (hx)  continue with xarelto     7. Hyponatremia  resolved

## 2018-04-21 NOTE — PROGRESS NOTE ADULT - ASSESSMENT
· Assessment	  89f hx as above presenting with hypoxic respiratory failure                                                                     Problem/Plan -   ·  Problem: Hyponatremia.  Plan: Nephro f/up noted. BMP /s/p IVF NS 2%.        Problem/Plan - 3:  ·  Problem: SVT (supraventricular tachycardia).  Plan:   - may continue diltiazem 60mg po tid with hold parameters hold for sbp < 110 and/or HR < 60  - continue xarelto 20mg po daily.      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  Plan: - continue xarelto 20mg po daily.        Problem/Plan - 6:  Problem: Hypertension. Plan:   - continue diltiazem with hold parameters       Problem/Plan - 7:  ·  Problem: Anxiety.  Plan: continue buspirone and remeron.      Problem/Plan - 8:  ·  Problem: Hyperlipidemia.  Plan: continue lipitor 10mg po qhs.     Rash on back: Urticaria, could be sec to abx or Lasix (Sulfa). Ethacrynic Acid suggested by Renal in lieu of Lasix.

## 2018-04-21 NOTE — CONSULT NOTE ADULT - PROBLEM SELECTOR RECOMMENDATION 9
- Favor zosyn as culprit, although vancomycin is also a possibility  - Would recommend avoidance of zosyn if possible, although if medication is indicated in the future and there are no other reasonable alternatives, pt can be re-exposed, with the knowledge that they will likely develop an exanthematous drug eruption  - Can start triamcinolone 0.1% ointment bid to affected areas prn itchy rash, avoid use on face and groin  - Triamcinolone is only helpful if she has itch. It will not change the course of the rash and it is not recommended to use unless the pt becomes symptomatic  - This type of drug rash often lasts for 1-2 weeks and may desquamate later in the course, which will appear as a small amount of skin peeling similar to a sunburn. The rash may spread more to the distal extremities and may become darker in color before it starts to improve. Blistering, skin pain, and large sheets of skin detachment are never normal for this type of rash. Please call dermatology if any of these other symptoms develop. - Favor zosyn as culprit, although vancomycin is also a possibility  - Would recommend avoidance of zosyn if possible, although if medication is indicated in the future and there are no other reasonable alternatives, pt can be re-exposed, with the knowledge that they will likely develop an exanthematous drug eruption  - Please check CMP and CBC with differential to ensure there are no lab abnormalities consistent w/ drug induced hypersensitivity syndrome (aka DRESS)  - Can start triamcinolone 0.1% ointment bid to affected areas prn itchy rash, avoid use on face and groin  - Triamcinolone is only helpful if she has itch. It will not change the course of the rash and it is not recommended to use unless the pt becomes symptomatic  - This type of drug rash often lasts for 1-2 weeks and may desquamate later in the course, which will appear as a small amount of skin peeling similar to a sunburn. The rash may spread more to the distal extremities and may become darker in color before it starts to improve. Blistering, skin pain, and large sheets of skin detachment are never normal for this type of rash. Please call dermatology if any of these other symptoms develop.

## 2018-04-21 NOTE — CONSULT NOTE ADULT - ATTENDING COMMENTS
Agree with above NP note.  acute diastolic HF/volume overload  cont iv lasix as ordered  trend na  cont a/c for dvt/pe
Totally agree with this assessment and plan.  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  No facial edema, negative Nikolsky sign, no TEN-like skin changes and no mucosal involvement favors less severe exanthematous drug eruption.  Continue to monitor LFTs and perform urinalysis to exclude systemic involvement, but at this time there is no evidence of DRESS or TEN.
[facility] NEPHROLOGY  Chester Rosa M.D.  Esteban Rouse D.O.  Marti Huber M.D.  Marielena Saenz, MSN, ANP-C    Telephone: [phone]  Facsimile: [phone]    [address] [place] [address]
Vicente Tello  Attending Physician   Division of Infectious Disease  Pager #[phone]  After 5pm/weekend or no response, call #[phone]

## 2018-04-21 NOTE — CHART NOTE - NSCHARTNOTEFT_GEN_A_CORE
Asked to evaluate Pt for a rash ~ 1430 , As per RN ,  Noted redness in the face this AM  , but  there is a rash now . seen and examined  Pt with diffuse drug eruptions , erythematous maculo papular  rash on Posterior chest , anterior chest abdomen and extremities  . Denied any SOB or any itching , no wheezing on Exam , patent airway . d/w DR Rosa and DR Luis , Pt had received Sonata and Lasix on 04/20. Was on Toprol xl in the past confirmed with OP pharmacist  Rashid .  Dermatology consulted , who favours Zosyn as a culprit .  CBc with diff and CMP added . Will add Zosyn to the allergy profile . c/w Triamcinolone   Will sign out Pt to night covering team .  May consider Edecrin , in AM .     Barbara Navarro NP-C [id number]

## 2018-04-21 NOTE — PROGRESS NOTE ADULT - SUBJECTIVE AND OBJECTIVE BOX
Patient is a 89y old  Female who presents with a chief complaint of SOB (13 Apr 2018 20:35)      SUBJECTIVE / OVERNIGHT EVENTS:  Rash on back noted  Review of Systems:   CONSTITUTIONAL: No fever, weight loss, or fatigue  EYES: No eye pain, visual disturbances, or discharge  ENMT:  No difficulty hearing, tinnitus, vertigo; No sinus or throat pain  NECK: No pain or stiffness  BREASTS: No pain, masses, or nipple discharge  RESPIRATORY: No cough, wheezing, chills or hemoptysis; No shortness of breath  CARDIOVASCULAR: No chest pain, palpitations, dizziness, or leg swelling  GASTROINTESTINAL: No abdominal or epigastric pain. No nausea, vomiting, or hematemesis; No diarrhea or constipation. No melena or hematochezia.  GENITOURINARY: No dysuria, frequency, hematuria, or incontinence  NEUROLOGICAL: No headaches, memory loss, loss of strength, numbness, or tremors  SKIN:HPI  LYMPH NODES: No enlarged glands  ENDOCRINE: No heat or cold intolerance; No hair loss  MUSCULOSKELETAL: No joint pain or swelling; No muscle, back, or extremity pain  PSYCHIATRIC: No depression, anxiety, mood swings, or difficulty sleeping  HEME/LYMPH: No easy bruising, or bleeding gums  ALLERY AND IMMUNOLOGIC: No hives or eczema    MEDICATIONS  (STANDING):  aspirin enteric coated 81 milliGRAM(s) Oral daily  atorvastatin 10 milliGRAM(s) Oral at bedtime  busPIRone 10 milliGRAM(s) Oral two times a day  cholecalciferol 1000 Unit(s) Oral daily  diltiazem    Tablet 90 milliGRAM(s) Oral three times a day  docusate sodium 100 milliGRAM(s) Oral three times a day  metoprolol succinate ER 25 milliGRAM(s) Oral daily  multivitamin 1 Tablet(s) Oral daily  pantoprazole    Tablet 40 milliGRAM(s) Oral before breakfast  polyethylene glycol 3350 17 Gram(s) Oral two times a day  rivaroxaban 20 milliGRAM(s) Oral every 24 hours  senna 2 Tablet(s) Oral at bedtime    MEDICATIONS  (PRN):  ondansetron Injectable 4 milliGRAM(s) IV Push every 8 hours PRN Nausea and/or Vomiting  triamcinolone 0.1% Ointment 1 Application(s) Topical every 12 hours PRN itching      PHYSICAL EXAM:  Vital Signs Last 24 Hrs  T(C): 37.1 (21 Apr 2018 12:47), Max: 37.1 (21 Apr 2018 12:47)  T(F): 98.7 (21 Apr 2018 12:47), Max: 98.7 (21 Apr 2018 12:47)  HR: 100 (21 Apr 2018 12:47) (86 - 100)  BP: 120/64 (21 Apr 2018 12:47) (98/57 - 120/64)  BP(mean): --  RR: 18 (21 Apr 2018 12:47) (16 - 18)  SpO2: 95% (21 Apr 2018 12:47) (95% - 97%)  I&O's Summary    20 Apr 2018 07:01  -  21 Apr 2018 07:00  --------------------------------------------------------  IN: 980 mL / OUT: 280 mL / NET: 700 mL    21 Apr 2018 07:01  -  21 Apr 2018 18:58  --------------------------------------------------------  IN: 120 mL / OUT: 0 mL / NET: 120 mL      GENERAL: NAD, well-developed  HEAD:  Atraumatic, Normocephalic  EYES: EOMI, PERRLA, conjunctiva and sclera clear  NECK: Supple, No JVD  CHEST/LUNG: Clear to auscultation bilaterally; No wheeze  HEART: Regular rate and rhythm; No murmurs, rubs, or gallops  ABDOMEN: Soft, Nontender, Nondistended; Bowel sounds present  EXTREMITIES:  2+ Peripheral Pulses, No clubbing, cyanosis, or edema  PSYCH: AAOx3  NEUROLOGY: non-focal  SKIN: No rashes or lesions    LABS:  CAPILLARY BLOOD GLUCOSE      POCT Blood Glucose.: 125 mg/dL (21 Apr 2018 14:47)                          9.6    11.6  )-----------( 485      ( 21 Apr 2018 06:51 )             29.2     04-21    135  |  89<L>  |  23  ----------------------------<  105<H>  4.0   |  35<H>  |  0.85    Ca    9.3      21 Apr 2018 06:51                RADIOLOGY & ADDITIONAL TESTS:    Imaging Personally Reviewed:    Consultant(s) Notes Reviewed:      Care Discussed with Consultants/Other Providers:

## 2018-04-21 NOTE — PROGRESS NOTE ADULT - SUBJECTIVE AND OBJECTIVE BOX
[facility] NEPHROLOGY- PROGRESS NOTE, Follow up     Patient is a 89y Female with history of HTN presents with SOB. Nephrology consulted for hyponatremia.    Allergy:  Eye cream from the 1960s Neocortef ?spelling caused eyes to becomes swollen (Unknown)  neomycin (Unknown)  soaps and creams causes rash uses only sensitive skin soap (Rash)  sulfa drugs (Unknown)  Voltaren (Rash)    Hospital Medications:   MEDICATIONS  (STANDING):  aspirin enteric coated 81 milliGRAM(s) Oral daily  atorvastatin 10 milliGRAM(s) Oral at bedtime  busPIRone 10 milliGRAM(s) Oral two times a day  cholecalciferol 1000 Unit(s) Oral daily  diltiazem    Tablet 90 milliGRAM(s) Oral three times a day  docusate sodium 100 milliGRAM(s) Oral three times a day  furosemide    Tablet 40 milliGRAM(s) Oral daily  metoprolol succinate ER 25 milliGRAM(s) Oral daily  multivitamin 1 Tablet(s) Oral daily  pantoprazole    Tablet 40 milliGRAM(s) Oral before breakfast  polyethylene glycol 3350 17 Gram(s) Oral two times a day  rivaroxaban 20 milliGRAM(s) Oral every 24 hours  senna 2 Tablet(s) Oral at bedtime    REVIEW OF SYSTEMS:  Gen: no changes in weight  Cards: no chest pain  Resp: no dyspnea  GI: + nausea without vomiting or diarrhea  Vascular: no LE edema    VITALS:  T(F): 98.7 (04-21-18 @ 12:47), Max: 98.7 (04-21-18 @ 12:47)  HR: 100 (04-21-18 @ 12:47)  BP: 120/64 (04-21-18 @ 12:47)  RR: 18 (04-21-18 @ 12:47)  SpO2: 95% (04-21-18 @ 12:47)  Wt(kg): --    04-20 @ 07:01  -  04-21 @ 07:00  --------------------------------------------------------  IN: 980 mL / OUT: 280 mL / NET: 700 mL    04-21 @ 07:01  -  04-21 @ 13:32  --------------------------------------------------------  IN: 120 mL / OUT: 0 mL / NET: 120 mL        PHYSICAL EXAM:  Gen: NAD, calm  Cards: RRR, +S1/S2, no M/G/R  Resp: Decreased BS @ bases B/L, scattered wheezing  GI: soft, NT/ND, NABS  Vascular: 1+ LE edema B/L    LABS:  04-21    135  |  89<L>  |  23  ----------------------------<  105<H>  4.0   |  35<H>  |  0.85    Ca    9.3      21 Apr 2018 06:51      Creatinine Trend: 0.85 <--, 0.81 <--, 0.77 <--, 0.69 <--, 0.67 <--, 0.61 <--, 0.64 <--, 0.71 <--, 0.82 <--, 0.81 <--, 0.71 <--, 0.77 <--, 0.81 <--                        9.6    11.6  )-----------( 485      ( 21 Apr 2018 06:51 )             29.2     Urine Studies:    Osmolality, Random Urine: 62 mos/kg (04-19 @ 05:10)  Sodium, Random Urine: <20 mmol/L (04-19 @ 05:10)  Osmolality, Random Urine: 395 mos/kg (04-18 @ 06:22)  Sodium, Random Urine: 47 mmol/L (04-18 @ 06:22)  Osmolality, Random Urine: 391 mos/kg (04-17 @ 15:33)  Sodium, Random Urine: 26 mmol/L (04-17 @ 15:33)  Sodium, Random Urine: 69 mmol/L (04-16 @ 14:31)  Osmolality, Random Urine: 227 mos/kg (04-16 @ 14:31)    RADIOLOGY & ADDITIONAL STUDIES: [facility] NEPHROLOGY- PROGRESS NOTE, Follow up     Patient is a 89y Female with history of HTN presents with SOB. Nephrology consulted for hyponatremia.    Pt has developed a drug rash.  Of note, she was started on PO lasix recently.  She has an allergy to sulfa and there is some cross-reactivity.  Will change to ethacrynic acid.    Allergy:  Eye cream from the 1960s Neocortef ?spelling caused eyes to becomes swollen (Unknown)  neomycin (Unknown)  soaps and creams causes rash uses only sensitive skin soap (Rash)  sulfa drugs (Unknown)  Voltaren (Rash)    Hospital Medications:   MEDICATIONS  (STANDING):  aspirin enteric coated 81 milliGRAM(s) Oral daily  atorvastatin 10 milliGRAM(s) Oral at bedtime  busPIRone 10 milliGRAM(s) Oral two times a day  cholecalciferol 1000 Unit(s) Oral daily  diltiazem    Tablet 90 milliGRAM(s) Oral three times a day  docusate sodium 100 milliGRAM(s) Oral three times a day  furosemide    Tablet 40 milliGRAM(s) Oral daily  metoprolol succinate ER 25 milliGRAM(s) Oral daily  multivitamin 1 Tablet(s) Oral daily  pantoprazole    Tablet 40 milliGRAM(s) Oral before breakfast  polyethylene glycol 3350 17 Gram(s) Oral two times a day  rivaroxaban 20 milliGRAM(s) Oral every 24 hours  senna 2 Tablet(s) Oral at bedtime    REVIEW OF SYSTEMS:  Cards: no chest pain  Resp: no dyspnea  GI: no nausea  Vascular: no LE edema  Skin: + rash all over body    VITALS:  T(F): 98.7 (04-21-18 @ 12:47), Max: 98.7 (04-21-18 @ 12:47)  HR: 100 (04-21-18 @ 12:47)  BP: 120/64 (04-21-18 @ 12:47)  RR: 18 (04-21-18 @ 12:47)  SpO2: 95% (04-21-18 @ 12:47)  Wt(kg): --    04-20 @ 07:01  -  04-21 @ 07:00  --------------------------------------------------------  IN: 980 mL / OUT: 280 mL / NET: 700 mL    04-21 @ 07:01  -  04-21 @ 13:32  --------------------------------------------------------  IN: 120 mL / OUT: 0 mL / NET: 120 mL        PHYSICAL EXAM:  Gen: NAD, calm  Cards: RRR, +S1/S2, no M/G/R  Resp: Decreased BS @ bases B/L, scattered wheezing  GI: soft, NT/ND, NABS  Vascular: 1+ LE edema B/L  Skin: maculopapular rash on face, trunk, back, legs, essentially entire body c/w drug rash      LABS:  04-21    135  |  89<L>  |  23  ----------------------------<  105<H>  4.0   |  35<H>  |  0.85    Ca    9.3      21 Apr 2018 06:51      Creatinine Trend: 0.85 <--, 0.81 <--, 0.77 <--, 0.69 <--, 0.67 <--, 0.61 <--, 0.64 <--, 0.71 <--, 0.82 <--, 0.81 <--, 0.71 <--, 0.77 <--, 0.81 <--                        9.6    11.6  )-----------( 485      ( 21 Apr 2018 06:51 )             29.2     Urine Studies:    Osmolality, Random Urine: 62 mos/kg (04-19 @ 05:10)  Sodium, Random Urine: <20 mmol/L (04-19 @ 05:10)  Osmolality, Random Urine: 395 mos/kg (04-18 @ 06:22)  Sodium, Random Urine: 47 mmol/L (04-18 @ 06:22)  Osmolality, Random Urine: 391 mos/kg (04-17 @ 15:33)  Sodium, Random Urine: 26 mmol/L (04-17 @ 15:33)  Sodium, Random Urine: 69 mmol/L (04-16 @ 14:31)  Osmolality, Random Urine: 227 mos/kg (04-16 @ 14:31)    RADIOLOGY & ADDITIONAL STUDIES:

## 2018-04-21 NOTE — CONSULT NOTE ADULT - ASSESSMENT
89 y/o F w/ a flutter, DVT admitted for SOB and hyponatremia w/ diffuse rash for 1 day. Rash consistent with exanthematous drug eruption. Considering drug history, most likely cause is zosyn followed by vancomycin. It is impossible to determine with complete certainty which medication is like the likely culprit. 89 y/o F w/ a flutter, DVT admitted for SOB and hyponatremia w/ diffuse rash for 1 day. Rash consistent with exanthematous drug eruption. Considering drug history, most likely cause is zosyn followed by vancomycin. It is impossible to determine with complete certainty which medication is like the likely culprit. No suspicion for DRESS or more severe skin reactions such as SJS/TEN at this time.

## 2018-04-21 NOTE — PROGRESS NOTE ADULT - PROBLEM SELECTOR PLAN 1
Secondary to volume overload s/p tolvaptan at 5PM no 4/18 with subsequent dilute urine and increase in serum sodium. Serum sodium with rapid improvement for which patient was given D5W overnight to prevent further correction of sodium. Sodium normalized.  Monitor serum sodium daily. Improved.  Secondary to volume overload s/p tolvaptan at 5PM no 4/18 with subsequent dilute urine and increase in serum sodium. Serum sodium with rapid improvement for which patient was given D5W overnight to prevent further correction of sodium. Sodium normalized.  Monitor serum sodium daily.

## 2018-04-21 NOTE — CONSULT NOTE ADULT - SUBJECTIVE AND OBJECTIVE BOX
HPI:  89 y/o F w/ a flutter, DVT admitted for SOB and hyponatremia. Developed rash overnight which started on the face and now spreading down to the trunk and extremities. Pt is denying any itch or pain. Denies any lesions in the mouth or eye abnormalities. Pt is a poor historian and is unable to provide a history of medications she has taken in the past. Based on chart review, notable medication exposures during this admission include: vancomycin 04/13-04/15, zosyn 04/13-04/16, tolvaptan 04/18, furosemide 04/13-04/16 and 04/20-04/21.      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  Hypertension  Hyperlipidemia  Insomnia  Breast Lump  Seasonal Allergies  Hyperlipemia  Anxiety: palpitations  Status post cataract extraction: OS  Status post hysterectomy: endometrial cancer  History of D&C- 8/12/11  History of Colonoscopy- 2011/Sept  History of Breast Lump/Mass Excision- [facility]ighn- left- 1971      REVIEW OF SYSTEMS      General: no fevers/chills, no lethargy	    Skin/Breast: see HPI  	  Ophthalmologic: no eye pain or change in vision  	  ENMT: no dysphagia or change in hearing    Respiratory and Thorax: endorses SOB  	  Cardiovascular: no palpitations or chest pain    Gastrointestinal: no abdominal pain or blood in stool     Genitourinary: no dysuria or frequency    Musculoskeletal: no joint pains or weakness	    Neurological: no weakness, numbness , or tingling    MEDICATIONS  (STANDING):  aspirin enteric coated 81 milliGRAM(s) Oral daily  atorvastatin 10 milliGRAM(s) Oral at bedtime  busPIRone 10 milliGRAM(s) Oral two times a day  cholecalciferol 1000 Unit(s) Oral daily  diltiazem    Tablet 90 milliGRAM(s) Oral three times a day  docusate sodium 100 milliGRAM(s) Oral three times a day  famotidine Injectable 20 milliGRAM(s) IV Push once  metoprolol succinate ER 25 milliGRAM(s) Oral daily  multivitamin 1 Tablet(s) Oral daily  pantoprazole    Tablet 40 milliGRAM(s) Oral before breakfast  polyethylene glycol 3350 17 Gram(s) Oral two times a day  rivaroxaban 20 milliGRAM(s) Oral every 24 hours  senna 2 Tablet(s) Oral at bedtime    MEDICATIONS  (PRN):  ondansetron Injectable 4 milliGRAM(s) IV Push every 8 hours PRN Nausea and/or Vomiting      Allergies    Eye cream from the 1960s Neocortef ?spelling caused eyes to becomes swollen (Unknown)  neomycin (Unknown)  soaps and creams causes rash uses only sensitive skin soap (Rash)  sulfa drugs (Unknown)  Voltaren (Rash)    Intolerances        SOCIAL HISTORY: unable to get hx due to confusion    FAMILY HISTORY:  No pertinent family history in first degree relatives      Vital Signs Last 24 Hrs  T(C): 37.1 (21 Apr 2018 12:47), Max: 37.1 (21 Apr 2018 12:47)  T(F): 98.7 (21 Apr 2018 12:47), Max: 98.7 (21 Apr 2018 12:47)  HR: 100 (21 Apr 2018 12:47) (86 - 100)  BP: 120/64 (21 Apr 2018 12:47) (98/57 - 120/64)  BP(mean): --  RR: 18 (21 Apr 2018 12:47) (16 - 18)  SpO2: 95% (21 Apr 2018 12:47) (95% - 97%)    PHYSICAL EXAM:     The patient was alert and oriented X 3, well nourished, and in no  apparent distress.  OP showed no ulcerations  There was no visible lymphadenopathy.  Conjunctiva were non injected  There was no clubbing or edema of extremities.  The scalp, hair, face, eyebrows, lips, OP, neck, chest, back,   extremities X 4, nails were examined.  There was no hyperhidrosis or bromhidrosis.    Of note on skin exam:   - Erythematous macules and papules coalescing into plaques w/ accentuation of trunk and proximal extremities, with lesser involvement of the face and distal extremities  - No evidence of skin detachment or blistering    LABS:                        9.6    11.6  )-----------( 485      ( 21 Apr 2018 06:51 )             29.2     04-21    135  |  89<L>  |  23  ----------------------------<  105<H>  4.0   |  35<H>  |  0.85    Ca    9.3      21 Apr 2018 06:51 HPI:  89 y/o F w/ a flutter, DVT admitted for SOB and hyponatremia. Developed rash overnight which started on the face and now spreading down to the trunk and extremities. Pt is denying any itch or pain. Denies any lesions in the mouth or eye abnormalities. Pt is a poor historian and is unable to provide a history of medications she has taken in the past. Based on chart review, notable medication exposures during this admission include: vancomycin 04/13-04/15, zosyn 04/13-04/16, tolvaptan 04/18, furosemide 04/13-04/16 and 04/20-04/21.      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  Hypertension  Hyperlipidemia  Insomnia  Breast Lump  Seasonal Allergies  Hyperlipemia  Anxiety: palpitations  Status post cataract extraction: OS  Status post hysterectomy: endometrial cancer  History of D&C- 8/12/11  History of Colonoscopy- 2011/Sept  History of Breast Lump/Mass Excision- benighn- left- 1971      REVIEW OF SYSTEMS      General: no fevers/chills, no lethargy	    Skin/Breast: see HPI  	  Ophthalmologic: no eye pain or change in vision  	  ENMT: no dysphagia or change in hearing    Respiratory and Thorax: endorses SOB  	  Cardiovascular: no palpitations or chest pain    Gastrointestinal: no abdominal pain or blood in stool     Genitourinary: no dysuria or frequency    Musculoskeletal: no joint pains or weakness	    Neurological: no weakness, numbness , or tingling    MEDICATIONS  (STANDING):  aspirin enteric coated 81 milliGRAM(s) Oral daily  atorvastatin 10 milliGRAM(s) Oral at bedtime  busPIRone 10 milliGRAM(s) Oral two times a day  cholecalciferol 1000 Unit(s) Oral daily  diltiazem    Tablet 90 milliGRAM(s) Oral three times a day  docusate sodium 100 milliGRAM(s) Oral three times a day  famotidine Injectable 20 milliGRAM(s) IV Push once  metoprolol succinate ER 25 milliGRAM(s) Oral daily  multivitamin 1 Tablet(s) Oral daily  pantoprazole    Tablet 40 milliGRAM(s) Oral before breakfast  polyethylene glycol 3350 17 Gram(s) Oral two times a day  rivaroxaban 20 milliGRAM(s) Oral every 24 hours  senna 2 Tablet(s) Oral at bedtime    MEDICATIONS  (PRN):  ondansetron Injectable 4 milliGRAM(s) IV Push every 8 hours PRN Nausea and/or Vomiting      Allergies    Eye cream from the 1960s Neocortef ?spelling caused eyes to becomes swollen (Unknown)  neomycin (Unknown)  soaps and creams causes rash uses only sensitive skin soap (Rash)  sulfa drugs (Unknown)  Voltaren (Rash)    Intolerances        SOCIAL HISTORY: unable to get hx due to confusion    FAMILY HISTORY:  No pertinent family history in first degree relatives      Vital Signs Last 24 Hrs  T(C): 37.1 (21 Apr 2018 12:47), Max: 37.1 (21 Apr 2018 12:47)  T(F): 98.7 (21 Apr 2018 12:47), Max: 98.7 (21 Apr 2018 12:47)  HR: 100 (21 Apr 2018 12:47) (86 - 100)  BP: 120/64 (21 Apr 2018 12:47) (98/57 - 120/64)  BP(mean): --  RR: 18 (21 Apr 2018 12:47) (16 - 18)  SpO2: 95% (21 Apr 2018 12:47) (95% - 97%)    PHYSICAL EXAM:     The patient was alert but not oriented, well nourished, and in no  apparent distress.  OP showed no ulcerations  There was no visible lymphadenopathy.  Conjunctiva were non injected  There was no clubbing or edema of extremities.  The scalp, hair, face, eyebrows, lips, OP, neck, chest, back,   extremities X 4, nails were examined.  There was no hyperhidrosis or bromhidrosis.    Of note on skin exam:   - Erythematous macules and papules coalescing into plaques w/ accentuation of trunk and proximal extremities, with lesser involvement of the face and distal extremities  - No evidence of skin detachment or blistering    LABS:                        9.6    11.6  )-----------( 485      ( 21 Apr 2018 06:51 )             29.2     04-21    135  |  89<L>  |  23  ----------------------------<  105<H>  4.0   |  35<H>  |  0.85    Ca    9.3      21 Apr 2018 06:51

## 2018-04-21 NOTE — PROGRESS NOTE ADULT - PROBLEM SELECTOR PLAN 3
Continue lasix 40 mg PO daily and 1L FR. Monitor UO.  Monitor for urinary retention. Drug rash, possibly due to lasix.  Will change to ethacrynic acid 50mg po daily and  Monitor UO.  Monitor for urinary retention.

## 2018-04-22 DIAGNOSIS — N17.9 ACUTE KIDNEY FAILURE, UNSPECIFIED: ICD-10-CM

## 2018-04-22 LAB
ANION GAP SERPL CALC-SCNC: 10 MMOL/L — SIGNIFICANT CHANGE UP (ref 5–17)
BUN SERPL-MCNC: 34 MG/DL — HIGH (ref 7–23)
CALCIUM SERPL-MCNC: 9.5 MG/DL — SIGNIFICANT CHANGE UP (ref 8.4–10.5)
CHLORIDE SERPL-SCNC: 89 MMOL/L — LOW (ref 96–108)
CO2 SERPL-SCNC: 35 MMOL/L — HIGH (ref 22–31)
CREAT ?TM UR-MCNC: 18 MG/DL — SIGNIFICANT CHANGE UP
CREAT SERPL-MCNC: 1.3 MG/DL — SIGNIFICANT CHANGE UP (ref 0.5–1.3)
GLUCOSE SERPL-MCNC: 118 MG/DL — HIGH (ref 70–99)
HCT VFR BLD CALC: 32.6 % — LOW (ref 34.5–45)
HGB BLD-MCNC: 10.3 G/DL — LOW (ref 11.5–15.5)
LG PLATELETS BLD QL AUTO: SLIGHT — SIGNIFICANT CHANGE UP
LYMPHOCYTES # BLD AUTO: 1 K/UL — SIGNIFICANT CHANGE UP (ref 1–3.3)
LYMPHOCYTES # BLD AUTO: 3 % — LOW (ref 13–44)
MCHC RBC-ENTMCNC: 28 PG — SIGNIFICANT CHANGE UP (ref 27–34)
MCHC RBC-ENTMCNC: 31.5 GM/DL — LOW (ref 32–36)
MCV RBC AUTO: 89 FL — SIGNIFICANT CHANGE UP (ref 80–100)
MONOCYTES # BLD AUTO: 0.8 K/UL — SIGNIFICANT CHANGE UP (ref 0–0.9)
MONOCYTES NFR BLD AUTO: 4 % — SIGNIFICANT CHANGE UP (ref 2–14)
NEUTROPHILS # BLD AUTO: 24.5 K/UL — HIGH (ref 1.8–7.4)
NEUTROPHILS NFR BLD AUTO: 89 % — HIGH (ref 43–77)
NEUTS BAND # BLD: 4 % — SIGNIFICANT CHANGE UP (ref 0–8)
OSMOLALITY SERPL: 284 MOS/KG — SIGNIFICANT CHANGE UP (ref 275–300)
OSMOLALITY UR: 272 MOS/KG — LOW (ref 300–900)
PLAT MORPH BLD: ABNORMAL
PLATELET # BLD AUTO: 463 K/UL — HIGH (ref 150–400)
POTASSIUM SERPL-MCNC: 4.6 MMOL/L — SIGNIFICANT CHANGE UP (ref 3.5–5.3)
POTASSIUM SERPL-SCNC: 4.6 MMOL/L — SIGNIFICANT CHANGE UP (ref 3.5–5.3)
RBC # BLD: 3.66 M/UL — LOW (ref 3.8–5.2)
RBC # FLD: 14.3 % — SIGNIFICANT CHANGE UP (ref 10.3–14.5)
RBC BLD AUTO: SIGNIFICANT CHANGE UP
SODIUM SERPL-SCNC: 134 MMOL/L — LOW (ref 135–145)
SODIUM UR-SCNC: 91 MMOL/L — SIGNIFICANT CHANGE UP
WBC # BLD: 26.4 K/UL — HIGH (ref 3.8–10.5)
WBC # FLD AUTO: 26.4 K/UL — HIGH (ref 3.8–10.5)

## 2018-04-22 RX ORDER — SODIUM CHLORIDE 9 MG/ML
1000 INJECTION INTRAMUSCULAR; INTRAVENOUS; SUBCUTANEOUS
Qty: 0 | Refills: 0 | Status: DISCONTINUED | OUTPATIENT
Start: 2018-04-22 | End: 2018-04-23

## 2018-04-22 RX ADMIN — Medication 1 TABLET(S): at 13:22

## 2018-04-22 RX ADMIN — Medication 25 MILLIGRAM(S): at 06:33

## 2018-04-22 RX ADMIN — Medication 1 APPLICATION(S): at 21:46

## 2018-04-22 RX ADMIN — SENNA PLUS 2 TABLET(S): 8.6 TABLET ORAL at 21:47

## 2018-04-22 RX ADMIN — Medication 100 MILLIGRAM(S): at 21:47

## 2018-04-22 RX ADMIN — ETHACRYNIC ACID 50 MILLIGRAM(S): 25 TABLET ORAL at 06:33

## 2018-04-22 RX ADMIN — Medication 100 MILLIGRAM(S): at 13:22

## 2018-04-22 RX ADMIN — Medication 81 MILLIGRAM(S): at 13:20

## 2018-04-22 RX ADMIN — ATORVASTATIN CALCIUM 10 MILLIGRAM(S): 80 TABLET, FILM COATED ORAL at 21:47

## 2018-04-22 RX ADMIN — POLYETHYLENE GLYCOL 3350 17 GRAM(S): 17 POWDER, FOR SOLUTION ORAL at 21:47

## 2018-04-22 RX ADMIN — SODIUM CHLORIDE 60 MILLILITER(S): 9 INJECTION INTRAMUSCULAR; INTRAVENOUS; SUBCUTANEOUS at 03:01

## 2018-04-22 RX ADMIN — Medication 1 APPLICATION(S): at 07:04

## 2018-04-22 RX ADMIN — PANTOPRAZOLE SODIUM 40 MILLIGRAM(S): 20 TABLET, DELAYED RELEASE ORAL at 06:33

## 2018-04-22 RX ADMIN — Medication 10 MILLIGRAM(S): at 23:04

## 2018-04-22 RX ADMIN — Medication 1000 UNIT(S): at 13:20

## 2018-04-22 RX ADMIN — RIVAROXABAN 20 MILLIGRAM(S): KIT at 18:40

## 2018-04-22 RX ADMIN — Medication 10 MILLIGRAM(S): at 13:19

## 2018-04-22 RX ADMIN — Medication 100 MILLIGRAM(S): at 06:33

## 2018-04-22 NOTE — PROGRESS NOTE ADULT - SUBJECTIVE AND OBJECTIVE BOX
[facility] NEPHROLOGY- PROGRESS NOTE, Follow up     Patient is a 89y Female with history of HTN presents with SOB. Nephrology consulted for hyponatremia.  4/22/18 Now with ALLAN 4/22/18.   4/21/18 Pt has developed a drug rash.  Of note, she was started on PO lasix recently.  She has an allergy to sulfa and there is some cross-reactivity.  Will change to ethacrynic acid.    Allergy:  Eye cream from the 1960s Neocortef ?spelling caused eyes to becomes swollen (Unknown)  neomycin (Unknown)  piperacillin-tazobactam (Rash)  soaps and creams causes rash uses only sensitive skin soap (Rash)  sulfa drugs (Unknown)  Voltaren (Rash)    Hospital Medications:   MEDICATIONS  (STANDING):  aspirin enteric coated 81 milliGRAM(s) Oral daily  atorvastatin 10 milliGRAM(s) Oral at bedtime  busPIRone 10 milliGRAM(s) Oral two times a day  cholecalciferol 1000 Unit(s) Oral daily  diltiazem    Tablet 90 milliGRAM(s) Oral three times a day  docusate sodium 100 milliGRAM(s) Oral three times a day  ethacrynic acid 50 milliGRAM(s) Oral daily  metoprolol succinate ER 25 milliGRAM(s) Oral daily  multivitamin 1 Tablet(s) Oral daily  pantoprazole    Tablet 40 milliGRAM(s) Oral before breakfast  polyethylene glycol 3350 17 Gram(s) Oral two times a day  rivaroxaban 20 milliGRAM(s) Oral every 24 hours  senna 2 Tablet(s) Oral at bedtime  sodium chloride 0.9%. 1000 milliLiter(s) (60 mL/Hr) IV Continuous <Continuous>    REVIEW OF SYSTEMS:  Cards: no chest pain  Resp: no dyspnea  GI: no nausea  Vascular: no LE edema  Skin: + rash all over body        VITALS:  T(F): 98.5 (04-22-18 @ 04:35), Max: 99.3 (04-21-18 @ 21:09)  HR: 172 (04-22-18 @ 04:35)  BP: 114/61 (04-22-18 @ 04:35)  RR: 18 (04-22-18 @ 04:35)  SpO2: 98% (04-22-18 @ 04:35)  Wt(kg): --    04-21 @ 07:01  -  04-22 @ 07:00  --------------------------------------------------------  IN: 1080 mL / OUT: 0 mL / NET: 1080 mL        PHYSICAL EXAM:    Gen: NAD, calm  Cards: RRR, +S1/S2, no M/G/R  Resp: Decreased BS @ bases B/L, scattered wheezing  GI: soft, NT/ND, NABS  Vascular: 1+ LE edema B/L  Skin: maculopapular rash on face, trunk, back, legs, essentially entire body c/w drug rash      LABS:  04-22    134<L>  |  89<L>  |  34<H>  ----------------------------<  118<H>  4.6   |  35<H>  |  1.30    Ca    9.5      22 Apr 2018 06:51    TPro  6.7  /  Alb  2.4<L>  /  TBili  0.5  /  DBili  x   /  AST  40  /  ALT  17  /  AlkPhos  112  04-21    Creatinine Trend: 1.30 <--, 1.01 <--, 0.85 <--, 0.81 <--, 0.77 <--, 0.69 <--, 0.67 <--, 0.61 <--, 0.64 <--, 0.71 <--, 0.82 <--, 0.81 <--                        10.3   26.4  )-----------( 463      ( 22 Apr 2018 06:51 )             32.6     Urine Studies:    Creatinine, Random Urine: 18 mg/dL (04-22 @ 02:25)  Sodium, Random Urine: 91 mmol/L (04-22 @ 02:25)  Osmolality, Random Urine: 272 mos/kg (04-22 @ 01:55)  Osmolality, Random Urine: 62 mos/kg (04-19 @ 05:10)  Sodium, Random Urine: <20 mmol/L (04-19 @ 05:10)  Osmolality, Random Urine: 395 mos/kg (04-18 @ 06:22)  Sodium, Random Urine: 47 mmol/L (04-18 @ 06:22)  Osmolality, Random Urine: 391 mos/kg (04-17 @ 15:33)  Sodium, Random Urine: 26 mmol/L (04-17 @ 15:33)  Sodium, Random Urine: 69 mmol/L (04-16 @ 14:31)  Osmolality, Random Urine: 227 mos/kg (04-16 @ 14:31)    RADIOLOGY & ADDITIONAL STUDIES: [facility] NEPHROLOGY- PROGRESS NOTE, Follow up     Patient is a 89y Female with history of HTN presents with SOB. Nephrology consulted for hyponatremia.    4/21/18 Pt has developed a drug rash.  Of note, she was started on PO lasix recently.  She has an allergy to sulfa and there is some cross-reactivity.  Changed to ethacrynic acid.    4/22/18 Now with ALLAN 4/22/18.  Pt feels a bit better, but still quite weak.      Allergy:  Eye cream from the 1960s Neocortef ?spelling caused eyes to becomes swollen (Unknown)  neomycin (Unknown)  piperacillin-tazobactam (Rash)  soaps and creams causes rash uses only sensitive skin soap (Rash)  sulfa drugs (Unknown)  Voltaren (Rash)    Hospital Medications:   MEDICATIONS  (STANDING):  aspirin enteric coated 81 milliGRAM(s) Oral daily  atorvastatin 10 milliGRAM(s) Oral at bedtime  busPIRone 10 milliGRAM(s) Oral two times a day  cholecalciferol 1000 Unit(s) Oral daily  diltiazem    Tablet 90 milliGRAM(s) Oral three times a day  docusate sodium 100 milliGRAM(s) Oral three times a day  ethacrynic acid 50 milliGRAM(s) Oral daily  metoprolol succinate ER 25 milliGRAM(s) Oral daily  multivitamin 1 Tablet(s) Oral daily  pantoprazole    Tablet 40 milliGRAM(s) Oral before breakfast  polyethylene glycol 3350 17 Gram(s) Oral two times a day  rivaroxaban 20 milliGRAM(s) Oral every 24 hours  senna 2 Tablet(s) Oral at bedtime  sodium chloride 0.9%. 1000 milliLiter(s) (60 mL/Hr) IV Continuous <Continuous>    REVIEW OF SYSTEMS:  Cards: no chest pain  Resp: no dyspnea  GI: no nausea  Vascular: no LE edema  Skin: + rash all over body        VITALS:  T(F): 98.5 (04-22-18 @ 04:35), Max: 99.3 (04-21-18 @ 21:09)  HR: 172 (04-22-18 @ 04:35)  BP: 114/61 (04-22-18 @ 04:35)  RR: 18 (04-22-18 @ 04:35)  SpO2: 98% (04-22-18 @ 04:35)  Wt(kg): --    04-21 @ 07:01  -  04-22 @ 07:00  --------------------------------------------------------  IN: 1080 mL / OUT: 0 mL / NET: 1080 mL        PHYSICAL EXAM:  Gen: NAD, calm  Cards: RRR, +S1/S2, no M/G/R  Resp: Decreased BS @ bases B/L, scattered wheezing  GI: soft, NT/ND, NABS  Vascular: 1+ LE edema B/L  Skin: maculopapular rash on face, trunk, back, legs, essentially entire body c/w drug rash      LABS:  04-22    134<L>  |  89<L>  |  34<H>  ----------------------------<  118<H>  4.6   |  35<H>  |  1.30    Ca    9.5      22 Apr 2018 06:51    TPro  6.7  /  Alb  2.4<L>  /  TBili  0.5  /  DBili  x   /  AST  40  /  ALT  17  /  AlkPhos  112  04-21    Creatinine Trend: 1.30 <--, 1.01 <--, 0.85 <--, 0.81 <--, 0.77 <--, 0.69 <--, 0.67 <--, 0.61 <--, 0.64 <--, 0.71 <--, 0.82 <--, 0.81 <--                        10.3   26.4  )-----------( 463      ( 22 Apr 2018 06:51 )             32.6     Urine Studies:    Creatinine, Random Urine: 18 mg/dL (04-22 @ 02:25)  Sodium, Random Urine: 91 mmol/L (04-22 @ 02:25)  Osmolality, Random Urine: 272 mos/kg (04-22 @ 01:55)  Osmolality, Random Urine: 62 mos/kg (04-19 @ 05:10)  Sodium, Random Urine: <20 mmol/L (04-19 @ 05:10)  Osmolality, Random Urine: 395 mos/kg (04-18 @ 06:22)  Sodium, Random Urine: 47 mmol/L (04-18 @ 06:22)  Osmolality, Random Urine: 391 mos/kg (04-17 @ 15:33)  Sodium, Random Urine: 26 mmol/L (04-17 @ 15:33)  Sodium, Random Urine: 69 mmol/L (04-16 @ 14:31)  Osmolality, Random Urine: 227 mos/kg (04-16 @ 14:31)    RADIOLOGY & ADDITIONAL STUDIES:

## 2018-04-22 NOTE — PROGRESS NOTE ADULT - ASSESSMENT
89 year old female with history of Hyperlipemia, Hypertension, Insomnia, PSVT, bl DVT  admitted with SOB     1. SOB   multifactorial, recent viral illness/ acute CHF   no evidence of ACS, clinically improving     2. Acute Diastolic congestive heart failure   improved   lasix changed to edecrin   cont diuresis as ordered  echo - normal LV sys function, EF 55-60%, mod-sev mitral stenosis     3. PAT  asa 81 qd  cont Toprol, ccb     4. Mitral Stenosis  cont ccb, bb to decrease hr and increase devries filling time     5. Htn   cont ccb  monitor bp     6. bl DVT (hx)  continue with xarelto     7. Hyponatremia  resolved

## 2018-04-22 NOTE — PROGRESS NOTE ADULT - ASSESSMENT
89 year old female with history of HTN presents with SOB. Nephrology consulted for hyponatremia. Now with ALLAN 4/22/18. 89 year old female with history of HTN presents with SOB. Nephrology consulted for hyponatremia. Now with ALLAN 4/22/18, likely hemodynamically-mediated..

## 2018-04-22 NOTE — PROGRESS NOTE ADULT - SUBJECTIVE AND OBJECTIVE BOX
Patient is a 89y old  Female who presents with a chief complaint of SOB (13 Apr 2018 20:35)      SUBJECTIVE / OVERNIGHT EVENTS:  c/o of weakness  Denies CP/SOB/Palpitation/HA.    MEDICATIONS  (STANDING):  aspirin enteric coated 81 milliGRAM(s) Oral daily  atorvastatin 10 milliGRAM(s) Oral at bedtime  busPIRone 10 milliGRAM(s) Oral two times a day  cholecalciferol 1000 Unit(s) Oral daily  diltiazem    Tablet 90 milliGRAM(s) Oral three times a day  docusate sodium 100 milliGRAM(s) Oral three times a day  metoprolol succinate ER 25 milliGRAM(s) Oral daily  multivitamin 1 Tablet(s) Oral daily  pantoprazole    Tablet 40 milliGRAM(s) Oral before breakfast  polyethylene glycol 3350 17 Gram(s) Oral two times a day  rivaroxaban 20 milliGRAM(s) Oral every 24 hours  senna 2 Tablet(s) Oral at bedtime  sodium chloride 0.9%. 1000 milliLiter(s) (60 mL/Hr) IV Continuous <Continuous>    MEDICATIONS  (PRN):  ondansetron Injectable 4 milliGRAM(s) IV Push every 8 hours PRN Nausea and/or Vomiting  triamcinolone 0.1% Ointment 1 Application(s) Topical every 12 hours PRN itching        CAPILLARY BLOOD GLUCOSE        I&O's Summary    21 Apr 2018 07:01  -  22 Apr 2018 07:00  --------------------------------------------------------  IN: 1080 mL / OUT: 0 mL / NET: 1080 mL    22 Apr 2018 07:01  -  22 Apr 2018 22:39  --------------------------------------------------------  IN: 740 mL / OUT: 450 mL / NET: 290 mL        PHYSICAL EXAM:    NECK: Supple, No JVD  CHEST/LUNG: Clear to auscultation bilaterally; No wheezing.  HEART: Regular rate and rhythm; No murmurs, rubs, or gallops  ABDOMEN: Soft, Nontender, Nondistended; Bowel sounds present  EXTREMITIES:   No clubbing, cyanosis, or edema  NEUROLOGY: AAO   SKIN: No rashes    LABS:                        10.3   26.4  )-----------( 463      ( 22 Apr 2018 06:51 )             32.6     04-22    134<L>  |  89<L>  |  34<H>  ----------------------------<  118<H>  4.6   |  35<H>  |  1.30    Ca    9.5      22 Apr 2018 06:51    TPro  6.7  /  Alb  2.4<L>  /  TBili  0.5  /  DBili  x   /  AST  40  /  ALT  17  /  AlkPhos  112  04-21            CAPILLARY BLOOD GLUCOSE                    RADIOLOGY & ADDITIONAL TESTS:    Imaging Personally Reviewed:    Consultant(s) Notes Reviewed:      Care Discussed with Consultants/Other Providers:

## 2018-04-22 NOTE — PROGRESS NOTE ADULT - PROBLEM SELECTOR PLAN 1
SCr with an upward trend today of 1.3 from 1.0 yesterday and baseline Cr this admission 0.6-0.8.   Lasix changed to Edecrin 50mg once daily first dose given today. Would hold dose tomorrow.   Agree with IVF 0.9% NS at 60ml/hr for ten hours    Monitor I&O's (of note output not recorded) SCr with an upward trend today of 1.3 from 1.0 yesterday and baseline Cr this admission 0.6-0.8. Given rapidly rising WBCs and skin rash, I am concerned about interstitial nephritis.  Will check differential, urinalysis, urine eosinophils (despite limited utility), and gallium scan of kidneys.  If +, will give steroids if no improvement in 1-3 days after stopping probably offending agent (furosemide) and would consider kidney biopsy tomorrow if renal function continues to worsen and urine is c/w AIN.    Lasix changed to Edecrin 50mg once daily first dose given today. Would hold dose tomorrow.   Agree with IVF 0.9% NS at 60ml/hr for ten hours    Monitor I&O's (of note output not recorded)

## 2018-04-22 NOTE — PROGRESS NOTE ADULT - SUBJECTIVE AND OBJECTIVE BOX
CARDIOLOGY FOLLOW UP - Dr. Mccartney    CC no cp/sob       PHYSICAL EXAM:  T(C): 36.9 (04-22-18 @ 04:35), Max: 37.4 (04-21-18 @ 21:09)  HR: 172 (04-22-18 @ 04:35) (86 - 172)  BP: 114/61 (04-22-18 @ 04:35) (99/53 - 120/64)  RR: 18 (04-22-18 @ 04:35) (18 - 18)  SpO2: 98% (04-22-18 @ 04:35) (95% - 98%)  Wt(kg): --  I&O's Summary    21 Apr 2018 07:01  -  22 Apr 2018 07:00  --------------------------------------------------------  IN: 1080 mL / OUT: 0 mL / NET: 1080 mL        Appearance: Normal	  Cardiovascular: Normal S1 S2,RRR, No JVD, No murmurs  Respiratory: Lungs clear to auscultation dec bs bases  Gastrointestinal:  Soft, Non-tender, + BS	  Extremities: Normal range of motion, +edema        MEDICATIONS  (STANDING):  aspirin enteric coated 81 milliGRAM(s) Oral daily  atorvastatin 10 milliGRAM(s) Oral at bedtime  busPIRone 10 milliGRAM(s) Oral two times a day  cholecalciferol 1000 Unit(s) Oral daily  diltiazem    Tablet 90 milliGRAM(s) Oral three times a day  docusate sodium 100 milliGRAM(s) Oral three times a day  ethacrynic acid 50 milliGRAM(s) Oral daily  metoprolol succinate ER 25 milliGRAM(s) Oral daily  multivitamin 1 Tablet(s) Oral daily  pantoprazole    Tablet 40 milliGRAM(s) Oral before breakfast  polyethylene glycol 3350 17 Gram(s) Oral two times a day  rivaroxaban 20 milliGRAM(s) Oral every 24 hours  senna 2 Tablet(s) Oral at bedtime  sodium chloride 0.9%. 1000 milliLiter(s) (60 mL/Hr) IV Continuous <Continuous>      TELEMETRY: 	    ECG:  	  RADIOLOGY:   DIAGNOSTIC TESTING:  [ ] Echocardiogram:  [ ]  Catheterization:  [ ] Stress Test:    OTHER: 	    LABS:	 	                                10.3   26.4  )-----------( 463      ( 22 Apr 2018 06:51 )             32.6     04-22    134<L>  |  89<L>  |  34<H>  ----------------------------<  118<H>  4.6   |  35<H>  |  1.30    Ca    9.5      22 Apr 2018 06:51    TPro  6.7  /  Alb  2.4<L>  /  TBili  0.5  /  DBili  x   /  AST  40  /  ALT  17  /  AlkPhos  112  04-21

## 2018-04-23 DIAGNOSIS — N17.9 ACUTE KIDNEY FAILURE, UNSPECIFIED: ICD-10-CM

## 2018-04-23 DIAGNOSIS — D72.829 ELEVATED WHITE BLOOD CELL COUNT, UNSPECIFIED: ICD-10-CM

## 2018-04-23 DIAGNOSIS — L27.0 GENERALIZED SKIN ERUPTION DUE TO DRUGS AND MEDICAMENTS TAKEN INTERNALLY: ICD-10-CM

## 2018-04-23 LAB
ANION GAP SERPL CALC-SCNC: 13 MMOL/L — SIGNIFICANT CHANGE UP (ref 5–17)
APPEARANCE UR: ABNORMAL
APPEARANCE UR: ABNORMAL
BACTERIA # UR AUTO: ABNORMAL /HPF
BACTERIA # UR AUTO: ABNORMAL /HPF
BILIRUB UR-MCNC: NEGATIVE — SIGNIFICANT CHANGE UP
BILIRUB UR-MCNC: NEGATIVE — SIGNIFICANT CHANGE UP
BUN SERPL-MCNC: 46 MG/DL — HIGH (ref 7–23)
CALCIUM SERPL-MCNC: 8.6 MG/DL — SIGNIFICANT CHANGE UP (ref 8.4–10.5)
CHLORIDE SERPL-SCNC: 84 MMOL/L — LOW (ref 96–108)
CO2 SERPL-SCNC: 32 MMOL/L — HIGH (ref 22–31)
COLOR SPEC: ABNORMAL
COLOR SPEC: YELLOW — SIGNIFICANT CHANGE UP
COMMENT - URINE: SIGNIFICANT CHANGE UP
CREAT ?TM UR-MCNC: 74 MG/DL — SIGNIFICANT CHANGE UP
CREAT SERPL-MCNC: 1.55 MG/DL — HIGH (ref 0.5–1.3)
DIFF PNL FLD: ABNORMAL
DIFF PNL FLD: ABNORMAL
GLUCOSE SERPL-MCNC: 113 MG/DL — HIGH (ref 70–99)
GLUCOSE UR QL: NEGATIVE — SIGNIFICANT CHANGE UP
GLUCOSE UR QL: NEGATIVE — SIGNIFICANT CHANGE UP
HCT VFR BLD CALC: 29.3 % — LOW (ref 34.5–45)
HGB BLD-MCNC: 9.6 G/DL — LOW (ref 11.5–15.5)
KETONES UR-MCNC: ABNORMAL
KETONES UR-MCNC: NEGATIVE — SIGNIFICANT CHANGE UP
LEUKOCYTE ESTERASE UR-ACNC: ABNORMAL
LEUKOCYTE ESTERASE UR-ACNC: ABNORMAL
MCHC RBC-ENTMCNC: 29 PG — SIGNIFICANT CHANGE UP (ref 27–34)
MCHC RBC-ENTMCNC: 32.9 GM/DL — SIGNIFICANT CHANGE UP (ref 32–36)
MCV RBC AUTO: 88.2 FL — SIGNIFICANT CHANGE UP (ref 80–100)
NITRITE UR-MCNC: NEGATIVE — SIGNIFICANT CHANGE UP
NITRITE UR-MCNC: NEGATIVE — SIGNIFICANT CHANGE UP
OSMOLALITY UR: 404 MOS/KG — SIGNIFICANT CHANGE UP (ref 300–900)
PH UR: 6 — SIGNIFICANT CHANGE UP (ref 5–8)
PH UR: 6 — SIGNIFICANT CHANGE UP (ref 5–8)
PLATELET # BLD AUTO: 393 K/UL — SIGNIFICANT CHANGE UP (ref 150–400)
POTASSIUM SERPL-MCNC: 4.1 MMOL/L — SIGNIFICANT CHANGE UP (ref 3.5–5.3)
POTASSIUM SERPL-SCNC: 4.1 MMOL/L — SIGNIFICANT CHANGE UP (ref 3.5–5.3)
PROT UR-MCNC: 100 MG/DL
PROT UR-MCNC: 30 MG/DL
RBC # BLD: 3.33 M/UL — LOW (ref 3.8–5.2)
RBC # FLD: 14.3 % — SIGNIFICANT CHANGE UP (ref 10.3–14.5)
RBC CASTS # UR COMP ASSIST: SIGNIFICANT CHANGE UP /HPF (ref 0–2)
RBC CASTS # UR COMP ASSIST: SIGNIFICANT CHANGE UP /HPF (ref 0–2)
SODIUM SERPL-SCNC: 129 MMOL/L — LOW (ref 135–145)
SODIUM UR-SCNC: 41 MMOL/L — SIGNIFICANT CHANGE UP
SP GR SPEC: 1.01 — SIGNIFICANT CHANGE UP (ref 1.01–1.02)
SP GR SPEC: 1.02 — SIGNIFICANT CHANGE UP (ref 1.01–1.02)
UROBILINOGEN FLD QL: NEGATIVE — SIGNIFICANT CHANGE UP
UROBILINOGEN FLD QL: NEGATIVE — SIGNIFICANT CHANGE UP
WBC # BLD: 32.4 K/UL — HIGH (ref 3.8–10.5)
WBC # FLD AUTO: 32.4 K/UL — HIGH (ref 3.8–10.5)
WBC UR QL: >50 /HPF (ref 0–5)
WBC UR QL: >50 /HPF (ref 0–5)

## 2018-04-23 PROCEDURE — 99232 SBSQ HOSP IP/OBS MODERATE 35: CPT

## 2018-04-23 RX ORDER — ALPRAZOLAM 0.25 MG
0.5 TABLET ORAL THREE TIMES A DAY
Qty: 0 | Refills: 0 | Status: DISCONTINUED | OUTPATIENT
Start: 2018-04-23 | End: 2018-04-30

## 2018-04-23 RX ADMIN — PANTOPRAZOLE SODIUM 40 MILLIGRAM(S): 20 TABLET, DELAYED RELEASE ORAL at 06:08

## 2018-04-23 RX ADMIN — Medication 0.5 MILLIGRAM(S): at 13:34

## 2018-04-23 RX ADMIN — ATORVASTATIN CALCIUM 10 MILLIGRAM(S): 80 TABLET, FILM COATED ORAL at 23:07

## 2018-04-23 RX ADMIN — Medication 10 MILLIGRAM(S): at 23:07

## 2018-04-23 RX ADMIN — Medication 1000 UNIT(S): at 10:47

## 2018-04-23 RX ADMIN — RIVAROXABAN 20 MILLIGRAM(S): KIT at 17:05

## 2018-04-23 RX ADMIN — SENNA PLUS 2 TABLET(S): 8.6 TABLET ORAL at 23:07

## 2018-04-23 RX ADMIN — Medication 81 MILLIGRAM(S): at 10:47

## 2018-04-23 RX ADMIN — Medication 1 TABLET(S): at 10:47

## 2018-04-23 RX ADMIN — Medication 25 MILLIGRAM(S): at 06:08

## 2018-04-23 RX ADMIN — Medication 100 MILLIGRAM(S): at 23:07

## 2018-04-23 RX ADMIN — Medication 10 MILLIGRAM(S): at 10:47

## 2018-04-23 RX ADMIN — Medication 100 MILLIGRAM(S): at 06:08

## 2018-04-23 NOTE — PROGRESS NOTE ADULT - SUBJECTIVE AND OBJECTIVE BOX
CARDIOLOGY FOLLOW UP - Dr. Mccartney    CC no cp/sob  Urticaria noted on chest and back.       PHYSICAL EXAM:  T(C): 37.4 (04-23-18 @ 04:45), Max: 37.4 (04-23-18 @ 04:45)  HR: 96 (04-23-18 @ 04:45) (96 - 105)  BP: 102/46 (04-23-18 @ 04:45) (102/46 - 106/66)  RR: 18 (04-23-18 @ 04:45) (18 - 18)  SpO2: 98% (04-23-18 @ 04:45) (96% - 98%)  Wt(kg): --  I&O's Summary    22 Apr 2018 07:01  -  23 Apr 2018 07:00  --------------------------------------------------------  IN: 980 mL / OUT: 450 mL / NET: 530 mL        Appearance: Normal	  Cardiovascular: Normal S1 S2,RRR, No JVD, No murmurs  Respiratory: Lungs clear to auscultation, dec at bases 	  Gastrointestinal:  Soft, Non-tender, + BS	  Extremities: Normal range of motion, No clubbing, cyanosis or edema  Rash on back: Urticaria      MEDICATIONS  (STANDING):  aspirin enteric coated 81 milliGRAM(s) Oral daily  atorvastatin 10 milliGRAM(s) Oral at bedtime  busPIRone 10 milliGRAM(s) Oral two times a day  cholecalciferol 1000 Unit(s) Oral daily  diltiazem    Tablet 90 milliGRAM(s) Oral three times a day  docusate sodium 100 milliGRAM(s) Oral three times a day  metoprolol succinate ER 25 milliGRAM(s) Oral daily  multivitamin 1 Tablet(s) Oral daily  pantoprazole    Tablet 40 milliGRAM(s) Oral before breakfast  polyethylene glycol 3350 17 Gram(s) Oral two times a day  rivaroxaban 20 milliGRAM(s) Oral every 24 hours  senna 2 Tablet(s) Oral at bedtime  sodium chloride 0.9%. 1000 milliLiter(s) (60 mL/Hr) IV Continuous <Continuous>      TELEMETRY: SR brief episode of PAT noted   ECG:  	  RADIOLOGY:   DIAGNOSTIC TESTING:  [ ] Echocardiogram:  [ ]  Catheterization:  [ ] Stress Test:    OTHER: 	    LABS:	 	                                9.6    32.4  )-----------( 393      ( 23 Apr 2018 07:07 )             29.3     04-23    129<L>  |  84<L>  |  46<H>  ----------------------------<  113<H>  4.1   |  32<H>  |  1.55<H>    Ca    8.6      23 Apr 2018 07:07    TPro  6.7  /  Alb  2.4<L>  /  TBili  0.5  /  DBili  x   /  AST  40  /  ALT  17  /  AlkPhos  112  04-21

## 2018-04-23 NOTE — PROVIDER CONTACT NOTE (MEDICATION) - ASSESSMENT
patient asymptomatic. patient awake and having soup at time of Tele Occurrence. patient A&Ox 3, Vials /66, , pox 96% RA, SR 90's on tele monitor.

## 2018-04-23 NOTE — CHART NOTE - NSCHARTNOTEFT_GEN_A_CORE
Patient is a 89y old  Female who presents with a chief complaint of SOB (13 Apr 2018 20:35)  Informed by RN pt had 600 ml residual of urine in her bladder per the bladder scan.  Pt was straight cath for   milky white urine.      HPI:  Gen: 89 y/o female WD/WN in NAD.   Skin: Pt with generalize rash throughout her body possible from medication relation. (+) blistering under her   left breast and left abdomen.   Resp:  CTA without adventitious sounds.   CV: S1S2 RRR  Chest: Symmetrical, equal lung expansion.     Vital Signs Last 24 Hrs  T(C): 36.6 (23 Apr 2018 12:09), Max: 37.4 (23 Apr 2018 04:45)  T(F): 97.8 (23 Apr 2018 12:09), Max: 99.3 (23 Apr 2018 04:45)  HR: 98 (23 Apr 2018 12:09) (96 - 102)  BP: 100/61 (23 Apr 2018 12:09) (100/61 - 106/66)  BP(mean): --  RR: 16 (23 Apr 2018 12:09) (16 - 18)  SpO2: 100% (23 Apr 2018 12:09) (96% - 100%)    Laboratory:                            9.6    32.4  )-----------( 393      ( 23 Apr 2018 07:07 )             29.3     Impression:  UTI  Possible Allergic Reaction to Zosyn    Plan:  Send urine for UA and Urine C&S.   Monitor for infection.       Neema Valle [facility]  Spectralink #[id number]

## 2018-04-23 NOTE — PROGRESS NOTE ADULT - SUBJECTIVE AND OBJECTIVE BOX
DELVIS MCBRIDE 89y [id number]    Patient is a 89y old  Female who presents with a chief complaint of SOB (13 Apr 2018 20:35)      Follow Up/CC:  ID following for rash    Interval History/ROS: rash over weekend    Allergies    Eye cream from the 1960s Neocortef ?spelling caused eyes to becomes swollen (Unknown)  neomycin (Unknown)  piperacillin-tazobactam (Rash)  soaps and creams causes rash uses only sensitive skin soap (Rash)  sulfa drugs (Unknown)  Voltaren (Rash)    Intolerances        ANTIMICROBIALS:      MEDICATIONS  (STANDING):  aspirin enteric coated 81 milliGRAM(s) Oral daily  atorvastatin 10 milliGRAM(s) Oral at bedtime  busPIRone 10 milliGRAM(s) Oral two times a day  cholecalciferol 1000 Unit(s) Oral daily  diltiazem    Tablet 90 milliGRAM(s) Oral three times a day  docusate sodium 100 milliGRAM(s) Oral three times a day  metoprolol succinate ER 25 milliGRAM(s) Oral daily  multivitamin 1 Tablet(s) Oral daily  polyethylene glycol 3350 17 Gram(s) Oral two times a day  rivaroxaban 20 milliGRAM(s) Oral every 24 hours  senna 2 Tablet(s) Oral at bedtime    MEDICATIONS  (PRN):  ALPRAZolam 0.5 milliGRAM(s) Oral three times a day PRN Anxiety  ondansetron Injectable 4 milliGRAM(s) IV Push every 8 hours PRN Nausea and/or Vomiting  triamcinolone 0.1% Ointment 1 Application(s) Topical every 12 hours PRN itching        Vital Signs Last 24 Hrs  T(C): 36.6 (23 Apr 2018 12:09), Max: 37.4 (23 Apr 2018 04:45)  T(F): 97.8 (23 Apr 2018 12:09), Max: 99.3 (23 Apr 2018 04:45)  HR: 98 (23 Apr 2018 12:09) (96 - 102)  BP: 100/61 (23 Apr 2018 12:09) (100/61 - 106/66)  BP(mean): --  RR: 16 (23 Apr 2018 12:09) (16 - 18)  SpO2: 100% (23 Apr 2018 12:09) (96% - 100%)    CBC Full  -  ( 23 Apr 2018 07:07 )  WBC Count : 32.4 K/uL  Hemoglobin : 9.6 g/dL  Hematocrit : 29.3 %  Platelet Count - Automated : 393 K/uL  Mean Cell Volume : 88.2 fl  Mean Cell Hemoglobin : 29.0 pg  Mean Cell Hemoglobin Concentration : 32.9 gm/dL  Auto Neutrophil # : x  Auto Lymphocyte # : x  Auto Monocyte # : x  Auto Eosinophil # : x  Auto Basophil # : x  Auto Neutrophil % : x  Auto Lymphocyte % : x  Auto Monocyte % : x  Auto Eosinophil % : x  Auto Basophil % : x    04-23    129<L>  |  84<L>  |  46<H>  ----------------------------<  113<H>  4.1   |  32<H>  |  1.55<H>    Ca    8.6      23 Apr 2018 07:07    TPro  6.7  /  Alb  2.4<L>  /  TBili  0.5  /  DBili  x   /  AST  40  /  ALT  17  /  AlkPhos  112  04-21    LIVER FUNCTIONS - ( 21 Apr 2018 18:57 )  Alb: 2.4 g/dL / Pro: 6.7 g/dL / ALK PHOS: 112 U/L / ALT: 17 U/L / AST: 40 U/L / GGT: x               MICROBIOLOGY:  .Urine Catheterized  04-13-18   >100,000 CFU/ml Escherichia coli  <10,000 CFU/ml Normal Urogenital aristeo present  --  Escherichia coli      URINE CATHETER  03-25-18 --  --  --      BLOOD  03-25-18 --  --  --    RADIOLOGY

## 2018-04-23 NOTE — PROGRESS NOTE ADULT - ASSESSMENT
· Assessment	  89f hx as above presenting with hypoxic respiratory failure    Drug rash:  Derm f/up.                                                                   Problem/Plan -   ·  Problem: Hyponatremia.  Plan: Nephro f/up noted. BMP     Leukocytosis:  CBC         Problem/Plan - 3:  ·  Problem: SVT (supraventricular tachycardia).  Plan:   - may continue diltiazem 60mg po tid with hold parameters hold for sbp < 110 and/or HR < 60  - continue xarelto 20mg po daily.      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  Plan: - continue xarelto 20mg po daily.        Problem/Plan - 6:  Problem: Hypertension. Plan:   - continue diltiazem        Problem/Plan - 7:  ·  Problem: Anxiety.  Plan: continue buspirone and remeron.      Problem/Plan - 8:  ·  Problem: Hyperlipidemia.  Plan: continue lipitor 10mg po qhs. · Assessment	  89f hx as above presenting with hypoxic respiratory failure    Drug rash:  Derm f/up.                                                                   Problem/Plan -   ·  Problem: Hyponatremia.  Plan: Nephro f/up noted. BMP     Leukocytosis:  CBC         Problem/Plan - 3:  ·  Problem: SVT (supraventricular tachycardia).  Plan:   - may continue diltiazem 60mg po tid with hold parameters hold for sbp < 110 and/or HR < 60  - continue xarelto 20mg po daily.      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  Plan: - continue xarelto 20mg po daily.        Problem/Plan - 6:  Problem: Hypertension. Plan:   - continue diltiazem        Problem/Plan - 7:  ·  Problem: Anxiety.  Plan: continue buspirone and remeron.      Problem/Plan - 8:  ·  Problem: Hyperlipidemia.  Plan: continue lipitor 10mg po qhs.     Dw pt's family.

## 2018-04-23 NOTE — PROGRESS NOTE ADULT - ASSESSMENT
89 year old female with history of Hyperlipemia, Hypertension, Insomnia, PSVT, bl DVT  admitted with SOB     1. SOB   multifactorial, recent viral illness/ acute CHF   no evidence of ACS, clinically improving     2. Acute Diastolic congestive heart failure   improved   lasix changed to edecrin   cont diuresis as ordered  echo - normal LV sys function, EF 55-60%, mod-sev mitral stenosis     3. PAT  asa 81 qd  cont Toprol, ccb     4. Mitral Stenosis  cont ccb, bb to decrease hr and increase devries filling time     5. Htn   cont ccb  monitor bp     6. bl DVT (hx)  continue with xarelto     7. Hyponatremia  renal f/u 89 year old female with history of Hyperlipemia, Hypertension, Insomnia, PSVT, bl DVT  admitted with SOB     1. SOB   multifactorial, recent viral illness/ acute CHF   no evidence of ACS, clinically improving     2. Acute Diastolic congestive heart failure   improved   diuresis on hold per nephrology   echo - normal LV sys function, EF 55-60%, mod-sev mitral stenosis     3. PAT  asa 81 qd  cont Toprol, ccb     4. Mitral Stenosis  cont ccb, bb to decrease hr and increase devries filling time     5. Htn   cont ccb  monitor bp     6. bl DVT (hx)  continue with xarelto     7. Hyponatremia  renal f/u

## 2018-04-23 NOTE — PROGRESS NOTE ADULT - PROBLEM SELECTOR PLAN 1
Concern for AIN given leukocytosis and diffuse macular rash for which antibiotics and diuretics discontinued. Would also discontinue PPI given know association with AIN. Check UA, urine sodium, urine creatinine. Check bladder scan given recent discontinuation of tiwari r/o urinary retention. Follow up gallium scan. Avoid nephrotoxins.

## 2018-04-23 NOTE — PROGRESS NOTE ADULT - SUBJECTIVE AND OBJECTIVE BOX
Patient is a 89y old  Female who presents with a chief complaint of SOB (2018 20:35)      SUBJECTIVE / OVERNIGHT EVENTS:  Allergic reaction.  Drug rash.    MEDICATIONS  (STANDING):  aspirin enteric coated 81 milliGRAM(s) Oral daily  atorvastatin 10 milliGRAM(s) Oral at bedtime  busPIRone 10 milliGRAM(s) Oral two times a day  cholecalciferol 1000 Unit(s) Oral daily  diltiazem    Tablet 90 milliGRAM(s) Oral three times a day  docusate sodium 100 milliGRAM(s) Oral three times a day  metoprolol succinate ER 25 milliGRAM(s) Oral daily  multivitamin 1 Tablet(s) Oral daily  polyethylene glycol 3350 17 Gram(s) Oral two times a day  rivaroxaban 20 milliGRAM(s) Oral every 24 hours  senna 2 Tablet(s) Oral at bedtime    MEDICATIONS  (PRN):  ALPRAZolam 0.5 milliGRAM(s) Oral three times a day PRN Anxiety  ondansetron Injectable 4 milliGRAM(s) IV Push every 8 hours PRN Nausea and/or Vomiting  triamcinolone 0.1% Ointment 1 Application(s) Topical every 12 hours PRN itching        CAPILLARY BLOOD GLUCOSE        I&O's Summary    2018 07:  -  2018 07:00  --------------------------------------------------------  IN: 980 mL / OUT: 450 mL / NET: 530 mL    2018 07:  -  2018 21:33  --------------------------------------------------------  IN: 240 mL / OUT: 500 mL / NET: -260 mL        PHYSICAL EXAM:    NECK: Supple, No JVD  CHEST/LUNG: Clear to auscultation bilaterally; No wheezing.  HEART: Regular rate and rhythm; No murmurs, rubs, or gallops  ABDOMEN: Soft, Nontender, Nondistended; Bowel sounds present  EXTREMITIES:   No clubbing, cyanosis, or edema  NEUROLOGY: AAO   SKIN:  Maculo papular rash on body.    LABS:                        9.6    32.4  )-----------( 393      ( 2018 07:07 )             29.3     04-23    129<L>  |  84<L>  |  46<H>  ----------------------------<  113<H>  4.1   |  32<H>  |  1.55<H>    Ca    8.6      2018 07:07            Urinalysis Basic - ( 2018 18:22 )    Color: Pink / Appearance: Turbid / S.021 / pH: x  Gluc: x / Ketone: Trace  / Bili: Negative / Urobili: Negative   Blood: x / Protein: 100 mg/dL / Nitrite: Negative   Leuk Esterase: Large / RBC: 0-2 /HPF / WBC >50 /HPF   Sq Epi: x / Non Sq Epi: x / Bacteria: Few /HPF      CAPILLARY BLOOD GLUCOSE                    RADIOLOGY & ADDITIONAL TESTS:    Imaging Personally Reviewed:    Consultant(s) Notes Reviewed:      Care Discussed with Consultants/Other Providers:

## 2018-04-23 NOTE — PROGRESS NOTE ADULT - SUBJECTIVE AND OBJECTIVE BOX
[facility] NEPHROLOGY- PROGRESS NOTE    89 year old female with history of HTN presents with SOB. Nephrology consulted for hyponatremia.    REVIEW OF SYSTEMS:  Gen: no changes in weight  Cards: no chest pain  Resp: no dyspnea  GI: + nausea without vomiting or diarrhea  Vascular: no LE edema    Eye cream from the 1960s Neocortef ?spelling caused eyes to becomes swollen (Unknown)  neomycin (Unknown)  soaps and creams causes rash uses only sensitive skin soap (Rash)  sulfa drugs (Unknown)  Voltaren (Rash)      Hospital Medications: Medications reviewed      VITALS:  T(F): 97.8 (04-23-18 @ 12:09), Max: 99.3 (04-23-18 @ 04:45)  HR: 98 (04-23-18 @ 12:09)  BP: 100/61 (04-23-18 @ 12:09)  RR: 16 (04-23-18 @ 12:09)  SpO2: 100% (04-23-18 @ 12:09)  Wt(kg): --    04-22 @ 07:01  -  04-23 @ 07:00  --------------------------------------------------------  IN: 980 mL / OUT: 450 mL / NET: 530 mL      PHYSICAL EXAM:    Gen: NAD, calm, diffuse macular rash noted  Cards: RRR, +S1/S2, no M/G/R  Resp: Decreased BS @ bases B/L, scattered wheezing  GI: soft, NT/ND, NABS  Vascular: no LE edema B/L      LABS:  04-23    129<L>  |  84<L>  |  46<H>  ----------------------------<  113<H>  4.1   |  32<H>  |  1.55<H>    Ca    8.6      23 Apr 2018 07:07    TPro  6.7  /  Alb  2.4<L>  /  TBili  0.5  /  DBili      /  AST  40  /  ALT  17  /  AlkPhos  112  04-21    Creatinine Trend: 1.55 <--, 1.30 <--, 1.01 <--, 0.85 <--, 0.81 <--, 0.77 <--, 0.69 <--, 0.67 <--, 0.61 <--, 0.64 <--, 0.71 <--                        9.6    32.4  )-----------( 393      ( 23 Apr 2018 07:07 )             29.3

## 2018-04-24 LAB
ANION GAP SERPL CALC-SCNC: 11 MMOL/L — SIGNIFICANT CHANGE UP (ref 5–17)
ANION GAP SERPL CALC-SCNC: 15 MMOL/L — SIGNIFICANT CHANGE UP (ref 5–17)
BUN SERPL-MCNC: 58 MG/DL — HIGH (ref 7–23)
BUN SERPL-MCNC: 63 MG/DL — HIGH (ref 7–23)
CALCIUM SERPL-MCNC: 8.4 MG/DL — SIGNIFICANT CHANGE UP (ref 8.4–10.5)
CALCIUM SERPL-MCNC: 8.6 MG/DL — SIGNIFICANT CHANGE UP (ref 8.4–10.5)
CHLORIDE SERPL-SCNC: 81 MMOL/L — LOW (ref 96–108)
CHLORIDE SERPL-SCNC: 82 MMOL/L — LOW (ref 96–108)
CO2 SERPL-SCNC: 29 MMOL/L — SIGNIFICANT CHANGE UP (ref 22–31)
CO2 SERPL-SCNC: 32 MMOL/L — HIGH (ref 22–31)
CREAT ?TM UR-MCNC: 73 MG/DL — SIGNIFICANT CHANGE UP
CREAT SERPL-MCNC: 1.57 MG/DL — HIGH (ref 0.5–1.3)
CREAT SERPL-MCNC: 1.58 MG/DL — HIGH (ref 0.5–1.3)
GLUCOSE SERPL-MCNC: 110 MG/DL — HIGH (ref 70–99)
GLUCOSE SERPL-MCNC: 126 MG/DL — HIGH (ref 70–99)
OSMOLALITY UR: 372 MOS/KG — SIGNIFICANT CHANGE UP (ref 300–900)
POTASSIUM SERPL-MCNC: 4 MMOL/L — SIGNIFICANT CHANGE UP (ref 3.5–5.3)
POTASSIUM SERPL-MCNC: 4.1 MMOL/L — SIGNIFICANT CHANGE UP (ref 3.5–5.3)
POTASSIUM SERPL-SCNC: 4 MMOL/L — SIGNIFICANT CHANGE UP (ref 3.5–5.3)
POTASSIUM SERPL-SCNC: 4.1 MMOL/L — SIGNIFICANT CHANGE UP (ref 3.5–5.3)
SODIUM SERPL-SCNC: 124 MMOL/L — LOW (ref 135–145)
SODIUM SERPL-SCNC: 126 MMOL/L — LOW (ref 135–145)
SODIUM UR-SCNC: <20 MMOL/L — SIGNIFICANT CHANGE UP

## 2018-04-24 PROCEDURE — 93010 ELECTROCARDIOGRAM REPORT: CPT

## 2018-04-24 PROCEDURE — 99232 SBSQ HOSP IP/OBS MODERATE 35: CPT

## 2018-04-24 RX ORDER — TOLVAPTAN 15 MG/1
15 TABLET ORAL ONCE
Qty: 0 | Refills: 0 | Status: COMPLETED | OUTPATIENT
Start: 2018-04-24 | End: 2018-04-24

## 2018-04-24 RX ORDER — PETROLATUM,WHITE
1 JELLY (GRAM) TOPICAL
Qty: 0 | Refills: 0 | Status: DISCONTINUED | OUTPATIENT
Start: 2018-04-24 | End: 2018-04-30

## 2018-04-24 RX ORDER — ACETAMINOPHEN 500 MG
650 TABLET ORAL ONCE
Qty: 0 | Refills: 0 | Status: COMPLETED | OUTPATIENT
Start: 2018-04-24 | End: 2018-04-24

## 2018-04-24 RX ADMIN — Medication 81 MILLIGRAM(S): at 09:45

## 2018-04-24 RX ADMIN — Medication 1 TABLET(S): at 09:45

## 2018-04-24 RX ADMIN — Medication 25 MILLIGRAM(S): at 05:31

## 2018-04-24 RX ADMIN — Medication 650 MILLIGRAM(S): at 21:40

## 2018-04-24 RX ADMIN — Medication 1 APPLICATION(S): at 17:20

## 2018-04-24 RX ADMIN — Medication 1 APPLICATION(S): at 05:31

## 2018-04-24 RX ADMIN — TOLVAPTAN 15 MILLIGRAM(S): 15 TABLET ORAL at 10:43

## 2018-04-24 RX ADMIN — Medication 100 MILLIGRAM(S): at 21:07

## 2018-04-24 RX ADMIN — Medication 1000 UNIT(S): at 09:45

## 2018-04-24 RX ADMIN — RIVAROXABAN 20 MILLIGRAM(S): KIT at 16:49

## 2018-04-24 RX ADMIN — ATORVASTATIN CALCIUM 10 MILLIGRAM(S): 80 TABLET, FILM COATED ORAL at 21:07

## 2018-04-24 RX ADMIN — Medication 650 MILLIGRAM(S): at 21:11

## 2018-04-24 RX ADMIN — Medication 10 MILLIGRAM(S): at 09:45

## 2018-04-24 RX ADMIN — Medication 100 MILLIGRAM(S): at 05:31

## 2018-04-24 RX ADMIN — Medication 0.5 MILLIGRAM(S): at 15:19

## 2018-04-24 RX ADMIN — Medication 10 MILLIGRAM(S): at 21:07

## 2018-04-24 NOTE — PROGRESS NOTE ADULT - PROBLEM SELECTOR PLAN 3
-prob from drug rash  -no fever  -hold off abx  -if fever or worsening WBC, check bcx x 2 and start aztreonam 1 gm iv q12

## 2018-04-24 NOTE — PROGRESS NOTE ADULT - ASSESSMENT
· Assessment	  89f hx as above presenting with hypoxic respiratory failure    Drug rash:-off antibiotic  Derm f/up..ID f/u noted                                                                   Problem/Plan -   ·  Problem: Hyponatremia.  Plan: Nephro f/up noted. BMP Dose of Talvaptan    Leukocytosis:  monitor,likely sec.to Drug rash         Problem/Plan - 3:  ·  Problem: SVT (supraventricular tachycardia).  Plan:   - may continue diltiazem 60mg po tid with hold parameters hold for sbp < 110 and/or HR < 60  - continue xarelto 20mg po daily.      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  Plan: - continue xarelto 20mg po daily.        Problem/Plan - 6:  Problem: Hypertension. Plan:   - continue diltiazem   -h/o MS-Crdiology f/u     Problem/Plan - 7:  ·  Problem: Anxiety.  Plan: continue buspirone and remeron.      Problem/Plan - 8:  ·  Problem: Hyperlipidemia.  Plan: continue lipitor 10mg po qhs.

## 2018-04-24 NOTE — PROGRESS NOTE ADULT - SUBJECTIVE AND OBJECTIVE BOX
DELVIS MCBRIDE 89y [id number]    Patient is a 89y old  Female who presents with a chief complaint of SOB (2018 20:35)      Follow Up/CC:  ID following for leukocytosis    Interval History/ROS: urine retention yesterday evening    Allergies    Eye cream from the 1960s Neocortef ?spelling caused eyes to becomes swollen (Unknown)  neomycin (Unknown)  piperacillin-tazobactam (Rash)  soaps and creams causes rash uses only sensitive skin soap (Rash)  sulfa drugs (Unknown)  Voltaren (Rash)    Intolerances        ANTIMICROBIALS:      MEDICATIONS  (STANDING):  aspirin enteric coated 81 milliGRAM(s) Oral daily  atorvastatin 10 milliGRAM(s) Oral at bedtime  busPIRone 10 milliGRAM(s) Oral two times a day  cholecalciferol 1000 Unit(s) Oral daily  diltiazem    Tablet 90 milliGRAM(s) Oral three times a day  docusate sodium 100 milliGRAM(s) Oral three times a day  metoprolol succinate ER 25 milliGRAM(s) Oral daily  multivitamin 1 Tablet(s) Oral daily  polyethylene glycol 3350 17 Gram(s) Oral two times a day  rivaroxaban 20 milliGRAM(s) Oral every 24 hours  senna 2 Tablet(s) Oral at bedtime    MEDICATIONS  (PRN):  ALPRAZolam 0.5 milliGRAM(s) Oral three times a day PRN Anxiety  ondansetron Injectable 4 milliGRAM(s) IV Push every 8 hours PRN Nausea and/or Vomiting  triamcinolone 0.1% Ointment 1 Application(s) Topical every 12 hours PRN itching        Vital Signs Last 24 Hrs  T(C): 36.5 (2018 12:29), Max: 37.2 (2018 04:39)  T(F): 97.7 (2018 12:29), Max: 98.9 (2018 04:39)  HR: 106 (2018 12:29) (98 - 118)  BP: 98/65 (2018 12:29) (98/65 - 107/64)  BP(mean): --  RR: 19 (2018 12:29) (18 - 19)  SpO2: 97% (2018 12:29) (97% - 98%)    CBC Full  -  ( 2018 07:07 )  WBC Count : 32.4 K/uL  Hemoglobin : 9.6 g/dL  Hematocrit : 29.3 %  Platelet Count - Automated : 393 K/uL  Mean Cell Volume : 88.2 fl  Mean Cell Hemoglobin : 29.0 pg  Mean Cell Hemoglobin Concentration : 32.9 gm/dL  Auto Neutrophil # : x  Auto Lymphocyte # : x  Auto Monocyte # : x  Auto Eosinophil # : x  Auto Basophil # : x  Auto Neutrophil % : x  Auto Lymphocyte % : x  Auto Monocyte % : x  Auto Eosinophil % : x  Auto Basophil % : x        126<L>  |  82<L>  |  58<H>  ----------------------------<  126<H>  4.1   |  29  |  1.57<H>    Ca    8.6      2018 06:48        Urinalysis Basic - ( 2018 18:22 )    Color: Pink / Appearance: Turbid / S.021 / pH: x  Gluc: x / Ketone: Trace  / Bili: Negative / Urobili: Negative   Blood: x / Protein: 100 mg/dL / Nitrite: Negative   Leuk Esterase: Large / RBC: 0-2 /HPF / WBC >50 /HPF   Sq Epi: x / Non Sq Epi: x / Bacteria: Few /HPF        MICROBIOLOGY:  .Urine Catheterized  18   >100,000 CFU/ml Escherichia coli  <10,000 CFU/ml Normal Urogenital aristeo present  --  Escherichia coli    RADIOLOGY

## 2018-04-24 NOTE — PROGRESS NOTE ADULT - SUBJECTIVE AND OBJECTIVE BOX
DELVIS MCBRIDE  89y  Female      Patient is a 89y old  Female who presents with a chief complaint of SOB (2018 20:35)  Patient seen and examined.covering .reported diffuse rash,likely sec.to antibiotic?  seen by ID,Dermatology.no sob,no cp,no fever,no cough    REVIEW OF SYSTEMS:  as abovex  INTERVAL HPI/OVERNIGHT EVENTS:  T(C): 36.5 (18 @ 12:29), Max: 37.2 (18 @ 04:39)  HR: 106 (18 @ 12:29) (98 - 118)  BP: 98/65 (18 @ 12:29) ( - /64)  RR: 19 (18 @ 12:29) (18 - 19)  SpO2: 97% (18 @ 12:29) (97% - 98%)  Wt(kg): --  I&O's Summary    2018 07:  -  2018 07:00  --------------------------------------------------------  IN: 480 mL / OUT: 500 mL / NET: -20 mL    2018 07:  -  2018 20:49  --------------------------------------------------------  IN: 720 mL / OUT: 300 mL / NET: 420 mL      T(C): 36.5 (18 @ 12:29), Max: 37.2 (18 @ 04:39)  HR: 106 (18 @ 12:29) (98 - 118)  BP: 98/65 (18 @ 12:29) (/65 - 107/64)  RR: 19 (18 @ 12:29) (18 - 19)  SpO2: 97% (18 @ 12:29) (97% - 98%)  Wt(kg): --Vital Signs Last 24 Hrs  T(C): 36.5 (2018 12:29), Max: 37.2 (2018 04:39)  T(F): 97.7 (2018 12:29), Max: 98.9 (2018 04:39)  HR: 106 (2018 12:29) (98 - 118)  BP: 98/65 (2018 12:29) (98/65 - 107/64)  BP(mean): --  RR: 19 (2018 12:29) (18 - 19)  SpO2: 97% (2018 12:29) (97% - 98%)    LABS:                        9.6    32.4  )-----------( 393      ( 2018 07:07 )             29.3     04-24    124<L>  |  81<L>  |  63<H>  ----------------------------<  110<H>  4.0   |  32<H>  |  1.58<H>    Ca    8.4      2018 19:09        Urinalysis Basic - ( 2018 18:22 )    Color: Pink / Appearance: Turbid / S.021 / pH: x  Gluc: x / Ketone: Trace  / Bili: Negative / Urobili: Negative   Blood: x / Protein: 100 mg/dL / Nitrite: Negative   Leuk Esterase: Large / RBC: 0-2 /HPF / WBC >50 /HPF   Sq Epi: x / Non Sq Epi: x / Bacteria: Few /HPF      CAPILLARY BLOOD GLUCOSE            Urinalysis Basic - ( 2018 18:22 )    Color: Pink / Appearance: Turbid / S.021 / pH: x  Gluc: x / Ketone: Trace  / Bili: Negative / Urobili: Negative   Blood: x / Protein: 100 mg/dL / Nitrite: Negative   Leuk Esterase: Large / RBC: 0-2 /HPF / WBC >50 /HPF   Sq Epi: x / Non Sq Epi: x / Bacteria: Few /HPF        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  Hypertension  Hyperlipidemia  Insomnia  Breast Lump  Seasonal Allergies  Hyperlipemia  Anxiety: palpitations  Status post cataract extraction: OS  Status post hysterectomy: endometrial cancer  History of D&C- 11  History of Colonoscopy-   History of Breast Lump/Mass Excision- benighn- left-       MEDICATIONS  (STANDING):  AQUAPHOR (petrolatum Ointment) 1 Application(s) Topical two times a day  aspirin enteric coated 81 milliGRAM(s) Oral daily  atorvastatin 10 milliGRAM(s) Oral at bedtime  busPIRone 10 milliGRAM(s) Oral two times a day  cholecalciferol 1000 Unit(s) Oral daily  diltiazem    Tablet 90 milliGRAM(s) Oral three times a day  docusate sodium 100 milliGRAM(s) Oral three times a day  metoprolol succinate ER 25 milliGRAM(s) Oral daily  multivitamin 1 Tablet(s) Oral daily  polyethylene glycol 3350 17 Gram(s) Oral two times a day  rivaroxaban 20 milliGRAM(s) Oral every 24 hours  senna 2 Tablet(s) Oral at bedtime    MEDICATIONS  (PRN):  ALPRAZolam 0.5 milliGRAM(s) Oral three times a day PRN Anxiety  ondansetron Injectable 4 milliGRAM(s) IV Push every 8 hours PRN Nausea and/or Vomiting  triamcinolone 0.1% Ointment 1 Application(s) Topical every 12 hours PRN itching        RADIOLOGY & ADDITIONAL TESTS:    Imaging Personally Reviewed:  [ ] YES  [ ] NO    Consultant(s) Notes Reviewed:  [ ] YES  [ ] NO    PHYSICAL EXAM:  GENERAL: NAD, well-groomed, well-developed  HEAD:  Atraumatic, Normocephalic  EYES: EOMI, PERRLA, conjunctiva and sclera clear  ENMT: No tonsillar erythema, exudates, or enlargement; Moist mucous membranes, Good dentition, No lesions  NECK: Supple, No JVD, Normal thyroid  NERVOUS SYSTEM:  Alert & Oriented X3, Good concentration; Motor Strength 5/5 B/L upper and lower extremities; DTRs 2+ intact and symmetric  CHEST/LUNG: Clear to percussion bilaterally; No rales, rhonchi, wheezing, or rubs  HEART: Regular rate and rhythm; No murmurs, rubs, or gallops  ABDOMEN: Soft, Nontender, Nondistended; Bowel sounds present  EXTREMITIES:  2+ Peripheral Pulses, No clubbing, cyanosis, or edema  LYMPH: No lymphadenopathy noted  SKIN: diffuse erethematous rash-exts.chest,abdomen area    Care Discussed with Consultants/Other Providers x[ ] YES  [ ] NO      Code Status: [] Full Code [] DNR [] DNI [] Goals of Care:   Disposition: [] ICU [] Stroke Unit [] RCU []PCU []Floor [] Discharge Home         DALTON Phan.[facility]

## 2018-04-24 NOTE — PROGRESS NOTE ADULT - ASSESSMENT
89 year old female with history of Hyperlipemia, Hypertension, Insomnia, PSVT, bl DVT  admitted with SOB     1. SOB   multifactorial, recent viral illness/ acute CHF   no evidence of ACS, clinically improved    2. Acute Diastolic congestive heart failure   improved   diuresis on hold per nephrology   echo - normal LV sys function, EF 55-60%, mod-sev mitral stenosis     3. PAT  asa 81 qd  cont Toprol, ccb     4. Mitral Stenosis  cont ccb, bb to decrease hr and increase devries filling time     5. Htn   cont ccb  monitor bp     6. bl DVT (hx)  continue with xarelto     7. Hyponatremia  renal f/u

## 2018-04-24 NOTE — PROGRESS NOTE ADULT - SUBJECTIVE AND OBJECTIVE BOX
CC: no acute events    TELEMETRY:     PHYSICAL EXAM:    T(C): 37.2 (04-24-18 @ 04:39), Max: 37.2 (04-24-18 @ 04:39)  HR: 98 (04-24-18 @ 05:56) (98 - 118)  BP: 101/65 (04-24-18 @ 05:56) (100/64 - 107/64)  RR: 18 (04-24-18 @ 04:39) (18 - 18)  SpO2: 98% (04-24-18 @ 04:39) (98% - 98%)  Wt(kg): --  I&O's Summary    23 Apr 2018 07:01  -  24 Apr 2018 07:00  --------------------------------------------------------  IN: 480 mL / OUT: 500 mL / NET: -20 mL        Appearance: Normal	  Cardiovascular: Normal S1 S2,RRR, No JVD, No murmurs  Respiratory: Lungs clear to auscultation	  Gastrointestinal:  Soft, Non-tender, + BS	  Extremities: Normal range of motion, No clubbing, cyanosis or edema  Vascular: Peripheral pulses palpable 2+ bilaterally     LABS:	 	                          9.6    32.4  )-----------( 393      ( 23 Apr 2018 07:07 )             29.3     04-24    126<L>  |  82<L>  |  58<H>  ----------------------------<  126<H>  4.1   |  29  |  1.57<H>    Ca    8.6      24 Apr 2018 06:48            CARDIAC MARKERS:    MEDICATIONS  (STANDING):  aspirin enteric coated 81 milliGRAM(s) Oral daily  atorvastatin 10 milliGRAM(s) Oral at bedtime  busPIRone 10 milliGRAM(s) Oral two times a day  cholecalciferol 1000 Unit(s) Oral daily  diltiazem    Tablet 90 milliGRAM(s) Oral three times a day  docusate sodium 100 milliGRAM(s) Oral three times a day  metoprolol succinate ER 25 milliGRAM(s) Oral daily  multivitamin 1 Tablet(s) Oral daily  polyethylene glycol 3350 17 Gram(s) Oral two times a day  rivaroxaban 20 milliGRAM(s) Oral every 24 hours  senna 2 Tablet(s) Oral at bedtime

## 2018-04-24 NOTE — PROGRESS NOTE ADULT - PROBLEM SELECTOR PLAN 1
Concern for AIN given leukocytosis and diffuse macular rash for which antibiotics, PPI and diuretics discontinued. UA with significant pyuria suspect likely secondary to AIN. Bladder scan on 4/23 noted with 600 ml of urine s/p straight cath with 500 ml of UO noted. Repeat bladder scan this morning with 219 ml of urine noted. Follow up gallium scan (P). Avoid nephrotoxins.

## 2018-04-24 NOTE — PROGRESS NOTE ADULT - SUBJECTIVE AND OBJECTIVE BOX
[facility] NEPHROLOGY- PROGRESS NOTE    89 year old female with history of HTN presents with SOB. Nephrology consulted for hyponatremia.    REVIEW OF SYSTEMS:  Gen: no changes in weight  Cards: no chest pain  Resp: no dyspnea  GI: + nausea without vomiting or diarrhea  Vascular: no LE edema    Eye cream from the 1960s Neocortef ?spelling caused eyes to becomes swollen (Unknown)  neomycin (Unknown)  soaps and creams causes rash uses only sensitive skin soap (Rash)  sulfa drugs (Unknown)  Voltaren (Rash)      Hospital Medications: Medications reviewed      VITALS:  T(F): 98.9 (04-24-18 @ 04:39), Max: 98.9 (04-24-18 @ 04:39)  HR: 98 (04-24-18 @ 05:56)  BP: 101/65 (04-24-18 @ 05:56)  RR: 18 (04-24-18 @ 04:39)  SpO2: 98% (04-24-18 @ 04:39)  Wt(kg): --    04-23 @ 07:01  -  04-24 @ 07:00  --------------------------------------------------------  IN: 480 mL / OUT: 500 mL / NET: -20 mL      PHYSICAL EXAM:    Gen: NAD, calm, diffuse macular rash noted  Cards: RRR, +S1/S2, no M/G/R  Resp: Decreased BS @ bases B/L, scattered wheezing  GI: soft, NT/ND, NABS  Vascular: no LE edema B/L      LABS:  04-24    126<L>  |  82<L>  |  58<H>  ----------------------------<  126<H>  4.1   |  29  |  1.57<H>    Ca    8.6      24 Apr 2018 06:48      Creatinine Trend: 1.57 <--, 1.55 <--, 1.30 <--, 1.01 <--, 0.85 <--, 0.81 <--, 0.77 <--, 0.69 <--, 0.67 <--, 0.61 <--, 0.64 <--                        9.6    32.4  )-----------( 393      ( 23 Apr 2018 07:07 )             29.3

## 2018-04-25 LAB
-  AMIKACIN: SIGNIFICANT CHANGE UP
-  AMOXICILLIN/CLAVULANIC ACID: SIGNIFICANT CHANGE UP
-  AMPICILLIN/SULBACTAM: SIGNIFICANT CHANGE UP
-  AMPICILLIN: SIGNIFICANT CHANGE UP
-  AZTREONAM: SIGNIFICANT CHANGE UP
-  CEFAZOLIN: SIGNIFICANT CHANGE UP
-  CEFEPIME: SIGNIFICANT CHANGE UP
-  CEFOXITIN: SIGNIFICANT CHANGE UP
-  CEFTRIAXONE: SIGNIFICANT CHANGE UP
-  CIPROFLOXACIN: SIGNIFICANT CHANGE UP
-  ERTAPENEM: SIGNIFICANT CHANGE UP
-  GENTAMICIN: SIGNIFICANT CHANGE UP
-  IMIPENEM: SIGNIFICANT CHANGE UP
-  LEVOFLOXACIN: SIGNIFICANT CHANGE UP
-  MEROPENEM: SIGNIFICANT CHANGE UP
-  NITROFURANTOIN: SIGNIFICANT CHANGE UP
-  PIPERACILLIN/TAZOBACTAM: SIGNIFICANT CHANGE UP
-  TIGECYCLINE: SIGNIFICANT CHANGE UP
-  TOBRAMYCIN: SIGNIFICANT CHANGE UP
-  TRIMETHOPRIM/SULFAMETHOXAZOLE: SIGNIFICANT CHANGE UP
ALBUMIN SERPL ELPH-MCNC: 2.2 G/DL — LOW (ref 3.3–5)
ALP SERPL-CCNC: 151 U/L — HIGH (ref 40–120)
ALT FLD-CCNC: 21 U/L — SIGNIFICANT CHANGE UP (ref 10–45)
ANION GAP SERPL CALC-SCNC: 13 MMOL/L — SIGNIFICANT CHANGE UP (ref 5–17)
AST SERPL-CCNC: 36 U/L — SIGNIFICANT CHANGE UP (ref 10–40)
BILIRUB SERPL-MCNC: 0.6 MG/DL — SIGNIFICANT CHANGE UP (ref 0.2–1.2)
BUN SERPL-MCNC: 57 MG/DL — HIGH (ref 7–23)
CALCIUM SERPL-MCNC: 8.7 MG/DL — SIGNIFICANT CHANGE UP (ref 8.4–10.5)
CHLORIDE SERPL-SCNC: 85 MMOL/L — LOW (ref 96–108)
CO2 SERPL-SCNC: 28 MMOL/L — SIGNIFICANT CHANGE UP (ref 22–31)
CREAT ?TM UR-MCNC: 38 MG/DL — SIGNIFICANT CHANGE UP
CREAT SERPL-MCNC: 1.35 MG/DL — HIGH (ref 0.5–1.3)
CULTURE RESULTS: SIGNIFICANT CHANGE UP
EOSINOPHIL NFR URNS MANUAL: POSITIVE
GLUCOSE SERPL-MCNC: 97 MG/DL — SIGNIFICANT CHANGE UP (ref 70–99)
HCT VFR BLD CALC: 30.3 % — LOW (ref 34.5–45)
HGB BLD-MCNC: 10 G/DL — LOW (ref 11.5–15.5)
MCHC RBC-ENTMCNC: 28.9 PG — SIGNIFICANT CHANGE UP (ref 27–34)
MCHC RBC-ENTMCNC: 33.1 GM/DL — SIGNIFICANT CHANGE UP (ref 32–36)
MCV RBC AUTO: 87.3 FL — SIGNIFICANT CHANGE UP (ref 80–100)
METHOD TYPE: SIGNIFICANT CHANGE UP
NT-PROBNP SERPL-SCNC: 6534 PG/ML — HIGH (ref 0–300)
ORGANISM # SPEC MICROSCOPIC CNT: SIGNIFICANT CHANGE UP
ORGANISM # SPEC MICROSCOPIC CNT: SIGNIFICANT CHANGE UP
OSMOLALITY UR: 244 MOS/KG — LOW (ref 300–900)
PLATELET # BLD AUTO: 352 K/UL — SIGNIFICANT CHANGE UP (ref 150–400)
POTASSIUM SERPL-MCNC: 4.2 MMOL/L — SIGNIFICANT CHANGE UP (ref 3.5–5.3)
POTASSIUM SERPL-SCNC: 4.2 MMOL/L — SIGNIFICANT CHANGE UP (ref 3.5–5.3)
PROT SERPL-MCNC: 5.9 G/DL — LOW (ref 6–8.3)
RAPID RVP RESULT: SIGNIFICANT CHANGE UP
RBC # BLD: 3.47 M/UL — LOW (ref 3.8–5.2)
RBC # FLD: 14.5 % — SIGNIFICANT CHANGE UP (ref 10.3–14.5)
S PYO AG SPEC QL IA: NEGATIVE — SIGNIFICANT CHANGE UP
SODIUM SERPL-SCNC: 126 MMOL/L — LOW (ref 135–145)
SODIUM UR-SCNC: <20 MMOL/L — SIGNIFICANT CHANGE UP
SPECIMEN SOURCE: SIGNIFICANT CHANGE UP
WBC # BLD: 13.9 K/UL — HIGH (ref 3.8–10.5)
WBC # FLD AUTO: 13.9 K/UL — HIGH (ref 3.8–10.5)

## 2018-04-25 PROCEDURE — 99232 SBSQ HOSP IP/OBS MODERATE 35: CPT

## 2018-04-25 PROCEDURE — 99233 SBSQ HOSP IP/OBS HIGH 50: CPT

## 2018-04-25 PROCEDURE — 78807: CPT | Mod: 26

## 2018-04-25 RX ORDER — BENZOCAINE AND MENTHOL 5; 1 G/100ML; G/100ML
1 LIQUID ORAL ONCE
Qty: 0 | Refills: 0 | Status: COMPLETED | OUTPATIENT
Start: 2018-04-25 | End: 2018-04-25

## 2018-04-25 RX ORDER — SODIUM CHLORIDE 5 G/100ML
1000 INJECTION, SOLUTION INTRAVENOUS
Qty: 0 | Refills: 0 | Status: COMPLETED | OUTPATIENT
Start: 2018-04-25 | End: 2018-04-25

## 2018-04-25 RX ORDER — ACETAMINOPHEN 500 MG
650 TABLET ORAL ONCE
Qty: 0 | Refills: 0 | Status: COMPLETED | OUTPATIENT
Start: 2018-04-25 | End: 2018-04-25

## 2018-04-25 RX ADMIN — Medication 650 MILLIGRAM(S): at 22:50

## 2018-04-25 RX ADMIN — Medication 1 APPLICATION(S): at 11:30

## 2018-04-25 RX ADMIN — ATORVASTATIN CALCIUM 10 MILLIGRAM(S): 80 TABLET, FILM COATED ORAL at 21:43

## 2018-04-25 RX ADMIN — Medication 1000 UNIT(S): at 11:30

## 2018-04-25 RX ADMIN — Medication 0.5 MILLIGRAM(S): at 00:08

## 2018-04-25 RX ADMIN — Medication 10 MILLIGRAM(S): at 11:30

## 2018-04-25 RX ADMIN — SODIUM CHLORIDE 50 MILLILITER(S): 5 INJECTION, SOLUTION INTRAVENOUS at 22:33

## 2018-04-25 RX ADMIN — Medication 0.5 MILLIGRAM(S): at 16:08

## 2018-04-25 RX ADMIN — RIVAROXABAN 20 MILLIGRAM(S): KIT at 17:51

## 2018-04-25 RX ADMIN — Medication 100 MILLIGRAM(S): at 05:21

## 2018-04-25 RX ADMIN — Medication 10 MILLIGRAM(S): at 21:43

## 2018-04-25 RX ADMIN — Medication 25 MILLIGRAM(S): at 05:25

## 2018-04-25 RX ADMIN — Medication 1 APPLICATION(S): at 21:43

## 2018-04-25 RX ADMIN — Medication 81 MILLIGRAM(S): at 11:30

## 2018-04-25 RX ADMIN — BENZOCAINE AND MENTHOL 1 LOZENGE: 5; 1 LIQUID ORAL at 20:51

## 2018-04-25 RX ADMIN — SODIUM CHLORIDE 50 MILLILITER(S): 5 INJECTION, SOLUTION INTRAVENOUS at 16:08

## 2018-04-25 RX ADMIN — Medication 1 APPLICATION(S): at 00:06

## 2018-04-25 RX ADMIN — Medication 1 TABLET(S): at 11:30

## 2018-04-25 RX ADMIN — POLYETHYLENE GLYCOL 3350 17 GRAM(S): 17 POWDER, FOR SOLUTION ORAL at 11:30

## 2018-04-25 NOTE — PROGRESS NOTE ADULT - PROBLEM SELECTOR PLAN 1
Concern for AIN given leukocytosis and diffuse macular rash for which antibiotics, PPI and diuretics discontinued. UA with significant pyuria with eosinophils (not specific) suspect likely secondary to AIN. Follow up gallium scan (P). Defer kidney biopsy given relatively stable renal function off of offending medication (Zosyn versus Furosemide). Avoid nephrotoxins.

## 2018-04-25 NOTE — PROGRESS NOTE ADULT - SUBJECTIVE AND OBJECTIVE BOX
pt is a 89 y/o female seen for lesion left foot  left lateral 5ht met head and base darken lesions noted hemorrhagic lesions intact skin intact drier  no erythema no edema  recommend betadine paint with ABD and ace bandage and Air cast for left ankle fx  will follow weekly if foot worsen please call [phone] ASAP

## 2018-04-25 NOTE — PROGRESS NOTE ADULT - NEUROLOGICAL DETAILS
responds to verbal commands

## 2018-04-25 NOTE — PROGRESS NOTE ADULT - SUBJECTIVE AND OBJECTIVE BOX
DELVIS MCBRIDE 89y [id number]    Patient is a 89y old  Female who presents with a chief complaint of SOB (2018 20:35)      Follow Up/CC:  ID following for leukocytosis    Interval History/ROS: no acute issues o/n    Allergies    Eye cream from the 1960s Neocortef ?spelling caused eyes to becomes swollen (Unknown)  neomycin (Unknown)  piperacillin-tazobactam (Rash)  soaps and creams causes rash uses only sensitive skin soap (Rash)  sulfa drugs (Unknown)  Voltaren (Rash)    Intolerances        ANTIMICROBIALS:      MEDICATIONS  (STANDING):  AQUAPHOR (petrolatum Ointment) 1 Application(s) Topical two times a day  aspirin enteric coated 81 milliGRAM(s) Oral daily  atorvastatin 10 milliGRAM(s) Oral at bedtime  busPIRone 10 milliGRAM(s) Oral two times a day  cholecalciferol 1000 Unit(s) Oral daily  diltiazem    Tablet 90 milliGRAM(s) Oral three times a day  docusate sodium 100 milliGRAM(s) Oral three times a day  metoprolol succinate ER 25 milliGRAM(s) Oral daily  multivitamin 1 Tablet(s) Oral daily  polyethylene glycol 3350 17 Gram(s) Oral two times a day  rivaroxaban 20 milliGRAM(s) Oral every 24 hours  senna 2 Tablet(s) Oral at bedtime    MEDICATIONS  (PRN):  ALPRAZolam 0.5 milliGRAM(s) Oral three times a day PRN Anxiety  ondansetron Injectable 4 milliGRAM(s) IV Push every 8 hours PRN Nausea and/or Vomiting  triamcinolone 0.1% Ointment 1 Application(s) Topical every 12 hours PRN itching        Vital Signs Last 24 Hrs  T(C): 36.4 (2018 04:30), Max: 36.5 (2018 12:29)  T(F): 97.6 (2018 04:30), Max: 97.7 (2018 12:29)  HR: 88 (2018 05:19) (88 - 108)  BP: 102/66 (2018 05:19) (93/58 - 102/66)  BP(mean): --  RR: 18 (2018 04:30) (18 - 19)  SpO2: 100% (2018 04:30) (97% - 100%)    CBC Full  -  ( 2018 09:53 )  WBC Count : 13.9 K/uL  Hemoglobin : 10.0 g/dL  Hematocrit : 30.3 %  Platelet Count - Automated : 352 K/uL  Mean Cell Volume : 87.3 fl  Mean Cell Hemoglobin : 28.9 pg  Mean Cell Hemoglobin Concentration : 33.1 gm/dL  Auto Neutrophil # : x  Auto Lymphocyte # : x  Auto Monocyte # : x  Auto Eosinophil # : x  Auto Basophil # : x  Auto Neutrophil % : x  Auto Lymphocyte % : x  Auto Monocyte % : x  Auto Eosinophil % : x  Auto Basophil % : x        126<L>  |  85<L>  |  57<H>  ----------------------------<  97  4.2   |  28  |  1.35<H>    Ca    8.7      2018 09:53    TPro  5.9<L>  /  Alb  2.2<L>  /  TBili  0.6  /  DBili  x   /  AST  36  /  ALT  21  /  AlkPhos  151<H>      LIVER FUNCTIONS - ( 2018 09:53 )  Alb: 2.2 g/dL / Pro: 5.9 g/dL / ALK PHOS: 151 U/L / ALT: 21 U/L / AST: 36 U/L / GGT: x           Urinalysis Basic - ( 2018 18:22 )    Color: Pink / Appearance: Turbid / S.021 / pH: x  Gluc: x / Ketone: Trace  / Bili: Negative / Urobili: Negative   Blood: x / Protein: 100 mg/dL / Nitrite: Negative   Leuk Esterase: Large / RBC: 0-2 /HPF / WBC >50 /HPF   Sq Epi: x / Non Sq Epi: x / Bacteria: Few /HPF        MICROBIOLOGY:  .Urine Catheterized  18   >100,000 CFU/ml Klebsiella pneumoniae  --  --      .Urine Catheterized  18   >100,000 CFU/ml Escherichia coli  <10,000 CFU/ml Normal Urogenital aristeo present  --  Escherichia coli      RADIOLOGY

## 2018-04-25 NOTE — PROGRESS NOTE ADULT - ASSESSMENT
89 year old female with history of Hyperlipemia, Hypertension, Insomnia, PSVT, bl DVT  admitted with SOB     1. SOB   multifactorial, recent viral illness/ acute CHF   no evidence of ACS, clinically improved    2. Acute Diastolic congestive heart failure   improved , does not appear overtly overloaded on exam   diuresis on hold per nephrology   echo - normal LV sys function, EF 55-60%, mod-sev mitral stenosis     3. PAT   no events on tele   asa 81 qd, cont Toprol, ccb     4. Mitral Stenosis  cont ccb, bb to decrease hr and increase devries filling time     5. Htn   cont ccb  monitor bp     6. bl DVT (hx)  continue with xarelto     7. Hyponatremia  IVF, monitor BMP   renal f/u 89 year old female with history of Hyperlipemia, Hypertension, Insomnia, PSVT, bl DVT  admitted with SOB     1. SOB   multifactorial, recent viral illness/ acute CHF   no evidence of ACS, clinically improved    2. Acute Diastolic congestive heart failure   improved , does not appear overtly overloaded on exam   diuresis on hold per nephrology   echo - normal LV sys function, EF 55-60%, mod-sev mitral stenosis     3. PAT   ectopic atrial rhythm noted, no evidence of Afib   asa 81 qd, cont Toprol, ccb     4. Mitral Stenosis  cont ccb, bb to decrease hr and increase devries filling time     5. Htn   cont ccb  monitor bp     6. bl DVT (hx)  continue with xarelto     7. Hyponatremia  IVF, monitor BMP   renal f/u

## 2018-04-25 NOTE — PROGRESS NOTE ADULT - SUBJECTIVE AND OBJECTIVE BOX
CARDIOLOGY FOLLOW UP - Dr. Mccartney    CC no chest pain or sob   c.o skin discomfort        PHYSICAL EXAM:  T(C): 36.3 (04-25-18 @ 12:50), Max: 36.4 (04-24-18 @ 20:53)  HR: 81 (04-25-18 @ 12:50) (81 - 108)  BP: 93/56 (04-25-18 @ 12:50) (93/56 - 102/66)  RR: 18 (04-25-18 @ 12:50) (18 - 19)  SpO2: 100% (04-25-18 @ 12:50) (97% - 100%)  Wt(kg): --  I&O's Summary    24 Apr 2018 07:01  -  25 Apr 2018 07:00  --------------------------------------------------------  IN: 940 mL / OUT: 1000 mL / NET: -60 mL    25 Apr 2018 07:01  -  25 Apr 2018 13:18  --------------------------------------------------------  IN: 120 mL / OUT: 400 mL / NET: -280 mL        Appearance: Normal	  Cardiovascular: Normal S1 S2,RRR, No JVD, No murmurs  Respiratory: fine crackle to RLL   Gastrointestinal:  Soft, Non-tender, + BS	  Extremities: Normal range of motion, No clubbing, cyanosis or edema        MEDICATIONS  (STANDING):  AQUAPHOR (petrolatum Ointment) 1 Application(s) Topical two times a day  aspirin enteric coated 81 milliGRAM(s) Oral daily  atorvastatin 10 milliGRAM(s) Oral at bedtime  busPIRone 10 milliGRAM(s) Oral two times a day  cholecalciferol 1000 Unit(s) Oral daily  diltiazem    Tablet 90 milliGRAM(s) Oral three times a day  docusate sodium 100 milliGRAM(s) Oral three times a day  metoprolol succinate ER 25 milliGRAM(s) Oral daily  multivitamin 1 Tablet(s) Oral daily  polyethylene glycol 3350 17 Gram(s) Oral two times a day  rivaroxaban 20 milliGRAM(s) Oral every 24 hours  senna 2 Tablet(s) Oral at bedtime  sodium chloride 2% . 1000 milliLiter(s) (50 mL/Hr) IV Continuous <Continuous>      TELEMETRY:   ECG:  	  RADIOLOGY:   DIAGNOSTIC TESTING:  [ ] Echocardiogram:  [ ]  Catheterization:  [ ] Stress Test:    OTHER: 	    LABS:	 	                                10.0   13.9  )-----------( 352      ( 25 Apr 2018 09:53 )             30.3     04-25    126<L>  |  85<L>  |  57<H>  ----------------------------<  97  4.2   |  28  |  1.35<H>    Ca    8.7      25 Apr 2018 09:53    TPro  5.9<L>  /  Alb  2.2<L>  /  TBili  0.6  /  DBili  x   /  AST  36  /  ALT  21  /  AlkPhos  151<H>  04-25 CARDIOLOGY FOLLOW UP - Dr. Mccartney    CC no chest pain or sob   c.o skin discomfort        PHYSICAL EXAM:  T(C): 36.3 (04-25-18 @ 12:50), Max: 36.4 (04-24-18 @ 20:53)  HR: 81 (04-25-18 @ 12:50) (81 - 108)  BP: 93/56 (04-25-18 @ 12:50) (93/56 - 102/66)  RR: 18 (04-25-18 @ 12:50) (18 - 19)  SpO2: 100% (04-25-18 @ 12:50) (97% - 100%)  Wt(kg): --  I&O's Summary    24 Apr 2018 07:01  -  25 Apr 2018 07:00  --------------------------------------------------------  IN: 940 mL / OUT: 1000 mL / NET: -60 mL    25 Apr 2018 07:01  -  25 Apr 2018 13:18  --------------------------------------------------------  IN: 120 mL / OUT: 400 mL / NET: -280 mL        Appearance: Normal	  Cardiovascular: Normal S1 S2,RRR, No JVD, No murmurs  Respiratory: fine crackle to RLL   Gastrointestinal:  Soft, Non-tender, + BS	  Extremities: Normal range of motion, No clubbing, cyanosis or edema        MEDICATIONS  (STANDING):  AQUAPHOR (petrolatum Ointment) 1 Application(s) Topical two times a day  aspirin enteric coated 81 milliGRAM(s) Oral daily  atorvastatin 10 milliGRAM(s) Oral at bedtime  busPIRone 10 milliGRAM(s) Oral two times a day  cholecalciferol 1000 Unit(s) Oral daily  diltiazem    Tablet 90 milliGRAM(s) Oral three times a day  docusate sodium 100 milliGRAM(s) Oral three times a day  metoprolol succinate ER 25 milliGRAM(s) Oral daily  multivitamin 1 Tablet(s) Oral daily  polyethylene glycol 3350 17 Gram(s) Oral two times a day  rivaroxaban 20 milliGRAM(s) Oral every 24 hours  senna 2 Tablet(s) Oral at bedtime  sodium chloride 2% . 1000 milliLiter(s) (50 mL/Hr) IV Continuous <Continuous>      TELEMETRY: sinus arrhythmia/ brief PAT   ECG:  	  RADIOLOGY:   DIAGNOSTIC TESTING:  [ ] Echocardiogram:  [ ]  Catheterization:  [ ] Stress Test:    OTHER: 	    LABS:	 	                                10.0   13.9  )-----------( 352      ( 25 Apr 2018 09:53 )             30.3     04-25    126<L>  |  85<L>  |  57<H>  ----------------------------<  97  4.2   |  28  |  1.35<H>    Ca    8.7      25 Apr 2018 09:53    TPro  5.9<L>  /  Alb  2.2<L>  /  TBili  0.6  /  DBili  x   /  AST  36  /  ALT  21  /  AlkPhos  151<H>  04-25

## 2018-04-25 NOTE — PROGRESS NOTE ADULT - SUBJECTIVE AND OBJECTIVE BOX
[facility] NEPHROLOGY- PROGRESS NOTE    89 year old female with history of HTN presents with SOB. Nephrology consulted for hyponatremia.    REVIEW OF SYSTEMS:  Gen: no changes in weight  Cards: no chest pain  Resp: no dyspnea  GI: no nausea, no vomiting or diarrhea  Vascular: no LE edema    Eye cream from the 1960s Neocortef ?spelling caused eyes to becomes swollen (Unknown)  neomycin (Unknown)  soaps and creams causes rash uses only sensitive skin soap (Rash)  sulfa drugs (Unknown)  Voltaren (Rash)      Hospital Medications: Medications reviewed      VITALS:  T(F): 97.6 (18 @ 04:30), Max: 97.7 (18 @ 12:29)  HR: 88 (18 @ 05:19)  BP: 102/66 (18 @ 05:19)  RR: 18 (18 @ 04:30)  SpO2: 100% (18 @ 04:30)  Wt(kg): --     @ 07:01  -   @ 07:00  --------------------------------------------------------  IN: 940 mL / OUT: 1000 mL / NET: -60 mL      PHYSICAL EXAM:    Gen: NAD, calm, diffuse macular rash noted but improving  Cards: RRR, +S1/S2, no M/G/R  Resp: Decreased BS @ bases B/L  GI: soft, NT/ND, NABS  Vascular: no LE edema B/L      LABS:      124<L>  |  81<L>  |  63<H>  ----------------------------<  110<H>  4.0   |  32<H>  |  1.58<H>    Ca    8.4      2018 19:09      Creatinine Trend: 1.58 <--, 1.57 <--, 1.55 <--, 1.30 <--, 1.01 <--, 0.85 <--, 0.81 <--, 0.77 <--, 0.69 <--, 0.67 <--    Urine Studies:  Urinalysis Basic - ( 2018 18:22 )    Color: Pink / Appearance: Turbid / S.021 / pH:   Gluc:  / Ketone: Trace  / Bili: Negative / Urobili: Negative   Blood:  / Protein: 100 mg/dL / Nitrite: Negative   Leuk Esterase: Large / RBC: 0-2 /HPF / WBC >50 /HPF   Sq Epi:  / Non Sq Epi:  / Bacteria: Few /HPF      Osmolality, Random Urine: 244 mos/kg ( @ 06:12)  Sodium, Random Urine: <20 mmol/L ( @ 06:12)  Creatinine, Random Urine: 38 mg/dL ( @ 06:12)  Osmolality, Random Urine: 372 mos/kg ( @ 20:40)  Sodium, Random Urine: <20 mmol/L ( @ 20:40)  Creatinine, Random Urine: 73 mg/dL ( @ 20:40)  Sodium, Random Urine: 41 mmol/L ( @ 17:02)  Creatinine, Random Urine: 74 mg/dL ( @ 17:02)  Osmolality, Random Urine: 404 mos/kg ( @ 17:02)  Creatinine, Random Urine: 18 mg/dL ( @ 02:25)  Sodium, Random Urine: 91 mmol/L ( @ 02:25)  Osmolality, Random Urine: 272 mos/kg ( @ 01:55)  Osmolality, Random Urine: 62 mos/kg ( @ 05:10)  Sodium, Random Urine: <20 mmol/L ( @ 05:10)

## 2018-04-25 NOTE — PROGRESS NOTE ADULT - SUBJECTIVE AND OBJECTIVE BOX
Patient is a 89y old  Female who presents with a chief complaint of SOB (13 Apr 2018 20:35)      SUBJECTIVE / OVERNIGHT EVENTS:   Feels better.  Denies CP/SOB/Palpitation/HA.    MEDICATIONS  (STANDING):  AQUAPHOR (petrolatum Ointment) 1 Application(s) Topical two times a day  aspirin enteric coated 81 milliGRAM(s) Oral daily  atorvastatin 10 milliGRAM(s) Oral at bedtime  busPIRone 10 milliGRAM(s) Oral two times a day  cholecalciferol 1000 Unit(s) Oral daily  diltiazem    Tablet 90 milliGRAM(s) Oral three times a day  docusate sodium 100 milliGRAM(s) Oral three times a day  metoprolol succinate ER 25 milliGRAM(s) Oral daily  multivitamin 1 Tablet(s) Oral daily  polyethylene glycol 3350 17 Gram(s) Oral two times a day  rivaroxaban 20 milliGRAM(s) Oral every 24 hours  senna 2 Tablet(s) Oral at bedtime    MEDICATIONS  (PRN):  ALPRAZolam 0.5 milliGRAM(s) Oral three times a day PRN Anxiety  ondansetron Injectable 4 milliGRAM(s) IV Push every 8 hours PRN Nausea and/or Vomiting  triamcinolone 0.1% Ointment 1 Application(s) Topical every 12 hours PRN itching        CAPILLARY BLOOD GLUCOSE        I&O's Summary    24 Apr 2018 07:01  -  25 Apr 2018 07:00  --------------------------------------------------------  IN: 940 mL / OUT: 1000 mL / NET: -60 mL    25 Apr 2018 07:01  -  25 Apr 2018 23:26  --------------------------------------------------------  IN: 780 mL / OUT: 400 mL / NET: 380 mL        PHYSICAL EXAM:    NECK: Supple, No JVD  CHEST/LUNG: Clear to auscultation bilaterally; No wheezing.  HEART: Regular rate and rhythm; No murmurs, rubs, or gallops  ABDOMEN: Soft, Nontender, Nondistended; Bowel sounds present  EXTREMITIES:   No clubbing, cyanosis, or edema  NEUROLOGY: AAO   SKIN: No rashes    LABS:                        10.0   13.9  )-----------( 352      ( 25 Apr 2018 09:53 )             30.3     04-25    126<L>  |  85<L>  |  57<H>  ----------------------------<  97  4.2   |  28  |  1.35<H>    Ca    8.7      25 Apr 2018 09:53    TPro  5.9<L>  /  Alb  2.2<L>  /  TBili  0.6  /  DBili  x   /  AST  36  /  ALT  21  /  AlkPhos  151<H>  04-25            CAPILLARY BLOOD GLUCOSE        04-23 @ 20:42  Culture-urine --  Culture results   >100,000 CFU/ml Klebsiella pneumoniae  method type MENA  Organism Klebsiella pneumoniae  Organism Identification Klebsiella pneumoniae  Specimen source .Urine Catheterized           04-23 @ 20:42  Culture blood --  Culture results   >100,000 CFU/ml Klebsiella pneumoniae  Gram stain --  Gram stain blood --  Method type MENA  Organism Klebsiella pneumoniae  Organism identification Klebsiella pneumoniae  Specimen source .Urine Catheterized      RADIOLOGY & ADDITIONAL TESTS:    Imaging Personally Reviewed:    Consultant(s) Notes Reviewed:      Care Discussed with Consultants/Other Providers:

## 2018-04-25 NOTE — PROGRESS NOTE ADULT - PROBLEM SELECTOR PLAN 3
-prob from drug rash  -WBC better  -no fever  -hold off abx  -if fever or worsening WBC, check bcx x 2 and start aztreonam 1 gm iv q12

## 2018-04-25 NOTE — CHART NOTE - NSCHARTNOTEFT_GEN_A_CORE
Source: Patient [x ]    Family [ ]     other [x ]chart since previous assess which was completed by me on 4/19  seen for follow up  admitted with SIRS, non infectious, with acute organ dysfunction(Acute Respiratory failure).,  pt being brought to nuclear testing at time of visit for gallium scan to investigate cause of renal issues as per nurse        Diet : DASH/SOFT  ensure enlive x 2 daily        Patient reports [ ] nausea  [ ] vomiting [ ] diarrhea [ ] constipation  [ ]chewing problems [ ] swallowing issues  [x ] other: pt appeared weak. she was not sure of answers to interview questions. she wasn't sure of how much of the meals she was consuming nor was she sure of the number of bottles of ensure that she is consuming daily. there are several bottles of ensure enlive accumulating on night stand. based on her daily weights she has lost 5% of her weight over the past seven days which indicates poor intake. complaining about itchy rash at time of visit, may be related to reaction to medication-Dermatology following     PO intake:  < 50% [ ] 50-75% [ ]   % [ ]  other : poor intake based on current weight loss     Source for PO intake [ ] Patient [ ] family [ ] chart [ ] staff [ x] other: daily weights     Enteral /Parenteral Nutrition: N/A      Current Weight: 64.4kg  % Weight Change: 5% over the past seven days    Pertinent Medications: MEDICATIONS  (STANDING):  AQUAPHOR (petrolatum Ointment) 1 Application(s) Topical two times a day  aspirin enteric coated 81 milliGRAM(s) Oral daily  atorvastatin 10 milliGRAM(s) Oral at bedtime  busPIRone 10 milliGRAM(s) Oral two times a day  cholecalciferol 1000 Unit(s) Oral daily  diltiazem    Tablet 90 milliGRAM(s) Oral three times a day  docusate sodium 100 milliGRAM(s) Oral three times a day  metoprolol succinate ER 25 milliGRAM(s) Oral daily  multivitamin 1 Tablet(s) Oral daily  polyethylene glycol 3350 17 Gram(s) Oral two times a day  rivaroxaban 20 milliGRAM(s) Oral every 24 hours  senna 2 Tablet(s) Oral at bedtime  sodium chloride 2% . 1000 milliLiter(s) (50 mL/Hr) IV Continuous <Continuous>    MEDICATIONS  (PRN):  ALPRAZolam 0.5 milliGRAM(s) Oral three times a day PRN Anxiety  ondansetron Injectable 4 milliGRAM(s) IV Push every 8 hours PRN Nausea and/or Vomiting  triamcinolone 0.1% Ointment 1 Application(s) Topical every 12 hours PRN itching    Pertinent Labs:  04-25 Na126 mmol/L<L> Glu 97 mg/dL K+ 4.2 mmol/L Cr  1.35 mg/dL<H> BUN 57 mg/dL<H> 04-25 Alb 2.2 g/dL<L> 04-03 PAB 14 mg/dL<L>  Na is low              Hemoglobin A1C, Whole Blood: 5.7 % (03-23 @ 06:46)          Skin: +1 generalized, +2 left/right hand    Estimated Needs:   [ x] no change since previous assessment  [ ] recalculated:       Previous Nutrition Diagnosis:     [x ] Inadequate Protein-Energy Intake [ ]Inadequate Oral Intake [ ] Excessive Energy Intake     [ ] Underweight [ ] Increased Nutrient Needs [ ] Overweight/Obesity     [ ] Altered GI Function [ ] Unintended Weight Loss [ ] Food & Nutrition Related Knowledge Deficit [ ] Malnutrition          Nutrition Diagnosis is [x ] ongoing  [ ] resolved [ ] not applicable -being addressed with supplements         New Nutrition Diagnosis: [ x] not applicable    [ ] Inadequate Protein Energy Intake [ ]Inadequate Oral Intake [ ] Excessive Energy Intake     [ ] Underweight [ ] Increased Nutrient Needs [ ] Overweight/Obesity     [ ] Altered GI Function [ ] Unintended Weight Loss [ ] Food & Nutrition Related Knowledge Deficit[ ] Limited Adherence to nutrition related recommendations [ ] Malnutrition  [ ] other: Free text       Related to:      As evidenced by:      Interventions:     Recommend    [x ] Change Diet To: liberalize  diet to Soft, pt with po intake and Na is low at this time, monitor need to add back low sodium modification if po intake improves and sodium level improves to WNL    [ x] Nutrition Supplement: decrease ensure enlive to 1 x daily, monitor need to increase back to 2.    [ ] Nutrition Support    [x ] Other: will add magic cups to increase protein intake, continue MVI daily       Monitoring and Evaluation:     [ x] PO intake [ x] Tolerance to diet prescription x ] weights [ x] follow up per protocol    [ ] other:

## 2018-04-26 ENCOUNTER — TRANSCRIPTION ENCOUNTER (OUTPATIENT)
Age: 83
End: 2018-04-26

## 2018-04-26 LAB
ANION GAP SERPL CALC-SCNC: 11 MMOL/L — SIGNIFICANT CHANGE UP (ref 5–17)
ANION GAP SERPL CALC-SCNC: 13 MMOL/L — SIGNIFICANT CHANGE UP (ref 5–17)
BASOPHILS # BLD AUTO: 0 K/UL — SIGNIFICANT CHANGE UP (ref 0–0.2)
BASOPHILS NFR BLD AUTO: 0.2 % — SIGNIFICANT CHANGE UP (ref 0–2)
BUN SERPL-MCNC: 51 MG/DL — HIGH (ref 7–23)
BUN SERPL-MCNC: 51 MG/DL — HIGH (ref 7–23)
CALCIUM SERPL-MCNC: 8.3 MG/DL — LOW (ref 8.4–10.5)
CALCIUM SERPL-MCNC: 8.4 MG/DL — SIGNIFICANT CHANGE UP (ref 8.4–10.5)
CHLORIDE SERPL-SCNC: 85 MMOL/L — LOW (ref 96–108)
CHLORIDE SERPL-SCNC: 85 MMOL/L — LOW (ref 96–108)
CO2 SERPL-SCNC: 31 MMOL/L — SIGNIFICANT CHANGE UP (ref 22–31)
CO2 SERPL-SCNC: 31 MMOL/L — SIGNIFICANT CHANGE UP (ref 22–31)
CREAT SERPL-MCNC: 1.16 MG/DL — SIGNIFICANT CHANGE UP (ref 0.5–1.3)
CREAT SERPL-MCNC: 1.18 MG/DL — SIGNIFICANT CHANGE UP (ref 0.5–1.3)
EOSINOPHIL # BLD AUTO: 0.3 K/UL — SIGNIFICANT CHANGE UP (ref 0–0.5)
EOSINOPHIL NFR BLD AUTO: 2.1 % — SIGNIFICANT CHANGE UP (ref 0–6)
GLUCOSE SERPL-MCNC: 106 MG/DL — HIGH (ref 70–99)
GLUCOSE SERPL-MCNC: 92 MG/DL — SIGNIFICANT CHANGE UP (ref 70–99)
HCT VFR BLD CALC: 27 % — LOW (ref 34.5–45)
HGB BLD-MCNC: 9.1 G/DL — LOW (ref 11.5–15.5)
LYMPHOCYTES # BLD AUTO: 1 K/UL — SIGNIFICANT CHANGE UP (ref 1–3.3)
LYMPHOCYTES # BLD AUTO: 7.7 % — LOW (ref 13–44)
MCHC RBC-ENTMCNC: 29.1 PG — SIGNIFICANT CHANGE UP (ref 27–34)
MCHC RBC-ENTMCNC: 33.7 GM/DL — SIGNIFICANT CHANGE UP (ref 32–36)
MCV RBC AUTO: 86.5 FL — SIGNIFICANT CHANGE UP (ref 80–100)
MONOCYTES # BLD AUTO: 0.8 K/UL — SIGNIFICANT CHANGE UP (ref 0–0.9)
MONOCYTES NFR BLD AUTO: 6.4 % — SIGNIFICANT CHANGE UP (ref 2–14)
NEUTROPHILS # BLD AUTO: 11 K/UL — HIGH (ref 1.8–7.4)
NEUTROPHILS NFR BLD AUTO: 83.6 % — HIGH (ref 43–77)
PLAT MORPH BLD: NORMAL — SIGNIFICANT CHANGE UP
PLATELET # BLD AUTO: 376 K/UL — SIGNIFICANT CHANGE UP (ref 150–400)
POTASSIUM SERPL-MCNC: 3.8 MMOL/L — SIGNIFICANT CHANGE UP (ref 3.5–5.3)
POTASSIUM SERPL-MCNC: 4 MMOL/L — SIGNIFICANT CHANGE UP (ref 3.5–5.3)
POTASSIUM SERPL-SCNC: 3.8 MMOL/L — SIGNIFICANT CHANGE UP (ref 3.5–5.3)
POTASSIUM SERPL-SCNC: 4 MMOL/L — SIGNIFICANT CHANGE UP (ref 3.5–5.3)
RBC # BLD: 3.12 M/UL — LOW (ref 3.8–5.2)
RBC # FLD: 14.5 % — SIGNIFICANT CHANGE UP (ref 10.3–14.5)
RBC BLD AUTO: SIGNIFICANT CHANGE UP
SODIUM SERPL-SCNC: 127 MMOL/L — LOW (ref 135–145)
SODIUM SERPL-SCNC: 129 MMOL/L — LOW (ref 135–145)
WBC # BLD: 13.2 K/UL — HIGH (ref 3.8–10.5)
WBC # FLD AUTO: 13.2 K/UL — HIGH (ref 3.8–10.5)

## 2018-04-26 PROCEDURE — 99232 SBSQ HOSP IP/OBS MODERATE 35: CPT

## 2018-04-26 RX ORDER — BENZOCAINE AND MENTHOL 5; 1 G/100ML; G/100ML
1 LIQUID ORAL EVERY 4 HOURS
Qty: 0 | Refills: 0 | Status: DISCONTINUED | OUTPATIENT
Start: 2018-04-26 | End: 2018-04-30

## 2018-04-26 RX ADMIN — Medication 10 MILLIGRAM(S): at 21:25

## 2018-04-26 RX ADMIN — Medication 1 TABLET(S): at 10:01

## 2018-04-26 RX ADMIN — POLYETHYLENE GLYCOL 3350 17 GRAM(S): 17 POWDER, FOR SOLUTION ORAL at 21:21

## 2018-04-26 RX ADMIN — Medication 100 MILLIGRAM(S): at 06:29

## 2018-04-26 RX ADMIN — Medication 10 MILLIGRAM(S): at 10:04

## 2018-04-26 RX ADMIN — BENZOCAINE AND MENTHOL 1 LOZENGE: 5; 1 LIQUID ORAL at 00:27

## 2018-04-26 RX ADMIN — Medication 81 MILLIGRAM(S): at 10:01

## 2018-04-26 RX ADMIN — Medication 100 MILLIGRAM(S): at 14:23

## 2018-04-26 RX ADMIN — Medication 100 MILLIGRAM(S): at 21:20

## 2018-04-26 RX ADMIN — SENNA PLUS 2 TABLET(S): 8.6 TABLET ORAL at 21:24

## 2018-04-26 RX ADMIN — Medication 0.5 MILLIGRAM(S): at 21:24

## 2018-04-26 RX ADMIN — ATORVASTATIN CALCIUM 10 MILLIGRAM(S): 80 TABLET, FILM COATED ORAL at 21:20

## 2018-04-26 RX ADMIN — Medication 1 APPLICATION(S): at 10:00

## 2018-04-26 RX ADMIN — Medication 1 APPLICATION(S): at 21:21

## 2018-04-26 RX ADMIN — BENZOCAINE AND MENTHOL 1 LOZENGE: 5; 1 LIQUID ORAL at 14:28

## 2018-04-26 RX ADMIN — Medication 1000 UNIT(S): at 10:01

## 2018-04-26 RX ADMIN — Medication 25 MILLIGRAM(S): at 06:29

## 2018-04-26 RX ADMIN — RIVAROXABAN 20 MILLIGRAM(S): KIT at 17:48

## 2018-04-26 NOTE — PROGRESS NOTE ADULT - ASSESSMENT
· Assessment	  89f hx as above presenting with hypoxic respiratory failure    Drug rash:-off antibiotic  Derm f/up..ID f/u noted                                                                   Problem/Plan -   ·  Problem: Hyponatremia.  Plan: Nephro f/up noted. BMP     Leukocytosis:  monitor,likely sec.to Drug rash         Problem/Plan - 3:  ·  Problem: SVT (supraventricular tachycardia).  Plan:   - may continue diltiazem 60mg po tid with hold parameters hold for sbp < 110 and/or HR < 60  - continue xarelto 20mg po daily.      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  Plan: - continue xarelto 20mg po daily.        Problem/Plan - 6:  Problem: Hypertension. Plan:   - continue diltiazem   -h/o MS-Crdiology f/u     Problem/Plan - 7:  ·  Problem: Anxiety.  Plan: continue buspirone and remeron.      Problem/Plan - 8:  ·  Problem: Hyperlipidemia.  Plan: continue lipitor 10mg po qhs.     Dw family.

## 2018-04-26 NOTE — PROGRESS NOTE ADULT - ASSESSMENT
89 year old female with history of Hyperlipemia, Hypertension, Insomnia, PSVT, bl DVT  admitted with SOB     1. SOB   multifactorial, recent viral illness/ acute CHF   no evidence of ACS, clinically improved    2. Acute Diastolic congestive heart failure   improved , does not appear overtly overloaded on exam   diuresis on hold per nephrology   echo - normal LV sys function, EF 55-60%, mod-sev mitral stenosis     3. PAT   no events overnight, tele dc'd   asa 81 qd, cont Toprol, ccb     4. Mitral Stenosis  cont ccb, bb to decrease hr and increase devries filling time     5. Htn   cont ccb  monitor bp     6. bl DVT (hx)  continue with xarelto     7. Hyponatremia  serum sodium improving   trend bmp  renal f/u

## 2018-04-26 NOTE — DISCHARGE NOTE ADULT - CONDITIONS AT DISCHARGE
Patient PIVL removed. no  evidence of infiltration noted at discharge. Patient skin intact, vital signs stable, Patient with no complaints of SOB, or chest pain at discharge. Pt discharged to rehab as per MD orders in stable condition.

## 2018-04-26 NOTE — DISCHARGE NOTE ADULT - PLAN OF CARE
Resolution of hypoxic respiratory failure Continue medications as prescribed  Daily weights to monitor fluid balance and call MD for increase of 3lbs or greater.  Low salt diet although Ensure 2 cans daily need to be strongly encouraged to offset hyponatremia per nephrologist recommendations  Follow up with your Cardiologist Monitor serum sodium levels daily during rehab stay.   Ensure 1 can twice daily as recommended by Dr Rosa Nephrologist  Follow up with Dr Rosa in 1 week Continue Xarelto as prescribed Continue topical creams as prescribed for 2 more weeks.   Anticipate skin peeling and sloughing will continue for about 2 more weeks, this is expected  You have had an allergic reaction to medication, likely Zosyn which is a type of Penicillin  Add Penicillin to your list of allergies with acute reaction including acute interstitial nephritis Continue Ensure twice daily to help with low sodium level  Avoid drugs that are toxic to kidneys  Follow up with Dr Rosa Nephrologist in 1 week Brace to left ankle when OOB  scabbed wounds under brace. Ankle blister. Keep skin dry and without pressure. Elevate foot when out of bed

## 2018-04-26 NOTE — PROVIDER CONTACT NOTE (OTHER) - ACTION/TREATMENT ORDERED:
will closely monitor the patient. EKG ordered.
EKG ordered, will closely monitor the patient.
will order albuterol neb. will continue to monitor the patient.
wound consult, will continue to monitor the patient.
NP notified. No intervention at this time. Will continue to monitor patient.
NP notified. RVP, Strep A ordered. Cepacol ordered.
Seen by Np, EKG done and seen by np, will continue to monitor

## 2018-04-26 NOTE — DISCHARGE NOTE ADULT - CARE PLAN
Principal Discharge DX:	Acute on chronic congestive heart failure, unspecified heart failure type  Goal:	Resolution of hypoxic respiratory failure  Assessment and plan of treatment:	Continue medications as prescribed  Daily weights to monitor fluid balance and call MD for increase of 3lbs or greater.  Low salt diet although Ensure 2 cans daily need to be strongly encouraged to offset hyponatremia per nephrologist recommendations  Follow up with your Cardiologist  Secondary Diagnosis:	Hyponatremia  Assessment and plan of treatment:	Monitor serum sodium levels daily during rehab stay.   Ensure 1 can twice daily as recommended by Dr Rosa Nephrologist  Follow up with Dr Rosa in 1 week  Secondary Diagnosis:	DVT (deep vein thrombosis) in pregnancy  Assessment and plan of treatment:	Continue Xarelto as prescribed  Secondary Diagnosis:	Allergic drug rash due to anti-infective agent  Assessment and plan of treatment:	Continue topical creams as prescribed for 2 more weeks.   Anticipate skin peeling and sloughing will continue for about 2 more weeks, this is expected  You have had an allergic reaction to medication, likely Zosyn which is a type of Penicillin  Add Penicillin to your list of allergies with acute reaction including acute interstitial nephritis  Secondary Diagnosis:	Acute kidney injury  Assessment and plan of treatment:	Continue Ensure twice daily to help with low sodium level  Avoid drugs that are toxic to kidneys  Follow up with Dr Rosa Nephrologist in 1 week Principal Discharge DX:	Acute on chronic congestive heart failure, unspecified heart failure type  Goal:	Resolution of hypoxic respiratory failure  Assessment and plan of treatment:	Continue medications as prescribed  Daily weights to monitor fluid balance and call MD for increase of 3lbs or greater.  Low salt diet although Ensure 2 cans daily need to be strongly encouraged to offset hyponatremia per nephrologist recommendations  Follow up with your Cardiologist  Secondary Diagnosis:	Hyponatremia  Assessment and plan of treatment:	Monitor serum sodium levels daily during rehab stay.   Ensure 1 can twice daily as recommended by Dr Rosa Nephrologist  Follow up with Dr Rosa in 1 week  Secondary Diagnosis:	DVT (deep vein thrombosis) in pregnancy  Assessment and plan of treatment:	Continue Xarelto as prescribed  Secondary Diagnosis:	Allergic drug rash due to anti-infective agent  Assessment and plan of treatment:	Continue topical creams as prescribed for 2 more weeks.   Anticipate skin peeling and sloughing will continue for about 2 more weeks, this is expected  You have had an allergic reaction to medication, likely Zosyn which is a type of Penicillin  Add Penicillin to your list of allergies with acute reaction including acute interstitial nephritis  Secondary Diagnosis:	Acute kidney injury  Assessment and plan of treatment:	Continue Ensure twice daily to help with low sodium level  Avoid drugs that are toxic to kidneys  Follow up with Dr Rosa Nephrologist in 1 week  Secondary Diagnosis:	Ankle fracture, left  Assessment and plan of treatment:	Brace to left ankle when OOB  scabbed wounds under brace. Ankle blister. Keep skin dry and without pressure. Elevate foot when out of bed

## 2018-04-26 NOTE — CHART NOTE - NSCHARTNOTEFT_GEN_A_CORE
CHIEF COMPLAINT: sore throat    SUBJECTIVE / OVERNIGHT EVENTS: Pt states that she has had a sore throat since yesterday afternoon. She states that the pain has gotten increasingly worse throughout the day aching rated 7/10, completely relieved by the Cepacol given last night. Complained again around midnight and additional Cepacol given.    REVIEW OF SYSTEMS:  Constitutional: No fever, fatigue or weight loss.  Skin: No rash.  Eyes: No recent vision problems or eye pain.  ENT: sore throat, pain with swallowing,   Endocrine: No thyroid problems.  Cardiovascular: No chest pain or palpation.  Respiratory: No cough, shortness of breath, congestion, or wheezing.  Gastrointestinal: No abdominal pain, nausea, vomiting, or diarrhea.  Genitourinary: No dysuria.  Musculoskeletal: No joint swelling. No joint pain  Neurologic: No headache.    I&O's Summary    25 Apr 2018 07:01  -  26 Apr 2018 07:00  --------------------------------------------------------  IN: 920 mL / OUT: 400 mL / NET: 520 mL    LABS:                        9.1    x     )-----------( 376      ( 26 Apr 2018 06:45 )             27.0     04-26    129<L>  |  85<L>  |  51<H>  ----------------------------<  92  3.8   |  31  |  1.16    Ca    8.4      26 Apr 2018 06:41    TPro  5.9<L>  /  Alb  2.2<L>  /  TBili  0.6  /  DBili  x   /  AST  36  /  ALT  21  /  AlkPhos  151<H>  04-25      RADIOLOGY & ADDITIONAL TESTS:  < from: Xray Chest 1 View- PORTABLE-Routine (04.18.18 @ 09:49) >    IMPRESSION:  Bilateral pleural effusions with adjacent atelectasis and pulmonary edema   slightly progressed on the right and may have improved on the left.    < end of copied text >      MEDICATIONS  (STANDING):  AQUAPHOR (petrolatum Ointment) 1 Application(s) Topical two times a day  aspirin enteric coated 81 milliGRAM(s) Oral daily  atorvastatin 10 milliGRAM(s) Oral at bedtime  busPIRone 10 milliGRAM(s) Oral two times a day  cholecalciferol 1000 Unit(s) Oral daily  diltiazem    Tablet 90 milliGRAM(s) Oral three times a day  docusate sodium 100 milliGRAM(s) Oral three times a day  metoprolol succinate ER 25 milliGRAM(s) Oral daily  multivitamin 1 Tablet(s) Oral daily  polyethylene glycol 3350 17 Gram(s) Oral two times a day  rivaroxaban 20 milliGRAM(s) Oral every 24 hours  senna 2 Tablet(s) Oral at bedtime    MEDICATIONS  (PRN):  ALPRAZolam 0.5 milliGRAM(s) Oral three times a day PRN Anxiety  benzocaine 15 mG/menthol 3.6 mG Lozenge 1 Lozenge Oral every 4 hours PRN Sore Throat  ondansetron Injectable 4 milliGRAM(s) IV Push every 8 hours PRN Nausea and/or Vomiting  triamcinolone 0.1% Ointment 1 Application(s) Topical every 12 hours PRN itching      PHYSICAL EXAM:  Reddened throat no leukoplakia noted no exudate, trachea midline,   GENERAL  in no acute distress. non-toxic in appearance  NEUROLOGY/PSYCHIATRIC: awake alert and oriented x3 non-focal deficits  CARDIOVASCULAR: Regular rate and rhythm; No murmurs, rubs, or gallops  RESPIRATORY: Clear to auscultation bilaterally; No wheeze, accessory muscle use  GASTROINTESTINAL: Soft, Nontender, Nondistended; Bowel sounds present x4  EXTREMITIES:  2+ Peripheral Pulses, No clubbing, cyanosis, or edema  SKIN: generalized red rash to body  GENITOURINARY: Non distended bladder    PMH/PSH  Neuropathy associated with cancer  Hypertension  Hyperlipidemia  Insomnia  Initial Insomnia  Breast Lump  Seasonal Allergies  Hyperlipemia  Anxiety  Shortness of breath  Status post cataract extraction  Status post hysterectomy  History of D&C- 8/12/11  History of Colonoscopy- 2011/Sept  [facility]    ASSESSMENT/PLAN:     89f hx of above and HTN, HLD, insomnia, recent admission for syncopal fall and rhabdomyolysis, recent dx of aflutter, recently diagnosed bilateral DVT and L ankle fracture, presenting from [facility] rehab for increasing work of breathing and shortness of breath. Admitted with respiratory failure, SVT, and flu now with sore throat    Consider viral sore throat     Plan (sore throat)  RVP panel  Rapid culture  Cepacol lozenges prn sore throat  Tylenol po times 1 dose      Will endorse to primary care team, attending to follow    Valeria Ha NP  [facility] [id number]

## 2018-04-26 NOTE — PROGRESS NOTE ADULT - SUBJECTIVE AND OBJECTIVE BOX
Patient is a 89y old  Female who presents with a chief complaint of SOB (13 Apr 2018 20:35)      SUBJECTIVE / OVERNIGHT EVENTS:   Feels better.  Denies CP/SOB/Palpitation/HA.    MEDICATIONS  (STANDING):  AQUAPHOR (petrolatum Ointment) 1 Application(s) Topical two times a day  aspirin enteric coated 81 milliGRAM(s) Oral daily  atorvastatin 10 milliGRAM(s) Oral at bedtime  busPIRone 10 milliGRAM(s) Oral two times a day  cholecalciferol 1000 Unit(s) Oral daily  diltiazem    Tablet 90 milliGRAM(s) Oral three times a day  docusate sodium 100 milliGRAM(s) Oral three times a day  metoprolol succinate ER 25 milliGRAM(s) Oral daily  multivitamin 1 Tablet(s) Oral daily  polyethylene glycol 3350 17 Gram(s) Oral two times a day  rivaroxaban 20 milliGRAM(s) Oral every 24 hours  senna 2 Tablet(s) Oral at bedtime    MEDICATIONS  (PRN):  ALPRAZolam 0.5 milliGRAM(s) Oral three times a day PRN Anxiety  benzocaine 15 mG/menthol 3.6 mG Lozenge 1 Lozenge Oral every 4 hours PRN Sore Throat  ondansetron Injectable 4 milliGRAM(s) IV Push every 8 hours PRN Nausea and/or Vomiting  triamcinolone 0.1% Ointment 1 Application(s) Topical every 12 hours PRN itching        CAPILLARY BLOOD GLUCOSE        I&O's Summary    25 Apr 2018 07:01 - 26 Apr 2018 07:00  --------------------------------------------------------  IN: 920 mL / OUT: 400 mL / NET: 520 mL    26 Apr 2018 07:01  -  26 Apr 2018 23:26  --------------------------------------------------------  IN: 840 mL / OUT: 1250 mL / NET: -410 mL        PHYSICAL EXAM:  GENERAL: NAD, well-developed  HEAD:  Atraumatic, Normocephalic  NECK: Supple, No JVD  CHEST/LUNG: Clear to auscultation bilaterally; No wheezing.  HEART: Regular rate and rhythm; No murmurs, rubs, or gallops  ABDOMEN: Soft, Nontender, Nondistended; Bowel sounds present  EXTREMITIES:   No clubbing, cyanosis, or edema  NEUROLOGY: AAO X 3  SKIN: No rashes    LABS:                        9.1    13.2  )-----------( 376      ( 26 Apr 2018 06:45 )             27.0     04-26    129<L>  |  85<L>  |  51<H>  ----------------------------<  92  3.8   |  31  |  1.16    Ca    8.4      26 Apr 2018 06:41    TPro  5.9<L>  /  Alb  2.2<L>  /  TBili  0.6  /  DBili  x   /  AST  36  /  ALT  21  /  AlkPhos  151<H>  04-25            CAPILLARY BLOOD GLUCOSE        04-25 @ 22:33  Culture-urine --  Culture results   No Strep pyogenes (Group A) isolated  method type --  Organism --  Organism Identification --  Specimen source .Throat  04-23 @ 20:42  Culture-urine --  Culture results   >100,000 CFU/ml Klebsiella pneumoniae  method type MENA  Organism Klebsiella pneumoniae  Organism Identification Klebsiella pneumoniae  Specimen source .Urine Catheterized           04-25 @ 22:33  Culture blood --  Culture results   No Strep pyogenes (Group A) isolated  Gram stain --  Gram stain blood --  Method type --  Organism --  Organism identification --  Specimen source .Throat   04-23 @ 20:42  Culture blood --  Culture results   >100,000 CFU/ml Klebsiella pneumoniae  Gram stain --  Gram stain blood --  Method type MENA  Organism Klebsiella pneumoniae  Organism identification Klebsiella pneumoniae  Specimen source .Urine Catheterized      RADIOLOGY & ADDITIONAL TESTS:    Imaging Personally Reviewed:    Consultant(s) Notes Reviewed:      Care Discussed with Consultants/Other Providers:

## 2018-04-26 NOTE — PROVIDER CONTACT NOTE (OTHER) - REASON
Patient c/o of sore throat
Sodium 127 s/p IVF completed.
left lateral foot DTI on proximal and distal area.
patient converted to SR from Aflutter
pt converted to A fib on tele
pulse Ox down to 79% with 4 L O2 n/c briefly for 3 seconds while patient was coughing.
patient -140-'s Afib on tele monitor. MEWS score of 8

## 2018-04-26 NOTE — PROVIDER CONTACT NOTE (OTHER) - ASSESSMENT
patient A&O x4, denies cp/sob. no c/o pain at the DTI site. suspected deep tissue injury on left lateral foot x2 site; measurement on the assessment. patient has ace wrap and brace for on left foot for left ankle fracture.
patient asymptomatic. denies cp/sob. SR 70-80's on tele monitor.
Patient AOx4. Patient denies difficulty swallowing, patient afebrile, no other associated symptoms noted.
Patient AOx4. Patient received total of 300 ml sodium chloride 2% over span of 6 hours for sodium of 126.
heart rate at 80 to 90 on tele. A fib, VS stable
patient A&O x3, denies cp/sob. patient pox came to 92% with 4 L n/c. patient has frequent congested cough. lung sound rhonchi bilaterally. T 98.5F, /62, RR20, pox 94% 4L n/c. patient asymptomatic.
patient asymptomatic. patient asleep at time of event on tele monitor. Patient A&O x3, denies cp/sob or palpitation. /64, , T 98.3F. pox 98% 2 L n/c.

## 2018-04-26 NOTE — DISCHARGE NOTE ADULT - CARE PROVIDER_API CALL
Cas Vaughn (DO), Medicine  [address]  [address]  [place] [address]  Phone: [phone]  Fax: [phone]    Chester Rosa), Internal Medicine; Nephrology  [facility] [id number]  Phone: [phone]  Fax: [phone]

## 2018-04-26 NOTE — DISCHARGE NOTE ADULT - ADDITIONAL INSTRUCTIONS
Follow up with Dr Rosa Nephrologist in 1 week. Call office for appointment  Follow up with your Primary MD within 1 week of discharge from rehab. Call office for appointment

## 2018-04-26 NOTE — DISCHARGE NOTE ADULT - PATIENT PORTAL LINK FT
You can access the [facility] Patient Portal, offered by [facility], by registering with the following website: [email or web address]

## 2018-04-26 NOTE — DISCHARGE NOTE ADULT - MEDICATION SUMMARY - MEDICATIONS TO STOP TAKING
I will STOP taking the medications listed below when I get home from the hospital:    Remeron 15 mg oral tablet  -- 1 tab(s) by mouth once a day (at bedtime)

## 2018-04-26 NOTE — PROGRESS NOTE ADULT - SUBJECTIVE AND OBJECTIVE BOX
DELVIS MCBRIDE 89y [id number]    Patient is a 89y old  Female who presents with a chief complaint of SOB (13 Apr 2018 20:35)      Follow Up/CC:  ID following for leukocytosis    Interval History/ROS: still with rash    Allergies    Eye cream from the 1960s Neocortef ?spelling caused eyes to becomes swollen (Unknown)  neomycin (Unknown)  piperacillin-tazobactam (Rash)  soaps and creams causes rash uses only sensitive skin soap (Rash)  sulfa drugs (Unknown)  Voltaren (Rash)    Intolerances        ANTIMICROBIALS:      MEDICATIONS  (STANDING):  AQUAPHOR (petrolatum Ointment) 1 Application(s) Topical two times a day  aspirin enteric coated 81 milliGRAM(s) Oral daily  atorvastatin 10 milliGRAM(s) Oral at bedtime  busPIRone 10 milliGRAM(s) Oral two times a day  cholecalciferol 1000 Unit(s) Oral daily  diltiazem    Tablet 90 milliGRAM(s) Oral three times a day  docusate sodium 100 milliGRAM(s) Oral three times a day  metoprolol succinate ER 25 milliGRAM(s) Oral daily  multivitamin 1 Tablet(s) Oral daily  polyethylene glycol 3350 17 Gram(s) Oral two times a day  rivaroxaban 20 milliGRAM(s) Oral every 24 hours  senna 2 Tablet(s) Oral at bedtime    MEDICATIONS  (PRN):  ALPRAZolam 0.5 milliGRAM(s) Oral three times a day PRN Anxiety  benzocaine 15 mG/menthol 3.6 mG Lozenge 1 Lozenge Oral every 4 hours PRN Sore Throat  ondansetron Injectable 4 milliGRAM(s) IV Push every 8 hours PRN Nausea and/or Vomiting  triamcinolone 0.1% Ointment 1 Application(s) Topical every 12 hours PRN itching        Vital Signs Last 24 Hrs  T(C): 36.4 (26 Apr 2018 12:35), Max: 36.8 (25 Apr 2018 21:39)  T(F): 97.5 (26 Apr 2018 12:35), Max: 98.2 (25 Apr 2018 21:39)  HR: 85 (26 Apr 2018 14:00) (78 - 86)  BP: 107/68 (26 Apr 2018 14:00) (102/62 - 107/69)  BP(mean): --  RR: 20 (26 Apr 2018 12:35) (18 - 20)  SpO2: 100% (26 Apr 2018 12:35) (94% - 100%)    CBC Full  -  ( 26 Apr 2018 06:45 )  WBC Count : 13.2 K/uL  Hemoglobin : 9.1 g/dL  Hematocrit : 27.0 %  Platelet Count - Automated : 376 K/uL  Mean Cell Volume : 86.5 fl  Mean Cell Hemoglobin : 29.1 pg  Mean Cell Hemoglobin Concentration : 33.7 gm/dL  Auto Neutrophil # : 11.0 K/uL  Auto Lymphocyte # : 1.0 K/uL  Auto Monocyte # : 0.8 K/uL  Auto Eosinophil # : 0.3 K/uL  Auto Basophil # : 0.0 K/uL  Auto Neutrophil % : 83.6 %  Auto Lymphocyte % : 7.7 %  Auto Monocyte % : 6.4 %  Auto Eosinophil % : 2.1 %  Auto Basophil % : 0.2 %    04-26    129<L>  |  85<L>  |  51<H>  ----------------------------<  92  3.8   |  31  |  1.16    Ca    8.4      26 Apr 2018 06:41    TPro  5.9<L>  /  Alb  2.2<L>  /  TBili  0.6  /  DBili  x   /  AST  36  /  ALT  21  /  AlkPhos  151<H>  04-25    LIVER FUNCTIONS - ( 25 Apr 2018 09:53 )  Alb: 2.2 g/dL / Pro: 5.9 g/dL / ALK PHOS: 151 U/L / ALT: 21 U/L / AST: 36 U/L / GGT: x               MICROBIOLOGY:  .Urine Catheterized  04-23-18   >100,000 CFU/ml Klebsiella pneumoniae  --  Klebsiella pneumoniae      .Urine Catheterized  04-13-18   >100,000 CFU/ml Escherichia coli  <10,000 CFU/ml Normal Urogenital aristeo present  --  Escherichia coli        Eosinophil Count, Urine (04.24.18 @ 23:32)    Eosinophil Count, Urine: Positive      Rapid RVP Result: NotDetec (04-25 @ 21:10)          RADIOLOGY

## 2018-04-26 NOTE — PROVIDER CONTACT NOTE (OTHER) - BACKGROUND
admitted for CHF, hyponatremia, & r/o PNA
admitted for syncope and left hip fx.
., anxiety, DVT, HLD, chf. HTN
Patient admitted for shortness of breath.
Patient admitted for shortness of breath.
admitted for r/o PNA & hypoxia. patient is DNR/DNI
patient admitted for SOB, hyponatremia

## 2018-04-26 NOTE — PROVIDER CONTACT NOTE (OTHER) - SITUATION
left lateral foot DTI on proximal and distal area.
patient converted to SR from Aflutter, HR 70-80's
Patient c/o of sore throat  Patient said sore throat started since morning
Sodium 127 s/p IVF completed.
pt converted to A fib on tele
pulse Ox down to 79% with 4 L O2 n/c briefly for 3 seconds while patient was coughing. patient pox came to 92% with 4 L n/c on telemonitoring.
patient -140-'s Afib on tele monitor; patient back and forth of Afib &SR.

## 2018-04-26 NOTE — PROVIDER CONTACT NOTE (OTHER) - RECOMMENDATIONS
patient not due for Cardizem and Toprol until 6 am.
Cavilon application
EKG, to evaluate pt
Restart sodium chloride 2%?
sore throat med? RVP?

## 2018-04-26 NOTE — DISCHARGE NOTE ADULT - HOSPITAL COURSE
89f hx HTN, HLD, insomnia, recent admission for syncopal fall and rhabdomyolysis, recent dx of aflutter, recently diagnosed bilateral DVT and L ankle fracture, presenting from [facility] for increasing work of breathing and shortness of breath over the last 3-4 days. Suspect CHF more than PNA with positive urine cx.  Pt  was  found to have Acute kidney injury.  Renal consulted  Concern for AIN given leukocytosis and diffuse macular rash for which antibiotics, PPI and diuretics discontinued. UA with significant pyuria with eosinophils (not specific) suspect likely secondary to AIN. Despite normal gallium scan. Renal  Deferred  kidney biopsy given relatively stable renal function off of offending medication (Zosyn versus Furosemide). Hyponatremia  In setting of ALLAN  s/p tolvaptan on 4/24. Urine c/s positive for Klebsiella Pneumonia , ID observing Off ABX   Serum sodium improved s/p  Samsca 89f hx HTN, HLD, insomnia, recent admission for syncopal fall and rhabdomyolysis, recent dx of aflutter, recently diagnosed bilateral DVT and L ankle fracture, presenting from [facility] for increasing work of breathing and shortness of breath over the last 3-4 days. Suspect CHF due to pleural effusion more than PNA with positive urine cx.  Pt  was  found to have Acute kidney injury.  Renal Dr Rosa consulted, suspect AIN given leukocytosis and diffuse macular rash for which antibiotics (Vanco & Zosyn), PPI and diuretics discontinued. UA with significant pyuria with eosinophils (not specific) suspect likely secondary to AIN. Despite normal gallium scan. Renal deferred kidney biopsy given relatively stable renal function off of offending medication (Zosyn versus Furosemide). Hyponatremia  In setting of ALLAN  s/p tolvaptan on 4/24. Urine  ID observing Off ABX   Serum sodium improved s/p  Samsca   Urine culture with Klebsiella likely colonized as pt remained afebrile w/o further leukocytosis 89f hx HTN, HLD, insomnia, recent admission for syncopal fall and rhabdomyolysis, recent dx of aflutter, recently diagnosed bilateral DVT and L ankle fracture, presenting from [facility] for increasing work of breathing and shortness of breath over the last 3-4 days. Suspect CHF due to pleural effusion more than PNA & with positive urine cx.  Pt  was  found to have Acute kidney injury.  Renal Dr Rosa consulted, suspect AIN given leukocytosis and diffuse macular rash for which antibiotics (Vanco & Zosyn), PPI and diuretics discontinued. UA with significant pyuria with eosinophils (not specific) suspect likely secondary to AIN despite normal gallium scan. Renal deferred kidney biopsy given relatively stable renal function off of offending medication (Zosyn versus Furosemide). Hyponatremia  In setting of ALLAN  s/p tolvaptan on 4/24. ID observing Off ABX Serum sodium improved s/p  Samsca. Urine culture with Klebsiella likely colonized as pt remained afebrile w/o evidence of infection. Discharge to [facility], [place] with outpatient f/u with renal Dr Rosa and PMD Dr Vaughn.

## 2018-04-26 NOTE — DISCHARGE NOTE ADULT - MEDICATION SUMMARY - MEDICATIONS TO TAKE
I will START or STAY ON the medications listed below when I get home from the hospital:    aspirin 81 mg oral delayed release tablet  -- 1 tab(s) by mouth once a day  -- Indication: For CAD    dilTIAZem 90 mg oral tablet  -- 1 tab(s) by mouth 3 times a day  -- Indication: For Atrial Fibrillation    rivaroxaban 20 mg oral tablet  -- 1 tab(s) by mouth every 24 hours  -- Indication: For Atrial Fibrillation    Lipitor 10 mg oral tablet  -- 1 tab(s) by mouth once a day  -- Indication: For Hyperlipidemia    busPIRone 10 mg oral tablet  -- 1 tab(s) by mouth 2 times a day  -- Indication: For Anxiety    metoprolol succinate 25 mg oral tablet, extended release  -- 1 tab(s) by mouth once a day  -- Indication: For CHF (congestive heart failure)    triamcinolone 0.1% topical ointment  -- 1 application on skin every 12 hours, As needed, itching  -- Indication: For Drug rash    petrolatum topical ointment  -- 1 application on skin 2 times a day  -- Indication: For Drug rash    nystatin 100,000 units/g topical powder  -- 1 application on skin 2 times a day  -- Indication: For groin rash    docusate sodium 100 mg oral capsule  -- 1 cap(s) by mouth 3 times a day  -- Indication: For Constipation    polyethylene glycol 3350 oral powder for reconstitution  -- 17 gram(s) by mouth 2 times a day  -- Indication: For Constipation    senna oral tablet  -- 2 tab(s) by mouth once a day (at bedtime)  -- Indication: For Constipation    pantoprazole 40 mg oral delayed release tablet  -- 1 tab(s) by mouth 2 times a day  -- Indication: For Hyperacidity/esophagitis    Multiple Vitamins oral tablet  -- 1 tab(s) by mouth once a day  -- Indication: For Suppliment    Vitamin D3 50,000 intl units oral capsule  -- 1 cap(s) by mouth once a week  -- Indication: For Suppliment

## 2018-04-26 NOTE — DISCHARGE NOTE ADULT - SECONDARY DIAGNOSIS.
Hyponatremia DVT (deep vein thrombosis) in pregnancy Allergic drug rash due to anti-infective agent Acute kidney injury Ankle fracture, left

## 2018-04-26 NOTE — PROGRESS NOTE ADULT - SUBJECTIVE AND OBJECTIVE BOX
CARDIOLOGY FOLLOW UP - Dr. Mccartney    CC no cp/sob, rash improving       PHYSICAL EXAM:  T(C): 36.4 (04-26-18 @ 12:35), Max: 36.8 (04-25-18 @ 21:39)  HR: 85 (04-26-18 @ 12:35) (78 - 86)  BP: 107/69 (04-26-18 @ 12:35) (102/62 - 107/69)  RR: 20 (04-26-18 @ 12:35) (18 - 20)  SpO2: 100% (04-26-18 @ 12:35) (94% - 100%)  Wt(kg): --  I&O's Summary    25 Apr 2018 07:01  -  26 Apr 2018 07:00  --------------------------------------------------------  IN: 920 mL / OUT: 400 mL / NET: 520 mL    26 Apr 2018 07:01  -  26 Apr 2018 14:00  --------------------------------------------------------  IN: 600 mL / OUT: 650 mL / NET: -50 mL        Appearance: Normal	  Cardiovascular: Normal S1 S2,RRR, No JVD, No murmurs  Respiratory: diminished at bases   Gastrointestinal:  Soft, Non-tender, + BS	  Extremities: Normal range of motion, No clubbing, cyanosis or edema        MEDICATIONS  (STANDING):  AQUAPHOR (petrolatum Ointment) 1 Application(s) Topical two times a day  aspirin enteric coated 81 milliGRAM(s) Oral daily  atorvastatin 10 milliGRAM(s) Oral at bedtime  busPIRone 10 milliGRAM(s) Oral two times a day  cholecalciferol 1000 Unit(s) Oral daily  diltiazem    Tablet 90 milliGRAM(s) Oral three times a day  docusate sodium 100 milliGRAM(s) Oral three times a day  metoprolol succinate ER 25 milliGRAM(s) Oral daily  multivitamin 1 Tablet(s) Oral daily  polyethylene glycol 3350 17 Gram(s) Oral two times a day  rivaroxaban 20 milliGRAM(s) Oral every 24 hours  senna 2 Tablet(s) Oral at bedtime      TELEMETRY: 	    ECG:  	  RADIOLOGY:   DIAGNOSTIC TESTING:  [ ] Echocardiogram:  [ ]  Catheterization:  [ ] Stress Test:    OTHER: 	    LABS:	 	                                9.1    13.2  )-----------( 376      ( 26 Apr 2018 06:45 )             27.0     04-26    129<L>  |  85<L>  |  51<H>  ----------------------------<  92  3.8   |  31  |  1.16    Ca    8.4      26 Apr 2018 06:41    TPro  5.9<L>  /  Alb  2.2<L>  /  TBili  0.6  /  DBili  x   /  AST  36  /  ALT  21  /  AlkPhos  151<H>  04-25

## 2018-04-26 NOTE — PROGRESS NOTE ADULT - SUBJECTIVE AND OBJECTIVE BOX
[facility] NEPHROLOGY- PROGRESS NOTE    89 year old female with history of HTN presents with SOB. Nephrology consulted for hyponatremia.    Pt c/o rash still, with burning sensation.    REVIEW OF SYSTEMS:  Gen: fevers/chills  Cards: no chest pain  Resp: no dyspnea  GI: no nausea, no vomiting or diarrhea  Vascular: no LE edema    Allergies:   Eye cream from the 1960s Neocortef ?spelling caused eyes to becomes swollen (Unknown)  neomycin (Unknown)  soaps and creams causes rash uses only sensitive skin soap (Rash)  sulfa drugs (Unknown)  Voltaren (Rash)  [facility] Medications: Medications reviewed      VITALS:  T(F): 98.1 (18 @ 04:49), Max: 98.2 (18 @ 21:39)  HR: 78 (18 @ 04:49)  BP: 102/62 (18 @ 04:49)  RR: 18 (18 @ 04:49)  SpO2: 94% (18 @ 04:49)  Wt(kg): --     @ 07:01  -   @ 07:00  --------------------------------------------------------  IN: 920 mL / OUT: 400 mL / NET: 520 mL      PHYSICAL EXAM:  Gen: NAD, calm, diffuse macular rash noted but improving  Cards: RRR, +S1/S2, no M/G/R  Resp: Decreased BS @ bases B/L  GI: soft, NT/ND, NABS  Vascular: no LE edema B/L        LABS:      129<L>  |  85<L>  |  51<H>  ----------------------------<  92  3.8   |  31  |  1.16    Ca    8.4      2018 06:41    TPro  5.9<L>  /  Alb  2.2<L>  /  TBili  0.6  /  DBili      /  AST  36  /  ALT  21  /  AlkPhos  151<H>      Creatinine Trend: 1.16 <--, 1.18 <--, 1.35 <--, 1.58 <--, 1.57 <--, 1.55 <--, 1.30 <--, 1.01 <--, 0.85 <--, 0.81 <--, 0.77 <--                        9.1    x     )-----------( 376      ( 2018 06:45 )             27.0     Urine Studies:  Urinalysis Basic - ( 2018 18:22 )    Color: Pink / Appearance: Turbid / S.021 / pH:   Gluc:  / Ketone: Trace  / Bili: Negative / Urobili: Negative   Blood:  / Protein: 100 mg/dL / Nitrite: Negative   Leuk Esterase: Large / RBC: 0-2 /HPF / WBC >50 /HPF   Sq Epi:  / Non Sq Epi:  / Bacteria: Few /HPF      Osmolality, Random Urine: 244 mos/kg ( @ 06:12)  Sodium, Random Urine: <20 mmol/L ( @ 06:12)  Creatinine, Random Urine: 38 mg/dL ( @ 06:12)  Osmolality, Random Urine: 372 mos/kg ( @ 20:40)  Sodium, Random Urine: <20 mmol/L ( @ 20:40)  Creatinine, Random Urine: 73 mg/dL ( @ 20:40)  Sodium, Random Urine: 41 mmol/L ( @ 17:02)  Creatinine, Random Urine: 74 mg/dL ( @ 17:02)  Osmolality, Random Urine: 404 mos/kg ( @ 17:02)  Creatinine, Random Urine: 18 mg/dL ( @ 02:25)  Sodium, Random Urine: 91 mmol/L ( @ 02:25)  Osmolality, Random Urine: 272 mos/kg ( @ 01:55)      Creatinine, Random Urine: 74 mg/dL ( @ 17:02)  Osmolality, Random Urine: 404 mos/kg ( @ 17:02)  Creatinine, Random Urine: 18 mg/dL ( @ 02:25)  Sodium, Random Urine: 91 mmol/L ( @ 02:25)  Osmolality, Random Urine: 272 mos/kg (04-22 @ 01:55)  Osmolality, Random Urine: 62 mos/kg ( @ 05:10)  Sodium, Random Urine: <20 mmol/L ( @ 05:10)    < from: NM Inflammatory Loc Limited Area, Gallium (18 @ 14:18) >    EXAM:  NM INFLAMMATORY LOC GALLIUMLTD                          EXAM:  NM MULTI DAY PROCEDURE                          EXAM:  NM INFLAMMATORY LOC SPECT CT                                PROCEDURE DATE:  2018          INTERPRETATION:  RADIOPHARMACEUTICAL: 5.1 mCi Ga-67 citrate, I.V.   administered on 2018.    CLINICAL STATEMENT: 89-year-old female with acute kidney insufficiency   referred to evaluate for acute interstitial nephritis.    TECHNIQUE:  Anterior and posterior static imagesand SPECT/CT of the   abdomen were obtained 48 hours following administration of   radiopharmaceutical on 2018.    COMPARISON: No prior gallium scan. CTA chest from 2018 was reviewed.    FINDINGS: There is physiologic radiogallium activity in the visualized   abdomen. Activity in the bilateral kidneys is significantly less intense   than that in the spine. There is no evidence of acute interstitial   nephritis.    IMPRESSION: Normal gallium scan.    No evidence of acute interstitial nephritis.                        MAXX ROBB M.D., NUCLEAR MEDICINE ATTENDING  This document has been electronically signed. 2018  3:16PM                < end of copied text >

## 2018-04-26 NOTE — PROGRESS NOTE ADULT - PROBLEM SELECTOR PLAN 1
Concern for AIN given leukocytosis and diffuse macular rash for which antibiotics, PPI and diuretics discontinued. UA with significant pyuria with eosinophils (not specific) suspect likely secondary to AIN. Despite normal gallium scan, I still believe pt has AIN. Defer kidney biopsy given relatively stable renal function off of offending medication (Zosyn versus Furosemide). Avoid nephrotoxins.

## 2018-04-27 LAB
ANION GAP SERPL CALC-SCNC: 9 MMOL/L — SIGNIFICANT CHANGE UP (ref 5–17)
BUN SERPL-MCNC: 42 MG/DL — HIGH (ref 7–23)
CALCIUM SERPL-MCNC: 8.7 MG/DL — SIGNIFICANT CHANGE UP (ref 8.4–10.5)
CHLORIDE SERPL-SCNC: 88 MMOL/L — LOW (ref 96–108)
CO2 SERPL-SCNC: 35 MMOL/L — HIGH (ref 22–31)
CREAT SERPL-MCNC: 1.14 MG/DL — SIGNIFICANT CHANGE UP (ref 0.5–1.3)
CULTURE RESULTS: SIGNIFICANT CHANGE UP
GLUCOSE SERPL-MCNC: 107 MG/DL — HIGH (ref 70–99)
HCT VFR BLD CALC: 27.3 % — LOW (ref 34.5–45)
HGB BLD-MCNC: 9.2 G/DL — LOW (ref 11.5–15.5)
MCHC RBC-ENTMCNC: 29.3 PG — SIGNIFICANT CHANGE UP (ref 27–34)
MCHC RBC-ENTMCNC: 33.7 GM/DL — SIGNIFICANT CHANGE UP (ref 32–36)
MCV RBC AUTO: 87 FL — SIGNIFICANT CHANGE UP (ref 80–100)
PLATELET # BLD AUTO: 399 K/UL — SIGNIFICANT CHANGE UP (ref 150–400)
POTASSIUM SERPL-MCNC: 4 MMOL/L — SIGNIFICANT CHANGE UP (ref 3.5–5.3)
POTASSIUM SERPL-SCNC: 4 MMOL/L — SIGNIFICANT CHANGE UP (ref 3.5–5.3)
RBC # BLD: 3.14 M/UL — LOW (ref 3.8–5.2)
RBC # FLD: 14.8 % — HIGH (ref 10.3–14.5)
SODIUM SERPL-SCNC: 132 MMOL/L — LOW (ref 135–145)
SPECIMEN SOURCE: SIGNIFICANT CHANGE UP
WBC # BLD: 11.4 K/UL — HIGH (ref 3.8–10.5)
WBC # FLD AUTO: 11.4 K/UL — HIGH (ref 3.8–10.5)

## 2018-04-27 PROCEDURE — 99232 SBSQ HOSP IP/OBS MODERATE 35: CPT

## 2018-04-27 RX ORDER — PANTOPRAZOLE SODIUM 20 MG/1
40 TABLET, DELAYED RELEASE ORAL ONCE
Qty: 0 | Refills: 0 | Status: COMPLETED | OUTPATIENT
Start: 2018-04-27 | End: 2018-04-27

## 2018-04-27 RX ORDER — ACETAMINOPHEN 500 MG
650 TABLET ORAL ONCE
Qty: 0 | Refills: 0 | Status: COMPLETED | OUTPATIENT
Start: 2018-04-27 | End: 2018-04-27

## 2018-04-27 RX ORDER — PANTOPRAZOLE SODIUM 20 MG/1
40 TABLET, DELAYED RELEASE ORAL
Qty: 0 | Refills: 0 | Status: DISCONTINUED | OUTPATIENT
Start: 2018-04-27 | End: 2018-04-30

## 2018-04-27 RX ORDER — BENZOCAINE AND MENTHOL 5; 1 G/100ML; G/100ML
1 LIQUID ORAL
Qty: 0 | Refills: 0 | Status: DISCONTINUED | OUTPATIENT
Start: 2018-04-27 | End: 2018-04-30

## 2018-04-27 RX ADMIN — POLYETHYLENE GLYCOL 3350 17 GRAM(S): 17 POWDER, FOR SOLUTION ORAL at 21:45

## 2018-04-27 RX ADMIN — Medication 10 MILLIGRAM(S): at 11:29

## 2018-04-27 RX ADMIN — Medication 650 MILLIGRAM(S): at 21:45

## 2018-04-27 RX ADMIN — Medication 1 TABLET(S): at 11:28

## 2018-04-27 RX ADMIN — Medication 10 MILLIGRAM(S): at 21:44

## 2018-04-27 RX ADMIN — Medication 1 APPLICATION(S): at 11:29

## 2018-04-27 RX ADMIN — BENZOCAINE AND MENTHOL 1 LOZENGE: 5; 1 LIQUID ORAL at 14:57

## 2018-04-27 RX ADMIN — Medication 100 MILLIGRAM(S): at 06:33

## 2018-04-27 RX ADMIN — RIVAROXABAN 20 MILLIGRAM(S): KIT at 17:32

## 2018-04-27 RX ADMIN — POLYETHYLENE GLYCOL 3350 17 GRAM(S): 17 POWDER, FOR SOLUTION ORAL at 11:28

## 2018-04-27 RX ADMIN — Medication 100 MILLIGRAM(S): at 21:44

## 2018-04-27 RX ADMIN — ATORVASTATIN CALCIUM 10 MILLIGRAM(S): 80 TABLET, FILM COATED ORAL at 21:44

## 2018-04-27 RX ADMIN — SENNA PLUS 2 TABLET(S): 8.6 TABLET ORAL at 21:44

## 2018-04-27 RX ADMIN — Medication 0.5 MILLIGRAM(S): at 16:25

## 2018-04-27 RX ADMIN — Medication 0.5 MILLIGRAM(S): at 21:53

## 2018-04-27 RX ADMIN — Medication 81 MILLIGRAM(S): at 11:29

## 2018-04-27 RX ADMIN — Medication 1000 UNIT(S): at 11:29

## 2018-04-27 RX ADMIN — PANTOPRAZOLE SODIUM 40 MILLIGRAM(S): 20 TABLET, DELAYED RELEASE ORAL at 14:04

## 2018-04-27 RX ADMIN — Medication 25 MILLIGRAM(S): at 06:29

## 2018-04-27 RX ADMIN — Medication 1 APPLICATION(S): at 21:45

## 2018-04-27 RX ADMIN — PANTOPRAZOLE SODIUM 40 MILLIGRAM(S): 20 TABLET, DELAYED RELEASE ORAL at 21:45

## 2018-04-27 RX ADMIN — Medication 100 MILLIGRAM(S): at 14:04

## 2018-04-27 NOTE — PROGRESS NOTE ADULT - CARDIOVASCULAR DETAILS
positive S1/positive S2
positive S2/positive S1
positive S1/positive S2
positive S2/positive S1
positive S1/positive S2

## 2018-04-27 NOTE — PROGRESS NOTE ADULT - NEGATIVE GENERAL SYMPTOMS
no chills/no fever
no chills/no fever
no chills/no fever/no malaise
no fever/no chills
no fever/no chills/no malaise
no fever/no chills
no fever/no chills

## 2018-04-27 NOTE — PROGRESS NOTE ADULT - NEGATIVE NEUROLOGICAL SYMPTOMS
no headache
no loss of consciousness/no weakness/no headache
no weakness/no loss of consciousness/no headache
no headache
no headache

## 2018-04-27 NOTE — PROGRESS NOTE ADULT - MS EXT PE MLT D E PC
no cyanosis/pedal edema
no cyanosis
no cyanosis
pedal edema/no cyanosis
no cyanosis
no cyanosis
no pedal edema/no cyanosis

## 2018-04-27 NOTE — PROGRESS NOTE ADULT - SUBJECTIVE AND OBJECTIVE BOX
DELVIS MCBRIDE 89y [id number]    Patient is a 89y old  Female who presents with a chief complaint of SOB (13 Apr 2018 20:35)      Follow Up/CC:  ID following for leukocytosis    Interval History/ROS: skin still itchy    Allergies    Eye cream from the 1960s Neocortef ?spelling caused eyes to becomes swollen (Unknown)  neomycin (Unknown)  piperacillin-tazobactam (Rash)  soaps and creams causes rash uses only sensitive skin soap (Rash)  sulfa drugs (Unknown)  Voltaren (Rash)    Intolerances        ANTIMICROBIALS:      MEDICATIONS  (STANDING):  AQUAPHOR (petrolatum Ointment) 1 Application(s) Topical two times a day  aspirin enteric coated 81 milliGRAM(s) Oral daily  atorvastatin 10 milliGRAM(s) Oral at bedtime  busPIRone 10 milliGRAM(s) Oral two times a day  cholecalciferol 1000 Unit(s) Oral daily  diltiazem    Tablet 90 milliGRAM(s) Oral three times a day  docusate sodium 100 milliGRAM(s) Oral three times a day  metoprolol succinate ER 25 milliGRAM(s) Oral daily  multivitamin 1 Tablet(s) Oral daily  pantoprazole    Tablet 40 milliGRAM(s) Oral two times a day  polyethylene glycol 3350 17 Gram(s) Oral two times a day  rivaroxaban 20 milliGRAM(s) Oral every 24 hours  senna 2 Tablet(s) Oral at bedtime    MEDICATIONS  (PRN):  ALPRAZolam 0.5 milliGRAM(s) Oral three times a day PRN Anxiety  benzocaine 15 mG/menthol 3.6 mG Lozenge 1 Lozenge Oral every 4 hours PRN Sore Throat  benzocaine 15 mG/menthol 3.6 mG Lozenge 1 Lozenge Oral four times a day PRN Sore Throat  ondansetron Injectable 4 milliGRAM(s) IV Push every 8 hours PRN Nausea and/or Vomiting  triamcinolone 0.1% Ointment 1 Application(s) Topical every 12 hours PRN itching        Vital Signs Last 24 Hrs  T(C): 36.4 (27 Apr 2018 13:21), Max: 36.7 (26 Apr 2018 20:47)  T(F): 97.5 (27 Apr 2018 13:21), Max: 98 (26 Apr 2018 20:47)  HR: 86 (27 Apr 2018 13:21) (76 - 86)  BP: 104/61 (27 Apr 2018 13:21) (104/61 - 108/67)  BP(mean): --  RR: 18 (27 Apr 2018 13:21) (18 - 18)  SpO2: 100% (27 Apr 2018 13:21) (100% - 100%)    CBC Full  -  ( 27 Apr 2018 06:21 )  WBC Count : 11.4 K/uL  Hemoglobin : 9.2 g/dL  Hematocrit : 27.3 %  Platelet Count - Automated : 399 K/uL  Mean Cell Volume : 87.0 fl  Mean Cell Hemoglobin : 29.3 pg  Mean Cell Hemoglobin Concentration : 33.7 gm/dL  Auto Neutrophil # : x  Auto Lymphocyte # : x  Auto Monocyte # : x  Auto Eosinophil # : x  Auto Basophil # : x  Auto Neutrophil % : x  Auto Lymphocyte % : x  Auto Monocyte % : x  Auto Eosinophil % : x  Auto Basophil % : x    04-27    132<L>  |  88<L>  |  42<H>  ----------------------------<  107<H>  4.0   |  35<H>  |  1.14    Ca    8.7      27 Apr 2018 06:21            MICROBIOLOGY:  .Throat  04-25-18   No Strep pyogenes (Group A) isolated  --  --      .Urine Catheterized  04-23-18   >100,000 CFU/ml Klebsiella pneumoniae  --  Klebsiella pneumoniae      .Urine Catheterized  04-13-18   >100,000 CFU/ml Escherichia coli  <10,000 CFU/ml Normal Urogenital aristeo present  --  Escherichia coli        Rapid RVP Result: [employer] (04-25 @ 21:10)      RADIOLOGY

## 2018-04-27 NOTE — PROGRESS NOTE ADULT - NEGATIVE ENMT SYMPTOMS
no nasal congestion/no nasal obstruction/no ear pain/no nasal discharge/no sinus symptoms
no nasal congestion/no nasal obstruction/no sinus symptoms/no nasal discharge/no ear pain
no nasal discharge
no throat pain/no nasal discharge

## 2018-04-27 NOTE — PROGRESS NOTE ADULT - SUBJECTIVE AND OBJECTIVE BOX
[facility] NEPHROLOGY- PROGRESS NOTE    89 year old female with history of HTN presents with SOB. Nephrology consulted for hyponatremia.  Pt with itching of her hands, no erythema on hands now.     REVIEW OF SYSTEMS:  Gen: fevers/chills  Cards: no chest pain  Resp: no dyspnea  GI: no nausea, no vomiting or diarrhea  : no dysuria  Vascular: no LE edema    Allergies:   Eye cream from the 1960s Neocortef ?spelling caused eyes to becomes swollen (Unknown)  neomycin (Unknown)  soaps and creams causes rash uses only sensitive skin soap (Rash)  sulfa drugs (Unknown)  Voltaren (Rash)  [facility] Medications: Medications reviewed      VITALS:  T(F): 98.1 (18 @ 04:49), Max: 98.2 (18 @ 21:39)  HR: 78 (18 @ 04:49)  BP: 102/62 (18 @ 04:49)  RR: 18 (18 @ 04:49)  SpO2: 94% (18 @ 04:49)  Wt(kg): --     @ 07:01  -   @ 07:00  --------------------------------------------------------  IN: 920 mL / OUT: 400 mL / NET: 520 mL      PHYSICAL EXAM:  Gen: NAD, calm, diffuse macular rash noted but improving  Cards: RRR, +S1/S2, no M/G/R  Resp: Decreased BS @ bases B/L  GI: soft, NT/ND, NABS  Vascular: no LE edema B/L      LABS:      132<L>  |  88<L>  |  42<H>  ----------------------------<  107<H>  4.0   |  35<H>  |  1.14    Ca    8.7      2018 06:21      Creatinine Trend: 1.14 <--, 1.16 <--, 1.18 <--, 1.35 <--, 1.58 <--, 1.57 <--, 1.55 <--, 1.30 <--, 1.01 <--, 0.85 <--                        9.2    11.4  )-----------( 399      ( 2018 06:21 )             27.3     Urine Studies:  Urinalysis Basic - ( 2018 18:22 )  Culture - Urine (18 @ 20:42)    -  Amikacin: S <=8    -  Ampicillin: R >16 These ampicillin results predict results for amoxicillin    -  Ampicillin/Sulbactam: S 8/4    -  Amoxicillin/Clavulanic Acid: S <=8/4    -  Aztreonam: S <=4    -  Cefazolin: S <=2 This predicts results for oral agents cefaclor, cefdinir, cefpodoxime, cefprozil, cefuroxime axetil, cephalexin and locarbef for uncomplicated UTI. Note that some isolates may be susceptible to these agents while testing resistant to cefazolin.    -  Cefepime: S <=2    -  Cefoxitin: S 8    -  Ceftriaxone: S <=1 Enterobacter, Citrobacter, and Serratia may develop resistance during prolonged therapy    -  Ertapenem: S <=0.5    -  Gentamicin: S <=1    -  Ciprofloxacin: S <=0.5    -  Imipenem: S <=1    -  Levofloxacin: S <=1    -  Meropenem: S <=1    -  Piperacillin/Tazobactam: S <=8    -  Tigecycline: S <=1    -  Nitrofurantoin: S <=32 Should not be used to treat pyelonephritis    -  Tobramycin: S <=2    -  Trimethoprim/Sulfamethoxazole: S <=0.5/9.5    Specimen Source: .Urine Catheterized    Culture Results:   >100,000 CFU/ml Klebsiella pneumoniae    Organism Identification: Klebsiella pneumoniae    Organism: Klebsiella pneumoniae    Method Type: MENA        Color: Pink / Appearance: Turbid / S.021 / pH:   Gluc:  / Ketone: Trace  / Bili: Negative / Urobili: Negative   Blood:  / Protein: 100 mg/dL / Nitrite: Negative   Leuk Esterase: Large / RBC: 0-2 /HPF / WBC >50 /HPF   Sq Epi:  / Non Sq Epi:  / Bacteria: Few /HPF      Osmolality, Random Urine: 244 mos/kg ( @ 06:12)  Sodium, Random Urine: <20 mmol/L ( @ 06:12)  Creatinine, Random Urine: 38 mg/dL ( @ 06:12)  Osmolality, Random Urine: 372 mos/kg ( @ 20:40)  Sodium, Random Urine: <20 mmol/L ( @ 20:40)  Creatinine, Random Urine: 73 mg/dL ( @ 20:40)  Sodium, Random Urine: 41 mmol/L ( @ 17:02)  Creatinine, Random Urine: 74 mg/dL ( @ 17:02)  Osmolality, Random Urine: 404 mos/kg ( @ 17:02)  Creatinine, Random Urine: 18 mg/dL ( @ 02:25)  Sodium, Random Urine: 91 mmol/L ( @ 02:25)  Osmolality, Random Urine: 272 mos/kg ( @ 01:55)

## 2018-04-27 NOTE — PROGRESS NOTE ADULT - NEGATIVE GASTROINTESTINAL SYMPTOMS
no abdominal pain/no constipation/no diarrhea/no vomiting
no abdominal pain/no vomiting/no constipation/no diarrhea
no vomiting/no diarrhea/no nausea/no abdominal pain
no vomiting/no nausea/no diarrhea/no abdominal pain
no vomiting/no diarrhea/no abdominal pain/no nausea
no nausea/no vomiting/no diarrhea/no abdominal pain
no abdominal pain/no diarrhea/no nausea/no vomiting

## 2018-04-27 NOTE — PROGRESS NOTE ADULT - SUBJECTIVE AND OBJECTIVE BOX
CARDIOLOGY FOLLOW UP - Dr. Mccartney    CC no cp/sob       PHYSICAL EXAM:  T(C): 36.7 (04-26-18 @ 20:47), Max: 36.7 (04-26-18 @ 20:47)  HR: 76 (04-26-18 @ 20:47) (76 - 85)  BP: 108/67 (04-26-18 @ 20:47) (107/68 - 108/67)  RR: 18 (04-26-18 @ 20:47) (18 - 20)  SpO2: 100% (04-26-18 @ 20:47) (100% - 100%)  Wt(kg): --  I&O's Summary    26 Apr 2018 07:01  -  27 Apr 2018 07:00  --------------------------------------------------------  IN: 840 mL / OUT: 1850 mL / NET: -1010 mL        Appearance: Normal	  Cardiovascular: Normal S1 S2,RRR, No JVD, No murmurs  Respiratory: diminished, fine crackles RLL   Gastrointestinal:  Soft, Non-tender, + BS	  Extremities: Normal range of motion, No clubbing, cyanosis or edema        MEDICATIONS  (STANDING):  AQUAPHOR (petrolatum Ointment) 1 Application(s) Topical two times a day  aspirin enteric coated 81 milliGRAM(s) Oral daily  atorvastatin 10 milliGRAM(s) Oral at bedtime  busPIRone 10 milliGRAM(s) Oral two times a day  cholecalciferol 1000 Unit(s) Oral daily  diltiazem    Tablet 90 milliGRAM(s) Oral three times a day  docusate sodium 100 milliGRAM(s) Oral three times a day  metoprolol succinate ER 25 milliGRAM(s) Oral daily  multivitamin 1 Tablet(s) Oral daily  polyethylene glycol 3350 17 Gram(s) Oral two times a day  rivaroxaban 20 milliGRAM(s) Oral every 24 hours  senna 2 Tablet(s) Oral at bedtime      TELEMETRY: 	    ECG:  	  RADIOLOGY:   DIAGNOSTIC TESTING:  [ ] Echocardiogram:  [ ]  Catheterization:  [ ] Stress Test:    OTHER: 	    LABS:	 	                                9.2    11.4  )-----------( 399      ( 27 Apr 2018 06:21 )             27.3     04-27    132<L>  |  88<L>  |  42<H>  ----------------------------<  107<H>  4.0   |  35<H>  |  1.14    Ca    8.7      27 Apr 2018 06:21    TPro  5.9<L>  /  Alb  2.2<L>  /  TBili  0.6  /  DBili  x   /  AST  36  /  ALT  21  /  AlkPhos  151<H>  04-25

## 2018-04-27 NOTE — PROGRESS NOTE ADULT - ASSESSMENT
· Assessment	  89f hx as above presenting with hypoxic respiratory failure    Drug rash:-off antibiotic  will monitor                                                                   Problem/Plan -   ·  Problem: Hyponatremia.  Plan: Nephro f/up noted. Na 132.       Problem/Plan - 3:  ·  Problem: SVT (supraventricular tachycardia).  Plan:   Resolved.     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  Plan: - continue xarelto 20mg po daily.        Problem/Plan - 6:  Problem: Hypertension. Plan:   - continue diltiazem        Problem/Plan - 7:  ·  Problem: Anxiety.  Plan: continue buspirone and remeron.      Problem/Plan - 8:  ·  Problem: Hyperlipidemia.  Plan: continue lipitor 10mg po qhs.     Dw family.

## 2018-04-27 NOTE — PROGRESS NOTE ADULT - RS GEN PE MLT RESP DETAILS PC
clear to auscultation bilaterally/respirations non-labored/good air movement
clear to auscultation bilaterally/good air movement/respirations non-labored
clear to auscultation bilaterally/good air movement/respirations non-labored
clear to auscultation bilaterally/respirations non-labored/good air movement
clear to auscultation bilaterally/good air movement/respirations non-labored
respirations non-labored/good air movement/clear to auscultation bilaterally
good air movement/diminished breath sounds, L/respirations non-labored/diminished breath sounds, R

## 2018-04-27 NOTE — PROGRESS NOTE ADULT - GASTROINTESTINAL DETAILS
nontender/no distention/soft/no rebound tenderness
nontender/soft/no rebound tenderness/no distention
soft/nontender/no rebound tenderness/no distention
no distention/soft/no rebound tenderness/nontender
no rebound tenderness/soft/nontender/no distention
soft/no distention/nontender/no rebound tenderness
no rebound tenderness/soft/nontender/no distention

## 2018-04-27 NOTE — PROGRESS NOTE ADULT - NEGATIVE CARDIOVASCULAR SYMPTOMS
no chest pain
no chest pain
no chest pain/no palpitations
no chest pain/no palpitations
no chest pain

## 2018-04-27 NOTE — PROGRESS NOTE ADULT - SUBJECTIVE AND OBJECTIVE BOX
Patient is a 89y old  Female who presents with a chief complaint of SOB (13 Apr 2018 20:35)      SUBJECTIVE / OVERNIGHT EVENTS:  C/o weakness.  Denies CP/SOB/Palpitation/HA.    MEDICATIONS  (STANDING):  AQUAPHOR (petrolatum Ointment) 1 Application(s) Topical two times a day  aspirin enteric coated 81 milliGRAM(s) Oral daily  atorvastatin 10 milliGRAM(s) Oral at bedtime  busPIRone 10 milliGRAM(s) Oral two times a day  cholecalciferol 1000 Unit(s) Oral daily  diltiazem    Tablet 90 milliGRAM(s) Oral three times a day  docusate sodium 100 milliGRAM(s) Oral three times a day  metoprolol succinate ER 25 milliGRAM(s) Oral daily  multivitamin 1 Tablet(s) Oral daily  pantoprazole    Tablet 40 milliGRAM(s) Oral two times a day  pantoprazole    Tablet 40 milliGRAM(s) Oral once  polyethylene glycol 3350 17 Gram(s) Oral two times a day  rivaroxaban 20 milliGRAM(s) Oral every 24 hours  senna 2 Tablet(s) Oral at bedtime    MEDICATIONS  (PRN):  ALPRAZolam 0.5 milliGRAM(s) Oral three times a day PRN Anxiety  benzocaine 15 mG/menthol 3.6 mG Lozenge 1 Lozenge Oral every 4 hours PRN Sore Throat  benzocaine 15 mG/menthol 3.6 mG Lozenge 1 Lozenge Oral four times a day PRN Sore Throat  ondansetron Injectable 4 milliGRAM(s) IV Push every 8 hours PRN Nausea and/or Vomiting  triamcinolone 0.1% Ointment 1 Application(s) Topical every 12 hours PRN itching        CAPILLARY BLOOD GLUCOSE        I&O's Summary    26 Apr 2018 07:01  -  27 Apr 2018 07:00  --------------------------------------------------------  IN: 840 mL / OUT: 1850 mL / NET: -1010 mL    27 Apr 2018 07:01  -  27 Apr 2018 19:57  --------------------------------------------------------  IN: 720 mL / OUT: 1100 mL / NET: -380 mL        PHYSICAL EXAM:    NECK: Supple, No JVD  CHEST/LUNG: Clear to auscultation bilaterally; No wheezing.  HEART: Regular rate and rhythm; No murmurs, rubs, or gallops  ABDOMEN: Soft, Nontender, Nondistended; Bowel sounds present  EXTREMITIES:   No clubbing, cyanosis, or edema  NEUROLOGY: AAO   SKIN:  rashes    LABS:                        9.2    11.4  )-----------( 399      ( 27 Apr 2018 06:21 )             27.3     04-27    132<L>  |  88<L>  |  42<H>  ----------------------------<  107<H>  4.0   |  35<H>  |  1.14    Ca    8.7      27 Apr 2018 06:21              CAPILLARY BLOOD GLUCOSE        04-25 @ 22:33  Culture-urine --  Culture results   No Strep pyogenes (Group A) isolated  method type --  Organism --  Organism Identification --  Specimen source .Throat  04-23 @ 20:42  Culture-urine --  Culture results   >100,000 CFU/ml Klebsiella pneumoniae  method type MENA  Organism Klebsiella pneumoniae  Organism Identification Klebsiella pneumoniae  Specimen source .Urine Catheterized           04-25 @ 22:33  Culture blood --  Culture results   No Strep pyogenes (Group A) isolated  Gram stain --  Gram stain blood --  Method type --  Organism --  Organism identification --  Specimen source .Throat   04-23 @ 20:42  Culture blood --  Culture results   >100,000 CFU/ml Klebsiella pneumoniae  Gram stain --  Gram stain blood --  Method type MENA  Organism Klebsiella pneumoniae  Organism identification Klebsiella pneumoniae  Specimen source .Urine Catheterized      RADIOLOGY & ADDITIONAL TESTS:    Imaging Personally Reviewed:    Consultant(s) Notes Reviewed:      Care Discussed with Consultants/Other Providers:

## 2018-04-27 NOTE — PROGRESS NOTE ADULT - NEGATIVE OPHTHALMOLOGIC SYMPTOMS
no blurred vision L/no blurred vision R
no blurred vision L/no loss of vision R/no loss of vision L/no blurred vision R
no blurred vision R/no loss of vision R/no blurred vision L/no loss of vision L
no blurred vision R/no blurred vision L
no blurred vision R/no blurred vision L

## 2018-04-27 NOTE — PROGRESS NOTE ADULT - ASSESSMENT
89 year old female with history of Hyperlipemia, Hypertension, Insomnia, PSVT, bl DVT  admitted with SOB     1. SOB   multifactorial, recent viral illness/ acute CHF   no evidence of ACS, clinically improved    2. Acute Diastolic congestive heart failure   improved , does not appear overtly overloaded on exam   diuresis on hold per nephrology   echo - normal LV sys function, EF 55-60%, mod-sev mitral stenosis     3. PAT   tele dc'd   asa 81 qd, cont Toprol, ccb     4. Mitral Stenosis  cont ccb, bb to decrease hr and increase devries filling time     5. Htn   cont ccb  monitor bp     6. bl DVT (hx)  continue with xarelto     7. Hyponatremia  serum sodium improving   trend bmp  renal f/u

## 2018-04-27 NOTE — PROGRESS NOTE ADULT - ASSESSMENT
89 year old female with history of HTN presents with SOB. Nephrology consulted for hyponatremia.      Urine culture is positive, but pt is asymptomatic.  No fever, WBC WNL, and patient with tremendous sensitivity to abx (especially with recent allergic reaction to zosyn).    I still believe pt has AIN and klebsiella is colonization.  Would monitor for s/s UTI and if they appear, may need to treat with aztreonem, but not now.

## 2018-04-28 RX ORDER — NYSTATIN CREAM 100000 [USP'U]/G
1 CREAM TOPICAL
Qty: 0 | Refills: 0 | Status: DISCONTINUED | OUTPATIENT
Start: 2018-04-28 | End: 2018-04-30

## 2018-04-28 RX ADMIN — Medication 100 MILLIGRAM(S): at 06:18

## 2018-04-28 RX ADMIN — SENNA PLUS 2 TABLET(S): 8.6 TABLET ORAL at 22:33

## 2018-04-28 RX ADMIN — RIVAROXABAN 20 MILLIGRAM(S): KIT at 17:25

## 2018-04-28 RX ADMIN — Medication 10 MILLIGRAM(S): at 22:34

## 2018-04-28 RX ADMIN — Medication 1 TABLET(S): at 11:12

## 2018-04-28 RX ADMIN — Medication 100 MILLIGRAM(S): at 14:00

## 2018-04-28 RX ADMIN — Medication 1 APPLICATION(S): at 22:34

## 2018-04-28 RX ADMIN — Medication 10 MILLIGRAM(S): at 11:12

## 2018-04-28 RX ADMIN — PANTOPRAZOLE SODIUM 40 MILLIGRAM(S): 20 TABLET, DELAYED RELEASE ORAL at 06:18

## 2018-04-28 RX ADMIN — NYSTATIN CREAM 1 APPLICATION(S): 100000 CREAM TOPICAL at 17:25

## 2018-04-28 RX ADMIN — ATORVASTATIN CALCIUM 10 MILLIGRAM(S): 80 TABLET, FILM COATED ORAL at 22:33

## 2018-04-28 RX ADMIN — Medication 100 MILLIGRAM(S): at 22:34

## 2018-04-28 RX ADMIN — PANTOPRAZOLE SODIUM 40 MILLIGRAM(S): 20 TABLET, DELAYED RELEASE ORAL at 17:25

## 2018-04-28 RX ADMIN — Medication 81 MILLIGRAM(S): at 11:12

## 2018-04-28 RX ADMIN — Medication 1 APPLICATION(S): at 11:12

## 2018-04-28 RX ADMIN — POLYETHYLENE GLYCOL 3350 17 GRAM(S): 17 POWDER, FOR SOLUTION ORAL at 22:34

## 2018-04-28 RX ADMIN — Medication 25 MILLIGRAM(S): at 06:19

## 2018-04-28 RX ADMIN — Medication 1000 UNIT(S): at 11:13

## 2018-04-28 NOTE — PROGRESS NOTE ADULT - SUBJECTIVE AND OBJECTIVE BOX
CC: no acute events    TELEMETRY:     PHYSICAL EXAM:    T(C): 36.3 (04-28-18 @ 04:58), Max: 36.6 (04-27-18 @ 21:02)  HR: 82 (04-28-18 @ 06:16) (71 - 91)  BP: 113/65 (04-28-18 @ 06:16) (104/61 - 113/65)  RR: 18 (04-28-18 @ 04:58) (18 - 18)  SpO2: 100% (04-28-18 @ 04:58) (100% - 100%)  Wt(kg): --  I&O's Summary    27 Apr 2018 07:01  -  28 Apr 2018 07:00  --------------------------------------------------------  IN: 840 mL / OUT: 1300 mL / NET: -460 mL        Appearance: Normal	  Cardiovascular: Normal S1 S2,RRR, No JVD, No murmurs  Respiratory: Lungs clear to auscultation	  Gastrointestinal:  Soft, Non-tender, + BS	  Extremities: Normal range of motion, No clubbing, cyanosis or edema  Vascular: Peripheral pulses palpable 2+ bilaterally     LABS:	 	                          9.2    11.4  )-----------( 399      ( 27 Apr 2018 06:21 )             27.3     04-27    132<L>  |  88<L>  |  42<H>  ----------------------------<  107<H>  4.0   |  35<H>  |  1.14    Ca    8.7      27 Apr 2018 06:21            CARDIAC MARKERS:

## 2018-04-28 NOTE — PROGRESS NOTE ADULT - ASSESSMENT
· Assessment	  89f hx as above presenting with hypoxic respiratory failure    Drug rash:-off antibiotic,improving  will monitor                                                                   Problem/Plan -   ·  Problem: Hyponatremia.  Plan: Nephro f/up noted. Improving       Problem/Plan - 3:  ·  Problem: SVT (supraventricular tachycardia).  Plan:   Resolved.     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  Plan: - continue xarelto 20mg po daily.        Problem/Plan - 6:  Problem: Hypertension. Plan:   - continue diltiazem        Problem/Plan - 7:  ·  Problem: Anxiety.  Plan: continue buspirone and remeron.      Problem/Plan - 8:  ·  Problem: Hyperlipidemia.  Plan: continue lipitor 10mg po qhs.     Dw family-Daughter at bedside

## 2018-04-28 NOTE — PROGRESS NOTE ADULT - SUBJECTIVE AND OBJECTIVE BOX
DELVIS MCBRIDE  89y  Female      Patient is a 89y old  Female who presents with a chief complaint of SOB (13 Apr 2018 20:35)  Patient seen and examined.Covering .Rash is better,leg edema better,no sob,no cp,slight cough.  d/w Daughter at bedside.    REVIEW OF SYSTEMS:  neg      INTERVAL HPI/OVERNIGHT EVENTS:  T(C): 36.6 (04-28-18 @ 12:20), Max: 36.6 (04-27-18 @ 21:02)  HR: 91 (04-28-18 @ 12:20) (71 - 91)  BP: 117/75 (04-28-18 @ 12:20) (107/66 - 117/75)  RR: 18 (04-28-18 @ 12:20) (18 - 18)  SpO2: 95% (04-28-18 @ 12:20) (95% - 100%)  Wt(kg): --  I&O's Summary    27 Apr 2018 07:01 - 28 Apr 2018 07:00  --------------------------------------------------------  IN: 840 mL / OUT: 1300 mL / NET: -460 mL    28 Apr 2018 07:01  -  28 Apr 2018 15:20  --------------------------------------------------------  IN: 120 mL / OUT: 0 mL / NET: 120 mL      T(C): 36.6 (04-28-18 @ 12:20), Max: 36.6 (04-27-18 @ 21:02)  HR: 91 (04-28-18 @ 12:20) (71 - 91)  BP: 117/75 (04-28-18 @ 12:20) (107/66 - 117/75)  RR: 18 (04-28-18 @ 12:20) (18 - 18)  SpO2: 95% (04-28-18 @ 12:20) (95% - 100%)  Wt(kg): --Vital Signs Last 24 Hrs  T(C): 36.6 (28 Apr 2018 12:20), Max: 36.6 (27 Apr 2018 21:02)  T(F): 97.8 (28 Apr 2018 12:20), Max: 97.8 (27 Apr 2018 21:02)  HR: 91 (28 Apr 2018 12:20) (71 - 91)  BP: 117/75 (28 Apr 2018 12:20) (107/66 - 117/75)  BP(mean): --  RR: 18 (28 Apr 2018 12:20) (18 - 18)  SpO2: 95% (28 Apr 2018 12:20) (95% - 100%)    LABS:                        9.2    11.4  )-----------( 399      ( 27 Apr 2018 06:21 )             27.3     04-27    132<L>  |  88<L>  |  42<H>  ----------------------------<  107<H>  4.0   |  35<H>  |  1.14    Ca    8.7      27 Apr 2018 06:21          CAPILLARY BLOOD GLUCOSE                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  Hypertension  Hyperlipidemia  Insomnia  Breast Lump  Seasonal Allergies  Hyperlipemia  Anxiety: palpitations  Status post cataract extraction: OS  Status post hysterectomy: endometrial cancer  History of D&C- 8/12/11  History of Colonoscopy- 2011/Sept  [facility]      MEDICATIONS  (STANDING):  AQUAPHOR (petrolatum Ointment) 1 Application(s) Topical two times a day  aspirin enteric coated 81 milliGRAM(s) Oral daily  atorvastatin 10 milliGRAM(s) Oral at bedtime  busPIRone 10 milliGRAM(s) Oral two times a day  cholecalciferol 1000 Unit(s) Oral daily  diltiazem    Tablet 90 milliGRAM(s) Oral three times a day  docusate sodium 100 milliGRAM(s) Oral three times a day  metoprolol succinate ER 25 milliGRAM(s) Oral daily  multivitamin 1 Tablet(s) Oral daily  nystatin Powder 1 Application(s) Topical two times a day  pantoprazole    Tablet 40 milliGRAM(s) Oral two times a day  polyethylene glycol 3350 17 Gram(s) Oral two times a day  rivaroxaban 20 milliGRAM(s) Oral every 24 hours  senna 2 Tablet(s) Oral at bedtime    MEDICATIONS  (PRN):  ALPRAZolam 0.5 milliGRAM(s) Oral three times a day PRN Anxiety  benzocaine 15 mG/menthol 3.6 mG Lozenge 1 Lozenge Oral every 4 hours PRN Sore Throat  benzocaine 15 mG/menthol 3.6 mG Lozenge 1 Lozenge Oral four times a day PRN Sore Throat  ondansetron Injectable 4 milliGRAM(s) IV Push every 8 hours PRN Nausea and/or Vomiting  triamcinolone 0.1% Ointment 1 Application(s) Topical every 12 hours PRN itching        RADIOLOGY & ADDITIONAL TESTS:    Imaging Personally Reviewed:  [ ] YES  [ ] NO    Consultant(s) Notes Reviewed:  [x ] YES  [ ] NO    PHYSICAL EXAM:  GENERAL: NAD, well-groomed, well-developed  HEAD:  Atraumatic, Normocephalic  EYES: EOMI, PERRLA, conjunctiva and sclera clear  ENMT: No tonsillar erythema, exudates, or enlargement; Moist mucous membranes, Good dentition, No lesions  NECK: Supple, No JVD, Normal thyroid  NERVOUS SYSTEM:  Alert & Oriented X3, Good concentration; Motor Strength 5/5 B/L upper and lower extremities; DTRs 2+ intact and symmetric  CHEST/LUNG: Clear to percussion bilaterally; No rales, rhonchi, wheezing, or rubs  HEART: Regular rate and rhythm; No murmurs, rubs, or gallops  ABDOMEN: Soft, Nontender, Nondistended; Bowel sounds present  EXTREMITIES:  2+ Peripheral Pulses, No clubbing, cyanosis,  edema 2+  LYMPH: No lymphadenopathy noted  SKIN: Improving generalized rash    Care Discussed with Consultants/Other Providers [x ] YES  [ ] NO      Code Status: [] Full Code [] DNR [] DNI [] Goals of Care:   Disposition: [] ICU [] Stroke Unit [] RCU []PCU []Floor [] Discharge Home         RAMON Phan

## 2018-04-28 NOTE — PROGRESS NOTE ADULT - SUBJECTIVE AND OBJECTIVE BOX
[facility] NEPHROLOGY- PROGRESS NOTE    89 year old female with history of HTN presents with SOB. Nephrology consulted for hyponatremia.    REVIEW OF SYSTEMS:  Gen: No fevers/chills, +fatigue  Cards: no chest pain  Resp: no dyspnea    LABS: No labs     132<L>  |  88<L>  |  42<H>  ----------------------------<  107<H>  4.0   |  35<H>  |  1.14    Ca    8.7      2018 06:21      Creatinine Trend: 1.14 <--, 1.16 <--, 1.18 <--, 1.35 <--, 1.58 <--, 1.57 <--, 1.55 <--, 1.30 <--, 1.01 <--                        9.2    11.4  )-----------( 399      ( 2018 06:21 )             27.3     Urine Studies:  Urinalysis Basic - ( 2018 18:22 )    Color: Pink / Appearance: Turbid / S.021 / pH:   Gluc:  / Ketone: Trace  / Bili: Negative / Urobili: Negative   Blood:  / Protein: 100 mg/dL / Nitrite: Negative   Leuk Esterase: Large / RBC: 0-2 /HPF / WBC >50 /HPF   Sq Epi:  / Non Sq Epi:  / Bacteria: Few /HPF      Osmolality, Random Urine: 244 mos/kg ( @ 06:12)  Sodium, Random Urine: <20 mmol/L ( @ 06:12)  Creatinine, Random Urine: 38 mg/dL ( @ 06:12)  Osmolality, Random Urine: 372 mos/kg ( @ 20:40)  Sodium, Random Urine: <20 mmol/L ( @ 20:40)  Creatinine, Random Urine: 73 mg/dL ( @ 20:40)  Sodium, Random Urine: 41 mmol/L ( @ 17:02)  Creatinine, Random Urine: 74 mg/dL ( @ 17:02)  Osmolality, Random Urine: 404 mos/kg ( @ 17:02)  Creatinine, Random Urine: 18 mg/dL ( @ 02:25)  Sodium, Random Urine: 91 mmol/L ( @ 02:25)  Osmolality, Random Urine: 272 mos/kg ( @ 01:55)    GI: no nausea, no vomiting or diarrhea  : no dysuria  Vascular: no LE edema    Allergies:   Eye cream from the 1960s Neocortef ?spelling caused eyes to becomes swollen (Unknown)  neomycin (Unknown)  soaps and creams causes rash uses only sensitive skin soap (Rash)  sulfa drugs (Unknown)  Voltaren (Rash)  [facility] Medications: Medications reviewed      VITALS:  T(F): 97.3 (18 @ 04:58), Max: 97.8 (18 @ 21:02)  HR: 82 (18 @ 06:16)  BP: 113/65 (18 @ 06:16)  RR: 18 (18 @ 04:58)  SpO2: 100% (18 @ 04:58)  Wt(kg): --     @ 07:01  -   @ 07:00  --------------------------------------------------------  IN: 840 mL / OUT: 1300 mL / NET: -460 mL      PHYSICAL EXAM:  Gen: NAD, calm, diffuse macular rash noted but improving  Cards: RRR, +S1/S2, no M/G/R  Resp: Decreased BS @ bases B/L  GI: soft, NT/ND, NABS  Vascular: no LE edema B/L

## 2018-04-29 LAB
ANION GAP SERPL CALC-SCNC: 11 MMOL/L — SIGNIFICANT CHANGE UP (ref 5–17)
BUN SERPL-MCNC: 34 MG/DL — HIGH (ref 7–23)
CALCIUM SERPL-MCNC: 8.8 MG/DL — SIGNIFICANT CHANGE UP (ref 8.4–10.5)
CHLORIDE SERPL-SCNC: 88 MMOL/L — LOW (ref 96–108)
CO2 SERPL-SCNC: 32 MMOL/L — HIGH (ref 22–31)
CREAT SERPL-MCNC: 1.02 MG/DL — SIGNIFICANT CHANGE UP (ref 0.5–1.3)
GLUCOSE SERPL-MCNC: 101 MG/DL — HIGH (ref 70–99)
HCT VFR BLD CALC: 26.9 % — LOW (ref 34.5–45)
HGB BLD-MCNC: 9 G/DL — LOW (ref 11.5–15.5)
MCHC RBC-ENTMCNC: 29.4 PG — SIGNIFICANT CHANGE UP (ref 27–34)
MCHC RBC-ENTMCNC: 33.6 GM/DL — SIGNIFICANT CHANGE UP (ref 32–36)
MCV RBC AUTO: 87.3 FL — SIGNIFICANT CHANGE UP (ref 80–100)
PLATELET # BLD AUTO: 431 K/UL — HIGH (ref 150–400)
POTASSIUM SERPL-MCNC: 4.2 MMOL/L — SIGNIFICANT CHANGE UP (ref 3.5–5.3)
POTASSIUM SERPL-SCNC: 4.2 MMOL/L — SIGNIFICANT CHANGE UP (ref 3.5–5.3)
RBC # BLD: 3.08 M/UL — LOW (ref 3.8–5.2)
RBC # FLD: 15.1 % — HIGH (ref 10.3–14.5)
SODIUM SERPL-SCNC: 131 MMOL/L — LOW (ref 135–145)
WBC # BLD: 17.3 K/UL — HIGH (ref 3.8–10.5)
WBC # FLD AUTO: 17.3 K/UL — HIGH (ref 3.8–10.5)

## 2018-04-29 RX ORDER — LANOLIN ALCOHOL/MO/W.PET/CERES
3 CREAM (GRAM) TOPICAL ONCE
Qty: 0 | Refills: 0 | Status: COMPLETED | OUTPATIENT
Start: 2018-04-29 | End: 2018-04-29

## 2018-04-29 RX ORDER — ACETAMINOPHEN 500 MG
650 TABLET ORAL ONCE
Qty: 0 | Refills: 0 | Status: COMPLETED | OUTPATIENT
Start: 2018-04-29 | End: 2018-04-29

## 2018-04-29 RX ADMIN — Medication 100 MILLIGRAM(S): at 13:21

## 2018-04-29 RX ADMIN — NYSTATIN CREAM 1 APPLICATION(S): 100000 CREAM TOPICAL at 06:45

## 2018-04-29 RX ADMIN — PANTOPRAZOLE SODIUM 40 MILLIGRAM(S): 20 TABLET, DELAYED RELEASE ORAL at 17:53

## 2018-04-29 RX ADMIN — Medication 1000 UNIT(S): at 11:42

## 2018-04-29 RX ADMIN — PANTOPRAZOLE SODIUM 40 MILLIGRAM(S): 20 TABLET, DELAYED RELEASE ORAL at 06:37

## 2018-04-29 RX ADMIN — Medication 10 MILLIGRAM(S): at 21:35

## 2018-04-29 RX ADMIN — NYSTATIN CREAM 1 APPLICATION(S): 100000 CREAM TOPICAL at 17:52

## 2018-04-29 RX ADMIN — Medication 0.5 MILLIGRAM(S): at 14:28

## 2018-04-29 RX ADMIN — Medication 100 MILLIGRAM(S): at 06:38

## 2018-04-29 RX ADMIN — Medication 1 APPLICATION(S): at 21:35

## 2018-04-29 RX ADMIN — Medication 3 MILLIGRAM(S): at 23:54

## 2018-04-29 RX ADMIN — Medication 0.5 MILLIGRAM(S): at 21:39

## 2018-04-29 RX ADMIN — Medication 25 MILLIGRAM(S): at 06:38

## 2018-04-29 RX ADMIN — Medication 10 MILLIGRAM(S): at 11:42

## 2018-04-29 RX ADMIN — Medication 650 MILLIGRAM(S): at 23:19

## 2018-04-29 RX ADMIN — Medication 650 MILLIGRAM(S): at 23:50

## 2018-04-29 RX ADMIN — ATORVASTATIN CALCIUM 10 MILLIGRAM(S): 80 TABLET, FILM COATED ORAL at 21:35

## 2018-04-29 RX ADMIN — Medication 1 APPLICATION(S): at 11:42

## 2018-04-29 RX ADMIN — RIVAROXABAN 20 MILLIGRAM(S): KIT at 17:53

## 2018-04-29 RX ADMIN — Medication 1 TABLET(S): at 11:42

## 2018-04-29 RX ADMIN — Medication 81 MILLIGRAM(S): at 11:42

## 2018-04-29 NOTE — PROGRESS NOTE ADULT - SUBJECTIVE AND OBJECTIVE BOX
Patient is a 89y old  Female who presents with a chief complaint of SOB (13 Apr 2018 20:35)      SUBJECTIVE / OVERNIGHT EVENTS:  C/o weakness.  Denies CP/SOB/Palpitation/HA.    MEDICATIONS  (STANDING):  AQUAPHOR (petrolatum Ointment) 1 Application(s) Topical two times a day  aspirin enteric coated 81 milliGRAM(s) Oral daily  atorvastatin 10 milliGRAM(s) Oral at bedtime  busPIRone 10 milliGRAM(s) Oral two times a day  cholecalciferol 1000 Unit(s) Oral daily  diltiazem    Tablet 90 milliGRAM(s) Oral three times a day  docusate sodium 100 milliGRAM(s) Oral three times a day  metoprolol succinate ER 25 milliGRAM(s) Oral daily  multivitamin 1 Tablet(s) Oral daily  nystatin Powder 1 Application(s) Topical two times a day  pantoprazole    Tablet 40 milliGRAM(s) Oral two times a day  polyethylene glycol 3350 17 Gram(s) Oral two times a day  rivaroxaban 20 milliGRAM(s) Oral every 24 hours  senna 2 Tablet(s) Oral at bedtime    MEDICATIONS  (PRN):  ALPRAZolam 0.5 milliGRAM(s) Oral three times a day PRN Anxiety  benzocaine 15 mG/menthol 3.6 mG Lozenge 1 Lozenge Oral every 4 hours PRN Sore Throat  benzocaine 15 mG/menthol 3.6 mG Lozenge 1 Lozenge Oral four times a day PRN Sore Throat  ondansetron Injectable 4 milliGRAM(s) IV Push every 8 hours PRN Nausea and/or Vomiting  triamcinolone 0.1% Ointment 1 Application(s) Topical every 12 hours PRN itching        CAPILLARY BLOOD GLUCOSE        I&O's Summary    28 Apr 2018 07:01  -  29 Apr 2018 07:00  --------------------------------------------------------  IN: 360 mL / OUT: 250 mL / NET: 110 mL    29 Apr 2018 07:01  -  29 Apr 2018 22:51  --------------------------------------------------------  IN: 540 mL / OUT: 875 mL / NET: -335 mL        PHYSICAL EXAM:  GENERAL: NAD, well-developed  HEAD:  Atraumatic, Normocephalic  NECK: Supple, No JVD  CHEST/LUNG: Clear to auscultation bilaterally; No wheezing.  HEART: Regular rate and rhythm; No murmurs, rubs, or gallops  ABDOMEN: Soft, Nontender, Nondistended; Bowel sounds present  EXTREMITIES:   No clubbing, cyanosis, or edema  NEUROLOGY: AAO X 3  SKIN: No rashes    LABS:                        9.0    17.3  )-----------( 431      ( 29 Apr 2018 06:25 )             26.9     04-29    131<L>  |  88<L>  |  34<H>  ----------------------------<  101<H>  4.2   |  32<H>  |  1.02    Ca    8.8      29 Apr 2018 06:25              CAPILLARY BLOOD GLUCOSE        04-25 @ 22:33  Culture-urine --  Culture results   No Strep pyogenes (Group A) isolated  method type --  Organism --  Organism Identification --  Specimen source .Throat  04-23 @ 20:42  Culture-urine --  Culture results   >100,000 CFU/ml Klebsiella pneumoniae  method type MENA  Organism Klebsiella pneumoniae  Organism Identification Klebsiella pneumoniae  Specimen source .Urine Catheterized           04-25 @ 22:33  Culture blood --  Culture results   No Strep pyogenes (Group A) isolated  Gram stain --  Gram stain blood --  Method type --  Organism --  Organism identification --  Specimen source .Throat   04-23 @ 20:42  Culture blood --  Culture results   >100,000 CFU/ml Klebsiella pneumoniae  Gram stain --  Gram stain blood --  Method type MENA  Organism Klebsiella pneumoniae  Organism identification Klebsiella pneumoniae  Specimen source .Urine Catheterized      RADIOLOGY & ADDITIONAL TESTS:    Imaging Personally Reviewed:    Consultant(s) Notes Reviewed:      Care Discussed with Consultants/Other Providers:

## 2018-04-29 NOTE — PROGRESS NOTE ADULT - SUBJECTIVE AND OBJECTIVE BOX
CARDIOLOGY FOLLOW UP - Dr. Mccartney    CC no chest pain or sob       PHYSICAL EXAM:  T(C): 36.8 (04-29-18 @ 05:07), Max: 36.8 (04-29-18 @ 05:07)  HR: 96 (04-29-18 @ 06:40) (91 - 105)  BP: 118/68 (04-29-18 @ 06:40) (112/70 - 118/68)  RR: 18 (04-29-18 @ 05:07) (18 - 18)  SpO2: 96% (04-29-18 @ 05:07) (94% - 96%)  Wt(kg): --  I&O's Summary    28 Apr 2018 07:01  -  29 Apr 2018 07:00  --------------------------------------------------------  IN: 360 mL / OUT: 250 mL / NET: 110 mL        Appearance: Normal	  Cardiovascular: Normal S1 S2,RRR, No JVD, No murmurs  Respiratory:  diminished 	  Gastrointestinal:  Soft, Non-tender, + BS	  Extremities: Normal range of motion, No clubbing, cyanosis or edema        MEDICATIONS  (STANDING):  AQUAPHOR (petrolatum Ointment) 1 Application(s) Topical two times a day  aspirin enteric coated 81 milliGRAM(s) Oral daily  atorvastatin 10 milliGRAM(s) Oral at bedtime  busPIRone 10 milliGRAM(s) Oral two times a day  cholecalciferol 1000 Unit(s) Oral daily  diltiazem    Tablet 90 milliGRAM(s) Oral three times a day  docusate sodium 100 milliGRAM(s) Oral three times a day  metoprolol succinate ER 25 milliGRAM(s) Oral daily  multivitamin 1 Tablet(s) Oral daily  nystatin Powder 1 Application(s) Topical two times a day  pantoprazole    Tablet 40 milliGRAM(s) Oral two times a day  polyethylene glycol 3350 17 Gram(s) Oral two times a day  rivaroxaban 20 milliGRAM(s) Oral every 24 hours  senna 2 Tablet(s) Oral at bedtime      TELEMETRY:  off tele  	    ECG:  	  RADIOLOGY:   DIAGNOSTIC TESTING:  [ ] Echocardiogram:  [ ]  Catheterization:  [ ] Stress Test:    OTHER: 	    LABS:	 	                                9.0    17.3  )-----------( 431      ( 29 Apr 2018 06:25 )             26.9     04-29    131<L>  |  88<L>  |  34<H>  ----------------------------<  101<H>  4.2   |  32<H>  |  1.02    Ca    8.8      29 Apr 2018 06:25

## 2018-04-29 NOTE — PROGRESS NOTE ADULT - SUBJECTIVE AND OBJECTIVE BOX
[facility] NEPHROLOGY- PROGRESS NOTE    89 year old female with history of HTN presents with SOB. Nephrology consulted for hyponatremia.    REVIEW OF SYSTEMS:  Gen: No fevers/chills, +fatigue  Cards: no chest pain  Resp: no dyspnea  GI: no nausea, no vomiting or diarrhea  : no dysuria  Vascular: no LE edema    Allergies:   Eye cream from the 1960s Neocortef ?spelling caused eyes to becomes swollen (Unknown)  neomycin (Unknown)  soaps and creams causes rash uses only sensitive skin soap (Rash)  sulfa drugs (Unknown)  Voltaren (Rash)  [facility] Medications: Medications reviewed    VITALS:  T(F): 98.2 (18 @ 05:07), Max: 98.2 (18 @ 05:07)  HR: 96 (18 @ 06:40)  BP: 118/68 (18 @ 06:40)  RR: 18 (18 @ 05:07)  SpO2: 96% (18 @ 05:07)  Wt(kg): --     @ 07:01  -   @ 07:00  --------------------------------------------------------  IN: 360 mL / OUT: 250 mL / NET: 110 mL     @ 07:01  -   @ 11:31  --------------------------------------------------------  IN: 0 mL / OUT: 425 mL / NET: -425 mL      PHYSICAL EXAM:  Gen: NAD, calm, diffuse macular rash noted but improving  Cards: RRR, +S1/S2, no M/G/R  Resp: Decreased BS @ bases B/L  GI: soft, NT/ND, NABS  Vascular: no LE edema B/L    LABS:      131<L>  |  88<L>  |  34<H>  ----------------------------<  101<H>  4.2   |  32<H>  |  1.02    Ca    8.8      2018 06:25      Creatinine Trend: 1.02 <--, 1.14 <--, 1.16 <--, 1.18 <--, 1.35 <--, 1.58 <--, 1.57 <--, 1.55 <--                        9.0    17.3  )-----------( 431      ( 2018 06:25 )             26.9     Urine Studies:  Urinalysis Basic - ( 2018 18:22 )    Color: Pink / Appearance: Turbid / S.021 / pH:   Gluc:  / Ketone: Trace  / Bili: Negative / Urobili: Negative   Blood:  / Protein: 100 mg/dL / Nitrite: Negative   Leuk Esterase: Large / RBC: 0-2 /HPF / WBC >50 /HPF   Sq Epi:  / Non Sq Epi:  / Bacteria: Few /HPF      Osmolality, Random Urine: 244 mos/kg ( @ 06:12)  Sodium, Random Urine: <20 mmol/L ( @ 06:12)  Creatinine, Random Urine: 38 mg/dL ( @ 06:12)  Osmolality, Random Urine: 372 mos/kg ( @ 20:40)  Sodium, Random Urine: <20 mmol/L ( @ 20:40)  Creatinine, Random Urine: 73 mg/dL ( @ 20:40)  Sodium, Random Urine: 41 mmol/L ( @ 17:02)  Creatinine, Random Urine: 74 mg/dL ( @ 17:02)  Osmolality, Random Urine: 404 mos/kg ( @ 17:02)

## 2018-04-29 NOTE — PROGRESS NOTE ADULT - ASSESSMENT
89 year old female with history of Hyperlipemia, Hypertension, Insomnia, PSVT, bl DVT  admitted with SOB     1. SOB   multifactorial, recent viral illness/ acute CHF   no evidence of ACS, clinically improved    2. Acute Diastolic congestive heart failure   improved , does not appear overtly overloaded on exam   diuresis on hold per nephrology   echo - normal LV sys function, EF 55-60%, mod-sev mitral stenosis     3. PAT   tele dc'd   asa 81 qd, cont Toprol, ccb     4. Mitral Stenosis  cont ccb, bb to decrease hr and increase diastolic  filling time     5. Htn   stable, continue with current anti-htn meds    6. bl DVT (hx)  continue with xarelto     7. Hyponatremia  serum sodium improving   trend bmp  renal f/u

## 2018-04-30 VITALS
SYSTOLIC BLOOD PRESSURE: 110 MMHG | RESPIRATION RATE: 18 BRPM | OXYGEN SATURATION: 100 % | HEART RATE: 84 BPM | DIASTOLIC BLOOD PRESSURE: 71 MMHG | TEMPERATURE: 98 F

## 2018-04-30 LAB
ANION GAP SERPL CALC-SCNC: 11 MMOL/L — SIGNIFICANT CHANGE UP (ref 5–17)
BASOPHILS # BLD AUTO: 0 K/UL — SIGNIFICANT CHANGE UP (ref 0–0.2)
BASOPHILS NFR BLD AUTO: 0.3 % — SIGNIFICANT CHANGE UP (ref 0–2)
BUN SERPL-MCNC: 36 MG/DL — HIGH (ref 7–23)
CALCIUM SERPL-MCNC: 8.6 MG/DL — SIGNIFICANT CHANGE UP (ref 8.4–10.5)
CHLORIDE SERPL-SCNC: 88 MMOL/L — LOW (ref 96–108)
CO2 SERPL-SCNC: 31 MMOL/L — SIGNIFICANT CHANGE UP (ref 22–31)
CREAT SERPL-MCNC: 1.02 MG/DL — SIGNIFICANT CHANGE UP (ref 0.5–1.3)
EOSINOPHIL # BLD AUTO: 0.4 K/UL — SIGNIFICANT CHANGE UP (ref 0–0.5)
EOSINOPHIL NFR BLD AUTO: 4.8 % — SIGNIFICANT CHANGE UP (ref 0–6)
GLUCOSE SERPL-MCNC: 103 MG/DL — HIGH (ref 70–99)
HCT VFR BLD CALC: 25.3 % — LOW (ref 34.5–45)
HGB BLD-MCNC: 8.5 G/DL — LOW (ref 11.5–15.5)
LYMPHOCYTES # BLD AUTO: 0.7 K/UL — LOW (ref 1–3.3)
LYMPHOCYTES # BLD AUTO: 9 % — LOW (ref 13–44)
MCHC RBC-ENTMCNC: 29.5 PG — SIGNIFICANT CHANGE UP (ref 27–34)
MCHC RBC-ENTMCNC: 33.6 GM/DL — SIGNIFICANT CHANGE UP (ref 32–36)
MCV RBC AUTO: 87.7 FL — SIGNIFICANT CHANGE UP (ref 80–100)
MONOCYTES # BLD AUTO: 0.5 K/UL — SIGNIFICANT CHANGE UP (ref 0–0.9)
MONOCYTES NFR BLD AUTO: 5.7 % — SIGNIFICANT CHANGE UP (ref 2–14)
NEUTROPHILS # BLD AUTO: 6.6 K/UL — SIGNIFICANT CHANGE UP (ref 1.8–7.4)
NEUTROPHILS NFR BLD AUTO: 80.2 % — HIGH (ref 43–77)
PLATELET # BLD AUTO: 375 K/UL — SIGNIFICANT CHANGE UP (ref 150–400)
POTASSIUM SERPL-MCNC: 4.3 MMOL/L — SIGNIFICANT CHANGE UP (ref 3.5–5.3)
POTASSIUM SERPL-SCNC: 4.3 MMOL/L — SIGNIFICANT CHANGE UP (ref 3.5–5.3)
RBC # BLD: 2.89 M/UL — LOW (ref 3.8–5.2)
RBC # FLD: 14.9 % — HIGH (ref 10.3–14.5)
SODIUM SERPL-SCNC: 130 MMOL/L — LOW (ref 135–145)
WBC # BLD: 8.2 K/UL — SIGNIFICANT CHANGE UP (ref 3.8–10.5)
WBC # FLD AUTO: 8.2 K/UL — SIGNIFICANT CHANGE UP (ref 3.8–10.5)

## 2018-04-30 PROCEDURE — 78800 RP LOCLZJ TUM 1 AREA 1 D IMG: CPT

## 2018-04-30 PROCEDURE — 97530 THERAPEUTIC ACTIVITIES: CPT

## 2018-04-30 PROCEDURE — 99285 EMERGENCY DEPT VISIT HI MDM: CPT | Mod: 25

## 2018-04-30 PROCEDURE — 97116 GAIT TRAINING THERAPY: CPT

## 2018-04-30 PROCEDURE — 82803 BLOOD GASES ANY COMBINATION: CPT

## 2018-04-30 PROCEDURE — 84132 ASSAY OF SERUM POTASSIUM: CPT

## 2018-04-30 PROCEDURE — 96374 THER/PROPH/DIAG INJ IV PUSH: CPT

## 2018-04-30 PROCEDURE — 96375 TX/PRO/DX INJ NEW DRUG ADDON: CPT

## 2018-04-30 PROCEDURE — 85610 PROTHROMBIN TIME: CPT

## 2018-04-30 PROCEDURE — 93005 ELECTROCARDIOGRAM TRACING: CPT

## 2018-04-30 PROCEDURE — 71275 CT ANGIOGRAPHY CHEST: CPT

## 2018-04-30 PROCEDURE — 71045 X-RAY EXAM CHEST 1 VIEW: CPT

## 2018-04-30 PROCEDURE — 85027 COMPLETE CBC AUTOMATED: CPT

## 2018-04-30 PROCEDURE — 94640 AIRWAY INHALATION TREATMENT: CPT

## 2018-04-30 PROCEDURE — A9556: CPT

## 2018-04-30 PROCEDURE — 87798 DETECT AGENT NOS DNA AMP: CPT

## 2018-04-30 PROCEDURE — 87581 M.PNEUMON DNA AMP PROBE: CPT

## 2018-04-30 PROCEDURE — 83605 ASSAY OF LACTIC ACID: CPT

## 2018-04-30 PROCEDURE — 87081 CULTURE SCREEN ONLY: CPT

## 2018-04-30 PROCEDURE — 82533 TOTAL CORTISOL: CPT

## 2018-04-30 PROCEDURE — 85730 THROMBOPLASTIN TIME PARTIAL: CPT

## 2018-04-30 PROCEDURE — 83880 ASSAY OF NATRIURETIC PEPTIDE: CPT

## 2018-04-30 PROCEDURE — 87633 RESP VIRUS 12-25 TARGETS: CPT

## 2018-04-30 PROCEDURE — 82435 ASSAY OF BLOOD CHLORIDE: CPT

## 2018-04-30 PROCEDURE — 84300 ASSAY OF URINE SODIUM: CPT

## 2018-04-30 PROCEDURE — 80048 BASIC METABOLIC PNL TOTAL CA: CPT

## 2018-04-30 PROCEDURE — 82947 ASSAY GLUCOSE BLOOD QUANT: CPT

## 2018-04-30 PROCEDURE — 93306 TTE W/DOPPLER COMPLETE: CPT

## 2018-04-30 PROCEDURE — 82570 ASSAY OF URINE CREATININE: CPT

## 2018-04-30 PROCEDURE — 84295 ASSAY OF SERUM SODIUM: CPT

## 2018-04-30 PROCEDURE — 87186 SC STD MICRODIL/AGAR DIL: CPT

## 2018-04-30 PROCEDURE — 87880 STREP A ASSAY W/OPTIC: CPT

## 2018-04-30 PROCEDURE — 84484 ASSAY OF TROPONIN QUANT: CPT

## 2018-04-30 PROCEDURE — 83930 ASSAY OF BLOOD OSMOLALITY: CPT

## 2018-04-30 PROCEDURE — 78999 UNLISTED MISC PX DX NUC MED: CPT

## 2018-04-30 PROCEDURE — 80202 ASSAY OF VANCOMYCIN: CPT

## 2018-04-30 PROCEDURE — 83935 ASSAY OF URINE OSMOLALITY: CPT

## 2018-04-30 PROCEDURE — 87086 URINE CULTURE/COLONY COUNT: CPT

## 2018-04-30 PROCEDURE — 82330 ASSAY OF CALCIUM: CPT

## 2018-04-30 PROCEDURE — 84443 ASSAY THYROID STIM HORMONE: CPT

## 2018-04-30 PROCEDURE — 82962 GLUCOSE BLOOD TEST: CPT

## 2018-04-30 PROCEDURE — 81001 URINALYSIS AUTO W/SCOPE: CPT

## 2018-04-30 PROCEDURE — 87205 SMEAR GRAM STAIN: CPT

## 2018-04-30 PROCEDURE — 82272 OCCULT BLD FECES 1-3 TESTS: CPT

## 2018-04-30 PROCEDURE — 97162 PT EVAL MOD COMPLEX 30 MIN: CPT

## 2018-04-30 PROCEDURE — 85014 HEMATOCRIT: CPT

## 2018-04-30 PROCEDURE — 80053 COMPREHEN METABOLIC PANEL: CPT

## 2018-04-30 PROCEDURE — 87486 CHLMYD PNEUM DNA AMP PROBE: CPT

## 2018-04-30 RX ORDER — METOPROLOL TARTRATE 50 MG
1 TABLET ORAL
Qty: 0 | Refills: 0 | COMMUNITY
Start: 2018-04-30

## 2018-04-30 RX ORDER — MIRTAZAPINE 45 MG/1
1 TABLET, ORALLY DISINTEGRATING ORAL
Qty: 0 | Refills: 0 | COMMUNITY

## 2018-04-30 RX ORDER — NYSTATIN CREAM 100000 [USP'U]/G
1 CREAM TOPICAL
Qty: 0 | Refills: 0 | COMMUNITY
Start: 2018-04-30 | End: 2018-05-14

## 2018-04-30 RX ORDER — PETROLATUM,WHITE
1 JELLY (GRAM) TOPICAL
Qty: 0 | Refills: 0 | COMMUNITY
Start: 2018-04-30

## 2018-04-30 RX ORDER — DILTIAZEM HCL 120 MG
1 CAPSULE, EXT RELEASE 24 HR ORAL
Qty: 0 | Refills: 0 | COMMUNITY
Start: 2018-04-30

## 2018-04-30 RX ADMIN — Medication 81 MILLIGRAM(S): at 12:34

## 2018-04-30 RX ADMIN — Medication 1 TABLET(S): at 12:33

## 2018-04-30 RX ADMIN — Medication 25 MILLIGRAM(S): at 06:12

## 2018-04-30 RX ADMIN — Medication 0.5 MILLIGRAM(S): at 12:50

## 2018-04-30 RX ADMIN — Medication 1 APPLICATION(S): at 10:15

## 2018-04-30 RX ADMIN — Medication 1000 UNIT(S): at 12:33

## 2018-04-30 RX ADMIN — NYSTATIN CREAM 1 APPLICATION(S): 100000 CREAM TOPICAL at 06:12

## 2018-04-30 RX ADMIN — Medication 10 MILLIGRAM(S): at 10:14

## 2018-04-30 RX ADMIN — PANTOPRAZOLE SODIUM 40 MILLIGRAM(S): 20 TABLET, DELAYED RELEASE ORAL at 06:12

## 2018-04-30 NOTE — PROGRESS NOTE ADULT - PROBLEM SELECTOR PLAN 2
-likely from drug  -off abx  -Derm input noted  -?zosyn related  -AIN likely
In setting of ALLAN and inappropriately elevated urine osm for which will give additional dose of tolvaptan today. Patient advised to drink to thirst as expect UO to increase after medication administration. Check BMP this evening to monitor for over correction of sodium. Monitor serum sodium daily.
In setting of ALLAN and inappropriately elevated urine osm s/p tolvaptan on 4/24. UO increasing and urine osm decreasing.   Serum sodium had been improving, worse today as pt stopped taking ensure.  ENCOURAGE PT TO DRINK ENSURE TWICE DAILY.  Monitor serum sodium daily.
BP controlled. Continue with current medications. Monitor BP.
Improved. Na 134 today   Secondary to volume overload s/p tolvaptan at 5PM no 4/18 with subsequent dilute urine and increase in serum sodium. Serum sodium with rapid improvement for which patient was given D5W overnight to prevent further correction of sodium. Sodium normalized.  Monitor serum sodium daily.
In setting of ALLAN and inappropriately elevated urine osm s/p tolvaptan on 4/24. UO increasing and urine osm decreasing.   Serum sodium improving. Monitor serum sodium daily.
Secondary to volume overload s/p tolvaptan with improvement now with new hyponatremia in setting of ALLAN. Check urine sodium and urine osm. Would hold off of further IVF for now given history of volume overload. Monitor serum sodium daily.
In setting of ALLAN and inappropriately elevated urine osm s/p tolvaptan on 4/24. UO increasing and urine osm decreasing. Repeat serum sodium today. Given low urine sodium, will consider IVF if serum sodium not improved today. Monitor serum sodium daily.
-likely from drug  -off abx  -Derm input noted  -?zosyn related
-no fever, normal WBC  -no symptoms  -hold off abx
-likely from drug  -off abx  -Derm input noted  -?zosyn related  -AIN likely
-likely from drug  -off abx  -Derm input noted
-no fever, normal WBC  -no symptoms  -hold off abx
-no fever, normal WBC  -no symptoms  -hold off abx

## 2018-04-30 NOTE — PROGRESS NOTE ADULT - SUBJECTIVE AND OBJECTIVE BOX
patient seen at bedside awaiting discharge to rehab today. necrotic areas lateral left foot with no signs of drainage and no signs of cellulitis. Continue with z-float boots. DP and PT non-palpable with PVD left foot. COntinue observation of necrotic areas left foot stable areas with no additional podiatric care needed  patient to follow up with outside podiatrist

## 2018-04-30 NOTE — PROGRESS NOTE ADULT - PROBLEM SELECTOR PROBLEM 1
ALLAN (acute kidney injury)
Acute kidney injury
Hyponatremia
Acute kidney injury
CHF (congestive heart failure)

## 2018-04-30 NOTE — PROGRESS NOTE ADULT - PROBLEM SELECTOR PROBLEM 5
Respiratory acidosis
Urinary tract infection with hematuria, site unspecified
Respiratory acidosis

## 2018-04-30 NOTE — PROGRESS NOTE ADULT - PROBLEM SELECTOR PLAN 1
Concern for AIN given leukocytosis and diffuse macular rash for which antibiotics, PPI and diuretics discontinued.  It has been resolving since discontinuation of zosyn and lasix. Monitor renal function. Encourage po intake.

## 2018-04-30 NOTE — PROGRESS NOTE ADULT - PROBLEM SELECTOR PROBLEM 2
Hyponatremia
Hyponatremia
Hypertension, essential, benign
Hyponatremia
Drug rash
Positive urine culture
Drug rash
Positive urine culture
Positive urine culture

## 2018-04-30 NOTE — PROGRESS NOTE ADULT - PROBLEM SELECTOR PLAN 4
Resolved. Holding diuretics for now as patient does not appear volume overloaded. Monitor UO.
-no fever, WBC better  -favor holding off abx  -had urine retention few days ago
Resolved. Defer further diuretics for now given ALLAN and plan for tolvaptan today. Monitor UO.
Resolved. Holding diuretics for now as patient does not appear volume overloaded. Monitor UO.
Asymptomatic bacteruria for which is being monitored off abx as per ID.
Drug rash, possibly due to lasix, therefore changed to ethacrynic acid 50mg po daily on 4/21/18. Monitor UO.  Monitor for urinary retention.
Resolved. Defer further diuretics for now given ALLAN and need for urine studies. Monitor UO. Bladder scan as above to monitor for urinary retention.
Resolved. Holding diuretics for now as patient does not appear volume overloaded. Monitor UO.
Secondary to E. Coli for which patient on IV abx. As per medicine.
-no fever, WBC better  -favor holding off abx  -had urine retention few days ago
-prob from drug rash  -no fever  -hold off abx
-no fever, WBC better  -favor holding off abx  -had urine retention few days ago

## 2018-04-30 NOTE — PROGRESS NOTE ADULT - ASSESSMENT
89 year old female with history of Hyperlipemia, Hypertension, Insomnia, PSVT, bl DVT  admitted with SOB     1. SOB   multifactorial, recent viral illness/ acute CHF   no evidence of ACS, clinically improved    2. Acute Diastolic congestive heart failure   no evidence of fluid overload   diuresis on hold per nephrology   echo - normal LV sys function, EF 55-60%, mod-sev mitral stenosis     3. PAT   tele dc'd   asa 81 qd, cont Toprol, ccb     4. Mitral Stenosis  cont ccb, bb to decrease hr and increase diastolic  filling time     5. Htn   stable, continue with current anti-htn meds    6. bl DVT (hx)  continue with xarelto     7. Hyponatremia  trend bmp  renal f/u

## 2018-04-30 NOTE — PROGRESS NOTE ADULT - SUBJECTIVE AND OBJECTIVE BOX
CARDIOLOGY FOLLOW UP - Dr. Mccartney    CC no chest pain or sob       PHYSICAL EXAM:  T(C): 36.3 (04-30-18 @ 06:11), Max: 36.4 (04-29-18 @ 12:32)  HR: 78 (04-30-18 @ 06:11) (78 - 89)  BP: 110/59 (04-30-18 @ 06:11) (108/64 - 114/67)  RR: 17 (04-30-18 @ 06:11) (17 - 18)  SpO2: 100% (04-30-18 @ 06:11) (95% - 100%)  Wt(kg): --  I&O's Summary    29 Apr 2018 07:01  -  30 Apr 2018 07:00  --------------------------------------------------------  IN: 780 mL / OUT: 975 mL / NET: -195 mL        Appearance: Normal	  Cardiovascular: Normal S1 S2,RRR sys murmur   Respiratory: decrease at base 	  Gastrointestinal:  Soft, Non-tender, + BS	  Extremities: No edema        MEDICATIONS  (STANDING):  AQUAPHOR (petrolatum Ointment) 1 Application(s) Topical two times a day  aspirin enteric coated 81 milliGRAM(s) Oral daily  atorvastatin 10 milliGRAM(s) Oral at bedtime  busPIRone 10 milliGRAM(s) Oral two times a day  cholecalciferol 1000 Unit(s) Oral daily  diltiazem    Tablet 90 milliGRAM(s) Oral three times a day  docusate sodium 100 milliGRAM(s) Oral three times a day  metoprolol succinate ER 25 milliGRAM(s) Oral daily  multivitamin 1 Tablet(s) Oral daily  nystatin Powder 1 Application(s) Topical two times a day  pantoprazole    Tablet 40 milliGRAM(s) Oral two times a day  polyethylene glycol 3350 17 Gram(s) Oral two times a day  rivaroxaban 20 milliGRAM(s) Oral every 24 hours  senna 2 Tablet(s) Oral at bedtime      TELEMETRY: 	    ECG:  	  RADIOLOGY:   DIAGNOSTIC TESTING:  [ ] Echocardiogram:  [ ]  Catheterization:  [ ] Stress Test:    OTHER: 	    LABS:	 	                                8.5    8.2   )-----------( 375      ( 30 Apr 2018 06:13 )             25.3     04-30    130<L>  |  88<L>  |  36<H>  ----------------------------<  103<H>  4.3   |  31  |  1.02    Ca    8.6      30 Apr 2018 06:13

## 2018-04-30 NOTE — PROGRESS NOTE ADULT - ATTENDING COMMENTS
-d/w staff
Agree with above NP note.  cont current tx  cont a.c  duiretics on hold   renal f/u
Agree with above NP note.  cv stable   na improved  off diuretics  likely will need restart soon  cont ccb   add low dose bb for pat  cont a/c
Agree with above NP note.  cv stable  cont current tx
Agree with above NP note.  cv stable  cont current tx  diuretics on hold   hyponatrmia tx per renal   cont a/c
Agree with above NP note.  cv stable  cont diuresis as ordered  rash - derm f/u  cont a/c  cont ccb, bb
agree with NP note above  Tolvapatan per renal  follow up echo  diuretics on hold
Agree with above NP note.  cv stable  cont current tx  duiretics on hold  pred for rash   renal f/u for jimmy  cont a/c
[facility] NEPHROLOGY  Chester Rosa M.D.  Esteban Rouse D.O.  Marti Huber M.D.  Marielena Saenz, MSN, ANP-C    Telephone: [phone]  Facsimile: [phone]    [address] [place] [address]
Patient seen and examined, agree with the above assessment and plan by HITESH Crespo.  tele reviewed  NSR w periods of ectopic atrial rhythm and PAT, no afib  CV stable  Cont current meds
agree with NP note above  sodium slowly improving  follow up echo  diuretics on hold  renal followup
Vicente Tello  Attending Physician   Division of Infectious Disease  Pager #[phone]  After 5pm/weekend or no response, call #[phone]
[facility] NEPHROLOGY  Chester Rosa M.D.  Esteban Rouse D.O.  Marti Huber M.D.  Marielena Saenz, MSN, ANP-C    Telephone: [phone]  Facsimile: [phone]    [address] [place] [address]
[facility] NEPHROLOGY  Chester Rosa M.D.  Esteban Rouse D.O.  Marti Huber M.D.  Marielena Saenz, MSN, ANP-C    Telephone: [phone]  Facsimile: [phone]    [address] [place] [address]
[facility] NEPHROLOGY  Chester Rosa M.D.  Esteban Rouse D.O.  Marti Huber M.D.  Marielena Saenz, MSN, ANP-C    Telephone: [phone]  Facsimile: [phone]    [address] [place] [address]
[facility] NEPHROLOGY  Chester Rosa M.D.  Esteban Rouse D.O.  Marti Huber M.D.  Marielena Saenz, MSN, ANP-C    Telephone: [phone]  Facsimile: [phone]    [address] [place] [address]
[facility] NEPHROLOGY  Chester Rosa M.D.  Esteban Rouse D.O.  Marti Huber M.D.  Marielena Saenz, MSN, ANP-C    Telephone: [phone]  Facsimile: [phone]    [address] [place] [address]
[facility] NEPHROLOGY  Chester Rosa M.D.  Esteban Rouse D.O.  Marti Huber M.D.  Marielena Saenz, MSN, ANP-C    Telephone: [phone]  Facsimile: [phone]    [address] [place] [address]
[facility] NEPHROLOGY  Chester Rosa M.D.  Esteban Rouse D.O.  Marti Huber M.D.  Marielena Saenz, MSN, ANP-C    Telephone: [phone]  Facsimile: [phone]    [address] [place] [address]
[facility] NEPHROLOGY  Chester Rosa M.D.  Esteban Rouse D.O.  Marti Huber M.D.  Marielena Saenz, MSN, ANP-C    Telephone: [phone]  Facsimile: [phone]    [address] [place] [address]
[facility] NEPHROLOGY  Chester Rosa M.D.  Esteban Rouse D.O.  Marti Huber M.D.  Marielena Saenz, MSN, ANP-C    Telephone: [phone]  Facsimile: [phone]    [address] [place] [address]
[facility] NEPHROLOGY  Chester Rosa M.D.  Esteban Rouse D.O.  Marti Huber M.D.  Marielena Saenz, MSN, ANP-C    Telephone: [phone]  Facsimile: [phone]    [address] [place] [address]
[facility] NEPHROLOGY  Chester Rosa M.D.  Esteban Rouse D.O.  Marti Huber M.D.  Marielena Saenz, MSN, ANP-C    Telephone: [phone]  Facsimile: [phone]    [address] [place] [address]
[facility] NEPHROLOGY  Chester Rosa M.D.  Esteban Rouse D.O.  Marti Huber M.D.  Marielena Saenz, MSN, ANP-C    Telephone: [phone]  Facsimile: [phone]    [address] [place] [address]
[facility] NEPHROLOGY  Chester Rosa M.D.  Esteban Rouse D.O.  Marti Huber M.D.  Marielena Saenz, MSN, ANP-C    Telephone: [phone]  Facsimile: [phone]    [address] [place] [address]
[facility] NEPHROLOGY  Chester Rosa M.D.  Esteban Rouse D.O.  Marti Huber M.D.  Marielena Saenz, MSN, ANP-C    Telephone: [phone]  Facsimile: [phone]    [address] [place] [address]
Vicente Tello  Attending Physician   Division of Infectious Disease  Pager #[phone]  After 5pm/weekend or no response, call #[phone]
Vicente Tello  Attending Physician   Division of Infectious Disease  Pager #[phone]  After 5pm/weekend or no response, call #[phone]
Vicente Tello  Attending Physician   Division of Infectious Disease  Pager #[phone]  After 5pm/weekend or no response, call #[phone]
Vicente Tello  Attending Physician   Division of Infectious Disease  Pager #[phone]  After 5pm/weekend or no response, call #[phone]    Will sign off, recall ID if needed #[phone].
Vicente Tello  Attending Physician   Division of Infectious Disease  Pager #[phone]  After 5pm/weekend or no response, call #[phone]    Will sign off, recall ID if needed #[phone].
Vicente Tello  Attending Physician   Division of Infectious Disease  Pager #[phone]  After 5pm/weekend or no response, call #[phone]

## 2018-04-30 NOTE — PROGRESS NOTE ADULT - PROBLEM SELECTOR PROBLEM 3
Leukocytosis
Hypertension, essential, benign
Hypertension, essential, benign
Edema, lower extremity
Hypertension, essential, benign
Leukocytosis
Leukocytosis
Drug rash
Leukocytosis

## 2018-04-30 NOTE — PROGRESS NOTE ADULT - PROVIDER SPECIALTY LIST ADULT
Cardiology
Infectious Disease
Internal Medicine
Nephrology
Podiatry
Podiatry
Cardiology
Cardiology
Internal Medicine
Nephrology
Infectious Disease
Nephrology

## 2018-04-30 NOTE — PROGRESS NOTE ADULT - PROBLEM SELECTOR PLAN 5
Patient with elevated serum bicarbonate due to compensated respiratory acidosis. Monitor pH.
Asymptomatic bacteruria for which is being monitored off abx as per ID.  Leukocytosis worsening.   Consider rechecking u/a and Ucx
Patient with elevated serum bicarbonate due to compensated respiratory acidosis. Monitor pH.

## 2018-04-30 NOTE — PROGRESS NOTE ADULT - ASSESSMENT
89 year old female with history of HTN presents with SOB. Nephrology consulted for hyponatremia.      Urine culture is positive, but pt is asymptomatic.  No fever, WBC WNL, and patient with tremendous sensitivity to abx (especially with recent allergic reaction to zosyn).    I still believe pt has AIN and klebsiella is colonization.  Would monitor for s/s UTI and if they appear, may need to treat with aztreonem, but not now.    ENCOURAGE PT TO DRINK ENSURE TWICE DAILY. This will help hyponatremia

## 2018-04-30 NOTE — PROGRESS NOTE ADULT - PROBLEM SELECTOR PROBLEM 4
Positive urine culture
Edema, lower extremity
Urinary tract infection with hematuria, site unspecified
Edema, lower extremity
Positive urine culture
Leukocytosis
Positive urine culture

## 2018-04-30 NOTE — PROGRESS NOTE ADULT - SUBJECTIVE AND OBJECTIVE BOX
[facility] NEPHROLOGY- PROGRESS NOTE    89 year old female with history of HTN presents with SOB. Nephrology consulted for hyponatremia.    Pt feeling better overall.  She c/o itching now that her skin is pealing.  No further skin burning.    She reports having eaten a good dinner last night, but has not been taking ensure because it makes her feel gassy.    REVIEW OF SYSTEMS:  Gen: No fevers/chills, +fatigue  Cards: no chest pain  Resp: no dyspnea  GI: no nausea, no vomiting or diarrhea  : no dysuria  Vascular: no LE edema    Allergies:   Eye cream from the 1960s Neocortef ?spelling caused eyes to becomes swollen (Unknown)  neomycin (Unknown)  soaps and creams causes rash uses only sensitive skin soap (Rash)  sulfa drugs (Unknown)  Voltaren (Rash)  [facility] Medications: Medications reviewed    VITALS:  T(F): 97.4 (18 @ 06:11), Max: 97.6 (18 @ 12:32)  HR: 78 (18 @ 06:11)  BP: 110/59 (18 @ 06:11)  RR: 17 (18 @ 06:11)  SpO2: 100% (18 @ 06:11)  Wt(kg): --     @ 07:01  -   @ 07:00  --------------------------------------------------------  IN: 780 mL / OUT: 975 mL / NET: -195 mL        PHYSICAL EXAM:  Gen: NAD, calm, rash resolved, now with skin sloughing.  Cards: RRR, +S1/S2, no M/G/R  Resp: Decreased BS @ bases B/L  GI: soft, NT/ND, NABS  Vascular: no LE edema B/L    LABS:      131<L>  |  88<L>  |  34<H>  ----------------------------<  101<H>  4.2   |  32<H>  |  1.02    Ca    8.8      2018 06:25      Creatinine Trend: 1.02 <--, 1.14 <--, 1.16 <--, 1.18 <--, 1.35 <--, 1.58 <--, 1.57 <--, 1.55 <--                        9.0    17.3  )-----------( 431      ( 2018 06:25 )             26.9     Urine Studies:  Urinalysis Basic - ( 2018 18:22 )    Color: Pink / Appearance: Turbid / S.021 / pH:   Gluc:  / Ketone: Trace  / Bili: Negative / Urobili: Negative   Blood:  / Protein: 100 mg/dL / Nitrite: Negative   Leuk Esterase: Large / RBC: 0-2 /HPF / WBC >50 /HPF   Sq Epi:  / Non Sq Epi:  / Bacteria: Few /HPF      Osmolality, Random Urine: 244 mos/kg ( @ 06:12)  Sodium, Random Urine: <20 mmol/L ( @ 06:12)  Creatinine, Random Urine: 38 mg/dL ( @ 06:12)  Osmolality, Random Urine: 372 mos/kg ( @ 20:40)  Sodium, Random Urine: <20 mmol/L ( @ 20:40)  Creatinine, Random Urine: 73 mg/dL ( @ 20:40)  Sodium, Random Urine: 41 mmol/L ( @ 17:02)  Creatinine, Random Urine: 74 mg/dL ( @ 17:02)  Osmolality, Random Urine: 404 mos/kg ( @ 17:02)

## 2018-05-22 NOTE — PROGRESS NOTE ADULT - PROBLEM SELECTOR PLAN 4
Called patient and left detailed message (per snapshot) relaying the message below and for patient to call back if necessary.   
seemed little SOB today : The VBG is noted: has normal ph: chest xray as well as ct noted: trace effusions: echo is awaited: No underlying pulmonary history: has scattered nodules which are not responsible for SOB :  3/27: echo awaited: she seems good: there is not much on ct scan chest to explain her SOB  3/28: Resolved
und to have , scattered subcm , nodules, incidentally: pt has no underlying hx of cancer and a remote smoker: These can be followed up as an outpatient:  3/24 outpatient follow up  3/25 outpatient follow up. no intervention
- on xarelto   - cont protonix 40mg bid while on xarelto for gi ppx
: on antibiotics:  3/24 urine culture so far negative. ABTs dc'd per ID. Management defer to ID
: on antibiotics:  3/24 urine culture so far negative. ABTs dc'd per ID. Management defer to ID  3/25 off ABT. afebrile. asymptomatic. ID on board
?secondary to fall: trending down: iv hydration:  3/24 improving  3/25 continue to improve. 1159 today from 7059 on admission  3/26: CPK's have improved significantly:
BP controlled on metoprolol. Rate control as per cardiology.
BP controlled on metoprolol. Rate control as per cardiology.
seemed little SOB today : The VBG is noted: has normal ph: chest xray as well as ct noted: trace effusions: echo is awaited: No underlying pulmonary history: has scattered nodules which are not responsible for SOB :  3/27: echo awaited: she seems good: there is not much on ct scan chest to explain her SOB
und to have , scattered subcm , nodules, incidentally: pt has no underlying hx of cancer and a remote smoker: These can be followed up as an outpatient:  3/24 outpatient follow up  3/25 outpatient follow up. no intervention

## 2018-06-18 ENCOUNTER — INPATIENT (INPATIENT)
Facility: HOSPITAL | Age: 83
LOS: 5 days | Discharge: HOME HEALTH | End: 2018-06-24
Attending: INTERNAL MEDICINE | Admitting: INTERNAL MEDICINE
Payer: MEDICARE

## 2018-06-18 VITALS
HEART RATE: 180 BPM | TEMPERATURE: 102 F | RESPIRATION RATE: 26 BRPM | OXYGEN SATURATION: 99 % | SYSTOLIC BLOOD PRESSURE: 89 MMHG | DIASTOLIC BLOOD PRESSURE: 63 MMHG

## 2018-06-18 DIAGNOSIS — Z98.49 CATARACT EXTRACTION STATUS, UNSPECIFIED EYE: Chronic | ICD-10-CM

## 2018-06-18 DIAGNOSIS — Z90.710 ACQUIRED ABSENCE OF BOTH CERVIX AND UTERUS: Chronic | ICD-10-CM

## 2018-06-18 DIAGNOSIS — I47.1 SUPRAVENTRICULAR TACHYCARDIA: ICD-10-CM

## 2018-06-18 LAB
ALBUMIN SERPL ELPH-MCNC: 2.6 G/DL — LOW (ref 3.3–5)
ALP SERPL-CCNC: 116 U/L — SIGNIFICANT CHANGE UP (ref 40–120)
ALT FLD-CCNC: 34 U/L — HIGH (ref 4–33)
APPEARANCE UR: SIGNIFICANT CHANGE UP
AST SERPL-CCNC: 74 U/L — HIGH (ref 4–32)
BACTERIA # UR AUTO: HIGH
BASE EXCESS BLDV CALC-SCNC: -0.5 MMOL/L — SIGNIFICANT CHANGE UP
BASOPHILS # BLD AUTO: 0.03 K/UL — SIGNIFICANT CHANGE UP (ref 0–0.2)
BASOPHILS NFR BLD AUTO: 0.3 % — SIGNIFICANT CHANGE UP (ref 0–2)
BILIRUB SERPL-MCNC: 0.2 MG/DL — SIGNIFICANT CHANGE UP (ref 0.2–1.2)
BILIRUB UR-MCNC: NEGATIVE — SIGNIFICANT CHANGE UP
BLOOD GAS VENOUS - CREATININE: 1.14 MG/DL — SIGNIFICANT CHANGE UP (ref 0.5–1.3)
BLOOD UR QL VISUAL: HIGH
BUN SERPL-MCNC: 18 MG/DL — SIGNIFICANT CHANGE UP (ref 7–23)
CALCIUM SERPL-MCNC: 7.6 MG/DL — LOW (ref 8.4–10.5)
CHLORIDE BLDV-SCNC: 106 MMOL/L — SIGNIFICANT CHANGE UP (ref 96–108)
CHLORIDE SERPL-SCNC: 103 MMOL/L — SIGNIFICANT CHANGE UP (ref 98–107)
CO2 SERPL-SCNC: 19 MMOL/L — LOW (ref 22–31)
COLOR SPEC: YELLOW — SIGNIFICANT CHANGE UP
CREAT SERPL-MCNC: 1.09 MG/DL — SIGNIFICANT CHANGE UP (ref 0.5–1.3)
EOSINOPHIL # BLD AUTO: 0.01 K/UL — SIGNIFICANT CHANGE UP (ref 0–0.5)
EOSINOPHIL NFR BLD AUTO: 0.1 % — SIGNIFICANT CHANGE UP (ref 0–6)
GAS PNL BLDV: 132 MMOL/L — LOW (ref 136–146)
GLUCOSE BLDV-MCNC: 115 — HIGH (ref 70–99)
GLUCOSE SERPL-MCNC: 112 MG/DL — HIGH (ref 70–99)
GLUCOSE UR-MCNC: NEGATIVE — SIGNIFICANT CHANGE UP
HCO3 BLDV-SCNC: 23 MMOL/L — SIGNIFICANT CHANGE UP (ref 20–27)
HCT VFR BLD CALC: 29.8 % — LOW (ref 34.5–45)
HCT VFR BLDV CALC: 27.3 % — LOW (ref 34.5–45)
HGB BLD-MCNC: 9.4 G/DL — LOW (ref 11.5–15.5)
HGB BLDV-MCNC: 8.8 G/DL — LOW (ref 11.5–15.5)
IMM GRANULOCYTES # BLD AUTO: 0.04 # — SIGNIFICANT CHANGE UP
IMM GRANULOCYTES NFR BLD AUTO: 0.4 % — SIGNIFICANT CHANGE UP (ref 0–1.5)
KETONES UR-MCNC: NEGATIVE — SIGNIFICANT CHANGE UP
LACTATE BLDV-MCNC: 2 MMOL/L — SIGNIFICANT CHANGE UP (ref 0.5–2)
LEUKOCYTE ESTERASE UR-ACNC: HIGH
LYMPHOCYTES # BLD AUTO: 1.83 K/UL — SIGNIFICANT CHANGE UP (ref 1–3.3)
LYMPHOCYTES # BLD AUTO: 19.4 % — SIGNIFICANT CHANGE UP (ref 13–44)
MCHC RBC-ENTMCNC: 26.8 PG — LOW (ref 27–34)
MCHC RBC-ENTMCNC: 31.5 % — LOW (ref 32–36)
MCV RBC AUTO: 84.9 FL — SIGNIFICANT CHANGE UP (ref 80–100)
MONOCYTES # BLD AUTO: 0.45 K/UL — SIGNIFICANT CHANGE UP (ref 0–0.9)
MONOCYTES NFR BLD AUTO: 4.8 % — SIGNIFICANT CHANGE UP (ref 2–14)
NEUTROPHILS # BLD AUTO: 7.06 K/UL — SIGNIFICANT CHANGE UP (ref 1.8–7.4)
NEUTROPHILS NFR BLD AUTO: 75 % — SIGNIFICANT CHANGE UP (ref 43–77)
NITRITE UR-MCNC: POSITIVE — HIGH
NRBC # FLD: 0 — SIGNIFICANT CHANGE UP
PCO2 BLDV: 40 MMHG — LOW (ref 41–51)
PH BLDV: 7.39 PH — SIGNIFICANT CHANGE UP (ref 7.32–7.43)
PH UR: 6.5 — SIGNIFICANT CHANGE UP (ref 4.6–8)
PLATELET # BLD AUTO: 444 K/UL — HIGH (ref 150–400)
PMV BLD: 9.9 FL — SIGNIFICANT CHANGE UP (ref 7–13)
PO2 BLDV: 27 MMHG — LOW (ref 35–40)
POTASSIUM BLDV-SCNC: 3.3 MMOL/L — LOW (ref 3.4–4.5)
POTASSIUM SERPL-MCNC: 3.7 MMOL/L — SIGNIFICANT CHANGE UP (ref 3.5–5.3)
POTASSIUM SERPL-SCNC: 3.7 MMOL/L — SIGNIFICANT CHANGE UP (ref 3.5–5.3)
PROT SERPL-MCNC: 5.7 G/DL — LOW (ref 6–8.3)
PROT UR-MCNC: >600 MG/DL — SIGNIFICANT CHANGE UP
RBC # BLD: 3.51 M/UL — LOW (ref 3.8–5.2)
RBC # FLD: 16.7 % — HIGH (ref 10.3–14.5)
RBC CASTS # UR COMP ASSIST: SIGNIFICANT CHANGE UP (ref 0–?)
SAO2 % BLDV: 35.4 % — LOW (ref 60–85)
SODIUM SERPL-SCNC: 136 MMOL/L — SIGNIFICANT CHANGE UP (ref 135–145)
SP GR SPEC: 1.02 — SIGNIFICANT CHANGE UP (ref 1–1.04)
SQUAMOUS # UR AUTO: SIGNIFICANT CHANGE UP
T4 AB SER-ACNC: 5.99 UG/DL — SIGNIFICANT CHANGE UP (ref 5.1–13)
TROPONIN T, HIGH SENSITIVITY RESULT: 79 NG/L — CRITICAL HIGH (ref ?–14)
TSH SERPL-MCNC: 2.19 UIU/ML — SIGNIFICANT CHANGE UP (ref 0.27–4.2)
UROBILINOGEN FLD QL: NORMAL MG/DL — SIGNIFICANT CHANGE UP
WBC # BLD: 9.42 K/UL — SIGNIFICANT CHANGE UP (ref 3.8–10.5)
WBC # FLD AUTO: 9.42 K/UL — SIGNIFICANT CHANGE UP (ref 3.8–10.5)
WBC UR QL: >50 — HIGH (ref 0–?)

## 2018-06-18 PROCEDURE — 99233 SBSQ HOSP IP/OBS HIGH 50: CPT

## 2018-06-18 PROCEDURE — 71045 X-RAY EXAM CHEST 1 VIEW: CPT | Mod: 26

## 2018-06-18 RX ORDER — ALPRAZOLAM 0.25 MG
0.5 TABLET ORAL ONCE
Qty: 0 | Refills: 0 | Status: DISCONTINUED | OUTPATIENT
Start: 2018-06-18 | End: 2018-06-18

## 2018-06-18 RX ORDER — SODIUM CHLORIDE 9 MG/ML
1000 INJECTION INTRAMUSCULAR; INTRAVENOUS; SUBCUTANEOUS ONCE
Qty: 0 | Refills: 0 | Status: COMPLETED | OUTPATIENT
Start: 2018-06-18 | End: 2018-06-18

## 2018-06-18 RX ORDER — METOPROLOL TARTRATE 50 MG
5 TABLET ORAL ONCE
Qty: 0 | Refills: 0 | Status: DISCONTINUED | OUTPATIENT
Start: 2018-06-18 | End: 2018-06-18

## 2018-06-18 RX ORDER — LANOLIN ALCOHOL/MO/W.PET/CERES
3 CREAM (GRAM) TOPICAL ONCE
Qty: 0 | Refills: 0 | Status: COMPLETED | OUTPATIENT
Start: 2018-06-18 | End: 2018-06-18

## 2018-06-18 RX ORDER — METOPROLOL TARTRATE 50 MG
3 TABLET ORAL ONCE
Qty: 0 | Refills: 0 | Status: DISCONTINUED | OUTPATIENT
Start: 2018-06-18 | End: 2018-06-18

## 2018-06-18 RX ORDER — CEFTRIAXONE 500 MG/1
1 INJECTION, POWDER, FOR SOLUTION INTRAMUSCULAR; INTRAVENOUS ONCE
Qty: 0 | Refills: 0 | Status: COMPLETED | OUTPATIENT
Start: 2018-06-18 | End: 2018-06-18

## 2018-06-18 RX ORDER — METOPROLOL TARTRATE 50 MG
5 TABLET ORAL ONCE
Qty: 0 | Refills: 0 | Status: COMPLETED | OUTPATIENT
Start: 2018-06-18 | End: 2018-06-18

## 2018-06-18 RX ADMIN — SODIUM CHLORIDE 1000 MILLILITER(S): 9 INJECTION INTRAMUSCULAR; INTRAVENOUS; SUBCUTANEOUS at 15:25

## 2018-06-18 RX ADMIN — SODIUM CHLORIDE 1000 MILLILITER(S): 9 INJECTION INTRAMUSCULAR; INTRAVENOUS; SUBCUTANEOUS at 15:29

## 2018-06-18 RX ADMIN — CEFTRIAXONE 100 GRAM(S): 500 INJECTION, POWDER, FOR SOLUTION INTRAMUSCULAR; INTRAVENOUS at 15:36

## 2018-06-18 RX ADMIN — Medication 0.5 MILLIGRAM(S): at 22:17

## 2018-06-18 RX ADMIN — Medication 5 MILLIGRAM(S): at 15:00

## 2018-06-18 RX ADMIN — Medication 3 MILLIGRAM(S): at 23:56

## 2018-06-18 NOTE — ED PROVIDER NOTE - ATTENDING CONTRIBUTION TO CARE
Attending note:   After face to face evaluation of this patient, I concur with above noted hx, pe, and care plan for this patient.  89 y/o F with weakness today, found in SVT, given adenosine twice by ems with return to sinus for seconds.  In ED SVT being treated with fluids and lopressor iv.    Evaluation in progress

## 2018-06-18 NOTE — ED ADULT TRIAGE NOTE - CHIEF COMPLAINT QUOTE
pt BIBA from home, pt c/o palpitations and SOB.  EMS administered adenosine 6mg, 12mg, 12mg and amiodarone 150mg.  pt toake directly to TR B

## 2018-06-18 NOTE — PROVIDER CONTACT NOTE (CRITICAL VALUE NOTIFICATION) - SITUATION
pt denies any active cp, md made aware of critical result, nad noted, vss, resps even and unlabored, pt TBA to telemetry, ely ctm closely.

## 2018-06-18 NOTE — CONSULT NOTE ADULT - ASSESSMENT
This is a 89 yo female with history of HTN, HLD, paroxysmal atrial fibrillation, DVT/PE on Xarelto, anxiety, insomnia, endometrial cancer s/p LYNETTE/BSO who woke up not feeling well.  When her aide come to the house she found her heart rate to be very fast and called EMS.  The noted her to be in SVt and she was given Adenosine 6mg and then 12 mg with transient response. This was followed by Amiodarone 150mg IV x1.  That only slowed the arrhythmia.  In the ED she became hypotensive and she received small doses of lopressor 1 mg at a time.  After 5 mg the patient converted to normal sinus rhythm with no recurrence of SVT.  We have spoken to her about an SVT ablation but she is undecided and would like to discuss this further with her daughter.

## 2018-06-18 NOTE — ED PROVIDER NOTE - CARE PLAN
Principal Discharge DX:	SVT (supraventricular tachycardia)  Secondary Diagnosis:	Dehydration  Secondary Diagnosis:	UTI (urinary tract infection)

## 2018-06-18 NOTE — ED PROVIDER NOTE - MEDICAL DECISION MAKING DETAILS
Pt with SVT, likely 2/2 dehydration and missed beta-blocker. Plan: IV/O2/monitor, placed on pads, ekg, cxr, labs, fluids, metoprolol, anticipate admission.

## 2018-06-18 NOTE — ED PROVIDER NOTE - PROGRESS NOTE DETAILS
Gollogly: admission delayed b/c CMP lost in lab and had to be resent. Pt states her PMD Is Cas Vaughn, most recently admitted to Dr Luis at [facility]. Paged Dr Vaughn. Gollogly: No CP or SOB; trop most likely rate-related, will trend.  No callback from Dr Vaughn. Discussed with Dr Luis, who will accept the pt on his service.

## 2018-06-18 NOTE — ED PROVIDER NOTE - OBJECTIVE STATEMENT
89F h/o HTN, HLD, paroxysmal Afib p/w tachycardia. Pt called EMS today b/c she had a feeling of generalized weakness, was found to have a narrow-complex tachycardia. Pt given adenosine 6mg then 12mg x 2 with transient response. Pt then given amiodarone 150mg. Pt denies lightheadedness/dizziness, chest pain, SOB, palpitations, or N/V/D. Pt missed her usual dose of beta-blocker this morning. 89F h/o HTN, HLD, paroxysmal Afib, hx PE on xarelto p/w tachycardia. Pt called EMS today b/c she had a feeling of generalized weakness, was found to have a narrow-complex tachycardia. Pt given adenosine 6mg then 12mg x 2 with transient response. Pt then given amiodarone 150mg. Pt denies lightheadedness/dizziness, chest pain, SOB, palpitations, or N/V/D. Pt missed her usual dose of beta-blocker this morning.

## 2018-06-18 NOTE — ED PROVIDER NOTE - CRITICAL CARE PROVIDED
consultation with other physicians/direct patient care (not related to procedure)/interpretation of diagnostic studies

## 2018-06-18 NOTE — ED PROVIDER NOTE - PMH
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

## 2018-06-18 NOTE — CONSULT NOTE ADULT - SUBJECTIVE AND OBJECTIVE BOX
This is a 89 yo female with past medical history of hypertension, hyperlipidemia, paroxysmal atrial fibrillation, DVT/PE on Xarelto, anxiety, insomnia, endometrial cancer s/p LYNETTE w/BSO who was recently discharged from [facility].  Today when the aide came for a home visit she found the patient's heart rate to be increased.  Patient states  she was not feeling well and agreed to go to the hospital.  EMS was called and found her to be in an SVT at 180bpm.  She was given Adenosine 6mg and 12 mg with only transient response.  Then she got Amiodarone 150mg IV and Lopressor 1 mg IV x 5 doses with resolution of the tachycardia. She is currently in normal sinus rhythm 80's and hemodynamically stable, feeling much better. She denies chest pain, shortness of breath, palpitations or lightheadedness.  She takes metoprolol and diltiazem at home but took neither this morning due to a systolic BP < 90. Of note, on admission she was found to be [id number] with +UA and iso be started on antibiotic therapy.      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  Hypertension  Hyperlipidemia  Insomnia  Breast Lump  Seasonal Allergies  Anxiety: palpitations  Status post cataract extraction: OS  Status post hysterectomy: endometrial cancer  History of D&C- 11  History of Colonoscopy-   History of Breast Lump/Mass Excision- [facility]- left- 197    MEDICATIONS  (STANDING):    MEDICATIONS  (PRN):      FAMILY HISTORY:  No pertinent family history in first degree relatives      SOCIAL HISTORY:      CIGARETTES: Never  DRUGS:        Never  ALCOHOL:    Never    REVIEW OF SYSTEMS:    CONSTITUTIONAL:   + fever without chills, shakes, or fatigue generalized not feeling well  EYES: No eye pain, visual disturbances, or discharge  ENMT:  No difficulty hearing, tinnitus, vertigo; No sinus or throat pain  NECK: No pain or stiffness  BREASTS: No pain, masses, or nipple discharge.  Hx of left lumpectomy (benign)  RESPIRATORY: No cough, wheezing, hemoptysis   +SOB  CARDIOVASCULAR: No chest pain, dyspnea, palpitations, dizziness, syncope, paroxysmal nocturnal dyspnea, orthopnea, or arm or leg swelling  GASTROINTESTINAL: No abdominal  or epigastric pain, nausea, vomiting, hematemesis, diarrhea, constipation, melena or bright red blood.  GENITOURINARY: No dysuria, nocturia, hematuria, or urinary incontinence  NEUROLOGICAL: No headaches, some memory loss, slurred speech, limb weakness, loss of strength, numbness, or tremors  SKIN: No itching, burning, rashes, or lesions   LYMPH NODES: No enlarged glands  ENDOCRINE: No heat or cold intolerance, or hair loss  MUSCULOSKELETAL: No joint pain or swelling, muscle, back, or extremity pain  PSYCHIATRIC: Very anxious,   + difficulty sleeping  HEME/LYMPH: No easy bruising or bleeding gums  ALLERGY AND IMMUNOLOGIC:   Multiple allergies.        Vital Signs Last 24 Hrs  T(C): 38.8 (2018 15:00), Max: 38.8 (2018 15:00)  T(F): 101.9 (2018 15:00), Max: 101.9 (2018 15:00)  HR: 85 (2018 16:07) (78 - 180)  BP: 103/51 (2018 16:07) (89/63 - 125/91)  BP(mean): 72 (2018 15:26) (60 - 72)  RR: 20 (2018 16:07) (20 - 26)  SpO2: 99% (2018 16:07) (97% - 99%)    PHYSICAL EXAM:    GENERAL: Alert but very anxious.  +shaking even when hemodynamically stable.  HEAD:  Atraumatic, Normocephalic  EYES: EOMI, PERRLA, conjunctiva and sclera clear  ENMT: No tonsillar erythema, exudates, or enlargement; Moist mucous membranes,  NECK: Supple and normal thyroid.  No JVD or carotid bruit.  Carotid pulse is 2+ bilaterally.  HEART: Irregular rate 140-180's  -> after beta blocker, HR returned to 80's b/min  No murmurs,  No  rubs, or gallops.  PULMONARY: Poor effort. No rales, wheezing, or rhonchi bilaterally.  ABDOMEN: Soft, Nontender, Nondistended; Bowel sounds present  EXTREMITIES:  2+ Peripheral Pulses, No clubbing, cyanosis or edema  LYMPH: No lymphadenopathy noted  NEUROLOGICAL: Grossly nonfocal          INTERPRETATION OF TELEMETRY:     ECG:        LABS:                        9.4    9.42  )-----------( 444      ( 2018 14:41 )             29.8                 Urinalysis Basic - ( 2018 15:02 )    Color: YELLOW / Appearance: TURBID / S.016 / pH: 6.5  Gluc: NEGATIVE / Ketone: NEGATIVE  / Bili: NEGATIVE / Urobili: NORMAL mg/dL   Blood: MODERATE / Protein: >600 mg/dL / Nitrite: POSITIVE   Leuk Esterase: LARGE / RBC: 10-25 / WBC >50   Sq Epi: MOD / Non Sq Epi: x / Bacteria: MANY                   RADIOLOGY & ADDITIONAL STUDIES:  PREVIOUS DIAGNOSTIC TESTING:      ECHO  FINDINGS:    STRESS  FINDINGS:    CATHETERIZATION  FINDINGS:      ASSESSMENT:        RECOMMENDATIONS: This is a 89 yo female with past medical history of hypertension, hyperlipidemia, paroxysmal atrial fibrillation, DVT/PE on Xarelto, anxiety, insomnia, endometrial cancer s/p LYNETTE w/BSO who was recently discharged from [facility].  Today when the aide came for a home visit she found the patient's heart rate to be increased.  Patient states  she was not feeling well and agreed to go to the hospital.  EMS was called and found her to be in an SVT at 180bpm.  She was given Adenosine 6mg and 12 mg with only transient response.  Then she got Amiodarone 150mg IV and Lopressor 1 mg IV x 5 doses with resolution of the tachycardia. She is currently in normal sinus rhythm 80's and hemodynamically stable, feeling much better. She denies chest pain, shortness of breath, palpitations or lightheadedness.  She takes metoprolol and diltiazem at home but took neither this morning due to a systolic BP < 90. Of note, on admission she was found to be [id number] with +UA and iso be started on antibiotic therapy.      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  Hypertension  Hyperlipidemia  Insomnia  Breast Lump  Seasonal Allergies  Anxiety: palpitations  Status post cataract extraction: OS  Status post hysterectomy: endometrial cancer  History of D&C- 11  History of Colonoscopy-   History of Breast Lump/Mass Excision- [facility]- left- 197    MEDICATIONS  (STANDING):    MEDICATIONS  (PRN):      FAMILY HISTORY:  No pertinent family history in first degree relatives      SOCIAL HISTORY:      CIGARETTES: Never  DRUGS:        Never  ALCOHOL:    Never    REVIEW OF SYSTEMS:    CONSTITUTIONAL:   + fever without chills, shakes, or fatigue generalized not feeling well  EYES: No eye pain, visual disturbances, or discharge  ENMT:  No difficulty hearing, tinnitus, vertigo; No sinus or throat pain  NECK: No pain or stiffness  BREASTS: No pain, masses, or nipple discharge.  Hx of left lumpectomy (benign)  RESPIRATORY: No cough, wheezing, hemoptysis   +SOB  CARDIOVASCULAR: No chest pain, dyspnea, palpitations, dizziness, syncope, paroxysmal nocturnal dyspnea, orthopnea, or arm or leg swelling  GASTROINTESTINAL: No abdominal  or epigastric pain, nausea, vomiting, hematemesis, diarrhea, constipation, melena or bright red blood.  GENITOURINARY: No dysuria, nocturia, hematuria, or urinary incontinence  NEUROLOGICAL: No headaches, some memory loss, slurred speech, limb weakness, loss of strength, numbness, or tremors  SKIN: No itching, burning, rashes, or lesions   LYMPH NODES: No enlarged glands  ENDOCRINE: No heat or cold intolerance, or hair loss  MUSCULOSKELETAL: No joint pain or swelling, muscle, back, or extremity pain  PSYCHIATRIC: Very anxious,   + difficulty sleeping  HEME/LYMPH: No easy bruising or bleeding gums  ALLERGY AND IMMUNOLOGIC:   Multiple allergies.        Vital Signs Last 24 Hrs  T(C): 38.8 (2018 15:00), Max: 38.8 (2018 15:00)  T(F): 101.9 (2018 15:00), Max: 101.9 (2018 15:00)  HR: 85 (2018 16:07) (78 - 180)  BP: 103/51 (2018 16:07) (89/63 - 125/91)  BP(mean): 72 (2018 15:26) (60 - 72)  RR: 20 (2018 16:07) (20 - 26)  SpO2: 99% (2018 16:07) (97% - 99%)    PHYSICAL EXAM:    GENERAL: Alert but very anxious.  +shaking even when hemodynamically stable.  HEAD:  Atraumatic, Normocephalic  EYES: EOMI, PERRLA, conjunctiva and sclera clear  ENMT: No tonsillar erythema, exudates, or enlargement; Moist mucous membranes,  NECK: Supple and normal thyroid.  No JVD or carotid bruit.  Carotid pulse is 2+ bilaterally.  HEART: Irregular rate 140-180's  -> after beta blocker, HR returned to 80's b/min  No murmurs,  No  rubs, or gallops.  PULMONARY: Poor effort. No rales, wheezing, or rhonchi bilaterally.  ABDOMEN: Soft, Nontender, Nondistended; Bowel sounds present  EXTREMITIES:  2+ Peripheral Pulses, No clubbing, cyanosis or edema.  Upper extremities with multiple areas of ecchymosis but no swelling.  LYMPH: No lymphadenopathy noted  NEUROLOGICAL: Grossly nonfocal          INTERPRETATION OF TELEMETRY:  Normal sinus rhythm 80's.  Had recurrent episodes of SVT upon arrival to the ED- responded to low dose lopressor IV.    ECG: Narrow complex tachycardia (AVNRT) 140 b/min        LABS:                        9.4    9.42  )-----------( 444      ( 2018 14:41 )             29.8       Urinalysis Basic - ( 2018 15:02 )    Color: YELLOW / Appearance: TURBID / S.016 / pH: 6.5  Gluc: NEGATIVE / Ketone: NEGATIVE  / Bili: NEGATIVE / Urobili: NORMAL mg/dL   Blood: MODERATE / Protein: >600 mg/dL / Nitrite: POSITIVE   Leuk Esterase: LARGE / RBC: 10-25 / WBC >50   Sq Epi: MOD / Non Sq Epi: x / Bacteria: MANY        RADIOLOGY & ADDITIONAL STUDIES:  PREVIOUS DIAGNOSTIC TESTING:      EXAM:  Portable AP chest from 2018 at 1534. Compared to prior study from   2018.    IMPRESSION:  External pacer pads overlie mid right hemithorax in peripheral left   thoracoabdominal region.    Generalized mild bilateral interstitial prominence may reflect mild edema   and/or underlying interstitial disease. No focal patchy airspace   consolidations, effusions, pneumothorax.    Stable cardiac and mediastinal silhouettes.    Trachea midline.    Generalized osteopenia and spine and bilateral shoulder degenerative   changes again noted.        ECHO  FINDINGS:  Estimated right ventricular systolic pressure equals 42 mm  Hg, assuming right atrial pressure equals 8 mm Hg,  consistent with mild pulmonary hypertension.  ------------------------------------------------------------------------  Conclusions:  1. Mitral annular calcification and calcified mitral  leaflets with decreased diastolic opening. Moderate mitral  regurgitation. Peak mitral valve gradient equals 14 mm Hg,  mean transmitral valve gradient equals 6 mm Hg, consistent  with moderate to severe mitral stenosis.  2. Calcified aortic valve with normal opening. Peak  transaortic valve gradient equals 13 mm Hg, aortic valve  velocity time integral equals 37 cm, consistent with mild  aortic stenosis. Mild-moderate aortic regurgitation.  3. Normal left ventricular systolic function. No segmental  wall motion abnormalities. Visual estimation of EF 55-60%.  4. Normal right ventricular size and function.  5. Bilateral pleural effusions.  ------------------------------------------------------------------------  Confirmed on  2018 - 15:14:44 by Aldair Quinteros M.D.  ------------------------------------------------------------------------

## 2018-06-18 NOTE — ED PROVIDER NOTE - PSH
History of Breast Lump/Mass Excision- [facility]- left- 1971    History of Colonoscopy- 2011/Sept    History of D&C- 8/12/11    Status post cataract extraction  OS  Status post hysterectomy  endometrial cancer

## 2018-06-19 DIAGNOSIS — I10 ESSENTIAL (PRIMARY) HYPERTENSION: ICD-10-CM

## 2018-06-19 DIAGNOSIS — I48.0 PAROXYSMAL ATRIAL FIBRILLATION: ICD-10-CM

## 2018-06-19 DIAGNOSIS — E86.0 DEHYDRATION: ICD-10-CM

## 2018-06-19 DIAGNOSIS — N39.0 URINARY TRACT INFECTION, SITE NOT SPECIFIED: ICD-10-CM

## 2018-06-19 DIAGNOSIS — Z29.9 ENCOUNTER FOR PROPHYLACTIC MEASURES, UNSPECIFIED: ICD-10-CM

## 2018-06-19 DIAGNOSIS — F41.9 ANXIETY DISORDER, UNSPECIFIED: ICD-10-CM

## 2018-06-19 DIAGNOSIS — E78.5 HYPERLIPIDEMIA, UNSPECIFIED: ICD-10-CM

## 2018-06-19 DIAGNOSIS — I47.1 SUPRAVENTRICULAR TACHYCARDIA: ICD-10-CM

## 2018-06-19 DIAGNOSIS — Z86.711 PERSONAL HISTORY OF PULMONARY EMBOLISM: ICD-10-CM

## 2018-06-19 LAB
BUN SERPL-MCNC: 18 MG/DL — SIGNIFICANT CHANGE UP (ref 7–23)
CALCIUM SERPL-MCNC: 8 MG/DL — LOW (ref 8.4–10.5)
CHLORIDE SERPL-SCNC: 104 MMOL/L — SIGNIFICANT CHANGE UP (ref 98–107)
CHOLEST SERPL-MCNC: 111 MG/DL — LOW (ref 120–199)
CO2 SERPL-SCNC: 21 MMOL/L — LOW (ref 22–31)
CREAT SERPL-MCNC: 0.94 MG/DL — SIGNIFICANT CHANGE UP (ref 0.5–1.3)
GLUCOSE SERPL-MCNC: 100 MG/DL — HIGH (ref 70–99)
HBA1C BLD-MCNC: 5 % — SIGNIFICANT CHANGE UP (ref 4–5.6)
HCT VFR BLD CALC: 26.8 % — LOW (ref 34.5–45)
HDLC SERPL-MCNC: 45 MG/DL — SIGNIFICANT CHANGE UP (ref 45–65)
HGB BLD-MCNC: 8.2 G/DL — LOW (ref 11.5–15.5)
LIPID PNL WITH DIRECT LDL SERPL: 59 MG/DL — SIGNIFICANT CHANGE UP
MAGNESIUM SERPL-MCNC: 1.7 MG/DL — SIGNIFICANT CHANGE UP (ref 1.6–2.6)
MCHC RBC-ENTMCNC: 26.2 PG — LOW (ref 27–34)
MCHC RBC-ENTMCNC: 30.6 % — LOW (ref 32–36)
MCV RBC AUTO: 85.6 FL — SIGNIFICANT CHANGE UP (ref 80–100)
NRBC # FLD: 0 — SIGNIFICANT CHANGE UP
PLATELET # BLD AUTO: 361 K/UL — SIGNIFICANT CHANGE UP (ref 150–400)
PMV BLD: 9.7 FL — SIGNIFICANT CHANGE UP (ref 7–13)
POTASSIUM SERPL-MCNC: 3.5 MMOL/L — SIGNIFICANT CHANGE UP (ref 3.5–5.3)
POTASSIUM SERPL-SCNC: 3.5 MMOL/L — SIGNIFICANT CHANGE UP (ref 3.5–5.3)
RBC # BLD: 3.13 M/UL — LOW (ref 3.8–5.2)
RBC # FLD: 16.1 % — HIGH (ref 10.3–14.5)
SODIUM SERPL-SCNC: 137 MMOL/L — SIGNIFICANT CHANGE UP (ref 135–145)
SPECIMEN SOURCE: SIGNIFICANT CHANGE UP
T3FREE SERPL-MCNC: 2.25 PG/ML — SIGNIFICANT CHANGE UP (ref 1.8–4.6)
TRIGL SERPL-MCNC: 58 MG/DL — SIGNIFICANT CHANGE UP (ref 10–149)
TROPONIN T, HIGH SENSITIVITY RESULT: 76 NG/L — CRITICAL HIGH (ref ?–14)
WBC # BLD: 10.86 K/UL — HIGH (ref 3.8–10.5)
WBC # FLD AUTO: 10.86 K/UL — HIGH (ref 3.8–10.5)

## 2018-06-19 PROCEDURE — 99233 SBSQ HOSP IP/OBS HIGH 50: CPT

## 2018-06-19 RX ORDER — METOPROLOL TARTRATE 50 MG
5 TABLET ORAL ONCE
Qty: 0 | Refills: 0 | Status: COMPLETED | OUTPATIENT
Start: 2018-06-19 | End: 2018-06-19

## 2018-06-19 RX ORDER — DOCUSATE SODIUM 100 MG
100 CAPSULE ORAL THREE TIMES A DAY
Qty: 0 | Refills: 0 | Status: DISCONTINUED | OUTPATIENT
Start: 2018-06-19 | End: 2018-06-24

## 2018-06-19 RX ORDER — ATORVASTATIN CALCIUM 80 MG/1
10 TABLET, FILM COATED ORAL AT BEDTIME
Qty: 0 | Refills: 0 | Status: DISCONTINUED | OUTPATIENT
Start: 2018-06-19 | End: 2018-06-24

## 2018-06-19 RX ORDER — ASPIRIN/CALCIUM CARB/MAGNESIUM 324 MG
81 TABLET ORAL DAILY
Qty: 0 | Refills: 0 | Status: DISCONTINUED | OUTPATIENT
Start: 2018-06-19 | End: 2018-06-24

## 2018-06-19 RX ORDER — SENNA PLUS 8.6 MG/1
2 TABLET ORAL AT BEDTIME
Qty: 0 | Refills: 0 | Status: DISCONTINUED | OUTPATIENT
Start: 2018-06-19 | End: 2018-06-24

## 2018-06-19 RX ORDER — NYSTATIN CREAM 100000 [USP'U]/G
1 CREAM TOPICAL
Qty: 0 | Refills: 0 | Status: DISCONTINUED | OUTPATIENT
Start: 2018-06-19 | End: 2018-06-24

## 2018-06-19 RX ORDER — ERGOCALCIFEROL 1.25 MG/1
50000 CAPSULE ORAL
Qty: 0 | Refills: 0 | Status: DISCONTINUED | OUTPATIENT
Start: 2018-06-19 | End: 2018-06-24

## 2018-06-19 RX ORDER — METOPROLOL TARTRATE 50 MG
25 TABLET ORAL DAILY
Qty: 0 | Refills: 0 | Status: DISCONTINUED | OUTPATIENT
Start: 2018-06-19 | End: 2018-06-20

## 2018-06-19 RX ORDER — ACETAMINOPHEN 500 MG
650 TABLET ORAL EVERY 6 HOURS
Qty: 0 | Refills: 0 | Status: DISCONTINUED | OUTPATIENT
Start: 2018-06-19 | End: 2018-06-24

## 2018-06-19 RX ORDER — ALPRAZOLAM 0.25 MG
0.5 TABLET ORAL AT BEDTIME
Qty: 0 | Refills: 0 | Status: DISCONTINUED | OUTPATIENT
Start: 2018-06-19 | End: 2018-06-24

## 2018-06-19 RX ORDER — PANTOPRAZOLE SODIUM 20 MG/1
40 TABLET, DELAYED RELEASE ORAL
Qty: 0 | Refills: 0 | Status: DISCONTINUED | OUTPATIENT
Start: 2018-06-19 | End: 2018-06-24

## 2018-06-19 RX ORDER — RIVAROXABAN 15 MG-20MG
20 KIT ORAL EVERY 24 HOURS
Qty: 0 | Refills: 0 | Status: DISCONTINUED | OUTPATIENT
Start: 2018-06-19 | End: 2018-06-20

## 2018-06-19 RX ORDER — CEFTRIAXONE 500 MG/1
1 INJECTION, POWDER, FOR SOLUTION INTRAMUSCULAR; INTRAVENOUS EVERY 24 HOURS
Qty: 0 | Refills: 0 | Status: DISCONTINUED | OUTPATIENT
Start: 2018-06-19 | End: 2018-06-24

## 2018-06-19 RX ORDER — LANOLIN ALCOHOL/MO/W.PET/CERES
3 CREAM (GRAM) TOPICAL AT BEDTIME
Qty: 0 | Refills: 0 | Status: DISCONTINUED | OUTPATIENT
Start: 2018-06-19 | End: 2018-06-24

## 2018-06-19 RX ORDER — POLYETHYLENE GLYCOL 3350 17 G/17G
17 POWDER, FOR SOLUTION ORAL
Qty: 0 | Refills: 0 | Status: DISCONTINUED | OUTPATIENT
Start: 2018-06-19 | End: 2018-06-24

## 2018-06-19 RX ADMIN — POLYETHYLENE GLYCOL 3350 17 GRAM(S): 17 POWDER, FOR SOLUTION ORAL at 17:19

## 2018-06-19 RX ADMIN — POLYETHYLENE GLYCOL 3350 17 GRAM(S): 17 POWDER, FOR SOLUTION ORAL at 05:07

## 2018-06-19 RX ADMIN — Medication 81 MILLIGRAM(S): at 13:03

## 2018-06-19 RX ADMIN — CEFTRIAXONE 100 GRAM(S): 500 INJECTION, POWDER, FOR SOLUTION INTRAMUSCULAR; INTRAVENOUS at 16:31

## 2018-06-19 RX ADMIN — Medication 25 MILLIGRAM(S): at 10:13

## 2018-06-19 RX ADMIN — Medication 5 MILLIGRAM(S): at 10:20

## 2018-06-19 RX ADMIN — PANTOPRAZOLE SODIUM 40 MILLIGRAM(S): 20 TABLET, DELAYED RELEASE ORAL at 05:07

## 2018-06-19 RX ADMIN — Medication 3 MILLIGRAM(S): at 21:29

## 2018-06-19 RX ADMIN — Medication 0.5 MILLIGRAM(S): at 21:29

## 2018-06-19 RX ADMIN — RIVAROXABAN 20 MILLIGRAM(S): KIT at 17:19

## 2018-06-19 RX ADMIN — Medication 650 MILLIGRAM(S): at 17:29

## 2018-06-19 RX ADMIN — ATORVASTATIN CALCIUM 10 MILLIGRAM(S): 80 TABLET, FILM COATED ORAL at 21:29

## 2018-06-19 RX ADMIN — Medication 10 MILLIGRAM(S): at 09:21

## 2018-06-19 RX ADMIN — SENNA PLUS 2 TABLET(S): 8.6 TABLET ORAL at 21:29

## 2018-06-19 RX ADMIN — Medication 1 TABLET(S): at 13:03

## 2018-06-19 RX ADMIN — Medication 650 MILLIGRAM(S): at 18:20

## 2018-06-19 RX ADMIN — PANTOPRAZOLE SODIUM 40 MILLIGRAM(S): 20 TABLET, DELAYED RELEASE ORAL at 17:19

## 2018-06-19 RX ADMIN — Medication 10 MILLIGRAM(S): at 17:19

## 2018-06-19 RX ADMIN — NYSTATIN CREAM 1 APPLICATION(S): 100000 CREAM TOPICAL at 17:20

## 2018-06-19 RX ADMIN — NYSTATIN CREAM 1 APPLICATION(S): 100000 CREAM TOPICAL at 05:07

## 2018-06-19 NOTE — H&P ADULT - PROBLEM SELECTOR PLAN 2
Pt given 2L NS in ED  Pt appears euvolemic at this time  Will avoid further IVF as pt recently admitted for heart failure

## 2018-06-19 NOTE — H&P ADULT - NSHPLABSRESULTS_GEN_ALL_CORE
April 2018, Echo: Moderate mitral regurgitation. Moderate to severe mitral stenosis. Mild aortic stenosis. Mild-moderate aortic regurgitation. Normal left ventricular systolic function. No segmental wall motion abnormalities. Visual estimation of EF 55-60%. Normal right ventricular size and function. Bilateral pleural effusions.  ----------  EKG: SVT  CE x2: Trop 79-->76  H/H: 9.4/29.8  Platelets: 444  Ca: 7.6  AST/ALT: 74/34  UA: Moderate blood, large leukocyte esterase, nitrites    6/18 CXR: External pacer pads overlie mid right hemithorax in peripheral left thoracoabdominal region. Generalized mild bilateral interstitial prominence may reflect mild edema and/or underlying interstitial disease. No focal patchy airspace consolidations, effusions, pneumothorax. Stable cardiac and mediastinal silhouettes. Trachea midline. Generalized osteopenia and spine and bilateral shoulder degenerative changes again noted.

## 2018-06-19 NOTE — CHART NOTE - NSCHARTNOTEFT_GEN_A_CORE
Called by RN For patient with HR of 190. Patient seen and states that she has a sore on her buttocks from an abrasion but otherwise is feeling okay. Denies any current SOB, chest pain, dizziness, palpitations. Her BP at time of Exam is . EKG performed showing SVT @ 190. Lopressor 5mg IVP x1 given and patient performed valsalva maneuver with improvement in heart rate to 105. Will monitor.

## 2018-06-19 NOTE — PHYSICAL THERAPY INITIAL EVALUATION ADULT - PERTINENT HX OF CURRENT PROBLEM, REHAB EVAL
89 y/o female with a PMHx of PAF on Xarelto, history of PE/DVT on Xarelto, HTN, HLD, anxiety, endometrial cancer S/P LYNETTE with BSO, breast mass S/P lumpectomy presents to ED with shortness of breath, found to be in SVT

## 2018-06-19 NOTE — PHYSICAL THERAPY INITIAL EVALUATION ADULT - ADDITIONAL COMMENTS
Pt reports that she lives alone in a private house with a few steps to negotiate to enter the house; however no steps to negotiate through the garage and pt has a stair lift inside. Pt was recently discharged this past weekend from rehab where she has been since March 2/2 to fall. Pt reports that she has a Home Health Aide 7 days/week from 9AM-7PM. Pt's daughter visits frequently as she lives ~2 blocks away. Sine discharge from rehab pt has been ambulating with assist using rolling walker.     Pt left comfortable on stretcher, NAD, all lines intact, all precautions maintained, and RN aware of PT evaluation.

## 2018-06-19 NOTE — H&P ADULT - NEGATIVE OPHTHALMOLOGIC SYMPTOMS
no blurred vision R/no pain L/no blurred vision L/no diplopia/no photophobia/no pain R/no loss of vision L/no loss of vision R

## 2018-06-19 NOTE — PHYSICAL THERAPY INITIAL EVALUATION ADULT - PATIENT PROFILE REVIEW, REHAB EVAL
yes/ACTIVITY: Ambulate with Assistance; spoke with RN Kath Estrella prior to PT evaluation--> Pt OK for PT consult

## 2018-06-19 NOTE — H&P ADULT - PROBLEM SELECTOR PLAN 1
Admit to telemetry, serial cardiac enzymes, serial EKGs  Check CBC, CMP, TSH, FLP, HgA1C  EP consult appreciated  Pt currently in NSR and resting comfortably  Continue Cardizem and Metoprolol for rate control  Will discuss with EP if pt decides to undergo SVT ablation  Recent echo as above  F/U MD note

## 2018-06-19 NOTE — H&P ADULT - NEGATIVE NEUROLOGICAL SYMPTOMS
no focal seizures/no loss of sensation/no paresthesias/no syncope/no vertigo/no headache/no tremors/no loss of consciousness/no confusion/no transient paralysis/no difficulty walking/no hemiparesis/no generalized seizures

## 2018-06-19 NOTE — H&P ADULT - NEUROLOGICAL DETAILS
responds to verbal commands/cranial nerves intact/normal strength/sensation intact/responds to pain/alert and oriented x 3

## 2018-06-19 NOTE — PROGRESS NOTE ADULT - SUBJECTIVE AND OBJECTIVE BOX
Patient feeling better now denying chest pain, shortness of breath, palpitations or lightheadedness. States this morning she had another fast heart rate episode but feeling better now.    Vital Signs Last 24 Hrs  T(C): 36.3 (19 Jun 2018 12:34), Max: 38.8 (18 Jun 2018 15:00)  T(F): 97.4 (19 Jun 2018 12:34), Max: 101.9 (18 Jun 2018 15:00)  HR: 95 (19 Jun 2018 13:00) (72 - 195)  BP: 116/56 (19 Jun 2018 13:00) (89/63 - 125/91)  BP(mean): 72 (18 Jun 2018 15:26) (60 - 72)  RR: 18 (19 Jun 2018 12:34) (16 - 26)  SpO2: 98% (19 Jun 2018 12:34) (97% - 100%)      EKG:  short R-P SVT at 193 bpm.  Incomplete RBBB. LAFB  Telemetry:  Normal sinus rhythm 70's.  Episode of SVT at 193 bpm at 10:15 am    MEDICATIONS  (STANDING):  aspirin enteric coated 81 milliGRAM(s) Oral daily  atorvastatin 10 milliGRAM(s) Oral at bedtime  busPIRone 10 milliGRAM(s) Oral two times a day  cefTRIAXone   IVPB 1 Gram(s) IV Intermittent every 24 hours  diltiazem    Tablet 90 milliGRAM(s) Oral three times a day  ergocalciferol 15438 Unit(s) Oral <User Schedule>  metoprolol succinate ER 25 milliGRAM(s) Oral daily  multivitamin 1 Tablet(s) Oral daily  nystatin Powder 1 Application(s) Topical two times a day  pantoprazole    Tablet 40 milliGRAM(s) Oral two times a day  polyethylene glycol 3350 17 Gram(s) Oral two times a day  rivaroxaban 20 milliGRAM(s) Oral every 24 hours  senna 2 Tablet(s) Oral at bedtime    MEDICATIONS  (PRN):  docusate sodium 100 milliGRAM(s) Oral three times a day PRN Constipation  triamcinolone 0.1% Ointment 1 Application(s) Topical every 12 hours PRN itching          Physical exam:   Gen- patient awake, alert NAD.  Appears much more calm today.    Resp- Clear to auscultation.  NO wheezing, rales or rhonchi  CV- S1 and S2 RRR. grade 2/6 systolic murmur.  No rubs or gallops  ABD- soft nontender +bowel sounds   EXT- no edema or calf tenderness  Skin:  Multiple areas of ecchymosis on arms and back.  Right buttocks with stage 1 pressure ulcer. Skin intact.           Neuro-grossly nonfocal.                            8.2    10.86 )-----------( 361      ( 19 Jun 2018 05:16 )             26.8       06-19    137  |  104  |  18  ----------------------------<  100<H>  3.5   |  21<L>  |  0.94    Ca    8.0<L>      19 Jun 2018 05:16  Mg     1.7     06-19    TPro  5.7<L>  /  Alb  2.6<L>  /  TBili  0.2  /  DBili  x   /  AST  74<H>  /  ALT  34<H>  /  AlkPhos  116  06-18

## 2018-06-19 NOTE — ED ADULT NURSE NOTE - OBJECTIVE STATEMENT
report recd last pm at shift change, see assessment notes for documentation throughout shift. atient stable, admitted to telemetry, nad noted.

## 2018-06-19 NOTE — H&P ADULT - ASSESSMENT
89 y/o female with a PMHx of PAF on Xarelto, history of PE/DVT on Xarelto, HTN, HLD, anxiety, endometrial cancer S/P LYNETTE with BSO, breast mass S/P lumpectomy presents to ED with shortness of breath, found to be in SVT.  Pt was given Adenosine 6mg and 12 mg with only transient response. Pt then got Amiodarone 150mg IV and Lopressor 1 mg IV x 5 doses with resolution of the tachycardia.

## 2018-06-19 NOTE — H&P ADULT - PROBLEM SELECTOR PLAN 3
UA largely positive with large LE and nitrites  Continue Ceftriaxone IV  Blood and urine cultures ordered  Will follow up sensitivities

## 2018-06-19 NOTE — H&P ADULT - HISTORY OF PRESENT ILLNESS
89 y/o female with a PMHx of PAF on Xarelto, history of PE/DVT on Xarelto, HTN, HLD, anxiety, endometrial cancer S/P LYNETTE with BSO, breast mass S/P lumpectomy presents to ED with shortness of breath and weakness. Pt was recently discharged home from [facility]. Pt has a home health aide 10 hours/day 7 days/week. Yesterday morning (6/18) patient's home health found patient's heart rate to be elevated. Pt admitted that she was experiencing shortness of breath and generalized weakness at the time. Pt endorses that she felt like her pulse was fast and she was not feeling well. EMS was called by patient's home health aide and pt was found to be in an SVT at 180 bpm. Pt was given Adenosine 6mg and 12 mg with only transient response. Pt then got Amiodarone 150mg IV and Lopressor 1 mg IV x 5 doses with resolution of the tachycardia. Pt now reports feeling much better. Pt does admit that she was unable to take her Metoprolol or Cardizem yesterday morning because of her blood pressure being so low. Pt denies fever, chills, recent travel, headache, dizziness, visual deficits, chest pain, orthopnea, palpitations, abdominal pain, N/V/D/C, hematochezia, melena, dysuria, hematuria, LOC, syncope, peripheral edema. Upon arrival to ED, EKG: SVT. CE x2: Trop 79-->76. H/H: 9.4/29.8. Platelets: 444. Ca: 7.6. AST/ALT: 74/34. UA: Moderate blood, large leukocyte esterase, nitrites. CXR: External pacer pads overlie mid right hemithorax in peripheral left thoracoabdominal region. Generalized mild bilateral interstitial prominence may reflect mild edema and/or underlying interstitial disease. No focal patchy airspace consolidations, effusions, pneumothorax. Stable cardiac and mediastinal silhouettes. Trachea midline. Generalized osteopenia and spine and bilateral shoulder degenerative changes again noted.

## 2018-06-19 NOTE — H&P ADULT - NEGATIVE CARDIOVASCULAR SYMPTOMS
no claudication/no chest pain/no palpitations/no dyspnea on exertion/no paroxysmal nocturnal dyspnea/no peripheral edema/no orthopnea

## 2018-06-19 NOTE — H&P ADULT - RS GEN PE MLT RESP DETAILS PC
respirations non-labored/no rhonchi/no chest wall tenderness/good air movement/no wheezes/no intercostal retractions/no rales/clear to auscultation bilaterally/airway patent/breath sounds equal

## 2018-06-19 NOTE — H&P ADULT - PMH
Anxiety    Breast Lump    Endometrial cancer  S/P LYNETTE with SBO  HLD (hyperlipidemia)    HTN (hypertension)    Insomnia    PAF (paroxysmal atrial fibrillation)  On Xarelto  PE (pulmonary thromboembolism)

## 2018-06-19 NOTE — H&P ADULT - NEGATIVE GASTROINTESTINAL SYMPTOMS
no change in bowel habits/no nausea/no flatulence/no vomiting/no diarrhea/no melena/no constipation/no abdominal pain/no hematochezia

## 2018-06-19 NOTE — PHYSICAL THERAPY INITIAL EVALUATION ADULT - ACTIVE RANGE OF MOTION EXAMINATION, REHAB EVAL
except bilateral shoulder flexion ~85 degrees/bilateral upper extremity Active ROM was WFL (within functional limits)/bilateral  lower extremity Active ROM was WFL (within functional limits)

## 2018-06-19 NOTE — H&P ADULT - PROBLEM SELECTOR PLAN 4
Pt currently NSR on telemetry, continue to monitor  Continue Cardizem and Metoprolol for rate control  Continue Xarelto for CVA prevention  CHADS score 3

## 2018-06-19 NOTE — H&P ADULT - PSH
History of Breast Lump/Mass Excision- [facility]- left- 1971    History of Colonoscopy- 2011/Sept    History of D&C- 8/12/11    Status post cataract extraction  OS  Status post hysterectomy  endometrial cancer

## 2018-06-19 NOTE — PHYSICAL THERAPY INITIAL EVALUATION ADULT - MANUAL MUSCLE TESTING RESULTS, REHAB EVAL
cardiac precautions; Bilateral Shoulders 3-/5, bilateral elbows/hands/wrist 3/5, bilateral LE 3/5/grossly assessed due to

## 2018-06-20 LAB
-  AMIKACIN: SIGNIFICANT CHANGE UP
-  AMPICILLIN/SULBACTAM: SIGNIFICANT CHANGE UP
-  AMPICILLIN: SIGNIFICANT CHANGE UP
-  AZTREONAM: SIGNIFICANT CHANGE UP
-  CEFAZOLIN: SIGNIFICANT CHANGE UP
-  CEFEPIME: SIGNIFICANT CHANGE UP
-  CEFOXITIN: SIGNIFICANT CHANGE UP
-  CEFTAZIDIME: SIGNIFICANT CHANGE UP
-  CEFTRIAXONE: SIGNIFICANT CHANGE UP
-  CIPROFLOXACIN: SIGNIFICANT CHANGE UP
-  ERTAPENEM: SIGNIFICANT CHANGE UP
-  GENTAMICIN: SIGNIFICANT CHANGE UP
-  IMIPENEM: SIGNIFICANT CHANGE UP
-  LEVOFLOXACIN: SIGNIFICANT CHANGE UP
-  MEROPENEM: SIGNIFICANT CHANGE UP
-  NITROFURANTOIN: SIGNIFICANT CHANGE UP
-  PIPERACILLIN/TAZOBACTAM: SIGNIFICANT CHANGE UP
-  TOBRAMYCIN: SIGNIFICANT CHANGE UP
-  TRIMETHOPRIM/SULFAMETHOXAZOLE: SIGNIFICANT CHANGE UP
BACTERIA UR CULT: SIGNIFICANT CHANGE UP
BUN SERPL-MCNC: 18 MG/DL — SIGNIFICANT CHANGE UP (ref 7–23)
CALCIUM SERPL-MCNC: 8.3 MG/DL — LOW (ref 8.4–10.5)
CHLORIDE SERPL-SCNC: 103 MMOL/L — SIGNIFICANT CHANGE UP (ref 98–107)
CO2 SERPL-SCNC: 23 MMOL/L — SIGNIFICANT CHANGE UP (ref 22–31)
CREAT SERPL-MCNC: 0.82 MG/DL — SIGNIFICANT CHANGE UP (ref 0.5–1.3)
GLUCOSE SERPL-MCNC: 88 MG/DL — SIGNIFICANT CHANGE UP (ref 70–99)
HCT VFR BLD CALC: 28.6 % — LOW (ref 34.5–45)
HGB BLD-MCNC: 8.6 G/DL — LOW (ref 11.5–15.5)
MAGNESIUM SERPL-MCNC: 1.8 MG/DL — SIGNIFICANT CHANGE UP (ref 1.6–2.6)
MCHC RBC-ENTMCNC: 26.5 PG — LOW (ref 27–34)
MCHC RBC-ENTMCNC: 30.1 % — LOW (ref 32–36)
MCV RBC AUTO: 88 FL — SIGNIFICANT CHANGE UP (ref 80–100)
METHOD TYPE: SIGNIFICANT CHANGE UP
NRBC # FLD: 0 — SIGNIFICANT CHANGE UP
ORGANISM # SPEC MICROSCOPIC CNT: SIGNIFICANT CHANGE UP
ORGANISM # SPEC MICROSCOPIC CNT: SIGNIFICANT CHANGE UP
PLATELET # BLD AUTO: 372 K/UL — SIGNIFICANT CHANGE UP (ref 150–400)
PMV BLD: 10 FL — SIGNIFICANT CHANGE UP (ref 7–13)
POTASSIUM SERPL-MCNC: 3.9 MMOL/L — SIGNIFICANT CHANGE UP (ref 3.5–5.3)
POTASSIUM SERPL-SCNC: 3.9 MMOL/L — SIGNIFICANT CHANGE UP (ref 3.5–5.3)
RBC # BLD: 3.25 M/UL — LOW (ref 3.8–5.2)
RBC # FLD: 15.9 % — HIGH (ref 10.3–14.5)
SODIUM SERPL-SCNC: 137 MMOL/L — SIGNIFICANT CHANGE UP (ref 135–145)
WBC # BLD: 8.88 K/UL — SIGNIFICANT CHANGE UP (ref 3.8–10.5)
WBC # FLD AUTO: 8.88 K/UL — SIGNIFICANT CHANGE UP (ref 3.8–10.5)

## 2018-06-20 PROCEDURE — 93010 ELECTROCARDIOGRAM REPORT: CPT

## 2018-06-20 PROCEDURE — 99233 SBSQ HOSP IP/OBS HIGH 50: CPT

## 2018-06-20 RX ORDER — METOPROLOL TARTRATE 50 MG
5 TABLET ORAL ONCE
Qty: 0 | Refills: 0 | Status: COMPLETED | OUTPATIENT
Start: 2018-06-20 | End: 2018-06-20

## 2018-06-20 RX ORDER — METOPROLOL TARTRATE 50 MG
2.5 TABLET ORAL ONCE
Qty: 0 | Refills: 0 | Status: COMPLETED | OUTPATIENT
Start: 2018-06-20 | End: 2018-06-20

## 2018-06-20 RX ORDER — METOPROLOL TARTRATE 50 MG
5 TABLET ORAL ONCE
Qty: 0 | Refills: 0 | Status: DISCONTINUED | OUTPATIENT
Start: 2018-06-20 | End: 2018-06-20

## 2018-06-20 RX ORDER — METOPROLOL TARTRATE 50 MG
25 TABLET ORAL ONCE
Qty: 0 | Refills: 0 | Status: COMPLETED | OUTPATIENT
Start: 2018-06-20 | End: 2018-06-20

## 2018-06-20 RX ORDER — ADENOSINE 3 MG/ML
12 INJECTION INTRAVENOUS ONCE
Qty: 0 | Refills: 0 | Status: COMPLETED | OUTPATIENT
Start: 2018-06-20 | End: 2018-06-20

## 2018-06-20 RX ADMIN — Medication 5 MILLIGRAM(S): at 17:27

## 2018-06-20 RX ADMIN — Medication 2.5 MILLIGRAM(S): at 18:25

## 2018-06-20 RX ADMIN — NYSTATIN CREAM 1 APPLICATION(S): 100000 CREAM TOPICAL at 06:44

## 2018-06-20 RX ADMIN — Medication 5 MILLIGRAM(S): at 21:04

## 2018-06-20 RX ADMIN — Medication 3 MILLIGRAM(S): at 21:05

## 2018-06-20 RX ADMIN — NYSTATIN CREAM 1 APPLICATION(S): 100000 CREAM TOPICAL at 17:16

## 2018-06-20 RX ADMIN — PANTOPRAZOLE SODIUM 40 MILLIGRAM(S): 20 TABLET, DELAYED RELEASE ORAL at 06:43

## 2018-06-20 RX ADMIN — RIVAROXABAN 20 MILLIGRAM(S): KIT at 17:16

## 2018-06-20 RX ADMIN — Medication 1 TABLET(S): at 14:06

## 2018-06-20 RX ADMIN — Medication 25 MILLIGRAM(S): at 06:43

## 2018-06-20 RX ADMIN — Medication 10 MILLIGRAM(S): at 06:43

## 2018-06-20 RX ADMIN — Medication 81 MILLIGRAM(S): at 14:06

## 2018-06-20 RX ADMIN — ATORVASTATIN CALCIUM 10 MILLIGRAM(S): 80 TABLET, FILM COATED ORAL at 21:04

## 2018-06-20 RX ADMIN — POLYETHYLENE GLYCOL 3350 17 GRAM(S): 17 POWDER, FOR SOLUTION ORAL at 06:43

## 2018-06-20 RX ADMIN — Medication 10 MILLIGRAM(S): at 17:16

## 2018-06-20 RX ADMIN — ERGOCALCIFEROL 50000 UNIT(S): 1.25 CAPSULE ORAL at 06:43

## 2018-06-20 RX ADMIN — Medication 0.5 MILLIGRAM(S): at 20:28

## 2018-06-20 RX ADMIN — PANTOPRAZOLE SODIUM 40 MILLIGRAM(S): 20 TABLET, DELAYED RELEASE ORAL at 17:17

## 2018-06-20 RX ADMIN — Medication 25 MILLIGRAM(S): at 19:00

## 2018-06-20 RX ADMIN — CEFTRIAXONE 100 GRAM(S): 500 INJECTION, POWDER, FOR SOLUTION INTRAMUSCULAR; INTRAVENOUS at 14:06

## 2018-06-20 NOTE — CHART NOTE - NSCHARTNOTEFT_GEN_A_CORE
Called by RN to inform me that patient's HR is sustaining in the 150s, BP 80/50, patient symptomatic. Patient examined at bedside. She complains of mild shortness of breath and generalized weakness. She denies chest pain, palpitations, nausea, vomiting. Repeat BP 93/64. Discussed with Nelly (EP NP) who recommends Metoprolol 25mg PO if patient is in sustained SVT and symptomatic. Will monitor on tele and will monitor vitals closely. Patient is NPO post midnight for SVT ablation tomorrow. Called by RN to inform me that patient's HR is sustaining in the 150s, BP 80/50, patient symptomatic. Patient examined at bedside. She complains of mild shortness of breath, generalized weakness, and anxiety. She denies chest pain, palpitations, nausea, vomiting. Repeat BP 93/64. Discussed with Nelly (EP NP) who recommends Metoprolol 25mg PO if patient is in sustained SVT and symptomatic. I offered to give patient her evening dose of Xanax now, but she refused and agreed to take it at 9:15pm. Will monitor on tele and will monitor vitals closely. Patient is NPO post midnight for SVT ablation tomorrow. Called by RN to inform me that patient's HR is sustaining in the 150s, BP 80/50, patient symptomatic. Patient examined at bedside. She complains of mild shortness of breath, generalized weakness, and anxiety. She denies chest pain, palpitations, nausea, vomiting. Repeat BP 93/64. Discussed with Nelly (EP NP) who recommends Metoprolol 25mg PO if patient is in sustained SVT and symptomatic. I offered to give patient her evening dose of Xanax now, but she refused and agreed to take it at 9:15pm. Will monitor on tele and will monitor vitals closely. Patient is NPO post midnight for SVT ablation tomorrow.    ADDENDUM: HR still sustaining in 150s. Patient sleeping comfortably in bed. IV Lopressor 5mg x 2 doses given.

## 2018-06-20 NOTE — PROGRESS NOTE ADULT - SUBJECTIVE AND OBJECTIVE BOX
Patient is a 89y old  Female who presents with a chief complaint of Tachycardia (19 Jun 2018 01:44)      SUBJECTIVE / OVERNIGHT EVENTS:   Feels better.  Denies CP/SOB/Palpitation/HA.    MEDICATIONS  (STANDING):  adenosine Injectable (ADENOCARD) 12 milliGRAM(s) IV Push once  ALPRAZolam 0.5 milliGRAM(s) Oral at bedtime  aspirin enteric coated 81 milliGRAM(s) Oral daily  atorvastatin 10 milliGRAM(s) Oral at bedtime  busPIRone 10 milliGRAM(s) Oral two times a day  cefTRIAXone   IVPB 1 Gram(s) IV Intermittent every 24 hours  ergocalciferol 47142 Unit(s) Oral <User Schedule>  melatonin 3 milliGRAM(s) Oral at bedtime  multivitamin 1 Tablet(s) Oral daily  nystatin Powder 1 Application(s) Topical two times a day  pantoprazole    Tablet 40 milliGRAM(s) Oral two times a day  polyethylene glycol 3350 17 Gram(s) Oral two times a day  senna 2 Tablet(s) Oral at bedtime    MEDICATIONS  (PRN):  acetaminophen   Tablet. 650 milliGRAM(s) Oral every 6 hours PRN Mild Pain (1 - 3)  docusate sodium 100 milliGRAM(s) Oral three times a day PRN Constipation  triamcinolone 0.1% Ointment 1 Application(s) Topical every 12 hours PRN itching        CAPILLARY BLOOD GLUCOSE        I&O's Summary      PHYSICAL EXAM:  GENERAL: NAD, well-developed  HEAD:  Atraumatic, Normocephalic  NECK: Supple, No JVD  CHEST/LUNG: Clear to auscultation bilaterally; No wheezing.  HEART: Regular rate and rhythm; No murmurs, rubs, or gallops  ABDOMEN: Soft, Nontender, Nondistended; Bowel sounds present  EXTREMITIES:   No clubbing, cyanosis, or edema  NEUROLOGY: AAO X 3  SKIN: No rashes    LABS:                        8.6    8.88  )-----------( 372      ( 20 Jun 2018 06:30 )             28.6     06-20    137  |  103  |  18  ----------------------------<  88  3.9   |  23  |  0.82    Ca    8.3<L>      20 Jun 2018 06:30  Mg     1.8     06-20              CAPILLARY BLOOD GLUCOSE        06-18 @ 19:36  Culture-urine --  Culture results --  method type --  Organism --  Organism Identification --  Specimen source BLOOD PERIPHERAL  06-18 @ 15:30  Culture-urine   COLONY COUNT: > = 100,000 CFU/ML  Culture results --  method type NEGATIVE MENA 43  Organism Klebsiella oxytoca  Organism Identification Klebsiella oxytoca  Specimen source URINE CATHETER           06-18 @ 19:36  Culture blood   NO ORGANISMS ISOLATED  NO ORGANISMS ISOLATED AT 48 HRS.  Culture results --  Gram stain --  Gram stain blood --  Method type --  Organism --  Organism identification --  Specimen source BLOOD PERIPHERAL   06-18 @ 15:30  Culture blood --  Culture results --  Gram stain --  Gram stain blood --  Method type NEGATIVE MENA 43  Organism Klebsiella oxytoca  Organism identification Klebsiella oxytoca  Specimen source URINE CATHETER      RADIOLOGY & ADDITIONAL TESTS:    Imaging Personally Reviewed:    Consultant(s) Notes Reviewed:      Care Discussed with Consultants/Other Providers:

## 2018-06-20 NOTE — PROGRESS NOTE ADULT - SUBJECTIVE AND OBJECTIVE BOX
Patient appears calmer and comfortable. Denies chest pain, shortness of breath, palpitations or dizziness overnight.  Very concerned about her urinary incontinence and constipation (received laxatives last night). Now agreeing to the ablation.         Vital Signs Last 24 Hrs  T(C): 36.6 (20 Jun 2018 12:34), Max: 36.6 (19 Jun 2018 14:53)  T(F): 97.8 (20 Jun 2018 12:34), Max: 97.9 (19 Jun 2018 22:14)  HR: 68 (20 Jun 2018 12:34) (63 - 95)  BP: 106/44 (20 Jun 2018 12:34) (96/48 - 119/55)  BP(mean): --  RR: 18 (20 Jun 2018 12:34) (17 - 18)  SpO2: 97% (20 Jun 2018 12:34) (96% - 100%)      EKG  Telemetry:  Normal sinus rhythm with frequent APC's.  No atrial fibrillation or SVT overnight.  MEDICATIONS  (STANDING):    ALPRAZolam 0.5 milliGRAM(s) Oral at bedtime  aspirin enteric coated 81 milliGRAM(s) Oral daily  atorvastatin 10 milliGRAM(s) Oral at bedtime  busPIRone 10 milliGRAM(s) Oral two times a day  cefTRIAXone   IVPB 1 Gram(s) IV Intermittent every 24 hours  diltiazem    Tablet 90 milliGRAM(s) Oral three times a day  ergocalciferol 72499 Unit(s) Oral <User Schedule>  melatonin 3 milliGRAM(s) Oral at bedtime  metoprolol succinate ER 25 milliGRAM(s) Oral daily  multivitamin 1 Tablet(s) Oral daily  nystatin Powder 1 Application(s) Topical two times a day  pantoprazole    Tablet 40 milliGRAM(s) Oral two times a day  polyethylene glycol 3350 17 Gram(s) Oral two times a day  rivaroxaban 20 milliGRAM(s) Oral every 24 hours  senna 2 Tablet(s) Oral at bedtime    MEDICATIONS  (PRN):  acetaminophen   Tablet. 650 milliGRAM(s) Oral every 6 hours PRN Mild Pain (1 - 3)  docusate sodium 100 milliGRAM(s) Oral three times a day PRN Constipation  triamcinolone 0.1% Ointment 1 Application(s) Topical every 12 hours PRN itching          Physical exam:   Gen- well developed well nourished. NAD. Calm and cooperative  Resp- clear to auscultation.  No wheezing, rales or rhonchi  CV- S1 and S2 normal.  Irregular.   ABD- soft nontender +bowel sounds  EXT- no edema or calf tenderness  Neuro- grossly nonfocal                            8.6    8.88  )-----------( 372      ( 20 Jun 2018 06:30 )             28.6       06-20    137  |  103  |  18  ----------------------------<  88  3.9   |  23  |  0.82    Ca    8.3<L>      20 Jun 2018 06:30  Mg     1.8     06-20    TPro  5.7<L>  /  Alb  2.6<L>  /  TBili  0.2  /  DBili  x   /  AST  74<H>  /  ALT  34<H>  /  AlkPhos  116  06-18 Patient appears calmer and comfortable. Denies chest pain, shortness of breath, palpitations or dizziness overnight.  Very concerned about her urinary incontinence and constipation (received laxatives last night). Now agreeing to the ablation.         Vital Signs Last 24 Hrs  T(C): 36.6 (20 Jun 2018 12:34), Max: 36.6 (19 Jun 2018 14:53)  T(F): 97.8 (20 Jun 2018 12:34), Max: 97.9 (19 Jun 2018 22:14)  HR: 68 (20 Jun 2018 12:34) (63 - 95)  BP: 106/44 (20 Jun 2018 12:34) (96/48 - 119/55)  BP(mean): --  RR: 18 (20 Jun 2018 12:34) (17 - 18)  SpO2: 97% (20 Jun 2018 12:34) (96% - 100%)      EKG  Telemetry:  Normal sinus rhythm with frequent APC's.  No atrial fibrillation or SVT overnight.  MEDICATIONS  (STANDING):    ALPRAZolam 0.5 milliGRAM(s) Oral at bedtime  aspirin enteric coated 81 milliGRAM(s) Oral daily  atorvastatin 10 milliGRAM(s) Oral at bedtime  busPIRone 10 milliGRAM(s) Oral two times a day  cefTRIAXone   IVPB 1 Gram(s) IV Intermittent every 24 hours  diltiazem    Tablet 90 milliGRAM(s) Oral three times a day  ergocalciferol 94960 Unit(s) Oral <User Schedule>  melatonin 3 milliGRAM(s) Oral at bedtime  metoprolol succinate ER 25 milliGRAM(s) Oral daily  multivitamin 1 Tablet(s) Oral daily  nystatin Powder 1 Application(s) Topical two times a day  pantoprazole    Tablet 40 milliGRAM(s) Oral two times a day  polyethylene glycol 3350 17 Gram(s) Oral two times a day  rivaroxaban 20 milliGRAM(s) Oral every 24 hours  senna 2 Tablet(s) Oral at bedtime    MEDICATIONS  (PRN):  acetaminophen   Tablet. 650 milliGRAM(s) Oral every 6 hours PRN Mild Pain (1 - 3)  docusate sodium 100 milliGRAM(s) Oral three times a day PRN Constipation  triamcinolone 0.1% Ointment 1 Application(s) Topical every 12 hours PRN itching          Physical exam:   Gen- well developed well nourished. NAD. Calm and cooperative  Resp- clear to auscultation.  No wheezing, rales or rhonchi  CV- S1 and S2 normal.  Irregular.   ABD- soft nontender +bowel sounds  EXT- no edema or calf tenderness  Neuro- grossly nonfocal                            8.6    8.88  )-----------( 372      ( 20 Jun 2018 06:30 )             28.6       06-20    137  |  103  |  18  ----------------------------<  88  3.9   |  23  |  0.82    Ca    8.3<L>      20 Jun 2018 06:30  Mg     1.8     06-20    TPro  5.7<L>  /  Alb  2.6<L>  /  TBili  0.2  /  DBili  x   /  AST  74<H>  /  ALT  34<H>  /  AlkPhos  116  06-18    BCX's (06/18) x2 -> negative to date

## 2018-06-21 LAB
BLD GP AB SCN SERPL QL: NEGATIVE — SIGNIFICANT CHANGE UP
BUN SERPL-MCNC: 26 MG/DL — HIGH (ref 7–23)
CALCIUM SERPL-MCNC: 8.4 MG/DL — SIGNIFICANT CHANGE UP (ref 8.4–10.5)
CHLORIDE SERPL-SCNC: 101 MMOL/L — SIGNIFICANT CHANGE UP (ref 98–107)
CO2 SERPL-SCNC: 24 MMOL/L — SIGNIFICANT CHANGE UP (ref 22–31)
CREAT SERPL-MCNC: 0.98 MG/DL — SIGNIFICANT CHANGE UP (ref 0.5–1.3)
GLUCOSE SERPL-MCNC: 117 MG/DL — HIGH (ref 70–99)
HCT VFR BLD CALC: 27.8 % — LOW (ref 34.5–45)
HGB BLD-MCNC: 8.4 G/DL — LOW (ref 11.5–15.5)
MAGNESIUM SERPL-MCNC: 1.9 MG/DL — SIGNIFICANT CHANGE UP (ref 1.6–2.6)
MCHC RBC-ENTMCNC: 26.2 PG — LOW (ref 27–34)
MCHC RBC-ENTMCNC: 30.2 % — LOW (ref 32–36)
MCV RBC AUTO: 86.6 FL — SIGNIFICANT CHANGE UP (ref 80–100)
NRBC # FLD: 0 — SIGNIFICANT CHANGE UP
PLATELET # BLD AUTO: 402 K/UL — HIGH (ref 150–400)
PMV BLD: 9.6 FL — SIGNIFICANT CHANGE UP (ref 7–13)
POTASSIUM SERPL-MCNC: 4.2 MMOL/L — SIGNIFICANT CHANGE UP (ref 3.5–5.3)
POTASSIUM SERPL-SCNC: 4.2 MMOL/L — SIGNIFICANT CHANGE UP (ref 3.5–5.3)
RBC # BLD: 3.21 M/UL — LOW (ref 3.8–5.2)
RBC # FLD: 15.9 % — HIGH (ref 10.3–14.5)
RH IG SCN BLD-IMP: NEGATIVE — SIGNIFICANT CHANGE UP
SODIUM SERPL-SCNC: 136 MMOL/L — SIGNIFICANT CHANGE UP (ref 135–145)
WBC # BLD: 7.34 K/UL — SIGNIFICANT CHANGE UP (ref 3.8–10.5)
WBC # FLD AUTO: 7.34 K/UL — SIGNIFICANT CHANGE UP (ref 3.8–10.5)

## 2018-06-21 PROCEDURE — 93621 COMP EP EVL L PAC&REC C SINS: CPT | Mod: 26

## 2018-06-21 PROCEDURE — 93010 ELECTROCARDIOGRAM REPORT: CPT

## 2018-06-21 PROCEDURE — 93613 INTRACARDIAC EPHYS 3D MAPG: CPT

## 2018-06-21 PROCEDURE — 93653 COMPRE EP EVAL TX SVT: CPT

## 2018-06-21 RX ADMIN — Medication 10 MILLIGRAM(S): at 22:07

## 2018-06-21 RX ADMIN — PANTOPRAZOLE SODIUM 40 MILLIGRAM(S): 20 TABLET, DELAYED RELEASE ORAL at 07:01

## 2018-06-21 RX ADMIN — CEFTRIAXONE 100 GRAM(S): 500 INJECTION, POWDER, FOR SOLUTION INTRAMUSCULAR; INTRAVENOUS at 15:00

## 2018-06-21 RX ADMIN — Medication 5 MILLIGRAM(S): at 00:02

## 2018-06-21 RX ADMIN — Medication 3 MILLIGRAM(S): at 22:06

## 2018-06-21 RX ADMIN — Medication 0.5 MILLIGRAM(S): at 22:06

## 2018-06-21 RX ADMIN — ATORVASTATIN CALCIUM 10 MILLIGRAM(S): 80 TABLET, FILM COATED ORAL at 22:06

## 2018-06-21 NOTE — CHART NOTE - NSCHARTNOTEFT_GEN_A_CORE
Patient seen and examined. Appears comfortable. Denies any chest pain, shortness of breath, dizziness, lightheadedness, near syncope or syncope.   Vital signs : Stable   Telemetry : Sinus rhythm 60's.   Labs : reviewed   Patient remains NPO for today's procedure.     Please refer to the EPS pre- assessment form for today's exam and findings.     Thank you .     Francesca manning, ARIEP-C  [phone]

## 2018-06-21 NOTE — CHART NOTE - NSCHARTNOTEFT_GEN_A_CORE
89y Female s/p SVT ablation x today for right groin check. Pt currently denies any complaints.    Right groin site: Dressing in place c/d/i. No hematoma present. No edema/swelling/discoloration/coldness of extremity. Pulses and sensation intact.     Will cont to monitor

## 2018-06-21 NOTE — PROGRESS NOTE ADULT - SUBJECTIVE AND OBJECTIVE BOX
Patient is a 89y old  Female who presents with a chief complaint of Tachycardia (19 Jun 2018 01:44)      SUBJECTIVE / OVERNIGHT EVENTS:   Feels better.  Denies CP/SOB/Palpitation/HA.    MEDICATIONS  (STANDING):  ALPRAZolam 0.5 milliGRAM(s) Oral at bedtime  aspirin enteric coated 81 milliGRAM(s) Oral daily  atorvastatin 10 milliGRAM(s) Oral at bedtime  busPIRone 10 milliGRAM(s) Oral two times a day  cefTRIAXone   IVPB 1 Gram(s) IV Intermittent every 24 hours  ergocalciferol 54839 Unit(s) Oral <User Schedule>  melatonin 3 milliGRAM(s) Oral at bedtime  multivitamin 1 Tablet(s) Oral daily  nystatin Powder 1 Application(s) Topical two times a day  pantoprazole    Tablet 40 milliGRAM(s) Oral two times a day  polyethylene glycol 3350 17 Gram(s) Oral two times a day  senna 2 Tablet(s) Oral at bedtime    MEDICATIONS  (PRN):  acetaminophen   Tablet. 650 milliGRAM(s) Oral every 6 hours PRN Mild Pain (1 - 3)  docusate sodium 100 milliGRAM(s) Oral three times a day PRN Constipation  triamcinolone 0.1% Ointment 1 Application(s) Topical every 12 hours PRN itching        CAPILLARY BLOOD GLUCOSE        I&O's Summary      PHYSICAL EXAM:  GENERAL: NAD, well-developed  HEAD:  Atraumatic, Normocephalic  NECK: Supple, No JVD  CHEST/LUNG: Clear to auscultation bilaterally; No wheezing.  HEART: Regular rate and rhythm; No murmurs, rubs, or gallops  ABDOMEN: Soft, Nontender, Nondistended; Bowel sounds present  EXTREMITIES:   No clubbing, cyanosis, or edema  NEUROLOGY: AAO X 3  SKIN: No rashes    LABS:                        8.4    7.34  )-----------( 402      ( 21 Jun 2018 06:00 )             27.8     06-21    136  |  101  |  26<H>  ----------------------------<  117<H>  4.2   |  24  |  0.98    Ca    8.4      21 Jun 2018 06:00  Mg     1.9     06-21              CAPILLARY BLOOD GLUCOSE        06-18 @ 19:36  Culture-urine --  Culture results --  method type --  Organism --  Organism Identification --  Specimen source BLOOD PERIPHERAL  06-18 @ 15:30  Culture-urine   COLONY COUNT: > = 100,000 CFU/ML  Culture results --  method type NEGATIVE MENA 43  Organism Klebsiella oxytoca  Organism Identification Klebsiella oxytoca  Specimen source URINE CATHETER           06-18 @ 19:36  Culture blood   NO ORGANISMS ISOLATED  NO ORGANISMS ISOLATED AT 72 HRS.  Culture results --  Gram stain --  Gram stain blood --  Method type --  Organism --  Organism identification --  Specimen source BLOOD PERIPHERAL   06-18 @ 15:30  Culture blood --  Culture results --  Gram stain --  Gram stain blood --  Method type NEGATIVE MENA 43  Organism Klebsiella oxytoca  Organism identification Klebsiella oxytoca  Specimen source URINE CATHETER      RADIOLOGY & ADDITIONAL TESTS:    Imaging Personally Reviewed:    Consultant(s) Notes Reviewed:      Care Discussed with Consultants/Other Providers:

## 2018-06-21 NOTE — PRE-OP CHECKLIST - ALLERGIES REVIEWED
Assessment/Plan


Assessment/Plan





ASSESSMENT:  The patient is a 64-year-old female with,





 Low-grade fever. probable due to SBO  ,resolved


Status post leukocytosis.


Probable small bowel obstruction


Recent history of polymicrobial gram-negative bacteremia including 

Stenotrophomonas maltophilia and Enterobacter.


History of Candida esophagitis.


History of C. difficile in the past.











Air anterior to the liver , ?  free intraperitoneal air ( Surg doubt 

perforation )


Lung and liver masses Ro Malignancy 


 CT:  Pleural-based mass at the left lung base / Moderate-sized left pleural 

effusion, left-sided pleural nodularity and retroperitoneal and paraspinal 

nodules/masses. 


         Left inferior hilar mass lesion partially visualized. New low-

attenuation liver lesion ( concerning for malignancy/metastatic disease )


Pl effusion SP ultrasound-guided thoracentesis,  960 cc  ( m/l 2/2 mets,  no 

evid of Empyema )


  -exudate fluid:  (n 6), pH 8, lguc 98, prot 4.6 (serum 8.4); cx stain: 


  GRAM STAIN  Final  


        GRAM STAIN RESULT           FEW WHITE BLOOD CELLS


                                    NO ORGANISMS SEEN


cx negative





Crohn's disease.


History of bowel obstruction x2 with lysis of adhesions in 2005.


COPD.


History of chronic pain syndrome, on morphine pump.


CVA in 2005.


Seizure disorder.








PLAN:





cont  the patient on empiric IV Zosyn d# 5/5 in the setting of SBO


Monitor cultures. ( pl effusion ) 


Monitor CBC and BMP.


follow GI recommendations


hem, surg f/u





Subjective


Allergies:  


Coded Allergies:  


     No Known Allergies (Verified , 10/27/06)


Subjective


afebrile 


no leukocytosis


pleural fluid cx neg





Objective


Vital Signs





Last 24 Hour Vital Signs








  Date Time  Temp Pulse Resp B/P (MAP) Pulse Ox O2 Delivery O2 Flow Rate FiO2


 


4/24/18 15:48 98.4 107 20 148/90 97 Room Air  





 98.4       


 


4/24/18 13:33 98.8       


 


4/24/18 13:03 98.8       


 


4/24/18 12:00 98.8 97 20 137/88 100 Room Air  





 98.8       


 


4/24/18 09:11 99.0       


 


4/24/18 08:00 99.0 82 20 117/78 99 Room Air  





 99.0       


 


4/24/18 04:16 97.9 86 18 118/73 98   





 97.9       


 


4/24/18 00:30 97.7 88 20 114/77 100   





 97.7       


 


4/23/18 20:00 97.8 94 18 126/81 100   





 97.8       


 


4/23/18 18:13 97.7       








Height (Feet):  5


Height (Inches):  6.00


Weight (Pounds):  129


Objective


HEENT:  anicteric


Respiratory/Chest:  normal breath sounds


Cardiovascular:  regular rhythm


Abdomen:  tender





Laboratory Tests








Test


  4/24/18


06:35 4/24/18


14:20


 


White Blood Count


  10.3 K/UL


(4.8-10.8) 


 


 


Red Blood Count


  4.35 M/UL


(4.20-5.40) 


 


 


Hemoglobin


  13.0 G/DL


(12.0-16.0) 


 


 


Hematocrit


  39.5 %


(37.0-47.0) 


 


 


Mean Corpuscular Volume 91 FL (80-99)   


 


Mean Corpuscular Hemoglobin


  29.8 PG


(27.0-31.0) 


 


 


Mean Corpuscular Hemoglobin


Concent 32.8 G/DL


(32.0-36.0) 


 


 


Red Cell Distribution Width


  15.0 %


(11.6-14.8)  H 


 


 


Platelet Count


  228 K/UL


(150-450) 


 


 


Mean Platelet Volume


  7.7 FL


(6.5-10.1) 


 


 


Neutrophils (%) (Auto)


  72.0 %


(45.0-75.0) 


 


 


Lymphocytes (%) (Auto)


  17.7 %


(20.0-45.0)  L 


 


 


Monocytes (%) (Auto)


  8.3 %


(1.0-10.0) 


 


 


Eosinophils (%) (Auto)


  1.5 %


(0.0-3.0) 


 


 


Basophils (%) (Auto)


  0.6 %


(0.0-2.0) 


 


 


Sodium Level


  135 MMOL/L


(136-145)  L 


 


 


Potassium Level


  3.3 MMOL/L


(3.5-5.1)  L 


 


 


Chloride Level


  102 MMOL/L


() 


 


 


Carbon Dioxide Level


  24 MMOL/L


(21-32) 


 


 


Anion Gap


  9 mmol/L


(5-15) 


 


 


Blood Urea Nitrogen


  9 mg/dL (7-18)


  


 


 


Creatinine


  0.9 MG/DL


(0.55-1.30) 


 


 


Estimat Glomerular Filtration


Rate > 60 mL/min


(>60) 


 


 


Glucose Level


  104 MG/DL


() 


 


 


Calcium Level


  9.2 MG/DL


(8.5-10.1) 


 


 


Alpha Fetoprotein  Pending  


 


CA 15-3 Antigen  Pending  


 


 Antigen  Pending  











Current Medications








 Medications


  (Trade)  Dose


 Ordered  Sig/Jed


 Route


 PRN Reason  Start Time


 Stop Time Status Last Admin


Dose Admin


 


 Acetaminophen


  (Tylenol)  650 mg  Q4H  PRN


 ORAL


 fever  4/19/18 15:30


 5/19/18 15:29   


 


 


 Acetaminophen/


 Hydrocodone Bitart


  (Norco 10/325)  1 tab  Q4H  PRN


 ORAL


 Pain Scale (3-5)  4/21/18 12:30


 4/28/18 08:29  4/23/18 09:14


 


 


 Al Hydroxide/Mg


 Hydroxide


  (Mylanta II)  30 ml  Q6H  PRN


 ORAL


 dyspepsia  4/19/18 15:30


 5/19/18 15:29   


 


 


 Atorvastatin


 Calcium


  (Lipitor)  40 mg  BEDTIME


 ORAL


   4/19/18 21:00


 5/19/18 20:59  4/23/18 20:34


 


 


 Bupropion HCl


  (Wellbutrin)  100 mg  DAILY


 ORAL


   4/20/18 09:00


 5/20/18 08:59  4/24/18 08:54


 


 


 Dextrose


  (Dextrose 50%)  50 ml  STAT  PRN


 IV


 Hypoglycemia BS<60mg/dL  4/19/18 15:30


 5/19/18 15:29   


 


 


 Dextrose/Sodium


 Chloride  1,000 ml @ 


 75 mls/hr  [id number]


 IV


   4/19/18 17:00


 5/19/18 16:59  4/24/18 00:39


 


 


 Diphenhydramine


 HCl


  (Benadryl)  25 mg  Q6H  PRN


 ORAL


 Itching/Pruritis  4/19/18 15:30


 5/19/18 15:29   


 


 


 Duloxetine HCl


  (Cymbalta)  90 mg  DAILY


 ORAL


   4/24/18 09:00


 5/20/18 08:59  4/24/18 08:54


 


 


 Gabapentin


  (Neurontin)  300 mg  THREE TIMES A  DAY


 ORAL


   4/20/18 10:00


 5/20/18 09:59  4/24/18 13:03


 


 


 Heparin Sodium


  (Porcine)


  (Heparin 5000


 units/ml)  5,000 units  EVERY 12  HOURS


 SUBQ


   4/19/18 21:00


 5/19/18 20:59  4/24/18 08:57


 


 


 Hydromorphone HCl


  (Dilaudid)  0.5 mg  Q4H  PRN


 IVP


 Mod-Severe Pain (4-10) if unab  4/21/18 21:30


 4/28/18 21:29  4/24/18 13:03


 


 


 Levetiracetam


  (Keppra)  1,000 mg  Q8H


 ORAL


   4/24/18 08:00


 5/24/18 07:59  4/24/18 08:55


 


 


 Levothyroxine


 Sodium


  (Synthroid)  75 mcg  ACBREAKFAST


 ORAL


   4/20/18 06:30


 5/20/18 06:29  4/24/18 05:36


 


 


 Lorazepam


  (Ativan 2mg/ml


 1ml)  2 mg  Q6H  PRN


 IV


 agitation  4/21/18 15:30


 4/28/18 15:29  4/23/18 13:08


 


 


 Methylnaltrexone


 Bromide


  (Relistor)  12 mg  DAILY


 SUBQ


   4/20/18 10:15


 5/20/18 10:14  4/24/18 08:57


 


 


 Metoclopramide HCl


  (Reglan)  10 mg  Q6H  PRN


 IVP


 Unrelieved Severe Nausea  4/19/18 15:30


 5/19/18 15:29   


 


 


 Nitroglycerin


  (Ntg)  0.4 mg  Q5M X 3 DOSES PRN


 SL


 Prn Chest Pain  4/19/18 15:30


 5/19/18 15:29   


 


 


 Ondansetron HCl


  (Zofran)  4 mg  Q6H  PRN


 IVP


 Nausea & Vomiting  4/19/18 15:30


 5/19/18 15:29  4/20/18 12:37


 


 


 Pantoprazole


  (Protonix)  40 mg  DAILY


 ORAL


   4/23/18 09:00


 5/23/18 08:59  4/24/18 08:54


 


 


 Piperacillin Sod/


 Tazobactam Sod


 3.375 gm/Sodium


 Chloride  110 ml @ 


 27.5 mls/hr  Q8H


 IVPB


   4/20/18 16:00


 4/27/18 15:59  4/24/18 15:58


 


 


 Polyethylene


 Glycol


  (Miralax)  17 gm  HSPRN  PRN


 ORAL


 Constipation  4/19/18 15:30


 5/19/18 15:29   


 


 


 Promethazine HCl


 25 mg/Sodium


 Chloride  56 ml @ 


 110 mls/hr  Q6H  PRN


 IV


 Refractory N/V  4/19/18 15:30


 5/19/18 15:29   


 


 


 Simethicone


  (Mylicon)  80 mg  QIDPRN  PRN


 ORAL


 gas   4/20/18 10:00


 5/20/18 09:59   


 


 


 Theophylline


  (Shiv-Dur)  100 mg  Q12HR


 ORAL


   4/19/18 21:00


 5/19/18 20:59  4/24/18 08:54


 


 


 Topiramate


  (Topamax)  25 mg  EVERY 12  HOURS


 ORAL


   4/19/18 21:00


 5/19/18 20:59  4/24/18 08:55


 


 


 Trazodone HCl


  (Desyrel)  200 mg  BEDTIME


 ORAL


   4/19/18 21:00


 5/19/18 20:59  4/23/18 20:34


 


 


 Zolpidem Tartrate


  (Ambien)  5 mg  QHS


 ORAL


   4/21/18 21:00


 4/28/18 20:59  4/23/18 20:34


 

















Selene Garcia M.D. Apr 24, 2018 16:34 done

## 2018-06-22 ENCOUNTER — TRANSCRIPTION ENCOUNTER (OUTPATIENT)
Age: 83
End: 2018-06-22

## 2018-06-22 LAB
BUN SERPL-MCNC: 21 MG/DL — SIGNIFICANT CHANGE UP (ref 7–23)
CALCIUM SERPL-MCNC: 8.2 MG/DL — LOW (ref 8.4–10.5)
CHLORIDE SERPL-SCNC: 102 MMOL/L — SIGNIFICANT CHANGE UP (ref 98–107)
CO2 SERPL-SCNC: 24 MMOL/L — SIGNIFICANT CHANGE UP (ref 22–31)
CREAT SERPL-MCNC: 0.83 MG/DL — SIGNIFICANT CHANGE UP (ref 0.5–1.3)
GLUCOSE SERPL-MCNC: 79 MG/DL — SIGNIFICANT CHANGE UP (ref 70–99)
HCT VFR BLD CALC: 28.8 % — LOW (ref 34.5–45)
HGB BLD-MCNC: 8.8 G/DL — LOW (ref 11.5–15.5)
MCHC RBC-ENTMCNC: 26.5 PG — LOW (ref 27–34)
MCHC RBC-ENTMCNC: 30.6 % — LOW (ref 32–36)
MCV RBC AUTO: 86.7 FL — SIGNIFICANT CHANGE UP (ref 80–100)
NRBC # FLD: 0 — SIGNIFICANT CHANGE UP
PLATELET # BLD AUTO: 407 K/UL — HIGH (ref 150–400)
PMV BLD: 9.8 FL — SIGNIFICANT CHANGE UP (ref 7–13)
POTASSIUM SERPL-MCNC: 3.6 MMOL/L — SIGNIFICANT CHANGE UP (ref 3.5–5.3)
POTASSIUM SERPL-SCNC: 3.6 MMOL/L — SIGNIFICANT CHANGE UP (ref 3.5–5.3)
RBC # BLD: 3.32 M/UL — LOW (ref 3.8–5.2)
RBC # FLD: 15.9 % — HIGH (ref 10.3–14.5)
SODIUM SERPL-SCNC: 139 MMOL/L — SIGNIFICANT CHANGE UP (ref 135–145)
WBC # BLD: 6.44 K/UL — SIGNIFICANT CHANGE UP (ref 3.8–10.5)
WBC # FLD AUTO: 6.44 K/UL — SIGNIFICANT CHANGE UP (ref 3.8–10.5)

## 2018-06-22 RX ORDER — RIVAROXABAN 15 MG-20MG
20 KIT ORAL EVERY 24 HOURS
Qty: 0 | Refills: 0 | Status: DISCONTINUED | OUTPATIENT
Start: 2018-06-22 | End: 2018-06-24

## 2018-06-22 RX ORDER — BENZOCAINE AND MENTHOL 5; 1 G/100ML; G/100ML
1 LIQUID ORAL EVERY 4 HOURS
Qty: 0 | Refills: 0 | Status: DISCONTINUED | OUTPATIENT
Start: 2018-06-22 | End: 2018-06-24

## 2018-06-22 RX ORDER — METOPROLOL TARTRATE 50 MG
25 TABLET ORAL DAILY
Qty: 0 | Refills: 0 | Status: DISCONTINUED | OUTPATIENT
Start: 2018-06-22 | End: 2018-06-24

## 2018-06-22 RX ADMIN — Medication 10 MILLIGRAM(S): at 18:19

## 2018-06-22 RX ADMIN — PANTOPRAZOLE SODIUM 40 MILLIGRAM(S): 20 TABLET, DELAYED RELEASE ORAL at 18:19

## 2018-06-22 RX ADMIN — Medication 81 MILLIGRAM(S): at 14:49

## 2018-06-22 RX ADMIN — ATORVASTATIN CALCIUM 10 MILLIGRAM(S): 80 TABLET, FILM COATED ORAL at 21:09

## 2018-06-22 RX ADMIN — Medication 3 MILLIGRAM(S): at 21:08

## 2018-06-22 RX ADMIN — Medication 0.5 MILLIGRAM(S): at 21:08

## 2018-06-22 RX ADMIN — Medication 1 TABLET(S): at 14:49

## 2018-06-22 RX ADMIN — Medication 10 MILLIGRAM(S): at 05:44

## 2018-06-22 RX ADMIN — PANTOPRAZOLE SODIUM 40 MILLIGRAM(S): 20 TABLET, DELAYED RELEASE ORAL at 05:44

## 2018-06-22 RX ADMIN — CEFTRIAXONE 100 GRAM(S): 500 INJECTION, POWDER, FOR SOLUTION INTRAMUSCULAR; INTRAVENOUS at 15:03

## 2018-06-22 RX ADMIN — RIVAROXABAN 20 MILLIGRAM(S): KIT at 18:20

## 2018-06-22 NOTE — DISCHARGE NOTE ADULT - CARE PLAN
Principal Discharge DX:	SVT (supraventricular tachycardia)  Goal:	Symptom resolution, medication compliance, prevent arrhythmias  Assessment and plan of treatment:	Follow up with your Cardiologist Dr Mccartney within 1 week, call for appointment  Secondary Diagnosis:	PAF (paroxysmal atrial fibrillation)  Goal:	rate control, prevent CVA / TIA, medication compliance, risk factor control  Assessment and plan of treatment:	Follow up with your Cardiologist Dr Mccartney within 1 week, call for appointment  Secondary Diagnosis:	HTN (hypertension)  Goal:	Monitor BP, medication compliance, prevent end organ damage  Assessment and plan of treatment:	Please follow up with your primary care provider within 1 week call for an appointment  Secondary Diagnosis:	HLD (hyperlipidemia)  Goal:	improve cholesterol profile,  medication compliance, prevent artherosclerosis disease progression  Assessment and plan of treatment:	Please follow up with your primary care provider within 1 week call for an appointment Principal Discharge DX:	SVT (supraventricular tachycardia)  Goal:	Symptom resolution, medication compliance, prevent arrhythmias  Assessment and plan of treatment:	s/p Ablation of SVT. Continue Toprol Xl. F/u with Dr. Macias  ( EP  to call with date and time of appt)  Secondary Diagnosis:	PAF (paroxysmal atrial fibrillation)  Goal:	rate control, prevent CVA / TIA, medication compliance, risk factor control  Assessment and plan of treatment:	continue xarelto for anticoagulation and Toprol for rate control. Follow up with your Cardiologist Dr Mccartney within 1-2 weeks, call for appointment  Secondary Diagnosis:	HTN (hypertension)  Goal:	Monitor BP, medication compliance, prevent end organ damage  Assessment and plan of treatment:	Please follow up with your primary care provider within 1 week call for an appointment  Secondary Diagnosis:	HLD (hyperlipidemia)  Goal:	improve cholesterol profile,  medication compliance, prevent artherosclerosis disease progression  Assessment and plan of treatment:	Please follow up with your primary care provider within 1 week call for an appointment

## 2018-06-22 NOTE — DISCHARGE NOTE ADULT - MEDICATION SUMMARY - MEDICATIONS TO STOP TAKING
I will STOP taking the medications listed below when I get home from the hospital:    dilTIAZem 90 mg oral tablet  -- 1 tab(s) by mouth 3 times a day

## 2018-06-22 NOTE — DISCHARGE NOTE ADULT - OTHER SIGNIFICANT FINDINGS
(Admit Diagnosis) Supraventricular tachycardia  (PMH) Endometrial cancer  (PMH) HLD (hyperlipidemia)  (PMH) HTN (hypertension)  (PMH) PAF (paroxysmal atrial fibrillation)  (PMH) Anxiety  (PMH) PE (pulmonary thromboembolism)  (PMH) Breast Lump  (PMH) Insomnia  (Principal DC/DX) SVT (supraventricular tachycardia)  (Problem/DX) Need for prophylactic measure  (Problem/DX) Anxiety  (Problem/DX) HLD (hyperlipidemia)  (Problem/DX) HTN (hypertension)  (Problem/DX) History of pulmonary embolus (PE)  (Problem/DX) PAF (paroxysmal atrial fibrillation)  (Problem/DX) UTI (urinary tract infection)  (Problem/DX) Dehydration  (Problem/DX) SVT (supraventricular tachycardia)  (PSH) Status post cataract extraction  (PSH) Status post hysterectomy  (PSH) History of Breast Lump/Mass Excision- benighn- left- 1971  (PSH) History of Colonoscopy- 2011/Sept  (PSH) History of D&C- 8/12/11  (Secondary DC/DX) UTI (urinary tract infection)  (Secondary DC/DX) Dehydration

## 2018-06-22 NOTE — DISCHARGE NOTE ADULT - PLAN OF CARE
Symptom resolution, medication compliance, prevent arrhythmias Follow up with your Cardiologist Dr Mccartney within 1 week, call for appointment rate control, prevent CVA / TIA, medication compliance, risk factor control Monitor BP, medication compliance, prevent end organ damage Please follow up with your primary care provider within 1 week call for an appointment improve cholesterol profile,  medication compliance, prevent artherosclerosis disease progression s/p Ablation of SVT. Continue Toprol Xl. F/u with Dr. Macias  ( EP  to call with date and time of appt) continue xarelto for anticoagulation and Toprol for rate control. Follow up with your Cardiologist Dr Mccartney within 1-2 weeks, call for appointment

## 2018-06-22 NOTE — PROGRESS NOTE ADULT - SUBJECTIVE AND OBJECTIVE BOX
INTERVAL HISTORY:  Patient seen and examined. Appears comfortable. Herber any CP, SOB, dizziness, LH, palps or near syncope or syncope.   Found sitting in chair.   C/o sore throat   	  MEDICATIONS:  aspirin enteric coated 81 milliGRAM(s) Oral daily  rivaroxaban 20 milliGRAM(s) Oral every 24 hours  cefTRIAXone   IVPB 1 Gram(s) IV Intermittent every 24 hours  acetaminophen   Tablet. 650 milliGRAM(s) Oral every 6 hours PRN  ALPRAZolam 0.5 milliGRAM(s) Oral at bedtime  busPIRone 10 milliGRAM(s) Oral two times a day  melatonin 3 milliGRAM(s) Oral at bedtime  docusate sodium 100 milliGRAM(s) Oral three times a day PRN  pantoprazole    Tablet 40 milliGRAM(s) Oral two times a day  polyethylene glycol 3350 17 Gram(s) Oral two times a day  senna 2 Tablet(s) Oral at bedtime  atorvastatin 10 milliGRAM(s) Oral at bedtime  benzocaine 15 mG/menthol 3.6 mG Lozenge 1 Lozenge Oral every 4 hours PRN  ergocalciferol 70633 Unit(s) Oral <User Schedule>  multivitamin 1 Tablet(s) Oral daily  nystatin Powder 1 Application(s) Topical two times a day  triamcinolone 0.1% Ointment 1 Application(s) Topical every 12 hours PRN    PHYSICAL EXAM:  T(C): 36.6 (06-22-18 @ 13:51), Max: 37.1 (06-21-18 @ 20:47)  HR: 69 (06-22-18 @ 13:51) (69 - 86)  BP: 142/64 (06-22-18 @ 13:51) (112/72 - 142/64)  RR: 17 (06-22-18 @ 13:51) (17 - 18)  SpO2: 97% (06-22-18 @ 13:51) (97% - 100%)  Wt(kg): --  I&O's Summary    Appearance: Normal	  HEENT:   Normal oral mucosa, PERRL, EOMI	  Lymphatic: No lymphadenopathy  Cardiovascular: Normal S1 S2, No JVD, No murmurs, No edema  Respiratory: Lungs clear to auscultation	  Psychiatry: A & O x 3, Mood & affect appropriate  Gastrointestinal:  Soft, Non-tender, + BS	  Skin: No rashes, No ecchymoses, No cyanosis  Neurologic: Non-focal  Extremities: Normal range of motion, No clubbing, cyanosis or edema, Groin - no edema, no hematoma or bleeding   Vascular: Peripheral pulses palpable 2+ bilaterally    TELEMETRY: 	  AF 70's.   ECG:  	  RADIOLOGY:   DIAGNOSTIC TESTING:  [ ] Echocardiogram:  [ ]  Catheterization:  [ ] Stress Test:    OTHER: 	    LABS:	 	    CARDIAC MARKERS:                                  8.8    6.44  )-----------( 407      ( 22 Jun 2018 06:10 )             28.8     06-22    139  |  102  |  21  ----------------------------<  79  3.6   |  24  |  0.83    Ca    8.2<L>      22 Jun 2018 06:10  Mg     1.9     06-21        ASSESSMENT/PLAN: 	This is a 89 yo female with history of HTN, HLD, paroxysmal atrial fibrillation, DVT/PE on Xarelto, anxiety, insomnia, endometrial cancer s/p LYNETTE/BSO who woke up not feeling well. She was found to be in adenosene sensitive SVT. Underwent successful SVT ablation.     Patient advised may shower in 24hrs. Keep the area clean and dry. No submersion of puncture site right groin in bath tubs or pools for 1 week. No driving for 24hrs. No strenuous activity or heavy lifting more than 10 pounds for 1 week. If you develop fever or swelling/redness/discharge from groin, call EP clinic  at [phone]     F/u with Dr. Macias  ( EP  to call with date and time of appt)  Xarelto 20 mg QD   Metoprolol 25 XL QD   D/c cardizem   Continue all other home meds      Thank you.     Francesca Sosa,FNP-C  [id number]

## 2018-06-22 NOTE — DISCHARGE NOTE ADULT - ADDITIONAL INSTRUCTIONS
No heavy lifting over 5 lbs for 1 week. No strenuous activity for 3 weeks or until cleared by your doctor.  No driving for 24 hours. You may shower, no bath or swimming for 1 week  Monitor right groin-  for pain, redness swelling, discharge, numbness, discoloration spreading in the extremity occurs  call your doctor / go to the nearest ER F/u with Dr. Macias Electrophysiologist ( EP  to call with date and time of appt)  f/u with Dr. Mccartney Cardiologist In 1-2 weeks

## 2018-06-22 NOTE — DISCHARGE NOTE ADULT - PROVIDER TOKENS
TOKEN:'[id number]',TOKEN:'[id number]:'[id number]' TOKEN:'[id number]',TOKEN:'[id number]:'[id number]:'[id number]'

## 2018-06-22 NOTE — DISCHARGE NOTE ADULT - PATIENT PORTAL LINK FT
You can access the [facility] Patient Portal, offered by [facility], by registering with the following website: [email or web address]

## 2018-06-22 NOTE — DISCHARGE NOTE ADULT - CARE PROVIDER_API CALL
Jose A Mccartney), Cardiovascular Disease; Internal Medicine; Interventional Cardiology; Nuclear Cardiology  [address] [facility] [address]  [place] [address]  Phone: [phone]  Fax: [phone]    Chester Rosa (MD), Internal Medicine; Nephrology  [facility] [place] [address]  Phone: [phone]  Fax: [phone]    Cas Vaughn (), Medicine  [address] [place] [address]  [address] [place] [address]  Phone: [phone]  Fax: [phone] Jose A Mccartney), Cardiovascular Disease; Internal Medicine; Interventional Cardiology; Nuclear Cardiology  [address] [facility] [address]  [place] [address]  Phone: [phone]  Fax: [phone]    Chester Rosa (MD), Internal Medicine; Nephrology  [facility] [place] [address]  Phone: [phone]  Fax: [phone]    Cas Vaughn (), Medicine  [address] [place] [address]  [address] [place] [address]  Phone: [phone]  Fax: [phone]    Colleen Watkins), Cardiac Electrophysiology; Cardiology; Internal Medicine  [address] [address] [place] [address]  Phone: [phone]  Fax: [phone]

## 2018-06-22 NOTE — PROGRESS NOTE ADULT - SUBJECTIVE AND OBJECTIVE BOX
Patient is a 89y old  Female who presents with a chief complaint of Tachycardia (22 Jun 2018 10:07)      SUBJECTIVE / OVERNIGHT EVENTS:   Feels better.  Denies CP/SOB/Palpitation/HA.    MEDICATIONS  (STANDING):  ALPRAZolam 0.5 milliGRAM(s) Oral at bedtime  aspirin enteric coated 81 milliGRAM(s) Oral daily  atorvastatin 10 milliGRAM(s) Oral at bedtime  busPIRone 10 milliGRAM(s) Oral two times a day  cefTRIAXone   IVPB 1 Gram(s) IV Intermittent every 24 hours  ergocalciferol 79542 Unit(s) Oral <User Schedule>  melatonin 3 milliGRAM(s) Oral at bedtime  metoprolol succinate ER 25 milliGRAM(s) Oral daily  multivitamin 1 Tablet(s) Oral daily  nystatin Powder 1 Application(s) Topical two times a day  pantoprazole    Tablet 40 milliGRAM(s) Oral two times a day  polyethylene glycol 3350 17 Gram(s) Oral two times a day  rivaroxaban 20 milliGRAM(s) Oral every 24 hours  senna 2 Tablet(s) Oral at bedtime    MEDICATIONS  (PRN):  acetaminophen   Tablet. 650 milliGRAM(s) Oral every 6 hours PRN Mild Pain (1 - 3)  benzocaine 15 mG/menthol 3.6 mG Lozenge 1 Lozenge Oral every 4 hours PRN Sore Throat  docusate sodium 100 milliGRAM(s) Oral three times a day PRN Constipation  triamcinolone 0.1% Ointment 1 Application(s) Topical every 12 hours PRN itching        CAPILLARY BLOOD GLUCOSE        I&O's Summary      PHYSICAL EXAM:  GENERAL: NAD, well-developed  HEAD:  Atraumatic, Normocephalic  NECK: Supple, No JVD  CHEST/LUNG: Clear to auscultation bilaterally; No wheezing.  HEART: Regular rate and rhythm; No murmurs, rubs, or gallops  ABDOMEN: Soft, Nontender, Nondistended; Bowel sounds present  EXTREMITIES:   No clubbing, cyanosis, or edema  NEUROLOGY: AAO X 3  SKIN: No rashes    LABS:                        8.8    6.44  )-----------( 407      ( 22 Jun 2018 06:10 )             28.8     06-22    139  |  102  |  21  ----------------------------<  79  3.6   |  24  |  0.83    Ca    8.2<L>      22 Jun 2018 06:10  Mg     1.9     06-21              CAPILLARY BLOOD GLUCOSE        06-18 @ 19:36  Culture-urine --  Culture results --  method type --  Organism --  Organism Identification --  Specimen source BLOOD PERIPHERAL  06-18 @ 15:30  Culture-urine   COLONY COUNT: > = 100,000 CFU/ML  Culture results --  method type NEGATIVE MENA 43  Organism Klebsiella oxytoca  Organism Identification Klebsiella oxytoca  Specimen source URINE CATHETER           06-18 @ 19:36  Culture blood   NO ORGANISMS ISOLATED  NO ORGANISMS ISOLATED AT 96 HOURS  Culture results --  Gram stain --  Gram stain blood --  Method type --  Organism --  Organism identification --  Specimen source BLOOD PERIPHERAL   06-18 @ 15:30  Culture blood --  Culture results --  Gram stain --  Gram stain blood --  Method type NEGATIVE MENA 43  Organism Klebsiella oxytoca  Organism identification Klebsiella oxytoca  Specimen source URINE CATHETER      RADIOLOGY & ADDITIONAL TESTS:    Imaging Personally Reviewed:    Consultant(s) Notes Reviewed:      Care Discussed with Consultants/Other Providers:

## 2018-06-22 NOTE — DISCHARGE NOTE ADULT - MEDICATION SUMMARY - MEDICATIONS TO TAKE
I will START or STAY ON the medications listed below when I get home from the hospital:    aspirin 81 mg oral delayed release tablet  -- 1 tab(s) by mouth once a day  -- Indication: For cardio protection     rivaroxaban 20 mg oral tablet  -- 1 tab(s) by mouth every 24 hours  -- Indication: For PAF (paroxysmal atrial fibrillation)    atorvastatin 10 mg oral tablet  -- 1 tab(s) by mouth once a day (at bedtime)  -- Indication: For HLD (hyperlipidemia)    busPIRone 10 mg oral tablet  -- 1 tab(s) by mouth 2 times a day  -- Indication: For Anxiety    metoprolol succinate 25 mg oral tablet, extended release  -- 1 tab(s) by mouth once a day  -- Indication: For SVT (supraventricular tachycardia)    triamcinolone 0.1% topical ointment  -- 1 application on skin every 12 hours, As needed, itching  -- Indication: For rash     nystatin 100,000 units/g topical powder  -- 1 application on skin 2 times a day  -- Indication: For fungal rash     docusate sodium 100 mg oral capsule  -- 1 cap(s) by mouth 3 times a day  -- Indication: For Stool softner     polyethylene glycol 3350 oral powder for reconstitution  -- 17 gram(s) by mouth 2 times a day  -- Indication: For Stool softner     senna oral tablet  -- 2 tab(s) by mouth once a day (at bedtime)  -- Indication: For Stool softner     pantoprazole 40 mg oral delayed release tablet  -- 1 tab(s) by mouth 2 times a day  -- Indication: For GERD     Multiple Vitamins oral tablet  -- 1 tab(s) by mouth once a day  -- Indication: For Supplement    Vitamin D3 50,000 intl units oral capsule  -- 1 cap(s) by mouth once a week  -- Indication: For Supplement

## 2018-06-22 NOTE — PROVIDER CONTACT NOTE (OTHER) - ASSESSMENT
unable to palpate either dorsalis pedis pulse  bilateral dorsalis pedis pulses found to be 1+ with doppler   bilateral feet cool to touch, right foot modeled.  bilateral femoral pulses 2+ palpation

## 2018-06-22 NOTE — DISCHARGE NOTE ADULT - HOSPITAL COURSE
89 y/o female with a PMHx of PAF on Xarelto, history of PE/DVT on Xarelto, HTN, HLD, anxiety, endometrial cancer S/P LYNETTE with BSO, breast mass S/P lumpectomy presents to ED with shortness of breath, found to be in SVT.    EKG: SVT  CE x2: Trop 79-->76  H/H: 9.4/29.8  Platelets: 444  Ca: 7.6  AST/ALT: 74/34  UA: Moderate blood, large leukocyte esterase, nitrites    6/18 EP consult (house): We have spoken to her about an SVT ablation but she is undecided and would like to discuss this further with her daughter.     6/19 EP: presented to the ED in SVT at 190 b/min, likely AVNRT, refractory to Adenosine/Amiodarone.  Finally broke with IV Lopressor and she remained in normal sinus rhythm until this morning when she had  recurrence of SVT at 193 b/min despite being on CCB and BB.   We have spoken to her and her daughter at length  about an SVT ablation but she is undecided and would like to discuss this further with her daughter. In the meantime, she is febrile today with a mild increase in her WBC's and +UA and has been started on ceftriaxone. Will continue to monitor.  If she agrees, will do an SVT ablation when medical issues are resolved.  6/20 EP: NPO after midnight. SVT ablation scheduled for 6/21/2018 6/20 Med: Continue Cardizem and Metoprolol for rate control. For SVT ablation  6/22 EP: F/u with Dr. Macias  ( EP  to call with date and time of appt), Xarelto 20 mg QD, Metoprolol 25 XL QD D/c cardizem, Continue all other home meds.

## 2018-06-22 NOTE — PROGRESS NOTE ADULT - SUBJECTIVE AND OBJECTIVE BOX
ANESTHESIA POSTOP CHECK    89y Female POSTOP DAY 1 S/P   [x ] General Anesthesia  [ ] Lenny Anesthesia  [ ] MAC    Vital Signs Last 24 Hrs  T(C): 36.4 (22 Jun 2018 05:30), Max: 37.1 (21 Jun 2018 20:47)  T(F): 97.6 (22 Jun 2018 05:30), Max: 98.8 (21 Jun 2018 20:47)  HR: 73 (22 Jun 2018 05:30) (69 - 86)  BP: 129/61 (22 Jun 2018 05:30) (112/72 - 129/61)  BP(mean): --  RR: 17 (22 Jun 2018 05:30) (17 - 18)  SpO2: 97% (22 Jun 2018 05:30) (97% - 100%)  I&O's Summary      [x ] NO APPARENT ANESTHESIA COMPLICATIONS      Comments:

## 2018-06-23 LAB
BACTERIA BLD CULT: SIGNIFICANT CHANGE UP
BACTERIA BLD CULT: SIGNIFICANT CHANGE UP
BASOPHILS # BLD AUTO: 0.04 K/UL — SIGNIFICANT CHANGE UP (ref 0–0.2)
BASOPHILS NFR BLD AUTO: 0.6 % — SIGNIFICANT CHANGE UP (ref 0–2)
BUN SERPL-MCNC: 17 MG/DL — SIGNIFICANT CHANGE UP (ref 7–23)
CALCIUM SERPL-MCNC: 8.2 MG/DL — LOW (ref 8.4–10.5)
CHLORIDE SERPL-SCNC: 104 MMOL/L — SIGNIFICANT CHANGE UP (ref 98–107)
CO2 SERPL-SCNC: 25 MMOL/L — SIGNIFICANT CHANGE UP (ref 22–31)
CREAT SERPL-MCNC: 0.78 MG/DL — SIGNIFICANT CHANGE UP (ref 0.5–1.3)
EOSINOPHIL # BLD AUTO: 0.43 K/UL — SIGNIFICANT CHANGE UP (ref 0–0.5)
EOSINOPHIL NFR BLD AUTO: 6.6 % — HIGH (ref 0–6)
GLUCOSE SERPL-MCNC: 93 MG/DL — SIGNIFICANT CHANGE UP (ref 70–99)
HCT VFR BLD CALC: 26.2 % — LOW (ref 34.5–45)
HGB BLD-MCNC: 8 G/DL — LOW (ref 11.5–15.5)
IMM GRANULOCYTES # BLD AUTO: 0.02 # — SIGNIFICANT CHANGE UP
IMM GRANULOCYTES NFR BLD AUTO: 0.3 % — SIGNIFICANT CHANGE UP (ref 0–1.5)
LYMPHOCYTES # BLD AUTO: 1.22 K/UL — SIGNIFICANT CHANGE UP (ref 1–3.3)
LYMPHOCYTES # BLD AUTO: 18.6 % — SIGNIFICANT CHANGE UP (ref 13–44)
MAGNESIUM SERPL-MCNC: 1.8 MG/DL — SIGNIFICANT CHANGE UP (ref 1.6–2.6)
MCHC RBC-ENTMCNC: 26.1 PG — LOW (ref 27–34)
MCHC RBC-ENTMCNC: 30.5 % — LOW (ref 32–36)
MCV RBC AUTO: 85.3 FL — SIGNIFICANT CHANGE UP (ref 80–100)
MONOCYTES # BLD AUTO: 0.48 K/UL — SIGNIFICANT CHANGE UP (ref 0–0.9)
MONOCYTES NFR BLD AUTO: 7.3 % — SIGNIFICANT CHANGE UP (ref 2–14)
NEUTROPHILS # BLD AUTO: 4.37 K/UL — SIGNIFICANT CHANGE UP (ref 1.8–7.4)
NEUTROPHILS NFR BLD AUTO: 66.6 % — SIGNIFICANT CHANGE UP (ref 43–77)
NRBC # FLD: 0 — SIGNIFICANT CHANGE UP
PLATELET # BLD AUTO: 381 K/UL — SIGNIFICANT CHANGE UP (ref 150–400)
PMV BLD: 9.6 FL — SIGNIFICANT CHANGE UP (ref 7–13)
POTASSIUM SERPL-MCNC: 3.7 MMOL/L — SIGNIFICANT CHANGE UP (ref 3.5–5.3)
POTASSIUM SERPL-SCNC: 3.7 MMOL/L — SIGNIFICANT CHANGE UP (ref 3.5–5.3)
RBC # BLD: 3.07 M/UL — LOW (ref 3.8–5.2)
RBC # FLD: 15.9 % — HIGH (ref 10.3–14.5)
SODIUM SERPL-SCNC: 138 MMOL/L — SIGNIFICANT CHANGE UP (ref 135–145)
WBC # BLD: 6.56 K/UL — SIGNIFICANT CHANGE UP (ref 3.8–10.5)
WBC # FLD AUTO: 6.56 K/UL — SIGNIFICANT CHANGE UP (ref 3.8–10.5)

## 2018-06-23 RX ORDER — POTASSIUM CHLORIDE 20 MEQ
20 PACKET (EA) ORAL ONCE
Qty: 0 | Refills: 0 | Status: COMPLETED | OUTPATIENT
Start: 2018-06-23 | End: 2018-06-23

## 2018-06-23 RX ADMIN — Medication 0.5 MILLIGRAM(S): at 21:47

## 2018-06-23 RX ADMIN — Medication 10 MILLIGRAM(S): at 17:52

## 2018-06-23 RX ADMIN — Medication 81 MILLIGRAM(S): at 13:41

## 2018-06-23 RX ADMIN — CEFTRIAXONE 100 GRAM(S): 500 INJECTION, POWDER, FOR SOLUTION INTRAMUSCULAR; INTRAVENOUS at 15:20

## 2018-06-23 RX ADMIN — PANTOPRAZOLE SODIUM 40 MILLIGRAM(S): 20 TABLET, DELAYED RELEASE ORAL at 17:53

## 2018-06-23 RX ADMIN — PANTOPRAZOLE SODIUM 40 MILLIGRAM(S): 20 TABLET, DELAYED RELEASE ORAL at 06:32

## 2018-06-23 RX ADMIN — RIVAROXABAN 20 MILLIGRAM(S): KIT at 17:53

## 2018-06-23 RX ADMIN — Medication 3 MILLIGRAM(S): at 21:47

## 2018-06-23 RX ADMIN — Medication 20 MILLIEQUIVALENT(S): at 13:41

## 2018-06-23 RX ADMIN — Medication 25 MILLIGRAM(S): at 06:32

## 2018-06-23 RX ADMIN — Medication 10 MILLIGRAM(S): at 06:32

## 2018-06-23 RX ADMIN — ATORVASTATIN CALCIUM 10 MILLIGRAM(S): 80 TABLET, FILM COATED ORAL at 21:47

## 2018-06-23 RX ADMIN — Medication 1 TABLET(S): at 13:42

## 2018-06-23 NOTE — PROGRESS NOTE ADULT - PROVIDER SPECIALTY LIST ADULT
Anesthesia
Electrophysiology
Electrophysiology
Internal Medicine
Electrophysiology

## 2018-06-23 NOTE — PROGRESS NOTE ADULT - ASSESSMENT
This is a 89 yo female with history of HTN, HLD, paroxysmal atrial fibrillation, DVT/PE on Xarelto, anxiety, insomnia, endometrial cancer s/p LYNETTE/BSO who presented to the ED in SVT at 190 b/min, likely AVNRT, refractory to Adenosine/Amiodarone.  Finally broke with IV Lopressor and she remained in normal sinus rhythm until this morning when she had  recurrence of SVT at 193 b/min despite being on CCB and BB.   We have spoken to her and her daughter at length  about an SVT ablation but she is undecided and would like to discuss this further with her daughter. In the meantime, she is febrile today with a mild increase in her WBC's and +UA and has been started on ceftriaxone. Will continue to monitor.  If she agrees, will do an SVT ablation when medical issues are resolved.
This is a 89 yo female with history of HTN, HLD, paroxysmal atrial fibrillation, DVT/PE on Xarelto, anxiety, insomnia, endometrial cancer s/p LYNETTE/BSO who presented to the ED in SVT at 190 b/min, likely AVNRT, refractory to Adenosine/Amiodarone.  Finally broke with IV Lopressor> Had an episode the next day despite  being on CCB and BB.   We have spoken to her and her daughter at length  about an SVT ablation but she has decided to undergo the procedure but wants to wait for her daughter before consenting.  This is her third day of antibiotics for UTI. She is no longer febrile and the leukocytosis has resolved.     Plan:  Will discontinue the beta blocker and CCB in preparation for the ablation.  Can give IV lopressor for episodes of SVT.           NPO after midnight           AM labs to include T&S           SVT ablation scheduled for 6/21/2018
· Assessment	  89 y/o female with a PMHx of PAF on Xarelto, history of PE/DVT on Xarelto, HTN, HLD, anxiety, endometrial cancer S/P LYNETTE with BSO, breast mass S/P lumpectomy presents to ED with shortness of breath, found to be in SVT.  Pt was given Adenosine 6mg and 12 mg with only transient response. Pt then got Amiodarone 150mg IV and Lopressor 1 mg IV x 5 doses with resolution of the tachycardia.     Problem/Plan - 1:  ·  Problem: SVT (supraventricular tachycardia).  Plan: Admit to telemetry,   EP consult appreciated  Continue Cardizem and Metoprolol for rate control  For SVT ablation         Problem/Plan - 2:  ·  Problem: Dehydration.  Plan: Pt given 2L NS in ED  Pt appears euvolemic at this time  Will avoid further IVF as pt recently admitted for heart failure.      Problem/Plan - 3:  ·  Problem: UTI (urinary tract infection).  Plan: UA largely positive with large LE and nitrites  Continue Ceftriaxone IV       Problem/Plan - 4:  ·  Problem: PAF (paroxysmal atrial fibrillation).  Plan: Pt currently NSR on telemetry, continue to monitor  Continue Cardizem and Metoprolol for rate control  Continue Xarelto for CVA prevention  CHADS score 3.      Problem/Plan - 5:  ·  Problem: History of pulmonary embolus (PE).  Plan: Continue Xarelto.      Problem/Plan - 6:  Problem: HTN (hypertension). Plan: Continue antihypertensive regimen  Monitor BP serially  Sodium restriction.     Problem/Plan - 7:  ·  Problem: HLD (hyperlipidemia).  Plan: Continue Lipitor  DASH diet.      Problem/Plan - 8:  ·  Problem: Anxiety.  Plan: Continue home meds.
· Assessment	  89 y/o female with a PMHx of PAF on Xarelto, history of PE/DVT on Xarelto, HTN, HLD, anxiety, endometrial cancer S/P LYNETTE with BSO, breast mass S/P lumpectomy presents to ED with shortness of breath, found to be in SVT.  Pt was given Adenosine 6mg and 12 mg with only transient response. Pt then got Amiodarone 150mg IV and Lopressor 1 mg IV x 5 doses with resolution of the tachycardia.     Problem/Plan - 1:  ·  Problem: SVT (supraventricular tachycardia).  Plan: Admit to telemetry,   S/p SVT ablation      Problem/Plan - 2:  ·  Problem: Dehydration.  Plan: BMP    Problem/Plan - 4:  ·  Problem: PAF (paroxysmal atrial fibrillation).  Plan: Pt currently NSR on telemetry, continue to monitor  Continue Cardizem and Metoprolol for rate control  ASA/Xarelto  CHADS score 3.        Problem/Plan - 6:  Problem: HTN (hypertension). Plan: Continue antihypertensive regimen  Sodium restriction.     Problem/Plan - 7:  ·  Problem: HLD (hyperlipidemia).  Plan: Continue Lipitor  DASH diet.      Problem/Plan - 8:  ·  Problem: Anxiety.  Plan: Continue home meds.
· Assessment	  89 y/o female with a PMHx of PAF on Xarelto, history of PE/DVT on Xarelto, HTN, HLD, anxiety, endometrial cancer S/P LYNETTE with BSO, breast mass S/P lumpectomy presents to ED with shortness of breath, found to be in SVT.  Pt was given Adenosine 6mg and 12 mg with only transient response. Pt then got Amiodarone 150mg IV and Lopressor 1 mg IV x 5 doses with resolution of the tachycardia.     Problem/Plan - 1:  ·  Problem: SVT (supraventricular tachycardia).  Plan: Admit to telemetry,   S/p SVT ablation      Problem/Plan - 2:  ·  Problem: Dehydration.  Plan:        Problem/Plan - 4:  ·  Problem: PAF (paroxysmal atrial fibrillation).  Plan: Pt currently NSR on telemetry, continue to monitor  Continue Cardizem and Metoprolol for rate control  ASA  CHADS score 3.        Problem/Plan - 6:  Problem: HTN (hypertension). Plan: Continue antihypertensive regimen  Sodium restriction.     Problem/Plan - 7:  ·  Problem: HLD (hyperlipidemia).  Plan: Continue Lipitor  DASH diet.      Problem/Plan - 8:  ·  Problem: Anxiety.  Plan: Continue home meds.
· Assessment	  89 y/o female with a PMHx of PAF on Xarelto, history of PE/DVT on Xarelto, HTN, HLD, anxiety, endometrial cancer S/P LYNETTE with BSO, breast mass S/P lumpectomy presents to ED with shortness of breath, found to be in SVT.  Pt was given Adenosine 6mg and 12 mg with only transient response. Pt then got Amiodarone 150mg IV and Lopressor 1 mg IV x 5 doses with resolution of the tachycardia.     Problem/Plan - 1:  ·  Problem: SVT (supraventricular tachycardia).  Plan: Admit to telemetry,   S/p SVT ablation      Problem/Plan - 2:  ·  Problem: Dehydration.  Plan: BMP    Problem/Plan - 4:  ·  Problem: PAF (paroxysmal atrial fibrillation).  Plan: Pt currently NSR on telemetry, continue to monitor  Continue Cardizem and Metoprolol for rate control  ASA/Xarelto  CHADS score 3.        Problem/Plan - 6:  Problem: HTN (hypertension). Plan: Continue antihypertensive regimen  Sodium restriction.     Problem/Plan - 7:  ·  Problem: HLD (hyperlipidemia).  Plan: Continue Lipitor  DASH diet.      Problem/Plan - 8:  ·  Problem: Anxiety.  Plan: Continue home meds.     Dc home tomorrow.

## 2018-06-23 NOTE — PROGRESS NOTE ADULT - SUBJECTIVE AND OBJECTIVE BOX
Patient is a 89y old  Female who presents with a chief complaint of Tachycardia (22 Jun 2018 10:07)      SUBJECTIVE / OVERNIGHT EVENTS:   Feels better.  Denies CP/SOB/Palpitation/HA.    MEDICATIONS  (STANDING):  ALPRAZolam 0.5 milliGRAM(s) Oral at bedtime  aspirin enteric coated 81 milliGRAM(s) Oral daily  atorvastatin 10 milliGRAM(s) Oral at bedtime  busPIRone 10 milliGRAM(s) Oral two times a day  cefTRIAXone   IVPB 1 Gram(s) IV Intermittent every 24 hours  ergocalciferol 43018 Unit(s) Oral <User Schedule>  melatonin 3 milliGRAM(s) Oral at bedtime  metoprolol succinate ER 25 milliGRAM(s) Oral daily  multivitamin 1 Tablet(s) Oral daily  nystatin Powder 1 Application(s) Topical two times a day  pantoprazole    Tablet 40 milliGRAM(s) Oral two times a day  polyethylene glycol 3350 17 Gram(s) Oral two times a day  rivaroxaban 20 milliGRAM(s) Oral every 24 hours  senna 2 Tablet(s) Oral at bedtime    MEDICATIONS  (PRN):  acetaminophen   Tablet. 650 milliGRAM(s) Oral every 6 hours PRN Mild Pain (1 - 3)  benzocaine 15 mG/menthol 3.6 mG Lozenge 1 Lozenge Oral every 4 hours PRN Sore Throat  docusate sodium 100 milliGRAM(s) Oral three times a day PRN Constipation  triamcinolone 0.1% Ointment 1 Application(s) Topical every 12 hours PRN itching        CAPILLARY BLOOD GLUCOSE        I&O's Summary      PHYSICAL EXAM:  GENERAL: NAD, well-developed  HEAD:  Atraumatic, Normocephalic  NECK: Supple, No JVD  CHEST/LUNG: Clear to auscultation bilaterally; No wheezing.  HEART: Regular rate and rhythm; No murmurs, rubs, or gallops  ABDOMEN: Soft, Nontender, Nondistended; Bowel sounds present  EXTREMITIES:   No clubbing, cyanosis, or edema  NEUROLOGY: AAO X 3  SKIN: No rashes    LABS:                        8.0    6.56  )-----------( 381      ( 23 Jun 2018 06:12 )             26.2     06-23    138  |  104  |  17  ----------------------------<  93  3.7   |  25  |  0.78    Ca    8.2<L>      23 Jun 2018 06:12  Mg     1.8     06-23              CAPILLARY BLOOD GLUCOSE        06-18 @ 19:36  Culture-urine --  Culture results --  method type --  Organism --  Organism Identification --  Specimen source BLOOD PERIPHERAL  06-18 @ 15:30  Culture-urine   COLONY COUNT: > = 100,000 CFU/ML  Culture results --  method type NEGATIVE MENA 43  Organism Klebsiella oxytoca  Organism Identification Klebsiella oxytoca  Specimen source URINE CATHETER           06-18 @ 19:36  Culture blood   NO ORGANISMS ISOLATED  Culture results --  Gram stain --  Gram stain blood --  Method type --  Organism --  Organism identification --  Specimen source BLOOD PERIPHERAL   06-18 @ 15:30  Culture blood --  Culture results --  Gram stain --  Gram stain blood --  Method type NEGATIVE MENA 43  Organism Klebsiella oxytoca  Organism identification Klebsiella oxytoca  Specimen source URINE CATHETER      RADIOLOGY & ADDITIONAL TESTS:    Imaging Personally Reviewed:    Consultant(s) Notes Reviewed:      Care Discussed with Consultants/Other Providers:

## 2018-06-24 VITALS
DIASTOLIC BLOOD PRESSURE: 62 MMHG | OXYGEN SATURATION: 95 % | SYSTOLIC BLOOD PRESSURE: 123 MMHG | RESPIRATION RATE: 16 BRPM | HEART RATE: 90 BPM | TEMPERATURE: 98 F

## 2018-06-24 RX ORDER — ATORVASTATIN CALCIUM 80 MG/1
1 TABLET, FILM COATED ORAL
Qty: 0 | Refills: 0 | DISCHARGE
Start: 2018-06-24

## 2018-06-24 RX ORDER — PANTOPRAZOLE SODIUM 20 MG/1
1 TABLET, DELAYED RELEASE ORAL
Qty: 60 | Refills: 0
Start: 2018-06-24 | End: 2018-07-23

## 2018-06-24 RX ORDER — METOPROLOL TARTRATE 50 MG
1 TABLET ORAL
Qty: 0 | Refills: 0 | DISCHARGE
Start: 2018-06-24

## 2018-06-24 RX ORDER — RIVAROXABAN 15 MG-20MG
1 KIT ORAL
Qty: 0 | Refills: 0 | DISCHARGE
Start: 2018-06-24

## 2018-06-24 RX ORDER — ATORVASTATIN CALCIUM 80 MG/1
1 TABLET, FILM COATED ORAL
Qty: 0 | Refills: 0 | COMMUNITY

## 2018-06-24 RX ORDER — ASPIRIN/CALCIUM CARB/MAGNESIUM 324 MG
1 TABLET ORAL
Qty: 0 | Refills: 0 | DISCHARGE
Start: 2018-06-24

## 2018-06-24 RX ADMIN — Medication 81 MILLIGRAM(S): at 12:32

## 2018-06-24 RX ADMIN — Medication 25 MILLIGRAM(S): at 05:39

## 2018-06-24 RX ADMIN — CEFTRIAXONE 100 GRAM(S): 500 INJECTION, POWDER, FOR SOLUTION INTRAMUSCULAR; INTRAVENOUS at 12:36

## 2018-06-24 RX ADMIN — NYSTATIN CREAM 1 APPLICATION(S): 100000 CREAM TOPICAL at 05:39

## 2018-06-24 RX ADMIN — POLYETHYLENE GLYCOL 3350 17 GRAM(S): 17 POWDER, FOR SOLUTION ORAL at 05:39

## 2018-06-24 RX ADMIN — Medication 10 MILLIGRAM(S): at 05:39

## 2018-06-24 RX ADMIN — PANTOPRAZOLE SODIUM 40 MILLIGRAM(S): 20 TABLET, DELAYED RELEASE ORAL at 05:39

## 2018-06-24 RX ADMIN — Medication 1 TABLET(S): at 12:32

## 2018-06-25 ENCOUNTER — INPATIENT (INPATIENT)
Facility: HOSPITAL | Age: 83
LOS: 12 days | Discharge: HOME HEALTH | End: 2018-07-08
Attending: INTERNAL MEDICINE | Admitting: INTERNAL MEDICINE
Payer: MEDICARE

## 2018-06-25 VITALS
HEART RATE: 96 BPM | DIASTOLIC BLOOD PRESSURE: 71 MMHG | OXYGEN SATURATION: 100 % | RESPIRATION RATE: 18 BRPM | TEMPERATURE: 98 F | SYSTOLIC BLOOD PRESSURE: 133 MMHG

## 2018-06-25 DIAGNOSIS — Z98.49 CATARACT EXTRACTION STATUS, UNSPECIFIED EYE: Chronic | ICD-10-CM

## 2018-06-25 DIAGNOSIS — Z90.710 ACQUIRED ABSENCE OF BOTH CERVIX AND UTERUS: Chronic | ICD-10-CM

## 2018-06-25 DIAGNOSIS — R06.00 DYSPNEA, UNSPECIFIED: ICD-10-CM

## 2018-06-25 PROBLEM — I26.99 OTHER PULMONARY EMBOLISM WITHOUT ACUTE COR PULMONALE: Chronic | Status: ACTIVE | Noted: 2018-06-18

## 2018-06-25 LAB
ALBUMIN SERPL ELPH-MCNC: 3.1 G/DL — LOW (ref 3.3–5)
ALP SERPL-CCNC: 92 U/L — SIGNIFICANT CHANGE UP (ref 40–120)
ALT FLD-CCNC: 19 U/L — SIGNIFICANT CHANGE UP (ref 4–33)
APTT BLD: 41.8 SEC — HIGH (ref 27.5–37.4)
AST SERPL-CCNC: 27 U/L — SIGNIFICANT CHANGE UP (ref 4–32)
BASE EXCESS BLDV CALC-SCNC: 4.2 MMOL/L — SIGNIFICANT CHANGE UP
BASOPHILS # BLD AUTO: 0.04 K/UL — SIGNIFICANT CHANGE UP (ref 0–0.2)
BASOPHILS NFR BLD AUTO: 0.4 % — SIGNIFICANT CHANGE UP (ref 0–2)
BILIRUB SERPL-MCNC: 0.4 MG/DL — SIGNIFICANT CHANGE UP (ref 0.2–1.2)
BLOOD GAS VENOUS - CREATININE: 0.78 MG/DL — SIGNIFICANT CHANGE UP (ref 0.5–1.3)
BUN SERPL-MCNC: 16 MG/DL — SIGNIFICANT CHANGE UP (ref 7–23)
CALCIUM SERPL-MCNC: 8.9 MG/DL — SIGNIFICANT CHANGE UP (ref 8.4–10.5)
CHLORIDE BLDV-SCNC: 101 MMOL/L — SIGNIFICANT CHANGE UP (ref 96–108)
CHLORIDE SERPL-SCNC: 95 MMOL/L — LOW (ref 98–107)
CO2 SERPL-SCNC: 25 MMOL/L — SIGNIFICANT CHANGE UP (ref 22–31)
CREAT SERPL-MCNC: 0.79 MG/DL — SIGNIFICANT CHANGE UP (ref 0.5–1.3)
EOSINOPHIL # BLD AUTO: 0.16 K/UL — SIGNIFICANT CHANGE UP (ref 0–0.5)
EOSINOPHIL NFR BLD AUTO: 1.6 % — SIGNIFICANT CHANGE UP (ref 0–6)
GAS PNL BLDV: 136 MMOL/L — SIGNIFICANT CHANGE UP (ref 136–146)
GLUCOSE BLDV-MCNC: 100 — HIGH (ref 70–99)
GLUCOSE SERPL-MCNC: 97 MG/DL — SIGNIFICANT CHANGE UP (ref 70–99)
HCO3 BLDV-SCNC: 28 MMOL/L — HIGH (ref 20–27)
HCT VFR BLD CALC: 29.9 % — LOW (ref 34.5–45)
HCT VFR BLDV CALC: 30.5 % — LOW (ref 34.5–45)
HGB BLD-MCNC: 9.5 G/DL — LOW (ref 11.5–15.5)
HGB BLDV-MCNC: 9.9 G/DL — LOW (ref 11.5–15.5)
IMM GRANULOCYTES # BLD AUTO: 0.03 # — SIGNIFICANT CHANGE UP
IMM GRANULOCYTES NFR BLD AUTO: 0.3 % — SIGNIFICANT CHANGE UP (ref 0–1.5)
INR BLD: 2.93 — HIGH (ref 0.88–1.17)
LACTATE BLDV-MCNC: 1.6 MMOL/L — SIGNIFICANT CHANGE UP (ref 0.5–2)
LYMPHOCYTES # BLD AUTO: 1.59 K/UL — SIGNIFICANT CHANGE UP (ref 1–3.3)
LYMPHOCYTES # BLD AUTO: 16.3 % — SIGNIFICANT CHANGE UP (ref 13–44)
MCHC RBC-ENTMCNC: 25.8 PG — LOW (ref 27–34)
MCHC RBC-ENTMCNC: 31.8 % — LOW (ref 32–36)
MCV RBC AUTO: 81.3 FL — SIGNIFICANT CHANGE UP (ref 80–100)
MONOCYTES # BLD AUTO: 0.69 K/UL — SIGNIFICANT CHANGE UP (ref 0–0.9)
MONOCYTES NFR BLD AUTO: 7.1 % — SIGNIFICANT CHANGE UP (ref 2–14)
NEUTROPHILS # BLD AUTO: 7.22 K/UL — SIGNIFICANT CHANGE UP (ref 1.8–7.4)
NEUTROPHILS NFR BLD AUTO: 74.3 % — SIGNIFICANT CHANGE UP (ref 43–77)
NRBC # FLD: 0 — SIGNIFICANT CHANGE UP
NT-PROBNP SERPL-SCNC: 4407 PG/ML — SIGNIFICANT CHANGE UP
PCO2 BLDV: 40 MMHG — LOW (ref 41–51)
PH BLDV: 7.46 PH — HIGH (ref 7.32–7.43)
PLATELET # BLD AUTO: 419 K/UL — HIGH (ref 150–400)
PMV BLD: 9.2 FL — SIGNIFICANT CHANGE UP (ref 7–13)
PO2 BLDV: 47 MMHG — HIGH (ref 35–40)
POTASSIUM BLDV-SCNC: 4.1 MMOL/L — SIGNIFICANT CHANGE UP (ref 3.4–4.5)
POTASSIUM SERPL-MCNC: 4.2 MMOL/L — SIGNIFICANT CHANGE UP (ref 3.5–5.3)
POTASSIUM SERPL-SCNC: 4.2 MMOL/L — SIGNIFICANT CHANGE UP (ref 3.5–5.3)
PROT SERPL-MCNC: 6.9 G/DL — SIGNIFICANT CHANGE UP (ref 6–8.3)
PROTHROM AB SERPL-ACNC: 33.2 SEC — HIGH (ref 9.8–13.1)
RBC # BLD: 3.68 M/UL — LOW (ref 3.8–5.2)
RBC # FLD: 15.7 % — HIGH (ref 10.3–14.5)
SAO2 % BLDV: 78.8 % — SIGNIFICANT CHANGE UP (ref 60–85)
SODIUM SERPL-SCNC: 135 MMOL/L — SIGNIFICANT CHANGE UP (ref 135–145)
TROPONIN T, HIGH SENSITIVITY: 59 NG/L — CRITICAL HIGH (ref ?–14)
WBC # BLD: 9.73 K/UL — SIGNIFICANT CHANGE UP (ref 3.8–10.5)
WBC # FLD AUTO: 9.73 K/UL — SIGNIFICANT CHANGE UP (ref 3.8–10.5)

## 2018-06-25 PROCEDURE — 71045 X-RAY EXAM CHEST 1 VIEW: CPT | Mod: 26

## 2018-06-25 RX ORDER — ACETAMINOPHEN 500 MG
975 TABLET ORAL ONCE
Qty: 0 | Refills: 0 | Status: DISCONTINUED | OUTPATIENT
Start: 2018-06-25 | End: 2018-06-25

## 2018-06-25 RX ORDER — SODIUM CHLORIDE 9 MG/ML
250 INJECTION INTRAMUSCULAR; INTRAVENOUS; SUBCUTANEOUS ONCE
Qty: 0 | Refills: 0 | Status: COMPLETED | OUTPATIENT
Start: 2018-06-25 | End: 2018-06-25

## 2018-06-25 RX ORDER — ACETAMINOPHEN 500 MG
650 TABLET ORAL ONCE
Qty: 0 | Refills: 0 | Status: COMPLETED | OUTPATIENT
Start: 2018-06-25 | End: 2018-06-25

## 2018-06-25 RX ORDER — AZTREONAM 2 G
1000 VIAL (EA) INJECTION ONCE
Qty: 0 | Refills: 0 | Status: COMPLETED | OUTPATIENT
Start: 2018-06-25 | End: 2018-06-25

## 2018-06-25 RX ORDER — VANCOMYCIN HCL 1 G
1000 VIAL (EA) INTRAVENOUS ONCE
Qty: 0 | Refills: 0 | Status: COMPLETED | OUTPATIENT
Start: 2018-06-25 | End: 2018-06-25

## 2018-06-25 RX ADMIN — Medication 650 MILLIGRAM(S): at 17:53

## 2018-06-25 RX ADMIN — SODIUM CHLORIDE 500 MILLILITER(S): 9 INJECTION INTRAMUSCULAR; INTRAVENOUS; SUBCUTANEOUS at 17:54

## 2018-06-25 RX ADMIN — Medication 250 MILLIGRAM(S): at 19:55

## 2018-06-25 RX ADMIN — Medication 50 MILLIGRAM(S): at 17:54

## 2018-06-25 NOTE — ED ADULT NURSE NOTE - OBJECTIVE STATEMENT
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  (break)

## 2018-06-25 NOTE — ED ADULT TRIAGE NOTE - CHIEF COMPLAINT QUOTE
pt presents c/o sob, weakness and cough s/p cardiac ablation last week, d/c'd yesterday from [facility]. denies any additional symptoms. PMHX: CHF, htn, high cholesterol, afib, cardiac ablation

## 2018-06-25 NOTE — ED PROVIDER NOTE - ATTENDING CONTRIBUTION TO CARE
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 Pt was just dc from [facility] yesterday s/p ablation. Pt endorsing sob at rest that is progressively getting worse assoaciated with nonproductive cough. Denies chest pain, palpitations, diaphoresis, n/v, syncope, fever or chills. Pt compliant with all meds  vs noted, pt slightly anxious, tachycardic, hypoxic on room air improved with supplemental o2, rest wnl  elderly female in nad, ncat, perrl, no jvd, rrr tachy, good air entry with b/l crackles at the bases, abd soft ntnd, no le edema no calf tenderness, neuro intact  ekg today nsr with sinus arrythmia   a/p: elderly female with multple comorbiites with recent ablation p/w sob at rest, concern for pna vs chfe  will check labs, cxr, continue cardioresp monitoring  reassess

## 2018-06-25 NOTE — ED PROVIDER NOTE - OBJECTIVE STATEMENT
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 Pt was dc from [facility] x1 day ago after admission for SVT. Pt had ablation x4 days ago. Pt admits to feeling well upon dc but last night developed sob while lying in bed. Pt states that the sob became worse today. Pt also admits to non productive cough. Pt denies chest pain, palpitations, diaphoresis, n/v/d, numbness, tingling, dizziness, syncope, fever or chills.

## 2018-06-25 NOTE — ED ADULT NURSE NOTE - PSH
History of Breast Lump/Mass Excision- [facility] ([place])- left- 1971    History of Colonoscopy- 2011/Sept    History of D&C- 8/12/11    Status post cataract extraction  OS  Status post hysterectomy  endometrial cancer

## 2018-06-26 DIAGNOSIS — R06.00 DYSPNEA, UNSPECIFIED: ICD-10-CM

## 2018-06-26 DIAGNOSIS — I48.0 PAROXYSMAL ATRIAL FIBRILLATION: ICD-10-CM

## 2018-06-26 DIAGNOSIS — I10 ESSENTIAL (PRIMARY) HYPERTENSION: ICD-10-CM

## 2018-06-26 DIAGNOSIS — I50.9 HEART FAILURE, UNSPECIFIED: ICD-10-CM

## 2018-06-26 LAB
APPEARANCE UR: SIGNIFICANT CHANGE UP
B PERT DNA SPEC QL NAA+PROBE: SIGNIFICANT CHANGE UP
BACTERIA # UR AUTO: SIGNIFICANT CHANGE UP
BASOPHILS # BLD AUTO: 0.04 K/UL — SIGNIFICANT CHANGE UP (ref 0–0.2)
BASOPHILS NFR BLD AUTO: 0.3 % — SIGNIFICANT CHANGE UP (ref 0–2)
BILIRUB UR-MCNC: NEGATIVE — SIGNIFICANT CHANGE UP
BLOOD UR QL VISUAL: HIGH
BUN SERPL-MCNC: 15 MG/DL — SIGNIFICANT CHANGE UP (ref 7–23)
C PNEUM DNA SPEC QL NAA+PROBE: NOT DETECTED — SIGNIFICANT CHANGE UP
CALCIUM SERPL-MCNC: 8.9 MG/DL — SIGNIFICANT CHANGE UP (ref 8.4–10.5)
CHLORIDE SERPL-SCNC: 95 MMOL/L — LOW (ref 98–107)
CHOLEST SERPL-MCNC: 117 MG/DL — LOW (ref 120–199)
CK MB BLD-MCNC: 1.57 NG/ML — SIGNIFICANT CHANGE UP (ref 1–4.7)
CK MB BLD-MCNC: 2.37 NG/ML — SIGNIFICANT CHANGE UP (ref 1–4.7)
CK MB BLD-MCNC: SIGNIFICANT CHANGE UP (ref 0–2.5)
CK MB BLD-MCNC: SIGNIFICANT CHANGE UP (ref 0–2.5)
CK SERPL-CCNC: 50 U/L — SIGNIFICANT CHANGE UP (ref 25–170)
CK SERPL-CCNC: 57 U/L — SIGNIFICANT CHANGE UP (ref 25–170)
CO2 SERPL-SCNC: 26 MMOL/L — SIGNIFICANT CHANGE UP (ref 22–31)
COLOR SPEC: YELLOW — SIGNIFICANT CHANGE UP
CREAT SERPL-MCNC: 0.8 MG/DL — SIGNIFICANT CHANGE UP (ref 0.5–1.3)
EOSINOPHIL # BLD AUTO: 0.23 K/UL — SIGNIFICANT CHANGE UP (ref 0–0.5)
EOSINOPHIL NFR BLD AUTO: 1.9 % — SIGNIFICANT CHANGE UP (ref 0–6)
FLUAV H1 2009 PAND RNA SPEC QL NAA+PROBE: NOT DETECTED — SIGNIFICANT CHANGE UP
FLUAV H1 RNA SPEC QL NAA+PROBE: NOT DETECTED — SIGNIFICANT CHANGE UP
FLUAV H3 RNA SPEC QL NAA+PROBE: NOT DETECTED — SIGNIFICANT CHANGE UP
FLUAV SUBTYP SPEC NAA+PROBE: SIGNIFICANT CHANGE UP
FLUBV RNA SPEC QL NAA+PROBE: NOT DETECTED — SIGNIFICANT CHANGE UP
GLUCOSE SERPL-MCNC: 102 MG/DL — HIGH (ref 70–99)
GLUCOSE UR-MCNC: NEGATIVE — SIGNIFICANT CHANGE UP
HADV DNA SPEC QL NAA+PROBE: NOT DETECTED — SIGNIFICANT CHANGE UP
HBA1C BLD-MCNC: 5.2 % — SIGNIFICANT CHANGE UP (ref 4–5.6)
HCOV 229E RNA SPEC QL NAA+PROBE: NOT DETECTED — SIGNIFICANT CHANGE UP
HCOV HKU1 RNA SPEC QL NAA+PROBE: NOT DETECTED — SIGNIFICANT CHANGE UP
HCOV NL63 RNA SPEC QL NAA+PROBE: NOT DETECTED — SIGNIFICANT CHANGE UP
HCOV OC43 RNA SPEC QL NAA+PROBE: NOT DETECTED — SIGNIFICANT CHANGE UP
HCT VFR BLD CALC: 27.8 % — LOW (ref 34.5–45)
HDLC SERPL-MCNC: 37 MG/DL — LOW (ref 45–65)
HGB BLD-MCNC: 8.7 G/DL — LOW (ref 11.5–15.5)
HMPV RNA SPEC QL NAA+PROBE: NOT DETECTED — SIGNIFICANT CHANGE UP
HPIV1 RNA SPEC QL NAA+PROBE: NOT DETECTED — SIGNIFICANT CHANGE UP
HPIV2 RNA SPEC QL NAA+PROBE: NOT DETECTED — SIGNIFICANT CHANGE UP
HPIV3 RNA SPEC QL NAA+PROBE: NOT DETECTED — SIGNIFICANT CHANGE UP
HPIV4 RNA SPEC QL NAA+PROBE: NOT DETECTED — SIGNIFICANT CHANGE UP
IMM GRANULOCYTES # BLD AUTO: 0.04 # — SIGNIFICANT CHANGE UP
IMM GRANULOCYTES NFR BLD AUTO: 0.3 % — SIGNIFICANT CHANGE UP (ref 0–1.5)
KETONES UR-MCNC: NEGATIVE — SIGNIFICANT CHANGE UP
LEUKOCYTE ESTERASE UR-ACNC: HIGH
LIPID PNL WITH DIRECT LDL SERPL: 66 MG/DL — SIGNIFICANT CHANGE UP
LYMPHOCYTES # BLD AUTO: 0.72 K/UL — LOW (ref 1–3.3)
LYMPHOCYTES # BLD AUTO: 6.1 % — LOW (ref 13–44)
M PNEUMO DNA SPEC QL NAA+PROBE: NOT DETECTED — SIGNIFICANT CHANGE UP
MAGNESIUM SERPL-MCNC: 1.5 MG/DL — LOW (ref 1.6–2.6)
MCHC RBC-ENTMCNC: 26.1 PG — LOW (ref 27–34)
MCHC RBC-ENTMCNC: 31.3 % — LOW (ref 32–36)
MCV RBC AUTO: 83.5 FL — SIGNIFICANT CHANGE UP (ref 80–100)
MONOCYTES # BLD AUTO: 0.54 K/UL — SIGNIFICANT CHANGE UP (ref 0–0.9)
MONOCYTES NFR BLD AUTO: 4.5 % — SIGNIFICANT CHANGE UP (ref 2–14)
MUCOUS THREADS # UR AUTO: SIGNIFICANT CHANGE UP
NEUTROPHILS # BLD AUTO: 10.32 K/UL — HIGH (ref 1.8–7.4)
NEUTROPHILS NFR BLD AUTO: 86.9 % — HIGH (ref 43–77)
NITRITE UR-MCNC: NEGATIVE — SIGNIFICANT CHANGE UP
NON-SQ EPI CELLS # UR AUTO: 1 — SIGNIFICANT CHANGE UP
NRBC # FLD: 0 — SIGNIFICANT CHANGE UP
PH UR: 5.5 — SIGNIFICANT CHANGE UP (ref 4.6–8)
PHOSPHATE SERPL-MCNC: 3.8 MG/DL — SIGNIFICANT CHANGE UP (ref 2.5–4.5)
PLATELET # BLD AUTO: 378 K/UL — SIGNIFICANT CHANGE UP (ref 150–400)
PMV BLD: 9.7 FL — SIGNIFICANT CHANGE UP (ref 7–13)
POTASSIUM SERPL-MCNC: 3.7 MMOL/L — SIGNIFICANT CHANGE UP (ref 3.5–5.3)
POTASSIUM SERPL-SCNC: 3.7 MMOL/L — SIGNIFICANT CHANGE UP (ref 3.5–5.3)
PROT UR-MCNC: 20 MG/DL — SIGNIFICANT CHANGE UP
RBC # BLD: 3.33 M/UL — LOW (ref 3.8–5.2)
RBC # FLD: 15.8 % — HIGH (ref 10.3–14.5)
RBC CASTS # UR COMP ASSIST: SIGNIFICANT CHANGE UP (ref 0–?)
RSV RNA SPEC QL NAA+PROBE: NOT DETECTED — SIGNIFICANT CHANGE UP
RV+EV RNA SPEC QL NAA+PROBE: POSITIVE — HIGH
SODIUM SERPL-SCNC: 135 MMOL/L — SIGNIFICANT CHANGE UP (ref 135–145)
SP GR SPEC: 1.01 — SIGNIFICANT CHANGE UP (ref 1–1.04)
SPECIMEN SOURCE: SIGNIFICANT CHANGE UP
SPECIMEN SOURCE: SIGNIFICANT CHANGE UP
SQUAMOUS # UR AUTO: SIGNIFICANT CHANGE UP
TRIGL SERPL-MCNC: 78 MG/DL — SIGNIFICANT CHANGE UP (ref 10–149)
TROPONIN T, HIGH SENSITIVITY: 58 NG/L — CRITICAL HIGH (ref ?–14)
TROPONIN T, HIGH SENSITIVITY: 68 NG/L — CRITICAL HIGH (ref ?–14)
TSH SERPL-MCNC: 3.87 UIU/ML — SIGNIFICANT CHANGE UP (ref 0.27–4.2)
UROBILINOGEN FLD QL: NORMAL MG/DL — SIGNIFICANT CHANGE UP
WBC # BLD: 11.89 K/UL — HIGH (ref 3.8–10.5)
WBC # FLD AUTO: 11.89 K/UL — HIGH (ref 3.8–10.5)
WBC UR QL: SIGNIFICANT CHANGE UP (ref 0–?)

## 2018-06-26 PROCEDURE — 71250 CT THORAX DX C-: CPT | Mod: 26

## 2018-06-26 RX ORDER — DOCUSATE SODIUM 100 MG
100 CAPSULE ORAL THREE TIMES A DAY
Qty: 0 | Refills: 0 | Status: DISCONTINUED | OUTPATIENT
Start: 2018-06-26 | End: 2018-07-08

## 2018-06-26 RX ORDER — SODIUM CHLORIDE 9 MG/ML
3 INJECTION INTRAMUSCULAR; INTRAVENOUS; SUBCUTANEOUS EVERY 8 HOURS
Qty: 0 | Refills: 0 | Status: DISCONTINUED | OUTPATIENT
Start: 2018-06-26 | End: 2018-07-08

## 2018-06-26 RX ORDER — METOPROLOL TARTRATE 50 MG
25 TABLET ORAL DAILY
Qty: 0 | Refills: 0 | Status: DISCONTINUED | OUTPATIENT
Start: 2018-06-26 | End: 2018-07-08

## 2018-06-26 RX ORDER — LANOLIN ALCOHOL/MO/W.PET/CERES
3 CREAM (GRAM) TOPICAL AT BEDTIME
Qty: 0 | Refills: 0 | Status: DISCONTINUED | OUTPATIENT
Start: 2018-06-26 | End: 2018-07-08

## 2018-06-26 RX ORDER — FUROSEMIDE 40 MG
20 TABLET ORAL DAILY
Qty: 0 | Refills: 0 | Status: DISCONTINUED | OUTPATIENT
Start: 2018-06-26 | End: 2018-06-29

## 2018-06-26 RX ORDER — ATORVASTATIN CALCIUM 80 MG/1
10 TABLET, FILM COATED ORAL AT BEDTIME
Qty: 0 | Refills: 0 | Status: DISCONTINUED | OUTPATIENT
Start: 2018-06-26 | End: 2018-07-08

## 2018-06-26 RX ORDER — PANTOPRAZOLE SODIUM 20 MG/1
40 TABLET, DELAYED RELEASE ORAL DAILY
Qty: 0 | Refills: 0 | Status: DISCONTINUED | OUTPATIENT
Start: 2018-06-26 | End: 2018-07-08

## 2018-06-26 RX ORDER — LANOLIN ALCOHOL/MO/W.PET/CERES
3 CREAM (GRAM) TOPICAL ONCE
Qty: 0 | Refills: 0 | Status: COMPLETED | OUTPATIENT
Start: 2018-06-26 | End: 2018-06-26

## 2018-06-26 RX ORDER — IPRATROPIUM BROMIDE 0.2 MG/ML
500 SOLUTION, NON-ORAL INHALATION ONCE
Qty: 0 | Refills: 0 | Status: COMPLETED | OUTPATIENT
Start: 2018-06-26 | End: 2018-06-26

## 2018-06-26 RX ORDER — PANTOPRAZOLE SODIUM 20 MG/1
40 TABLET, DELAYED RELEASE ORAL
Qty: 0 | Refills: 0 | Status: DISCONTINUED | OUTPATIENT
Start: 2018-06-26 | End: 2018-06-26

## 2018-06-26 RX ORDER — SENNA PLUS 8.6 MG/1
2 TABLET ORAL AT BEDTIME
Qty: 0 | Refills: 0 | Status: DISCONTINUED | OUTPATIENT
Start: 2018-06-26 | End: 2018-07-08

## 2018-06-26 RX ORDER — RIVAROXABAN 15 MG-20MG
20 KIT ORAL EVERY 24 HOURS
Qty: 0 | Refills: 0 | Status: DISCONTINUED | OUTPATIENT
Start: 2018-06-26 | End: 2018-07-08

## 2018-06-26 RX ORDER — ASPIRIN/CALCIUM CARB/MAGNESIUM 324 MG
81 TABLET ORAL DAILY
Qty: 0 | Refills: 0 | Status: DISCONTINUED | OUTPATIENT
Start: 2018-06-26 | End: 2018-07-08

## 2018-06-26 RX ORDER — MAGNESIUM SULFATE 500 MG/ML
1 VIAL (ML) INJECTION ONCE
Qty: 0 | Refills: 0 | Status: COMPLETED | OUTPATIENT
Start: 2018-06-26 | End: 2018-06-26

## 2018-06-26 RX ADMIN — PANTOPRAZOLE SODIUM 40 MILLIGRAM(S): 20 TABLET, DELAYED RELEASE ORAL at 14:32

## 2018-06-26 RX ADMIN — Medication 3 MILLIGRAM(S): at 00:37

## 2018-06-26 RX ADMIN — Medication 25 MILLIGRAM(S): at 04:42

## 2018-06-26 RX ADMIN — SODIUM CHLORIDE 3 MILLILITER(S): 9 INJECTION INTRAMUSCULAR; INTRAVENOUS; SUBCUTANEOUS at 21:16

## 2018-06-26 RX ADMIN — Medication 20 MILLIGRAM(S): at 04:20

## 2018-06-26 RX ADMIN — Medication 81 MILLIGRAM(S): at 14:32

## 2018-06-26 RX ADMIN — SODIUM CHLORIDE 3 MILLILITER(S): 9 INJECTION INTRAMUSCULAR; INTRAVENOUS; SUBCUTANEOUS at 06:06

## 2018-06-26 RX ADMIN — RIVAROXABAN 20 MILLIGRAM(S): KIT at 17:55

## 2018-06-26 RX ADMIN — SODIUM CHLORIDE 3 MILLILITER(S): 9 INJECTION INTRAMUSCULAR; INTRAVENOUS; SUBCUTANEOUS at 14:32

## 2018-06-26 RX ADMIN — ATORVASTATIN CALCIUM 10 MILLIGRAM(S): 80 TABLET, FILM COATED ORAL at 21:17

## 2018-06-26 RX ADMIN — Medication 1 TABLET(S): at 14:32

## 2018-06-26 RX ADMIN — Medication 100 GRAM(S): at 14:32

## 2018-06-26 RX ADMIN — Medication 10 MILLIGRAM(S): at 17:55

## 2018-06-26 RX ADMIN — Medication 10 MILLIGRAM(S): at 06:37

## 2018-06-26 RX ADMIN — Medication 3 MILLIGRAM(S): at 21:17

## 2018-06-26 RX ADMIN — Medication 500 MICROGRAM(S): at 00:37

## 2018-06-26 NOTE — PHYSICAL THERAPY INITIAL EVALUATION ADULT - PERTINENT HX OF CURRENT PROBLEM, REHAB EVAL
89 y/o female with a PMHx of PAF on Xarelto, history of PE/DVT on Xarelto, HTN, HLD, anxiety, endometrial cancer S/P LYNETTE with BSO, breast mass S/P lumpectomy presents to ED with shortness of breath, found to be in SVT, s/p ablation.

## 2018-06-26 NOTE — H&P ADULT - ASSESSMENT
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 Pt was discharged from [facility] x 1 day ago after admission for SVT. Pt had ablation x 4 days ago. Pt admits to feeling well upon discharge but last night developed SOB and cough while lying in bed. Pt states that the SOB became progressively worse today. Pt states she had a non-productive cough. Pt has no chest pain, palpitations, diaphoresis, n/v/d, abdominal pain, numbness, tingling, dizziness, syncope, fever, chills.

## 2018-06-26 NOTE — PHYSICAL THERAPY INITIAL EVALUATION ADULT - ADDITIONAL COMMENTS
Pt reports that she lives alone in a private house with a few steps to negotiate to enter the house; however no steps to negotiate through the garage and pt has a stair lift inside. Pt was recently discharged 2 days ago from the hospital where she reports that she has not been non-ambulating; however performing transfers from bed<->transport chair with assist. Pt reports that she has a Home Health Aide 7 days/week from 9AM-7PM and reports that her daughter has been staying with her overnight. Pt was suppose to receive home PT but had to come back to hospital     Pt left comfortable on stretcher, NAD, all lines intact, all precautions maintained, and RN aware of PT evaluation.

## 2018-06-26 NOTE — PHYSICAL THERAPY INITIAL EVALUATION ADULT - GENERAL OBSERVATIONS, REHAB EVAL
Pt encountered supine on stretcher in ED, no distress, AxOx4, with +IV, 2L of O2 through nasal cannula and +cardiac monitor

## 2018-06-26 NOTE — PHYSICAL THERAPY INITIAL EVALUATION ADULT - PATIENT PROFILE REVIEW, REHAB EVAL
No Formal Activity Order in the computer; spoke with OMAR Coy prior to PT evaluation--> Pt OK for PT consult/yes

## 2018-06-26 NOTE — H&P ADULT - PROBLEM SELECTOR PLAN 1
Check RVP, CXR.   Monitor pt on Telemetry.   Will discuss plan with Dr. Luis. Check RVP, CT chest ordered  Monitor pt on Telemetry.   Will discuss plan with Dr. Luis.

## 2018-06-26 NOTE — H&P ADULT - HISTORY OF PRESENT ILLNESS
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 Pt was discharged from [facility] x 1 day ago after admission for SVT. Pt had ablation x 4 days ago. Pt admits to feeling well upon discharge but last night developed SOB and cough while lying in bed. Pt states that the SOB became progressively worse today. Pt states she had a non-productive cough. Pt has no chest pain, palpitations, diaphoresis, n/v/d, abdominal pain, numbness, tingling, dizziness, syncope, fever, chills.

## 2018-06-27 LAB
BASOPHILS # BLD AUTO: 0.02 K/UL — SIGNIFICANT CHANGE UP (ref 0–0.2)
BASOPHILS NFR BLD AUTO: 0.3 % — SIGNIFICANT CHANGE UP (ref 0–2)
BUN SERPL-MCNC: 16 MG/DL — SIGNIFICANT CHANGE UP (ref 7–23)
CALCIUM SERPL-MCNC: 8.2 MG/DL — LOW (ref 8.4–10.5)
CHLORIDE SERPL-SCNC: 96 MMOL/L — LOW (ref 98–107)
CO2 SERPL-SCNC: 27 MMOL/L — SIGNIFICANT CHANGE UP (ref 22–31)
CREAT SERPL-MCNC: 0.78 MG/DL — SIGNIFICANT CHANGE UP (ref 0.5–1.3)
EOSINOPHIL # BLD AUTO: 0.31 K/UL — SIGNIFICANT CHANGE UP (ref 0–0.5)
EOSINOPHIL NFR BLD AUTO: 4.9 % — SIGNIFICANT CHANGE UP (ref 0–6)
GLUCOSE SERPL-MCNC: 91 MG/DL — SIGNIFICANT CHANGE UP (ref 70–99)
HCT VFR BLD CALC: 25.5 % — LOW (ref 34.5–45)
HGB BLD-MCNC: 8 G/DL — LOW (ref 11.5–15.5)
IMM GRANULOCYTES # BLD AUTO: 0.02 # — SIGNIFICANT CHANGE UP
IMM GRANULOCYTES NFR BLD AUTO: 0.3 % — SIGNIFICANT CHANGE UP (ref 0–1.5)
LYMPHOCYTES # BLD AUTO: 1.49 K/UL — SIGNIFICANT CHANGE UP (ref 1–3.3)
LYMPHOCYTES # BLD AUTO: 23.4 % — SIGNIFICANT CHANGE UP (ref 13–44)
MAGNESIUM SERPL-MCNC: 1.9 MG/DL — SIGNIFICANT CHANGE UP (ref 1.6–2.6)
MCHC RBC-ENTMCNC: 25.5 PG — LOW (ref 27–34)
MCHC RBC-ENTMCNC: 31.4 % — LOW (ref 32–36)
MCV RBC AUTO: 81.2 FL — SIGNIFICANT CHANGE UP (ref 80–100)
MONOCYTES # BLD AUTO: 0.64 K/UL — SIGNIFICANT CHANGE UP (ref 0–0.9)
MONOCYTES NFR BLD AUTO: 10 % — SIGNIFICANT CHANGE UP (ref 2–14)
NEUTROPHILS # BLD AUTO: 3.89 K/UL — SIGNIFICANT CHANGE UP (ref 1.8–7.4)
NEUTROPHILS NFR BLD AUTO: 61.1 % — SIGNIFICANT CHANGE UP (ref 43–77)
NRBC # FLD: 0 — SIGNIFICANT CHANGE UP
PLATELET # BLD AUTO: 321 K/UL — SIGNIFICANT CHANGE UP (ref 150–400)
PMV BLD: 9.5 FL — SIGNIFICANT CHANGE UP (ref 7–13)
POTASSIUM SERPL-MCNC: 3.5 MMOL/L — SIGNIFICANT CHANGE UP (ref 3.5–5.3)
POTASSIUM SERPL-SCNC: 3.5 MMOL/L — SIGNIFICANT CHANGE UP (ref 3.5–5.3)
RBC # BLD: 3.14 M/UL — LOW (ref 3.8–5.2)
RBC # FLD: 15.8 % — HIGH (ref 10.3–14.5)
SODIUM SERPL-SCNC: 135 MMOL/L — SIGNIFICANT CHANGE UP (ref 135–145)
WBC # BLD: 6.37 K/UL — SIGNIFICANT CHANGE UP (ref 3.8–10.5)
WBC # FLD AUTO: 6.37 K/UL — SIGNIFICANT CHANGE UP (ref 3.8–10.5)

## 2018-06-27 RX ORDER — POTASSIUM CHLORIDE 20 MEQ
40 PACKET (EA) ORAL ONCE
Qty: 0 | Refills: 0 | Status: COMPLETED | OUTPATIENT
Start: 2018-06-27 | End: 2018-06-27

## 2018-06-27 RX ADMIN — SODIUM CHLORIDE 3 MILLILITER(S): 9 INJECTION INTRAMUSCULAR; INTRAVENOUS; SUBCUTANEOUS at 11:37

## 2018-06-27 RX ADMIN — Medication 25 MILLIGRAM(S): at 11:38

## 2018-06-27 RX ADMIN — Medication 10 MILLIGRAM(S): at 17:30

## 2018-06-27 RX ADMIN — Medication 10 MILLIGRAM(S): at 05:04

## 2018-06-27 RX ADMIN — Medication 3 MILLIGRAM(S): at 22:44

## 2018-06-27 RX ADMIN — RIVAROXABAN 20 MILLIGRAM(S): KIT at 17:30

## 2018-06-27 RX ADMIN — SODIUM CHLORIDE 3 MILLILITER(S): 9 INJECTION INTRAMUSCULAR; INTRAVENOUS; SUBCUTANEOUS at 22:49

## 2018-06-27 RX ADMIN — ATORVASTATIN CALCIUM 10 MILLIGRAM(S): 80 TABLET, FILM COATED ORAL at 21:09

## 2018-06-27 RX ADMIN — Medication 20 MILLIGRAM(S): at 05:22

## 2018-06-27 RX ADMIN — PANTOPRAZOLE SODIUM 40 MILLIGRAM(S): 20 TABLET, DELAYED RELEASE ORAL at 11:38

## 2018-06-27 RX ADMIN — Medication 100 MILLIGRAM(S): at 11:38

## 2018-06-27 RX ADMIN — Medication 100 MILLIGRAM(S): at 20:27

## 2018-06-27 RX ADMIN — Medication 40 MILLIEQUIVALENT(S): at 11:38

## 2018-06-27 RX ADMIN — SODIUM CHLORIDE 3 MILLILITER(S): 9 INJECTION INTRAMUSCULAR; INTRAVENOUS; SUBCUTANEOUS at 05:02

## 2018-06-27 RX ADMIN — Medication 1 TABLET(S): at 11:38

## 2018-06-27 RX ADMIN — Medication 81 MILLIGRAM(S): at 11:38

## 2018-06-27 NOTE — PROVIDER CONTACT NOTE (MEDICATION) - ACTION/TREATMENT ORDERED:
As per Tele PA, okay to administer IVP Lasix at this time, reschedule daily PO Metoprolol to further assess BP. No further interventions at this time. Continuing to monitor.

## 2018-06-27 NOTE — PROVIDER CONTACT NOTE (MEDICATION) - ASSESSMENT
Pt with /49 HR 74. Pt asymptomatic of BP at this time, Tele FRANCES Carlson aware. Pt with IVP Lasix and PO metoprolol due at this time.

## 2018-06-28 LAB
BUN SERPL-MCNC: 16 MG/DL — SIGNIFICANT CHANGE UP (ref 7–23)
CALCIUM SERPL-MCNC: 8.3 MG/DL — LOW (ref 8.4–10.5)
CHLORIDE SERPL-SCNC: 93 MMOL/L — LOW (ref 98–107)
CO2 SERPL-SCNC: 27 MMOL/L — SIGNIFICANT CHANGE UP (ref 22–31)
CREAT SERPL-MCNC: 0.78 MG/DL — SIGNIFICANT CHANGE UP (ref 0.5–1.3)
GLUCOSE SERPL-MCNC: 92 MG/DL — SIGNIFICANT CHANGE UP (ref 70–99)
HCT VFR BLD CALC: 26.8 % — LOW (ref 34.5–45)
HGB BLD-MCNC: 8.3 G/DL — LOW (ref 11.5–15.5)
MAGNESIUM SERPL-MCNC: 1.7 MG/DL — SIGNIFICANT CHANGE UP (ref 1.6–2.6)
MCHC RBC-ENTMCNC: 25.9 PG — LOW (ref 27–34)
MCHC RBC-ENTMCNC: 31 % — LOW (ref 32–36)
MCV RBC AUTO: 83.5 FL — SIGNIFICANT CHANGE UP (ref 80–100)
NRBC # FLD: 0 — SIGNIFICANT CHANGE UP
PLATELET # BLD AUTO: 304 K/UL — SIGNIFICANT CHANGE UP (ref 150–400)
PMV BLD: 9.5 FL — SIGNIFICANT CHANGE UP (ref 7–13)
POTASSIUM SERPL-MCNC: 4.1 MMOL/L — SIGNIFICANT CHANGE UP (ref 3.5–5.3)
POTASSIUM SERPL-SCNC: 4.1 MMOL/L — SIGNIFICANT CHANGE UP (ref 3.5–5.3)
RBC # BLD: 3.21 M/UL — LOW (ref 3.8–5.2)
RBC # FLD: 15.5 % — HIGH (ref 10.3–14.5)
SODIUM SERPL-SCNC: 133 MMOL/L — LOW (ref 135–145)
WBC # BLD: 6.94 K/UL — SIGNIFICANT CHANGE UP (ref 3.8–10.5)
WBC # FLD AUTO: 6.94 K/UL — SIGNIFICANT CHANGE UP (ref 3.8–10.5)

## 2018-06-28 RX ORDER — ALPRAZOLAM 0.25 MG
0.5 TABLET ORAL AT BEDTIME
Qty: 0 | Refills: 0 | Status: DISCONTINUED | OUTPATIENT
Start: 2018-06-28 | End: 2018-07-05

## 2018-06-28 RX ORDER — ALPRAZOLAM 0.25 MG
0.5 TABLET ORAL DAILY
Qty: 0 | Refills: 0 | Status: DISCONTINUED | OUTPATIENT
Start: 2018-06-28 | End: 2018-07-05

## 2018-06-28 RX ADMIN — SODIUM CHLORIDE 3 MILLILITER(S): 9 INJECTION INTRAMUSCULAR; INTRAVENOUS; SUBCUTANEOUS at 14:53

## 2018-06-28 RX ADMIN — Medication 3 MILLIGRAM(S): at 22:01

## 2018-06-28 RX ADMIN — Medication 1 TABLET(S): at 12:23

## 2018-06-28 RX ADMIN — Medication 81 MILLIGRAM(S): at 12:23

## 2018-06-28 RX ADMIN — Medication 10 MILLIGRAM(S): at 05:49

## 2018-06-28 RX ADMIN — Medication 20 MILLIGRAM(S): at 05:49

## 2018-06-28 RX ADMIN — Medication 100 MILLIGRAM(S): at 09:06

## 2018-06-28 RX ADMIN — RIVAROXABAN 20 MILLIGRAM(S): KIT at 17:16

## 2018-06-28 RX ADMIN — ATORVASTATIN CALCIUM 10 MILLIGRAM(S): 80 TABLET, FILM COATED ORAL at 22:01

## 2018-06-28 RX ADMIN — Medication 25 MILLIGRAM(S): at 05:49

## 2018-06-28 RX ADMIN — Medication 100 MILLIGRAM(S): at 17:16

## 2018-06-28 RX ADMIN — PANTOPRAZOLE SODIUM 40 MILLIGRAM(S): 20 TABLET, DELAYED RELEASE ORAL at 12:23

## 2018-06-28 RX ADMIN — Medication 10 MILLIGRAM(S): at 17:16

## 2018-06-28 RX ADMIN — Medication 0.5 MILLIGRAM(S): at 22:01

## 2018-06-28 RX ADMIN — SODIUM CHLORIDE 3 MILLILITER(S): 9 INJECTION INTRAMUSCULAR; INTRAVENOUS; SUBCUTANEOUS at 05:49

## 2018-06-28 RX ADMIN — SODIUM CHLORIDE 3 MILLILITER(S): 9 INJECTION INTRAMUSCULAR; INTRAVENOUS; SUBCUTANEOUS at 21:28

## 2018-06-28 NOTE — DIETITIAN INITIAL EVALUATION ADULT. - NS AS NUTRI INTERV MEALS SNACK
1. Suggest: PO diet rx: Soft, DASH/TLC (cholesterol and sodium restricted, Low Fat; Fluid restriction per MD discretion; PO supplement: Ensure Enlive 8oz. 1x daily (will provide additional ~350 Kcal, ~ 20 gm Protein);               2. Monitor labs, weights, hydration status;             3. Monitor labs, weights, hydration status;/Diets modified for specific foods and ingredients/Other (specify)

## 2018-06-28 NOTE — DIETITIAN INITIAL EVALUATION ADULT. - OTHER INFO
Nutrition Consult X Registered Dietitian. Pt 89 yo female with CHF. Pt appears alert, oriented. Per Pt her appetite not well here in the hospital, but PTA her appetite/PO intake was okay. No chew/swallow problem voiced; no nausea/vomiting/diarrhea reported @ present. Pt not sure about her height, weight or any weight changes. Food preferences discussed with Pt. Case discussed with Tele PA, nurse. RDN remains available, Pt made aware.

## 2018-06-28 NOTE — DIETITIAN INITIAL EVALUATION ADULT. - DIET TYPE
DASH/TLC (sodium and cholesterol restricted diet)/caffeine free/No Carbonated Beverages, No Chocolate/regular/1200ml/low sodium

## 2018-06-28 NOTE — DIETITIAN INITIAL EVALUATION ADULT. - NS AS NUTRI INTERV MEDICAL AND FOOD SUPPLEMENTS
Ensure Enlive 8oz. 1x daily (will provide additional ~350 Kcal, ~ 20 gm Protein);/Commercial beverage

## 2018-06-29 LAB
BASOPHILS # BLD AUTO: 0.01 K/UL — SIGNIFICANT CHANGE UP (ref 0–0.2)
BASOPHILS NFR BLD AUTO: 0.2 % — SIGNIFICANT CHANGE UP (ref 0–2)
BUN SERPL-MCNC: 19 MG/DL — SIGNIFICANT CHANGE UP (ref 7–23)
CALCIUM SERPL-MCNC: 8.3 MG/DL — LOW (ref 8.4–10.5)
CHLORIDE SERPL-SCNC: 94 MMOL/L — LOW (ref 98–107)
CO2 SERPL-SCNC: 29 MMOL/L — SIGNIFICANT CHANGE UP (ref 22–31)
CREAT SERPL-MCNC: 0.77 MG/DL — SIGNIFICANT CHANGE UP (ref 0.5–1.3)
EOSINOPHIL # BLD AUTO: 0.47 K/UL — SIGNIFICANT CHANGE UP (ref 0–0.5)
EOSINOPHIL NFR BLD AUTO: 8 % — HIGH (ref 0–6)
GLUCOSE SERPL-MCNC: 84 MG/DL — SIGNIFICANT CHANGE UP (ref 70–99)
HCT VFR BLD CALC: 27.9 % — LOW (ref 34.5–45)
HGB BLD-MCNC: 8.6 G/DL — LOW (ref 11.5–15.5)
IMM GRANULOCYTES # BLD AUTO: 0.02 # — SIGNIFICANT CHANGE UP
IMM GRANULOCYTES NFR BLD AUTO: 0.3 % — SIGNIFICANT CHANGE UP (ref 0–1.5)
LYMPHOCYTES # BLD AUTO: 1.64 K/UL — SIGNIFICANT CHANGE UP (ref 1–3.3)
LYMPHOCYTES # BLD AUTO: 27.8 % — SIGNIFICANT CHANGE UP (ref 13–44)
MAGNESIUM SERPL-MCNC: 1.9 MG/DL — SIGNIFICANT CHANGE UP (ref 1.6–2.6)
MCHC RBC-ENTMCNC: 25.8 PG — LOW (ref 27–34)
MCHC RBC-ENTMCNC: 30.8 % — LOW (ref 32–36)
MCV RBC AUTO: 83.8 FL — SIGNIFICANT CHANGE UP (ref 80–100)
MONOCYTES # BLD AUTO: 0.47 K/UL — SIGNIFICANT CHANGE UP (ref 0–0.9)
MONOCYTES NFR BLD AUTO: 8 % — SIGNIFICANT CHANGE UP (ref 2–14)
NEUTROPHILS # BLD AUTO: 3.28 K/UL — SIGNIFICANT CHANGE UP (ref 1.8–7.4)
NEUTROPHILS NFR BLD AUTO: 55.7 % — SIGNIFICANT CHANGE UP (ref 43–77)
NRBC # FLD: 0 — SIGNIFICANT CHANGE UP
PLATELET # BLD AUTO: 308 K/UL — SIGNIFICANT CHANGE UP (ref 150–400)
PMV BLD: 10 FL — SIGNIFICANT CHANGE UP (ref 7–13)
POTASSIUM SERPL-MCNC: 3.6 MMOL/L — SIGNIFICANT CHANGE UP (ref 3.5–5.3)
POTASSIUM SERPL-SCNC: 3.6 MMOL/L — SIGNIFICANT CHANGE UP (ref 3.5–5.3)
RBC # BLD: 3.33 M/UL — LOW (ref 3.8–5.2)
RBC # FLD: 15.4 % — HIGH (ref 10.3–14.5)
SODIUM SERPL-SCNC: 135 MMOL/L — SIGNIFICANT CHANGE UP (ref 135–145)
WBC # BLD: 5.89 K/UL — SIGNIFICANT CHANGE UP (ref 3.8–10.5)
WBC # FLD AUTO: 5.89 K/UL — SIGNIFICANT CHANGE UP (ref 3.8–10.5)

## 2018-06-29 RX ORDER — BUDESONIDE, MICRONIZED 100 %
0.5 POWDER (GRAM) MISCELLANEOUS
Qty: 0 | Refills: 0 | Status: COMPLETED | OUTPATIENT
Start: 2018-06-29 | End: 2018-07-02

## 2018-06-29 RX ORDER — FUROSEMIDE 40 MG
20 TABLET ORAL ONCE
Qty: 0 | Refills: 0 | Status: COMPLETED | OUTPATIENT
Start: 2018-06-29 | End: 2018-06-29

## 2018-06-29 RX ORDER — POTASSIUM CHLORIDE 20 MEQ
40 PACKET (EA) ORAL ONCE
Qty: 0 | Refills: 0 | Status: COMPLETED | OUTPATIENT
Start: 2018-06-29 | End: 2018-06-29

## 2018-06-29 RX ORDER — FUROSEMIDE 40 MG
40 TABLET ORAL DAILY
Qty: 0 | Refills: 0 | Status: DISCONTINUED | OUTPATIENT
Start: 2018-06-30 | End: 2018-07-01

## 2018-06-29 RX ADMIN — Medication 20 MILLIGRAM(S): at 11:11

## 2018-06-29 RX ADMIN — SODIUM CHLORIDE 3 MILLILITER(S): 9 INJECTION INTRAMUSCULAR; INTRAVENOUS; SUBCUTANEOUS at 13:15

## 2018-06-29 RX ADMIN — Medication 20 MILLIGRAM(S): at 05:32

## 2018-06-29 RX ADMIN — ATORVASTATIN CALCIUM 10 MILLIGRAM(S): 80 TABLET, FILM COATED ORAL at 21:24

## 2018-06-29 RX ADMIN — RIVAROXABAN 20 MILLIGRAM(S): KIT at 17:24

## 2018-06-29 RX ADMIN — Medication 81 MILLIGRAM(S): at 11:11

## 2018-06-29 RX ADMIN — Medication 100 MILLIGRAM(S): at 17:25

## 2018-06-29 RX ADMIN — Medication 100 MILLIGRAM(S): at 14:53

## 2018-06-29 RX ADMIN — Medication 10 MILLIGRAM(S): at 05:32

## 2018-06-29 RX ADMIN — Medication 40 MILLIEQUIVALENT(S): at 11:11

## 2018-06-29 RX ADMIN — PANTOPRAZOLE SODIUM 40 MILLIGRAM(S): 20 TABLET, DELAYED RELEASE ORAL at 11:11

## 2018-06-29 RX ADMIN — SODIUM CHLORIDE 3 MILLILITER(S): 9 INJECTION INTRAMUSCULAR; INTRAVENOUS; SUBCUTANEOUS at 05:36

## 2018-06-29 RX ADMIN — Medication 0.5 MILLIGRAM(S): at 22:32

## 2018-06-29 RX ADMIN — Medication 3 MILLIGRAM(S): at 21:52

## 2018-06-29 RX ADMIN — Medication 100 MILLIGRAM(S): at 05:34

## 2018-06-29 RX ADMIN — Medication 0.5 MILLIGRAM(S): at 21:52

## 2018-06-29 RX ADMIN — Medication 25 MILLIGRAM(S): at 05:32

## 2018-06-29 RX ADMIN — Medication 1 TABLET(S): at 11:11

## 2018-06-29 RX ADMIN — SODIUM CHLORIDE 3 MILLILITER(S): 9 INJECTION INTRAMUSCULAR; INTRAVENOUS; SUBCUTANEOUS at 21:28

## 2018-06-29 RX ADMIN — Medication 10 MILLIGRAM(S): at 17:24

## 2018-06-29 RX ADMIN — Medication 100 MILLIGRAM(S): at 21:24

## 2018-06-29 NOTE — CONSULT NOTE ADULT - ASSESSMENT
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 SOB    multifactorial, + viral illness/ acute on chronic  Diastolic CHF   clinically improving with IV diuresis   RVP+ , chest xray revealing bl pleural effusions    ekg no evidence of ACS     2. Acute on chronic Diastolic congestive heart failure   likely in the setting of viral illness   CTA chest revealing pulm edema , chest xray revealing pulm edema   c.o mild dyspnea, but overall improving with iv diuresis   give additional 20 mg Lasix x 1 IVP, increase lasix to 40 mg IVP daily  recent echo revealing normal LV sys function, EF 55-60%, mod-sev mitral stenosis     3. PSVT s/p ablation   currently in NSR , continue with BB/ ASA     4. Htn   bp stable, continue with BB     5. DVT (hx)  continue with xarelto

## 2018-06-29 NOTE — CONSULT NOTE ADULT - CONSULT REASON
sob/ cough   hx HTN, HLD, SVT s/p ablation x 4 days ago, PE/DVT on xarelto, endometrial CA s/p LYNETTE,

## 2018-06-29 NOTE — CONSULT NOTE ADULT - SUBJECTIVE AND OBJECTIVE BOX
CARDIOLOGY CONSULT - Dr. Mccartney     CHIEF COMPLAINT:    HPI:  89 y/o female with PMHx as mentioned below admitted with sob and cough x 1 day. Pt was discharged from [facility] x 1 day ago after admission for SVT. Pt had ablation x 4 days ago. Pt admits to feeling well upon discharge but last night developed SOB and cough while lying in bed. Pt states that the SOB became progressively worse today. Pt states she had a non-productive cough. Pt has no chest pain, palpitations, diaphoresis, n/v/d, abdominal pain, numbness, tingling, dizziness, syncope, fever, chills. (26 Jun 2018 11:57)      PAST MEDICAL & SURGICAL HISTORY:  Endometrial cancer: S/P LYNETTE with SBO  HLD (hyperlipidemia)  HTN (hypertension)  PAF (paroxysmal atrial fibrillation): On Xarelto  PE (pulmonary thromboembolism)  Insomnia  Breast Lump  Anxiety  Status post cataract extraction: OS  Status post hysterectomy: endometrial cancer  History of D&C- 8/12/11  History of Colonoscopy- 2011/Sept  History of Breast Lump/[facility] Excision- [facility]- [facility]          PREVIOUS DIAGNOSTIC TESTING:    [x ] Echocardiogram:  < from: Transthoracic Echocardiogram (04.17.18 @ 20:16) >  EF (Teicholtz): 59 %  Doppler Peak Velocity (m/sec): MV=1.9AoV=1.8    ------------------------------------------------------------------------  Conclusions:  1. Mitral annular calcification and calcified mitral  leaflets with decreased diastolic opening. Moderate mitral  regurgitation. Peak mitral valve gradient equals 14 mm Hg,  mean transmitral valve gradient equals 6 mm Hg, consistent  with moderate to severe mitral stenosis.  2. Calcified aortic valve with normal opening. Peak  transaortic valve gradient equals 13 mm Hg, aortic valve  velocity time integral equals 37 cm, consistent with mild  aortic stenosis. Mild-moderate aortic regurgitation.  3. Normal left ventricular systolic function. No segmental  wall motion abnormalities. Visual estimation of EF 55-60%.  4. Normal right ventricular size and function.  5. Bilateral pleural effusions.  ------------------------------------------------------------------------  Confirmed on  4/18/2018 - 15:14:44 by Aldair Quinteros M.D.  ------------------------------------------------------------------------      [ ]  Catheterization:    [ ] Stress Test:  	    MEDICATIONS:  MEDICATIONS  (STANDING):  ALPRAZolam 0.5 milliGRAM(s) Oral at bedtime  aspirin enteric coated 81 milliGRAM(s) Oral daily  atorvastatin 10 milliGRAM(s) Oral at bedtime  busPIRone 10 milliGRAM(s) Oral two times a day  furosemide   Injectable 20 milliGRAM(s) IV Push daily  melatonin 3 milliGRAM(s) Oral at bedtime  metoprolol succinate ER 25 milliGRAM(s) Oral daily  multivitamin 1 Tablet(s) Oral daily  pantoprazole    Tablet 40 milliGRAM(s) Oral daily  rivaroxaban 20 milliGRAM(s) Oral every 24 hours  sodium chloride 0.9% lock flush 3 milliLiter(s) IV Push every 8 hours      FAMILY HISTORY:  No pertinent family history in first degree relatives      SOCIAL HISTORY:    [ ] Non-smoker  [ ] Smoker  [ ] Alcohol    Allergies    Eye cream from the 1960s Neocortef ?spelling caused eyes to becomes swollen (Unknown)  neomycin (Unknown)  piperacillin-tazobactam (Rash)  soaps and creams causes rash uses only sensitive skin soap (Rash)  sulfa drugs (Unknown)  Voltaren (Rash)    Intolerances    	    REVIEW OF SYSTEMS:  CONSTITUTIONAL: No fever, weight loss, or fatigue  EYES: No eye pain, visual disturbances, or discharge  ENMT:  No difficulty hearing, tinnitus, vertigo; No sinus or throat pain  NECK: No pain or stiffness  RESPIRATORY: No cough, wheezing, chills or hemoptysis; No Shortness of Breath  CARDIOVASCULAR: No chest pain, palpitations, passing out, dizziness, or leg swelling  GASTROINTESTINAL: No abdominal or epigastric pain. No nausea, vomiting, or hematemesis; No diarrhea or constipation. No melena or hematochezia.  GENITOURINARY: No dysuria, frequency, hematuria, or incontinence  NEUROLOGICAL: No headaches, memory loss, loss of strength, numbness, or tremors  SKIN: No itching, burning, rashes, or lesions   	    [ ] All others negative	  [ ] Unable to obtain    PHYSICAL EXAM:  T(C): 36.8 (06-29-18 @ 05:28), Max: 36.9 (06-28-18 @ 12:56)  HR: 86 (06-29-18 @ 05:28) (86 - 98)  BP: 136/67 (06-29-18 @ 05:28) (103/56 - 136/67)  RR: 18 (06-29-18 @ 05:28) (17 - 18)  SpO2: 96% (06-29-18 @ 05:28) (96% - 100%)  Wt(kg): --  I&O's Summary    28 Jun 2018 07:01  -  29 Jun 2018 07:00  --------------------------------------------------------  IN: 40 mL / OUT: 0 mL / NET: 40 mL        Appearance: Normal	  Psychiatry: A & O x 3, Mood & affect appropriate  HEENT:   Normal oral mucosa, PERRL, EOMI	  Lymphatic: No lymphadenopathy  Cardiovascular: Normal S1 S2,RRR, No JVD, No murmurs  Respiratory: Lungs clear to auscultation	  Gastrointestinal:  Soft, Non-tender, + BS	  Skin: No rashes, No ecchymoses, No cyanosis	  Neurologic: Non-focal  Extremities: Normal range of motion, No clubbing, cyanosis or edema  Vascular: Peripheral pulses palpable 2+ bilaterally    TELEMETRY: 	    ECG:  sinus tachycardia    left ant fascicular block, RBBB 	  RADIOLOGY:< from: Xray Chest 1 View- PORTABLE-Urgent (06.25.18 @ 17:24) >    IMPRESSION:    Pulmonary vascular congestion with tracebilateral pleural effusions.        < end of copied text >    OTHER: < from: CT Chest No Cont (06.26.18 @ 02:45) >  IMPRESSION:     Bilateral pleural effusions with interval decreased size of the left   pleural effusion compared to 4/13/2018.    Minimal residual groundglass opacities noted in the right upper lobe with   adjacent areas of septal thickening suggestive of improving interstitial   edema when compared with prior.                    < end of copied text >  	  	  LABS:	 	    CARDIAC MARKERS:        Entero/Rhinovirus (RapRVP): POSITIVE (06.26.18 @ 06:30)                              8.6    5.89  )-----------( 308      ( 29 Jun 2018 06:10 )             27.9     06-29    135  |  94<L>  |  19  ----------------------------<  84  3.6   |  29  |  0.77    Ca    8.3<L>      29 Jun 2018 06:10  Mg     1.9     06-29        proBNP:   Lipid Profile:   HgA1c:   TSH: CARDIOLOGY CONSULT - Dr. Mccartney     CHIEF COMPLAINT:    HPI:  89 y/o female with PMHx as mentioned below admitted with sob and cough x 1 day.  She had a recent ablation for SVT.  Pt admits to feeling well upon discharge. She reports she felt SOB and cough which progressively got worse and prompt her to come to this ER. She denies chest pain, palpitations, diaphoresis, n/v/d, abdominal pain, numbness, tingling, dizziness, syncope, fever, chills. Patient known from prior admissions. Echo from 4/2018 revealing EF 59%, mod- severe MS, mild- mod AR, normal lV sys fx. Cardiac hx includes Diastolic CHF, Hyperlipemia, Hypertension, PSVT s/p ablation , PE/DVT on a/c . ROS otherwise negative.       PAST MEDICAL & SURGICAL HISTORY:  Endometrial cancer: S/P LYNETTE with SBO  HLD (hyperlipidemia)  HTN (hypertension)  PAF (paroxysmal atrial fibrillation): On Xarelto  PE (pulmonary thromboembolism)  Insomnia  Breast Lump  Anxiety  Status post cataract extraction: OS  Status post hysterectomy: endometrial cancer  History of D&C- 8/12/11  History of Colonoscopy- 2011/Sept  History of Breast Lump/Mass Excision- [facility]- left- 1971          PREVIOUS DIAGNOSTIC TESTING:    [x ] Echocardiogram:  < from: Transthoracic Echocardiogram (04.17.18 @ 20:16) >  EF (Teicholtz): 59 %  Doppler Peak Velocity (m/sec): MV=1.9AoV=1.8    ------------------------------------------------------------------------  Conclusions:  1. Mitral annular calcification and calcified mitral  leaflets with decreased diastolic opening. Moderate mitral  regurgitation. Peak mitral valve gradient equals 14 mm Hg,  mean transmitral valve gradient equals 6 mm Hg, consistent  with moderate to severe mitral stenosis.  2. Calcified aortic valve with normal opening. Peak  transaortic valve gradient equals 13 mm Hg, aortic valve  velocity time integral equals 37 cm, consistent with mild  aortic stenosis. Mild-moderate aortic regurgitation.  3. Normal left ventricular systolic function. No segmental  wall motion abnormalities. Visual estimation of EF 55-60%.  4. Normal right ventricular size and function.  5. Bilateral pleural effusions.  ------------------------------------------------------------------------  Confirmed on  4/18/2018 - 15:14:44 by Aldair Quinteros M.D.  ------------------------------------------------------------------------      [ ]  Catheterization:    [ ] Stress Test:  	    MEDICATIONS:  MEDICATIONS  (STANDING):  ALPRAZolam 0.5 milliGRAM(s) Oral at bedtime  aspirin enteric coated 81 milliGRAM(s) Oral daily  atorvastatin 10 milliGRAM(s) Oral at bedtime  busPIRone 10 milliGRAM(s) Oral two times a day  furosemide   Injectable 20 milliGRAM(s) IV Push daily  melatonin 3 milliGRAM(s) Oral at bedtime  metoprolol succinate ER 25 milliGRAM(s) Oral daily  multivitamin 1 Tablet(s) Oral daily  pantoprazole    Tablet 40 milliGRAM(s) Oral daily  rivaroxaban 20 milliGRAM(s) Oral every 24 hours  sodium chloride 0.9% lock flush 3 milliLiter(s) IV Push every 8 hours      FAMILY HISTORY:  No pertinent family history in first degree relatives      SOCIAL HISTORY:    [x ] Non-smoker  [ ] Smoker  [ ] Alcohol    Allergies    Eye cream from the 1960s Neocortef ?spelling caused eyes to becomes swollen (Unknown)  neomycin (Unknown)  piperacillin-tazobactam (Rash)  soaps and creams causes rash uses only sensitive skin soap (Rash)  sulfa drugs (Unknown)  Voltaren (Rash)    Intolerances    	    REVIEW OF SYSTEMS:  CONSTITUTIONAL: No fever, weight loss, or fatigue  EYES: No eye pain, visual disturbances, or discharge  ENMT:  No difficulty hearing, tinnitus, vertigo; No sinus or throat pain  NECK: No pain or stiffness  RESPIRATORY: No , wheezing, chills or hemoptysis; + Shortness of Breath + cough  CARDIOVASCULAR: No chest pain, palpitations, passing out, dizziness, or leg swelling  GASTROINTESTINAL: No abdominal or epigastric pain. No nausea, vomiting, or hematemesis; No diarrhea or constipation. No melena or hematochezia.  GENITOURINARY: No dysuria, frequency, hematuria, or incontinence  NEUROLOGICAL: No headaches, memory loss, loss of strength, numbness, or tremors  SKIN: No itching, burning, rashes, or lesions   	    [ x] All others negative	  [ ] Unable to obtain    PHYSICAL EXAM:  T(C): 36.8 (06-29-18 @ 05:28), Max: 36.9 (06-28-18 @ 12:56)  HR: 86 (06-29-18 @ 05:28) (86 - 98)  BP: 136/67 (06-29-18 @ 05:28) (103/56 - 136/67)  RR: 18 (06-29-18 @ 05:28) (17 - 18)  SpO2: 96% (06-29-18 @ 05:28) (96% - 100%)  Wt(kg): --  I&O's Summary    28 Jun 2018 07:01  -  29 Jun 2018 07:00  --------------------------------------------------------  IN: 40 mL / OUT: 0 mL / NET: 40 mL        Appearance: Normal	  Psychiatry: A & O x 3, Mood & affect appropriate  HEENT:   Normal oral mucosa, PERRL, EOMI	  Lymphatic: No lymphadenopathy  Cardiovascular: Normal S1 S2,RRR, + sys murmur   Respiratory: fine crackles at base /coarse bl   Gastrointestinal:  Soft, Non-tender, + BS	  Skin: No rashes, No ecchymoses, No cyanosis	  Neurologic: Non-focal  Extremities: Normal range of motion, No clubbing, cyanosis or edema  Vascular: Peripheral pulses palpable 2+ bilaterally    TELEMETRY: 	    ECG:  sinus tachycardia    left ant fascicular block, RBBB 	  RADIOLOGY:< from: Xray Chest 1 View- PORTABLE-Urgent (06.25.18 @ 17:24) >    IMPRESSION:    Pulmonary vascular congestion with trace bilateral pleural effusions.        < end of copied text >    OTHER: < from: CT Chest No Cont (06.26.18 @ 02:45) >  IMPRESSION:     Bilateral pleural effusions with interval decreased size of the left   pleural effusion compared to 4/13/2018.    Minimal residual groundglass opacities noted in the right upper lobe with   adjacent areas of septal thickening suggestive of improving interstitial   edema when compared with prior.                    < end of copied text >  	  	  LABS:	 	    CARDIAC MARKERS:        Entero/Rhinovirus (RapRVP): POSITIVE (06.26.18 @ 06:30)                              8.6    5.89  )-----------( 308      ( 29 Jun 2018 06:10 )             27.9     06-29    135  |  94<L>  |  19  ----------------------------<  84  3.6   |  29  |  0.77    Ca    8.3<L>      29 Jun 2018 06:10  Mg     1.9     06-29        proBNP:   Lipid Profile:   HgA1c:   TSH:

## 2018-06-29 NOTE — CONSULT NOTE ADULT - ATTENDING COMMENTS
Patient seen and examined, agree with the above assessment and plan by NP Zak.  Dyspnea/cough multifactorial  lasix as above

## 2018-06-30 LAB
BACTERIA BLD CULT: SIGNIFICANT CHANGE UP
BACTERIA BLD CULT: SIGNIFICANT CHANGE UP
BUN SERPL-MCNC: 22 MG/DL — SIGNIFICANT CHANGE UP (ref 7–23)
CALCIUM SERPL-MCNC: 8.5 MG/DL — SIGNIFICANT CHANGE UP (ref 8.4–10.5)
CHLORIDE SERPL-SCNC: 95 MMOL/L — LOW (ref 98–107)
CO2 SERPL-SCNC: 29 MMOL/L — SIGNIFICANT CHANGE UP (ref 22–31)
CREAT SERPL-MCNC: 0.81 MG/DL — SIGNIFICANT CHANGE UP (ref 0.5–1.3)
GLUCOSE SERPL-MCNC: 93 MG/DL — SIGNIFICANT CHANGE UP (ref 70–99)
HCT VFR BLD CALC: 26 % — LOW (ref 34.5–45)
HGB BLD-MCNC: 8.2 G/DL — LOW (ref 11.5–15.5)
MAGNESIUM SERPL-MCNC: 1.7 MG/DL — SIGNIFICANT CHANGE UP (ref 1.6–2.6)
MCHC RBC-ENTMCNC: 25.2 PG — LOW (ref 27–34)
MCHC RBC-ENTMCNC: 31.5 % — LOW (ref 32–36)
MCV RBC AUTO: 79.8 FL — LOW (ref 80–100)
NRBC # FLD: 0 — SIGNIFICANT CHANGE UP
PLATELET # BLD AUTO: 342 K/UL — SIGNIFICANT CHANGE UP (ref 150–400)
PMV BLD: 9.8 FL — SIGNIFICANT CHANGE UP (ref 7–13)
POTASSIUM SERPL-MCNC: 4 MMOL/L — SIGNIFICANT CHANGE UP (ref 3.5–5.3)
POTASSIUM SERPL-SCNC: 4 MMOL/L — SIGNIFICANT CHANGE UP (ref 3.5–5.3)
RBC # BLD: 3.26 M/UL — LOW (ref 3.8–5.2)
RBC # FLD: 15.3 % — HIGH (ref 10.3–14.5)
SODIUM SERPL-SCNC: 135 MMOL/L — SIGNIFICANT CHANGE UP (ref 135–145)
WBC # BLD: 6.57 K/UL — SIGNIFICANT CHANGE UP (ref 3.8–10.5)
WBC # FLD AUTO: 6.57 K/UL — SIGNIFICANT CHANGE UP (ref 3.8–10.5)

## 2018-06-30 RX ORDER — MAGNESIUM OXIDE 400 MG ORAL TABLET 241.3 MG
400 TABLET ORAL ONCE
Qty: 0 | Refills: 0 | Status: COMPLETED | OUTPATIENT
Start: 2018-06-30 | End: 2018-06-30

## 2018-06-30 RX ADMIN — Medication 0.5 MILLIGRAM(S): at 21:54

## 2018-06-30 RX ADMIN — ATORVASTATIN CALCIUM 10 MILLIGRAM(S): 80 TABLET, FILM COATED ORAL at 21:26

## 2018-06-30 RX ADMIN — Medication 100 MILLIGRAM(S): at 06:01

## 2018-06-30 RX ADMIN — Medication 10 MILLIGRAM(S): at 06:01

## 2018-06-30 RX ADMIN — Medication 1 TABLET(S): at 14:06

## 2018-06-30 RX ADMIN — Medication 3 MILLIGRAM(S): at 22:31

## 2018-06-30 RX ADMIN — SODIUM CHLORIDE 3 MILLILITER(S): 9 INJECTION INTRAMUSCULAR; INTRAVENOUS; SUBCUTANEOUS at 21:27

## 2018-06-30 RX ADMIN — Medication 81 MILLIGRAM(S): at 14:06

## 2018-06-30 RX ADMIN — Medication 100 MILLIGRAM(S): at 17:54

## 2018-06-30 RX ADMIN — Medication 100 MILLIGRAM(S): at 14:06

## 2018-06-30 RX ADMIN — SODIUM CHLORIDE 3 MILLILITER(S): 9 INJECTION INTRAMUSCULAR; INTRAVENOUS; SUBCUTANEOUS at 14:00

## 2018-06-30 RX ADMIN — RIVAROXABAN 20 MILLIGRAM(S): KIT at 17:30

## 2018-06-30 RX ADMIN — Medication 0.5 MILLIGRAM(S): at 10:23

## 2018-06-30 RX ADMIN — MAGNESIUM OXIDE 400 MG ORAL TABLET 400 MILLIGRAM(S): 241.3 TABLET ORAL at 14:06

## 2018-06-30 RX ADMIN — Medication 0.5 MILLIGRAM(S): at 22:31

## 2018-06-30 RX ADMIN — Medication 25 MILLIGRAM(S): at 06:01

## 2018-06-30 RX ADMIN — SODIUM CHLORIDE 3 MILLILITER(S): 9 INJECTION INTRAMUSCULAR; INTRAVENOUS; SUBCUTANEOUS at 07:27

## 2018-06-30 RX ADMIN — Medication 100 MILLIGRAM(S): at 21:27

## 2018-06-30 RX ADMIN — Medication 40 MILLIGRAM(S): at 06:00

## 2018-06-30 RX ADMIN — PANTOPRAZOLE SODIUM 40 MILLIGRAM(S): 20 TABLET, DELAYED RELEASE ORAL at 14:06

## 2018-06-30 RX ADMIN — Medication 10 MILLIGRAM(S): at 17:30

## 2018-07-01 LAB
BUN SERPL-MCNC: 25 MG/DL — HIGH (ref 7–23)
CALCIUM SERPL-MCNC: 8.6 MG/DL — SIGNIFICANT CHANGE UP (ref 8.4–10.5)
CHLORIDE SERPL-SCNC: 93 MMOL/L — LOW (ref 98–107)
CO2 SERPL-SCNC: 27 MMOL/L — SIGNIFICANT CHANGE UP (ref 22–31)
CREAT SERPL-MCNC: 0.8 MG/DL — SIGNIFICANT CHANGE UP (ref 0.5–1.3)
GLUCOSE SERPL-MCNC: 95 MG/DL — SIGNIFICANT CHANGE UP (ref 70–99)
HCT VFR BLD CALC: 27.5 % — LOW (ref 34.5–45)
HGB BLD-MCNC: 8.5 G/DL — LOW (ref 11.5–15.5)
MAGNESIUM SERPL-MCNC: 1.8 MG/DL — SIGNIFICANT CHANGE UP (ref 1.6–2.6)
MCHC RBC-ENTMCNC: 25.5 PG — LOW (ref 27–34)
MCHC RBC-ENTMCNC: 30.9 % — LOW (ref 32–36)
MCV RBC AUTO: 82.6 FL — SIGNIFICANT CHANGE UP (ref 80–100)
NRBC # FLD: 0 — SIGNIFICANT CHANGE UP
PLATELET # BLD AUTO: 351 K/UL — SIGNIFICANT CHANGE UP (ref 150–400)
PMV BLD: 10.1 FL — SIGNIFICANT CHANGE UP (ref 7–13)
POTASSIUM SERPL-MCNC: 4 MMOL/L — SIGNIFICANT CHANGE UP (ref 3.5–5.3)
POTASSIUM SERPL-SCNC: 4 MMOL/L — SIGNIFICANT CHANGE UP (ref 3.5–5.3)
RBC # BLD: 3.33 M/UL — LOW (ref 3.8–5.2)
RBC # FLD: 15.1 % — HIGH (ref 10.3–14.5)
SODIUM SERPL-SCNC: 135 MMOL/L — SIGNIFICANT CHANGE UP (ref 135–145)
WBC # BLD: 6.71 K/UL — SIGNIFICANT CHANGE UP (ref 3.8–10.5)
WBC # FLD AUTO: 6.71 K/UL — SIGNIFICANT CHANGE UP (ref 3.8–10.5)

## 2018-07-01 RX ORDER — FUROSEMIDE 40 MG
40 TABLET ORAL DAILY
Qty: 0 | Refills: 0 | Status: DISCONTINUED | OUTPATIENT
Start: 2018-07-01 | End: 2018-07-08

## 2018-07-01 RX ADMIN — Medication 0.5 MILLIGRAM(S): at 21:55

## 2018-07-01 RX ADMIN — Medication 3 MILLIGRAM(S): at 22:16

## 2018-07-01 RX ADMIN — Medication 100 MILLIGRAM(S): at 12:19

## 2018-07-01 RX ADMIN — ATORVASTATIN CALCIUM 10 MILLIGRAM(S): 80 TABLET, FILM COATED ORAL at 21:48

## 2018-07-01 RX ADMIN — SODIUM CHLORIDE 3 MILLILITER(S): 9 INJECTION INTRAMUSCULAR; INTRAVENOUS; SUBCUTANEOUS at 13:41

## 2018-07-01 RX ADMIN — Medication 0.5 MILLIGRAM(S): at 22:16

## 2018-07-01 RX ADMIN — Medication 10 MILLIGRAM(S): at 17:23

## 2018-07-01 RX ADMIN — Medication 25 MILLIGRAM(S): at 06:12

## 2018-07-01 RX ADMIN — Medication 100 MILLIGRAM(S): at 13:42

## 2018-07-01 RX ADMIN — Medication 1 TABLET(S): at 12:20

## 2018-07-01 RX ADMIN — Medication 10 MILLIGRAM(S): at 06:12

## 2018-07-01 RX ADMIN — SODIUM CHLORIDE 3 MILLILITER(S): 9 INJECTION INTRAMUSCULAR; INTRAVENOUS; SUBCUTANEOUS at 21:43

## 2018-07-01 RX ADMIN — Medication 0.5 MILLIGRAM(S): at 08:00

## 2018-07-01 RX ADMIN — PANTOPRAZOLE SODIUM 40 MILLIGRAM(S): 20 TABLET, DELAYED RELEASE ORAL at 12:20

## 2018-07-01 RX ADMIN — RIVAROXABAN 20 MILLIGRAM(S): KIT at 17:23

## 2018-07-01 RX ADMIN — Medication 200 MILLIGRAM(S): at 21:47

## 2018-07-01 RX ADMIN — Medication 40 MILLIGRAM(S): at 06:12

## 2018-07-01 RX ADMIN — Medication 81 MILLIGRAM(S): at 12:20

## 2018-07-01 RX ADMIN — SODIUM CHLORIDE 3 MILLILITER(S): 9 INJECTION INTRAMUSCULAR; INTRAVENOUS; SUBCUTANEOUS at 06:08

## 2018-07-01 RX ADMIN — Medication 100 MILLIGRAM(S): at 17:23

## 2018-07-01 RX ADMIN — Medication 100 MILLIGRAM(S): at 06:12

## 2018-07-02 LAB
BACTERIA UR CULT: SIGNIFICANT CHANGE UP
BUN SERPL-MCNC: 25 MG/DL — HIGH (ref 7–23)
CALCIUM SERPL-MCNC: 8.8 MG/DL — SIGNIFICANT CHANGE UP (ref 8.4–10.5)
CHLORIDE SERPL-SCNC: 92 MMOL/L — LOW (ref 98–107)
CO2 SERPL-SCNC: 31 MMOL/L — SIGNIFICANT CHANGE UP (ref 22–31)
CREAT SERPL-MCNC: 0.91 MG/DL — SIGNIFICANT CHANGE UP (ref 0.5–1.3)
GLUCOSE SERPL-MCNC: 97 MG/DL — SIGNIFICANT CHANGE UP (ref 70–99)
HCT VFR BLD CALC: 27.4 % — LOW (ref 34.5–45)
HGB BLD-MCNC: 8.9 G/DL — LOW (ref 11.5–15.5)
MAGNESIUM SERPL-MCNC: 1.8 MG/DL — SIGNIFICANT CHANGE UP (ref 1.6–2.6)
MCHC RBC-ENTMCNC: 25.4 PG — LOW (ref 27–34)
MCHC RBC-ENTMCNC: 32.5 % — SIGNIFICANT CHANGE UP (ref 32–36)
MCV RBC AUTO: 78.3 FL — LOW (ref 80–100)
NRBC # FLD: 0 — SIGNIFICANT CHANGE UP
PLATELET # BLD AUTO: 392 K/UL — SIGNIFICANT CHANGE UP (ref 150–400)
PMV BLD: 10 FL — SIGNIFICANT CHANGE UP (ref 7–13)
POTASSIUM SERPL-MCNC: 3.8 MMOL/L — SIGNIFICANT CHANGE UP (ref 3.5–5.3)
POTASSIUM SERPL-SCNC: 3.8 MMOL/L — SIGNIFICANT CHANGE UP (ref 3.5–5.3)
RBC # BLD: 3.5 M/UL — LOW (ref 3.8–5.2)
RBC # FLD: 15.2 % — HIGH (ref 10.3–14.5)
SODIUM SERPL-SCNC: 133 MMOL/L — LOW (ref 135–145)
SPECIMEN SOURCE: SIGNIFICANT CHANGE UP
WBC # BLD: 7.5 K/UL — SIGNIFICANT CHANGE UP (ref 3.8–10.5)
WBC # FLD AUTO: 7.5 K/UL — SIGNIFICANT CHANGE UP (ref 3.8–10.5)

## 2018-07-02 PROCEDURE — 99232 SBSQ HOSP IP/OBS MODERATE 35: CPT

## 2018-07-02 RX ADMIN — SODIUM CHLORIDE 3 MILLILITER(S): 9 INJECTION INTRAMUSCULAR; INTRAVENOUS; SUBCUTANEOUS at 22:11

## 2018-07-02 RX ADMIN — Medication 3 MILLIGRAM(S): at 22:16

## 2018-07-02 RX ADMIN — SODIUM CHLORIDE 3 MILLILITER(S): 9 INJECTION INTRAMUSCULAR; INTRAVENOUS; SUBCUTANEOUS at 13:25

## 2018-07-02 RX ADMIN — Medication 0.5 MILLIGRAM(S): at 22:13

## 2018-07-02 RX ADMIN — Medication 10 MILLIGRAM(S): at 16:28

## 2018-07-02 RX ADMIN — SODIUM CHLORIDE 3 MILLILITER(S): 9 INJECTION INTRAMUSCULAR; INTRAVENOUS; SUBCUTANEOUS at 06:59

## 2018-07-02 RX ADMIN — Medication 1 TABLET(S): at 13:25

## 2018-07-02 RX ADMIN — Medication 10 MILLIGRAM(S): at 06:58

## 2018-07-02 RX ADMIN — ATORVASTATIN CALCIUM 10 MILLIGRAM(S): 80 TABLET, FILM COATED ORAL at 22:13

## 2018-07-02 RX ADMIN — RIVAROXABAN 20 MILLIGRAM(S): KIT at 16:28

## 2018-07-02 RX ADMIN — Medication 200 MILLIGRAM(S): at 06:58

## 2018-07-02 RX ADMIN — PANTOPRAZOLE SODIUM 40 MILLIGRAM(S): 20 TABLET, DELAYED RELEASE ORAL at 13:25

## 2018-07-02 RX ADMIN — Medication 0.5 MILLIGRAM(S): at 12:18

## 2018-07-02 RX ADMIN — Medication 25 MILLIGRAM(S): at 06:58

## 2018-07-02 RX ADMIN — Medication 40 MILLIGRAM(S): at 13:24

## 2018-07-02 RX ADMIN — Medication 200 MILLIGRAM(S): at 13:24

## 2018-07-02 RX ADMIN — Medication 200 MILLIGRAM(S): at 22:13

## 2018-07-02 RX ADMIN — Medication 81 MILLIGRAM(S): at 13:24

## 2018-07-02 NOTE — CONSULT NOTE ADULT - ASSESSMENT
89 yo F with a PMH of HTN, HLD, SVT s/p ablation on 06/21/2018, PE/DVT on Xarelto, and endometrial CA s/p LYNETTE who presented on 06/25/2018 with a cc of sore throat that progressed to a non productive cough, dyspnea, and subjective fever.  Found to be in acutely decompensated dHF in the setting of viral illness, LVEF 55-60%, NYHA Class I-II:    - continue all meds including Xarelto and furosemide  - may increase beta blocker therapy as BP permits  - supportive care during viral illness  - continue telemetry  - keep K+ 4-4.5 & Mg 2-2.5  - d/w EP attending 89 yo F with a PMH of HTN, HLD, SVT s/p ablation on 06/21/2018, PE/DVT on Xarelto, chronic and stable bifascicular block and endometrial CA s/p LYNETTE who presented on 06/25/2018 with a cc of sore throat that progressed to a non productive cough, dyspnea, and subjective fever.  Found to be in acutely decompensated dHF in the setting of viral illness, LVEF 55-60%, NYHA Class I-II:    - continue all meds including Xarelto and furosemide  - may increase beta blocker therapy as BP permits  - supportive care during viral illness  - continue telemetry  - keep K+ 4-4.5 & Mg 2-2.5  - d/w EP attending 89 yo F with a PMH of HTN, HLD, SVT s/p ablation on 06/21/2018, PE/DVT on Xarelto, chronic and stable bifascicular block and endometrial CA s/p LYNETTE who presented on 06/25/2018 with a cc of sore throat that progressed to a non productive cough, dyspnea, and subjective fever.  Found to be in acutely decompensated dHF in the setting of viral illness, LVEF 55-60%, NYHA Class II-III:    - continue all meds including Xarelto and furosemide  - may increase beta blocker therapy as BP permits  - supportive care during viral illness  - continue telemetry  - keep K+ 4-4.5 & Mg 2-2.5  - d/w EP attending

## 2018-07-02 NOTE — CONSULT NOTE ADULT - SUBJECTIVE AND OBJECTIVE BOX
HPI/CC: Asked by Dr. Mccartney to see this patient for follow up of ablation who presented on 2018 with a cc of cough and subjective fever.  This is a 89 yo F with a PMH of HTN, HLD, SVT s/p ablation on 2018, PE/DVT on Xarelto, and endometrial CA s/p LYNETTE who presented on 2018 with a cc of sore throat that progressed to a non productive cough and dyspnea.  She was told by her aide that she had a fever and was advised to report to the ED.   Found to be in ADHF in the setting of viral illness (enterovirus).  The patient denies palpitations, PND, dyspnea on exertion, or chest pain, today. A review of telemetry demonstrates SR/ST 80-100s with frequent PACs.    PAST MEDICAL & SURGICAL HISTORY:  Endometrial cancer: S/P LYNETTE with SBO  HLD (hyperlipidemia)  HTN (hypertension)  PAF (paroxysmal atrial fibrillation): On Xarelto  PE (pulmonary thromboembolism)  Insomnia  Breast Lump  Anxiety  Status post cataract extraction: OS  Status post hysterectomy: endometrial cancer  History of D&C- 11  History of Colonoscopy-   History of Breast Lump/[facility] Excision- [facility]-   	  FAMILY HISTORY:  No pertinent family history in first degree relatives    DRUG ALLERGIES:  Eye cream from the 1960s Neocortef ?spelling caused eyes to becomes swollen (Unknown)  neomycin (Unknown)  piperacillin-tazobactam (Rash)  soaps and creams causes rash uses only sensitive skin soap (Rash)  sulfa drugs (Unknown)  Voltaren (Rash)    MEDICATIONS:  aspirin enteric coated 81 milliGRAM(s) Oral daily  furosemide    Tablet 40 milliGRAM(s) Oral daily  metoprolol succinate ER 25 milliGRAM(s) Oral daily  rivaroxaban 20 milliGRAM(s) Oral every 24 hours    SOCIAL HISTORY:    Denies illicit drug use or tobacco use  Denies alcohol 	    REVIEW OF SYSTEMS:  CONSTITUTIONAL: No fever, weight loss, or fatigue  EYES: No eye pain, visual disturbances, or discharge  ENMT:  No difficulty hearing, tinnitus, vertigo; No sinus or throat pain  NECK: No pain or stiffness  RESPIRATORY: No cough, wheezing, chills or hemoptysis; No Shortness of Breath  CARDIOVASCULAR: No chest pain, palpitations, passing out, dizziness, or leg swelling  GASTROINTESTINAL: No abdominal or epigastric pain. No nausea, vomiting, or hematemesis; No diarrhea or constipation. No melena or hematochezia.  GENITOURINARY: No dysuria, frequency, hematuria, or incontinence  NEUROLOGICAL: No headaches, memory loss, loss of strength, numbness, or tremors  SKIN: No itching, burning, rashes, or lesions   LYMPH Nodes: No enlarged glands  ENDOCRINE: No heat or cold intolerance; No hair loss  MUSCULOSKELETAL: No joint pain or swelling; No muscle, back, or extremity pain  PSYCHIATRIC: No depression, anxiety, mood swings, or difficulty sleeping  HEME/LYMPH: No easy bruising, or bleeding gums  ALLERGY AND IMMUNOLOGIC: No hives or eczema	  All others negative	    PHYSICAL EXAM:  T(C): 36.7 (18 @ 06:15), Max: 37.2 (18 @ 21:33)  HR: 81 (18 @ 12:22) (76 - 91)  BP: 102/57 (18 @ 06:15) (102/57 - 114/59)  RR: 18 (18 @ 12:22) (18 - 18)  SpO2: 93% (18 @ 12:22) (93% - 98%)  Wt(kg): --  I&O's Summary    2018 07:01  -  2018 07:00  --------------------------------------------------------  IN: 700 mL / OUT: 0 mL / NET: 700 mL    Appearance: Normal	  HEENT:   Normal oral mucosa, PERRL, EOMI	  Lymphatic: No lymphadenopathy  Cardiovascular: Normal S1 S2, No JVD, No murmurs, No edema  Respiratory: Lungs with fine bibasilar rales and min insp wheeze to auscultation	  Psychiatry: A & O x 3, Mood & affect appropriate although mildly anxious  Gastrointestinal:  Soft, Non-tender, + BS	  Skin: No rashes, No ecchymoses, No cyanosis; Rt groin healing well	  Neurologic: Non-focal  Extremities: Normal range of motion, No clubbing, cyanosis or edema  Vascular: Peripheral pulses palpable 2+ bilaterally    DIAGNOSTICS:    12 Lead EC2018, ST, PACs, LAD, Bifascicular block (RBBB & LAFB), ABDIAS 124, , QTc 463ms.  CATH: N/A  2D TTE:   Patient name: DELVIS MCBRIDE  YOB: 1927   Age: 89 (F)   MR#: [id number]  Study Date: 2018  [facility]  Study quality: Technically fair  Referring Physician: Christian Luis MD  Blood Pressure: 112/60 mmHg  Height: 157 cm  Weight: 65 kg  BSA: 1.7 m2  Heart Rate: 84 mmHg  ------------------------------------------------------------------------  PROCEDURE: Transthoracic echocardiogram with 2-D, M-Mode  and complete spectral and color flow Doppler.  INDICATION: Heart failure, unspecified (I50.9)  ------------------------------------------------------------------------  Dimensions:    Normal Values:  LA:     4.2    2.0 - 4.0 cm  Ao:     3.4    2.0 - 3.8 cm  SEPTUM: 1.0    0.6 - 1.2 cm  PWT:    0.9    0.6 - 1.1 cm  LVIDd:  4.2    3.0 - 5.6 cm  LVIDs:  2.9    1.8 - 4.0 cm  Derived variables:  LVMI: 77 g/m2  RWT: 0.42  Fractional short: 31 %  EF (Visual Estimate): 55-60 %  EF (Teicholtz): 59 %  Doppler Peak Velocity (m/sec): MV=1.9 AoV=1.8  ------------------------------------------------------------------------  Observations:  Mitral Valve: Mitral annular calcification and calcified  mitral leaflets with decreased diastolic opening. Moderate  mitral regurgitation. Peak mitral valve gradient equals 14  mm Hg, mean transmitral valve gradient equals 6 mm Hg,  consistent with moderate to severe mitral stenosis.  Aortic Valve/Aorta: Calcified aortic valve with normal  opening. Peak transaortic valve gradient equals 13 mm Hg,  aortic valve velocity time integral equals 37 cm,  consistent with mild aortic stenosis. Mild-moderate aortic  regurgitation. Peak left ventricular outflow tract gradient  equals 7 mm Hg, mean gradient is equal to 6 mm Hg, LVOT  velocity time integral equals 25 cm.  Aortic Root: 3.4 cm.  LVOT diameter: 1.6 cm.  Left Atrium: Normal left atrium.  LA volume index = 27  cc/m2.  Left Ventricle: Normal left ventricular systolic function.  No segmental wall motion abnormalities. Visual estimation  of EF 55-60%.  Normal left ventricular internal dimensions  and wall thicknesses. Indeterminate diastolic function.  Right Heart: Mild right atrial enlargement. Normal right  ventricular size and function. Normal tricuspid valve. Mild  tricuspid regurgitation. Normal pulmonic valve.  Pericardium/Pleura: Normal pericardium with no pericardial  effusion.  Bilateral pleural effusions.  Hemodynamic: Estimated right atrial pressure is 8 mm Hg.  Estimated right ventricular systolic pressure equals 42 mm  Hg, assuming right atrial pressure equals 8 mm Hg,  consistent with mild pulmonary hypertension.  ------------------------------------------------------------------------  Conclusions:  1. Mitral annular calcification and calcified mitral  leaflets with decreased diastolic opening. Moderate mitral  regurgitation. Peak mitral valve gradient equals 14 mm Hg,  mean transmitral valve gradient equals 6 mm Hg, consistent  with moderate to severe mitral stenosis.  2. Calcified aortic valve with normal opening. Peak  transaortic valve gradient equals 13 mm Hg, aortic valve  velocity time integral equals 37 cm, consistent with mild  aortic stenosis. Mild-moderate aortic regurgitation.  3. Normal left ventricular systolic function. No segmental  wall motion abnormalities. Visual estimation of EF 55-60%.  4. Normal right ventricular size and function.  5. Bilateral pleural effusions.  ------------------------------------------------------------------------  Confirmed on  2018 - 15:14:44 by Aldair Quinteros M.D.  ------------------------------------------------------------------------

## 2018-07-03 ENCOUNTER — TRANSCRIPTION ENCOUNTER (OUTPATIENT)
Age: 83
End: 2018-07-03

## 2018-07-03 LAB
BUN SERPL-MCNC: 30 MG/DL — HIGH (ref 7–23)
CALCIUM SERPL-MCNC: 9 MG/DL — SIGNIFICANT CHANGE UP (ref 8.4–10.5)
CHLORIDE SERPL-SCNC: 91 MMOL/L — LOW (ref 98–107)
CO2 SERPL-SCNC: 30 MMOL/L — SIGNIFICANT CHANGE UP (ref 22–31)
CREAT SERPL-MCNC: 0.86 MG/DL — SIGNIFICANT CHANGE UP (ref 0.5–1.3)
GLUCOSE BLDC GLUCOMTR-MCNC: 148 MG/DL — HIGH (ref 70–99)
GLUCOSE SERPL-MCNC: 90 MG/DL — SIGNIFICANT CHANGE UP (ref 70–99)
HCT VFR BLD CALC: 28.9 % — LOW (ref 34.5–45)
HGB BLD-MCNC: 9 G/DL — LOW (ref 11.5–15.5)
MAGNESIUM SERPL-MCNC: 1.9 MG/DL — SIGNIFICANT CHANGE UP (ref 1.6–2.6)
MCHC RBC-ENTMCNC: 25.4 PG — LOW (ref 27–34)
MCHC RBC-ENTMCNC: 31.1 % — LOW (ref 32–36)
MCV RBC AUTO: 81.6 FL — SIGNIFICANT CHANGE UP (ref 80–100)
NRBC # FLD: 0 — SIGNIFICANT CHANGE UP
PLATELET # BLD AUTO: 446 K/UL — HIGH (ref 150–400)
PMV BLD: 10.3 FL — SIGNIFICANT CHANGE UP (ref 7–13)
POTASSIUM SERPL-MCNC: 3.8 MMOL/L — SIGNIFICANT CHANGE UP (ref 3.5–5.3)
POTASSIUM SERPL-SCNC: 3.8 MMOL/L — SIGNIFICANT CHANGE UP (ref 3.5–5.3)
RBC # BLD: 3.54 M/UL — LOW (ref 3.8–5.2)
RBC # FLD: 15.1 % — HIGH (ref 10.3–14.5)
SODIUM SERPL-SCNC: 135 MMOL/L — SIGNIFICANT CHANGE UP (ref 135–145)
WBC # BLD: 7.66 K/UL — SIGNIFICANT CHANGE UP (ref 3.8–10.5)
WBC # FLD AUTO: 7.66 K/UL — SIGNIFICANT CHANGE UP (ref 3.8–10.5)

## 2018-07-03 PROCEDURE — 99232 SBSQ HOSP IP/OBS MODERATE 35: CPT

## 2018-07-03 RX ADMIN — SODIUM CHLORIDE 3 MILLILITER(S): 9 INJECTION INTRAMUSCULAR; INTRAVENOUS; SUBCUTANEOUS at 22:13

## 2018-07-03 RX ADMIN — Medication 3 MILLIGRAM(S): at 22:13

## 2018-07-03 RX ADMIN — ATORVASTATIN CALCIUM 10 MILLIGRAM(S): 80 TABLET, FILM COATED ORAL at 22:13

## 2018-07-03 RX ADMIN — Medication 25 MILLIGRAM(S): at 05:58

## 2018-07-03 RX ADMIN — SODIUM CHLORIDE 3 MILLILITER(S): 9 INJECTION INTRAMUSCULAR; INTRAVENOUS; SUBCUTANEOUS at 06:00

## 2018-07-03 RX ADMIN — Medication 40 MILLIGRAM(S): at 05:59

## 2018-07-03 RX ADMIN — Medication 200 MILLIGRAM(S): at 12:49

## 2018-07-03 RX ADMIN — Medication 10 MILLIGRAM(S): at 07:28

## 2018-07-03 RX ADMIN — Medication 0.5 MILLIGRAM(S): at 22:13

## 2018-07-03 RX ADMIN — Medication 1 TABLET(S): at 12:48

## 2018-07-03 RX ADMIN — Medication 200 MILLIGRAM(S): at 22:13

## 2018-07-03 RX ADMIN — Medication 10 MILLIGRAM(S): at 17:39

## 2018-07-03 RX ADMIN — SODIUM CHLORIDE 3 MILLILITER(S): 9 INJECTION INTRAMUSCULAR; INTRAVENOUS; SUBCUTANEOUS at 17:08

## 2018-07-03 RX ADMIN — PANTOPRAZOLE SODIUM 40 MILLIGRAM(S): 20 TABLET, DELAYED RELEASE ORAL at 12:49

## 2018-07-03 RX ADMIN — Medication 200 MILLIGRAM(S): at 05:58

## 2018-07-03 RX ADMIN — Medication 81 MILLIGRAM(S): at 12:49

## 2018-07-03 RX ADMIN — RIVAROXABAN 20 MILLIGRAM(S): KIT at 17:39

## 2018-07-03 NOTE — DISCHARGE NOTE ADULT - PROVIDER TOKENS
TOKLOVE:'[id number]',TOKEN:'[id number] TOKEN:'[id number]:'[id number]

## 2018-07-03 NOTE — DISCHARGE NOTE ADULT - HOSPITAL COURSE
89 y/o female with PMHx of HTN, HLD, SVT s/p ablation x 4 days ago, PE/DVT on xarelto, endometrial CA s/p LYNETTE, c/o sob and cough x 1 day. Pt was discharged from [facility] x 1 day ago after admission for SVT. Pt had ablation x 4 days ago. Pt admits to feeling well upon discharge but last night developed SOB and cough while lying in bed. Pt states that the SOB became progressively worse today. Pt states she had a non-productive cough. Pt has no chest pain, palpitations, diaphoresis, n/v/d, abdominal pain, numbness, tingling, dizziness, syncope, fever, chills.     Test Results:  EKG: sinus tachycardia with PAC, 106 BPM, TWI in III, V3  Trop: 59 --> 58, CK neg  BNP: 4407  CXR: Pulmonary vascular congestion with trace bilateral pleural effusions.  CT chest: Bilateral pleural effusions with interval decreased size of the left pleural effusion compared to 4/13/2018. Minimal residual groundglass opacities noted in the right upper lobe with adjacent areas of septal thickening suggestive of improving interstitial edema when compared with prior.    + SOB - multifactorial, + viral illness/ acute on chronic Diastolic CHF - clinically improved s/p IV lasix, now on PO lasix   RVP+ , chest xray revealing bl pleural effusions    ekg no evidence of ACS     + Acute on chronic Diastolic congestive heart failure   likely in the setting of viral illness   CTA chest revealing pulm edema , chest xray revealing pulm edema   Dyspnea improved   continue lasix to 40mg PO daily   recent echo revealing normal LV sys function, EF 55-60%, mod-sev mitral stenosis     + PSVT s/p ablation, stable - continue with BB/ ASA     + Htn - bp stable, continue with BB     + DVT (hx) - continue with xarelto     Incomplete************* 89 y/o female with PMHx of HTN, HLD, SVT s/p ablation x 4 days ago, PE/DVT on xarelto, endometrial CA s/p LYNETTE, c/o sob and cough x 1 day. Pt was discharged from [facility] x 1 day ago after admission for SVT. Pt had ablation x 4 days ago. Pt admits to feeling well upon discharge but last night developed SOB and cough while lying in bed. Pt states that the SOB became progressively worse today. Pt states she had a non-productive cough. Pt has no chest pain, palpitations, diaphoresis, n/v/d, abdominal pain, numbness, tingling, dizziness, syncope, fever, chills.     Test Results:  EKG: sinus tachycardia with PAC, 106 BPM, TWI in III, V3  Trop: 59 --> 58, CK neg  BNP: 4407  CXR: Pulmonary vascular congestion with trace bilateral pleural effusions.  CT chest: Bilateral pleural effusions with interval decreased size of the left pleural effusion compared to 4/13/2018. Minimal residual groundglass opacities noted in the right upper lobe with adjacent areas of septal thickening suggestive of improving interstitial edema when compared with prior.    + SOB - multifactorial, + viral illness/ acute on chronic Diastolic CHF - clinically improved s/p IV lasix, now on PO lasix   RVP+ , chest xray revealing bl pleural effusions    ekg no evidence of ACS     + Acute on chronic Diastolic congestive heart failure   likely in the setting of viral illness   CTA chest revealing pulm edema , chest xray revealing pulm edema   Dyspnea improved   continue lasix to 40mg PO daily   recent echo revealing normal LV sys function, EF 55-60%, mod-sev mitral stenosis     + PSVT s/p ablation, stable - continue with BB/ ASA     + Htn - bp stable, continue with BB     + DVT (hx) - continue with xarelto  Patient is hemodynamically stable and without complaints and patient is ready for discharge.

## 2018-07-03 NOTE — DISCHARGE NOTE ADULT - PLAN OF CARE
Treat underlying cause Likely secondary to viral syndrome and CHF exacerbation. Continue all medications as prescribed. To relieve and prevent worsening symptoms associated with congestive heart failure, to improve quality of life, and to treat underlying conditions such as coronary heart disease, high blood pressure, or diabetes, and to maintain euvolemia. Low salt diet, fluid restriction to 1500 ml daily, monitor your fluid intake and weight daily, exercise as tolerated 30 minutes daily, and follow up with your physician/cardiologistIncoI within 1 to 2 weeks. Supportive care, hydration, rest. Low sodium and fat diet, continue anti-hypertensive medications, and follow up with primary care physician. You recently had an ablation, please follow up with EP at your scheduled appointment. Low fat diet, exercise daily and continue current medications. Follow up with primary care physician and cardiologist for management. Continue Xarelto. Follow up with your primary care physician for further monitoring in 1-2 weeks. Please call to arrange appointment.

## 2018-07-03 NOTE — DISCHARGE NOTE ADULT - NS AS DC FOLLOWUP STROKE INST
Able to obtain one set of cultures thus far - Dr. Fransisco Kong and Kirit Bee aware. Smoking Cessation

## 2018-07-03 NOTE — DISCHARGE NOTE ADULT - MEDICATION SUMMARY - MEDICATIONS TO TAKE
I will START or STAY ON the medications listed below when I get home from the hospital:    aspirin 81 mg oral delayed release tablet  -- 1 tab(s) by mouth once a day  -- Indication: For Cardiovascular protection    rivaroxaban 20 mg oral tablet  -- 1 tab(s) by mouth every 24 hours  -- Indication: For PAF (paroxysmal atrial fibrillation)    atorvastatin 10 mg oral tablet  -- 1 tab(s) by mouth once a day (at bedtime)  -- Indication: For CHolesterol    busPIRone 10 mg oral tablet  -- 1 tab(s) by mouth 2 times a day  -- Indication: For anxiety    metoprolol succinate 25 mg oral tablet, extended release  -- 1 tab(s) by mouth once a day  -- Indication: For Cad    furosemide 40 mg oral tablet  -- 1 tab(s) by mouth once a day   -- Avoid prolonged or excessive exposure to direct and/or artificial sunlight while taking this medication.  It is very important that you take or use this exactly as directed.  Do not skip doses or discontinue unless directed by your doctor.  It may be advisable to drink a full glass orange juice or eat a banana daily while taking this medication.    -- Indication: For Cad    docusate sodium 100 mg oral capsule  -- 1 cap(s) by mouth 3 times a day  -- Indication: For Constipation    senna oral tablet  -- 2 tab(s) by mouth once a day (at bedtime)  -- Indication: For Constipation    pantoprazole 40 mg oral delayed release tablet  -- 1 tab(s) by mouth 2 times a day  -- Indication: For gerd    Multiple Vitamins oral tablet  -- 1 tab(s) by mouth once a day  -- Indication: For Supplement    Vitamin D3 50,000 intl units oral capsule  -- 1 cap(s) by mouth once a week  -- Indication: For Supplement

## 2018-07-03 NOTE — DISCHARGE NOTE ADULT - HOME CARE AGENCY
[facility]  [phone]  Visiting RN to see patient day after discharge; Physical therapy to follow

## 2018-07-03 NOTE — DISCHARGE NOTE ADULT - CARE PLAN
Principal Discharge DX:	Dyspnea  Goal:	Treat underlying cause  Assessment and plan of treatment:	Likely secondary to viral syndrome and CHF exacerbation. Continue all medications as prescribed.  Secondary Diagnosis:	CHF (congestive heart failure)  Goal:	To relieve and prevent worsening symptoms associated with congestive heart failure, to improve quality of life, and to treat underlying conditions such as coronary heart disease, high blood pressure, or diabetes, and to maintain euvolemia.  Assessment and plan of treatment:	Low salt diet, fluid restriction to 1500 ml daily, monitor your fluid intake and weight daily, exercise as tolerated 30 minutes daily, and follow up with your physician/cardiologistIncoI within 1 to 2 weeks.  Secondary Diagnosis:	Viral syndrome  Assessment and plan of treatment:	Supportive care, hydration, rest.  Secondary Diagnosis:	HTN (hypertension)  Assessment and plan of treatment:	Low sodium and fat diet, continue anti-hypertensive medications, and follow up with primary care physician.  Secondary Diagnosis:	SVT (supraventricular tachycardia)  Assessment and plan of treatment:	You recently had an ablation, please follow up with EP at your scheduled appointment.  Secondary Diagnosis:	HLD (hyperlipidemia)  Assessment and plan of treatment:	Low fat diet, exercise daily and continue current medications. Follow up with primary care physician and cardiologist for management.  Secondary Diagnosis:	History of DVT (deep vein thrombosis)  Assessment and plan of treatment:	Continue Xarelto. Follow up with your primary care physician for further monitoring in 1-2 weeks. Please call to arrange appointment.

## 2018-07-03 NOTE — DISCHARGE NOTE ADULT - CARE PROVIDER_API CALL
Jose A Mccartney), Cardiovascular Disease; Internal Medicine; Interventional Cardiology; Nuclear Cardiology  [address] [facility] [address]  [place] [address]  Phone: [phone]  Fax: [phone]    Colleen Watkins), Cardiac Electrophysiology; Cardiology; Internal Medicine  [address] [place] [address]  Phone: [phone]  Fax: [phone] Jose A Mccartney), Cardiovascular Disease; Internal Medicine; Interventional Cardiology; Nuclear Cardiology  [address] [facility] [address]  [place] [address]  Phone: [phone]  Fax: [phone]    Colleen Watkins), Cardiac Electrophysiology; Cardiology; Internal Medicine  [address] [place] [address]  Phone: [phone]  Fax: [phone]    Christian Luis), Internal Medicine  [address] [place] [address]  Phone: [phone]  Fax: [phone]

## 2018-07-03 NOTE — DISCHARGE NOTE ADULT - SECONDARY DIAGNOSIS.
CHF (congestive heart failure) Viral syndrome HTN (hypertension) SVT (supraventricular tachycardia) HLD (hyperlipidemia) History of DVT (deep vein thrombosis)

## 2018-07-03 NOTE — DISCHARGE NOTE ADULT - MEDICATION SUMMARY - MEDICATIONS TO STOP TAKING
I will STOP taking the medications listed below when I get home from the hospital:    Vitamin D3 50,000 intl units oral capsule  -- 1 cap(s) by mouth once a week    polyethylene glycol 3350 oral powder for reconstitution  -- 17 gram(s) by mouth 2 times a day    triamcinolone 0.1% topical ointment  -- 1 application on skin every 12 hours, As needed, itching    nystatin 100,000 units/g topical powder  -- 1 application on skin 2 times a day

## 2018-07-03 NOTE — DISCHARGE NOTE ADULT - CARE PROVIDERS DIRECT ADDRESSES
,DirectAddress_Unknown,DirectAddress_Unknown ,DirectAddress_Unknown,DirectAddress_Unknown,[email or web address]

## 2018-07-03 NOTE — DISCHARGE NOTE ADULT - NSFTFHOME1RD_GEN_ALL_CORE
Fall risk/Shortness of breath with minimal ambulation Shortness of breath with minimal ambulation/Fall risk/Ataxic gait

## 2018-07-03 NOTE — DISCHARGE NOTE ADULT - PATIENT PORTAL LINK FT
You can access the [facility] Patient Portal, offered by [facility], by registering with the following website: [email or web address]

## 2018-07-04 LAB
BUN SERPL-MCNC: 30 MG/DL — HIGH (ref 7–23)
CALCIUM SERPL-MCNC: 9.2 MG/DL — SIGNIFICANT CHANGE UP (ref 8.4–10.5)
CHLORIDE SERPL-SCNC: 95 MMOL/L — LOW (ref 98–107)
CO2 SERPL-SCNC: 30 MMOL/L — SIGNIFICANT CHANGE UP (ref 22–31)
CREAT SERPL-MCNC: 0.87 MG/DL — SIGNIFICANT CHANGE UP (ref 0.5–1.3)
GLUCOSE SERPL-MCNC: 87 MG/DL — SIGNIFICANT CHANGE UP (ref 70–99)
HCT VFR BLD CALC: 29.9 % — LOW (ref 34.5–45)
HGB BLD-MCNC: 9.6 G/DL — LOW (ref 11.5–15.5)
MAGNESIUM SERPL-MCNC: 1.9 MG/DL — SIGNIFICANT CHANGE UP (ref 1.6–2.6)
MCHC RBC-ENTMCNC: 25 PG — LOW (ref 27–34)
MCHC RBC-ENTMCNC: 32.1 % — SIGNIFICANT CHANGE UP (ref 32–36)
MCV RBC AUTO: 77.9 FL — LOW (ref 80–100)
NRBC # FLD: 0 — SIGNIFICANT CHANGE UP
PLATELET # BLD AUTO: 488 K/UL — HIGH (ref 150–400)
PMV BLD: 10.1 FL — SIGNIFICANT CHANGE UP (ref 7–13)
POTASSIUM SERPL-MCNC: 3.9 MMOL/L — SIGNIFICANT CHANGE UP (ref 3.5–5.3)
POTASSIUM SERPL-SCNC: 3.9 MMOL/L — SIGNIFICANT CHANGE UP (ref 3.5–5.3)
RBC # BLD: 3.84 M/UL — SIGNIFICANT CHANGE UP (ref 3.8–5.2)
RBC # FLD: 15.1 % — HIGH (ref 10.3–14.5)
SODIUM SERPL-SCNC: 136 MMOL/L — SIGNIFICANT CHANGE UP (ref 135–145)
WBC # BLD: 6.9 K/UL — SIGNIFICANT CHANGE UP (ref 3.8–10.5)
WBC # FLD AUTO: 6.9 K/UL — SIGNIFICANT CHANGE UP (ref 3.8–10.5)

## 2018-07-04 RX ADMIN — Medication 0.5 MILLIGRAM(S): at 22:04

## 2018-07-04 RX ADMIN — Medication 40 MILLIGRAM(S): at 06:52

## 2018-07-04 RX ADMIN — ATORVASTATIN CALCIUM 10 MILLIGRAM(S): 80 TABLET, FILM COATED ORAL at 22:05

## 2018-07-04 RX ADMIN — SODIUM CHLORIDE 3 MILLILITER(S): 9 INJECTION INTRAMUSCULAR; INTRAVENOUS; SUBCUTANEOUS at 06:53

## 2018-07-04 RX ADMIN — Medication 1 TABLET(S): at 13:41

## 2018-07-04 RX ADMIN — Medication 200 MILLIGRAM(S): at 13:42

## 2018-07-04 RX ADMIN — PANTOPRAZOLE SODIUM 40 MILLIGRAM(S): 20 TABLET, DELAYED RELEASE ORAL at 13:42

## 2018-07-04 RX ADMIN — SODIUM CHLORIDE 3 MILLILITER(S): 9 INJECTION INTRAMUSCULAR; INTRAVENOUS; SUBCUTANEOUS at 13:24

## 2018-07-04 RX ADMIN — Medication 81 MILLIGRAM(S): at 13:42

## 2018-07-04 RX ADMIN — SODIUM CHLORIDE 3 MILLILITER(S): 9 INJECTION INTRAMUSCULAR; INTRAVENOUS; SUBCUTANEOUS at 21:57

## 2018-07-04 RX ADMIN — Medication 10 MILLIGRAM(S): at 18:55

## 2018-07-04 RX ADMIN — Medication 25 MILLIGRAM(S): at 06:53

## 2018-07-04 RX ADMIN — Medication 200 MILLIGRAM(S): at 22:05

## 2018-07-04 RX ADMIN — Medication 200 MILLIGRAM(S): at 06:51

## 2018-07-04 RX ADMIN — RIVAROXABAN 20 MILLIGRAM(S): KIT at 18:55

## 2018-07-04 RX ADMIN — Medication 3 MILLIGRAM(S): at 22:07

## 2018-07-04 RX ADMIN — Medication 10 MILLIGRAM(S): at 06:51

## 2018-07-05 LAB
BUN SERPL-MCNC: 26 MG/DL — HIGH (ref 7–23)
CALCIUM SERPL-MCNC: 9.3 MG/DL — SIGNIFICANT CHANGE UP (ref 8.4–10.5)
CHLORIDE SERPL-SCNC: 91 MMOL/L — LOW (ref 98–107)
CO2 SERPL-SCNC: 29 MMOL/L — SIGNIFICANT CHANGE UP (ref 22–31)
CREAT SERPL-MCNC: 0.87 MG/DL — SIGNIFICANT CHANGE UP (ref 0.5–1.3)
GLUCOSE SERPL-MCNC: 95 MG/DL — SIGNIFICANT CHANGE UP (ref 70–99)
HCT VFR BLD CALC: 30.5 % — LOW (ref 34.5–45)
HGB BLD-MCNC: 9.3 G/DL — LOW (ref 11.5–15.5)
MAGNESIUM SERPL-MCNC: 2 MG/DL — SIGNIFICANT CHANGE UP (ref 1.6–2.6)
MCHC RBC-ENTMCNC: 24.8 PG — LOW (ref 27–34)
MCHC RBC-ENTMCNC: 30.5 % — LOW (ref 32–36)
MCV RBC AUTO: 81.3 FL — SIGNIFICANT CHANGE UP (ref 80–100)
NRBC # FLD: 0 — SIGNIFICANT CHANGE UP
PLATELET # BLD AUTO: 471 K/UL — HIGH (ref 150–400)
PMV BLD: 10.1 FL — SIGNIFICANT CHANGE UP (ref 7–13)
POTASSIUM SERPL-MCNC: 3.7 MMOL/L — SIGNIFICANT CHANGE UP (ref 3.5–5.3)
POTASSIUM SERPL-SCNC: 3.7 MMOL/L — SIGNIFICANT CHANGE UP (ref 3.5–5.3)
RBC # BLD: 3.75 M/UL — LOW (ref 3.8–5.2)
RBC # FLD: 15.1 % — HIGH (ref 10.3–14.5)
SODIUM SERPL-SCNC: 132 MMOL/L — LOW (ref 135–145)
WBC # BLD: 5.68 K/UL — SIGNIFICANT CHANGE UP (ref 3.8–10.5)
WBC # FLD AUTO: 5.68 K/UL — SIGNIFICANT CHANGE UP (ref 3.8–10.5)

## 2018-07-05 PROCEDURE — 99232 SBSQ HOSP IP/OBS MODERATE 35: CPT

## 2018-07-05 RX ORDER — ALPRAZOLAM 0.25 MG
0.5 TABLET ORAL AT BEDTIME
Qty: 0 | Refills: 0 | Status: DISCONTINUED | OUTPATIENT
Start: 2018-07-06 | End: 2018-07-08

## 2018-07-05 RX ADMIN — PANTOPRAZOLE SODIUM 40 MILLIGRAM(S): 20 TABLET, DELAYED RELEASE ORAL at 11:30

## 2018-07-05 RX ADMIN — Medication 0.5 MILLIGRAM(S): at 21:59

## 2018-07-05 RX ADMIN — RIVAROXABAN 20 MILLIGRAM(S): KIT at 17:08

## 2018-07-05 RX ADMIN — SODIUM CHLORIDE 3 MILLILITER(S): 9 INJECTION INTRAMUSCULAR; INTRAVENOUS; SUBCUTANEOUS at 21:13

## 2018-07-05 RX ADMIN — Medication 40 MILLIGRAM(S): at 05:37

## 2018-07-05 RX ADMIN — Medication 10 MILLIGRAM(S): at 17:08

## 2018-07-05 RX ADMIN — Medication 200 MILLIGRAM(S): at 05:37

## 2018-07-05 RX ADMIN — SODIUM CHLORIDE 3 MILLILITER(S): 9 INJECTION INTRAMUSCULAR; INTRAVENOUS; SUBCUTANEOUS at 14:21

## 2018-07-05 RX ADMIN — ATORVASTATIN CALCIUM 10 MILLIGRAM(S): 80 TABLET, FILM COATED ORAL at 21:15

## 2018-07-05 RX ADMIN — Medication 25 MILLIGRAM(S): at 05:37

## 2018-07-05 RX ADMIN — Medication 10 MILLIGRAM(S): at 05:37

## 2018-07-05 RX ADMIN — Medication 81 MILLIGRAM(S): at 11:30

## 2018-07-05 RX ADMIN — Medication 1 TABLET(S): at 11:30

## 2018-07-05 RX ADMIN — Medication 3 MILLIGRAM(S): at 21:59

## 2018-07-05 RX ADMIN — Medication 200 MILLIGRAM(S): at 14:22

## 2018-07-05 RX ADMIN — SODIUM CHLORIDE 3 MILLILITER(S): 9 INJECTION INTRAMUSCULAR; INTRAVENOUS; SUBCUTANEOUS at 05:37

## 2018-07-05 NOTE — CHART NOTE - NSCHARTNOTEFT_GEN_A_CORE
Telemetry recording reviewed: SR with APC's. 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  Continue Toprol 25 mg daily.

## 2018-07-06 LAB
BUN SERPL-MCNC: 30 MG/DL — HIGH (ref 7–23)
CALCIUM SERPL-MCNC: 9.3 MG/DL — SIGNIFICANT CHANGE UP (ref 8.4–10.5)
CHLORIDE SERPL-SCNC: 92 MMOL/L — LOW (ref 98–107)
CO2 SERPL-SCNC: 30 MMOL/L — SIGNIFICANT CHANGE UP (ref 22–31)
CREAT SERPL-MCNC: 0.93 MG/DL — SIGNIFICANT CHANGE UP (ref 0.5–1.3)
GLUCOSE SERPL-MCNC: 96 MG/DL — SIGNIFICANT CHANGE UP (ref 70–99)
HCT VFR BLD CALC: 28.3 % — LOW (ref 34.5–45)
HGB BLD-MCNC: 9.1 G/DL — LOW (ref 11.5–15.5)
MAGNESIUM SERPL-MCNC: 2 MG/DL — SIGNIFICANT CHANGE UP (ref 1.6–2.6)
MCHC RBC-ENTMCNC: 25 PG — LOW (ref 27–34)
MCHC RBC-ENTMCNC: 32.2 % — SIGNIFICANT CHANGE UP (ref 32–36)
MCV RBC AUTO: 77.7 FL — LOW (ref 80–100)
NRBC # FLD: 0 — SIGNIFICANT CHANGE UP
PLATELET # BLD AUTO: 482 K/UL — HIGH (ref 150–400)
PMV BLD: 10.3 FL — SIGNIFICANT CHANGE UP (ref 7–13)
POTASSIUM SERPL-MCNC: 3.4 MMOL/L — LOW (ref 3.5–5.3)
POTASSIUM SERPL-SCNC: 3.4 MMOL/L — LOW (ref 3.5–5.3)
RBC # BLD: 3.64 M/UL — LOW (ref 3.8–5.2)
RBC # FLD: 15.1 % — HIGH (ref 10.3–14.5)
SODIUM SERPL-SCNC: 134 MMOL/L — LOW (ref 135–145)
WBC # BLD: 6.66 K/UL — SIGNIFICANT CHANGE UP (ref 3.8–10.5)
WBC # FLD AUTO: 6.66 K/UL — SIGNIFICANT CHANGE UP (ref 3.8–10.5)

## 2018-07-06 PROCEDURE — 99232 SBSQ HOSP IP/OBS MODERATE 35: CPT

## 2018-07-06 RX ADMIN — Medication 81 MILLIGRAM(S): at 12:27

## 2018-07-06 RX ADMIN — Medication 0.5 MILLIGRAM(S): at 22:52

## 2018-07-06 RX ADMIN — SODIUM CHLORIDE 3 MILLILITER(S): 9 INJECTION INTRAMUSCULAR; INTRAVENOUS; SUBCUTANEOUS at 13:49

## 2018-07-06 RX ADMIN — PANTOPRAZOLE SODIUM 40 MILLIGRAM(S): 20 TABLET, DELAYED RELEASE ORAL at 12:27

## 2018-07-06 RX ADMIN — Medication 200 MILLIGRAM(S): at 15:06

## 2018-07-06 RX ADMIN — Medication 25 MILLIGRAM(S): at 05:12

## 2018-07-06 RX ADMIN — Medication 40 MILLIGRAM(S): at 05:12

## 2018-07-06 RX ADMIN — SODIUM CHLORIDE 3 MILLILITER(S): 9 INJECTION INTRAMUSCULAR; INTRAVENOUS; SUBCUTANEOUS at 21:34

## 2018-07-06 RX ADMIN — Medication 200 MILLIGRAM(S): at 21:34

## 2018-07-06 RX ADMIN — Medication 10 MILLIGRAM(S): at 17:51

## 2018-07-06 RX ADMIN — Medication 3 MILLIGRAM(S): at 22:52

## 2018-07-06 RX ADMIN — RIVAROXABAN 20 MILLIGRAM(S): KIT at 17:51

## 2018-07-06 RX ADMIN — SODIUM CHLORIDE 3 MILLILITER(S): 9 INJECTION INTRAMUSCULAR; INTRAVENOUS; SUBCUTANEOUS at 05:12

## 2018-07-06 RX ADMIN — ATORVASTATIN CALCIUM 10 MILLIGRAM(S): 80 TABLET, FILM COATED ORAL at 21:34

## 2018-07-06 RX ADMIN — Medication 10 MILLIGRAM(S): at 05:12

## 2018-07-06 RX ADMIN — Medication 1 TABLET(S): at 12:27

## 2018-07-07 RX ORDER — FUROSEMIDE 40 MG
1 TABLET ORAL
Qty: 30 | Refills: 0 | OUTPATIENT
Start: 2018-07-07 | End: 2018-08-05

## 2018-07-07 RX ORDER — FUROSEMIDE 40 MG
1 TABLET ORAL
Qty: 30 | Refills: 0
Start: 2018-07-07 | End: 2018-08-05

## 2018-07-07 RX ADMIN — Medication 10 MILLIGRAM(S): at 05:20

## 2018-07-07 RX ADMIN — Medication 10 MILLIGRAM(S): at 18:37

## 2018-07-07 RX ADMIN — Medication 40 MILLIGRAM(S): at 05:20

## 2018-07-07 RX ADMIN — Medication 25 MILLIGRAM(S): at 05:20

## 2018-07-07 RX ADMIN — Medication 200 MILLIGRAM(S): at 21:08

## 2018-07-07 RX ADMIN — SODIUM CHLORIDE 3 MILLILITER(S): 9 INJECTION INTRAMUSCULAR; INTRAVENOUS; SUBCUTANEOUS at 21:07

## 2018-07-07 RX ADMIN — ATORVASTATIN CALCIUM 10 MILLIGRAM(S): 80 TABLET, FILM COATED ORAL at 21:07

## 2018-07-07 RX ADMIN — Medication 1 TABLET(S): at 12:25

## 2018-07-07 RX ADMIN — Medication 0.5 MILLIGRAM(S): at 23:04

## 2018-07-07 RX ADMIN — Medication 3 MILLIGRAM(S): at 23:04

## 2018-07-07 RX ADMIN — Medication 81 MILLIGRAM(S): at 12:25

## 2018-07-07 RX ADMIN — Medication 200 MILLIGRAM(S): at 05:20

## 2018-07-07 RX ADMIN — Medication 200 MILLIGRAM(S): at 15:35

## 2018-07-07 RX ADMIN — RIVAROXABAN 20 MILLIGRAM(S): KIT at 18:37

## 2018-07-07 RX ADMIN — PANTOPRAZOLE SODIUM 40 MILLIGRAM(S): 20 TABLET, DELAYED RELEASE ORAL at 12:25

## 2018-07-07 NOTE — PROGRESS NOTE ADULT - ATTENDING COMMENTS
-PSVT,-s/p ablation  -b/l pl.effusion-monitor  -OOB,stable for d/c home  -f/u with PCP,Cardiology in 1-2 weeks
-PSVT,-s/p ablation  -b/l pl.effusion-monitor  -OOB,D/C PLANNING
Agree with above NP note.  cv stable   cont current meds   cont a/c
Agree with above NP note.  cv stable   cont current meds   cont a/c
Agree with above NP note.  cv stable   cont lasix as ordered  d/c planning
Agree with above NP note.  cv stable  cont current tx
agree with NP note above  change lasix to PO  BB
agree with NP note above  cont iv lasix  BB

## 2018-07-08 VITALS
RESPIRATION RATE: 18 BRPM | SYSTOLIC BLOOD PRESSURE: 119 MMHG | OXYGEN SATURATION: 98 % | DIASTOLIC BLOOD PRESSURE: 74 MMHG | TEMPERATURE: 97 F | HEART RATE: 74 BPM

## 2018-07-08 RX ADMIN — Medication 25 MILLIGRAM(S): at 05:30

## 2018-07-08 RX ADMIN — Medication 200 MILLIGRAM(S): at 05:30

## 2018-07-08 RX ADMIN — Medication 40 MILLIGRAM(S): at 05:30

## 2018-07-08 RX ADMIN — Medication 10 MILLIGRAM(S): at 05:30

## 2018-07-08 NOTE — PROGRESS NOTE ADULT - PROVIDER SPECIALTY LIST ADULT
Cardiology
Electrophysiology
Electrophysiology
Internal Medicine
Cardiology
Internal Medicine
Internal Medicine

## 2018-07-08 NOTE — PROGRESS NOTE ADULT - SUBJECTIVE AND OBJECTIVE BOX
DELVIS MCBRIDE  89y  Female      Patient is a 89y old  Female who presents with a chief complaint of " I had cough" (03 Jul 2018 09:16)  Patient is comfortable,nad,no sob,no cp,no fever,no cough    REVIEW OF SYSTEMS:  as above      INTERVAL HPI/OVERNIGHT EVENTS:  T(C): 36.7 (07-07-18 @ 12:26), Max: 36.7 (07-07-18 @ 12:26)  HR: 85 (07-07-18 @ 12:26) (77 - 85)  BP: 116/68 (07-07-18 @ 12:26) (111/53 - 117/64)  RR: 17 (07-07-18 @ 12:26) (17 - 18)  SpO2: 100% (07-07-18 @ 12:26) (97% - 100%)  Wt(kg): --  I&O's Summary    06 Jul 2018 07:01  -  07 Jul 2018 07:00  --------------------------------------------------------  IN: 1120 mL / OUT: 0 mL / NET: 1120 mL    07 Jul 2018 07:01  -  07 Jul 2018 14:49  --------------------------------------------------------  IN: 600 mL / OUT: 0 mL / NET: 600 mL      T(C): 36.7 (07-07-18 @ 12:26), Max: 36.7 (07-07-18 @ 12:26)  HR: 85 (07-07-18 @ 12:26) (77 - 85)  BP: 116/68 (07-07-18 @ 12:26) (111/53 - 117/64)  RR: 17 (07-07-18 @ 12:26) (17 - 18)  SpO2: 100% (07-07-18 @ 12:26) (97% - 100%)  Wt(kg): --Vital Signs Last 24 Hrs  T(C): 36.7 (07 Jul 2018 12:26), Max: 36.7 (07 Jul 2018 12:26)  T(F): 98.1 (07 Jul 2018 12:26), Max: 98.1 (07 Jul 2018 12:26)  HR: 85 (07 Jul 2018 12:26) (77 - 85)  BP: 116/68 (07 Jul 2018 12:26) (111/53 - 117/64)  BP(mean): --  RR: 17 (07 Jul 2018 12:26) (17 - 18)  SpO2: 100% (07 Jul 2018 12:26) (97% - 100%)    LABS:                        9.1    6.66  )-----------( 482      ( 06 Jul 2018 05:40 )             28.3     07-06    134<L>  |  92<L>  |  30<H>  ----------------------------<  96  3.4<L>   |  30  |  0.93    Ca    9.3      06 Jul 2018 05:40  Mg     2.0     07-06          CAPILLARY BLOOD GLUCOSE                PAST MEDICAL & SURGICAL HISTORY:  Endometrial cancer: S/P LYNETTE with SBO  HLD (hyperlipidemia)  HTN (hypertension)  PAF (paroxysmal atrial fibrillation): On Xarelto  PE (pulmonary thromboembolism)  Insomnia  Breast Lump  Anxiety  Status post cataract extraction: OS  Status post hysterectomy: endometrial cancer  History of D&C- 8/12/11  History of Colonoscopy- 2011/Sept  [facility]      MEDICATIONS  (STANDING):  ALPRAZolam 0.5 milliGRAM(s) Oral at bedtime  aspirin enteric coated 81 milliGRAM(s) Oral daily  atorvastatin 10 milliGRAM(s) Oral at bedtime  busPIRone 10 milliGRAM(s) Oral two times a day  furosemide    Tablet 40 milliGRAM(s) Oral daily  guaiFENesin    Syrup 200 milliGRAM(s) Oral every 8 hours  melatonin 3 milliGRAM(s) Oral at bedtime  metoprolol succinate ER 25 milliGRAM(s) Oral daily  multivitamin 1 Tablet(s) Oral daily  pantoprazole    Tablet 40 milliGRAM(s) Oral daily  rivaroxaban 20 milliGRAM(s) Oral every 24 hours  sodium chloride 0.9% lock flush 3 milliLiter(s) IV Push every 8 hours    MEDICATIONS  (PRN):  docusate sodium 100 milliGRAM(s) Oral three times a day PRN Constipation  senna 2 Tablet(s) Oral at bedtime PRN Constipation        RADIOLOGY & ADDITIONAL TESTS:    Imaging Personally Reviewed:  [ ] YES  [ ] NO    Consultant(s) Notes Reviewed:  [ ] YES  [ ] NO    PHYSICAL EXAM:  GENERAL: NAD, well-groomed, well-developed  HEAD:  Atraumatic, Normocephalic  EYES: EOMI, PERRLA, conjunctiva and sclera clear  ENMT: No tonsillar erythema, exudates, or enlargement; Moist mucous membranes, Good dentition, No lesions  NECK: Supple, No JVD, Normal thyroid  NERVOUS SYSTEM:  Alert & Oriented X3, Good concentration; Motor Strength 5/5 B/L upper and lower extremities; DTRs 2+ intact and symmetric  CHEST/LUNG: Clear to percussion bilaterally; No rales, rhonchi, wheezing, or rubs  HEART: Regular rate and rhythm; No murmurs, rubs, or gallops  ABDOMEN: Soft, Nontender, Nondistended; Bowel sounds present  EXTREMITIES:  2+ Peripheral Pulses, No clubbing, cyanosis, or edema  LYMPH: No lymphadenopathy noted  SKIN: No rashes or lesions    Care Discussed with Consultants/Other Providers [ ] YES  [ ] NO      Code Status: [] Full Code [] DNR [] DNI [] Goals of Care:   Disposition: [] ICU [] Stroke Unit [] RCU []PCU []Floor [] Discharge Home         [facility] PhanP
CARDIOLOGY FOLLOW UP - Dr. Mccartney    CC no cp/sob       PHYSICAL EXAM:  T(C): 36.4 (07-03-18 @ 05:48), Max: 36.9 (07-02-18 @ 14:01)  HR: 85 (07-03-18 @ 05:48) (81 - 100)  BP: 119/68 (07-03-18 @ 05:48) (112/71 - 122/61)  RR: 18 (07-03-18 @ 05:48) (18 - 18)  SpO2: 94% (07-03-18 @ 05:48) (91% - 94%)  Wt(kg): --  I&O's Summary    02 Jul 2018 07:01  -  03 Jul 2018 07:00  --------------------------------------------------------  IN: 950 mL / OUT: 0 mL / NET: 950 mL        Appearance: Normal	  Cardiovascular: Normal S1 S2,RRR, No JVD, +sys murmur   Respiratory: Lungs clear to auscultation	  Gastrointestinal:  Soft, Non-tender, + BS	  Extremities: Normal range of motion, No clubbing, cyanosis or edema        MEDICATIONS  (STANDING):  ALPRAZolam 0.5 milliGRAM(s) Oral at bedtime  aspirin enteric coated 81 milliGRAM(s) Oral daily  atorvastatin 10 milliGRAM(s) Oral at bedtime  busPIRone 10 milliGRAM(s) Oral two times a day  furosemide    Tablet 40 milliGRAM(s) Oral daily  guaiFENesin    Syrup 200 milliGRAM(s) Oral every 8 hours  melatonin 3 milliGRAM(s) Oral at bedtime  metoprolol succinate ER 25 milliGRAM(s) Oral daily  multivitamin 1 Tablet(s) Oral daily  pantoprazole    Tablet 40 milliGRAM(s) Oral daily  rivaroxaban 20 milliGRAM(s) Oral every 24 hours  sodium chloride 0.9% lock flush 3 milliLiter(s) IV Push every 8 hours      TELEMETRY: NSR , PAC 	    ECG:  	  RADIOLOGY:   DIAGNOSTIC TESTING:  [ ] Echocardiogram:  [ ]  Catheterization:  [ ] Stress Test:    OTHER: 	    LABS:	 	                                9.0    7.66  )-----------( 446      ( 03 Jul 2018 05:30 )             28.9     07-03    135  |  91<L>  |  30<H>  ----------------------------<  90  3.8   |  30  |  0.86    Ca    9.0      03 Jul 2018 05:30  Mg     1.9     07-03
CARDIOLOGY FOLLOW UP - Dr. Mccartney    CC no cp/sob       PHYSICAL EXAM:  T(C): 36.5 (07-05-18 @ 05:36), Max: 36.8 (07-04-18 @ 14:11)  HR: 82 (07-05-18 @ 05:36) (77 - 96)  BP: 110/62 (07-05-18 @ 05:36) (110/62 - 141/84)  RR: 17 (07-05-18 @ 05:36) (17 - 18)  SpO2: 94% (07-05-18 @ 05:36) (94% - 97%)  Wt(kg): --  I&O's Summary    04 Jul 2018 07:01  -  05 Jul 2018 07:00  --------------------------------------------------------  IN: 1050 mL / OUT: 0 mL / NET: 1050 mL        Appearance: Normal	  Cardiovascular: Normal S1 S2,RRR, +sys murmur   Respiratory: Lungs clear to auscultation	  Gastrointestinal:  Soft, Non-tender, + BS	  Extremities: Normal range of motion, No clubbing, cyanosis or edema        MEDICATIONS  (STANDING):  ALPRAZolam 0.5 milliGRAM(s) Oral at bedtime  aspirin enteric coated 81 milliGRAM(s) Oral daily  atorvastatin 10 milliGRAM(s) Oral at bedtime  busPIRone 10 milliGRAM(s) Oral two times a day  furosemide    Tablet 40 milliGRAM(s) Oral daily  guaiFENesin    Syrup 200 milliGRAM(s) Oral every 8 hours  melatonin 3 milliGRAM(s) Oral at bedtime  metoprolol succinate ER 25 milliGRAM(s) Oral daily  multivitamin 1 Tablet(s) Oral daily  pantoprazole    Tablet 40 milliGRAM(s) Oral daily  rivaroxaban 20 milliGRAM(s) Oral every 24 hours  sodium chloride 0.9% lock flush 3 milliLiter(s) IV Push every 8 hours      TELEMETRY: NSR 	    ECG:  	  RADIOLOGY:   DIAGNOSTIC TESTING:  [ ] Echocardiogram:  [ ]  Catheterization:  [ ] Stress Test:    OTHER: 	    LABS:	 	                                9.3    5.68  )-----------( 471      ( 05 Jul 2018 06:00 )             30.5     07-05    132<L>  |  91<L>  |  26<H>  ----------------------------<  95  3.7   |  29  |  0.87    Ca    9.3      05 Jul 2018 06:00  Mg     2.0     07-05
CARDIOLOGY FOLLOW UP - Dr. Mccartney    CC no cp/sob       PHYSICAL EXAM:  T(C): 36.7 (07-01-18 @ 06:09), Max: 36.9 (06-30-18 @ 20:11)  HR: 74 (07-01-18 @ 08:01) (74 - 102)  BP: 104/57 (07-01-18 @ 06:09) (104/57 - 119/57)  RR: 18 (07-01-18 @ 06:09) (17 - 18)  SpO2: 97% (07-01-18 @ 08:01) (93% - 100%)  Wt(kg): --  I&O's Summary    30 Jun 2018 07:01  -  01 Jul 2018 07:00  --------------------------------------------------------  IN: 558 mL / OUT: 0 mL / NET: 558 mL        Appearance: Normal	  Cardiovascular: Normal S1 S2,RRR, No JVD, +sys murmur   Respiratory: diminished   Gastrointestinal:  Soft, Non-tender, + BS	  Extremities: Normal range of motion, No clubbing, cyanosis or edema        MEDICATIONS  (STANDING):  ALPRAZolam 0.5 milliGRAM(s) Oral at bedtime  aspirin enteric coated 81 milliGRAM(s) Oral daily  atorvastatin 10 milliGRAM(s) Oral at bedtime  benzonatate 100 milliGRAM(s) Oral every 8 hours  buDESOnide   0.5 milliGRAM(s) Respule 0.5 milliGRAM(s) Inhalation two times a day  busPIRone 10 milliGRAM(s) Oral two times a day  furosemide   Injectable 40 milliGRAM(s) IV Push daily  melatonin 3 milliGRAM(s) Oral at bedtime  metoprolol succinate ER 25 milliGRAM(s) Oral daily  multivitamin 1 Tablet(s) Oral daily  pantoprazole    Tablet 40 milliGRAM(s) Oral daily  rivaroxaban 20 milliGRAM(s) Oral every 24 hours  sodium chloride 0.9% lock flush 3 milliLiter(s) IV Push every 8 hours      TELEMETRY: NSR . 	    ECG:  	  RADIOLOGY:   DIAGNOSTIC TESTING:  [ ] Echocardiogram:  [ ]  Catheterization:  [ ] Stress Test:    OTHER: 	    LABS:	 	                                8.5    6.71  )-----------( 351      ( 01 Jul 2018 06:28 )             27.5     07-01    135  |  93<L>  |  25<H>  ----------------------------<  95  4.0   |  27  |  0.80    Ca    8.6      01 Jul 2018 06:28  Mg     1.8     07-01
CARDIOLOGY FOLLOW UP - Dr. Mccartney    CC no cp/sob       PHYSICAL EXAM:  T(C): 36.7 (07-02-18 @ 06:15), Max: 37.2 (07-01-18 @ 21:33)  HR: 91 (07-02-18 @ 06:15) (76 - 91)  BP: 102/57 (07-02-18 @ 06:15) (102/57 - 114/71)  RR: 18 (07-02-18 @ 06:15) (18 - 18)  SpO2: 96% (07-02-18 @ 06:15) (95% - 98%)  Wt(kg): --  I&O's Summary    01 Jul 2018 07:01  -  02 Jul 2018 07:00  --------------------------------------------------------  IN: 700 mL / OUT: 0 mL / NET: 700 mL        Appearance: Normal	  Cardiovascular: Normal S1 S2,RRR, No JVD, +sys murmur   Respiratory: Lungs clear to auscultation	  Gastrointestinal:  Soft, Non-tender, + BS	  Extremities: Normal range of motion, No clubbing, cyanosis or edema        MEDICATIONS  (STANDING):  ALPRAZolam 0.5 milliGRAM(s) Oral at bedtime  aspirin enteric coated 81 milliGRAM(s) Oral daily  atorvastatin 10 milliGRAM(s) Oral at bedtime  buDESOnide   0.5 milliGRAM(s) Respule 0.5 milliGRAM(s) Inhalation two times a day  busPIRone 10 milliGRAM(s) Oral two times a day  furosemide    Tablet 40 milliGRAM(s) Oral daily  guaiFENesin    Syrup 200 milliGRAM(s) Oral every 8 hours  melatonin 3 milliGRAM(s) Oral at bedtime  metoprolol succinate ER 25 milliGRAM(s) Oral daily  multivitamin 1 Tablet(s) Oral daily  pantoprazole    Tablet 40 milliGRAM(s) Oral daily  rivaroxaban 20 milliGRAM(s) Oral every 24 hours  sodium chloride 0.9% lock flush 3 milliLiter(s) IV Push every 8 hours      TELEMETRY: 	NSR 90, pac    ECG:  	  RADIOLOGY:   DIAGNOSTIC TESTING:  [ ] Echocardiogram:  [ ]  Catheterization:  [ ] Stress Test:    OTHER: 	    LABS:	 	                                8.9    7.50  )-----------( 392      ( 02 Jul 2018 05:27 )             27.4     07-02    133<L>  |  92<L>  |  25<H>  ----------------------------<  97  3.8   |  31  |  0.91    Ca    8.8      02 Jul 2018 05:27  Mg     1.8     07-02
CARDIOLOGY FOLLOW UP - Dr. Mccartney    CC no cp/sob  c/o cough       PHYSICAL EXAM:  T(C): 36.7 (06-30-18 @ 05:59), Max: 37.1 (06-29-18 @ 13:32)  HR: 77 (06-30-18 @ 05:59) (75 - 96)  BP: 105/45 (06-30-18 @ 05:59) (104/60 - 125/65)  RR: 18 (06-30-18 @ 05:59) (17 - 18)  SpO2: 100% (06-30-18 @ 05:59) (93% - 100%)  Wt(kg): --  I&O's Summary    29 Jun 2018 07:01  -  30 Jun 2018 07:00  --------------------------------------------------------  IN: 600 mL / OUT: 0 mL / NET: 600 mL        Appearance: Normal	  Cardiovascular: Normal S1 S2,RRR, No JVD,+sys murmur   Respiratory: diminished   Gastrointestinal:  Soft, Non-tender, + BS	  Extremities: Normal range of motion, No clubbing, cyanosis or edema        MEDICATIONS  (STANDING):  ALPRAZolam 0.5 milliGRAM(s) Oral at bedtime  aspirin enteric coated 81 milliGRAM(s) Oral daily  atorvastatin 10 milliGRAM(s) Oral at bedtime  benzonatate 100 milliGRAM(s) Oral every 8 hours  buDESOnide   0.5 milliGRAM(s) Respule 0.5 milliGRAM(s) Inhalation two times a day  busPIRone 10 milliGRAM(s) Oral two times a day  furosemide   Injectable 40 milliGRAM(s) IV Push daily  melatonin 3 milliGRAM(s) Oral at bedtime  metoprolol succinate ER 25 milliGRAM(s) Oral daily  multivitamin 1 Tablet(s) Oral daily  pantoprazole    Tablet 40 milliGRAM(s) Oral daily  rivaroxaban 20 milliGRAM(s) Oral every 24 hours  sodium chloride 0.9% lock flush 3 milliLiter(s) IV Push every 8 hours      TELEMETRY: ST    ECG:  	  RADIOLOGY:   DIAGNOSTIC TESTING:  [ ] Echocardiogram:  [ ]  Catheterization:  [ ] Stress Test:    OTHER: 	    LABS:	 	                                8.2    6.57  )-----------( 342      ( 30 Jun 2018 05:36 )             26.0     06-30    135  |  95<L>  |  22  ----------------------------<  93  4.0   |  29  |  0.81    Ca    8.5      30 Jun 2018 05:36  Mg     1.7     06-30
CARDIOLOGY FOLLOW UP NOTE - DR. HANSEN    Subjective:  no cp, sob      PHYSICAL EXAM:  T(C): 36.3 (07-08-18 @ 05:26), Max: 36.8 (07-07-18 @ 18:36)  HR: 76 (07-08-18 @ 05:26) (76 - 85)  BP: 106/58 (07-08-18 @ 05:26) (106/58 - 123/60)  RR: 17 (07-08-18 @ 05:26) (17 - 17)  SpO2: 96% (07-08-18 @ 05:26) (96% - 100%)  Wt(kg): --  I&O's Summary    07 Jul 2018 07:01  -  08 Jul 2018 07:00  --------------------------------------------------------  IN: 950 mL / OUT: 150 mL / NET: 800 mL        Appearance: Normal	  Cardiovascular: Normal S1 S2,RRR, No JVD, No murmurs  Respiratory: Lungs clear to auscultation	  Gastrointestinal:  Soft, Non-tender, + BS	  Extremities: Normal range of motion, No clubbing, cyanosis or edema  Vascular: Peripheral pulses palpable 2+ bilaterally    MEDICATIONS  (STANDING):  ALPRAZolam 0.5 milliGRAM(s) Oral at bedtime  aspirin enteric coated 81 milliGRAM(s) Oral daily  atorvastatin 10 milliGRAM(s) Oral at bedtime  busPIRone 10 milliGRAM(s) Oral two times a day  furosemide    Tablet 40 milliGRAM(s) Oral daily  guaiFENesin    Syrup 200 milliGRAM(s) Oral every 8 hours  melatonin 3 milliGRAM(s) Oral at bedtime  metoprolol succinate ER 25 milliGRAM(s) Oral daily  multivitamin 1 Tablet(s) Oral daily  pantoprazole    Tablet 40 milliGRAM(s) Oral daily  rivaroxaban 20 milliGRAM(s) Oral every 24 hours  sodium chloride 0.9% lock flush 3 milliLiter(s) IV Push every 8 hours      TELEMETRY: 	    ECG:  	  RADIOLOGY:   DIAGNOSTIC TESTING:  [ ] Echocardiogram:  [ ] Catheterization:  [ ] Stress Test:    OTHER: 	    LABS:	 	    CARDIAC MARKERS:                  proBNP:     Lipid Profile:   HgA1c:
CC: no events    TELEMETRY:     PHYSICAL EXAM:    T(C): 36.6 (07-04-18 @ 06:50), Max: 36.7 (07-03-18 @ 14:48)  HR: 78 (07-04-18 @ 06:50) (78 - 94)  BP: 134/64 (07-04-18 @ 06:50) (131/68 - 134/77)  RR: 18 (07-04-18 @ 06:50) (18 - 18)  SpO2: 96% (07-04-18 @ 06:50) (96% - 97%)  Wt(kg): --  I&O's Summary      Appearance: Normal	  Cardiovascular: Normal S1 S2,RRR, No JVD, No murmurs  Respiratory: Lungs clear to auscultation	  Gastrointestinal:  Soft, Non-tender, + BS	  Extremities: Normal range of motion, No clubbing, cyanosis or edema  Vascular: Peripheral pulses palpable 2+ bilaterally     LABS:	 	                          9.6    6.90  )-----------( 488      ( 04 Jul 2018 05:30 )             29.9     07-04    136  |  95<L>  |  30<H>  ----------------------------<  87  3.9   |  30  |  0.87    Ca    9.2      04 Jul 2018 05:30  Mg     1.9     07-04            CARDIAC MARKERS:
DELVIS MCBRIDE  89y  Female      Patient is a 89y old  Female who presents with a chief complaint of " I had cough" (03 Jul 2018 09:16)  pt.seen and examined.covering .comfortable,no sob,no cp,no fever,no cough    REVIEW OF SYSTEMS:  neg      INTERVAL HPI/OVERNIGHT EVENTS:  T(C): 36.6 (07-06-18 @ 17:52), Max: 37.1 (07-06-18 @ 12:31)  HR: 79 (07-06-18 @ 17:52) (73 - 79)  BP: 117/64 (07-06-18 @ 17:52) (106/58 - 117/64)  RR: 17 (07-06-18 @ 17:52) (17 - 18)  SpO2: 98% (07-06-18 @ 17:52) (97% - 98%)  Wt(kg): --  I&O's Summary    05 Jul 2018 07:01  -  06 Jul 2018 07:00  --------------------------------------------------------  IN: 1000 mL / OUT: 0 mL / NET: 1000 mL    06 Jul 2018 07:01  -  06 Jul 2018 21:18  --------------------------------------------------------  IN: 800 mL / OUT: 0 mL / NET: 800 mL      T(C): 36.6 (07-06-18 @ 17:52), Max: 37.1 (07-06-18 @ 12:31)  HR: 79 (07-06-18 @ 17:52) (73 - 79)  BP: 117/64 (07-06-18 @ 17:52) (106/58 - 117/64)  RR: 17 (07-06-18 @ 17:52) (17 - 18)  SpO2: 98% (07-06-18 @ 17:52) (97% - 98%)  Wt(kg): --Vital Signs Last 24 Hrs  T(C): 36.6 (06 Jul 2018 17:52), Max: 37.1 (06 Jul 2018 12:31)  T(F): 97.8 (06 Jul 2018 17:52), Max: 98.7 (06 Jul 2018 12:31)  HR: 79 (06 Jul 2018 17:52) (73 - 79)  BP: 117/64 (06 Jul 2018 17:52) (106/58 - 117/64)  BP(mean): --  RR: 17 (06 Jul 2018 17:52) (17 - 18)  SpO2: 98% (06 Jul 2018 17:52) (97% - 98%)    LABS:                        9.1    6.66  )-----------( 482      ( 06 Jul 2018 05:40 )             28.3     07-06    134<L>  |  92<L>  |  30<H>  ----------------------------<  96  3.4<L>   |  30  |  0.93    Ca    9.3      06 Jul 2018 05:40  Mg     2.0     07-06          CAPILLARY BLOOD GLUCOSE                PAST MEDICAL & SURGICAL HISTORY:  Endometrial cancer: S/P LYNETTE with SBO  HLD (hyperlipidemia)  HTN (hypertension)  PAF (paroxysmal atrial fibrillation): On Xarelto  PE (pulmonary thromboembolism)  Insomnia  Breast Lump  Anxiety  Status post cataract extraction: OS  Status post hysterectomy: endometrial cancer  History of D&C- 8/12/11  History of Colonoscopy- 2011/Sept  [facility]      MEDICATIONS  (STANDING):  ALPRAZolam 0.5 milliGRAM(s) Oral at bedtime  aspirin enteric coated 81 milliGRAM(s) Oral daily  atorvastatin 10 milliGRAM(s) Oral at bedtime  busPIRone 10 milliGRAM(s) Oral two times a day  furosemide    Tablet 40 milliGRAM(s) Oral daily  guaiFENesin    Syrup 200 milliGRAM(s) Oral every 8 hours  melatonin 3 milliGRAM(s) Oral at bedtime  metoprolol succinate ER 25 milliGRAM(s) Oral daily  multivitamin 1 Tablet(s) Oral daily  pantoprazole    Tablet 40 milliGRAM(s) Oral daily  rivaroxaban 20 milliGRAM(s) Oral every 24 hours  sodium chloride 0.9% lock flush 3 milliLiter(s) IV Push every 8 hours    MEDICATIONS  (PRN):  docusate sodium 100 milliGRAM(s) Oral three times a day PRN Constipation  senna 2 Tablet(s) Oral at bedtime PRN Constipation        RADIOLOGY & ADDITIONAL TESTS:    Imaging Personally Reviewed:  [ ] YES  [ ] NO    Consultant(s) Notes Reviewed:  [ ] YES  [ ] NO    PHYSICAL EXAM:  GENERAL: NAD, well-groomed, well-developed  HEAD:  Atraumatic, Normocephalic  EYES: EOMI, PERRLA, conjunctiva and sclera clear  ENMT: No tonsillar erythema, exudates, or enlargement; Moist mucous membranes, Good dentition, No lesions  NECK: Supple, No JVD, Normal thyroid  NERVOUS SYSTEM:  Alert & Oriented X3, Good concentration; Motor Strength 5/5 B/L upper and lower extremities; DTRs 2+ intact and symmetric  CHEST/LUNG: Clear to percussion bilaterally; No rales, rhonchi, wheezing, or rubs  HEART: Regular rate and rhythm; No murmurs, rubs, or gallops  ABDOMEN: Soft, Nontender, Nondistended; Bowel sounds present  EXTREMITIES:  2+ Peripheral Pulses, No clubbing, cyanosis, or edema  LYMPH: No lymphadenopathy noted  SKIN: No rashes or lesions    Care Discussed with Consultants/Other Providers x[ ] YES  [ ] NO      Code Status: [] Full Code [] DNR [] DNI [] Goals of Care:   Disposition: [] ICU [] Stroke Unit [] RCU []PCU []Floor [] Discharge Home         [facility]
Patient is a 89y old  Female who presents with a chief complaint of " I had cough" (03 Jul 2018 09:16)      SUBJECTIVE / OVERNIGHT EVENTS:   Feels better.  Denies CP/SOB/Palpitation/HA.    MEDICATIONS  (STANDING):  ALPRAZolam 0.5 milliGRAM(s) Oral at bedtime  aspirin enteric coated 81 milliGRAM(s) Oral daily  atorvastatin 10 milliGRAM(s) Oral at bedtime  busPIRone 10 milliGRAM(s) Oral two times a day  furosemide    Tablet 40 milliGRAM(s) Oral daily  guaiFENesin    Syrup 200 milliGRAM(s) Oral every 8 hours  melatonin 3 milliGRAM(s) Oral at bedtime  metoprolol succinate ER 25 milliGRAM(s) Oral daily  multivitamin 1 Tablet(s) Oral daily  pantoprazole    Tablet 40 milliGRAM(s) Oral daily  rivaroxaban 20 milliGRAM(s) Oral every 24 hours  sodium chloride 0.9% lock flush 3 milliLiter(s) IV Push every 8 hours    MEDICATIONS  (PRN):  ALPRAZolam 0.5 milliGRAM(s) Oral daily PRN anxiety  docusate sodium 100 milliGRAM(s) Oral three times a day PRN Constipation  senna 2 Tablet(s) Oral at bedtime PRN Constipation        CAPILLARY BLOOD GLUCOSE        I&O's Summary    04 Jul 2018 07:01  -  04 Jul 2018 20:22  --------------------------------------------------------  IN: 1005 mL / OUT: 0 mL / NET: 1005 mL        PHYSICAL EXAM:  GENERAL: NAD, well-developed  HEAD:  Atraumatic, Normocephalic  NECK: Supple, No JVD  CHEST/LUNG: Clear to auscultation bilaterally; No wheezing.  HEART: Regular rate and rhythm; No murmurs, rubs, or gallops  ABDOMEN: Soft, Nontender, Nondistended; Bowel sounds present  EXTREMITIES:   No clubbing, cyanosis, or edema  NEUROLOGY: AAO X 3  SKIN: No rashes    LABS:                        9.6    6.90  )-----------( 488      ( 04 Jul 2018 05:30 )             29.9     07-04    136  |  95<L>  |  30<H>  ----------------------------<  87  3.9   |  30  |  0.87    Ca    9.2      04 Jul 2018 05:30  Mg     1.9     07-04              CAPILLARY BLOOD GLUCOSE                    RADIOLOGY & ADDITIONAL TESTS:    Imaging Personally Reviewed:    Consultant(s) Notes Reviewed:      Care Discussed with Consultants/Other Providers:
Patient is a 89y old  Female who presents with a chief complaint of " I had cough" (03 Jul 2018 09:16)      SUBJECTIVE / OVERNIGHT EVENTS:   Feels better.  Denies CP/SOB/Palpitation/HA.    MEDICATIONS  (STANDING):  ALPRAZolam 0.5 milliGRAM(s) Oral at bedtime  aspirin enteric coated 81 milliGRAM(s) Oral daily  atorvastatin 10 milliGRAM(s) Oral at bedtime  busPIRone 10 milliGRAM(s) Oral two times a day  furosemide    Tablet 40 milliGRAM(s) Oral daily  guaiFENesin    Syrup 200 milliGRAM(s) Oral every 8 hours  melatonin 3 milliGRAM(s) Oral at bedtime  metoprolol succinate ER 25 milliGRAM(s) Oral daily  multivitamin 1 Tablet(s) Oral daily  pantoprazole    Tablet 40 milliGRAM(s) Oral daily  rivaroxaban 20 milliGRAM(s) Oral every 24 hours  sodium chloride 0.9% lock flush 3 milliLiter(s) IV Push every 8 hours    MEDICATIONS  (PRN):  ALPRAZolam 0.5 milliGRAM(s) Oral daily PRN anxiety  docusate sodium 100 milliGRAM(s) Oral three times a day PRN Constipation  senna 2 Tablet(s) Oral at bedtime PRN Constipation        CAPILLARY BLOOD GLUCOSE      POCT Blood Glucose.: 148 mg/dL (03 Jul 2018 12:15)    I&O's Summary    02 Jul 2018 07:01  -  03 Jul 2018 07:00  --------------------------------------------------------  IN: 950 mL / OUT: 0 mL / NET: 950 mL        PHYSICAL EXAM:  GENERAL: NAD, well-developed  HEAD:  Atraumatic, Normocephalic  NECK: Supple, No JVD  CHEST/LUNG: Clear to auscultation bilaterally; No wheezing.  HEART: Regular rate and rhythm; No murmurs, rubs, or gallops  ABDOMEN: Soft, Nontender, Nondistended; Bowel sounds present  EXTREMITIES:   No clubbing, cyanosis, or edema  NEUROLOGY: AAO X 3  SKIN: No rashes    LABS:                        9.0    7.66  )-----------( 446      ( 03 Jul 2018 05:30 )             28.9     07-03    135  |  91<L>  |  30<H>  ----------------------------<  90  3.8   |  30  |  0.86    Ca    9.0      03 Jul 2018 05:30  Mg     1.9     07-03              CAPILLARY BLOOD GLUCOSE      POCT Blood Glucose.: 148 mg/dL (03 Jul 2018 12:15)    06-27 @ 17:49  Culture-urine   NO GROWTH AT 24 HOURS  Culture results --  method type --  Organism --  Organism Identification --  Specimen source URINE MIDSTREAM           06-27 @ 17:49  Culture blood --  Culture results --  Gram stain --  Gram stain blood --  Method type --  Organism --  Organism identification --  Specimen source URINE MIDSTREAM      RADIOLOGY & ADDITIONAL TESTS:    Imaging Personally Reviewed:    Consultant(s) Notes Reviewed:      Care Discussed with Consultants/Other Providers:
Patient is a 89y old  Female who presents with a chief complaint of " I had cough" (03 Jul 2018 09:16)      SUBJECTIVE / OVERNIGHT EVENTS:   Feels better.  Denies CP/SOB/Palpitation/HA.    MEDICATIONS  (STANDING):  ALPRAZolam 0.5 milliGRAM(s) Oral at bedtime  aspirin enteric coated 81 milliGRAM(s) Oral daily  atorvastatin 10 milliGRAM(s) Oral at bedtime  busPIRone 10 milliGRAM(s) Oral two times a day  furosemide    Tablet 40 milliGRAM(s) Oral daily  melatonin 3 milliGRAM(s) Oral at bedtime  metoprolol succinate ER 25 milliGRAM(s) Oral daily  multivitamin 1 Tablet(s) Oral daily  pantoprazole    Tablet 40 milliGRAM(s) Oral daily  rivaroxaban 20 milliGRAM(s) Oral every 24 hours  sodium chloride 0.9% lock flush 3 milliLiter(s) IV Push every 8 hours    MEDICATIONS  (PRN):  ALPRAZolam 0.5 milliGRAM(s) Oral daily PRN anxiety  docusate sodium 100 milliGRAM(s) Oral three times a day PRN Constipation  senna 2 Tablet(s) Oral at bedtime PRN Constipation        CAPILLARY BLOOD GLUCOSE        I&O's Summary    04 Jul 2018 07:01  -  05 Jul 2018 07:00  --------------------------------------------------------  IN: 1050 mL / OUT: 0 mL / NET: 1050 mL        PHYSICAL EXAM:  GENERAL: NAD, well-developed  HEAD:  Atraumatic, Normocephalic  NECK: Supple, No JVD  CHEST/LUNG: Clear to auscultation bilaterally; No wheezing.  HEART: Regular rate and rhythm; No murmurs, rubs, or gallops  ABDOMEN: Soft, Nontender, Nondistended; Bowel sounds present  EXTREMITIES:   No clubbing, cyanosis, or edema  NEUROLOGY: AAO X 3  SKIN: No rashes    LABS:                        9.3    5.68  )-----------( 471      ( 05 Jul 2018 06:00 )             30.5     07-05    132<L>  |  91<L>  |  26<H>  ----------------------------<  95  3.7   |  29  |  0.87    Ca    9.3      05 Jul 2018 06:00  Mg     2.0     07-05              CAPILLARY BLOOD GLUCOSE                    RADIOLOGY & ADDITIONAL TESTS:    Imaging Personally Reviewed:    Consultant(s) Notes Reviewed:      Care Discussed with Consultants/Other Providers:
Patient is a 89y old  Female who presents with a chief complaint of " I had cough" (03 Jul 2018 09:16)  Reports cough, no fever or palpitations.     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  Hypertension  Hyperlipidemia  Insomnia  Initial Insomnia  Breast Lump  Seasonal Allergies  Hyperlipemia  Anxiety  Status post cataract extraction: OS  Status post hysterectomy: endometrial cancer  History of D&C- 8/12/11  History of Colonoscopy- 2011/Sept  History of Breast Lump/Mass Excision- [facility]- left- 1971      MEDICATIONS  (STANDING):  ALPRAZolam 0.5 milliGRAM(s) Oral at bedtime  aspirin enteric coated 81 milliGRAM(s) Oral daily  atorvastatin 10 milliGRAM(s) Oral at bedtime  busPIRone 10 milliGRAM(s) Oral two times a day  furosemide    Tablet 40 milliGRAM(s) Oral daily  guaiFENesin    Syrup 200 milliGRAM(s) Oral every 8 hours  melatonin 3 milliGRAM(s) Oral at bedtime  metoprolol succinate ER 25 milliGRAM(s) Oral daily  multivitamin 1 Tablet(s) Oral daily  pantoprazole    Tablet 40 milliGRAM(s) Oral daily  rivaroxaban 20 milliGRAM(s) Oral every 24 hours  sodium chloride 0.9% lock flush 3 milliLiter(s) IV Push every 8 hours    MEDICATIONS  (PRN):  ALPRAZolam 0.5 milliGRAM(s) Oral daily PRN anxiety  docusate sodium 100 milliGRAM(s) Oral three times a day PRN Constipation  senna 2 Tablet(s) Oral at bedtime PRN Constipation            Vital Signs Last 24 Hrs  T(C): 36.7 (03 Jul 2018 14:48), Max: 36.8 (02 Jul 2018 22:00)  T(F): 98 (03 Jul 2018 14:48), Max: 98.3 (02 Jul 2018 22:00)  HR: 94 (03 Jul 2018 14:48) (85 - 100)  BP: 134/77 (03 Jul 2018 14:48) (119/68 - 134/77)  BP(mean): --  RR: 18 (03 Jul 2018 14:48) (18 - 18)  SpO2: 97% (03 Jul 2018 14:48) (94% - 97%)            INTERPRETATION OF TELEMETRY:  SR  bpm; Sinus tachy 120 at times; no SVT seen    ECG:        LABS:                        9.0    7.66  )-----------( 446      ( 03 Jul 2018 05:30 )             28.9     07-03    135  |  91<L>  |  30<H>  ----------------------------<  90  3.8   |  30  |  0.86    Ca    9.0      03 Jul 2018 05:30  Mg     1.9     07-03                BNP  RADIOLOGY & ADDITIONAL STUDIES:      PHYSICAL EXAM:    GENERAL: In no apparent distress, well nourished, and hydrated.  NECK: Supple and normal thyroid.  No JVD or carotid bruit.  Carotid pulse is 2+ bilaterally.  HEART: Regular rate and rhythm; 2/6 murmurs, no rubs, or gallops.  PULMONARY: Clear to auscultation and perfusion.  No rales, wheezing, or rhonchi bilaterally.  ABDOMEN: Soft, Nontender, Nondistended; Bowel sounds present  EXTREMITIES:  2+ Peripheral Pulses, No clubbing, cyanosis, or edema  NEUROLOGICAL: Grossly nonfocal
Patient is a 89y old  Female who presents with a chief complaint of " I had cough" (2018 11:57)      SUBJECTIVE / OVERNIGHT EVENTS:   C/o weakness  Denies CP/SOB/Palpitation/HA.    MEDICATIONS  (STANDING):  aspirin enteric coated 81 milliGRAM(s) Oral daily  atorvastatin 10 milliGRAM(s) Oral at bedtime  busPIRone 10 milliGRAM(s) Oral two times a day  furosemide   Injectable 20 milliGRAM(s) IV Push daily  melatonin 3 milliGRAM(s) Oral at bedtime  metoprolol succinate ER 25 milliGRAM(s) Oral daily  multivitamin 1 Tablet(s) Oral daily  pantoprazole    Tablet 40 milliGRAM(s) Oral daily  rivaroxaban 20 milliGRAM(s) Oral every 24 hours  sodium chloride 0.9% lock flush 3 milliLiter(s) IV Push every 8 hours    MEDICATIONS  (PRN):  docusate sodium 100 milliGRAM(s) Oral three times a day PRN Constipation  guaiFENesin    Syrup 100 milliGRAM(s) Oral every 6 hours PRN Cough  senna 2 Tablet(s) Oral at bedtime PRN Constipation        CAPILLARY BLOOD GLUCOSE        I&O's Summary    2018 07:  -  2018 07:00  --------------------------------------------------------  IN: 280 mL / OUT: 300 mL / NET: -20 mL    2018 07:  -  2018 00:09  --------------------------------------------------------  IN: 710 mL / OUT: 0 mL / NET: 710 mL        PHYSICAL EXAM:  GENERAL: NAD, well-developed  HEAD:  Atraumatic, Normocephalic  NECK: Supple, No JVD  CHEST/LUNG: Clear to auscultation bilaterally; No wheezing.  HEART: Regular rate and rhythm; No murmurs, rubs, or gallops  ABDOMEN: Soft, Nontender, Nondistended; Bowel sounds present  EXTREMITIES:   No clubbing, cyanosis, or edema  NEUROLOGY: AAO X 3  SKIN: No rashes    LABS:                        8.0    6.37  )-----------( 321      ( 2018 06:10 )             25.5         135  |  96<L>  |  16  ----------------------------<  91  3.5   |  27  |  0.78    Ca    8.2<L>      2018 06:10  Phos  3.8       Mg     1.9             CARDIAC MARKERS ( 2018 05:38 )  x     / x     / 50 u/L / 1.57 ng/mL / x      CARDIAC MARKERS ( 2018 00:50 )  x     / x     / 57 u/L / 2.37 ng/mL / x          Urinalysis Basic - ( 2018 19:50 )    Color: YELLOW / Appearance: TURBID / S.014 / pH: 5.5  Gluc: NEGATIVE / Ketone: NEGATIVE  / Bili: NEGATIVE / Urobili: NORMAL mg/dL   Blood: MODERATE / Protein: 20 mg/dL / Nitrite: NEGATIVE   Leuk Esterase: LARGE / RBC: 25-50 / WBC 25-50   Sq Epi: FEW / Non Sq Epi: x / Bacteria: MOD      CAPILLARY BLOOD GLUCOSE         @ 18:51  Culture-urine --  Culture results --  method type --  Organism --  Organism Identification --  Specimen source BLOOD PERIPHERAL            @ 18:51  Culture blood   NO ORGANISMS ISOLATED  NO ORGANISMS ISOLATED AT 48 HRS.  Culture results --  Gram stain --  Gram stain blood --  Method type --  Organism --  Organism identification --  Specimen source BLOOD PERIPHERAL      RADIOLOGY & ADDITIONAL TESTS:    Imaging Personally Reviewed:    Consultant(s) Notes Reviewed:      Care Discussed with Consultants/Other Providers:
Patient is a 89y old  Female who presents with a chief complaint of " I had cough" (26 Jun 2018 11:57)      SUBJECTIVE / OVERNIGHT EVENTS:   Feels better.  Denies CP/SOB/Palpitation/HA.    MEDICATIONS  (STANDING):  ALPRAZolam 0.5 milliGRAM(s) Oral at bedtime  aspirin enteric coated 81 milliGRAM(s) Oral daily  atorvastatin 10 milliGRAM(s) Oral at bedtime  benzonatate 100 milliGRAM(s) Oral every 8 hours  buDESOnide   0.5 milliGRAM(s) Respule 0.5 milliGRAM(s) Inhalation two times a day  busPIRone 10 milliGRAM(s) Oral two times a day  furosemide   Injectable 40 milliGRAM(s) IV Push daily  melatonin 3 milliGRAM(s) Oral at bedtime  metoprolol succinate ER 25 milliGRAM(s) Oral daily  multivitamin 1 Tablet(s) Oral daily  pantoprazole    Tablet 40 milliGRAM(s) Oral daily  rivaroxaban 20 milliGRAM(s) Oral every 24 hours  sodium chloride 0.9% lock flush 3 milliLiter(s) IV Push every 8 hours    MEDICATIONS  (PRN):  ALPRAZolam 0.5 milliGRAM(s) Oral daily PRN anxiety  docusate sodium 100 milliGRAM(s) Oral three times a day PRN Constipation  guaiFENesin    Syrup 100 milliGRAM(s) Oral every 6 hours PRN Cough  senna 2 Tablet(s) Oral at bedtime PRN Constipation        CAPILLARY BLOOD GLUCOSE        I&O's Summary    29 Jun 2018 07:01  -  30 Jun 2018 07:00  --------------------------------------------------------  IN: 600 mL / OUT: 0 mL / NET: 600 mL    30 Jun 2018 07:01  -  01 Jul 2018 00:54  --------------------------------------------------------  IN: 118 mL / OUT: 0 mL / NET: 118 mL        PHYSICAL EXAM:  GENERAL: NAD, well-developed  HEAD:  Atraumatic, Normocephalic  NECK: Supple, No JVD  CHEST/LUNG: Clear to auscultation bilaterally; No wheezing.  HEART: Regular rate and rhythm; No murmurs, rubs, or gallops  ABDOMEN: Soft, Nontender, Nondistended; Bowel sounds present  EXTREMITIES:   No clubbing, cyanosis, or edema  NEUROLOGY: AAO X 3  SKIN: No rashes    LABS:                        8.2    6.57  )-----------( 342      ( 30 Jun 2018 05:36 )             26.0     06-30    135  |  95<L>  |  22  ----------------------------<  93  4.0   |  29  |  0.81    Ca    8.5      30 Jun 2018 05:36  Mg     1.7     06-30              CAPILLARY BLOOD GLUCOSE        06-25 @ 18:51  Culture-urine --  Culture results --  method type --  Organism --  Organism Identification --  Specimen source BLOOD PERIPHERAL           06-25 @ 18:51  Culture blood   NO ORGANISMS ISOLATED  Culture results --  Gram stain --  Gram stain blood --  Method type --  Organism --  Organism identification --  Specimen source BLOOD PERIPHERAL      RADIOLOGY & ADDITIONAL TESTS:    Imaging Personally Reviewed:    Consultant(s) Notes Reviewed:      Care Discussed with Consultants/Other Providers:
Patient is a 89y old  Female who presents with a chief complaint of " I had cough" (26 Jun 2018 11:57)      SUBJECTIVE / OVERNIGHT EVENTS:   Feels better.  Denies CP/SOB/Palpitation/HA.    MEDICATIONS  (STANDING):  ALPRAZolam 0.5 milliGRAM(s) Oral at bedtime  aspirin enteric coated 81 milliGRAM(s) Oral daily  atorvastatin 10 milliGRAM(s) Oral at bedtime  benzonatate 100 milliGRAM(s) Oral every 8 hours  buDESOnide   0.5 milliGRAM(s) Respule 0.5 milliGRAM(s) Inhalation two times a day  busPIRone 10 milliGRAM(s) Oral two times a day  melatonin 3 milliGRAM(s) Oral at bedtime  metoprolol succinate ER 25 milliGRAM(s) Oral daily  multivitamin 1 Tablet(s) Oral daily  pantoprazole    Tablet 40 milliGRAM(s) Oral daily  rivaroxaban 20 milliGRAM(s) Oral every 24 hours  sodium chloride 0.9% lock flush 3 milliLiter(s) IV Push every 8 hours    MEDICATIONS  (PRN):  ALPRAZolam 0.5 milliGRAM(s) Oral daily PRN anxiety  docusate sodium 100 milliGRAM(s) Oral three times a day PRN Constipation  guaiFENesin    Syrup 100 milliGRAM(s) Oral every 6 hours PRN Cough  senna 2 Tablet(s) Oral at bedtime PRN Constipation        CAPILLARY BLOOD GLUCOSE        I&O's Summary    28 Jun 2018 07:01  -  29 Jun 2018 07:00  --------------------------------------------------------  IN: 40 mL / OUT: 0 mL / NET: 40 mL        PHYSICAL EXAM:  GENERAL: NAD, well-developed  HEAD:  Atraumatic, Normocephalic  NECK: Supple, No JVD  CHEST/LUNG: Clear to auscultation bilaterally; No wheezing.  HEART: Regular rate and rhythm; No murmurs, rubs, or gallops  ABDOMEN: Soft, Nontender, Nondistended; Bowel sounds present  EXTREMITIES:   No clubbing, cyanosis, or edema  NEUROLOGY: AAO X 3  SKIN: No rashes    LABS:                        8.6    5.89  )-----------( 308      ( 29 Jun 2018 06:10 )             27.9     06-29    135  |  94<L>  |  19  ----------------------------<  84  3.6   |  29  |  0.77    Ca    8.3<L>      29 Jun 2018 06:10  Mg     1.9     06-29              CAPILLARY BLOOD GLUCOSE        06-25 @ 18:51  Culture-urine --  Culture results --  method type --  Organism --  Organism Identification --  Specimen source BLOOD PERIPHERAL           06-25 @ 18:51  Culture blood   NO ORGANISMS ISOLATED  NO ORGANISMS ISOLATED AT 72 HRS.  Culture results --  Gram stain --  Gram stain blood --  Method type --  Organism --  Organism identification --  Specimen source BLOOD PERIPHERAL      RADIOLOGY & ADDITIONAL TESTS:    Imaging Personally Reviewed:    Consultant(s) Notes Reviewed:      Care Discussed with Consultants/Other Providers:
Patient is a 89y old  Female who presents with a chief complaint of " I had cough" (26 Jun 2018 11:57)      SUBJECTIVE / OVERNIGHT EVENTS:   Feels better.  Denies CP/SOB/Palpitation/HA.    MEDICATIONS  (STANDING):  ALPRAZolam 0.5 milliGRAM(s) Oral at bedtime  aspirin enteric coated 81 milliGRAM(s) Oral daily  atorvastatin 10 milliGRAM(s) Oral at bedtime  busPIRone 10 milliGRAM(s) Oral two times a day  furosemide    Tablet 40 milliGRAM(s) Oral daily  guaiFENesin    Syrup 200 milliGRAM(s) Oral every 8 hours  melatonin 3 milliGRAM(s) Oral at bedtime  metoprolol succinate ER 25 milliGRAM(s) Oral daily  multivitamin 1 Tablet(s) Oral daily  pantoprazole    Tablet 40 milliGRAM(s) Oral daily  rivaroxaban 20 milliGRAM(s) Oral every 24 hours  sodium chloride 0.9% lock flush 3 milliLiter(s) IV Push every 8 hours    MEDICATIONS  (PRN):  ALPRAZolam 0.5 milliGRAM(s) Oral daily PRN anxiety  docusate sodium 100 milliGRAM(s) Oral three times a day PRN Constipation  senna 2 Tablet(s) Oral at bedtime PRN Constipation        CAPILLARY BLOOD GLUCOSE        I&O's Summary    01 Jul 2018 07:01  -  02 Jul 2018 07:00  --------------------------------------------------------  IN: 700 mL / OUT: 0 mL / NET: 700 mL    02 Jul 2018 07:01  -  02 Jul 2018 23:35  --------------------------------------------------------  IN: 750 mL / OUT: 0 mL / NET: 750 mL        PHYSICAL EXAM:  GENERAL: NAD, well-developed  HEAD:  Atraumatic, Normocephalic  NECK: Supple, No JVD  CHEST/LUNG: Clear to auscultation bilaterally; No wheezing.  HEART: Regular rate and rhythm; No murmurs, rubs, or gallops  ABDOMEN: Soft, Nontender, Nondistended; Bowel sounds present  EXTREMITIES:   No clubbing, cyanosis, or edema  NEUROLOGY: AAO X 3  SKIN: No rashes    LABS:                        8.9    7.50  )-----------( 392      ( 02 Jul 2018 05:27 )             27.4     07-02    133<L>  |  92<L>  |  25<H>  ----------------------------<  97  3.8   |  31  |  0.91    Ca    8.8      02 Jul 2018 05:27  Mg     1.8     07-02              CAPILLARY BLOOD GLUCOSE        06-27 @ 17:49  Culture-urine   NO GROWTH AT 24 HOURS  Culture results --  method type --  Organism --  Organism Identification --  Specimen source URINE MIDSTREAM           06-27 @ 17:49  Culture blood --  Culture results --  Gram stain --  Gram stain blood --  Method type --  Organism --  Organism identification --  Specimen source URINE MIDSTREAM      RADIOLOGY & ADDITIONAL TESTS:    Imaging Personally Reviewed:    Consultant(s) Notes Reviewed:      Care Discussed with Consultants/Other Providers:
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  Telemetry review demonstrated NSR 76 bpm and frequent APCs.  Tolerating Toprol XL 25mg orally once daily; can consider Metoprolol Tartrate 37.5mg orally once daily if BP permits to suppress atrial ectopy.
CARDIOLOGY FOLLOW UP - Dr. Mccartney    CC no cp/sob       PHYSICAL EXAM:  T(C): 36.4 (07-06-18 @ 05:10), Max: 36.8 (07-05-18 @ 11:31)  HR: 73 (07-06-18 @ 05:10) (73 - 94)  BP: 106/58 (07-06-18 @ 05:10) (103/61 - 117/67)  RR: 18 (07-06-18 @ 05:10) (17 - 18)  SpO2: 97% (07-06-18 @ 05:10) (94% - 97%)  Wt(kg): --  I&O's Summary    05 Jul 2018 07:01  -  06 Jul 2018 07:00  --------------------------------------------------------  IN: 1000 mL / OUT: 0 mL / NET: 1000 mL        Appearance: Normal	  Cardiovascular: Normal S1 S2,RRR, No JVD, +sys murmur   Respiratory: Lungs clear to auscultation	  Gastrointestinal:  Soft, Non-tender, + BS	  Extremities: Normal range of motion, No clubbing, cyanosis or edema        MEDICATIONS  (STANDING):  ALPRAZolam 0.5 milliGRAM(s) Oral at bedtime  aspirin enteric coated 81 milliGRAM(s) Oral daily  atorvastatin 10 milliGRAM(s) Oral at bedtime  busPIRone 10 milliGRAM(s) Oral two times a day  furosemide    Tablet 40 milliGRAM(s) Oral daily  melatonin 3 milliGRAM(s) Oral at bedtime  metoprolol succinate ER 25 milliGRAM(s) Oral daily  multivitamin 1 Tablet(s) Oral daily  pantoprazole    Tablet 40 milliGRAM(s) Oral daily  rivaroxaban 20 milliGRAM(s) Oral every 24 hours  sodium chloride 0.9% lock flush 3 milliLiter(s) IV Push every 8 hours      TELEMETRY: NSR, APC 	    ECG:  	  RADIOLOGY:   DIAGNOSTIC TESTING:  [ ] Echocardiogram:  [ ]  Catheterization:  [ ] Stress Test:    OTHER: 	    LABS:	 	                                9.1    6.66  )-----------( 482      ( 06 Jul 2018 05:40 )             28.3     07-06    134<L>  |  92<L>  |  30<H>  ----------------------------<  96  3.4<L>   |  30  |  0.93    Ca    9.3      06 Jul 2018 05:40  Mg     2.0     07-06
Patient is a 89y old  Female who presents with a chief complaint of " I had cough" (26 Jun 2018 11:57)      SUBJECTIVE / OVERNIGHT EVENTS:   Feels better.  Denies CP/SOB/Palpitation/HA.    MEDICATIONS  (STANDING):  ALPRAZolam 0.5 milliGRAM(s) Oral at bedtime  aspirin enteric coated 81 milliGRAM(s) Oral daily  atorvastatin 10 milliGRAM(s) Oral at bedtime  busPIRone 10 milliGRAM(s) Oral two times a day  furosemide   Injectable 20 milliGRAM(s) IV Push daily  melatonin 3 milliGRAM(s) Oral at bedtime  metoprolol succinate ER 25 milliGRAM(s) Oral daily  multivitamin 1 Tablet(s) Oral daily  pantoprazole    Tablet 40 milliGRAM(s) Oral daily  rivaroxaban 20 milliGRAM(s) Oral every 24 hours  sodium chloride 0.9% lock flush 3 milliLiter(s) IV Push every 8 hours    MEDICATIONS  (PRN):  ALPRAZolam 0.5 milliGRAM(s) Oral daily PRN anxiety  docusate sodium 100 milliGRAM(s) Oral three times a day PRN Constipation  guaiFENesin    Syrup 100 milliGRAM(s) Oral every 6 hours PRN Cough  senna 2 Tablet(s) Oral at bedtime PRN Constipation        CAPILLARY BLOOD GLUCOSE        I&O's Summary    27 Jun 2018 07:01  -  28 Jun 2018 07:00  --------------------------------------------------------  IN: 1205 mL / OUT: 30 mL / NET: 1175 mL        PHYSICAL EXAM:  GENERAL: NAD, well-developed  HEAD:  Atraumatic, Normocephalic  NECK: Supple, No JVD  CHEST/LUNG: Clear to auscultation bilaterally; No wheezing.  HEART: Regular rate and rhythm; No murmurs, rubs, or gallops  ABDOMEN: Soft, Nontender, Nondistended; Bowel sounds present  EXTREMITIES:   No clubbing, cyanosis, or edema  NEUROLOGY: AAO X 3  SKIN: No rashes    LABS:                        8.3    6.94  )-----------( 304      ( 28 Jun 2018 06:15 )             26.8     06-28    133<L>  |  93<L>  |  16  ----------------------------<  92  4.1   |  27  |  0.78    Ca    8.3<L>      28 Jun 2018 06:15  Mg     1.7     06-28              CAPILLARY BLOOD GLUCOSE        06-25 @ 18:51  Culture-urine --  Culture results --  method type --  Organism --  Organism Identification --  Specimen source BLOOD PERIPHERAL           06-25 @ 18:51  Culture blood   NO ORGANISMS ISOLATED  NO ORGANISMS ISOLATED AT 72 HRS.  Culture results --  Gram stain --  Gram stain blood --  Method type --  Organism --  Organism identification --  Specimen source BLOOD PERIPHERAL      RADIOLOGY & ADDITIONAL TESTS:    Imaging Personally Reviewed:    Consultant(s) Notes Reviewed:      Care Discussed with Consultants/Other Providers:

## 2018-07-08 NOTE — PROGRESS NOTE ADULT - ASSESSMENT
89 y/o female with PMHx of HTN, Diastolic CHF, HLD, SVT s/p ablation x 4 days ago, PE/DVT on xarelto, endometrial CA s/p LYNETTE, c/o sob and cough     1. SOB    multifactorial, + viral illness/ acute on chronic Diastolic CHF   RVP+ , chest xray revealing bl pleural effusions   sob now resolved, continue PO lasix    ekg no evidence of ACS     2. Acute on chronic Diastolic congestive heart failure   likely in the setting of viral illness   CTA chest revealing pulm edema , chest xray revealing pulm edema   Dyspnea resolved   continue lasix to 40mg PO daily   recent echo revealing normal LV sys function, EF 55-60%, mod-sev mitral stenosis     3. PSVT s/p ablation   stable   continue with BB/ ASA     4. Htn   bp stable, continue with BB     5. DVT (hx)  continue with xarelto     PT eval, d/c planning per primary team,  f/u in our office
· Assessment	    89 y/o female with PMHx of HTN, HLD, SVT s/p ablation x 4 days ago, PE/DVT on xarelto, endometrial CA s/p LYNETTE, c/o sob and cough x 1 day. Pt was discharged from [facility] x 1 day ago after admission for SVT. Pt had ablation x 4 days ago. Pt admits to feeling well upon discharge but last night developed SOB and cough while lying in bed. Pt states that the SOB became progressively worse today. Pt states she had a non-productive cough. Pt has no chest pain, palpitations, diaphoresis, n/v/d, abdominal pain, numbness, tingling, dizziness, syncope, fever, chills.       Problem/Plan - 1:   Problem/Plan - 4:  ·  Problem:Acute CHF (congestive heart failure) exa.  Plan:  IV Lasix daily,     BMP/Cardio eval called.     Problem/Plan - 2:  ·  Problem: HTN (hypertension).  Plan: Continue Metoprolol.   Monitor BP.      Problem/Plan - 3:  ·  Problem: PAF (paroxysmal atrial fibrillation).  Plan: Continue Xarelto.     PT
· Assessment	    89 y/o female with PMHx of HTN, HLD, SVT s/p ablation x 4 days ago, PE/DVT on xarelto, endometrial CA s/p LYNETTE, c/o sob and cough x 1 day. Pt was discharged from [facility] x 1 day ago after admission for SVT. Pt had ablation x 4 days ago. Pt admits to feeling well upon discharge but last night developed SOB and cough while lying in bed. Pt states that the SOB became progressively worse today. Pt states she had a non-productive cough. Pt has no chest pain, palpitations, diaphoresis, n/v/d, abdominal pain, numbness, tingling, dizziness, syncope, fever, chills.       Problem/Plan - 1:   Problem/Plan - 4:  ·  Problem:Acute CHF (congestive heart failure) exa.  Plan:  PO Lasix daily,   Cardio/EP f/up noted.     Problem/Plan - 2:  ·  Problem: HTN (hypertension).  Plan: Continue Metoprolol.   Monitor BP.      Problem/Plan - 3:  ·  Problem: PAF (paroxysmal atrial fibrillation).  Plan: Continue Xarelto.     Hyponatremia/Hypocalcemia:  BMP    anemia  CBC    Dc planning.
89 y/o female with PMHx of HTN, Diastolic CHF, HLD, SVT s/p ablation x 4 days ago, PE/DVT on xarelto, endometrial CA s/p LYNETTE, c/o sob and cough     1. SOB    multifactorial, + viral illness/ acute on chronic Diastolic CHF   RVP+ , chest xray revealing bl pleural effusions   sob now resolved     2. Acute on chronic Diastolic congestive heart failure   likely in the setting of viral illness   Dyspnea resolved   continue lasix 40mg PO daily   recent echo revealing normal LV sys function, EF 55-60%, mod-sev mitral stenosis     3. PSVT s/p ablation   stable   continue with BB/ ASA     4. Htn   bp stable, continue with BB     5. DVT (hx)  continue with xarelto     d/c planning per primary team,  f/u in our office
89 y/o female with PMHx of HTN, Diastolic CHF, HLD, SVT s/p ablation x 4 days ago, PE/DVT on xarelto, endometrial CA s/p LYNETTE, c/o sob and cough     1. SOB    multifactorial, + viral illness/ acute on chronic Diastolic CHF   RVP+ , chest xray revealing bl pleural effusions   sob now resolved, continue PO lasix    ekg no evidence of ACS     2. Acute on chronic Diastolic congestive heart failure   likely in the setting of viral illness   CTA chest revealing pulm edema , chest xray revealing pulm edema   Dyspnea resolved   continue lasix to 40mg PO daily   recent echo revealing normal LV sys function, EF 55-60%, mod-sev mitral stenosis     3. PSVT s/p ablation   stable   continue with BB/ ASA     4. Htn   bp stable, continue with BB     5. DVT (hx)  continue with xarelto     PT eval, d/c planning per primary team,  f/u in our office
89 y/o female with PMHx of HTN, Diastolic CHF, HLD, SVT s/p ablation x 4 days ago, PE/DVT on xarelto, endometrial CA s/p LYNETTE, c/o sob and cough     1. SOB    multifactorial, + viral illness/ acute on chronic Diastolic CHF   clinically improved, continue PO lasix   RVP+ , chest xray revealing bl pleural effusions    ekg no evidence of ACS     2. Acute on chronic Diastolic congestive heart failure   likely in the setting of viral illness   CTA chest revealing pulm edema , chest xray revealing pulm edema   Dyspnea improved   continue lasix to 40mg PO daily   recent echo revealing normal LV sys function, EF 55-60%, mod-sev mitral stenosis     3. PSVT s/p ablation   stable   continue with BB/ ASA     4. Htn   bp stable, continue with BB     5. DVT (hx)  continue with xarelto     PT eval, d/c planning per primary team
89 y/o female with PMHx of HTN, Diastolic CHF, HLD, SVT s/p ablation x 4 days ago, PE/DVT on xarelto, endometrial CA s/p LYNETTE, c/o sob and cough     1. SOB    multifactorial, + viral illness/ acute on chronic Diastolic CHF   clinically improving with IV diuresis, change lasix to PO   RVP+ , chest xray revealing bl pleural effusions    ekg no evidence of ACS     2. Acute on chronic Diastolic congestive heart failure   likely in the setting of viral illness   CTA chest revealing pulm edema , chest xray revealing pulm edema   Dyspnea improved   change lasix to 40mg PO daily   recent echo revealing normal LV sys function, EF 55-60%, mod-sev mitral stenosis     3. PSVT s/p ablation   HR overall improving   continue with BB/ ASA     4. Htn   bp stable, continue with BB     5. DVT (hx)  continue with xarelto
89 y/o female with PMHx of HTN, Diastolic CHF, HLD, SVT s/p ablation x 4 days ago, PE/DVT on xarelto, endometrial CA s/p LYNETTE, c/o sob and cough     1. SOB    multifactorial, + viral illness/ acute on chronic Diastolic CHF   clinically improving with IV diuresis, continue lasix as ordered  RVP+ , chest xray revealing bl pleural effusions    ekg no evidence of ACS     2. Acute on chronic Diastolic congestive heart failure   likely in the setting of viral illness   CTA chest revealing pulm edema , chest xray revealing pulm edema   c.o mild dyspnea, but overall improving with iv diuresis   continue lasix to 40 mg IVP daily, likely change to PO tomorrow   recent echo revealing normal LV sys function, EF 55-60%, mod-sev mitral stenosis     3. PSVT s/p ablation   currently in NSR, some tachycardia noted, continue to monitor  continue with BB/ ASA, can up-titrate bb if needed     4. Htn   bp stable, continue with BB     5. DVT (hx)  continue with xarelto
89 y/o female with PMHx of HTN, Diastolic CHF, HLD, SVT s/p ablation x 4 days ago, PE/DVT on xarelto, endometrial CA s/p LYNETTE, c/o sob and cough     1. SOB    multifactorial, + viral illness/ acute on chronic Diastolic CHF   clinically improved, continue PO lasix   RVP+ , chest xray revealing bl pleural effusions    ekg no evidence of ACS     2. Acute on chronic Diastolic congestive heart failure   likely in the setting of viral illness   CTA chest revealing pulm edema , chest xray revealing pulm edema   Dyspnea improved   continue lasix to 40mg PO daily   recent echo revealing normal LV sys function, EF 55-60%, mod-sev mitral stenosis     3. PSVT s/p ablation   stable   continue with BB/ ASA     4. Htn   bp stable, continue with BB     5. DVT (hx)  continue with xarelto
89 y/o female with PMHx of HTN, Diastolic CHF, HLD, SVT s/p ablation x 4 days ago, PE/DVT on xarelto, endometrial CA s/p LYNETTE, c/o sob and cough     1. SOB    multifactorial, + viral illness/ acute on chronic Diastolic CHF   clinically improved, continue PO lasix   RVP+ , chest xray revealing bl pleural effusions    ekg no evidence of ACS     2. Acute on chronic Diastolic congestive heart failure   likely in the setting of viral illness   CTA chest revealing pulm edema , chest xray revealing pulm edema   Dyspnea improved   continue lasix to 40mg PO daily   recent echo revealing normal LV sys function, EF 55-60%, mod-sev mitral stenosis     3. PSVT s/p ablation   stable   continue with BB/ ASA     4. Htn   bp stable, continue with BB     5. DVT (hx)  continue with xarelto     PT eval, d/c planning per primary team
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  Found to be in acutely decompensated dHF in the setting of viral illness, LVEF 55-60%.      - continue all meds including Xarelto and furosemide  - may increase beta blocker therapy as BP permits  - supportive care during viral illness  - continue telemetry  - keep K+ 4-4.5 & Mg 2-2.5
· Assessment	    89 y/o female with PMHx of HTN, HLD, SVT s/p ablation x 4 days ago, PE/DVT on xarelto, endometrial CA s/p LYNETTE, c/o sob and cough x 1 day. Pt was discharged from [facility] x 1 day ago after admission for SVT. Pt had ablation x 4 days ago. Pt admits to feeling well upon discharge but last night developed SOB and cough while lying in bed. Pt states that the SOB became progressively worse today. Pt states she had a non-productive cough. Pt has no chest pain, palpitations, diaphoresis, n/v/d, abdominal pain, numbness, tingling, dizziness, syncope, fever, chills.       Problem/Plan - 1:   Problem/Plan - 4:  ·  Problem:Acute CHF (congestive heart failure) exa.  Plan:  PO Lasix daily,     BMP  Cardio f/up noted.     Problem/Plan - 2:  ·  Problem: HTN (hypertension).  Plan: Continue Metoprolol.   Monitor BP.      Problem/Plan - 3:  ·  Problem: PAF (paroxysmal atrial fibrillation).  Plan: Continue Xarelto.     Hyponatremia/Hypocalcemia:  BMP    anemia  CBC    PT
· Assessment	    89 y/o female with PMHx of HTN, HLD, SVT s/p ablation x 4 days ago, PE/DVT on xarelto, endometrial CA s/p LYNETTE, c/o sob and cough x 1 day. Pt was discharged from [facility] x 1 day ago after admission for SVT. Pt had ablation x 4 days ago. Pt admits to feeling well upon discharge but last night developed SOB and cough while lying in bed. Pt states that the SOB became progressively worse today. Pt states she had a non-productive cough. Pt has no chest pain, palpitations, diaphoresis, n/v/d, abdominal pain, numbness, tingling, dizziness, syncope, fever, chills.       Problem/Plan - 1:   Problem/Plan - 4:  ·  Problem:Acute CHF (congestive heart failure) exa.  Plan:  PO Lasix daily,   Cardio/EP f/up noted.     Problem/Plan - 2:  ·  Problem: HTN (hypertension).  Plan: Continue Metoprolol.   Monitor BP.      Problem/Plan - 3:  ·  Problem: PAF (paroxysmal atrial fibrillation).  Plan: Continue Xarelto.     Hyponatremia/Hypocalcemia:  BMP    anemia  CBC    Dc planning.
· Assessment	    89 y/o female with PMHx of HTN, HLD, SVT s/p ablation x 4 days ago, PE/DVT on xarelto, endometrial CA s/p LYNETTE, c/o sob and cough x 1 day. Pt was discharged from [facility] x 1 day ago after admission for SVT. Pt had ablation x 4 days ago. Pt admits to feeling well upon discharge but last night developed SOB and cough while lying in bed. Pt states that the SOB became progressively worse today. Pt states she had a non-productive cough. Pt has no chest pain, palpitations, diaphoresis, n/v/d, abdominal pain, numbness, tingling, dizziness, syncope, fever, chills.       Problem/Plan - 1:   Problem/Plan - 4:  ·  Problem:Acute CHF (congestive heart failure) exa.  Plan:  PO Lasix daily,   Cardio/EP f/up noted.     Problem/Plan - 2:  ·  Problem: HTN (hypertension).  Plan: Continue Metoprolol.   Monitor BP.      Problem/Plan - 3:  ·  Problem: PAF (paroxysmal atrial fibrillation).  Plan: Continue Xarelto.     Hyponatremia/Hypocalcemia:  BMP    anemia  CBC    Dc planning.
· Assessment	    89 y/o female with PMHx of HTN, HLD, SVT s/p ablation x 4 days ago, PE/DVT on xarelto, endometrial CA s/p LYNETTE, c/o sob and cough x 1 day. Pt was discharged from [facility] x 1 day ago after admission for SVT. Pt had ablation x 4 days ago. Pt admits to feeling well upon discharge but last night developed SOB and cough while lying in bed. Pt states that the SOB became progressively worse today. Pt states she had a non-productive cough. Pt has no chest pain, palpitations, diaphoresis, n/v/d, abdominal pain, numbness, tingling, dizziness, syncope, fever, chills.       Problem/Plan - 1:   Problem/Plan - 4:  ·  Problem:Acute CHF (congestive heart failure) exa.  Plan:  IV Lasix daily,     BMP     Problem/Plan - 2:  ·  Problem: HTN (hypertension).  Plan: Continue Metoprolol.   Monitor BP.      Problem/Plan - 3:  ·  Problem: PAF (paroxysmal atrial fibrillation).  Plan: Continue Xarelto.     PT
· Assessment	    89 y/o female with PMHx of HTN, HLD, SVT s/p ablation x 4 days ago, PE/DVT on xarelto, endometrial CA s/p LYNETTE, c/o sob and cough x 1 day. Pt was discharged from [facility] x 1 day ago after admission for SVT. Pt had ablation x 4 days ago. Pt admits to feeling well upon discharge but last night developed SOB and cough while lying in bed. Pt states that the SOB became progressively worse today. Pt states she had a non-productive cough. Pt has no chest pain, palpitations, diaphoresis, n/v/d, abdominal pain, numbness, tingling, dizziness, syncope, fever, chills.       Problem/Plan - 1:   Problem/Plan - 4:  ·  Problem:Acute CHF (congestive heart failure) exa.  Plan:  IV Lasix daily,     BMP/Cardio eval noted.     Problem/Plan - 2:  ·  Problem: HTN (hypertension).  Plan: Continue Metoprolol.   Monitor BP.      Problem/Plan - 3:  ·  Problem: PAF (paroxysmal atrial fibrillation).  Plan: Continue Xarelto.     Hyponatremia/Hypocalcemia:  BMP    anemia  CBC    PT
· Assessment	    89 y/o female with PMHx of HTN, HLD, SVT s/p ablation x 4 days ago, PE/DVT on xarelto, endometrial CA s/p LYNETTE, c/o sob and cough x 1 day. Pt was discharged from [facility] x 1 day ago after admission for SVT. Pt had ablation x 4 days ago. Pt admits to feeling well upon discharge but last night developed SOB and cough while lying in bed. Pt states that the SOB became progressively worse today. Pt states she had a non-productive cough. Pt has no chest pain, palpitations, diaphoresis, n/v/d, abdominal pain, numbness, tingling, dizziness, syncope, fever, chills.       Problem/Plan - 1:   Problem/Plan - 4:  ·  Problem:Acute CHF (congestive heart failure) exa.  Plan:  IV Lasix daily,     BMP  Cardio f/up noted.     Problem/Plan - 2:  ·  Problem: HTN (hypertension).  Plan: Continue Metoprolol.   Monitor BP.      Problem/Plan - 3:  ·  Problem: PAF (paroxysmal atrial fibrillation).  Plan: Continue Xarelto.     Hyponatremia/Hypocalcemia:  BMP    anemia  CBC    PT
· Assessment	    89 y/o female with PMHx of HTN, HLD, SVT s/p ablation x 4 days ago, PE/DVT on xarelto, endometrial CA s/p LYNETTE, c/o sob and cough x 1 day. Pt was discharged from [facility] x 1 day ago after admission for SVT. Pt had ablation x 4 days ago. Pt admits to feeling well upon discharge but last night developed SOB and cough while lying in bed. Pt states that the SOB became progressively worse today. Pt states she had a non-productive cough. Pt has no chest pain, palpitations, diaphoresis, n/v/d, abdominal pain, numbness, tingling, dizziness, syncope, fever, chills.       Problem/Plan - 1:   Problem/Plan - 4:  ·  Problem:Acute CHF (congestive heart failure) exa.  Plan:  PO Lasix daily,   Cardio/EP f/up noted.     Problem/Plan - 2:  ·  Problem: HTN (hypertension).  Plan: Continue Metoprolol.   Monitor BP.      Problem/Plan - 3:  ·  Problem: PAF (paroxysmal atrial fibrillation).  Plan: Continue Xarelto.     Hyponatremia/Hypocalcemia:  BMP    anemia  CBC    Dc planning.
· Assessment	    89 y/o female with PMHx of HTN, HLD, SVT s/p ablation x 4 days ago, PE/DVT on xarelto, endometrial CA s/p LYNETTE, c/o sob and cough x 1 day. Pt was discharged from [facility] x 1 day ago after admission for SVT. Pt had ablation x 4 days ago. Pt admits to feeling well upon discharge but last night developed SOB and cough while lying in bed. Pt states that the SOB became progressively worse today. Pt states she had a non-productive cough. Pt has no chest pain, palpitations, diaphoresis, n/v/d, abdominal pain, numbness, tingling, dizziness, syncope, fever, chills.       Problem/Plan - 1:   Problem/Plan - 4:  ·  Problem:Acute CHF (congestive heart failure) exa.  Plan:  PO Lasix daily,   Cardio/EP f/up noted.     Problem/Plan - 2:  ·  Problem: HTN (hypertension).  Plan: Continue Metoprolol.   Monitor BP.      Problem/Plan - 3:  ·  Problem: PAF (paroxysmal atrial fibrillation).  Plan: Continue Xarelto.     Hyponatremia/Hypocalcemia:  BMP    anemia  CBC    Dc planning.

## 2018-09-07 NOTE — CONSULT NOTE ADULT - ATTENDING COMMENTS
Agree with plan. Minimal instability noted on stress XRs. f/u in 1wk for new XRs.
spoke with dr borges

## 2019-06-18 NOTE — PROVIDER CONTACT NOTE (OTHER) - ASSESSMENT
No
Patient with sinus tachy on tele monitor to 150s bmp. Tele PA aware. Pt resting comfortably in bed. Patient denies CP, SOB, or generalized pain. No s/s of acute distress noted, pt asymptomatic at this time. VS obtained. PM medications given, PRN PO xanax administered for anxiety. Tele strip printed in pt chart.
Patient with burst of SVT to 170 bmp on tele monitor. Tele PA Anastasia made aware. VS obtained. HR returned to 70 bpm quickly, /58. AM BP/HR meds given. Pt asymptomatic at this time, denies CP, SOB, pain or palpitations, patient resting comfortably in bed.
Patient with burst of SVT to 175 bmp on tele monitor. Tele PA Anastasia made aware. VS obtained and stable. Pt asymptomatic at this time, denies CP, SOB, pain or palpitations, patient resting comfortably in bed. HR returned to 72 bmp on tele monitor.
Patient with no urine output in 8 hours since last bladder scan and straight cath at 1900. Tele FRANCES Oconnor made aware of bladder volume per ultrasound 515cc. Pt with IVF running at 100cc/hr.
Patient with sinus tachy on tele monitor to 174 bmp. Tele PA aware. Patient denies CP, SOB, or generalized pain. No s/s of acute distress noted, pt asymptomatic at this time. VS obtained.
patient has not been able to void in 8 hours since last straight cath'ed. patient, 305mL present upon bladder scan. assisted patient to commode and utilized techniques for relaxation and ease of voiding with no success. patient only able to pass bowels.
patient receiving 100mL NS/hr, Patient has not voided in 8hours, >999 mL upon bladder scan.
patient resting in the bed, no s.s distress noted. patient is asymptomatic and goes back to NSR
patient sleeping at time of elevated HR, patient awoken to obtain vitals, patient denied symptoms. afebrile, temp of 98. /69. respirations are 20 on 2L NC satting at 100%. Patients HR returned to 60s and 70s on its own as per cardiac monitor.

## 2019-08-19 NOTE — PROGRESS NOTE ADULT - ASSESSMENT
89f hx HTN, HLD, insomnia, recent admission for syncopal fall and rhabdomyolysis, recent dx of aflutter, recently diagnosed bilateral DVT and L ankle fracture, presenting from [facility] for increasing work of breathing and shortness of breath over the last 3-4 days. Suspect CHF more than PNA with positive urine cx Date Of Previous Biopsy (Optional): 7-

## 2019-09-07 NOTE — H&P ADULT - PROBLEM SELECTOR PLAN 8
Continue home meds
I have personally evaluated and examined the patient. The Attending was available to me as a supervising provider if needed.

## 2019-11-20 ENCOUNTER — TRANSCRIPTION ENCOUNTER (OUTPATIENT)
Age: 84
End: 2019-11-20

## 2019-11-20 ENCOUNTER — INPATIENT (INPATIENT)
Facility: HOSPITAL | Age: 84
LOS: 5 days | Discharge: NURSING, REHAB OR LONG-TERM CARE | DRG: 481 | End: 2019-11-26
Attending: ORTHOPAEDIC SURGERY | Admitting: ORTHOPAEDIC SURGERY
Payer: MEDICARE

## 2019-11-20 VITALS
OXYGEN SATURATION: 94 % | SYSTOLIC BLOOD PRESSURE: 122 MMHG | RESPIRATION RATE: 18 BRPM | WEIGHT: 130.07 LBS | HEART RATE: 80 BPM | TEMPERATURE: 98 F | DIASTOLIC BLOOD PRESSURE: 75 MMHG | HEIGHT: 63 IN

## 2019-11-20 DIAGNOSIS — Z98.49 CATARACT EXTRACTION STATUS, UNSPECIFIED EYE: Chronic | ICD-10-CM

## 2019-11-20 DIAGNOSIS — Z90.710 ACQUIRED ABSENCE OF BOTH CERVIX AND UTERUS: Chronic | ICD-10-CM

## 2019-11-20 DIAGNOSIS — S72.001A FRACTURE OF UNSPECIFIED PART OF NECK OF RIGHT FEMUR, INITIAL ENCOUNTER FOR CLOSED FRACTURE: ICD-10-CM

## 2019-11-20 PROBLEM — E78.5 HYPERLIPIDEMIA, UNSPECIFIED: Chronic | Status: ACTIVE | Noted: 2018-06-19

## 2019-11-20 PROBLEM — I48.0 PAROXYSMAL ATRIAL FIBRILLATION: Chronic | Status: ACTIVE | Noted: 2018-06-19

## 2019-11-20 PROBLEM — I10 ESSENTIAL (PRIMARY) HYPERTENSION: Chronic | Status: ACTIVE | Noted: 2018-06-19

## 2019-11-20 LAB
ALBUMIN SERPL ELPH-MCNC: 4.2 G/DL — SIGNIFICANT CHANGE UP (ref 3.3–5)
ALP SERPL-CCNC: 87 U/L — SIGNIFICANT CHANGE UP (ref 40–120)
ALT FLD-CCNC: 17 U/L — SIGNIFICANT CHANGE UP (ref 10–45)
ANION GAP SERPL CALC-SCNC: 12 MMOL/L — SIGNIFICANT CHANGE UP (ref 5–17)
APTT BLD: 34.8 SEC — SIGNIFICANT CHANGE UP (ref 27.5–36.3)
AST SERPL-CCNC: 27 U/L — SIGNIFICANT CHANGE UP (ref 10–40)
BASOPHILS # BLD AUTO: 0.02 K/UL — SIGNIFICANT CHANGE UP (ref 0–0.2)
BASOPHILS NFR BLD AUTO: 0.2 % — SIGNIFICANT CHANGE UP (ref 0–2)
BILIRUB SERPL-MCNC: 0.3 MG/DL — SIGNIFICANT CHANGE UP (ref 0.2–1.2)
BLD GP AB SCN SERPL QL: NEGATIVE — SIGNIFICANT CHANGE UP
BUN SERPL-MCNC: 28 MG/DL — HIGH (ref 7–23)
CALCIUM SERPL-MCNC: 9.6 MG/DL — SIGNIFICANT CHANGE UP (ref 8.4–10.5)
CHLORIDE SERPL-SCNC: 98 MMOL/L — SIGNIFICANT CHANGE UP (ref 96–108)
CO2 SERPL-SCNC: 31 MMOL/L — SIGNIFICANT CHANGE UP (ref 22–31)
CREAT SERPL-MCNC: 1.31 MG/DL — HIGH (ref 0.5–1.3)
EOSINOPHIL # BLD AUTO: 0.07 K/UL — SIGNIFICANT CHANGE UP (ref 0–0.5)
EOSINOPHIL NFR BLD AUTO: 0.6 % — SIGNIFICANT CHANGE UP (ref 0–6)
GLUCOSE SERPL-MCNC: 100 MG/DL — HIGH (ref 70–99)
HCT VFR BLD CALC: 37.1 % — SIGNIFICANT CHANGE UP (ref 34.5–45)
HGB BLD-MCNC: 12.3 G/DL — SIGNIFICANT CHANGE UP (ref 11.5–15.5)
IMM GRANULOCYTES NFR BLD AUTO: 0.4 % — SIGNIFICANT CHANGE UP (ref 0–1.5)
INR BLD: 1.12 RATIO — SIGNIFICANT CHANGE UP (ref 0.88–1.16)
LYMPHOCYTES # BLD AUTO: 1.07 K/UL — SIGNIFICANT CHANGE UP (ref 1–3.3)
LYMPHOCYTES # BLD AUTO: 9.4 % — LOW (ref 13–44)
MCHC RBC-ENTMCNC: 29.9 PG — SIGNIFICANT CHANGE UP (ref 27–34)
MCHC RBC-ENTMCNC: 33.2 GM/DL — SIGNIFICANT CHANGE UP (ref 32–36)
MCV RBC AUTO: 90.3 FL — SIGNIFICANT CHANGE UP (ref 80–100)
MONOCYTES # BLD AUTO: 0.65 K/UL — SIGNIFICANT CHANGE UP (ref 0–0.9)
MONOCYTES NFR BLD AUTO: 5.7 % — SIGNIFICANT CHANGE UP (ref 2–14)
NEUTROPHILS # BLD AUTO: 9.56 K/UL — HIGH (ref 1.8–7.4)
NEUTROPHILS NFR BLD AUTO: 83.7 % — HIGH (ref 43–77)
NRBC # BLD: 0 /100 WBCS — SIGNIFICANT CHANGE UP (ref 0–0)
PLATELET # BLD AUTO: 210 K/UL — SIGNIFICANT CHANGE UP (ref 150–400)
POTASSIUM SERPL-MCNC: 4.4 MMOL/L — SIGNIFICANT CHANGE UP (ref 3.5–5.3)
POTASSIUM SERPL-SCNC: 4.4 MMOL/L — SIGNIFICANT CHANGE UP (ref 3.5–5.3)
PROT SERPL-MCNC: 7.4 G/DL — SIGNIFICANT CHANGE UP (ref 6–8.3)
PROTHROM AB SERPL-ACNC: 12.9 SEC — SIGNIFICANT CHANGE UP (ref 10–12.9)
RBC # BLD: 4.11 M/UL — SIGNIFICANT CHANGE UP (ref 3.8–5.2)
RBC # FLD: 13.2 % — SIGNIFICANT CHANGE UP (ref 10.3–14.5)
RH IG SCN BLD-IMP: NEGATIVE — SIGNIFICANT CHANGE UP
SODIUM SERPL-SCNC: 141 MMOL/L — SIGNIFICANT CHANGE UP (ref 135–145)
WBC # BLD: 11.41 K/UL — HIGH (ref 3.8–10.5)
WBC # FLD AUTO: 11.41 K/UL — HIGH (ref 3.8–10.5)

## 2019-11-20 PROCEDURE — 72170 X-RAY EXAM OF PELVIS: CPT | Mod: 26,59

## 2019-11-20 PROCEDURE — 70450 CT HEAD/BRAIN W/O DYE: CPT | Mod: 26

## 2019-11-20 PROCEDURE — 72192 CT PELVIS W/O DYE: CPT | Mod: 26

## 2019-11-20 PROCEDURE — 76377 3D RENDER W/INTRP POSTPROCES: CPT | Mod: 26

## 2019-11-20 PROCEDURE — 93010 ELECTROCARDIOGRAM REPORT: CPT

## 2019-11-20 PROCEDURE — 99285 EMERGENCY DEPT VISIT HI MDM: CPT | Mod: GC

## 2019-11-20 PROCEDURE — 73552 X-RAY EXAM OF FEMUR 2/>: CPT | Mod: 26,RT

## 2019-11-20 PROCEDURE — 73090 X-RAY EXAM OF FOREARM: CPT | Mod: 26,LT

## 2019-11-20 PROCEDURE — 73502 X-RAY EXAM HIP UNI 2-3 VIEWS: CPT | Mod: 26,RT

## 2019-11-20 PROCEDURE — 71045 X-RAY EXAM CHEST 1 VIEW: CPT | Mod: 26

## 2019-11-20 RX ORDER — SODIUM CHLORIDE 9 MG/ML
1000 INJECTION, SOLUTION INTRAVENOUS
Refills: 0 | Status: DISCONTINUED | OUTPATIENT
Start: 2019-11-20 | End: 2019-11-21

## 2019-11-20 RX ORDER — TRAZODONE HCL 50 MG
25 TABLET ORAL AT BEDTIME
Refills: 0 | Status: DISCONTINUED | OUTPATIENT
Start: 2019-11-20 | End: 2019-11-21

## 2019-11-20 RX ORDER — PANTOPRAZOLE SODIUM 20 MG/1
40 TABLET, DELAYED RELEASE ORAL
Refills: 0 | Status: DISCONTINUED | OUTPATIENT
Start: 2019-11-20 | End: 2019-11-21

## 2019-11-20 RX ORDER — CHOLECALCIFEROL (VITAMIN D3) 125 MCG
50000 CAPSULE ORAL
Refills: 0 | Status: DISCONTINUED | OUTPATIENT
Start: 2019-11-20 | End: 2019-11-21

## 2019-11-20 RX ORDER — ATORVASTATIN CALCIUM 80 MG/1
10 TABLET, FILM COATED ORAL AT BEDTIME
Refills: 0 | Status: DISCONTINUED | OUTPATIENT
Start: 2019-11-20 | End: 2019-11-21

## 2019-11-20 RX ORDER — OXYCODONE HYDROCHLORIDE 5 MG/1
2.5 TABLET ORAL EVERY 4 HOURS
Refills: 0 | Status: DISCONTINUED | OUTPATIENT
Start: 2019-11-20 | End: 2019-11-21

## 2019-11-20 RX ORDER — METOPROLOL TARTRATE 50 MG
25 TABLET ORAL DAILY
Refills: 0 | Status: DISCONTINUED | OUTPATIENT
Start: 2019-11-20 | End: 2019-11-21

## 2019-11-20 RX ORDER — SODIUM CHLORIDE 9 MG/ML
1000 INJECTION, SOLUTION INTRAVENOUS
Refills: 0 | Status: DISCONTINUED | OUTPATIENT
Start: 2019-11-20 | End: 2019-11-20

## 2019-11-20 RX ORDER — TETANUS TOXOID, REDUCED DIPHTHERIA TOXOID AND ACELLULAR PERTUSSIS VACCINE, ADSORBED 5; 2.5; 8; 8; 2.5 [IU]/.5ML; [IU]/.5ML; UG/.5ML; UG/.5ML; UG/.5ML
0.5 SUSPENSION INTRAMUSCULAR ONCE
Refills: 0 | Status: COMPLETED | OUTPATIENT
Start: 2019-11-20 | End: 2019-11-20

## 2019-11-20 RX ORDER — ALPRAZOLAM 0.25 MG
0.5 TABLET ORAL ONCE
Refills: 0 | Status: DISCONTINUED | OUTPATIENT
Start: 2019-11-20 | End: 2019-11-20

## 2019-11-20 RX ORDER — ACETAMINOPHEN 500 MG
975 TABLET ORAL EVERY 8 HOURS
Refills: 0 | Status: DISCONTINUED | OUTPATIENT
Start: 2019-11-20 | End: 2019-11-21

## 2019-11-20 RX ORDER — SODIUM CHLORIDE 9 MG/ML
1000 INJECTION INTRAMUSCULAR; INTRAVENOUS; SUBCUTANEOUS
Refills: 0 | Status: DISCONTINUED | OUTPATIENT
Start: 2019-11-20 | End: 2019-11-20

## 2019-11-20 RX ORDER — LANOLIN ALCOHOL/MO/W.PET/CERES
9 CREAM (GRAM) TOPICAL AT BEDTIME
Refills: 0 | Status: DISCONTINUED | OUTPATIENT
Start: 2019-11-20 | End: 2019-11-21

## 2019-11-20 RX ORDER — FUROSEMIDE 40 MG
40 TABLET ORAL DAILY
Refills: 0 | Status: DISCONTINUED | OUTPATIENT
Start: 2019-11-20 | End: 2019-11-21

## 2019-11-20 RX ORDER — OXYCODONE HYDROCHLORIDE 5 MG/1
5 TABLET ORAL EVERY 4 HOURS
Refills: 0 | Status: DISCONTINUED | OUTPATIENT
Start: 2019-11-20 | End: 2019-11-21

## 2019-11-20 RX ADMIN — Medication 975 MILLIGRAM(S): at 19:26

## 2019-11-20 RX ADMIN — Medication 0.5 MILLIGRAM(S): at 19:25

## 2019-11-20 RX ADMIN — TETANUS TOXOID, REDUCED DIPHTHERIA TOXOID AND ACELLULAR PERTUSSIS VACCINE, ADSORBED 0.5 MILLILITER(S): 5; 2.5; 8; 8; 2.5 SUSPENSION INTRAMUSCULAR at 15:40

## 2019-11-20 RX ADMIN — Medication 25 MILLIGRAM(S): at 22:53

## 2019-11-20 RX ADMIN — Medication 1 TABLET(S): at 22:55

## 2019-11-20 RX ADMIN — ATORVASTATIN CALCIUM 10 MILLIGRAM(S): 80 TABLET, FILM COATED ORAL at 22:53

## 2019-11-20 RX ADMIN — Medication 9 MILLIGRAM(S): at 22:54

## 2019-11-20 RX ADMIN — Medication 0.5 MILLIGRAM(S): at 15:42

## 2019-11-20 NOTE — ED PROVIDER NOTE - CLINICAL SUMMARY MEDICAL DECISION MAKING FREE TEXT BOX
89F presenting s/p fall: on AC: though denies head injury given age and AC use, obtain CT head to eval for ICH.  R forearm x-ray, cxr/pelvis xrays screening for bony trauma, and clean wound/bandage, update tetanus.

## 2019-11-20 NOTE — H&P ADULT - HISTORY OF PRESENT ILLNESS
89y Female presents to [facility] ED [facility]/o mild R hip pain and inability to ambulate. Patient states she tripped while trying to open her door. Patient denies headstrike or LOC. Localizes pain to forearm, does not really complain of right hip pain but patient was unable to stand/ambulate after fall. Patient denies radiation of pain. Patient denies numbness/tingling/burning in the RLE. No other bone/joint complaints. Patient is a minimal ambulator at baseline with assistive devices, primarily uses cane at home, Patient has issues w/ ADLs/IADLs, has aide. 89y Female [facility] hip pain and inability to ambulate. Patient states she tripped while trying to open her door. Patient denies headstrike or LOC. Localizes pain to forearm, does not really complain of right hip pain but patient was unable to stand/ambulate after fall. Patient denies radiation of pain. Patient denies numbness/tingling/burning in the RLE. No other bone/joint complaints. Patient is a minimal ambulator at baseline with assistive devices, primarily uses cane at home, Patient has issues w/ ADLs/IADLs, has aide.     Pt on Xarelto at baseline, last dose was last evening.

## 2019-11-20 NOTE — CONSULT NOTE ADULT - ASSESSMENT
89y Female presents to [facility] ED s/p [facility] fall c/o mild R hip pain and inability to ambulate. Patient states she tripped while trying to open her door. Patient denies headstrike or LOC. Localizes pain to forearm, does not really complain of right hip pain but patient was unable to stand/ambulate after fall. Patient denies radiation of pain. Patient denies numbness/tingling/burning in the RLE. No other bone/joint complaints. Patient is a minimal ambulator at baseline with assistive devices, primarily uses cane at home, Patient has issues w/ ADLs/IADLs, has aide.     Pt on Xarelto at baseline, last dose was last evening. (20 Nov 2019 18:37)

## 2019-11-20 NOTE — ED ADULT NURSE REASSESSMENT NOTE - NS ED NURSE REASSESS COMMENT FT1
Report received from Charanjit Plunkett. Patient resting comfortably in bed. VS as document. Patient denies needs at this time,. Comfort and safety measures maintained. Assess patient. Review patient's order, labs, history & plan. Address patient's Head Pain.

## 2019-11-20 NOTE — ED ADULT NURSE NOTE - CHPI ED NUR SYMPTOMS NEG
no numbness/no vomiting/no weakness/no deformity/no confusion/no tingling/no loss of consciousness/no fever

## 2019-11-20 NOTE — ED PROVIDER NOTE - NS ED ROS FT
CONSTITUTIONAL: No fever,  EYES: No redness  ENT: no sore throat  CARDIOVASCULAR: No chest pain,  RESPIRATORY: No cough, no shortness of breath  GI: No abdominal pain, no nausea, no vomiting,  GENITOURINARY: No dysuria  MUSKULOSKELETAL: No new pain in joints/muscles  SKIN: No rash  NEURO: No headache  ALL OTHER SYSTEMS NEGATIVE.

## 2019-11-20 NOTE — H&P ADULT - NSICDXPASTMEDICALHX_GEN_ALL_CORE_FT
PAST MEDICAL HISTORY:  Anxiety     Breast Lump     Endometrial cancer S/P LYNETTE with SBO    HLD (hyperlipidemia)     HTN (hypertension)     Insomnia     PAF (paroxysmal atrial fibrillation) On Xarelto    PE (pulmonary thromboembolism)

## 2019-11-20 NOTE — H&P ADULT - NSHPPHYSICALEXAM_GEN_ALL_CORE
PE   Right LE:  Skin intact; No ecchymosis/soft tissue swelling  Compartments soft; mild TTP about hip. No TTP to knee/leg/ankle/foot   minimal pain with PROM of hip   able to SLR; neg Log Roll/Heel Strike  Motor intact GS/TA/FHL/EHL  SILT L2-S1  DP/PT pulses 2+    Secondary Survey: No TTP over bony prominences, SILT, palpable pulses, full/painless range of motion, compartments soft, large laceration over right forearm about 4 cm in length, no active bleeding

## 2019-11-20 NOTE — H&P ADULT - ASSESSMENT
89y Female with Right Femoral neck fracture    -admit to orthopedics  - Plan for OR tomorrow for Right hip CRPP with Dr Garcia   - Pain control  - NPO/IVF after midnight   - bedrest  - CBC/BMP/Coags/UA/T+S x2  - EKG/CXR  - Medical clearance- HLM aware  - will advise if plan changes

## 2019-11-20 NOTE — PATIENT PROFILE ADULT - VISION (WITH CORRECTIVE LENSES IF THE PATIENT USUALLY WEARS THEM):
Partially impaired: cannot see medication labels or newsprint, but can see obstacles in path, and the surrounding layout; can count fingers at arm's length/cataract surgery - for reading

## 2019-11-20 NOTE — ED PROVIDER NOTE - OBJECTIVE STATEMENT
89 y f pmh of PE on apixiban, htn, hld   pw fall  pant leg got caught on railing   then she fell forward  did not hit head  called ems because of bleeding from right arm wound  no LOC 89 y f pmh of PE on apixiban, htn, hld   pw fall  pant leg got caught on railing   then she fell forward  did not hit head  called ems because of bleeding from right arm wound  no LOC    Attending note (Pernell): 89 y/o F with h/o PE and pAfib (on xarelto), HLD, HTN, presenting after trip/fall onto right side, bleeding from right forearm; states she did not hit head, no LOC.  Mild pain at R forearm, but able to move arm without pain.  States she scraped right arm, was concerned b/c of bleeding so called for EMS to come to hospital b/c she is on anticoagulation.  Unsure of last tetanus vac.

## 2019-11-20 NOTE — CONSULT NOTE ADULT - SUBJECTIVE AND OBJECTIVE BOX
Patient is a 89y old  Female who presents with a chief complaint of Right hip fracture (20 Nov 2019 18:37)      HPI:  89[facility] mild R hip pain and inability to ambulate. Patient states she tripped while trying to open her door. Patient denies headstrike or LOC. Localizes pain to forearm, does not really complain of right hip pain but patient was unable to stand/ambulate after fall. Patient denies radiation of pain. Patient denies numbness/tingling/burning in the RLE. No other bone/joint complaints. Patient is a minimal ambulator at baseline with assistive devices, primarily uses cane at home, Patient has issues w/ ADLs/IADLs, has aide.     Pt on Xarelto at baseline, last dose was last evening. (20 Nov 2019 18:37)      MEDICATIONS  (STANDING):  acetaminophen   Tablet .. 975 milliGRAM(s) Oral every 8 hours  atorvastatin 10 milliGRAM(s) Oral at bedtime  cholecalciferol Oral Tab/Cap - Peds 20434 Unit(s) Oral every week  furosemide    Tablet 40 milliGRAM(s) Oral daily  lactated ringers. 1000 milliLiter(s) (60 mL/Hr) IV Continuous <Continuous>  metoprolol succinate ER 25 milliGRAM(s) Oral daily  multivitamin 1 Tablet(s) Oral daily  pantoprazole    Tablet 40 milliGRAM(s) Oral before breakfast    MEDICATIONS  (PRN):  busPIRone 10 milliGRAM(s) Oral two times a day PRN SOB  melatonin 9 milliGRAM(s) Oral at bedtime PRN Insomnia  oxyCODONE    IR 2.5 milliGRAM(s) Oral every 4 hours PRN Moderate Pain (4 - 6)  oxyCODONE    IR 5 milliGRAM(s) Oral every 4 hours PRN Severe Pain (7 - 10)  traZODone 25 milliGRAM(s) Oral at bedtime PRN insomnia      Allergies    Eye cream from the 1960s Neocortef ?spelling caused eyes to becomes swollen (Unknown)  neomycin (Unknown)  piperacillin-tazobactam (Rash)  soaps and creams causes rash uses only sensitive skin soap (Rash)  sulfa drugs (Unknown)  Voltaren (Rash)    Intolerances      PAST MEDICAL HISTORY:  Endometrial cancer  HLD (hyperlipidemia)  HTN (hypertension)  PAF (paroxysmal atrial fibrillation)  PE (pulmonary thromboembolism)  Neuropathy associated with cancer  Hypertension  Hyperlipidemia  Insomnia  Initial Insomnia  Breast Lump  Seasonal Allergies  Hyperlipemia  Anxiety    PAST SURGICAL HISTORY:  Status post cataract extraction  Status post hysterectomy  History of D&C- 8/12/11  History of Colonoscopy- 2011/Sept  History of Breast Lump/Mass Excision- [facility]- left- 1971      Diet:  (   ) Regular   ( x  ) Low Sodium   (  x ) Low Cholesterol   (   ) Diabetic   (   ) Other    FAMILY HISTORY:  No pertinent family history in first degree relatives      SOCIAL HISTORY:  Marital Status:  (   )    (   ) Single    (   )    (  x )   Occupation:   Lives with: (x   ) alone  (   ) children   (   ) spouse   (   ) parents  (   ) other has aides   Substance Use: ( x ) never used  (  ) other:  Tobacco Usage:  ( x  ) never smoked   (   ) former smoker   (   ) current smoker  (   ) pack years  (   ) last cigarette date  Alcohol Usage:  none  Advanced Directives: (   ) None    (   ) DNR    (   ) DNI    (   ) Health Care Proxy:     REVIEW OF SYSTEM:  CONSTITUTIONAL: No fever, No change in weight, No fatigue  HEAD: No headache, No dizziness, No recent trauma  EYES: No eye pain, No visual disturbances, No discharge  ENT:  No difficulty hearing, No tinnitus, No vertigo, No sinus pain, No throat pain  NECK: No pain, No stiffness  BREASTS: No pain, No masses, No nipple discharge  RESPIRATORY: No cough, No wheezing, No chills, No hemoptysis, No shortness of breath at rest or exertional shortness of breath  CARDIOVASCULAR: No chest pain, No palpitations, No dizziness, No CHF, No arrhythmia, No cardiomegaly, No leg swelling  GASTROINTESTINAL: No abdominal, No epigastric pain. No nausea, No vomiting, No hematemesis, No diarrhea, No constipation. No melena, No hematochezia. No GERD  GENITOURINARY: No dysuria, No frequency, No hematuria, No incontinence, No nocturia, No hesitancy,  SKIN: No itching, No burning, No rashes, No lesions   LYMPH NODES: No history of enlarged glands  ENDOCRINE: No heat or cold intolerance, No hair loss. No osteoporosis, No thyroid disease  MUSCULOSKELETAL: No joint pain or swelling, No muscle, back, or extremity pain  (+) right hip fracture  PSYCHIATRIC: No depression, No anxiety, No mood swings, No difficulty sleeping  HEME/LYMPH: No easy bruising, No anticoagulants, No bleeding disorder, No bleeding gums  ALLERGY AND IMMUNOLOGIC: No hives, No eczema  NEUROLOGICAL: No memory loss, No loss of strength, No numbness, No tremors    VITALS:  Vital Signs Last 24 Hrs  T(C): 36.6 (20 Nov 2019 22:46), Max: 36.6 (20 Nov 2019 19:21)  T(F): 97.8 (20 Nov 2019 22:46), Max: 97.9 (20 Nov 2019 19:21)  HR: 55 (20 Nov 2019 22:46) (55 - 89)  BP: 104/58 (20 Nov 2019 22:46) (104/58 - 122/75)  BP(mean): --  RR: 18 (20 Nov 2019 22:46) (17 - 18)  SpO2: 96% (20 Nov 2019 22:46) (94% - 99%)  I&O's Summary    20 Nov 2019 07:01  -  21 Nov 2019 00:21  --------------------------------------------------------  IN: 125 mL / OUT: 0 mL / NET: 125 mL        PHYSICAL EXAM:  GENERAL: NAD, well nourished and conversant  HEAD:  Atraumatic  EYES: EOM, PERRLA, conjunctiva pink and sclera white  ENT: No tonsillar erythema, exudates, or enlargement, moist mucous membranes, good dentition, no lesions  NECK: Supple, No JVD, normal thyroid, carotids with normal upstrokes and no bruits  CHEST/LUNG: Clear to auscultation bilaterally, No rales, rhonchi, wheezing, or rubs  HEART: Regular rate and rhythm, No murmurs, rubs, or gallops  ABDOMEN: Soft, nondistended, no masses, guarding, tenderness or rebound, bowel sounds present  EXTREMITIES:  2+ Peripheral Pulses, No clubbing, cyanosis, or edema.   (+)   LYMPH: No lymphadenopathy noted  SKIN: No rashes or lesions  NERVOUS SYSTEM:  Alert & Oriented X3, normal cognitive function. Motor Strength 5/5 right upper and right lower.  5/5 left upper and left lower extremities, DTRs 2+ intact and symmetric    LABS:    EKG nsr no acute changes    CBC Full  -  ( 20 Nov 2019 15:48 )  WBC Count : 11.41 K/uL  RBC Count : 4.11 M/uL  Hemoglobin : 12.3 g/dL  Hematocrit : 37.1 %  Platelet Count - Automated : 210 K/uL  Mean Cell Volume : 90.3 fl  Mean Cell Hemoglobin : 29.9 pg  Mean Cell Hemoglobin Concentration : 33.2 gm/dL  Auto Neutrophil # : 9.56 K/uL  Auto Lymphocyte # : 1.07 K/uL  Auto Monocyte # : 0.65 K/uL  Auto Eosinophil # : 0.07 K/uL  Auto Basophil # : 0.02 K/uL  Auto Neutrophil % : 83.7 %  Auto Lymphocyte % : 9.4 %  Auto Monocyte % : 5.7 %  Auto Eosinophil % : 0.6 %  Auto Basophil % : 0.2 %    11-20    141  |  98  |  28<H>  ----------------------------<  100<H>  4.4   |  31  |  1.31<H>    Ca    9.6      20 Nov 2019 15:48    TPro  7.4  /  Alb  4.2  /  TBili  0.3  /  DBili  x   /  AST  27  /  ALT  17  /  AlkPhos  87  11-20    LIVER FUNCTIONS - ( 20 Nov 2019 15:48 )  Alb: 4.2 g/dL / Pro: 7.4 g/dL / ALK PHOS: 87 U/L / ALT: 17 U/L / AST: 27 U/L / GGT: x           PT/INR - ( 20 Nov 2019 15:48 )   PT: 12.9 sec;   INR: 1.12 ratio         PTT - ( 20 Nov 2019 15:48 )  PTT:34.8 sec    CAPILLARY BLOOD GLUCOSE          RADIOLOGY & ADDITIONAL TESTS:    Consultant(s):    Care Discussed with Consultants/Other Providers [ ] YES  [ ] NO

## 2019-11-20 NOTE — H&P ADULT - NSICDXPASTSURGICALHX_GEN_ALL_CORE_FT
PAST SURGICAL HISTORY:  [facility]     History of Colonoscopy- 2011/Sept     History of D&C- 8/12/11     Status post cataract extraction OS    Status post hysterectomy endometrial cancer

## 2019-11-20 NOTE — ED ADULT NURSE NOTE - NSIMPLEMENTINTERV_GEN_ALL_ED
Implemented All Fall with Harm Risk Interventions:  [place] to call system. Call bell, personal items and telephone within reach. Instruct patient to call for assistance. Room bathroom lighting operational. Non-slip footwear when patient is off stretcher. Physically safe environment: no spills, clutter or unnecessary equipment. Stretcher in lowest position, wheels locked, appropriate side rails in place. Provide visual cue, wrist band, yellow gown, etc. Monitor gait and stability. Monitor for mental status changes and reorient to person, place, and time. Review medications for side effects contributing to fall risk. Reinforce activity limits and safety measures with patient and family. Provide visual clues: red socks.

## 2019-11-20 NOTE — ED PROVIDER NOTE - PROGRESS NOTE DETAILS
Attending note (Pernell): remained stable, pelvis x-ray suggestive of R subcap femoral head impacted fracture; will further eval with dedicated R hip/femur xrays and CT bony pelvis.  Some tenderness with movement of R hip but able to flex/extend.  DP and PT pulses intact b/.

## 2019-11-20 NOTE — ED PROVIDER NOTE - ATTENDING CONTRIBUTION TO CARE
89 y/o F with h/o PE and pAfib (on xarelto), HLD, HTN, presenting after trip/fall onto right side, bleeding from right forearm; states she did not hit head, no LOC.  Mild pain at R forearm, but able to move arm without pain.  States she scraped right arm, was concerned b/c of bleeding so called for EMS to come to hospital b/c she is on anticoagulation.  Unsure of last tetanus vac.    On Physical Exam:  General: well appearing, in NAD, speaking clearly in full sentences and without difficulty; cooperative with exam  HEENT: PERRL, MMM  Neck: no neck tenderness, no nuchal rigidity  Cardiac: normal s1, s2; RRR; no MGR  Lungs: CTABL  Abdomen: soft nontender/nondistended  : no bladder tenderness or distension  Skin: 2x4cm abrasion / skin tear to proximal lateral right forearm, no active bleeding, no repairable lacerations  Extremities: no peripheral edema; pelvis stable; able to fully range all extremities  Neuro: no gross neurologic deficits      AP: fall: on AC: though denies head injury given age and AC use, obtain CT head to eval for ICH.  R forearm x-ray, cxr/pelvis xrays screening for bony trauma, and clean wound/bandage, update tetanus.    ED Course: remained stable, pelvis x-ray suggestive of R subcap femoral head impacted fracture; will further eval with dedicated R hip/femur xrays and CT bony pelvis.  Some tenderness with movement of R hip but able to flex/extend.  DP and PT pulses intact b/.

## 2019-11-20 NOTE — ED PROVIDER NOTE - PHYSICAL EXAMINATION
CONSTITUTIONAL: non-toxic, NAD, no respiratory distress, non-diaphoretic   HEAD: Normocephalic; atraumatic, no english sign, no raccoon eyes,  EYES: EOM intact  ENMT: External appears normal; normal oropharynx  NECK: Supple; non-tender; normal ROM  CARD: Normal S1, S2; no murmur; no rubs, or gallops  RESP: Normal chest excursion with respiration; breath sounds clear and equal bilaterally; no wheezes,rhonchi, or rales  ABD: Soft, non-distended; non-tender; no RUQ tenderness, no RLQ tenderness  EXT: Normal ROM in all four extremities; non-tender to palpation; distal pulses intact  SKIN: Warm, dry, no rash  NEURO: CN 2-12 grossly intact, Mental status AAOx3  PERRL  5/5 strength all extremites   Trauma: Normal ROM in all four extremities; non-tender to palpation; no midline tenderness, distal pulses intact, no scaphoid tenderness, no tenderness to medial and lateral malleoli of legs bilaterally, no tenderness to navicular bone bilaterally, no tenderness to 5th metatarsal bilaterally

## 2019-11-20 NOTE — ED ADULT NURSE NOTE - OBJECTIVE STATEMENT
Patient is a 89 year old female with PMH of...  Granddaughter at bedside. Patient is a 89 year old female with PMH of endometrial cancer, HLD, HTN, A-fib, PE, insomnia and anxiety who presents to the ED s/p mechanical fall. Patient states she was rushing to answer her front door when her cane got caught in the rug and she fell.   Granddaughter and daughter at bedside. Patient is a 89 year old female with PMH of endometrial cancer, HLD, HTN, A-fib, PE, insomnia and anxiety who presents to the ED s/p mechanical fall. Patient states she was rushing to answer her front door when her cane got caught in the rug and she tripped and fell forward. Patient sustained skin tear to her right arm from the fall. Patient A&Ox4. Patient denies dizziness, LOC, feeling faint before the fall. Patient denies hitting her head. Fall was unwitnessed. Patient maintains ROM to all extremities. Patient reports that her right arm stings at site of abrasion. Abrasion wrapped by EMS.   Patient denies headache, vision changes. Denies numbness, tingling.  Patient denies chest pain, SOB, dyspnea. Patient denies fever, chills. Patient denies N/V/D.  Granddaughter and daughter at bedside. Patient is a 89 year old female with PMH of endometrial cancer, HLD, HTN, A-fib, PE, insomnia and anxiety who presents to the ED via EMS s/p mechanical fall, on xarelto. Patient states she was rushing to answer her front door when her cane got caught in the rug and she tripped and fell forward. Patient sustained skin tear to her right arm from the fall. Patient A&Ox4. Patient denies dizziness, LOC, feeling faint before the fall. Patient denies hitting her head. Fall was unwitnessed. Patient maintains ROM to all extremities. Patient reports that her right arm stings at site of abrasion. Abrasion wrapped by EMS with bleeding controlled PTA.   Patient denies headache, vision changes. Denies numbness, tingling.  Patient denies chest pain, SOB, dyspnea. Patient denies fever, chills. Patient denies N/V/D.  Granddaughter and daughter at bedside.

## 2019-11-21 DIAGNOSIS — S72.001A FRACTURE OF UNSPECIFIED PART OF NECK OF RIGHT FEMUR, INITIAL ENCOUNTER FOR CLOSED FRACTURE: ICD-10-CM

## 2019-11-21 DIAGNOSIS — I26.99 OTHER PULMONARY EMBOLISM WITHOUT ACUTE COR PULMONALE: ICD-10-CM

## 2019-11-21 DIAGNOSIS — F41.9 ANXIETY DISORDER, UNSPECIFIED: ICD-10-CM

## 2019-11-21 DIAGNOSIS — C54.1 MALIGNANT NEOPLASM OF ENDOMETRIUM: ICD-10-CM

## 2019-11-21 DIAGNOSIS — I10 ESSENTIAL (PRIMARY) HYPERTENSION: ICD-10-CM

## 2019-11-21 DIAGNOSIS — I48.0 PAROXYSMAL ATRIAL FIBRILLATION: ICD-10-CM

## 2019-11-21 LAB
ANION GAP SERPL CALC-SCNC: 9 MMOL/L — SIGNIFICANT CHANGE UP (ref 5–17)
APTT BLD: 32.1 SEC — SIGNIFICANT CHANGE UP (ref 27.5–36.3)
BLD GP AB SCN SERPL QL: NEGATIVE — SIGNIFICANT CHANGE UP
BUN SERPL-MCNC: 23 MG/DL — SIGNIFICANT CHANGE UP (ref 7–23)
CALCIUM SERPL-MCNC: 9.4 MG/DL — SIGNIFICANT CHANGE UP (ref 8.4–10.5)
CHLORIDE SERPL-SCNC: 104 MMOL/L — SIGNIFICANT CHANGE UP (ref 96–108)
CO2 SERPL-SCNC: 27 MMOL/L — SIGNIFICANT CHANGE UP (ref 22–31)
CREAT SERPL-MCNC: 1.31 MG/DL — HIGH (ref 0.5–1.3)
GLUCOSE SERPL-MCNC: 92 MG/DL — SIGNIFICANT CHANGE UP (ref 70–99)
HCT VFR BLD CALC: 34.8 % — SIGNIFICANT CHANGE UP (ref 34.5–45)
HGB BLD-MCNC: 11.4 G/DL — LOW (ref 11.5–15.5)
INR BLD: 1.03 RATIO — SIGNIFICANT CHANGE UP (ref 0.88–1.16)
MCHC RBC-ENTMCNC: 29.3 PG — SIGNIFICANT CHANGE UP (ref 27–34)
MCHC RBC-ENTMCNC: 32.8 GM/DL — SIGNIFICANT CHANGE UP (ref 32–36)
MCV RBC AUTO: 89.5 FL — SIGNIFICANT CHANGE UP (ref 80–100)
NRBC # BLD: 0 /100 WBCS — SIGNIFICANT CHANGE UP (ref 0–0)
PLATELET # BLD AUTO: 192 K/UL — SIGNIFICANT CHANGE UP (ref 150–400)
POTASSIUM SERPL-MCNC: 3.9 MMOL/L — SIGNIFICANT CHANGE UP (ref 3.5–5.3)
POTASSIUM SERPL-SCNC: 3.9 MMOL/L — SIGNIFICANT CHANGE UP (ref 3.5–5.3)
PROTHROM AB SERPL-ACNC: 11.9 SEC — SIGNIFICANT CHANGE UP (ref 10–12.9)
RBC # BLD: 3.89 M/UL — SIGNIFICANT CHANGE UP (ref 3.8–5.2)
RBC # FLD: 13.2 % — SIGNIFICANT CHANGE UP (ref 10.3–14.5)
RH IG SCN BLD-IMP: NEGATIVE — SIGNIFICANT CHANGE UP
SODIUM SERPL-SCNC: 140 MMOL/L — SIGNIFICANT CHANGE UP (ref 135–145)
WBC # BLD: 5.7 K/UL — SIGNIFICANT CHANGE UP (ref 3.8–10.5)
WBC # FLD AUTO: 5.7 K/UL — SIGNIFICANT CHANGE UP (ref 3.8–10.5)

## 2019-11-21 RX ORDER — TRAZODONE HCL 50 MG
25 TABLET ORAL AT BEDTIME
Refills: 0 | Status: DISCONTINUED | OUTPATIENT
Start: 2019-11-21 | End: 2019-11-26

## 2019-11-21 RX ORDER — SODIUM CHLORIDE 9 MG/ML
1000 INJECTION INTRAMUSCULAR; INTRAVENOUS; SUBCUTANEOUS
Refills: 0 | Status: DISCONTINUED | OUTPATIENT
Start: 2019-11-21 | End: 2019-11-21

## 2019-11-21 RX ORDER — HYDROMORPHONE HYDROCHLORIDE 2 MG/ML
0.25 INJECTION INTRAMUSCULAR; INTRAVENOUS; SUBCUTANEOUS
Refills: 0 | Status: DISCONTINUED | OUTPATIENT
Start: 2019-11-21 | End: 2019-11-21

## 2019-11-21 RX ORDER — SODIUM CHLORIDE 9 MG/ML
1000 INJECTION INTRAMUSCULAR; INTRAVENOUS; SUBCUTANEOUS
Refills: 0 | Status: DISCONTINUED | OUTPATIENT
Start: 2019-11-21 | End: 2019-11-26

## 2019-11-21 RX ORDER — ASPIRIN/CALCIUM CARB/MAGNESIUM 324 MG
81 TABLET ORAL DAILY
Refills: 0 | Status: DISCONTINUED | OUTPATIENT
Start: 2019-11-21 | End: 2019-11-26

## 2019-11-21 RX ORDER — ALPRAZOLAM 0.25 MG
0.5 TABLET ORAL ONCE
Refills: 0 | Status: DISCONTINUED | OUTPATIENT
Start: 2019-11-21 | End: 2019-11-26

## 2019-11-21 RX ORDER — PANTOPRAZOLE SODIUM 20 MG/1
40 TABLET, DELAYED RELEASE ORAL
Refills: 0 | Status: DISCONTINUED | OUTPATIENT
Start: 2019-11-21 | End: 2019-11-26

## 2019-11-21 RX ORDER — METOPROLOL TARTRATE 50 MG
25 TABLET ORAL DAILY
Refills: 0 | Status: DISCONTINUED | OUTPATIENT
Start: 2019-11-21 | End: 2019-11-26

## 2019-11-21 RX ORDER — FUROSEMIDE 40 MG
40 TABLET ORAL DAILY
Refills: 0 | Status: DISCONTINUED | OUTPATIENT
Start: 2019-11-21 | End: 2019-11-26

## 2019-11-21 RX ORDER — ONDANSETRON 8 MG/1
4 TABLET, FILM COATED ORAL EVERY 6 HOURS
Refills: 0 | Status: DISCONTINUED | OUTPATIENT
Start: 2019-11-21 | End: 2019-11-26

## 2019-11-21 RX ORDER — ACETAMINOPHEN 500 MG
975 TABLET ORAL ONCE
Refills: 0 | Status: COMPLETED | OUTPATIENT
Start: 2019-11-21 | End: 2019-11-21

## 2019-11-21 RX ORDER — OXYCODONE HYDROCHLORIDE 5 MG/1
5 TABLET ORAL EVERY 4 HOURS
Refills: 0 | Status: DISCONTINUED | OUTPATIENT
Start: 2019-11-21 | End: 2019-11-26

## 2019-11-21 RX ORDER — LANOLIN ALCOHOL/MO/W.PET/CERES
9 CREAM (GRAM) TOPICAL AT BEDTIME
Refills: 0 | Status: DISCONTINUED | OUTPATIENT
Start: 2019-11-21 | End: 2019-11-26

## 2019-11-21 RX ORDER — RIVAROXABAN 15 MG-20MG
20 KIT ORAL DAILY
Refills: 0 | Status: DISCONTINUED | OUTPATIENT
Start: 2019-11-22 | End: 2019-11-26

## 2019-11-21 RX ORDER — ATORVASTATIN CALCIUM 80 MG/1
10 TABLET, FILM COATED ORAL AT BEDTIME
Refills: 0 | Status: DISCONTINUED | OUTPATIENT
Start: 2019-11-21 | End: 2019-11-26

## 2019-11-21 RX ORDER — SODIUM CHLORIDE 9 MG/ML
1000 INJECTION, SOLUTION INTRAVENOUS
Refills: 0 | Status: DISCONTINUED | OUTPATIENT
Start: 2019-11-21 | End: 2019-11-21

## 2019-11-21 RX ORDER — CEFAZOLIN SODIUM 1 G
2000 VIAL (EA) INJECTION EVERY 8 HOURS
Refills: 0 | Status: COMPLETED | OUTPATIENT
Start: 2019-11-21 | End: 2019-11-21

## 2019-11-21 RX ORDER — ONDANSETRON 8 MG/1
4 TABLET, FILM COATED ORAL ONCE
Refills: 0 | Status: DISCONTINUED | OUTPATIENT
Start: 2019-11-21 | End: 2019-11-21

## 2019-11-21 RX ORDER — OXYCODONE HYDROCHLORIDE 5 MG/1
2.5 TABLET ORAL EVERY 4 HOURS
Refills: 0 | Status: DISCONTINUED | OUTPATIENT
Start: 2019-11-21 | End: 2019-11-26

## 2019-11-21 RX ADMIN — Medication 975 MILLIGRAM(S): at 06:14

## 2019-11-21 RX ADMIN — Medication 100 MILLIGRAM(S): at 14:43

## 2019-11-21 RX ADMIN — Medication 975 MILLIGRAM(S): at 17:55

## 2019-11-21 RX ADMIN — HYDROMORPHONE HYDROCHLORIDE 0.25 MILLIGRAM(S): 2 INJECTION INTRAMUSCULAR; INTRAVENOUS; SUBCUTANEOUS at 13:50

## 2019-11-21 RX ADMIN — PANTOPRAZOLE SODIUM 40 MILLIGRAM(S): 20 TABLET, DELAYED RELEASE ORAL at 05:48

## 2019-11-21 RX ADMIN — HYDROMORPHONE HYDROCHLORIDE 0.25 MILLIGRAM(S): 2 INJECTION INTRAMUSCULAR; INTRAVENOUS; SUBCUTANEOUS at 10:35

## 2019-11-21 RX ADMIN — Medication 81 MILLIGRAM(S): at 14:43

## 2019-11-21 RX ADMIN — Medication 100 MILLIGRAM(S): at 21:32

## 2019-11-21 RX ADMIN — SODIUM CHLORIDE 60 MILLILITER(S): 9 INJECTION, SOLUTION INTRAVENOUS at 05:45

## 2019-11-21 RX ADMIN — HYDROMORPHONE HYDROCHLORIDE 0.25 MILLIGRAM(S): 2 INJECTION INTRAMUSCULAR; INTRAVENOUS; SUBCUTANEOUS at 10:27

## 2019-11-21 RX ADMIN — Medication 9 MILLIGRAM(S): at 21:32

## 2019-11-21 RX ADMIN — ATORVASTATIN CALCIUM 10 MILLIGRAM(S): 80 TABLET, FILM COATED ORAL at 21:32

## 2019-11-21 RX ADMIN — Medication 25 MILLIGRAM(S): at 21:32

## 2019-11-21 RX ADMIN — Medication 975 MILLIGRAM(S): at 05:44

## 2019-11-21 RX ADMIN — Medication 40 MILLIGRAM(S): at 05:44

## 2019-11-21 RX ADMIN — Medication 975 MILLIGRAM(S): at 17:25

## 2019-11-21 NOTE — PROGRESS NOTE ADULT - PROBLEM SELECTOR PLAN 1
tolerated procedure well  pain meds as needed  DVT and GI prophylaxis  physical therapy as tolerated

## 2019-11-21 NOTE — PHYSICAL THERAPY INITIAL EVALUATION ADULT - ACTIVE RANGE OF MOTION EXAMINATION, REHAB EVAL
bilateral upper extremity Active ROM was WFL (within functional limits)/bilateral  lower extremity Active ROM was WFL (within functional limits)/except bilateral shoulder flexion ~85 degrees. RLE limited 2/2 pain

## 2019-11-21 NOTE — PROGRESS NOTE ADULT - ATTENDING COMMENTS
I am a non participating [facility] physician seeing Pt in coverage for Dr Mcclure I am a non participating [facility] physician seeing Pt in coverage for Dr Mcclure. The above patient examination was reviewed with Dr. Samaniego and I agree with his evaluation, assessment and treatment plan.  Benedicto Mcclure M.D.

## 2019-11-21 NOTE — CHART NOTE - NSCHARTNOTEFT_GEN_A_CORE
Resting without complaints.  No Chest Pain, SOB, N/V.    T(C): 36 (11-21-19 @ 08:36), Max: 36.6 (11-20-19 @ 19:21)  HR: 69 (11-21-19 @ 10:30) (52 - 89)  BP: 123/60 (11-21-19 @ 10:30) (104/58 - 127/59)  RR: 16 (11-21-19 @ 10:30) (16 - 18)  SpO2: 99% (11-21-19 @ 10:30) (96% - 100%)      Exam:  Alert and [place], No Acute Distress  Card: +S1/S2, RRR  Pulm: CTAB  Laterality: R Hip  Dressing: gauze and surgical balaji c/d/i  Calves soft, non-tender bilaterally  +PF/DF/EHL/FHL  SILT  + DP pulse    Xray:  Intraop Fluroscopy: S/P R Hip Closed Reduction Percutaneous Pinning Procedure.  Hardware in Place and intact with no signs of new beba hardware Fx.                          11.4   5.70  )-----------( 192      ( 21 Nov 2019 06:45 )             34.8    11-21    140  |  104  |  23  ----------------------------<  92  3.9   |  27  |  1.31<H>    Ca    9.4      21 Nov 2019 06:44    TPro  7.4  /  Alb  4.2  /  TBili  0.3  /  DBili  x   /  AST  27  /  ALT  17  /  AlkPhos  87  11-20      A/P: 89y Female S/p  R Hip CRPP sx. VSS. NAD  -PT/OT-WBAT RLE  -F/U Daily AM Labs  -IS  -DVT PPx: Xarelto 20mg PO Daily (Starting tomorrow), ASA 81mg PO Daily and SCDs while admitted.  -Pain Control  -Continue Current Tx  -Dispo planning PACU to Floor    Irving Dolan PA-C  Orthopedic Surgery Team  Team Pager #[phone]

## 2019-11-21 NOTE — PRE-ANESTHESIA EVALUATION ADULT - NSANTHOSAYNRD_GEN_A_CORE
No. [facility] screening performed.  STOP BANG Legend: 0-2 = LOW Risk; 3-4 = INTERMEDIATE Risk; 5-8 = HIGH Risk

## 2019-11-21 NOTE — PHYSICAL THERAPY INITIAL EVALUATION ADULT - ADDITIONAL COMMENTS
Pt reports that she lives alone in a private house with a few steps to negotiate to enter the house; however no steps to negotiate through the garage and pt has a stair lift inside. Pt reports that she has a Home Health Aide and Pt's daughter visits frequently as she lives ~2 blocks away. Pt states she is ambulatory household distances with a cane at home

## 2019-11-21 NOTE — PROGRESS NOTE ADULT - SUBJECTIVE AND OBJECTIVE BOX
89y Female [facility] pain and inability to ambulate. Patient states she tripped while trying to open her door. Patient denies headstrike or LOC. Localizes pain to forearm, does not really complain of right hip pain but patient was unable to stand/ambulate after fall. Patient denies radiation of pain. Patient denies numbness/tingling/burning in the RLE. No other bone/joint complaints. Patient is a minimal ambulator at baseline with assistive devices, primarily uses cane at home, Patient has issues w/ ADLs/IADLs, has aide. Patient seen post op resting comfortably     MEDICATIONS  (STANDING):  aspirin  chewable 81 milliGRAM(s) Oral daily  atorvastatin 10 milliGRAM(s) Oral at bedtime  ceFAZolin   IVPB 2000 milliGRAM(s) IV Intermittent every 8 hours  furosemide    Tablet 40 milliGRAM(s) Oral daily  metoprolol succinate ER 25 milliGRAM(s) Oral daily  pantoprazole    Tablet 40 milliGRAM(s) Oral before breakfast  sodium chloride 0.9%. 1000 milliLiter(s) (70 mL/Hr) IV Continuous <Continuous>    MEDICATIONS  (PRN):  ALPRAZolam 0.5 milliGRAM(s) Oral Once PRN Anxiety Attack  busPIRone 10 milliGRAM(s) Oral two times a day PRN SOB  melatonin 9 milliGRAM(s) Oral at bedtime PRN Insomnia  ondansetron Injectable 4 milliGRAM(s) IV Push every 6 hours PRN Nausea and/or Vomiting  oxyCODONE    IR 2.5 milliGRAM(s) Oral every 4 hours PRN Moderate Pain (4 - 6)  oxyCODONE    IR 5 milliGRAM(s) Oral every 4 hours PRN Severe Pain (7 - 10)  traZODone 25 milliGRAM(s) Oral at bedtime PRN insomnia          VITALS:   T(C): 37.1 (11-21-19 @ 17:25), Max: 37.2 (11-21-19 @ 16:25)  HR: 88 (11-21-19 @ 18:42) (52 - 98)  BP: 108/62 (11-21-19 @ 18:42) (94/56 - 127/59)  RR: 18 (11-21-19 @ 18:42) (16 - 18)  SpO2: 93% (11-21-19 @ 18:42) (93% - 100%)  Wt(kg): --      PHYSICAL EXAM:  GENERAL: NAD, well nourished and conversant  HEAD:  Atraumatic  EYES: EOM, PERRLA, conjunctiva pink and sclera white  ENT: No tonsillar erythema, exudates, or enlargement, moist mucous membranes, good dentition, no lesions  NECK: Supple, No JVD, normal thyroid, carotids with normal upstrokes and no bruits  CHEST/LUNG: Clear to auscultation bilaterally, No rales, rhonchi, wheezing, or rubs  HEART: Regular rate and rhythm, No murmurs, rubs, or gallops  ABDOMEN: Soft, nondistended, no masses, guarding, tenderness or rebound, bowel sounds present  EXTREMITIES:  2+ Peripheral Pulses, No clubbing, cyanosis, or edema.   (+)   LYMPH: No lymphadenopathy noted  SKIN: No rashes or lesions  NERVOUS SYSTEM:  Alert & Oriented X3, normal cognitive function. Motor Strength 5/5 right upper and right lower.  5/5 left upper and left lower extremities, DTRs 2+ intact and symmetric    LABS:        CBC Full  -  ( 21 Nov 2019 06:45 )  WBC Count : 5.70 K/uL  RBC Count : 3.89 M/uL  Hemoglobin : 11.4 g/dL  Hematocrit : 34.8 %  Platelet Count - Automated : 192 K/uL  Mean Cell Volume : 89.5 fl  Mean Cell Hemoglobin : 29.3 pg  Mean Cell Hemoglobin Concentration : 32.8 gm/dL  Auto Neutrophil # : x  Auto Lymphocyte # : x  Auto Monocyte # : x  Auto Eosinophil # : x  Auto Basophil # : x  Auto Neutrophil % : x  Auto Lymphocyte % : x  Auto Monocyte % : x  Auto Eosinophil % : x  Auto Basophil % : x    11-21    140  |  104  |  23  ----------------------------<  92  3.9   |  27  |  1.31<H>    Ca    9.4      21 Nov 2019 06:44    TPro  7.4  /  Alb  4.2  /  TBili  0.3  /  DBili  x   /  AST  27  /  ALT  17  /  AlkPhos  87  11-20    LIVER FUNCTIONS - ( 20 Nov 2019 15:48 )  Alb: 4.2 g/dL / Pro: 7.4 g/dL / ALK PHOS: 87 U/L / ALT: 17 U/L / AST: 27 U/L / GGT: x           PT/INR - ( 21 Nov 2019 06:44 )   PT: 11.9 sec;   INR: 1.03 ratio         PTT - ( 21 Nov 2019 06:44 )  PTT:32.1 sec    CAPILLARY BLOOD GLUCOSE          RADIOLOGY & ADDITIONAL TESTS:

## 2019-11-22 ENCOUNTER — TRANSCRIPTION ENCOUNTER (OUTPATIENT)
Age: 84
End: 2019-11-22

## 2019-11-22 LAB
ANION GAP SERPL CALC-SCNC: 14 MMOL/L — SIGNIFICANT CHANGE UP (ref 5–17)
BASOPHILS # BLD AUTO: 0 K/UL — SIGNIFICANT CHANGE UP (ref 0–0.2)
BASOPHILS NFR BLD AUTO: 0 % — SIGNIFICANT CHANGE UP (ref 0–2)
BUN SERPL-MCNC: 31 MG/DL — HIGH (ref 7–23)
CALCIUM SERPL-MCNC: 8.3 MG/DL — LOW (ref 8.4–10.5)
CHLORIDE SERPL-SCNC: 101 MMOL/L — SIGNIFICANT CHANGE UP (ref 96–108)
CO2 SERPL-SCNC: 25 MMOL/L — SIGNIFICANT CHANGE UP (ref 22–31)
CREAT SERPL-MCNC: 1.57 MG/DL — HIGH (ref 0.5–1.3)
EOSINOPHIL # BLD AUTO: 0 K/UL — SIGNIFICANT CHANGE UP (ref 0–0.5)
EOSINOPHIL NFR BLD AUTO: 0 % — SIGNIFICANT CHANGE UP (ref 0–6)
GLUCOSE SERPL-MCNC: 118 MG/DL — HIGH (ref 70–99)
HCT VFR BLD CALC: 33 % — LOW (ref 34.5–45)
HGB BLD-MCNC: 10.4 G/DL — LOW (ref 11.5–15.5)
LYMPHOCYTES # BLD AUTO: 0.51 K/UL — LOW (ref 1–3.3)
LYMPHOCYTES # BLD AUTO: 5.3 % — LOW (ref 13–44)
MCHC RBC-ENTMCNC: 29 PG — SIGNIFICANT CHANGE UP (ref 27–34)
MCHC RBC-ENTMCNC: 31.5 GM/DL — LOW (ref 32–36)
MCV RBC AUTO: 91.9 FL — SIGNIFICANT CHANGE UP (ref 80–100)
MONOCYTES # BLD AUTO: 0.34 K/UL — SIGNIFICANT CHANGE UP (ref 0–0.9)
MONOCYTES NFR BLD AUTO: 3.5 % — SIGNIFICANT CHANGE UP (ref 2–14)
NEUTROPHILS # BLD AUTO: 8.74 K/UL — HIGH (ref 1.8–7.4)
NEUTROPHILS NFR BLD AUTO: 91.2 % — HIGH (ref 43–77)
PLATELET # BLD AUTO: 199 K/UL — SIGNIFICANT CHANGE UP (ref 150–400)
POTASSIUM SERPL-MCNC: 4.1 MMOL/L — SIGNIFICANT CHANGE UP (ref 3.5–5.3)
POTASSIUM SERPL-SCNC: 4.1 MMOL/L — SIGNIFICANT CHANGE UP (ref 3.5–5.3)
RBC # BLD: 3.59 M/UL — LOW (ref 3.8–5.2)
RBC # FLD: 13.4 % — SIGNIFICANT CHANGE UP (ref 10.3–14.5)
SODIUM SERPL-SCNC: 140 MMOL/L — SIGNIFICANT CHANGE UP (ref 135–145)
WBC # BLD: 9.58 K/UL — SIGNIFICANT CHANGE UP (ref 3.8–10.5)
WBC # FLD AUTO: 9.58 K/UL — SIGNIFICANT CHANGE UP (ref 3.8–10.5)

## 2019-11-22 RX ORDER — FERROUS SULFATE 325(65) MG
325 TABLET ORAL DAILY
Refills: 0 | Status: DISCONTINUED | OUTPATIENT
Start: 2019-11-22 | End: 2019-11-26

## 2019-11-22 RX ADMIN — ATORVASTATIN CALCIUM 10 MILLIGRAM(S): 80 TABLET, FILM COATED ORAL at 21:10

## 2019-11-22 RX ADMIN — Medication 81 MILLIGRAM(S): at 13:32

## 2019-11-22 RX ADMIN — RIVAROXABAN 20 MILLIGRAM(S): KIT at 21:10

## 2019-11-22 RX ADMIN — Medication 25 MILLIGRAM(S): at 22:22

## 2019-11-22 RX ADMIN — SODIUM CHLORIDE 70 MILLILITER(S): 9 INJECTION INTRAMUSCULAR; INTRAVENOUS; SUBCUTANEOUS at 06:21

## 2019-11-22 RX ADMIN — Medication 9 MILLIGRAM(S): at 21:10

## 2019-11-22 RX ADMIN — PANTOPRAZOLE SODIUM 40 MILLIGRAM(S): 20 TABLET, DELAYED RELEASE ORAL at 06:21

## 2019-11-22 RX ADMIN — Medication 1 TABLET(S): at 15:07

## 2019-11-22 RX ADMIN — Medication 325 MILLIGRAM(S): at 15:07

## 2019-11-22 RX ADMIN — OXYCODONE HYDROCHLORIDE 2.5 MILLIGRAM(S): 5 TABLET ORAL at 10:57

## 2019-11-22 RX ADMIN — OXYCODONE HYDROCHLORIDE 2.5 MILLIGRAM(S): 5 TABLET ORAL at 10:27

## 2019-11-22 NOTE — PROGRESS NOTE ADULT - PROBLEM SELECTOR PLAN 4
currently stable  A/P: 89y Female sp R Hip CRPP POD 1  continue perioperative abx  Pain control  DVT ppx- restarted Xarelto and A81 today

## 2019-11-22 NOTE — DISCHARGE NOTE PROVIDER - HOSPITAL COURSE
89F w/ hx of HTN, HLD, anxiety, insomnia, GERD, DVT on xarelto, and CHF who was admitted on 11/20 s/p mechanical fall with a valgus impacted right femoral neck fracture. She was seen by internal medicine and was deemed to be optimized for surgery. Patient underwent CRPP of the R hip on 11/21 and tolerated it well without complications. The remainder of her hospital stay was uneventful. She ambulated with PT and was determined to be medically cleared for discharge to rehab. 89F w/ hx of HTN, HLD, anxiety, insomnia, GERD, DVT on xarelto, and CHF who was admitted on 11/20 s/p mechanical fall with a valgus impacted right femoral neck fracture. She was seen by internal medicine and was deemed to be optimized for surgery. Patient underwent CRPP of the R hip on 11/21 and tolerated it well without complications. The remainder of her hospital stay was uneventful. She ambulated with PT and was determined to be medically cleared for discharge to rehab.  She was therefore discharged to rehab facility on 11/26, all questions were answered and the patient was in agreement with the discharge plan.

## 2019-11-22 NOTE — DISCHARGE NOTE PROVIDER - CARE PROVIDER_API CALL
Reuben Garcia)  Orthopaedic Surgery  [address] [place] [address], [address]  [place] [address]  Phone: [phone]  Fax: [phone]  Follow Up Time:

## 2019-11-22 NOTE — DISCHARGE NOTE PROVIDER - NSDCCPCAREPLAN_GEN_ALL_CORE_FT
PRINCIPAL DISCHARGE DIAGNOSIS  Diagnosis: Closed fracture of neck of right femur, initial encounter  Assessment and Plan of Treatment: PRINCIPAL DISCHARGE DIAGNOSIS  Diagnosis: Closed fracture of neck of right femur, initial encounter  Assessment and Plan of Treatment:   Hip CRPP DC Instructions:  1.	Resume previous diet, regular or diabetic as appropriate  2.	Weight Bearing as Tolerated with assistance and rolling walker  3.	Continue DVT/PE Prophylaxis. Xarelto 20mg Daily as per your regular home dose. See Med Rec for Duration and dose.  4.	PT daily  5.	Follow up with Orthopedic Surgeon Dr Garcia in 10-14 Days after Discharge from the Hospital. Call Office asap For Appointment.  6.	Staples/Sutures to be removed Post-Op Day 14, provided wound is healed, no open areas or copious drainage.  7.	Ice the hip as much as possible  8.	Keep bandage on Hip. Change only if wet or soiled using Tegaderm/Paper tape and dry gauze.  9.                Sponge bathe only. Will need assistance to shower, dressing can get water on it but cant get soaked.

## 2019-11-22 NOTE — PROGRESS NOTE ADULT - ATTENDING COMMENTS
A/P: 89y Female sp R Hip CRPP POD 1  continue perioperative abx  Pain control  DVT ppx- restrted Xarelto and A81 today   PT/WBAT/OOB

## 2019-11-22 NOTE — DISCHARGE NOTE PROVIDER - NSDCMRMEDTOKEN_GEN_ALL_CORE_FT
aspirin 81 mg oral delayed release tablet: 1 tab(s) orally once a day  atorvastatin 10 mg oral tablet: 1 tab(s) orally once a day (at bedtime)  busPIRone 10 mg oral tablet: 1 tab(s) orally 2 times a day  furosemide 40 mg oral tablet: 1 tab(s) orally once a day   metoprolol succinate 25 mg oral tablet, extended release: 1 tab(s) orally once a day  Multiple Vitamins oral tablet: 1 tab(s) orally once a day  pantoprazole 40 mg oral delayed release tablet: 1 tab(s) orally 2 times a day  rivaroxaban 20 mg oral tablet: 1 tab(s) orally every 24 hours  Vitamin D3 50,000 intl units oral capsule: 1 cap(s) orally once a week acetaminophen 325 mg oral tablet: 3 tab(s) orally every 8 hours, As needed, Temp greater or equal to 38C (100.4F), Mild Pain (1 - 3)  ALPRAZolam 0.5 mg oral tablet: 1 tab(s) orally once, As needed, Anxiety Attack  aspirin 81 mg oral delayed release tablet: 1 tab(s) orally once a day  atorvastatin 10 mg oral tablet: 1 tab(s) orally once a day (at bedtime)  busPIRone 10 mg oral tablet: 1 tab(s) orally 2 times a day  furosemide 40 mg oral tablet: 1 tab(s) orally once a day   metoprolol succinate 25 mg oral tablet, extended release: 1 tab(s) orally once a day  Multiple Vitamins oral tablet: 1 tab(s) orally once a day  pantoprazole 40 mg oral delayed release tablet: 1 tab(s) orally 2 times a day  rivaroxaban 20 mg oral tablet: 1 tab(s) orally every 24 hours  Vitamin D3 50,000 intl units oral capsule: 1 cap(s) orally once a week acetaminophen 325 mg oral tablet: 3 tab(s) orally every 8 hours, As needed, Temp greater or equal to 38C (100.4F), Mild Pain (1 - 3)  ALPRAZolam 0.5 mg oral tablet: 1 tab(s) orally once, As needed, Anxiety Attack  aspirin 81 mg oral delayed release tablet: 1 tab(s) orally once a day  atorvastatin 10 mg oral tablet: 1 tab(s) orally once a day (at bedtime)  busPIRone 10 mg oral tablet: 1 tab(s) orally 2 times a day  furosemide 40 mg oral tablet: 1 tab(s) orally once a day   metoprolol succinate 25 mg oral tablet, extended release: 1 tab(s) orally once a day  Multiple Vitamins oral tablet: 1 tab(s) orally once a day  nystatin 100,000 units/g topical powder: 1 application topically 2 times a day  pantoprazole 40 mg oral delayed release tablet: 1 tab(s) orally 2 times a day  rivaroxaban 20 mg oral tablet: 1 tab(s) orally every 24 hours  senna oral tablet: 2 tab(s) orally once a day (at bedtime)  Vitamin D3 50,000 intl units oral capsule: 1 cap(s) orally once a week

## 2019-11-22 NOTE — PROGRESS NOTE ADULT - SUBJECTIVE AND OBJECTIVE BOX
89y Female presents to [facility] ED [facility] c/o mild R hip pain and inability to ambulate. Patient states she tripped while trying to open her door. Patient denies headstrike or LOC. Localizes pain to forearm, does not really complain of right hip pain but patient was unable to stand/ambulate after fall. Patient denies radiation of pain. Patient denies numbness/tingling/burning in the RLE. No other bone/joint complaints. Patient is a minimal ambulator at baseline with assistive devices, primarily uses cane at home, Patient has issues w/ ADLs/IADLs, has aide. Patient seen post op resting comfortably   : 89y Female sp R Hip CRPP POD 1  continue perioperative abx  Pain control  DVT ppx- restrted Xarelto and A81 today    MEDICATIONS  (STANDING):  aspirin  chewable 81 milliGRAM(s) Oral daily  atorvastatin 10 milliGRAM(s) Oral at bedtime  ceFAZolin   IVPB 2000 milliGRAM(s) IV Intermittent every 8 hours  furosemide    Tablet 40 milliGRAM(s) Oral daily  metoprolol succinate ER 25 milliGRAM(s) Oral daily  pantoprazole    Tablet 40 milliGRAM(s) Oral before breakfast  sodium chloride 0.9%. 1000 milliLiter(s) (70 mL/Hr) IV Continuous <Continuous>    MEDICATIONS  (PRN):  ALPRAZolam 0.5 milliGRAM(s) Oral Once PRN Anxiety Attack  busPIRone 10 milliGRAM(s) Oral two times a day PRN SOB  melatonin 9 milliGRAM(s) Oral at bedtime PRN Insomnia  ondansetron Injectable 4 milliGRAM(s) IV Push every 6 hours PRN Nausea and/or Vomiting  oxyCODONE    IR 2.5 milliGRAM(s) Oral every 4 hours PRN Moderate Pain (4 - 6)  oxyCODONE    IR 5 milliGRAM(s) Oral every 4 hours PRN Severe Pain (7 - 10)  traZODone 25 milliGRAM(s) Oral at bedtime PRN insomnia          VITALS:     PTT - ( 21 Nov 2019 06:44 )  PTT:32.1 sec  Vital Signs Last 24 Hrs  T(C): 36.8 (11-22-19 @ 06:14), Max: 37.2 (11-21-19 @ 16:25)  T(F): 98.2 (11-22-19 @ 06:14), Max: 99 (11-21-19 @ 16:25)  HR: 63 (11-22-19 @ 06:14) (52 - 98)  BP: 102/62 (11-22-19 @ 06:14) (94/56 - 127/59)  BP(mean): 81 (11-21-19 @ 13:00) (78 - 87)  RR: 18 (11-22-19 @ 06:14) (16 - 18)  SpO2: 95% (11-22-19 @ 06:14) (93% - 100%)    PHYSICAL EXAM:  GENERAL: NAD, well nourished and conversant  HEAD:  Atraumatic  EYES: EOM, PERRLA, conjunctiva pink and sclera white  ENT: No tonsillar erythema, exudates, or enlargement, moist mucous membranes, good dentition, no lesions  NECK: Supple, No JVD, normal thyroid, carotids with normal upstrokes and no bruits  CHEST/LUNG: Clear to auscultation bilaterally, No rales, rhonchi, wheezing, or rubs  HEART: Regular rate and rhythm, No murmurs, rubs, or gallops  ABDOMEN: Soft, nondistended, no masses, guarding, tenderness or rebound, bowel sounds present  EXTREMITIES:  2+ Peripheral Pulses, No clubbing, cyanosis, or edema.   (+) right hip CRPP  LYMPH: No lymphadenopathy noted  SKIN: No rashes or lesions  NERVOUS SYSTEM:  Alert & Oriented X3, normal cognitive function. Motor Strength 5/5 right upper and right lower.  5/5 left upper and left lower extremities, DTRs 2+ intact and symmetric    LABS:          CBC Full  -  ( 22 Nov 2019 08:19 )  WBC Count : 9.58 K/uL  RBC Count : 3.59 M/uL  Hemoglobin : 10.4 g/dL  Hematocrit : 33.0 %  Platelet Count - Automated : 199 K/uL  Mean Cell Volume : 91.9 fl  Mean Cell Hemoglobin : 29.0 pg  Mean Cell Hemoglobin Concentration : 31.5 gm/dL  Auto Neutrophil # : 8.74 K/uL  Auto Lymphocyte # : 0.51 K/uL  Auto Monocyte # : 0.34 K/uL  Auto Eosinophil # : 0.00 K/uL  Auto Basophil # : 0.00 K/uL  Auto Neutrophil % : 91.2 %  Auto Lymphocyte % : 5.3 %  Auto Monocyte % : 3.5 %  Auto Eosinophil % : 0.0 %  Auto Basophil % : 0.0 %    11-22    140  |  101  |  31<H>  ----------------------------<  118<H>  4.1   |  25  |  1.57<H>    Ca    8.3<L>      22 Nov 2019 06:58        PT/INR - ( 21 Nov 2019 06:44 )   PT: 11.9 sec;   INR: 1.03 ratio         PTT - ( 21 Nov 2019 06:44 )  PTT:32.1 sec            RADIOLOGY & ADDITIONAL TESTS:

## 2019-11-22 NOTE — PROGRESS NOTE ADULT - SUBJECTIVE AND OBJECTIVE BOX
Patient seen and examined this AM, Pain controlled. No acute events overnight, Denies any Fever/Chills/CP/SOB, states she was unable to sleep overnight. Tolerated procedure well.                             11.4   5.70  )-----------( 192      ( 21 Nov 2019 06:45 )             34.8     21 Nov 2019 06:44    140    |  104    |  23     ----------------------------<  92     3.9     |  27     |  1.31     Ca    9.4        21 Nov 2019 06:44    TPro  7.4    /  Alb  4.2    /  TBili  0.3    /  DBili  x      /  AST  27     /  ALT  17     /  AlkPhos  87     20 Nov 2019 15:48    PT/INR - ( 21 Nov 2019 06:44 )   PT: 11.9 sec;   INR: 1.03 ratio         PTT - ( 21 Nov 2019 06:44 )  PTT:32.1 sec  Vital Signs Last 24 Hrs  T(C): 36.8 (11-22-19 @ 06:14), Max: 37.2 (11-21-19 @ 16:25)  T(F): 98.2 (11-22-19 @ 06:14), Max: 99 (11-21-19 @ 16:25)  HR: 63 (11-22-19 @ 06:14) (52 - 98)  BP: 102/62 (11-22-19 @ 06:14) (94/56 - 127/59)  BP(mean): 81 (11-21-19 @ 13:00) (78 - 87)  RR: 18 (11-22-19 @ 06:14) (16 - 18)  SpO2: 95% (11-22-19 @ 06:14) (93% - 100%)    Physical Exam  Gen: NAD  RLE:   Dressing saturated, removed, no active drainage, reapplied clean 4x4 and tegaderm  +ehl/fhl/ta/gs function  L2-S1 silt  Dp/pt pulse intact  No calf ttp  Compartments soft    A/P: 89y Female sp R Hip CRPP POD 1  continue perioperative abx  Pain control  DVT ppx- restrted Xarelto and A81 today   PT/WBAT/OOB  FU labs this AM  Ice prn   Medical management appreciated  Incentive spirometry  Dispo planning- [place]

## 2019-11-23 LAB
ANION GAP SERPL CALC-SCNC: 16 MMOL/L — SIGNIFICANT CHANGE UP (ref 5–17)
BASOPHILS # BLD AUTO: 0.04 K/UL — SIGNIFICANT CHANGE UP (ref 0–0.2)
BASOPHILS NFR BLD AUTO: 0.3 % — SIGNIFICANT CHANGE UP (ref 0–2)
BUN SERPL-MCNC: 39 MG/DL — HIGH (ref 7–23)
CALCIUM SERPL-MCNC: 8.6 MG/DL — SIGNIFICANT CHANGE UP (ref 8.4–10.5)
CHLORIDE SERPL-SCNC: 99 MMOL/L — SIGNIFICANT CHANGE UP (ref 96–108)
CO2 SERPL-SCNC: 21 MMOL/L — LOW (ref 22–31)
CREAT SERPL-MCNC: 1.28 MG/DL — SIGNIFICANT CHANGE UP (ref 0.5–1.3)
EOSINOPHIL # BLD AUTO: 0.08 K/UL — SIGNIFICANT CHANGE UP (ref 0–0.5)
EOSINOPHIL NFR BLD AUTO: 0.6 % — SIGNIFICANT CHANGE UP (ref 0–6)
GLUCOSE SERPL-MCNC: 91 MG/DL — SIGNIFICANT CHANGE UP (ref 70–99)
HCT VFR BLD CALC: 33.2 % — LOW (ref 34.5–45)
HGB BLD-MCNC: 10.8 G/DL — LOW (ref 11.5–15.5)
IMM GRANULOCYTES NFR BLD AUTO: 0.5 % — SIGNIFICANT CHANGE UP (ref 0–1.5)
LYMPHOCYTES # BLD AUTO: 1.06 K/UL — SIGNIFICANT CHANGE UP (ref 1–3.3)
LYMPHOCYTES # BLD AUTO: 8.1 % — LOW (ref 13–44)
MCHC RBC-ENTMCNC: 29.4 PG — SIGNIFICANT CHANGE UP (ref 27–34)
MCHC RBC-ENTMCNC: 32.5 GM/DL — SIGNIFICANT CHANGE UP (ref 32–36)
MCV RBC AUTO: 90.5 FL — SIGNIFICANT CHANGE UP (ref 80–100)
MONOCYTES # BLD AUTO: 1.08 K/UL — HIGH (ref 0–0.9)
MONOCYTES NFR BLD AUTO: 8.2 % — SIGNIFICANT CHANGE UP (ref 2–14)
NEUTROPHILS # BLD AUTO: 10.81 K/UL — HIGH (ref 1.8–7.4)
NEUTROPHILS NFR BLD AUTO: 82.3 % — HIGH (ref 43–77)
PLATELET # BLD AUTO: 213 K/UL — SIGNIFICANT CHANGE UP (ref 150–400)
POTASSIUM SERPL-MCNC: 4.3 MMOL/L — SIGNIFICANT CHANGE UP (ref 3.5–5.3)
POTASSIUM SERPL-SCNC: 4.3 MMOL/L — SIGNIFICANT CHANGE UP (ref 3.5–5.3)
RBC # BLD: 3.67 M/UL — LOW (ref 3.8–5.2)
RBC # FLD: 13.8 % — SIGNIFICANT CHANGE UP (ref 10.3–14.5)
SODIUM SERPL-SCNC: 136 MMOL/L — SIGNIFICANT CHANGE UP (ref 135–145)
WBC # BLD: 13.13 K/UL — HIGH (ref 3.8–10.5)
WBC # FLD AUTO: 13.13 K/UL — HIGH (ref 3.8–10.5)

## 2019-11-23 RX ORDER — ACETAMINOPHEN 500 MG
3 TABLET ORAL
Qty: 0 | Refills: 0 | DISCHARGE
Start: 2019-11-23

## 2019-11-23 RX ORDER — ALPRAZOLAM 0.25 MG
1 TABLET ORAL
Qty: 0 | Refills: 0 | DISCHARGE
Start: 2019-11-23

## 2019-11-23 RX ORDER — ACETAMINOPHEN 500 MG
975 TABLET ORAL EVERY 8 HOURS
Refills: 0 | Status: DISCONTINUED | OUTPATIENT
Start: 2019-11-23 | End: 2019-11-26

## 2019-11-23 RX ADMIN — Medication 25 MILLIGRAM(S): at 06:22

## 2019-11-23 RX ADMIN — Medication 325 MILLIGRAM(S): at 12:45

## 2019-11-23 RX ADMIN — ATORVASTATIN CALCIUM 10 MILLIGRAM(S): 80 TABLET, FILM COATED ORAL at 21:40

## 2019-11-23 RX ADMIN — Medication 9 MILLIGRAM(S): at 21:39

## 2019-11-23 RX ADMIN — Medication 81 MILLIGRAM(S): at 12:45

## 2019-11-23 RX ADMIN — RIVAROXABAN 20 MILLIGRAM(S): KIT at 21:39

## 2019-11-23 RX ADMIN — Medication 1 TABLET(S): at 12:45

## 2019-11-23 RX ADMIN — Medication 40 MILLIGRAM(S): at 06:22

## 2019-11-23 RX ADMIN — Medication 25 MILLIGRAM(S): at 21:39

## 2019-11-23 RX ADMIN — PANTOPRAZOLE SODIUM 40 MILLIGRAM(S): 20 TABLET, DELAYED RELEASE ORAL at 06:22

## 2019-11-23 NOTE — PROGRESS NOTE ADULT - SUBJECTIVE AND OBJECTIVE BOX
89y Female presents to [facility] ED s/p [facility] fall c/o mild R hip pain and inability to ambulate. Patient states she tripped while trying to open her door. Patient denies headstrike or LOC. Localizes pain to forearm, does not really complain of right hip pain but patient was unable to stand/ambulate after fall. Patient denies radiation of pain. Patient denies numbness/tingling/burning in the RLE. No other bone/joint complaints. Patient is a minimal ambulator at baseline with assistive devices, primarily uses cane at home, Patient has issues w/ ADLs/IADLs, has aide. Patient seen post op resting comfortably ,  Potential discharge soon  Afebrile  Pain controlled. Incentive spirometry encouraged.    MEDICATIONS  (STANDING):  aspirin  chewable 81 milliGRAM(s) Oral daily  atorvastatin 10 milliGRAM(s) Oral at bedtime  ceFAZolin   IVPB 2000 milliGRAM(s) IV Intermittent every 8 hours  furosemide    Tablet 40 milliGRAM(s) Oral daily  metoprolol succinate ER 25 milliGRAM(s) Oral daily  pantoprazole    Tablet 40 milliGRAM(s) Oral before breakfast  sodium chloride 0.9%. 1000 milliLiter(s) (70 mL/Hr) IV Continuous <Continuous>    MEDICATIONS  (PRN):  ALPRAZolam 0.5 milliGRAM(s) Oral Once PRN Anxiety Attack  busPIRone 10 milliGRAM(s) Oral two times a day PRN SOB  melatonin 9 milliGRAM(s) Oral at bedtime PRN Insomnia  ondansetron Injectable 4 milliGRAM(s) IV Push every 6 hours PRN Nausea and/or Vomiting  oxyCODONE    IR 2.5 milliGRAM(s) Oral every 4 hours PRN Moderate Pain (4 - 6)  oxyCODONE    IR 5 milliGRAM(s) Oral every 4 hours PRN Severe Pain (7 - 10)  traZODone 25 milliGRAM(s) Oral at bedtime PRN insomnia          VITALS:   Vital Signs Last 24 Hrs  T(C): 36.7  T(F): 98.1   HR: 62   BP: 122/68   BP(mean): --  RR: 18   SpO2: 94%       PHYSICAL EXAM:  GENERAL: NAD, well nourished and conversant  HEAD:  Atraumatic  EYES: EOM, PERRLA, conjunctiva pink and sclera white  ENT: No tonsillar erythema, exudates, or enlargement, moist mucous membranes, good dentition, no lesions  NECK: Supple, No JVD, normal thyroid, carotids with normal upstrokes and no bruits  CHEST/LUNG: Clear to auscultation bilaterally, No rales, rhonchi, wheezing, or rubs  HEART: Regular rate and rhythm, No murmurs, rubs, or gallops  ABDOMEN: Soft, nondistended, no masses, guarding, tenderness or rebound, bowel sounds present  EXTREMITIES:  2+ Peripheral Pulses, No clubbing, cyanosis, or edema.   (+) Right FN  fracture repair   LYMPH: No lymphadenopathy noted  SKIN: No rashes or lesions  NERVOUS SYSTEM:  Alert & Oriented X3, normal cognitive function. Motor Strength 5/5 right upper and right lower.  5/5 left upper and left lower extremities, DTRs 2+ intact and symmetric    LABS:        CBC Full  -  ( 23 Nov 2019 10:37 )  WBC Count : 13.13 K/uL  RBC Count : 3.67 M/uL  Hemoglobin : 10.8 g/dL  Hematocrit : 33.2 %  Platelet Count - Automated : 213 K/uL  Mean Cell Volume : 90.5 fl  Mean Cell Hemoglobin : 29.4 pg  Mean Cell Hemoglobin Concentration : 32.5 gm/dL  Auto Neutrophil # : 10.81 K/uL  Auto Lymphocyte # : 1.06 K/uL  Auto Monocyte # : 1.08 K/uL  Auto Eosinophil # : 0.08 K/uL  Auto Basophil # : 0.04 K/uL  Auto Neutrophil % : 82.3 %  Auto Lymphocyte % : 8.1 %  Auto Monocyte % : 8.2 %  Auto Eosinophil % : 0.6 %  Auto Basophil % : 0.3 %    11-23    136  |  99  |  39<H>  ----------------------------<  91  4.3   |  21<L>  |  1.28    Ca    8.6      23 Nov 2019 07:23

## 2019-11-23 NOTE — PROGRESS NOTE ADULT - ATTENDING COMMENTS
I am a non participating [facility] physician seeing Pt in coverage for Dr Mcclure. The above patient examination was reviewed with Dr. Samaniego and I agree with his evaluation, assessment and treatment plan.  Benedicto Mcclure M.D.

## 2019-11-24 RX ORDER — ALPRAZOLAM 0.25 MG
0.5 TABLET ORAL EVERY 12 HOURS
Refills: 0 | Status: DISCONTINUED | OUTPATIENT
Start: 2019-11-24 | End: 2019-11-26

## 2019-11-24 RX ORDER — SENNA PLUS 8.6 MG/1
2 TABLET ORAL AT BEDTIME
Refills: 0 | Status: DISCONTINUED | OUTPATIENT
Start: 2019-11-24 | End: 2019-11-26

## 2019-11-24 RX ORDER — POLYETHYLENE GLYCOL 3350 17 G/17G
17 POWDER, FOR SOLUTION ORAL DAILY
Refills: 0 | Status: DISCONTINUED | OUTPATIENT
Start: 2019-11-24 | End: 2019-11-26

## 2019-11-24 RX ADMIN — RIVAROXABAN 20 MILLIGRAM(S): KIT at 21:15

## 2019-11-24 RX ADMIN — ATORVASTATIN CALCIUM 10 MILLIGRAM(S): 80 TABLET, FILM COATED ORAL at 21:15

## 2019-11-24 RX ADMIN — Medication 975 MILLIGRAM(S): at 11:55

## 2019-11-24 RX ADMIN — Medication 40 MILLIGRAM(S): at 05:16

## 2019-11-24 RX ADMIN — Medication 975 MILLIGRAM(S): at 12:25

## 2019-11-24 RX ADMIN — Medication 0.5 MILLIGRAM(S): at 07:13

## 2019-11-24 RX ADMIN — Medication 25 MILLIGRAM(S): at 21:15

## 2019-11-24 RX ADMIN — Medication 0.5 MILLIGRAM(S): at 19:13

## 2019-11-24 RX ADMIN — POLYETHYLENE GLYCOL 3350 17 GRAM(S): 17 POWDER, FOR SOLUTION ORAL at 11:55

## 2019-11-24 RX ADMIN — Medication 81 MILLIGRAM(S): at 11:54

## 2019-11-24 RX ADMIN — Medication 325 MILLIGRAM(S): at 11:54

## 2019-11-24 RX ADMIN — Medication 9 MILLIGRAM(S): at 21:15

## 2019-11-24 RX ADMIN — PANTOPRAZOLE SODIUM 40 MILLIGRAM(S): 20 TABLET, DELAYED RELEASE ORAL at 06:12

## 2019-11-24 RX ADMIN — Medication 1 TABLET(S): at 11:54

## 2019-11-24 RX ADMIN — Medication 25 MILLIGRAM(S): at 05:16

## 2019-11-24 NOTE — PROGRESS NOTE ADULT - ATTENDING COMMENTS
I am a non participating [facility] physician seeing Pt in coverage for Dr Mcclure. The above patient examination was reviewed with Dr. Samaniego and I agree with his evaluation, assessment and treatment plan.  Benedicto Mcclure M.D.

## 2019-11-24 NOTE — PROGRESS NOTE ADULT - ASSESSMENT
89y Female seen s/p Open reduction and internal fixation of right femoral neck fx. s/p crpp        Pain control  DVT ppx- restarted Xarelto and A81 today     PT/WBAT/OOB  Labs stable  Ice prn   Incentive spirometry  Dispo planning- [facility] tomorrow

## 2019-11-24 NOTE — PROGRESS NOTE ADULT - SUBJECTIVE AND OBJECTIVE BOX
Patient seen and examined this AM, Pain controlled. No acute events overnight, Denies any Fever/Chills/CP/SOB, states she was unable to sleep overnight and wants her home xanax dose.    Vital Signs Last 24 Hrs  T(C): 36.7 (24 Nov 2019 05:02), Max: 37 (23 Nov 2019 13:28)  T(F): 98 (24 Nov 2019 05:02), Max: 98.6 (23 Nov 2019 13:28)  HR: 65 (24 Nov 2019 05:02) (62 - 86)  BP: 113/66 (24 Nov 2019 05:02) (99/60 - 122/68)  BP(mean): --  RR: 17 (24 Nov 2019 05:02) (17 - 18)  SpO2: 93% (24 Nov 2019 05:02) (92% - 95%)    Physical Exam  Gen: NAD  RLE:   Dressing saturated, removed, no active drainage, reapplied clean 4x4 and tegaderm  +ehl/fhl/ta/gs function  L2-S1 silt  Dp/pt pulse intact  No calf ttp  Compartments soft    A/P: 89y Female sp R Hip CRPP POD 3  Pain control  DVT ppx- restrted Xarelto and A81 today   PT/WBAT/OOB  Labs stable  Ice prn   Incentive spirometry  Dispo planning- [facility] tomorrow

## 2019-11-24 NOTE — PROGRESS NOTE ADULT - SUBJECTIVE AND OBJECTIVE BOX
89y Female [facility] R hip pain and inability to ambulate. Patient states she tripped while trying to open her door. Patient denies headstrike or LOC. Localizes pain to forearm, does not really complain of right hip pain but patient was unable to stand/ambulate after fall. Patient denies radiation of pain. Patient denies numbness/tingling/burning in the RLE. No other bone/joint complaints. Patient is a minimal ambulator at baseline with assistive devices, primarily uses cane at home, Patient has issues w/ ADLs/IADLs, has aide. Patient seen post op resting comfortably ,  Potential discharge soon  Afebrile  Pain controlled. Incentive spirometry encouraged.  wants Xanax to sleep    MEDICATIONS  (STANDING):  aspirin  chewable 81 milliGRAM(s) Oral daily  atorvastatin 10 milliGRAM(s) Oral at bedtime  ceFAZolin   IVPB 2000 milliGRAM(s) IV Intermittent every 8 hours  furosemide    Tablet 40 milliGRAM(s) Oral daily  metoprolol succinate ER 25 milliGRAM(s) Oral daily  pantoprazole    Tablet 40 milliGRAM(s) Oral before breakfast  sodium chloride 0.9%. 1000 milliLiter(s) (70 mL/Hr) IV Continuous <Continuous>    MEDICATIONS  (PRN):  ALPRAZolam 0.5 milliGRAM(s) Oral Once PRN Anxiety Attack  busPIRone 10 milliGRAM(s) Oral two times a day PRN SOB  melatonin 9 milliGRAM(s) Oral at bedtime PRN Insomnia  ondansetron Injectable 4 milliGRAM(s) IV Push every 6 hours PRN Nausea and/or Vomiting  oxyCODONE    IR 2.5 milliGRAM(s) Oral every 4 hours PRN Moderate Pain (4 - 6)  oxyCODONE    IR 5 milliGRAM(s) Oral every 4 hours PRN Severe Pain (7 - 10)  traZODone 25 milliGRAM(s) Oral at bedtime PRN insomnia        Vital Signs Last 24 Hrs  T(C): 36.7 (24 Nov 2019 05:02), Max: 37 (23 Nov 2019 13:28)  T(F): 98 (24 Nov 2019 05:02), Max: 98.6 (23 Nov 2019 13:28)  HR: 65 (24 Nov 2019 05:02) (62 - 86)  BP: 113/66 (24 Nov 2019 05:02) (99/60 - 122/68)  BP(mean): --  RR: 17 (24 Nov 2019 05:02) (17 - 18)  SpO2: 93% (24 Nov 2019 05:02) (92% - 95        PHYSICAL EXAM:  GENERAL: NAD, well nourished and conversant  HEAD:  Atraumatic  EYES: EOM, PERRLA, conjunctiva pink and sclera white  ENT: No tonsillar erythema, exudates, or enlargement, moist mucous membranes, good dentition, no lesions  NECK: Supple, No JVD, normal thyroid, carotids with normal upstrokes and no bruits  CHEST/LUNG: Clear to auscultation bilaterally, No rales, rhonchi, wheezing, or rubs  HEART: Regular rate and rhythm, No murmurs, rubs, or gallops  ABDOMEN: Soft, nondistended, no masses, guarding, tenderness or rebound, bowel sounds present  EXTREMITIES:  2+ Peripheral Pulses, No clubbing, cyanosis, or edema.   (+) Right FN  fracture repair   LYMPH: No lymphadenopathy noted  SKIN: No rashes or lesions  NERVOUS SYSTEM:  Alert & Oriented X3, normal cognitive function. Motor Strength 5/5 right upper and right lower.  5/5 left upper and left lower extremities, DTRs 2+ intact and symmetric    LABS:          CBC Full  -  ( 23 Nov 2019 10:37 )  WBC Count : 13.13 K/uL  RBC Count : 3.67 M/uL  Hemoglobin : 10.8 g/dL  Hematocrit : 33.2 %  Platelet Count - Automated : 213 K/uL  Mean Cell Volume : 90.5 fl  Mean Cell Hemoglobin : 29.4 pg  Mean Cell Hemoglobin Concentration : 32.5 gm/dL  Auto Neutrophil # : 10.81 K/uL  Auto Lymphocyte # : 1.06 K/uL  Auto Monocyte # : 1.08 K/uL  Auto Eosinophil # : 0.08 K/uL  Auto Basophil # : 0.04 K/uL  Auto Neutrophil % : 82.3 %  Auto Lymphocyte % : 8.1 %  Auto Monocyte % : 8.2 %  Auto Eosinophil % : 0.6 %  Auto Basophil % : 0.3 %    11-23    136  |  99  |  39<H>  ----------------------------<  91  4.3   |  21<L>  |  1.28    Ca    8.6      23 Nov 2019 07:23

## 2019-11-25 RX ORDER — NYSTATIN CREAM 100000 [USP'U]/G
1 CREAM TOPICAL
Refills: 0 | Status: DISCONTINUED | OUTPATIENT
Start: 2019-11-25 | End: 2019-11-26

## 2019-11-25 RX ADMIN — PANTOPRAZOLE SODIUM 40 MILLIGRAM(S): 20 TABLET, DELAYED RELEASE ORAL at 06:05

## 2019-11-25 RX ADMIN — NYSTATIN CREAM 1 APPLICATION(S): 100000 CREAM TOPICAL at 16:16

## 2019-11-25 RX ADMIN — Medication 1 TABLET(S): at 11:56

## 2019-11-25 RX ADMIN — Medication 325 MILLIGRAM(S): at 11:55

## 2019-11-25 RX ADMIN — Medication 0.5 MILLIGRAM(S): at 21:03

## 2019-11-25 RX ADMIN — Medication 40 MILLIGRAM(S): at 06:05

## 2019-11-25 RX ADMIN — Medication 0.5 MILLIGRAM(S): at 08:58

## 2019-11-25 RX ADMIN — Medication 25 MILLIGRAM(S): at 22:16

## 2019-11-25 RX ADMIN — Medication 81 MILLIGRAM(S): at 11:55

## 2019-11-25 RX ADMIN — Medication 9 MILLIGRAM(S): at 22:16

## 2019-11-25 RX ADMIN — Medication 25 MILLIGRAM(S): at 06:05

## 2019-11-25 RX ADMIN — ATORVASTATIN CALCIUM 10 MILLIGRAM(S): 80 TABLET, FILM COATED ORAL at 21:05

## 2019-11-25 RX ADMIN — RIVAROXABAN 20 MILLIGRAM(S): KIT at 21:05

## 2019-11-25 NOTE — PROGRESS NOTE ADULT - ASSESSMENT
A/P 89y year old female s/p Percutaneous pinning of right femoral neck fracture POD4    Pain Control  DVT PPX w/ xarelto  PT/OOB- FU updated PT recs regarding possible discharge home with services  WBAT   Dispo Planning

## 2019-11-25 NOTE — PROGRESS NOTE ADULT - SUBJECTIVE AND OBJECTIVE BOX
89y Female presents to [facility] ED s/p [facility] fall c/o mild R hip pain and inability to ambulate. Patient states she tripped while trying to open her door. Patient denies headstrike or LOC. Localizes pain to forearm, does not really complain of right hip pain but patient was unable to stand/ambulate after fall. Patient denies radiation of pain. Patient denies numbness/tingling/burning in the RLE. No other bone/joint complaints. Patient is a minimal ambulator at baseline with assistive devices, primarily uses cane at home, Patient  has been participating with physical therapy. DC planing as per ortho and NCC.      MEDICATIONS  (STANDING):  aspirin  chewable 81 milliGRAM(s) Oral daily  atorvastatin 10 milliGRAM(s) Oral at bedtime  ferrous    sulfate 325 milliGRAM(s) Oral daily  furosemide    Tablet 40 milliGRAM(s) Oral daily  metoprolol succinate ER 25 milliGRAM(s) Oral daily  multivitamin 1 Tablet(s) Oral daily  nystatin Powder 1 Application(s) Topical two times a day  pantoprazole    Tablet 40 milliGRAM(s) Oral before breakfast  rivaroxaban 20 milliGRAM(s) Oral daily  senna 2 Tablet(s) Oral at bedtime  sodium chloride 0.9%. 1000 milliLiter(s) (70 mL/Hr) IV Continuous <Continuous>    MEDICATIONS  (PRN):  acetaminophen   Tablet .. 975 milliGRAM(s) Oral every 8 hours PRN Temp greater or equal to 38C (100.4F), Mild Pain (1 - 3)  ALPRAZolam 0.5 milliGRAM(s) Oral every 12 hours PRN anxiety  ALPRAZolam 0.5 milliGRAM(s) Oral Once PRN Anxiety Attack  busPIRone 10 milliGRAM(s) Oral two times a day PRN SOB  melatonin 9 milliGRAM(s) Oral at bedtime PRN Insomnia  ondansetron Injectable 4 milliGRAM(s) IV Push every 6 hours PRN Nausea and/or Vomiting  oxyCODONE    IR 2.5 milliGRAM(s) Oral every 4 hours PRN Moderate Pain (4 - 6)  oxyCODONE    IR 5 milliGRAM(s) Oral every 4 hours PRN Severe Pain (7 - 10)  polyethylene glycol 3350 17 Gram(s) Oral daily PRN Constipation  traZODone 25 milliGRAM(s) Oral at bedtime PRN insomnia          VITALS:   T(C): 36.6 (11-25-19 @ 12:55), Max: 37.1 (11-24-19 @ 17:13)  HR: 71 (11-25-19 @ 12:55) (60 - 72)  BP: 101/70 (11-25-19 @ 12:55) (101/70 - 125/64)  RR: 18 (11-25-19 @ 12:55) (18 - 18)  SpO2: 96% (11-25-19 @ 12:55) (92% - 96%)  Wt(kg): --    PHYSICAL EXAM:  GENERAL: NAD, well nourished and conversant  HEAD:  Atraumatic  EYES: EOM, PERRLA, conjunctiva pink and sclera white  ENT: No tonsillar erythema, exudates, or enlargement, moist mucous membranes, good dentition, no lesions  NECK: Supple, No JVD, normal thyroid, carotids with normal upstrokes and no bruits  CHEST/LUNG: Clear to auscultation bilaterally, No rales, rhonchi, wheezing, or rubs  HEART: Regular rate and rhythm, No murmurs, rubs, or gallops  ABDOMEN: Soft, nondistended, no masses, guarding, tenderness or rebound, bowel sounds present  EXTREMITIES:  2+ Peripheral Pulses, No clubbing, cyanosis, or edema.   (+) Right FN  fracture repair   LYMPH: No lymphadenopathy noted  SKIN: No rashes or lesions  NERVOUS SYSTEM:  Alert & Oriented X3, normal cognitive function. Motor Strength 5/5 right upper and right lower.  5/5 left upper and left lower extremities, DTRs 2+ intact and symmetric    LABS:                      CAPILLARY BLOOD GLUCOSE          RADIOLOGY & ADDITIONAL TESTS: 89y Female presents to [facility] ED [facility] c/o mild R hip pain and inability to ambulate. Patient states she tripped while trying to open her door. Patient denies headstrike or LOC. Localizes pain to forearm, does not really complain of right hip pain but patient was unable to stand/ambulate after fall. Patient denies radiation of pain. Patient denies numbness/tingling/burning in the RLE. No other bone/joint complaints. Patient is a minimal ambulator at baseline with assistive devices, primarily uses cane at home, The patient underwent CRPP ORIF of the right hip on 11/21/19.  Patient  has been participating with physical therapy. DC planing as per ortho and NCC. Plan is for transfer to rehabilitation, later today.      MEDICATIONS  (STANDING):  aspirin  chewable 81 milliGRAM(s) Oral daily  atorvastatin 10 milliGRAM(s) Oral at bedtime  ferrous    sulfate 325 milliGRAM(s) Oral daily  furosemide    Tablet 40 milliGRAM(s) Oral daily  metoprolol succinate ER 25 milliGRAM(s) Oral daily  multivitamin 1 Tablet(s) Oral daily  nystatin Powder 1 Application(s) Topical two times a day  pantoprazole    Tablet 40 milliGRAM(s) Oral before breakfast  rivaroxaban 20 milliGRAM(s) Oral daily  senna 2 Tablet(s) Oral at bedtime  sodium chloride 0.9%. 1000 milliLiter(s) (70 mL/Hr) IV Continuous <Continuous>    MEDICATIONS  (PRN):  acetaminophen   Tablet .. 975 milliGRAM(s) Oral every 8 hours PRN Temp greater or equal to 38C (100.4F), Mild Pain (1 - 3)  ALPRAZolam 0.5 milliGRAM(s) Oral every 12 hours PRN anxiety  ALPRAZolam 0.5 milliGRAM(s) Oral Once PRN Anxiety Attack  busPIRone 10 milliGRAM(s) Oral two times a day PRN SOB  melatonin 9 milliGRAM(s) Oral at bedtime PRN Insomnia  ondansetron Injectable 4 milliGRAM(s) IV Push every 6 hours PRN Nausea and/or Vomiting  oxyCODONE    IR 2.5 milliGRAM(s) Oral every 4 hours PRN Moderate Pain (4 - 6)  oxyCODONE    IR 5 milliGRAM(s) Oral every 4 hours PRN Severe Pain (7 - 10)  polyethylene glycol 3350 17 Gram(s) Oral daily PRN Constipation  traZODone 25 milliGRAM(s) Oral at bedtime PRN insomnia          VITALS:   T(C): 36.6 (11-25-19 @ 12:55), Max: 37.1 (11-24-19 @ 17:13)  HR: 71 (11-25-19 @ 12:55) (60 - 72)  BP: 101/70 (11-25-19 @ 12:55) (101/70 - 125/64)  RR: 18 (11-25-19 @ 12:55) (18 - 18)  SpO2: 96% (11-25-19 @ 12:55) (92% - 96%)  Wt(kg): --    PHYSICAL EXAM:  GENERAL: NAD, well nourished and conversant  HEAD:  Atraumatic  EYES: EOM, PERRLA, conjunctiva pink and sclera white  ENT: No tonsillar erythema, exudates, or enlargement, moist mucous membranes, good dentition, no lesions  NECK: Supple, No JVD, normal thyroid, carotids with normal upstrokes and no bruits  CHEST/LUNG: Clear to auscultation bilaterally, No rales, rhonchi, wheezing, or rubs  HEART: Regular rate and rhythm, No murmurs, rubs, or gallops  ABDOMEN: Soft, nondistended, no masses, guarding, tenderness or rebound, bowel sounds present  EXTREMITIES:  2+ Peripheral Pulses, No clubbing, cyanosis, or edema.   (+) Right FN  fracture repair   LYMPH: No lymphadenopathy noted  SKIN: No rashes or lesions  NERVOUS SYSTEM:  Alert & Oriented X3, normal cognitive function. Motor Strength 5/5 right upper and right lower.  5/5 left upper and left lower extremities, DTRs 2+ intact and symmetric    LABS:                      CAPILLARY BLOOD GLUCOSE          RADIOLOGY & ADDITIONAL TESTS:

## 2019-11-25 NOTE — PROGRESS NOTE ADULT - PROBLEM SELECTOR PLAN 3
currently rate controlled  restarted anticoagulation xarelto and ASA  Patient has remained rate controlled

## 2019-11-25 NOTE — PROGRESS NOTE ADULT - SUBJECTIVE AND OBJECTIVE BOX
Orthopedic Surgery Progress Note    S: No acute events overnight, pain is well controlled.  No chest pain, shortness of breath, light-headedness.  Patient requesting to go home with home services rather than rehab if possible.    O:  Vital Signs Last 24 Hrs  T(C): 36.5 (25 Nov 2019 05:40), Max: 37.1 (24 Nov 2019 10:19)  T(F): 97.7 (25 Nov 2019 05:40), Max: 98.7 (24 Nov 2019 10:19)  HR: 64 (25 Nov 2019 05:40) (60 - 89)  BP: 125/64 (25 Nov 2019 05:40) (92/61 - 125/64)  BP(mean): --  RR: 18 (25 Nov 2019 05:40) (17 - 18)  SpO2: 93% (25 Nov 2019 05:40) (90% - 94%)    Gen: Alert and oriented, NAD, patient resting comfortably in bed  RLE:  Dressing C/D/I  EHL/FHL/TA/GS intact  SILT DP/SP/Campo/Sa  WWP distally  No calf tenderness to palpation, compartments soft    Labs:                        10.8   13.13 )-----------( 213      ( 23 Nov 2019 10:37 )             33.2       11-23    136  |  99  |  39<H>  ----------------------------<  91  4.3   |  21<L>  |  1.28

## 2019-11-25 NOTE — PROGRESS NOTE ADULT - ATTENDING COMMENTS
I am a non participating [facility] physician seeing Pt in coverage for Dr Mcclure. The above patient examination was reviewed with Dr. Samaniego and I agree with his evaluation, assessment and treatment plan.  Benedicto Mcclure M.D. Cleared by orthopedics for discharge to rehabilitation.  Full instructions provided regarding diagnosis, treatment, discharge medications, diet and follow up care on transfer sheet. Medically stable and for discharge to rehabilitation today as planned.

## 2019-11-25 NOTE — PROGRESS NOTE ADULT - ASSESSMENT
89y Female seen s/p Open reduction and internal fixation of right femoral neck fx. s/p crpp 89y Female seen s/p Open reduction and internal fixation of right femoral neck fx.  The patient underwent CRPP ORIF of the right hip on 11/21/19.  Patient  has been participating with physical therapy.

## 2019-11-26 ENCOUNTER — TRANSCRIPTION ENCOUNTER (OUTPATIENT)
Age: 84
End: 2019-11-26

## 2019-11-26 VITALS
SYSTOLIC BLOOD PRESSURE: 102 MMHG | DIASTOLIC BLOOD PRESSURE: 67 MMHG | OXYGEN SATURATION: 99 % | HEART RATE: 65 BPM | RESPIRATION RATE: 18 BRPM | TEMPERATURE: 98 F

## 2019-11-26 PROCEDURE — 86900 BLOOD TYPING SEROLOGIC ABO: CPT

## 2019-11-26 PROCEDURE — 86850 RBC ANTIBODY SCREEN: CPT

## 2019-11-26 PROCEDURE — 72192 CT PELVIS W/O DYE: CPT

## 2019-11-26 PROCEDURE — 86901 BLOOD TYPING SEROLOGIC RH(D): CPT

## 2019-11-26 PROCEDURE — 97116 GAIT TRAINING THERAPY: CPT

## 2019-11-26 PROCEDURE — 90715 TDAP VACCINE 7 YRS/> IM: CPT

## 2019-11-26 PROCEDURE — C1769: CPT

## 2019-11-26 PROCEDURE — 97110 THERAPEUTIC EXERCISES: CPT

## 2019-11-26 PROCEDURE — 85730 THROMBOPLASTIN TIME PARTIAL: CPT

## 2019-11-26 PROCEDURE — 76000 FLUOROSCOPY <1 HR PHYS/QHP: CPT

## 2019-11-26 PROCEDURE — 97161 PT EVAL LOW COMPLEX 20 MIN: CPT

## 2019-11-26 PROCEDURE — 93005 ELECTROCARDIOGRAM TRACING: CPT

## 2019-11-26 PROCEDURE — 73502 X-RAY EXAM HIP UNI 2-3 VIEWS: CPT

## 2019-11-26 PROCEDURE — 85027 COMPLETE CBC AUTOMATED: CPT

## 2019-11-26 PROCEDURE — C1713: CPT

## 2019-11-26 PROCEDURE — 80048 BASIC METABOLIC PNL TOTAL CA: CPT

## 2019-11-26 PROCEDURE — 99285 EMERGENCY DEPT VISIT HI MDM: CPT | Mod: 25

## 2019-11-26 PROCEDURE — 73090 X-RAY EXAM OF FOREARM: CPT

## 2019-11-26 PROCEDURE — 80053 COMPREHEN METABOLIC PANEL: CPT

## 2019-11-26 PROCEDURE — 76377 3D RENDER W/INTRP POSTPROCES: CPT

## 2019-11-26 PROCEDURE — 70450 CT HEAD/BRAIN W/O DYE: CPT

## 2019-11-26 PROCEDURE — 71045 X-RAY EXAM CHEST 1 VIEW: CPT

## 2019-11-26 PROCEDURE — 73552 X-RAY EXAM OF FEMUR 2/>: CPT

## 2019-11-26 PROCEDURE — 90471 IMMUNIZATION ADMIN: CPT

## 2019-11-26 PROCEDURE — 72170 X-RAY EXAM OF PELVIS: CPT

## 2019-11-26 PROCEDURE — 85610 PROTHROMBIN TIME: CPT

## 2019-11-26 RX ORDER — NYSTATIN CREAM 100000 [USP'U]/G
1 CREAM TOPICAL
Qty: 0 | Refills: 0 | DISCHARGE
Start: 2019-11-26

## 2019-11-26 RX ORDER — SENNA PLUS 8.6 MG/1
2 TABLET ORAL
Qty: 0 | Refills: 0 | DISCHARGE
Start: 2019-11-26

## 2019-11-26 RX ADMIN — PANTOPRAZOLE SODIUM 40 MILLIGRAM(S): 20 TABLET, DELAYED RELEASE ORAL at 05:29

## 2019-11-26 RX ADMIN — POLYETHYLENE GLYCOL 3350 17 GRAM(S): 17 POWDER, FOR SOLUTION ORAL at 10:09

## 2019-11-26 RX ADMIN — Medication 40 MILLIGRAM(S): at 05:29

## 2019-11-26 RX ADMIN — Medication 325 MILLIGRAM(S): at 12:28

## 2019-11-26 RX ADMIN — Medication 0.5 MILLIGRAM(S): at 10:09

## 2019-11-26 RX ADMIN — Medication 81 MILLIGRAM(S): at 12:27

## 2019-11-26 RX ADMIN — NYSTATIN CREAM 1 APPLICATION(S): 100000 CREAM TOPICAL at 05:29

## 2019-11-26 RX ADMIN — Medication 1 TABLET(S): at 12:28

## 2019-11-26 NOTE — PROGRESS NOTE ADULT - SUBJECTIVE AND OBJECTIVE BOX
Orthopedic Surgery Progress Note    S: No acute events overnight, pain is well controlled.  No chest pain, shortness of breath, light-headedness.    O:  Vital Signs Last 24 Hrs  T(C): 37.2 (26 Nov 2019 05:06), Max: 37.2 (26 Nov 2019 05:06)  T(F): 98.9 (26 Nov 2019 05:06), Max: 98.9 (26 Nov 2019 05:06)  HR: 64 (26 Nov 2019 05:06) (64 - 88)  BP: 110/61 (26 Nov 2019 05:06) (101/64 - 123/74)  BP(mean): --  RR: 18 (26 Nov 2019 05:06) (18 - 18)  SpO2: 97% (26 Nov 2019 05:06) (93% - 97%)    Gen: Alert and oriented x3, NAD, patient resting comfortably in bed  RLE:  Dressing C/D/I  EHL/FHL/TA/GS intact  SILT DP/SP/Campo/Sa  WWP distally

## 2019-11-26 NOTE — PROGRESS NOTE ADULT - SUBJECTIVE AND OBJECTIVE BOX
89y Female presents to [facility] ED s/p [facility] fall c/o mild R hip pain and inability to ambulate. Patient states she tripped while trying to open her door. Patient denies headstrike or LOC. Localizes pain to forearm, does not really complain of right hip pain but patient was unable to stand/ambulate after fall. Patient denies radiation of pain. Patient denies numbness/tingling/burning in the RLE. No other bone/joint complaints. Patient is a minimal ambulator at baseline with assistive devices, primarily uses cane at home, Patient  has been participating with physical therapy. DC planing as per ortho and NCC.      MEDICATIONS  (STANDING):  aspirin  chewable 81 milliGRAM(s) Oral daily  atorvastatin 10 milliGRAM(s) Oral at bedtime  ferrous    sulfate 325 milliGRAM(s) Oral daily  furosemide    Tablet 40 milliGRAM(s) Oral daily  metoprolol succinate ER 25 milliGRAM(s) Oral daily  multivitamin 1 Tablet(s) Oral daily  nystatin Powder 1 Application(s) Topical two times a day  pantoprazole    Tablet 40 milliGRAM(s) Oral before breakfast  rivaroxaban 20 milliGRAM(s) Oral daily  senna 2 Tablet(s) Oral at bedtime  sodium chloride 0.9%. 1000 milliLiter(s) (70 mL/Hr) IV Continuous <Continuous>    MEDICATIONS  (PRN):  acetaminophen   Tablet .. 975 milliGRAM(s) Oral every 8 hours PRN Temp greater or equal to 38C (100.4F), Mild Pain (1 - 3)  ALPRAZolam 0.5 milliGRAM(s) Oral every 12 hours PRN anxiety  ALPRAZolam 0.5 milliGRAM(s) Oral Once PRN Anxiety Attack  busPIRone 10 milliGRAM(s) Oral two times a day PRN SOB  melatonin 9 milliGRAM(s) Oral at bedtime PRN Insomnia  ondansetron Injectable 4 milliGRAM(s) IV Push every 6 hours PRN Nausea and/or Vomiting  oxyCODONE    IR 2.5 milliGRAM(s) Oral every 4 hours PRN Moderate Pain (4 - 6)  oxyCODONE    IR 5 milliGRAM(s) Oral every 4 hours PRN Severe Pain (7 - 10)  polyethylene glycol 3350 17 Gram(s) Oral daily PRN Constipation  traZODone 25 milliGRAM(s) Oral at bedtime PRN insomnia        Vital Signs Last 24 Hrs  T(C): 37.2 (26 Nov 2019 05:06), Max: 37.2 (26 Nov 2019 05:06)  T(F): 98.9 (26 Nov 2019 05:06), Max: 98.9 (26 Nov 2019 05:06)  HR: 64 (26 Nov 2019 05:06) (64 - 88)  BP: 110/61 (26 Nov 2019 05:06) (101/64 - 123/74)  BP(mean): --  RR: 18 (26 Nov 2019 05:06) (18 - 18)  SpO2: 97% (26 Nov 2019 05:06) (94% - 97%)      PHYSICAL EXAM:  GENERAL: NAD, well nourished and conversant  HEAD:  Atraumatic  EYES: EOM, PERRLA, conjunctiva pink and sclera white  ENT: No tonsillar erythema, exudates, or enlargement, moist mucous membranes, good dentition, no lesions  NECK: Supple, No JVD, normal thyroid, carotids with normal upstrokes and no bruits  CHEST/LUNG: Clear to auscultation bilaterally, No rales, rhonchi, wheezing, or rubs  HEART: Regular rate and rhythm, No murmurs, rubs, or gallops  ABDOMEN: Soft, nondistended, no masses, guarding, tenderness or rebound, bowel sounds present  EXTREMITIES:  2+ Peripheral Pulses, No clubbing, cyanosis, or edema.   (+) Right FN  fracture repair   LYMPH: No lymphadenopathy noted  SKIN: No rashes or lesions  NERVOUS SYSTEM:  Alert & Oriented X3, normal cognitive function. Motor Strength 5/5 right upper and right lower.  5/5 left upper and left lower extremities, DTRs 2+ intact and symmetric    LABS: No labs obtained today                      CAPILLARY BLOOD GLUCOSE          RADIOLOGY & ADDITIONAL TESTS:

## 2019-11-26 NOTE — PROGRESS NOTE ADULT - REASON FOR ADMISSION
Right hip fracture

## 2019-11-26 NOTE — PROGRESS NOTE ADULT - ASSESSMENT
A/P 89y year old female s/p Percutaneous pinning of right hip fracture POD5    Pain Control  DVT PPX with xarelto  aspirin 81 qd  PT/OOB  WBAT   Dispo Planning- R[facility]HI today

## 2019-11-26 NOTE — DISCHARGE NOTE NURSING/CASE MANAGEMENT/SOCIAL WORK - PATIENT PORTAL LINK FT
You can access the FollowMyHealth Patient Portal offered by [facility] by registering at the following website: [email or web address]. By joining [facility]’s FollowMyHealth portal, you will also be able to view your health information using other applications (apps) compatible with our system.

## 2019-11-26 NOTE — PROGRESS NOTE ADULT - ATTENDING COMMENTS
I am a non participating [facility] physician seeing Pt in coverage for Dr Mcclure. The above patient examination was reviewed with Dr. Samaniego and I agree with his evaluation, assessment and treatment plan.  Benedicto Mcclure M.D.

## 2019-12-02 NOTE — PROVIDER CONTACT NOTE (OTHER) - SITUATION
Pt tachycardic airway protection Hematemesis/airway protection volume resuscitation/emergency venous access critical patient/arterial puncture to obtain ABG's/monitoring purposes critical illness/emergency venous access/venous access/volume resuscitation

## 2020-01-01 NOTE — PROVIDER CONTACT NOTE (OTHER) - BACKGROUND
89 year old female admitted with fever s/p fall. PMH of HTN, HLD, palpitations, anxiety
89 year old female admitted with fever s/p fall being treated for Rhabdo
89 year old female admitted with fever s/p fall. PMH of HTN, HLD, palpitations, anxiety
89 year old female admitted with fever s/p fall. PMH of anxiety, palpitations, HTN, insomnia.
89 year old female admitted with fever s/p fall. PMH of anxiety, palpitations, HTN, insomnia.
admitted for palpitations, fever, s/p fall
admitted s/p fall at home, being treated with IVF for rhabdo
admitted s/p fall, treated for Rhabdo
patient admitted s/p fall
Available

## 2020-01-10 ENCOUNTER — INPATIENT (INPATIENT)
Facility: HOSPITAL | Age: 85
LOS: 3 days | Discharge: NURSING, REHAB OR LONG-TERM CARE | End: 2020-01-14
Attending: INTERNAL MEDICINE | Admitting: INTERNAL MEDICINE
Payer: MEDICARE

## 2020-01-10 VITALS
TEMPERATURE: 98 F | DIASTOLIC BLOOD PRESSURE: 92 MMHG | OXYGEN SATURATION: 93 % | RESPIRATION RATE: 18 BRPM | SYSTOLIC BLOOD PRESSURE: 121 MMHG | HEART RATE: 169 BPM

## 2020-01-10 DIAGNOSIS — Z90.710 ACQUIRED ABSENCE OF BOTH CERVIX AND UTERUS: Chronic | ICD-10-CM

## 2020-01-10 DIAGNOSIS — Z29.9 ENCOUNTER FOR PROPHYLACTIC MEASURES, UNSPECIFIED: ICD-10-CM

## 2020-01-10 DIAGNOSIS — N39.0 URINARY TRACT INFECTION, SITE NOT SPECIFIED: ICD-10-CM

## 2020-01-10 DIAGNOSIS — S82.91XA UNSPECIFIED FRACTURE OF RIGHT LOWER LEG, INITIAL ENCOUNTER FOR CLOSED FRACTURE: Chronic | ICD-10-CM

## 2020-01-10 DIAGNOSIS — I10 ESSENTIAL (PRIMARY) HYPERTENSION: ICD-10-CM

## 2020-01-10 DIAGNOSIS — F41.9 ANXIETY DISORDER, UNSPECIFIED: ICD-10-CM

## 2020-01-10 DIAGNOSIS — E78.5 HYPERLIPIDEMIA, UNSPECIFIED: ICD-10-CM

## 2020-01-10 DIAGNOSIS — I48.0 PAROXYSMAL ATRIAL FIBRILLATION: ICD-10-CM
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
## 2020-01-10 DIAGNOSIS — N28.9 DISORDER OF KIDNEY AND URETER, UNSPECIFIED: ICD-10-CM

## 2020-01-10 DIAGNOSIS — M62.82 RHABDOMYOLYSIS: ICD-10-CM

## 2020-01-10 DIAGNOSIS — Z98.49 CATARACT EXTRACTION STATUS, UNSPECIFIED EYE: Chronic | ICD-10-CM

## 2020-01-10 DIAGNOSIS — I47.1 SUPRAVENTRICULAR TACHYCARDIA: ICD-10-CM

## 2020-01-10 LAB
ALBUMIN SERPL ELPH-MCNC: 2.9 G/DL — LOW (ref 3.3–5)
ALBUMIN SERPL ELPH-MCNC: 3.1 G/DL — LOW (ref 3.3–5)
ALP SERPL-CCNC: 117 U/L — SIGNIFICANT CHANGE UP (ref 40–120)
ALP SERPL-CCNC: 99 U/L — SIGNIFICANT CHANGE UP (ref 40–120)
ALT FLD-CCNC: 46 U/L — HIGH (ref 4–33)
ALT FLD-CCNC: 48 U/L — HIGH (ref 4–33)
ANION GAP SERPL CALC-SCNC: 13 MMO/L — SIGNIFICANT CHANGE UP (ref 7–14)
ANION GAP SERPL CALC-SCNC: 17 MMO/L — HIGH (ref 7–14)
ANISOCYTOSIS BLD QL: SLIGHT — SIGNIFICANT CHANGE UP
APPEARANCE UR: SIGNIFICANT CHANGE UP
APTT BLD: 35.9 SEC — SIGNIFICANT CHANGE UP (ref 27.5–36.3)
AST SERPL-CCNC: 75 U/L — HIGH (ref 4–32)
AST SERPL-CCNC: 78 U/L — HIGH (ref 4–32)
B PERT DNA SPEC QL NAA+PROBE: NOT DETECTED — SIGNIFICANT CHANGE UP
BACTERIA # UR AUTO: HIGH
BASE EXCESS BLDV CALC-SCNC: 3.6 MMOL/L — SIGNIFICANT CHANGE UP
BASOPHILS # BLD AUTO: 0.02 K/UL — SIGNIFICANT CHANGE UP (ref 0–0.2)
BASOPHILS NFR BLD AUTO: 0.2 % — SIGNIFICANT CHANGE UP (ref 0–2)
BASOPHILS NFR SPEC: 0 % — SIGNIFICANT CHANGE UP (ref 0–2)
BILIRUB DIRECT SERPL-MCNC: < 0.2 MG/DL — SIGNIFICANT CHANGE UP (ref 0.1–0.2)
BILIRUB SERPL-MCNC: 0.3 MG/DL — SIGNIFICANT CHANGE UP (ref 0.2–1.2)
BILIRUB SERPL-MCNC: 0.6 MG/DL — SIGNIFICANT CHANGE UP (ref 0.2–1.2)
BILIRUB UR-MCNC: NEGATIVE — SIGNIFICANT CHANGE UP
BLASTS # FLD: 0 % — SIGNIFICANT CHANGE UP (ref 0–0)
BLD GP AB SCN SERPL QL: NEGATIVE — SIGNIFICANT CHANGE UP
BLOOD GAS VENOUS - CREATININE: 2.02 MG/DL — HIGH (ref 0.5–1.3)
BLOOD UR QL VISUAL: HIGH
BUN SERPL-MCNC: 37 MG/DL — HIGH (ref 7–23)
BUN SERPL-MCNC: 38 MG/DL — HIGH (ref 7–23)
BURR CELLS BLD QL SMEAR: PRESENT — SIGNIFICANT CHANGE UP
C PNEUM DNA SPEC QL NAA+PROBE: NOT DETECTED — SIGNIFICANT CHANGE UP
CALCIUM SERPL-MCNC: 8 MG/DL — LOW (ref 8.4–10.5)
CALCIUM SERPL-MCNC: 8.7 MG/DL — SIGNIFICANT CHANGE UP (ref 8.4–10.5)
CHLORIDE BLDV-SCNC: 99 MMOL/L — SIGNIFICANT CHANGE UP (ref 96–108)
CHLORIDE SERPL-SCNC: 94 MMOL/L — LOW (ref 98–107)
CHLORIDE SERPL-SCNC: 98 MMOL/L — SIGNIFICANT CHANGE UP (ref 98–107)
CK MB BLD-MCNC: 0.3 — SIGNIFICANT CHANGE UP (ref 0–2.5)
CK MB BLD-MCNC: 6.59 NG/ML — HIGH (ref 1–4.7)
CK SERPL-CCNC: 2068 U/L — HIGH (ref 25–170)
CO2 SERPL-SCNC: 23 MMOL/L — SIGNIFICANT CHANGE UP (ref 22–31)
CO2 SERPL-SCNC: 24 MMOL/L — SIGNIFICANT CHANGE UP (ref 22–31)
COLOR SPEC: YELLOW — SIGNIFICANT CHANGE UP
CREAT SERPL-MCNC: 1.72 MG/DL — HIGH (ref 0.5–1.3)
CREAT SERPL-MCNC: 1.92 MG/DL — HIGH (ref 0.5–1.3)
DACRYOCYTES BLD QL SMEAR: SLIGHT — SIGNIFICANT CHANGE UP
EOSINOPHIL # BLD AUTO: 0 K/UL — SIGNIFICANT CHANGE UP (ref 0–0.5)
EOSINOPHIL NFR BLD AUTO: 0 % — SIGNIFICANT CHANGE UP (ref 0–6)
EOSINOPHIL NFR FLD: 0 % — SIGNIFICANT CHANGE UP (ref 0–6)
FLUAV H1 2009 PAND RNA SPEC QL NAA+PROBE: NOT DETECTED — SIGNIFICANT CHANGE UP
FLUAV H1 RNA SPEC QL NAA+PROBE: NOT DETECTED — SIGNIFICANT CHANGE UP
FLUAV H3 RNA SPEC QL NAA+PROBE: NOT DETECTED — SIGNIFICANT CHANGE UP
FLUAV SUBTYP SPEC NAA+PROBE: NOT DETECTED — SIGNIFICANT CHANGE UP
FLUBV RNA SPEC QL NAA+PROBE: NOT DETECTED — SIGNIFICANT CHANGE UP
GAS PNL BLDV: 131 MMOL/L — LOW (ref 136–146)
GLUCOSE BLDV-MCNC: 117 MG/DL — HIGH (ref 70–99)
GLUCOSE SERPL-MCNC: 105 MG/DL — HIGH (ref 70–99)
GLUCOSE SERPL-MCNC: 122 MG/DL — HIGH (ref 70–99)
GLUCOSE UR-MCNC: NEGATIVE — SIGNIFICANT CHANGE UP
HADV DNA SPEC QL NAA+PROBE: NOT DETECTED — SIGNIFICANT CHANGE UP
HCO3 BLDV-SCNC: 26 MMOL/L — SIGNIFICANT CHANGE UP (ref 20–27)
HCOV PNL SPEC NAA+PROBE: SIGNIFICANT CHANGE UP
HCT VFR BLD CALC: 34.7 % — SIGNIFICANT CHANGE UP (ref 34.5–45)
HCT VFR BLDV CALC: 34.9 % — SIGNIFICANT CHANGE UP (ref 34.5–45)
HGB BLD-MCNC: 11.2 G/DL — LOW (ref 11.5–15.5)
HGB BLDV-MCNC: 11.3 G/DL — LOW (ref 11.5–15.5)
HMPV RNA SPEC QL NAA+PROBE: NOT DETECTED — SIGNIFICANT CHANGE UP
HPIV1 RNA SPEC QL NAA+PROBE: NOT DETECTED — SIGNIFICANT CHANGE UP
HPIV2 RNA SPEC QL NAA+PROBE: NOT DETECTED — SIGNIFICANT CHANGE UP
HPIV3 RNA SPEC QL NAA+PROBE: NOT DETECTED — SIGNIFICANT CHANGE UP
HPIV4 RNA SPEC QL NAA+PROBE: NOT DETECTED — SIGNIFICANT CHANGE UP
HYALINE CASTS # UR AUTO: NEGATIVE — SIGNIFICANT CHANGE UP
IMM GRANULOCYTES NFR BLD AUTO: 0.4 % — SIGNIFICANT CHANGE UP (ref 0–1.5)
INR BLD: 2.23 — HIGH (ref 0.88–1.17)
KETONES UR-MCNC: SIGNIFICANT CHANGE UP
LACTATE BLDV-MCNC: 1.9 MMOL/L — SIGNIFICANT CHANGE UP (ref 0.5–2)
LEUKOCYTE ESTERASE UR-ACNC: SIGNIFICANT CHANGE UP
LYMPHOCYTES # BLD AUTO: 0.46 K/UL — LOW (ref 1–3.3)
LYMPHOCYTES # BLD AUTO: 3.6 % — LOW (ref 13–44)
LYMPHOCYTES NFR SPEC AUTO: 2.6 % — LOW (ref 13–44)
MACROCYTES BLD QL: SLIGHT — SIGNIFICANT CHANGE UP
MAGNESIUM SERPL-MCNC: 2.2 MG/DL — SIGNIFICANT CHANGE UP (ref 1.6–2.6)
MAGNESIUM SERPL-MCNC: 2.2 MG/DL — SIGNIFICANT CHANGE UP (ref 1.6–2.6)
MCHC RBC-ENTMCNC: 28.7 PG — SIGNIFICANT CHANGE UP (ref 27–34)
MCHC RBC-ENTMCNC: 32.3 % — SIGNIFICANT CHANGE UP (ref 32–36)
MCV RBC AUTO: 89 FL — SIGNIFICANT CHANGE UP (ref 80–100)
METAMYELOCYTES # FLD: 0 % — SIGNIFICANT CHANGE UP (ref 0–1)
MONOCYTES # BLD AUTO: 0.71 K/UL — SIGNIFICANT CHANGE UP (ref 0–0.9)
MONOCYTES NFR BLD AUTO: 5.5 % — SIGNIFICANT CHANGE UP (ref 2–14)
MONOCYTES NFR BLD: 3.5 % — SIGNIFICANT CHANGE UP (ref 2–9)
MYELOCYTES NFR BLD: 0 % — SIGNIFICANT CHANGE UP (ref 0–0)
NEUTROPHIL AB SER-ACNC: 92.1 % — HIGH (ref 43–77)
NEUTROPHILS # BLD AUTO: 11.57 K/UL — HIGH (ref 1.8–7.4)
NEUTROPHILS NFR BLD AUTO: 90.3 % — HIGH (ref 43–77)
NEUTS BAND # BLD: 0.9 % — SIGNIFICANT CHANGE UP (ref 0–6)
NITRITE UR-MCNC: NEGATIVE — SIGNIFICANT CHANGE UP
NRBC # FLD: 0 K/UL — SIGNIFICANT CHANGE UP (ref 0–0)
OTHER - HEMATOLOGY %: 0 — SIGNIFICANT CHANGE UP
PCO2 BLDV: 43 MMHG — SIGNIFICANT CHANGE UP (ref 41–51)
PH BLDV: 7.43 PH — SIGNIFICANT CHANGE UP (ref 7.32–7.43)
PH UR: 6 — SIGNIFICANT CHANGE UP (ref 5–8)
PHOSPHATE SERPL-MCNC: 4.1 MG/DL — SIGNIFICANT CHANGE UP (ref 2.5–4.5)
PHOSPHATE SERPL-MCNC: 4.4 MG/DL — SIGNIFICANT CHANGE UP (ref 2.5–4.5)
PLATELET # BLD AUTO: 207 K/UL — SIGNIFICANT CHANGE UP (ref 150–400)
PMV BLD: 11.1 FL — SIGNIFICANT CHANGE UP (ref 7–13)
PO2 BLDV: < 24 MMHG — LOW (ref 35–40)
POIKILOCYTOSIS BLD QL AUTO: SLIGHT — SIGNIFICANT CHANGE UP
POLYCHROMASIA BLD QL SMEAR: SLIGHT — SIGNIFICANT CHANGE UP
POTASSIUM BLDV-SCNC: 4 MMOL/L — SIGNIFICANT CHANGE UP (ref 3.4–4.5)
POTASSIUM SERPL-MCNC: 3.6 MMOL/L — SIGNIFICANT CHANGE UP (ref 3.5–5.3)
POTASSIUM SERPL-MCNC: 4.2 MMOL/L — SIGNIFICANT CHANGE UP (ref 3.5–5.3)
POTASSIUM SERPL-SCNC: 3.6 MMOL/L — SIGNIFICANT CHANGE UP (ref 3.5–5.3)
POTASSIUM SERPL-SCNC: 4.2 MMOL/L — SIGNIFICANT CHANGE UP (ref 3.5–5.3)
PROMYELOCYTES # FLD: 0 % — SIGNIFICANT CHANGE UP (ref 0–0)
PROT SERPL-MCNC: 5.8 G/DL — LOW (ref 6–8.3)
PROT SERPL-MCNC: 6.6 G/DL — SIGNIFICANT CHANGE UP (ref 6–8.3)
PROT UR-MCNC: 100 — HIGH
PROTHROM AB SERPL-ACNC: 25.4 SEC — HIGH (ref 9.8–13.1)
RBC # BLD: 3.9 M/UL — SIGNIFICANT CHANGE UP (ref 3.8–5.2)
RBC # FLD: 13.1 % — SIGNIFICANT CHANGE UP (ref 10.3–14.5)
RBC CASTS # UR COMP ASSIST: HIGH (ref 0–?)
REVIEW TO FOLLOW: YES — SIGNIFICANT CHANGE UP
RH IG SCN BLD-IMP: NEGATIVE — SIGNIFICANT CHANGE UP
RSV RNA SPEC QL NAA+PROBE: NOT DETECTED — SIGNIFICANT CHANGE UP
RV+EV RNA SPEC QL NAA+PROBE: NOT DETECTED — SIGNIFICANT CHANGE UP
SAO2 % BLDV: 29 % — LOW (ref 60–85)
SODIUM SERPL-SCNC: 134 MMOL/L — LOW (ref 135–145)
SODIUM SERPL-SCNC: 135 MMOL/L — SIGNIFICANT CHANGE UP (ref 135–145)
SP GR SPEC: 1.02 — SIGNIFICANT CHANGE UP (ref 1–1.04)
SQUAMOUS # UR AUTO: SIGNIFICANT CHANGE UP
TROPONIN T, HIGH SENSITIVITY: 114 NG/L — CRITICAL HIGH (ref ?–14)
TROPONIN T, HIGH SENSITIVITY: 93 NG/L — CRITICAL HIGH (ref ?–14)
TROPONIN T, HIGH SENSITIVITY: 95 NG/L — CRITICAL HIGH (ref ?–14)
TSH SERPL-MCNC: 1.85 UIU/ML — SIGNIFICANT CHANGE UP (ref 0.27–4.2)
UROBILINOGEN FLD QL: NORMAL — SIGNIFICANT CHANGE UP
VARIANT LYMPHS # BLD: 0.9 % — SIGNIFICANT CHANGE UP
WBC # BLD: 12.81 K/UL — HIGH (ref 3.8–10.5)
WBC # FLD AUTO: 12.81 K/UL — HIGH (ref 3.8–10.5)
WBC UR QL: >50 — HIGH (ref 0–?)

## 2020-01-10 PROCEDURE — 71045 X-RAY EXAM CHEST 1 VIEW: CPT | Mod: 26

## 2020-01-10 PROCEDURE — 70450 CT HEAD/BRAIN W/O DYE: CPT | Mod: 26

## 2020-01-10 PROCEDURE — 99223 1ST HOSP IP/OBS HIGH 75: CPT

## 2020-01-10 PROCEDURE — 99233 SBSQ HOSP IP/OBS HIGH 50: CPT

## 2020-01-10 PROCEDURE — 72170 X-RAY EXAM OF PELVIS: CPT | Mod: 26

## 2020-01-10 RX ORDER — PANTOPRAZOLE SODIUM 20 MG/1
40 TABLET, DELAYED RELEASE ORAL
Refills: 0 | Status: DISCONTINUED | OUTPATIENT
Start: 2020-01-10 | End: 2020-01-14

## 2020-01-10 RX ORDER — METOPROLOL TARTRATE 50 MG
25 TABLET ORAL DAILY
Refills: 0 | Status: DISCONTINUED | OUTPATIENT
Start: 2020-01-10 | End: 2020-01-10

## 2020-01-10 RX ORDER — CEFTRIAXONE 500 MG/1
1000 INJECTION, POWDER, FOR SOLUTION INTRAMUSCULAR; INTRAVENOUS EVERY 24 HOURS
Refills: 0 | Status: DISCONTINUED | OUTPATIENT
Start: 2020-01-10 | End: 2020-01-10

## 2020-01-10 RX ORDER — RIVAROXABAN 15 MG-20MG
20 KIT ORAL DAILY
Refills: 0 | Status: DISCONTINUED | OUTPATIENT
Start: 2020-01-10 | End: 2020-01-10

## 2020-01-10 RX ORDER — METOPROLOL TARTRATE 50 MG
25 TABLET ORAL DAILY
Refills: 0 | Status: DISCONTINUED | OUTPATIENT
Start: 2020-01-10 | End: 2020-01-14

## 2020-01-10 RX ORDER — CIPROFLOXACIN LACTATE 400MG/40ML
400 VIAL (ML) INTRAVENOUS ONCE
Refills: 0 | Status: COMPLETED | OUTPATIENT
Start: 2020-01-10 | End: 2020-01-10

## 2020-01-10 RX ORDER — TRAZODONE HCL 50 MG
100 TABLET ORAL DAILY
Refills: 0 | Status: DISCONTINUED | OUTPATIENT
Start: 2020-01-10 | End: 2020-01-14

## 2020-01-10 RX ORDER — SODIUM CHLORIDE 9 MG/ML
1000 INJECTION INTRAMUSCULAR; INTRAVENOUS; SUBCUTANEOUS
Refills: 0 | Status: DISCONTINUED | OUTPATIENT
Start: 2020-01-10 | End: 2020-01-10

## 2020-01-10 RX ORDER — SODIUM CHLORIDE 9 MG/ML
500 INJECTION INTRAMUSCULAR; INTRAVENOUS; SUBCUTANEOUS ONCE
Refills: 0 | Status: COMPLETED | OUTPATIENT
Start: 2020-01-10 | End: 2020-01-10

## 2020-01-10 RX ORDER — RIVAROXABAN 15 MG-20MG
15 KIT ORAL DAILY
Refills: 0 | Status: DISCONTINUED | OUTPATIENT
Start: 2020-01-10 | End: 2020-01-14

## 2020-01-10 RX ORDER — SODIUM CHLORIDE 9 MG/ML
1000 INJECTION INTRAMUSCULAR; INTRAVENOUS; SUBCUTANEOUS
Refills: 0 | Status: DISCONTINUED | OUTPATIENT
Start: 2020-01-10 | End: 2020-01-11

## 2020-01-10 RX ORDER — ALPRAZOLAM 0.25 MG
0.5 TABLET ORAL THREE TIMES A DAY
Refills: 0 | Status: DISCONTINUED | OUTPATIENT
Start: 2020-01-10 | End: 2020-01-14

## 2020-01-10 RX ORDER — ATORVASTATIN CALCIUM 80 MG/1
10 TABLET, FILM COATED ORAL AT BEDTIME
Refills: 0 | Status: DISCONTINUED | OUTPATIENT
Start: 2020-01-10 | End: 2020-01-14

## 2020-01-10 RX ORDER — ASPIRIN/CALCIUM CARB/MAGNESIUM 324 MG
81 TABLET ORAL DAILY
Refills: 0 | Status: DISCONTINUED | OUTPATIENT
Start: 2020-01-10 | End: 2020-01-14

## 2020-01-10 RX ADMIN — Medication 100 MILLIGRAM(S): at 22:11

## 2020-01-10 RX ADMIN — RIVAROXABAN 15 MILLIGRAM(S): KIT at 18:16

## 2020-01-10 RX ADMIN — Medication 400 MILLIGRAM(S): at 14:32

## 2020-01-10 RX ADMIN — Medication 81 MILLIGRAM(S): at 18:16

## 2020-01-10 RX ADMIN — SODIUM CHLORIDE 500 MILLILITER(S): 9 INJECTION INTRAMUSCULAR; INTRAVENOUS; SUBCUTANEOUS at 14:52

## 2020-01-10 RX ADMIN — SODIUM CHLORIDE 500 MILLILITER(S): 9 INJECTION INTRAMUSCULAR; INTRAVENOUS; SUBCUTANEOUS at 13:36

## 2020-01-10 RX ADMIN — SODIUM CHLORIDE 500 MILLILITER(S): 9 INJECTION INTRAMUSCULAR; INTRAVENOUS; SUBCUTANEOUS at 12:06

## 2020-01-10 RX ADMIN — ATORVASTATIN CALCIUM 10 MILLIGRAM(S): 80 TABLET, FILM COATED ORAL at 22:11

## 2020-01-10 RX ADMIN — Medication 200 MILLIGRAM(S): at 13:36

## 2020-01-10 RX ADMIN — SODIUM CHLORIDE 100 MILLILITER(S): 9 INJECTION INTRAMUSCULAR; INTRAVENOUS; SUBCUTANEOUS at 16:50

## 2020-01-10 RX ADMIN — Medication 1 TABLET(S): at 18:16

## 2020-01-10 RX ADMIN — SODIUM CHLORIDE 75 MILLILITER(S): 9 INJECTION INTRAMUSCULAR; INTRAVENOUS; SUBCUTANEOUS at 18:00

## 2020-01-10 RX ADMIN — SODIUM CHLORIDE 500 MILLILITER(S): 9 INJECTION INTRAMUSCULAR; INTRAVENOUS; SUBCUTANEOUS at 14:32

## 2020-01-10 NOTE — ED PROVIDER NOTE - PHYSICAL EXAMINATION
Physical Exam:  Gen: NAD, AOx3, non-toxic appearing  Head: NCAT  HEENT: EOMI, PEERLA, normal conjunctiva, tongue midline, oral mucosa dry  Lung: CTAB, no respiratory distress, no wheezes/rhonchi/rales B/L, speaking in full sentences  CV: RRR, no murmurs, rubs or gallops  Abd: soft, NT, ND, no guarding, no rigidity, no rebound tenderness, no CVA tenderness   MSK: no visible deformities, ROM normal in UE/LE, no back pain  Neuro: No focal sensory or motor deficits  Skin: Warm, well perfused, no rash, + leg swelling bilateral   Psych: normal affect, calm  ~Neto Fletcher D.O. -Resident Physical Exam:  Gen: NAD, AOx2.5  Head: NCAT  HEENT: EOMI, PEERLA, normal conjunctiva, tongue midline, oral mucosa dry  Lung: CTAB, no respiratory distress, no wheezes/rhonchi/rales B/L, speaking in full sentences  CV: RRR, no murmurs, rubs or gallops  Abd: soft, NT, ND, no guarding, no rigidity, no rebound tenderness, no CVA tenderness   MSK: no visible deformities, ROM normal in UE/LE, no back pain  Neuro: No focal sensory or motor deficits  Skin: Warm, well perfused, no rash, + leg swelling bilateral   Psych: normal affect, calm  ~Neto Fletcher D.O. -Resident

## 2020-01-10 NOTE — H&P ADULT - ASSESSMENT
89y F w/ PMH of HTN, HLD, SVT s/p ablation on 6/21/2018, endometrial CA s/p LYNETTE presents s/p found on floor of home.  with fall, rhabdomyolysis , SVT, acute viral gastroenteritis and acute bacterial cytitis 89y F w/ PMH of HTN, HLD, SVT s/p ablation on 6/21/2018, endometrial CA s/p LYNETTE presents found on floor of home after fall, a/f syncopal eval,  rhabdomyolysis, SVT, in the setting of acute viral gastroenteritis:

## 2020-01-10 NOTE — ED PROVIDER NOTE - CRITICAL CARE PROVIDED
consultation with other physicians/additional history taking/direct patient care (not related to procedure)/interpretation of diagnostic studies/documentation/consult w/ pt's family directly relating to pts condition

## 2020-01-10 NOTE — ED ADULT NURSE NOTE - OBJECTIVE STATEMENT
Pt presenting to -A AxOx4 ambulatory at baseline with a cane with c.o generalized weakness, N/V, loss of appetite x3 days. PMH of HTN, HLD, anxiety. Pt states she fell last night- denies hitting head or LOC. Pt states she was lying on the floor since last night and could not get to the phone to call anyone. Pt found by daughter on floor, was brought in by EMS. As per EMS pt noted to be going in and out of SVT- with HR jumping to 170s and breaking on its own. On arrival to ED pt's breathing is even and unlabored. Pt satting well on 3L NC. Palor/diaphoresis not noted. Skin dry with blanchable redness on sacrum. Pt on cardiac monitor- with HR between . Pt denies CP, SOB, dizziness, fevers, chills. IV established by EMS with 20g in right wrist. Second IV established with 20g in RAC. Labs drawn and sent, MD at bedside, will continue to monitor.

## 2020-01-10 NOTE — CONSULT NOTE ADULT - SUBJECTIVE AND OBJECTIVE BOX
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 Patient reports having nausea, vomiting and diarrhea since 1/7/2020 after eating beef in garlic sauce. She reports feeling weak since the diarrhea and vomiting started. She is unable to recall the events of last night and found herself on the floor this morning. She thinks she slid to the floor while trying to reach for her medication. She was unable to get up from the floor as she felt very weak and was found by her daughter around 10 am. She was found to be with narrow complex tachycardia with HR up to 170s by EMS. Her HR improved after IV hydration and she reports feeling better. EKG and telemetry shows intermittent atrial tachycardia with VR up to 160s. She feels better now and denies any chest pain, SOB, palpitations, nausea/vomiting, diarrhea or dizziness at this time. Currently telemetry reveals sinus rhythm with HR 70s-80s.  PAST MEDICAL & SURGICAL HISTORY:  Endometrial cancer: S/P LYNETTE with SBO  HLD (hyperlipidemia)  HTN (hypertension)  PAF (paroxysmal atrial fibrillation): On Xarelto  PE (pulmonary thromboembolism)  Insomnia  Breast Lump  Anxiety  Leg fracture, right  Status post cataract extraction: OS  Status post hysterectomy: endometrial cancer  History of D&C- 8/12/11  History of Colonoscopy- 2011/Sept  History of Breast Lump/Mass Excision- [facility]- 1971    PREVIOUS DIAGNOSTIC TESTING:    Echocardiogram: 4/18/18  Dimensions:    Normal Values:  LA:     4.2    2.0 - 4.0 cm  Ao:     3.4    2.0 - 3.8 cm  SEPTUM: 1.0    0.6 - 1.2 cm  PWT:    0.9    0.6 - 1.1 cm  LVIDd:  4.2    3.0 - 5.6 cm  LVIDs:  2.9    1.8 - 4.0 cm  Derived variables:  LVMI: 77 g/m2  RWT: 0.42  Fractional short: 31 %  EF (Visual Estimate): 55-60 %  EF (Teicholtz): 59 %  Doppler Peak Velocity (m/sec): MV=1.9 AoV=1.8  ------------------------------------------------------------------------  Observations:  Mitral Valve: Mitral annular calcification and calcified  mitral leaflets with decreased diastolic opening. Moderate  mitral regurgitation. Peak mitral valve gradient equals 14  mm Hg, mean transmitral valve gradient equals 6 mm Hg,  consistent with moderate to severe mitral stenosis.  Aortic Valve/Aorta: Calcified aortic valve with normal  opening. Peak transaortic valve gradient equals 13 mm Hg,  aortic valve velocity time integral equals 37 cm,  consistent with mild aortic stenosis. Mild-moderate aortic  regurgitation. Peak left ventricular outflow tract gradient  equals 7 mm Hg, mean gradient is equal to 6 mm Hg, LVOT  velocity time integral equals 25 cm.  Aortic Root: 3.4 cm.  LVOT diameter: 1.6 cm.  Left Atrium: Normal left atrium.  LA volume index = 27  cc/m2.  Left Ventricle: Normal left ventricular systolic function.  No segmental wall motion abnormalities. Visual estimation  of EF 55-60%.  Normal left ventricular internal dimensions  and wall thicknesses. Indeterminate diastolic function.  Right Heart: Mild right atrial enlargement. Normal right  ventricular size and function. Normal tricuspid valve. Mild  tricuspid regurgitation. Normal pulmonic valve.  Pericardium/Pleura: Normal pericardium with no pericardial  effusion.  Bilateral pleural effusions.  Hemodynamic: Estimated right atrial pressure is 8 mm Hg.  Estimated right ventricular systolic pressure equals 42 mm  Hg, assuming right atrial pressure equals 8 mm Hg,  consistent with mild pulmonary hypertension.  ------------------------------------------------------------------------  Conclusions:  1. Mitral annular calcification and calcified mitral  leaflets with decreased diastolic opening. Moderate mitral  regurgitation. Peak mitral valve gradient equals 14 mm Hg,  mean transmitral valve gradient equals 6 mm Hg, consistent  with moderate to severe mitral stenosis.  2. Calcified aortic valve with normal opening. Peak  transaortic valve gradient equals 13 mm Hg, aortic valve  velocity time integral equals 37 cm, consistent with mild  aortic stenosis. Mild-moderate aortic regurgitation.  3. Normal left ventricular systolic function. No segmental  wall motion abnormalities. Visual estimation of EF 55-60%.  4. Normal right ventricular size and function.  5. Bilateral pleural effusions.  ------------------------------------------------------------------------    	    MEDICATIONS:  aspirin enteric coated 81 milliGRAM(s) Oral daily  metoprolol succinate ER 25 milliGRAM(s) Oral daily  rivaroxaban 20 milliGRAM(s) Oral daily  ALPRAZolam 0.5 milliGRAM(s) Oral three times a day PRN  traZODone 100 milliGRAM(s) Oral daily  pantoprazole    Tablet 40 milliGRAM(s) Oral before breakfast  atorvastatin 10 milliGRAM(s) Oral at bedtime  multivitamin 1 Tablet(s) Oral daily  sodium chloride 0.9%. 1000 milliLiter(s) IV Continuous <Continuous>      FAMILY HISTORY:  No pertinent family history in first degree relatives      SOCIAL HISTORY:    Lives alone    [-] Smoker  [-] Alcohol  [-] Drugs    Allergies    Eye cream from the 1960s Neocortef ?spelling caused eyes to becomes swollen (Unknown)  neomycin (Unknown)  piperacillin-tazobactam (Rash)  soaps and creams causes rash uses only sensitive skin soap (Rash)  sulfa drugs (Unknown)  Voltaren (Rash)    Intolerances      REVIEW OF SYSTEMS:  CONSTITUTIONAL: No fever, weight loss, + fatigue  EYES: No eye pain, visual disturbances, or discharge  ENMT:  No difficulty hearing, tinnitus, vertigo; No sinus or throat pain  NECK: No pain or stiffness  RESPIRATORY: No cough, wheezing, chills or hemoptysis; No Shortness of Breath  CARDIOVASCULAR: No chest pain, palpitations, passing out, dizziness, or leg swelling  GASTROINTESTINAL: + diarrhea, + nausea, + vomiting   GENITOURINARY: No dysuria, frequency, hematuria, or incontinence  NEUROLOGICAL: No headaches, memory loss, loss of strength, numbness, or tremors  SKIN: No itching, burning, rashes, or lesions   LYMPH Nodes: No enlarged glands  ENDOCRINE: No heat or cold intolerance; No hair loss  MUSCULOSKELETAL: No joint pain or swelling; No muscle, back, or extremity pain  PSYCHIATRIC: No depression, anxiety, mood swings, or difficulty sleeping  HEME/LYMPH: No easy bruising, or bleeding gums  ALLERY AND IMMUNOLOGIC: No hives or eczema	    [X] All others negative	  [ ] Unable to obtain    PHYSICAL EXAM:  T(C): 36.5 (01-10-20 @ 12:30), Max: 36.5 (01-10-20 @ 11:09)  HR: 84 (01-10-20 @ 16:00) (84 - 169)  BP: 102/72 (01-10-20 @ 16:00) (102/72 - 121/92)  RR: 18 (01-10-20 @ 16:00) (16 - 18)  SpO2: 100% (01-10-20 @ 12:30) (93% - 100%)  Wt(kg): --  I&O's Summary      Appearance: Normal	  HEENT:   Normal oral mucosa, PERRL, EOMI	  Lymphatic: No lymphadenopathy  Cardiovascular: Normal S1 S2, No JVD, No murmurs, No edema  Respiratory: Lungs clear to auscultation	  Psychiatry: A & O x 3, Mood & affect appropriate  Gastrointestinal:  Soft, Non-tender, + BS	  Skin: No rashes, No ecchymoses, No cyanosis	  Neurologic: Non-focal  Extremities: Normal range of motion, No clubbing, cyanosis or edema  Vascular: Peripheral pulses palpable 2+ bilaterally    TELEMETRY: Sinus rhythm with HR 70s-80s; atrial tachycardia with VR up to 170bpm	    ECG:  	Atrial tachycardia with  bpm  RADIOLOGY:  OTHER: 	  	  LABS:	 	    CARDIAC MARKERS:      CKMB: 6.59 ng/mL (01-10 @ 11:25)    CKMB Relative Index: 0.3 (01-10 @ 11:25)                            11.2   12.81 )-----------( 207      ( 10 Alvaro 2020 11:25 )             34.7     01-10    134<L>  |  94<L>  |  38<H>  ----------------------------<  122<H>  4.2   |  23  |  1.92<H>    Ca    8.7      10 Alvaro 2020 11:25  Phos  4.1     01-10  Mg     2.2     01-10    TPro  6.6  /  Alb  3.1<L>  /  TBili  0.6  /  DBili  x   /  AST  75<H>  /  ALT  48<H>  /  AlkPhos  117  01-10      TSH: Thyroid Stimulating Hormone, Serum: 1.85 uIU/mL (01-10 @ 11:25)

## 2020-01-10 NOTE — H&P ADULT - PROBLEM SELECTOR PLAN 1
- secondary to fall   - IV 0.9% NS at 100 mL per hour  - daily BMP with am labs  Telemetry  Trop 114--95  ZLT=[id number] MB=6.5- elevated troponins possibly due to Fall  Ekg: SR @ 63 bpm + RBBB Flipped t's III AVF  CT head: Stable exam.No mass effect, hemorrhage or evidence of acute intracranial pathology.  CXR: Left apical scarring changes again noted. Clear remaining visualized lungs lungs.   Pelvic xray:  3 intact long narrow distally threaded cannulated compression screws traverse an old healed valgus impacted right femoral neck subcapital fracture with residual slight post traumatic contour abnormality along the lateral femoral head neck junction region.   RVP: neg  - - secondary to fall, with CK elevation   - c/w IVF and repeat CK with AM labs   - daily BMP with am labs  Telemetry  Trop 114--95  KDU=[id number] MB=6.5- elevated troponins possibly due to Fall  Ekg: SR @ 63 bpm + RBBB Flipped t's III AVF  CT head: Stable exam.No mass effect, hemorrhage or evidence of acute intracranial pathology.  CXR: Left apical scarring changes again noted. Clear remaining visualized lungs lungs.   Pelvic xray:  3 intact long narrow distally threaded cannulated compression screws traverse an old healed valgus impacted right femoral neck subcapital fracture with residual slight post traumatic contour abnormality along the lateral femoral head neck junction region.   RVP: neg  - - secondary to fall, with CK elevation   - c/w IVF and repeat CK with AM labs   -CKMB and trop also elevated and suspect 2/2 fall and rhabdo, will monitor on telemetry. currently asymptomatic with no CP. trop trending down. no new ischemic changes on EKG

## 2020-01-10 NOTE — CHART NOTE - NSCHARTNOTEFT_GEN_A_CORE
Yeyo James | Reference #: [id number]    Others' Prescriptions  Patient Name:	Basilia Srinivasan	YOB: 1927  [address] [place] [place] [address]	Sex:	Female  Rx Written	Rx Dispensed	Drug	Quantity	Days Supply	Prescriber Name  12/30/2019	12/31/2019	alprazolam 0.5 mg tablet	90	30	Tarun Davis MD  10/21/2019	12/09/2019	zolpidem tartrate 10 mg tablet	30	30	Tarun Davis MD  11/04/2019	11/05/2019	alprazolam 0.5 mg tablet	90	30	Tarun Davis MD  10/21/2019	10/24/2019	zolpidem tartrate 10 mg tablet	30	30	Tarun Davis MD  09/16/2019	09/17/2019	alprazolam 0.5 mg tablet	90	30	Tarun Davis MD  07/01/2019	09/08/2019	zolpidem tartrate 10 mg tablet	30	30	BenTarun christiansen MD  07/01/2019	08/07/2019	zolpidem tartrate 10 mg tablet	30	30	BenTarun christiansen MD  07/17/2019	07/18/2019	alprazolam 0.5 mg tablet	90	30	Tarun Davis MD  Patient Name:	Basilia Srinivasan	YOB: 1927  Address:	[address] [place] [address]  [place] [address]	Sex:	Female  Rx Written	Rx Dispensed	Drug	Quantity	Days Supply	Prescriber Name  12/02/2019	12/02/2019	alprazolam 0.5 mg tablet	10	10	Aniya George  Patient Name:	Basilia Holloway	YOB: 1927  Address:	[address] [place] [address]	Sex:	Female  Rx Written	Rx Dispensed	Drug	Quantity	Days Supply	Prescriber Name  03/27/2019	06/01/2019	zolpidem tartrate 10 mg tablet	30	30	Tarun Davis MD  05/30/2019	06/01/2019	alprazolam 0.5 mg tablet	90	30	Tarun Davis MD  03/27/2019	05/02/2019	zolpidem tartrate 10 mg tablet	30	30	Tarun Davis MD  03/27/2019	03/29/2019	alprazolam 0.5 mg tablet	90	30	Tarun Davis MD  03/27/2019	03/29/2019	zolpidem tartrate 10 mg tablet	30	30	Tarun Davis MD  12/28/2018	02/27/2019	zolpidem tartrate 10 mg tablet	30	30	Tarun Davis MD  02/01/2019	02/02/2019	alprazolam 0.5 mg tablet	90	30	Tarun Davis MD  12/28/2018	01/29/2019	zolpidem tartrate 10 mg tablet	30	30	Tarun Davis MD

## 2020-01-10 NOTE — H&P ADULT - PROBLEM SELECTOR PLAN 3
- Monitor BMP   - acute kidney injury due to gastroenteritis now on IVF- gastroenteritis has resolved -ALLAN-  likely prerenal 2/2 dehydration from gastroenteritis vs rhabdo  -c/w gentle IVF and repeat Cr in AM  -renally dose meds

## 2020-01-10 NOTE — ED PROVIDER NOTE - OBJECTIVE STATEMENT
89yo f PMH of HTN, HLD, SVT (AVNRT)s/p ablation on 06/21/2018, PE/DVT on Xarelto, chronic and stable bifascicular block and endometrial CA s/p LYENTTE pw daughter for fall at home, nausea vomiting and diarrhea. Patient awake and alert, found on floor by daughter this AM and found to be in SVT by EMS. Pt presently has  on time of eval. reports n/v/d for last few days, daughter saw her last night and this am didn't hear from her so went to check on her and found her on the floor. Reports worsening of symptoms since onset. reports having received the influenza vaccine this year. Denies sick contacts. Denies smoking history. Denies chest pain, shortness of breath, abdominal pain, constipation, urinary symptoms. 89yo F BIBEMS after being found down.  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 Patient awake and alert, found on floor by daughter this AM and found to be in SVT by EMS. Pt presently has labile HR between 169 and mid 80's during eval. reports n/v/d for last few days, daughter saw her last night and this am didn't hear from her so went to check on her and found her on the floor. Reports worsening of symptoms since onset. reports having received the influenza vaccine this year. Denies sick contacts. Denies smoking history. Denies chest pain, shortness of breath, abdominal pain, constipation, urinary symptoms. It is unclear how long pt was down for or exactly when she was last seen.  Reportedly at least 12 hrs down per pt, but she doesn't recall how she fell.     Pt has HHA 4 hrs a day. Lives alone otherwise

## 2020-01-10 NOTE — H&P ADULT - ATTENDING COMMENTS
I was physically present for the key portions of the evaluation and management (E/M) service provided.  I agree with the above history, physical, and plan which I have reviewed and edited where appropriate.     Plan discussed with ACP team.

## 2020-01-10 NOTE — H&P ADULT - PROBLEM SELECTOR PROBLEM 2
Has a sore on head and mom is not sure if it is infected or not. Also has a dot that is red is in his fold and not sure if infected or normal.    UTI (urinary tract infection) Fall, initial encounter

## 2020-01-10 NOTE — H&P ADULT - GASTROINTESTINAL DETAILS
soft/no guarding/nontender/no masses palpable/bowel sounds normal/no distention/normal/no rebound tenderness

## 2020-01-10 NOTE — H&P ADULT - LYMPHATIC
posterior cervical R/anterior cervical R/supraclavicular L/supraclavicular R/posterior cervical L/anterior cervical L anterior cervical R

## 2020-01-10 NOTE — H&P ADULT - NSHPSOCIALHISTORY_GEN_ALL_CORE
Pt states she lives at home at home alone with HHA for 4 hours a day.  Pt denies smoking, drinking, and IVDA.

## 2020-01-10 NOTE — H&P ADULT - EXTREMITIES
Vaccine Information Statement(s) was given today. This has been reviewed, questions answered, and verbal consent given by Patient for injection(s) and administration of Influenza (Inactivated).    1. Does the patient have a moderate to severe fever?  No  2. Has the patient had a serious reaction to a flu shot before?   No  3. Has the patient ever had Guillian Tabernash Syndrome within 6 weeks of a previous flu shot?  No  4. Is the patient less that 6 months of age?  No    Patient is eligible to receive the vaccine based on all questions being answered as 'No'.    Patient tolerated without incident. See immunization grid for documentation.         detailed exam

## 2020-01-10 NOTE — H&P ADULT - NEUROLOGICAL DETAILS
no spontaneous movement/responds to verbal commands/alert and oriented x 3/sensation intact/cranial nerves intact

## 2020-01-10 NOTE — H&P ADULT - NSHPLABSRESULTS_GEN_ALL_CORE
admission labs reviewed: admission labs reviewed: mild leukocytosis and anemia, elevated INR, ALLAN, mild inc AST and ALT, UA with many bacteria, large LE and WBCs     EKG : self reviewed: Ekg: SR @ 63 bpm + RBBB, Flipped t's III AVF (present on prior EKGs)    CXR: self reviewed: no focal opacification

## 2020-01-10 NOTE — ED ADULT NURSE REASSESSMENT NOTE - NS ED NURSE REASSESS COMMENT FT1
received pt in bed A and Ox 3 in NAD resting comfortably, on monitor with a fib noted, rate controlled, VS  as noted, pt is on O2 NC at 2 LPM, breathing is even and unlabored. personla care assisted with, epdning further disposition.

## 2020-01-10 NOTE — ED PROVIDER NOTE - ATTENDING CONTRIBUTION TO CARE
Attending Attestation: Dr. Leal  I have personally performed a history and physical examination of the patient and discussed management with the resident as well as the patient.  I reviewed the resident's note and agree with the documented findings and plan of care.  I have authored and modified critical sections of the Provider Note, including but not limited to HPI, Physical Exam and MDM. 89yo f c complex PMHx BIBEMS after being found down. Also c/o nausea vomiting and diarrhea. Unclear if LOC.  unclear duration on floor. Question syncope vs cardiac etiologies (SVT/ACS), or infectious causing syncope. Will eval with labs, cxr, r/o pna, cardiac markers and ck r/o rhabdo, will also ct head for chi with xarelto, likely admit. svt hr back and forth to 170's to 90's will get ep for reccs with significant hx dhf and svt in past.  Hold off on lauro blockers for now given, also, intermittent bradycardia.

## 2020-01-10 NOTE — H&P ADULT - PROBLEM SELECTOR PLAN 4
- Serial EKG  continue metoprolol, asa  check TSH  Ep following- for medical therapy in setting of missing home meds and dehydration 2/2 gastroenteritis  restart metoprolol, asa  check TSH  EP following- will appreciate recs

## 2020-01-10 NOTE — CONSULT NOTE ADULT - ASSESSMENT
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 Patient was found to be in atrial tachycardia with VR up to 170 bpm in the setting of nausea/vomiting, diarrhea x 2-3 days and dehydration. Currently patient is maintaining sinus rhythm. Discussed with Dr. Watkins  - Continue metoprolol ER 25 mg daily  - Continue to monitor on telemetry  - Maintain K=~4.0 and Mg++~ 2.0  - Continue other management as per primary team

## 2020-01-10 NOTE — ED PROVIDER NOTE - PROGRESS NOTE DETAILS
patient was admitted to medicine for further evaluation and treatment/management, ep following hr stable in 70's since fluid resuscitation

## 2020-01-10 NOTE — CONSULT NOTE ADULT - ATTENDING COMMENTS
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 Patient was found to be in atrial tachycardia with VR up to 170 bpm in the setting of nausea/vomiting, diarrhea x 2-3 days and dehydration. Will continue metoprolol. Follow on tele.

## 2020-01-10 NOTE — H&P ADULT - RS GEN PE MLT RESP DETAILS PC
breath sounds equal/good air movement/respirations non-labored/clear to auscultation bilaterally/normal/airway patent

## 2020-01-10 NOTE — ED PROVIDER NOTE - PSH
History of Breast Lump/Mass Excision- [facility]- left- 1971    History of Colonoscopy- 2011/Sept    History of D&C- 8/12/11    Status post cataract extraction  OS  Status post hysterectomy  endometrial cancer

## 2020-01-10 NOTE — ED PROVIDER NOTE - CLINICAL SUMMARY MEDICAL DECISION MAKING FREE TEXT BOX
89yo f pw daughter for fall at home, nausea vomiting and diarrhea. question syncope vs cardiac etiologies or infectious causing syncope, will eval with labs, cxr, r/o pna, cardiac markers and ck r/o rhabdo, will also ct head for chi with xarelto, likely admit. svt hr back and forth to 170's to 90's will get ep for reccs with significant hx dhf and svt in past. 89yo f c complex PMHx BIBEMS after being found down. Also c/o nausea vomiting and diarrhea. Unclear if LOC.  unclear duration on floor. Question syncope vs cardiac etiologies (SVT/ACS), or infectious causing syncope. Will eval with labs, cxr, r/o pna, cardiac markers and ck r/o rhabdo, will also ct head for chi with xarelto, likely admit. svt hr back and forth to 170's to 90's will get ep for reccs with significant hx dhf and svt in past.  Hold off on lauro blockers for now given, also, intermittent bradycardia.

## 2020-01-10 NOTE — ED PROVIDER NOTE - NS ED ROS FT
ROS:  GENERAL: No fever, no chills  EYES: no change in vision  HEENT: no trouble swallowing, no trouble speaking  CARDIAC: no chest pain  PULMONARY: no cough, no shortness of breath  GI: no abdominal pain, + nausea, + vomiting, +diarrhea, no constipation  : No dysuria, no frequency, no change in appearance, or odor of urine  SKIN: no rashes  NEURO: no headache, no weakness  MSK: No joint pain  ~Neto Fletcher D.O. -Resident

## 2020-01-10 NOTE — H&P ADULT - PROBLEM SELECTOR PLAN 2
-Acute bacterial cystitis    IV Ciprofloxacin  - suspect may have had vasovagal episode 2/2 dehydration from N/V. N/V also may have triggered SVT   -above imaging neg for acute fx  -will c/w IVF, check orthostatics, resume BB, monitor on telemetry, fall precautions, pending PT eval  -RVP neg suspect may have had vasovagal episode 2/2 dehydration from N/V. N/V also may have triggered SVT. or SVT triggered syncopal event?  -above imaging neg for acute fx  -will c/w IVF, check orthostatics, resume BB, monitor on telemetry, fall precautions, pending PT eval  -RVP neg  suspect leukocytosis 2/2 fall. gastroenteritis resolved and has no N/V/D. has no fever and currently asymptomatic. AAOx3 and denies any dysuria, change in urinary freq. will monitor off abx. if has diarrhea will check GI PCR and cdiff

## 2020-01-10 NOTE — H&P ADULT - NEGATIVE GASTROINTESTINAL SYMPTOMS
no constipation/no flatulence/no steatorrhea/no abdominal pain/no hematochezia/no hiccoughs/no melena/no jaundice

## 2020-01-10 NOTE — H&P ADULT - NEGATIVE GENERAL SYMPTOMS
no weight loss/no polyphagia/no fatigue/no malaise/no weight gain/no polyuria/no polydipsia/no fever/no chills/no sweating/no anorexia

## 2020-01-10 NOTE — H&P ADULT - HISTORY OF PRESENT ILLNESS
89y F w. PMH HTN, HLD, SVT s/p ablation on 6/21/208, endometrial CA s/p LYNETTE in 2011, presents s/p found on floor of her home.  Pt states she has been having multiple episodes of diarrhea, nausea, and vomiting, after eating beef with garlic sauce on Tuesday evening.  The diarrhea has been consistent since Tuesday and has stopped on Thursday night ( over 10 episodes).  Pt states she has progressively felt weaker throughout the past 4 days.  Last night, pt attempted to reach for a drink of water then "slipped out of the bed".  Pt states she was too weak to get up so stayed on the floor until she was found by her daughter at 10 am.  Pt is taking Xarelto but denies head trauma. + Nausea, emesis (non bloody, non bilious). NO chest pain, SOB, ZHANG, PND, orthopnea, palpitations, diaphoresis, lightheadedness, dizziness, syncope, increased lower extremity edema, fever chills, malaise, myalgias, anorexia, weight changes ( loss or gain), night sweats, generalized fatigue,,BRBPR, melena, urinary symptoms, cough, and wheezing. 89y F w. PMH HTN, HLD, SVT s/p ablation on 6/21/208, endometrial CA s/p LYNETTE in 2011, presents after being found on floor of her home.  Pt states she has been having multiple episodes of diarrhea, nausea, and vomiting, after eating beef with garlic sauce on Tuesday evening.  The diarrhea has been consistent since Tuesday and has stopped on Thursday night ( over 10 episodes).  Pt states she has progressively felt weaker throughout the past 4 days.  Last night, pt attempted to reach for a drink of water then "slipped out of the bed".  Pt states she was too weak to get up so stayed on the floor until she was found by her daughter at 10 am.  Pt is taking Xarelto but denies head trauma. + Nausea, emesis (non bloody, non bilious). NO chest pain, SOB, ZHANG, PND, orthopnea, palpitations, diaphoresis, lightheadedness, dizziness, syncope, increased lower extremity edema, fever chills, malaise, myalgias, anorexia, weight changes ( loss or gain), night sweats, generalized fatigue,,BRBPR, melena, urinary symptoms, cough, and wheezing.

## 2020-01-10 NOTE — ED PROVIDER NOTE - CARE PLAN
Principal Discharge DX:	Rhabdomyolysis  Secondary Diagnosis:	Renal insufficiency  Secondary Diagnosis:	UTI (urinary tract infection)  Secondary Diagnosis:	SVT (supraventricular tachycardia)

## 2020-01-10 NOTE — H&P ADULT - NSICDXPASTSURGICALHX_GEN_ALL_CORE_FT
PAST SURGICAL HISTORY:  [facility]     History of Colonoscopy- 2011/Sept     History of D&C- 8/12/11     Leg fracture, right Hip fx repair in Nov 2019    Status post cataract extraction OS    Status post hysterectomy endometrial cancer

## 2020-01-10 NOTE — ED ADULT TRIAGE NOTE - CHIEF COMPLAINT QUOTE
Pt awake and alert, found on floor by daughter this AM and found to be in SVT by EMS. Pt presently has  on cardiac monitor and taken directly to TRA.

## 2020-01-10 NOTE — H&P ADULT - PROBLEM SELECTOR PLAN 9
already on xarelto dvt ppx: xarelto  diet: DASH  dispo: pending PT dvt ppx: xarelto  diet: DASH  dispo: pending PT    Transitions of Care Status:  1.  Name of PCP:  2.  PCP Contacted on Admission: [ ] Y    [x ] N  - evening admission   3.  PCP contacted at Discharge: [ ] Y    [ ] N    [ ] N/A  4.  Post-Discharge Appointment Date and Location:  5.  Summary of Handoff given to PCP:

## 2020-01-10 NOTE — H&P ADULT - NSTOBACCOSCREENHP_GEN_A_NCS
Called Attila to discuss concerns. Attila stated patient's fatigue has increased. Patient saw cardiology approximately one year ago and was recommended to follow up in 6 months. Attila is wondering if it is advised to recheck patient at cardiology. Attila voices concern r/t previously they were told no treatment would be beneficial to decrease the fatigue, so they did not go ahead with the recheck.     Attila also is wondering if different lab work could be done, and would be recommended/benefical to try to find the cause of the increased fatigue. Attila also wondering if testosterone lotion would be beneficial or worth checking into.     I provided Attila cardiologist name and phone number if deciding to set up appointment, Attila would like 's recommendations first. Will route to PCP for review and further advisement. Attila available to talk until noon today, can be reached at work number after 2pm. Sukhdev MACARIO   No

## 2020-01-11 LAB
ALBUMIN SERPL ELPH-MCNC: 2.6 G/DL — LOW (ref 3.3–5)
ALP SERPL-CCNC: 92 U/L — SIGNIFICANT CHANGE UP (ref 40–120)
ALT FLD-CCNC: 44 U/L — HIGH (ref 4–33)
ANION GAP SERPL CALC-SCNC: 12 MMO/L — SIGNIFICANT CHANGE UP (ref 7–14)
AST SERPL-CCNC: 79 U/L — HIGH (ref 4–32)
BASOPHILS # BLD AUTO: 0.02 K/UL — SIGNIFICANT CHANGE UP (ref 0–0.2)
BASOPHILS NFR BLD AUTO: 0.2 % — SIGNIFICANT CHANGE UP (ref 0–2)
BILIRUB DIRECT SERPL-MCNC: < 0.2 MG/DL — SIGNIFICANT CHANGE UP (ref 0.1–0.2)
BILIRUB SERPL-MCNC: 0.3 MG/DL — SIGNIFICANT CHANGE UP (ref 0.2–1.2)
BUN SERPL-MCNC: 34 MG/DL — HIGH (ref 7–23)
CALCIUM SERPL-MCNC: 7.9 MG/DL — LOW (ref 8.4–10.5)
CHLORIDE SERPL-SCNC: 103 MMOL/L — SIGNIFICANT CHANGE UP (ref 98–107)
CHOLEST SERPL-MCNC: 96 MG/DL — LOW (ref 120–199)
CK SERPL-CCNC: 1995 U/L — HIGH (ref 25–170)
CO2 SERPL-SCNC: 22 MMOL/L — SIGNIFICANT CHANGE UP (ref 22–31)
CREAT SERPL-MCNC: 1.65 MG/DL — HIGH (ref 0.5–1.3)
EOSINOPHIL # BLD AUTO: 0.1 K/UL — SIGNIFICANT CHANGE UP (ref 0–0.5)
EOSINOPHIL NFR BLD AUTO: 1.1 % — SIGNIFICANT CHANGE UP (ref 0–6)
GLUCOSE SERPL-MCNC: 104 MG/DL — HIGH (ref 70–99)
HBA1C BLD-MCNC: 5 % — SIGNIFICANT CHANGE UP (ref 4–5.6)
HCT VFR BLD CALC: 30.3 % — LOW (ref 34.5–45)
HDLC SERPL-MCNC: 24 MG/DL — LOW (ref 45–65)
HGB BLD-MCNC: 9.7 G/DL — LOW (ref 11.5–15.5)
IMM GRANULOCYTES NFR BLD AUTO: 0.6 % — SIGNIFICANT CHANGE UP (ref 0–1.5)
LIPID PNL WITH DIRECT LDL SERPL: 49 MG/DL — SIGNIFICANT CHANGE UP
LYMPHOCYTES # BLD AUTO: 0.62 K/UL — LOW (ref 1–3.3)
LYMPHOCYTES # BLD AUTO: 7 % — LOW (ref 13–44)
MAGNESIUM SERPL-MCNC: 2.2 MG/DL — SIGNIFICANT CHANGE UP (ref 1.6–2.6)
MCHC RBC-ENTMCNC: 28.6 PG — SIGNIFICANT CHANGE UP (ref 27–34)
MCHC RBC-ENTMCNC: 32 % — SIGNIFICANT CHANGE UP (ref 32–36)
MCV RBC AUTO: 89.4 FL — SIGNIFICANT CHANGE UP (ref 80–100)
MONOCYTES # BLD AUTO: 0.72 K/UL — SIGNIFICANT CHANGE UP (ref 0–0.9)
MONOCYTES NFR BLD AUTO: 8.1 % — SIGNIFICANT CHANGE UP (ref 2–14)
NEUTROPHILS # BLD AUTO: 7.35 K/UL — SIGNIFICANT CHANGE UP (ref 1.8–7.4)
NEUTROPHILS NFR BLD AUTO: 83 % — HIGH (ref 43–77)
NRBC # FLD: 0 K/UL — SIGNIFICANT CHANGE UP (ref 0–0)
PHOSPHATE SERPL-MCNC: 3.2 MG/DL — SIGNIFICANT CHANGE UP (ref 2.5–4.5)
PLATELET # BLD AUTO: 168 K/UL — SIGNIFICANT CHANGE UP (ref 150–400)
PMV BLD: 11.1 FL — SIGNIFICANT CHANGE UP (ref 7–13)
POTASSIUM SERPL-MCNC: 3.4 MMOL/L — LOW (ref 3.5–5.3)
POTASSIUM SERPL-SCNC: 3.4 MMOL/L — LOW (ref 3.5–5.3)
PROT SERPL-MCNC: 5.5 G/DL — LOW (ref 6–8.3)
RBC # BLD: 3.39 M/UL — LOW (ref 3.8–5.2)
RBC # FLD: 13.2 % — SIGNIFICANT CHANGE UP (ref 10.3–14.5)
SODIUM SERPL-SCNC: 137 MMOL/L — SIGNIFICANT CHANGE UP (ref 135–145)
SPECIMEN SOURCE: SIGNIFICANT CHANGE UP
TRIGL SERPL-MCNC: 115 MG/DL — SIGNIFICANT CHANGE UP (ref 10–149)
TSH SERPL-MCNC: 1.37 UIU/ML — SIGNIFICANT CHANGE UP (ref 0.27–4.2)
WBC # BLD: 8.86 K/UL — SIGNIFICANT CHANGE UP (ref 3.8–10.5)
WBC # FLD AUTO: 8.86 K/UL — SIGNIFICANT CHANGE UP (ref 3.8–10.5)

## 2020-01-11 PROCEDURE — 99233 SBSQ HOSP IP/OBS HIGH 50: CPT

## 2020-01-11 RX ORDER — LANOLIN ALCOHOL/MO/W.PET/CERES
3 CREAM (GRAM) TOPICAL AT BEDTIME
Refills: 0 | Status: COMPLETED | OUTPATIENT
Start: 2020-01-11 | End: 2020-01-11

## 2020-01-11 RX ORDER — ACETAMINOPHEN 500 MG
650 TABLET ORAL EVERY 6 HOURS
Refills: 0 | Status: DISCONTINUED | OUTPATIENT
Start: 2020-01-11 | End: 2020-01-14

## 2020-01-11 RX ORDER — POTASSIUM CHLORIDE 20 MEQ
40 PACKET (EA) ORAL EVERY 4 HOURS
Refills: 0 | Status: COMPLETED | OUTPATIENT
Start: 2020-01-11 | End: 2020-01-11

## 2020-01-11 RX ADMIN — Medication 40 MILLIEQUIVALENT(S): at 11:43

## 2020-01-11 RX ADMIN — Medication 0.5 MILLIGRAM(S): at 21:48

## 2020-01-11 RX ADMIN — Medication 1 TABLET(S): at 11:43

## 2020-01-11 RX ADMIN — ATORVASTATIN CALCIUM 10 MILLIGRAM(S): 80 TABLET, FILM COATED ORAL at 21:48

## 2020-01-11 RX ADMIN — Medication 100 MILLIGRAM(S): at 11:43

## 2020-01-11 RX ADMIN — SODIUM CHLORIDE 75 MILLILITER(S): 9 INJECTION INTRAMUSCULAR; INTRAVENOUS; SUBCUTANEOUS at 06:13

## 2020-01-11 RX ADMIN — Medication 81 MILLIGRAM(S): at 11:44

## 2020-01-11 RX ADMIN — PANTOPRAZOLE SODIUM 40 MILLIGRAM(S): 20 TABLET, DELAYED RELEASE ORAL at 06:13

## 2020-01-11 RX ADMIN — Medication 40 MILLIEQUIVALENT(S): at 14:58

## 2020-01-11 RX ADMIN — Medication 0.5 MILLIGRAM(S): at 01:21

## 2020-01-11 RX ADMIN — Medication 650 MILLIGRAM(S): at 15:43

## 2020-01-11 RX ADMIN — Medication 3 MILLIGRAM(S): at 01:21

## 2020-01-11 RX ADMIN — RIVAROXABAN 15 MILLIGRAM(S): KIT at 11:43

## 2020-01-11 RX ADMIN — Medication 650 MILLIGRAM(S): at 14:58

## 2020-01-11 NOTE — PHYSICAL THERAPY INITIAL EVALUATION ADULT - PATIENT PROFILE REVIEW, REHAB EVAL
yes/PT orders received: ambulate as tolerated. Consult with OMAR ESCOBAR, pt may participate in PT evaluation.

## 2020-01-11 NOTE — PHYSICAL THERAPY INITIAL EVALUATION ADULT - DISCHARGE DISPOSITION, PT EVAL
Patient will benefit from inpatient restorative rehab at this time to improve functional mobility and strength to allow patient to return to baseline functional status./rehabilitation facility

## 2020-01-11 NOTE — PHYSICAL THERAPY INITIAL EVALUATION ADULT - ADDITIONAL COMMENTS
Patient reports she lives alone in a private house, +chair lift. Patient reports she requires varying levels of assistance with ADLs and ambulated with a rolling walker prior to admission. Pt reports she has a home health aide 4 hours/day, Monday-Friday.    Patient was left semi-supine in bed as found, all lines/tubes intact and call vicente within reach, OMAR negron

## 2020-01-11 NOTE — PROGRESS NOTE ADULT - PROBLEM SELECTOR PLAN 3
-ALLAN-  likely prerenal 2/2 dehydration from gastroenteritis vs rhabdo  -c/w gentle IVF and repeat Cr in AM  -renally dose meds

## 2020-01-11 NOTE — PATIENT PROFILE ADULT - DOES PATIENT HAVE ADVANCE DIRECTIVE
yes yes/will pass on day shift to ask patient to request healthcare proxy and living will paperwork for chart

## 2020-01-11 NOTE — PHYSICAL THERAPY INITIAL EVALUATION ADULT - PERTINENT HX OF CURRENT PROBLEM, REHAB EVAL
Patient is a 89 year old female admitted to [facility] on 1/10 after being found on floor of her home.  Pt states she has been having multiple episodes of diarrhea, nausea, and vomiting. PMH: PMH HTN, HLD, SVT s/p ablation on 6/21/208, endometrial CA s/p LYNETTE in 2011. CT Head: No mass effect, hemorrhage or evidence of acute intracranial pathology.

## 2020-01-11 NOTE — PROGRESS NOTE ADULT - SUBJECTIVE AND OBJECTIVE BOX
Overnight Events:   No events overnight    Medications:  ALPRAZolam 0.5 milliGRAM(s) Oral three times a day PRN  aspirin enteric coated 81 milliGRAM(s) Oral daily  atorvastatin 10 milliGRAM(s) Oral at bedtime  metoprolol succinate ER 25 milliGRAM(s) Oral daily  multivitamin 1 Tablet(s) Oral daily  pantoprazole    Tablet 40 milliGRAM(s) Oral before breakfast  rivaroxaban 15 milliGRAM(s) Oral daily  sodium chloride 0.9%. 1000 milliLiter(s) IV Continuous <Continuous>  traZODone 100 milliGRAM(s) Oral daily      PAST MEDICAL & SURGICAL HISTORY:  Endometrial cancer: S/P LYNETTE with SBO  HLD (hyperlipidemia)  HTN (hypertension)  PAF (paroxysmal atrial fibrillation): On Xarelto  PE (pulmonary thromboembolism)  Insomnia  Breast Lump  Anxiety  Leg fracture, right: Hip fx repair in 2019  Status post cataract extraction: OS  Status post hysterectomy: endometrial cancer  History of D&C- 11  History of Colonoscopy-   History of Breast Lump/Mass Excision- [facility]- left-       Vitals:  T(F): 97.6 (), Max: 97.9 (01-10)  HR: 65 () (65 - 169)  BP: 101/62 () (101/62 - 121/92)  RR: 18 ()  SpO2: 97% ()  I&O's Summary      Physical Exam:  Appearance: No acute distress; well appearing  Eyes: PERRL, EOMI, pink conjunctiva  HENT: Normal oral muscosa  Cardiovascular: RRR, S1, S2, no murmurs, rubs, or gallops; no edema; no JVD  Respiratory: Clear to auscultation bilaterally  Gastrointestinal: soft, non-tender, non-distended with normal bowel sounds  Musculoskeletal: No clubbing; no joint deformity   Neurologic: Non-focal  Lymphatic: No lymphadenopathy  Psychiatry: AAOx3, mood & affect appropriate  Skin: No rashes, ecchymoses, or cyanosis                          9.7    8.86  )-----------( 168      ( 2020 05:25 )             30.3         137  |  103  |  34<H>  ----------------------------<  104<H>  3.4<L>   |  22  |  1.65<H>    Ca    7.9<L>      2020 05:25  Phos  3.2       Mg     2.2         TPro  5.5<L>  /  Alb  2.6<L>  /  TBili  0.3  /  DBili  < 0.2  /  AST  79<H>  /  ALT  44<H>  /  AlkPhos  92      PT/INR - ( 10 Alvaro 2020 11:25 )   PT: 25.4 SEC;   INR: 2.23          PTT - ( 10 Alvaro 2020 11:25 )  PTT:35.9 SEC  CARDIAC MARKERS ( 2020 05:25 )  x     / x     / 1995 u/L / x     / x      CARDIAC MARKERS ( 10 Alvaro 2020 11:25 )  x     / x     / 2068 u/L / 6.59 ng/mL / x            Total Cholesterol: 96  LDL: 49  HDL: 24  T    Hemoglobin A1C, Whole Blood: 5.0 % ( @ 05:25)

## 2020-01-11 NOTE — PROGRESS NOTE ADULT - SUBJECTIVE AND OBJECTIVE BOX
Julián Royal M.D. Pager Number [phone]    Patient is a 89y old  Female who presents with a chief complaint of Unwitnessed fall (2020 09:47)      SUBJECTIVE / OVERNIGHT EVENTS:  Pt seen and examined at bedside. No acute events overnight.  Pt denies cp, palpitations, sob, abd pain, N/V, fever, chills.    ROS:  All other review of systems negative    Allergies    Eye cream from the 1960s Neocortef ?spelling caused eyes to becomes swollen (Unknown)  neomycin (Unknown)  piperacillin-tazobactam (Rash)  soaps and creams causes rash uses only sensitive skin soap (Rash)  sulfa drugs (Unknown)  Voltaren (Rash)    Intolerances        MEDICATIONS  (STANDING):  aspirin enteric coated 81 milliGRAM(s) Oral daily  atorvastatin 10 milliGRAM(s) Oral at bedtime  metoprolol succinate ER 25 milliGRAM(s) Oral daily  multivitamin 1 Tablet(s) Oral daily  pantoprazole    Tablet 40 milliGRAM(s) Oral before breakfast  rivaroxaban 15 milliGRAM(s) Oral daily  sodium chloride 0.9%. 1000 milliLiter(s) (75 mL/Hr) IV Continuous <Continuous>  traZODone 100 milliGRAM(s) Oral daily    MEDICATIONS  (PRN):  acetaminophen   Tablet .. 650 milliGRAM(s) Oral every 6 hours PRN Temp greater or equal to 38C (100.4F), Mild Pain (1 - 3)  ALPRAZolam 0.5 milliGRAM(s) Oral three times a day PRN anxiety      Vital Signs Last 24 Hrs  T(C): 36.7 (2020 14:42), Max: 37.2 (2020 10:12)  T(F): 98 (2020 14:42), Max: 99 (2020 10:12)  HR: 85 (2020 14:42) (65 - 85)  BP: 115/67 (2020 14:42) (101/62 - 115/67)  BP(mean): --  RR: 18 (2020 14:42) (18 - 20)  SpO2: 100% (2020 14:42) (96% - 100%)  CAPILLARY BLOOD GLUCOSE        I&O's Summary      PHYSICAL EXAM:  GENERAL: NAD, elderly female  HEAD:  Atraumatic, Normocephalic  EYES: EOMI, PERRLA, conjunctiva and sclera clear  NECK: Supple, No JVD  CHEST/LUNG: Clear to auscultation bilaterally; No wheeze  HEART: Regular rate and rhythm; No murmurs, rubs, or gallops  ABDOMEN: Soft, Nontender, Nondistended; Bowel sounds present  EXTREMITIES:  2+ Peripheral Pulses, No clubbing, cyanosis, or edema  NEUROLOGY: AAOx3, non-focal  PSYCH: calm  SKIN: No rashes or lesions    LABS:                        9.7    8.86  )-----------( 168      ( 2020 05:25 )             30.3     01-11    137  |  103  |  34<H>  ----------------------------<  104<H>  3.4<L>   |  22  |  1.65<H>    Ca    7.9<L>      2020 05:25  Phos  3.2       Mg     2.2     11    TPro  5.5<L>  /  Alb  2.6<L>  /  TBili  0.3  /  DBili  < 0.2  /  AST  79<H>  /  ALT  44<H>  /  AlkPhos  92  -11    PT/INR - ( 10 Alvaro 2020 11:25 )   PT: 25.4 SEC;   INR: 2.23          PTT - ( 10 Alvaro 2020 11:25 )  PTT:35.9 SEC  CARDIAC MARKERS ( 2020 05:25 )  x     / x     / 1995 u/L / x     / x      CARDIAC MARKERS ( 10 Alvaro 2020 11:25 )  x     / x     / 2068 u/L / 6.59 ng/mL / x          Urinalysis Basic - ( 10 Alvaro 2020 12:10 )    Color: YELLOW / Appearance: Lt TURBID / S.018 / pH: 6.0  Gluc: NEGATIVE / Ketone: TRACE  / Bili: NEGATIVE / Urobili: NORMAL   Blood: MODERATE / Protein: 100 / Nitrite: NEGATIVE   Leuk Esterase: LARGE / RBC: 6-10 / WBC >50   Sq Epi: OCC / Non Sq Epi: x / Bacteria: MANY        RADIOLOGY & ADDITIONAL TESTS:  Results Reviewed:   Imaging Personally Reviewed:  Electrocardiogram Personally Reviewed:    COORDINATION OF CARE:  Care Discussed with Consultants/Other Providers [Y/N]:  Prior or Outpatient Records Reviewed [Y/N]:

## 2020-01-11 NOTE — PROGRESS NOTE ADULT - PROBLEM SELECTOR PLAN 2
suspect may have had vasovagal episode 2/2 dehydration from N/V. N/V also may have triggered SVT. or SVT triggered syncopal event?  -above imaging neg for acute fx  -will c/w IVF, check orthostatics, resume BB, monitor on telemetry, fall precautions  -PT recs for inpatient restorative rehab

## 2020-01-11 NOTE — PROGRESS NOTE ADULT - ASSESSMENT
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 Patient was found to be in atrial tachycardia with VR up to 170 bpm in the setting of nausea/vomiting, diarrhea x 2-3 days and dehydration. Currently patient is maintaining sinus rhythm.   - Continue metoprolol ER 25 mg daily  - Continue to monitor on telemetry  - Maintain K=~4.0 and Mg++~ 2.0  - Continue other management as per primary team

## 2020-01-11 NOTE — PATIENT PROFILE ADULT - STATED REASON FOR ADMISSION
Pt states she kept throwing up and had diarrhea. Pt was very weak, ended up on the floor, and was found by daughter

## 2020-01-11 NOTE — PROGRESS NOTE ADULT - ASSESSMENT
89y F w/ PMH of HTN, HLD, SVT s/p ablation on 6/21/2018, endometrial CA s/p LYNETTE presents found on floor of home after fall, a/f syncopal eval,  rhabdomyolysis, SVT, in the setting of acute viral gastroenteritis:

## 2020-01-11 NOTE — PROGRESS NOTE ADULT - PROBLEM SELECTOR PLAN 9
dvt ppx: xarelto  diet: DASH  dispo: pt recs restorative rehab     Transitions of Care Status:  1.  Name of PCP:  2.  PCP Contacted on Admission: [ ] Y    [x ] N  - evening admission   3.  PCP contacted at Discharge: [ ] Y    [ ] N    [ ] N/A  4.  Post-Discharge Appointment Date and Location:  5.  Summary of Handoff given to PCP:

## 2020-01-11 NOTE — PHYSICAL THERAPY INITIAL EVALUATION ADULT - GAIT DEVIATIONS NOTED, PT EVAL
decreased kelsea/decreased step length/decreased stride length/decreased weight-shifting ability/increased time in double stance

## 2020-01-11 NOTE — PROGRESS NOTE ADULT - PROBLEM SELECTOR PLAN 4
Resolved   -in setting of missing home meds and dehydration 2/2 gastroenteritis  -Continue  metoprolol, asa  -TSH normal  -EP following; appreciate recs

## 2020-01-12 DIAGNOSIS — N30.00 ACUTE CYSTITIS WITHOUT HEMATURIA: ICD-10-CM

## 2020-01-12 LAB
-  AMIKACIN: SIGNIFICANT CHANGE UP
-  AMPICILLIN/SULBACTAM: SIGNIFICANT CHANGE UP
-  AMPICILLIN: SIGNIFICANT CHANGE UP
-  AZTREONAM: SIGNIFICANT CHANGE UP
-  CEFAZOLIN: SIGNIFICANT CHANGE UP
-  CEFEPIME: SIGNIFICANT CHANGE UP
-  CEFOXITIN: SIGNIFICANT CHANGE UP
-  CEFTAZIDIME: SIGNIFICANT CHANGE UP
-  CEFTRIAXONE: SIGNIFICANT CHANGE UP
-  ERTAPENEM: SIGNIFICANT CHANGE UP
-  GENTAMICIN: SIGNIFICANT CHANGE UP
-  IMIPENEM: SIGNIFICANT CHANGE UP
-  LEVOFLOXACIN: SIGNIFICANT CHANGE UP
-  MEROPENEM: SIGNIFICANT CHANGE UP
-  NITROFURANTOIN: SIGNIFICANT CHANGE UP
-  PIPERACILLIN/TAZOBACTAM: SIGNIFICANT CHANGE UP
-  TIGECYCLINE: SIGNIFICANT CHANGE UP
-  TOBRAMYCIN: SIGNIFICANT CHANGE UP
-  TRIMETHOPRIM/SULFAMETHOXAZOLE: SIGNIFICANT CHANGE UP
ANION GAP SERPL CALC-SCNC: 11 MMO/L — SIGNIFICANT CHANGE UP (ref 7–14)
BACTERIA UR CULT: SIGNIFICANT CHANGE UP
BUN SERPL-MCNC: 28 MG/DL — HIGH (ref 7–23)
CALCIUM SERPL-MCNC: 8.9 MG/DL — SIGNIFICANT CHANGE UP (ref 8.4–10.5)
CHLORIDE SERPL-SCNC: 105 MMOL/L — SIGNIFICANT CHANGE UP (ref 98–107)
CK SERPL-CCNC: 764 U/L — HIGH (ref 25–170)
CO2 SERPL-SCNC: 22 MMOL/L — SIGNIFICANT CHANGE UP (ref 22–31)
CREAT SERPL-MCNC: 1.2 MG/DL — SIGNIFICANT CHANGE UP (ref 0.5–1.3)
GLUCOSE SERPL-MCNC: 110 MG/DL — HIGH (ref 70–99)
HCT VFR BLD CALC: 33.6 % — LOW (ref 34.5–45)
HGB BLD-MCNC: 10.7 G/DL — LOW (ref 11.5–15.5)
MAGNESIUM SERPL-MCNC: 2.1 MG/DL — SIGNIFICANT CHANGE UP (ref 1.6–2.6)
MCHC RBC-ENTMCNC: 28.4 PG — SIGNIFICANT CHANGE UP (ref 27–34)
MCHC RBC-ENTMCNC: 31.8 % — LOW (ref 32–36)
MCV RBC AUTO: 89.1 FL — SIGNIFICANT CHANGE UP (ref 80–100)
METHOD TYPE: SIGNIFICANT CHANGE UP
NRBC # FLD: 0 K/UL — SIGNIFICANT CHANGE UP (ref 0–0)
ORGANISM # SPEC MICROSCOPIC CNT: SIGNIFICANT CHANGE UP
ORGANISM # SPEC MICROSCOPIC CNT: SIGNIFICANT CHANGE UP
PHOSPHATE SERPL-MCNC: 2.4 MG/DL — LOW (ref 2.5–4.5)
PLATELET # BLD AUTO: 214 K/UL — SIGNIFICANT CHANGE UP (ref 150–400)
PMV BLD: 11 FL — SIGNIFICANT CHANGE UP (ref 7–13)
POTASSIUM SERPL-MCNC: 4.4 MMOL/L — SIGNIFICANT CHANGE UP (ref 3.5–5.3)
POTASSIUM SERPL-SCNC: 4.4 MMOL/L — SIGNIFICANT CHANGE UP (ref 3.5–5.3)
RBC # BLD: 3.77 M/UL — LOW (ref 3.8–5.2)
RBC # FLD: 13.2 % — SIGNIFICANT CHANGE UP (ref 10.3–14.5)
SODIUM SERPL-SCNC: 138 MMOL/L — SIGNIFICANT CHANGE UP (ref 135–145)
WBC # BLD: 9.18 K/UL — SIGNIFICANT CHANGE UP (ref 3.8–10.5)
WBC # FLD AUTO: 9.18 K/UL — SIGNIFICANT CHANGE UP (ref 3.8–10.5)

## 2020-01-12 PROCEDURE — 99233 SBSQ HOSP IP/OBS HIGH 50: CPT

## 2020-01-12 RX ORDER — CEFTRIAXONE 500 MG/1
1 INJECTION, POWDER, FOR SOLUTION INTRAMUSCULAR; INTRAVENOUS EVERY 24 HOURS
Refills: 0 | Status: DISCONTINUED | OUTPATIENT
Start: 2020-01-12 | End: 2020-01-13

## 2020-01-12 RX ORDER — POLYETHYLENE GLYCOL 3350 17 G/17G
17 POWDER, FOR SOLUTION ORAL DAILY
Refills: 0 | Status: DISCONTINUED | OUTPATIENT
Start: 2020-01-12 | End: 2020-01-14

## 2020-01-12 RX ORDER — LANOLIN ALCOHOL/MO/W.PET/CERES
3 CREAM (GRAM) TOPICAL ONCE
Refills: 0 | Status: COMPLETED | OUTPATIENT
Start: 2020-01-12 | End: 2020-01-12

## 2020-01-12 RX ORDER — FLUTICASONE PROPIONATE 50 MCG
1 SPRAY, SUSPENSION NASAL
Refills: 0 | Status: DISCONTINUED | OUTPATIENT
Start: 2020-01-12 | End: 2020-01-14

## 2020-01-12 RX ADMIN — Medication 650 MILLIGRAM(S): at 10:15

## 2020-01-12 RX ADMIN — Medication 1 TABLET(S): at 12:02

## 2020-01-12 RX ADMIN — Medication 100 MILLIGRAM(S): at 12:02

## 2020-01-12 RX ADMIN — RIVAROXABAN 15 MILLIGRAM(S): KIT at 12:02

## 2020-01-12 RX ADMIN — PANTOPRAZOLE SODIUM 40 MILLIGRAM(S): 20 TABLET, DELAYED RELEASE ORAL at 05:59

## 2020-01-12 RX ADMIN — Medication 3 MILLIGRAM(S): at 22:04

## 2020-01-12 RX ADMIN — Medication 650 MILLIGRAM(S): at 09:34

## 2020-01-12 RX ADMIN — Medication 0.5 MILLIGRAM(S): at 22:04

## 2020-01-12 RX ADMIN — ATORVASTATIN CALCIUM 10 MILLIGRAM(S): 80 TABLET, FILM COATED ORAL at 22:04

## 2020-01-12 RX ADMIN — Medication 25 MILLIGRAM(S): at 05:59

## 2020-01-12 RX ADMIN — CEFTRIAXONE 100 MILLIGRAM(S): 500 INJECTION, POWDER, FOR SOLUTION INTRAMUSCULAR; INTRAVENOUS at 12:02

## 2020-01-12 RX ADMIN — Medication 81 MILLIGRAM(S): at 12:02

## 2020-01-12 NOTE — PROGRESS NOTE ADULT - PROBLEM SELECTOR PLAN 1
-Urine cx positive for >100k E. coli  -Start Ceftriaxone 1gm daily x 3 days  -Follow up with urine cx sensitivities

## 2020-01-12 NOTE — PROGRESS NOTE ADULT - ASSESSMENT
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 Patient was found to be in atrial tachycardia with VR up to 170 bpm in the setting of nausea/vomiting, diarrhea x 2-3 days and dehydration. Currently patient is maintaining sinus rhythm.   - Continue metoprolol ER 25 mg daily however if continues to have runs of AT please call EP service to consider increasing dose  - Continue to monitor on telemetry  - Maintain K=~4.0 and Mg++~ 2.0  - Continue other management as per primary team

## 2020-01-12 NOTE — PROGRESS NOTE ADULT - PROBLEM SELECTOR PLAN 4
-Resolved  -likely prerenal 2/2 dehydration from gastroenteritis vs rhabdo  -c/w gentle IVF and repeat Cr in AM  -renally dose meds

## 2020-01-12 NOTE — PROGRESS NOTE ADULT - SUBJECTIVE AND OBJECTIVE BOX
Overnight Events:   Patient had run of SVT to 170's yesterday late morning, now back in NSR      Medications:  acetaminophen   Tablet .. 650 milliGRAM(s) Oral every 6 hours PRN  ALPRAZolam 0.5 milliGRAM(s) Oral three times a day PRN  aspirin enteric coated 81 milliGRAM(s) Oral daily  atorvastatin 10 milliGRAM(s) Oral at bedtime  metoprolol succinate ER 25 milliGRAM(s) Oral daily  multivitamin 1 Tablet(s) Oral daily  pantoprazole    Tablet 40 milliGRAM(s) Oral before breakfast  rivaroxaban 15 milliGRAM(s) Oral daily  traZODone 100 milliGRAM(s) Oral daily      PAST MEDICAL & SURGICAL HISTORY:  Endometrial cancer: S/P LYNETTE with SBO  HLD (hyperlipidemia)  HTN (hypertension)  PAF (paroxysmal atrial fibrillation): On Xarelto  PE (pulmonary thromboembolism)  Insomnia  Breast Lump  Anxiety  Leg fracture, right: Hip fx repair in Nov 2019  Status post cataract extraction: OS  Status post hysterectomy: endometrial cancer  History of D&C- 8/12/11  History of Colonoscopy- 2011/Sept  [facility]      Vitals:  T(F): 97.9 (01-12), Max: 99 (01-11)  HR: 78 (01-12) (78 - 85)  BP: 107/51 (01-12) (104/56 - 115/67)  RR: 18 (01-12)  SpO2: 97% (01-12)  I&O's Summary      Physical Exam:  Appearance: No acute distress; well appearing  Eyes: PERRL, EOMI, pink conjunctiva  HENT: Normal oral muscosa  Cardiovascular: RRR, S1, S2, no murmurs, rubs, or gallops; no edema; no JVD  Respiratory: Clear to auscultation bilaterally  Gastrointestinal: soft, non-tender, non-distended with normal bowel sounds  Musculoskeletal: No clubbing; no joint deformity   Neurologic: Non-focal  Lymphatic: No lymphadenopathy  Psychiatry: AAOx3, mood & affect appropriate  Skin: No rashes, ecchymoses, or cyanosis                          10.7   9.18  )-----------( 214      ( 12 Jan 2020 05:40 )             33.6     01-12    138  |  105  |  28<H>  ----------------------------<  110<H>  4.4   |  22  |  1.20    Ca    8.9      12 Jan 2020 05:40  Phos  2.4     01-12  Mg     2.1     01-12    TPro  5.5<L>  /  Alb  2.6<L>  /  TBili  0.3  /  DBili  < 0.2  /  AST  79<H>  /  ALT  44<H>  /  AlkPhos  92  01-11    PT/INR - ( 10 Alvaro 2020 11:25 )   PT: 25.4 SEC;   INR: 2.23          PTT - ( 10 Alvaro 2020 11:25 )  PTT:35.9 SEC  CARDIAC MARKERS ( 12 Jan 2020 05:40 )  x     / x     / 764 u/L / x     / x      CARDIAC MARKERS ( 11 Jan 2020 05:25 )  x     / x     / 1995 u/L / x     / x      CARDIAC MARKERS ( 10 Alvaro 2020 11:25 )  x     / x     / 2068 u/L / 6.59 ng/mL / x

## 2020-01-12 NOTE — PROGRESS NOTE ADULT - SUBJECTIVE AND OBJECTIVE BOX
Julián Royal M.D. Pager Number [phone]    Patient is a 89y old  Female who presents with a chief complaint of Unwitnessed fall (12 Jan 2020 08:18)      SUBJECTIVE / OVERNIGHT EVENTS:  Pt seen and examined at bedside. No acute events overnight.  Pt denies cp, palpitations, sob, abd pain, N/V, fever, chills.    ROS:  All other review of systems negative    Allergies    Eye cream from the 1960s Neocortef ?spelling caused eyes to becomes swollen (Unknown)  neomycin (Unknown)  piperacillin-tazobactam (Rash)  soaps and creams causes rash uses only sensitive skin soap (Rash)  sulfa drugs (Unknown)  Voltaren (Rash)    Intolerances        MEDICATIONS  (STANDING):  aspirin enteric coated 81 milliGRAM(s) Oral daily  atorvastatin 10 milliGRAM(s) Oral at bedtime  cefTRIAXone   IVPB 1 milliGRAM(s) IV Intermittent every 24 hours  metoprolol succinate ER 25 milliGRAM(s) Oral daily  multivitamin 1 Tablet(s) Oral daily  pantoprazole    Tablet 40 milliGRAM(s) Oral before breakfast  rivaroxaban 15 milliGRAM(s) Oral daily  traZODone 100 milliGRAM(s) Oral daily    MEDICATIONS  (PRN):  acetaminophen   Tablet .. 650 milliGRAM(s) Oral every 6 hours PRN Temp greater or equal to 38C (100.4F), Mild Pain (1 - 3)  ALPRAZolam 0.5 milliGRAM(s) Oral three times a day PRN anxiety      Vital Signs Last 24 Hrs  T(C): 36.6 (12 Jan 2020 10:15), Max: 36.7 (11 Jan 2020 14:42)  T(F): 97.8 (12 Jan 2020 10:15), Max: 98 (11 Jan 2020 14:42)  HR: 86 (12 Jan 2020 10:15) (78 - 86)  BP: 101/63 (12 Jan 2020 10:15) (101/63 - 115/67)  BP(mean): --  RR: 17 (12 Jan 2020 10:15) (17 - 18)  SpO2: 96% (12 Jan 2020 10:15) (96% - 100%)  CAPILLARY BLOOD GLUCOSE        I&O's Summary      PHYSICAL EXAM:  GENERAL: NAD, elderly female  CHEST/LUNG: Clear to auscultation bilaterally; No wheeze  HEART: Regular rate and rhythm; No murmurs, rubs, or gallops  ABDOMEN: Soft, Nontender, Nondistended; Bowel sounds present  EXTREMITIES:  2+ Peripheral Pulses, No clubbing, cyanosis, or edema  NEUROLOGY: AAOx3, non-focal  PSYCH: calm  SKIN: No rashes or lesions    LABS:                        10.7   9.18  )-----------( 214      ( 12 Jan 2020 05:40 )             33.6     01-12    138  |  105  |  28<H>  ----------------------------<  110<H>  4.4   |  22  |  1.20    Ca    8.9      12 Jan 2020 05:40  Phos  2.4     01-12  Mg     2.1     01-12    TPro  5.5<L>  /  Alb  2.6<L>  /  TBili  0.3  /  DBili  < 0.2  /  AST  79<H>  /  ALT  44<H>  /  AlkPhos  92  01-11      CARDIAC MARKERS ( 12 Jan 2020 05:40 )  x     / x     / 764 u/L / x     / x      CARDIAC MARKERS ( 11 Jan 2020 05:25 )  x     / x     / 1995 u/L / x     / x              RADIOLOGY & ADDITIONAL TESTS:  Results Reviewed:   Imaging Personally Reviewed:  Electrocardiogram Personally Reviewed:    COORDINATION OF CARE:  Care Discussed with Consultants/Other Providers [Y/N]:  Prior or Outpatient Records Reviewed [Y/N]:

## 2020-01-13 LAB
ANION GAP SERPL CALC-SCNC: 11 MMO/L — SIGNIFICANT CHANGE UP (ref 7–14)
BUN SERPL-MCNC: 26 MG/DL — HIGH (ref 7–23)
CALCIUM SERPL-MCNC: 8.2 MG/DL — LOW (ref 8.4–10.5)
CHLORIDE SERPL-SCNC: 106 MMOL/L — SIGNIFICANT CHANGE UP (ref 98–107)
CO2 SERPL-SCNC: 22 MMOL/L — SIGNIFICANT CHANGE UP (ref 22–31)
CREAT SERPL-MCNC: 1.06 MG/DL — SIGNIFICANT CHANGE UP (ref 0.5–1.3)
GLUCOSE SERPL-MCNC: 106 MG/DL — HIGH (ref 70–99)
HCT VFR BLD CALC: 30 % — LOW (ref 34.5–45)
HGB BLD-MCNC: 9.6 G/DL — LOW (ref 11.5–15.5)
MAGNESIUM SERPL-MCNC: 2 MG/DL — SIGNIFICANT CHANGE UP (ref 1.6–2.6)
MCHC RBC-ENTMCNC: 28.2 PG — SIGNIFICANT CHANGE UP (ref 27–34)
MCHC RBC-ENTMCNC: 32 % — SIGNIFICANT CHANGE UP (ref 32–36)
MCV RBC AUTO: 88 FL — SIGNIFICANT CHANGE UP (ref 80–100)
NRBC # FLD: 0 K/UL — SIGNIFICANT CHANGE UP (ref 0–0)
PHOSPHATE SERPL-MCNC: 3 MG/DL — SIGNIFICANT CHANGE UP (ref 2.5–4.5)
PLATELET # BLD AUTO: 235 K/UL — SIGNIFICANT CHANGE UP (ref 150–400)
PMV BLD: 10.7 FL — SIGNIFICANT CHANGE UP (ref 7–13)
POTASSIUM SERPL-MCNC: 4.2 MMOL/L — SIGNIFICANT CHANGE UP (ref 3.5–5.3)
POTASSIUM SERPL-SCNC: 4.2 MMOL/L — SIGNIFICANT CHANGE UP (ref 3.5–5.3)
RBC # BLD: 3.41 M/UL — LOW (ref 3.8–5.2)
RBC # FLD: 13.2 % — SIGNIFICANT CHANGE UP (ref 10.3–14.5)
SODIUM SERPL-SCNC: 139 MMOL/L — SIGNIFICANT CHANGE UP (ref 135–145)
WBC # BLD: 8.19 K/UL — SIGNIFICANT CHANGE UP (ref 3.8–10.5)
WBC # FLD AUTO: 8.19 K/UL — SIGNIFICANT CHANGE UP (ref 3.8–10.5)

## 2020-01-13 PROCEDURE — 99232 SBSQ HOSP IP/OBS MODERATE 35: CPT

## 2020-01-13 PROCEDURE — 99233 SBSQ HOSP IP/OBS HIGH 50: CPT

## 2020-01-13 RX ORDER — CEFTRIAXONE 500 MG/1
1000 INJECTION, POWDER, FOR SOLUTION INTRAMUSCULAR; INTRAVENOUS EVERY 24 HOURS
Refills: 0 | Status: COMPLETED | OUTPATIENT
Start: 2020-01-13 | End: 2020-01-14

## 2020-01-13 RX ORDER — CLOTRIMAZOLE AND BETAMETHASONE DIPROPIONATE 10; .5 MG/G; MG/G
1 CREAM TOPICAL DAILY
Refills: 0 | Status: DISCONTINUED | OUTPATIENT
Start: 2020-01-13 | End: 2020-01-14

## 2020-01-13 RX ORDER — LANOLIN ALCOHOL/MO/W.PET/CERES
3 CREAM (GRAM) TOPICAL ONCE
Refills: 0 | Status: COMPLETED | OUTPATIENT
Start: 2020-01-13 | End: 2020-01-13

## 2020-01-13 RX ADMIN — ATORVASTATIN CALCIUM 10 MILLIGRAM(S): 80 TABLET, FILM COATED ORAL at 23:21

## 2020-01-13 RX ADMIN — Medication 25 MILLIGRAM(S): at 05:20

## 2020-01-13 RX ADMIN — Medication 81 MILLIGRAM(S): at 11:25

## 2020-01-13 RX ADMIN — RIVAROXABAN 15 MILLIGRAM(S): KIT at 11:25

## 2020-01-13 RX ADMIN — CEFTRIAXONE 100 MILLIGRAM(S): 500 INJECTION, POWDER, FOR SOLUTION INTRAMUSCULAR; INTRAVENOUS at 12:41

## 2020-01-13 RX ADMIN — CLOTRIMAZOLE AND BETAMETHASONE DIPROPIONATE 1 APPLICATION(S): 10; .5 CREAM TOPICAL at 12:41

## 2020-01-13 RX ADMIN — Medication 0.5 MILLIGRAM(S): at 23:26

## 2020-01-13 RX ADMIN — Medication 100 MILLIGRAM(S): at 11:23

## 2020-01-13 RX ADMIN — Medication 1 TABLET(S): at 11:25

## 2020-01-13 RX ADMIN — PANTOPRAZOLE SODIUM 40 MILLIGRAM(S): 20 TABLET, DELAYED RELEASE ORAL at 05:20

## 2020-01-13 RX ADMIN — Medication 3 MILLIGRAM(S): at 23:33

## 2020-01-13 NOTE — PROGRESS NOTE ADULT - ATTENDING COMMENTS
Discussed case with Dr. Rodriguez. Difficult situation but hopefully, this was AT in the setting of volume depletion and not recurrent AVNRT
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 Patient was found to be in atrial tachycardia with VR up to 170 bpm in the setting of nausea/vomiting, diarrhea x 2-3 days and dehydration. Will continue metoprolol. Follow on tele.

## 2020-01-13 NOTE — PROGRESS NOTE ADULT - SUBJECTIVE AND OBJECTIVE BOX
[facility] Division of Hospital Medicine  Scotty Kaur MD  Pager [phone]      Patient is a 89y old  Female who presents with a chief complaint of Unwitnessed fall (13 Jan 2020 11:34)      SUBJECTIVE / OVERNIGHT EVENTS:    Pt remains in NSR o/n. Reports urinary frequency this am. Denies dizziness, chest pain, palpitations, abd pain, diarrhea. Eating well.     ADDITIONAL REVIEW OF SYSTEMS:    RESPIRATORY: No cough, wheezing, chills or hemoptysis; No shortness of breath  CARDIOVASCULAR: No chest pain, palpitations, dizziness, or leg swelling  GASTROINTESTINAL: No abdominal or epigastric pain. No nausea, vomiting, or hematemesis; No diarrhea or constipation. No melena or hematochezia.    MEDICATIONS  (STANDING):  aspirin enteric coated 81 milliGRAM(s) Oral daily  atorvastatin 10 milliGRAM(s) Oral at bedtime  cefTRIAXone   IVPB 1000 milliGRAM(s) IV Intermittent every 24 hours  clotrimazole/betamethasone Cream 1 Application(s) Topical daily  metoprolol succinate ER 25 milliGRAM(s) Oral daily  multivitamin 1 Tablet(s) Oral daily  pantoprazole    Tablet 40 milliGRAM(s) Oral before breakfast  polyethylene glycol 3350 17 Gram(s) Oral daily  rivaroxaban 15 milliGRAM(s) Oral daily  traZODone 100 milliGRAM(s) Oral daily    MEDICATIONS  (PRN):  acetaminophen   Tablet .. 650 milliGRAM(s) Oral every 6 hours PRN Temp greater or equal to 38C (100.4F), Mild Pain (1 - 3)  ALPRAZolam 0.5 milliGRAM(s) Oral three times a day PRN anxiety  fluticasone propionate 50 MICROgram(s)/spray Nasal Spray 1 Spray(s) Both Nostrils two times a day PRN nasal congestion      CAPILLARY BLOOD GLUCOSE        I&O's Summary    12 Jan 2020 07:01  -  13 Jan 2020 07:00  --------------------------------------------------------  IN: 0 mL / OUT: 250 mL / NET: -250 mL        PHYSICAL EXAM:  Vital Signs Last 24 Hrs  T(C): 36.9 (13 Jan 2020 05:19), Max: 37.1 (13 Jan 2020 02:02)  T(F): 98.4 (13 Jan 2020 05:19), Max: 98.8 (13 Jan 2020 02:02)  HR: 89 (13 Jan 2020 05:19) (64 - 89)  BP: 120/58 (13 Jan 2020 05:19) (103/43 - 120/58)  BP(mean): --  RR: 18 (13 Jan 2020 05:19) (17 - 18)  SpO2: 95% (13 Jan 2020 05:19) (95% - 97%)    CONSTITUTIONAL: NAD  EYES: PERRLA; conjunctiva and sclera clear  ENMT: Moist oral mucosa, no pharyngeal injection or exudates;   NECK: Supple, no palpable masses; no thyromegaly  RESPIRATORY: Normal respiratory effort; lungs are clear to auscultation bilaterally  CARDIOVASCULAR: Regular rate and rhythm, normal S1 and S2, no murmur/rub/gallop; No lower extremity edema; Peripheral pulses are 2+ bilaterally  ABDOMEN: Nontender to palpation, normoactive bowel sounds, no rebound/guarding; No hepatosplenomegaly  MUSCLOSKELETAL:  no clubbing or cyanosis of digits; no joint swelling or tenderness to palpation  PSYCH: A+O to person, place  NEUROLOGY: CN 2-12 are intact and symmetric; no gross sensory deficits;   SKIN: No rashes; no palpable lesions    LABS:                        9.6    8.19  )-----------( 235      ( 13 Jan 2020 05:00 )             30.0     01-13    139  |  106  |  26<H>  ----------------------------<  106<H>  4.2   |  22  |  1.06    Ca    8.2<L>      13 Jan 2020 05:00  Phos  3.0     01-13  Mg     2.0     01-13        CARDIAC MARKERS ( 12 Jan 2020 05:40 )  x     / x     / 764 u/L / x     / x                RADIOLOGY & ADDITIONAL TESTS:  Results Reviewed:   Imaging Personally Reviewed:  Electrocardiogram Personally Reviewed:    COORDINATION OF CARE:  Care Discussed with Consultants/Other Providers [Y/N]:  Prior or Outpatient Records Reviewed [Y/N]:

## 2020-01-13 NOTE — PROGRESS NOTE ADULT - SUBJECTIVE AND OBJECTIVE BOX
Patient is seen and examined. Denies any chest pain, SOB, palpitations or dizziness.    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      MEDICATIONS  (STANDING):  aspirin enteric coated 81 milliGRAM(s) Oral daily  atorvastatin 10 milliGRAM(s) Oral at bedtime  cefTRIAXone   IVPB 1 milliGRAM(s) IV Intermittent every 24 hours  metoprolol succinate ER 25 milliGRAM(s) Oral daily  multivitamin 1 Tablet(s) Oral daily  pantoprazole    Tablet 40 milliGRAM(s) Oral before breakfast  polyethylene glycol 3350 17 Gram(s) Oral daily  rivaroxaban 15 milliGRAM(s) Oral daily  traZODone 100 milliGRAM(s) Oral daily    MEDICATIONS  (PRN):  acetaminophen   Tablet .. 650 milliGRAM(s) Oral every 6 hours PRN Temp greater or equal to 38C (100.4F), Mild Pain (1 - 3)  ALPRAZolam 0.5 milliGRAM(s) Oral three times a day PRN anxiety  fluticasone propionate 50 MICROgram(s)/spray Nasal Spray 1 Spray(s) Both Nostrils two times a day PRN nasal congestion    Vital Signs Last 24 Hrs  T(C): 36.9 (13 Jan 2020 05:19), Max: 37.1 (13 Jan 2020 02:02)  T(F): 98.4 (13 Jan 2020 05:19), Max: 98.8 (13 Jan 2020 02:02)  HR: 89 (13 Jan 2020 05:19) (64 - 89)  BP: 120/58 (13 Jan 2020 05:19) (103/43 - 120/58)  BP(mean): --  RR: 18 (13 Jan 2020 05:19) (17 - 18)  SpO2: 95% (13 Jan 2020 05:19) (95% - 97%)    INTERPRETATION OF TELEMETRY: Sinus rhythm with HR 70s-80s  LABS:                        9.6    8.19  )-----------( 235      ( 13 Jan 2020 05:00 )             30.0     01-13    139  |  106  |  26<H>  ----------------------------<  106<H>  4.2   |  22  |  1.06    Ca    8.2<L>      13 Jan 2020 05:00  Phos  3.0     01-13  Mg     2.0     01-13      CARDIAC MARKERS ( 12 Jan 2020 05:40 )  x     / x     / 764 u/L / x     / x          I&O's Summary    12 Jan 2020 07:01  -  13 Jan 2020 07:00  --------------------------------------------------------  IN: 0 mL / OUT: 250 mL / NET: -250 mL      PHYSICAL EXAM:    GENERAL: In no apparent distress, well nourished, and hydrated.  HEAD:  Atraumatic, Normocephalic  HEART: Regular rate and rhythm; No murmurs, rubs, or gallops.  PULMONARY: Clear to auscultation and percussion.  No rales, wheezing, or rhonchi bilaterally.  ABDOMEN: Soft, Nontender, Nondistended; Bowel sounds present  EXTREMITIES:  2+ Peripheral Pulses, No clubbing, cyanosis, or edema

## 2020-01-13 NOTE — PROGRESS NOTE ADULT - PROBLEM SELECTOR PLAN 3
suspect may have had vasovagal episode 2/2 dehydration from N/V. N/V also may have triggered SVT.   -above imaging neg for acute fx  -PT recs for inpatient restorative rehab, however pt wants to go home.

## 2020-01-13 NOTE — PROGRESS NOTE ADULT - ASSESSMENT
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 Patient was found to be in atrial tachycardia with VR up to 170 bpm in the setting of  dehydration. Currently patient is maintaining sinus rhythm.   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 Patient was found to be in atrial tachycardia with VR up to 170 bpm in the setting of  dehydration. Currently patient is maintaining sinus rhythm.   - Continue metoprolol ER 25 mg daily however if continues to have runs of AT please call EP service to consider increasing dose  - Continue to monitor on telemetry  - Maintain K=~4.0 and Mg++~ 2.0  - Continue other management as per primary team  Addendum  Patient had a short episode of narrow complex tachycardia. Discussed with Dr. Watkins  - Increase metoprolol to metoprolol tartrate 25mg BID  - Will follow up

## 2020-01-14 ENCOUNTER — TRANSCRIPTION ENCOUNTER (OUTPATIENT)
Age: 85
End: 2020-01-14

## 2020-01-14 VITALS
TEMPERATURE: 98 F | OXYGEN SATURATION: 100 % | HEART RATE: 80 BPM | DIASTOLIC BLOOD PRESSURE: 65 MMHG | SYSTOLIC BLOOD PRESSURE: 102 MMHG | RESPIRATION RATE: 19 BRPM

## 2020-01-14 LAB
ANION GAP SERPL CALC-SCNC: 10 MMO/L — SIGNIFICANT CHANGE UP (ref 7–14)
BUN SERPL-MCNC: 26 MG/DL — HIGH (ref 7–23)
CALCIUM SERPL-MCNC: 8.8 MG/DL — SIGNIFICANT CHANGE UP (ref 8.4–10.5)
CHLORIDE SERPL-SCNC: 107 MMOL/L — SIGNIFICANT CHANGE UP (ref 98–107)
CO2 SERPL-SCNC: 23 MMOL/L — SIGNIFICANT CHANGE UP (ref 22–31)
CREAT SERPL-MCNC: 1.04 MG/DL — SIGNIFICANT CHANGE UP (ref 0.5–1.3)
GLUCOSE SERPL-MCNC: 98 MG/DL — SIGNIFICANT CHANGE UP (ref 70–99)
HCT VFR BLD CALC: 30.1 % — LOW (ref 34.5–45)
HGB BLD-MCNC: 9.5 G/DL — LOW (ref 11.5–15.5)
MAGNESIUM SERPL-MCNC: 2.1 MG/DL — SIGNIFICANT CHANGE UP (ref 1.6–2.6)
MCHC RBC-ENTMCNC: 28.2 PG — SIGNIFICANT CHANGE UP (ref 27–34)
MCHC RBC-ENTMCNC: 31.6 % — LOW (ref 32–36)
MCV RBC AUTO: 89.3 FL — SIGNIFICANT CHANGE UP (ref 80–100)
NRBC # FLD: 0 K/UL — SIGNIFICANT CHANGE UP (ref 0–0)
PHOSPHATE SERPL-MCNC: 3.8 MG/DL — SIGNIFICANT CHANGE UP (ref 2.5–4.5)
PLATELET # BLD AUTO: 278 K/UL — SIGNIFICANT CHANGE UP (ref 150–400)
PMV BLD: 10.7 FL — SIGNIFICANT CHANGE UP (ref 7–13)
POTASSIUM SERPL-MCNC: 3.9 MMOL/L — SIGNIFICANT CHANGE UP (ref 3.5–5.3)
POTASSIUM SERPL-SCNC: 3.9 MMOL/L — SIGNIFICANT CHANGE UP (ref 3.5–5.3)
RBC # BLD: 3.37 M/UL — LOW (ref 3.8–5.2)
RBC # FLD: 13 % — SIGNIFICANT CHANGE UP (ref 10.3–14.5)
SODIUM SERPL-SCNC: 140 MMOL/L — SIGNIFICANT CHANGE UP (ref 135–145)
WBC # BLD: 7.21 K/UL — SIGNIFICANT CHANGE UP (ref 3.8–10.5)
WBC # FLD AUTO: 7.21 K/UL — SIGNIFICANT CHANGE UP (ref 3.8–10.5)

## 2020-01-14 PROCEDURE — 99239 HOSP IP/OBS DSCHRG MGMT >30: CPT

## 2020-01-14 RX ORDER — PANTOPRAZOLE SODIUM 20 MG/1
1 TABLET, DELAYED RELEASE ORAL
Qty: 0 | Refills: 0 | DISCHARGE
Start: 2020-01-14

## 2020-01-14 RX ORDER — ZOLPIDEM TARTRATE 10 MG/1
1 TABLET ORAL
Qty: 0 | Refills: 0 | DISCHARGE

## 2020-01-14 RX ORDER — METOPROLOL TARTRATE 50 MG
25 TABLET ORAL
Refills: 0 | Status: DISCONTINUED | OUTPATIENT
Start: 2020-01-14 | End: 2020-01-14

## 2020-01-14 RX ORDER — ACETAMINOPHEN 500 MG
2 TABLET ORAL
Qty: 0 | Refills: 0 | DISCHARGE
Start: 2020-01-14

## 2020-01-14 RX ORDER — FLUTICASONE PROPIONATE 50 MCG
1 SPRAY, SUSPENSION NASAL
Qty: 0 | Refills: 0 | DISCHARGE
Start: 2020-01-14

## 2020-01-14 RX ORDER — CHOLECALCIFEROL (VITAMIN D3) 125 MCG
1 CAPSULE ORAL
Qty: 0 | Refills: 0 | DISCHARGE

## 2020-01-14 RX ORDER — POTASSIUM CHLORIDE 20 MEQ
1 PACKET (EA) ORAL
Qty: 0 | Refills: 0 | DISCHARGE

## 2020-01-14 RX ORDER — METOPROLOL TARTRATE 50 MG
1 TABLET ORAL
Qty: 0 | Refills: 0 | DISCHARGE
Start: 2020-01-14

## 2020-01-14 RX ORDER — POLYETHYLENE GLYCOL 3350 17 G/17G
17 POWDER, FOR SOLUTION ORAL
Qty: 0 | Refills: 0 | DISCHARGE
Start: 2020-01-14

## 2020-01-14 RX ADMIN — Medication 100 MILLIGRAM(S): at 13:54

## 2020-01-14 RX ADMIN — CLOTRIMAZOLE AND BETAMETHASONE DIPROPIONATE 1 APPLICATION(S): 10; .5 CREAM TOPICAL at 13:01

## 2020-01-14 RX ADMIN — Medication 1 TABLET(S): at 12:29

## 2020-01-14 RX ADMIN — CEFTRIAXONE 100 MILLIGRAM(S): 500 INJECTION, POWDER, FOR SOLUTION INTRAMUSCULAR; INTRAVENOUS at 13:55

## 2020-01-14 RX ADMIN — RIVAROXABAN 15 MILLIGRAM(S): KIT at 12:29

## 2020-01-14 RX ADMIN — Medication 81 MILLIGRAM(S): at 12:30

## 2020-01-14 RX ADMIN — Medication 0.5 MILLIGRAM(S): at 15:50

## 2020-01-14 RX ADMIN — PANTOPRAZOLE SODIUM 40 MILLIGRAM(S): 20 TABLET, DELAYED RELEASE ORAL at 06:31

## 2020-01-14 RX ADMIN — Medication 25 MILLIGRAM(S): at 17:07

## 2020-01-14 RX ADMIN — Medication 25 MILLIGRAM(S): at 06:31

## 2020-01-14 NOTE — DISCHARGE NOTE NURSING/CASE MANAGEMENT/SOCIAL WORK - PATIENT PORTAL LINK FT
You can access the FollowMyHealth Patient Portal offered by [facility] by registering at the following website: [email or web address]. By joining "[employer]’s FollowMyHealth portal, you will also be able to view your health information using other applications (apps) compatible with our system.

## 2020-01-14 NOTE — DISCHARGE NOTE PROVIDER - PROVIDER TOKENS
PROVIDER:[TOKEN:[[id number]],FOLLOWUP:[1 week]],PROVIDER:[TOKEN:[[id number]],FOLLOWUP:[1 month]]

## 2020-01-14 NOTE — PROVIDER CONTACT NOTE (OTHER) - ASSESSMENT
reports "feeling anxious about discharge"  HR now rate controlled afib, NSR 90s  vital signs assessed bp 141/92 HR 92 T 98.1 RR 18 oxygen 99

## 2020-01-14 NOTE — PROGRESS NOTE ADULT - PROBLEM SELECTOR PROBLEM 3
Dr. Light reviewed  Encouraged erythromycin ointment 3/day in operative eye  Reviewed with pt (with )  Pt states has been using ointment 3/day  Asked pt to monitor for worsening symptoms and call over weekend  Pt verbally agreed  Tez Bello RN 1:44 PM 06/02/17      
S/p LLL retraction repair with conjunctivoplasty, Alloderm graft, lateral canthopexy, and temporary Blunt suture tarsorrhaphy may 11th, 2017  Sister calling on behalf of pt today  Called pt directly with ranjit --[phone] then [phone]  For past week left eye tearing-- thick tears, burning itching, hurts inside eye, feels heavy, with redness  Symptoms seem to be getting worse since started earlier this week  Offered appt this afternoon  Pt unable to come-- transportation issues and unable to come in Saturday-- requesting appt on Monday  Scheduled with dr. Lal on Monday-- pt will arrange ride  Stated should call over weekend to be seen if symptoms continue to worsen  Tez Bello RN 12:04 PM 06/02/17    Note to oculo-plastics clinic/resident for review  Tez Bello RN 12:04 PM 06/02/17            
Fall, initial encounter
Renal insufficiency

## 2020-01-14 NOTE — PROGRESS NOTE ADULT - PROBLEM SELECTOR PLAN 1
-Urine cx positive for >100k E. coli pan-sensitive  -to complete Ceftriaxone 1gm daily x 3 days. last day today

## 2020-01-14 NOTE — DISCHARGE NOTE PROVIDER - NSDCCPCAREPLAN_GEN_ALL_CORE_FT
PRINCIPAL DISCHARGE DIAGNOSIS  Diagnosis: Rhabdomyolysis  Assessment and Plan of Treatment: Followup with PMD and take all medications prescribed.      SECONDARY DISCHARGE DIAGNOSES  Diagnosis: SVT (supraventricular tachycardia)  Assessment and Plan of Treatment: Followup with PMD and take all medications prescribed.    Diagnosis: UTI (urinary tract infection)  Assessment and Plan of Treatment: Followup with PMD and take all medications prescribed.    Diagnosis: Renal insufficiency  Assessment and Plan of Treatment: Followup with PMD and take all medications prescribed.

## 2020-01-14 NOTE — DISCHARGE NOTE PROVIDER - CARE PROVIDER_API CALL
Tarun Davis)  Medicine  [id number] [place], [place] [id number]  Phone: [phone]  Fax: [phone]  Follow Up Time: 1 week    Colleen Watkins)  Cardiac Electrophysiology; Cardiology; Internal Medicine  [address] [place] [id number]  Phone: [phone]  Fax: [phone]  Follow Up Time: 1 month

## 2020-01-14 NOTE — PROGRESS NOTE ADULT - SUBJECTIVE AND OBJECTIVE BOX
[facility] Division of [facility] Medicine  Scotty Kaur MD  Pager [phone]      Patient is a 89y old  Female who presents with a chief complaint of Unwitnessed fall (13 Jan 2020 14:11)      SUBJECTIVE / OVERNIGHT EVENTS:    pt had episode of SVT yesterday pm during PT session. Metoprolol dose increased. This am pt denies palpitations, sob. No diarrhea or urinary frequency.     ADDITIONAL REVIEW OF SYSTEMS:    RESPIRATORY: No cough, wheezing, chills or hemoptysis; No shortness of breath  CARDIOVASCULAR: No chest pain, palpitations, dizziness, or leg swelling  GASTROINTESTINAL: No abdominal or epigastric pain. No nausea, vomiting, or hematemesis; No diarrhea or constipation. No melena or hematochezia.    MEDICATIONS  (STANDING):  aspirin enteric coated 81 milliGRAM(s) Oral daily  atorvastatin 10 milliGRAM(s) Oral at bedtime  cefTRIAXone   IVPB 1000 milliGRAM(s) IV Intermittent every 24 hours  clotrimazole/betamethasone Cream 1 Application(s) Topical daily  metoprolol tartrate 25 milliGRAM(s) Oral two times a day  multivitamin 1 Tablet(s) Oral daily  pantoprazole    Tablet 40 milliGRAM(s) Oral before breakfast  polyethylene glycol 3350 17 Gram(s) Oral daily  rivaroxaban 15 milliGRAM(s) Oral daily  traZODone 100 milliGRAM(s) Oral daily    MEDICATIONS  (PRN):  acetaminophen   Tablet .. 650 milliGRAM(s) Oral every 6 hours PRN Temp greater or equal to 38C (100.4F), Mild Pain (1 - 3)  ALPRAZolam 0.5 milliGRAM(s) Oral three times a day PRN anxiety  fluticasone propionate 50 MICROgram(s)/spray Nasal Spray 1 Spray(s) Both Nostrils two times a day PRN nasal congestion      CAPILLARY BLOOD GLUCOSE        I&O's Summary    14 Jan 2020 07:01  -  14 Jan 2020 11:55  --------------------------------------------------------  IN: 0 mL / OUT: 200 mL / NET: -200 mL        PHYSICAL EXAM:  Vital Signs Last 24 Hrs  T(C): 36.7 (14 Jan 2020 06:19), Max: 37.1 (13 Jan 2020 22:15)  T(F): 98 (14 Jan 2020 06:19), Max: 98.7 (13 Jan 2020 22:15)  HR: 66 (14 Jan 2020 06:19) (66 - 158)  BP: 115/72 (14 Jan 2020 06:19) (115/72 - 123/64)  BP(mean): --  RR: 18 (14 Jan 2020 06:19) (18 - 18)  SpO2: 96% (14 Jan 2020 06:19) (96% - 99%)    CONSTITUTIONAL: NAD  EYES: PERRLA; conjunctiva and sclera clear  ENMT: Moist oral mucosa, no pharyngeal injection or exudates;   NECK: Supple, no palpable masses; no thyromegaly  RESPIRATORY: Normal respiratory effort; lungs are clear to auscultation bilaterally  CARDIOVASCULAR: Regular rate and rhythm, normal S1 and S2, no murmur/rub/gallop; No lower extremity edema; Peripheral pulses are 2+ bilaterally  ABDOMEN: Nontender to palpation, normoactive bowel sounds, no rebound/guarding; No hepatosplenomegaly  MUSCLOSKELETAL:  no clubbing or cyanosis of digits; no joint swelling or tenderness to palpation  PSYCH: A+O to person, place  NEUROLOGY: CN 2-12 are intact and symmetric; no gross sensory deficits;   SKIN: No rashes; no palpable lesions    LABS:                        9.5    7.21  )-----------( 278      ( 14 Jan 2020 07:00 )             30.1     01-14    140  |  107  |  26<H>  ----------------------------<  98  3.9   |  23  |  1.04    Ca    8.8      14 Jan 2020 07:00  Phos  3.8     01-14  Mg     2.1     01-14                  RADIOLOGY & ADDITIONAL TESTS:  Results Reviewed:   Imaging Personally Reviewed:  Electrocardiogram Personally Reviewed:    COORDINATION OF CARE:  Care Discussed with Consultants/Other Providers [Y/N]:  Prior or Outpatient Records Reviewed [Y/N]:

## 2020-01-14 NOTE — PROVIDER CONTACT NOTE (OTHER) - ACTION/TREATMENT ORDERED:
will continue to monitor vital signs  will give due dose of trazodone for anxiety   will reassess patient

## 2020-01-14 NOTE — PROGRESS NOTE ADULT - PROBLEM SELECTOR PLAN 5
Resolved   -in setting of missing home meds and dehydration 2/2 gastroenteritis  -Continue  metoprolol dose increased to 25m bid, asa  -TSH normal  -EP following; appreciate recs

## 2020-01-14 NOTE — DISCHARGE NOTE PROVIDER - NSDCMRMEDTOKEN_GEN_ALL_CORE_FT
acetaminophen 325 mg oral tablet: 2 tab(s) orally every 6 hours, As needed, Temp greater or equal to 38C (100.4F), Mild Pain (1 - 3)  ALPRAZolam 0.5 mg oral tablet: 1 tab(s) orally 3 times a day, As Needed  Ambien 10 mg oral tablet: 1 tab(s) orally once a day (at bedtime), As Needed for sleep  aspirin 81 mg oral delayed release tablet: 1 tab(s) orally once a day  atorvastatin 10 mg oral tablet: 1 tab(s) orally once a day (at bedtime)  fluticasone 50 mcg/inh nasal spray: 1 spray(s) nasal 2 times a day, As needed, nasal congestion  metoprolol tartrate 25 mg oral tablet: 1 tab(s) orally 2 times a day  Multiple Vitamins oral tablet: 1 tab(s) orally once a day  pantoprazole 40 mg oral delayed release tablet: 1 tab(s) orally once a day (before a meal)  polyethylene glycol 3350 oral powder for reconstitution: 17 gram(s) orally once a day, As Needed for constipation  rivaroxaban 20 mg oral tablet: 1 tab(s) orally every 24 hours  traZODone 100 mg oral tablet: 1 tab(s) orally once a day

## 2020-01-14 NOTE — DISCHARGE NOTE PROVIDER - CARE PROVIDERS DIRECT ADDRESSES
,DirectAddress_Unknown,[email or web address]

## 2020-01-15 PROBLEM — C54.1 MALIGNANT NEOPLASM OF ENDOMETRIUM: Chronic | Status: ACTIVE | Noted: 2018-06-19

## 2020-06-17 NOTE — CONSULT NOTE ADULT - PROBLEM SELECTOR RECOMMENDATION 2
3
found to have , scattered subcm , nodules, incidentally: pt has no underlying hx of cancer and a remote smoker: These can be followed up as an outpatient:
likely 2/2 rhabdo  u/s reviewed  lfts trending down
IVF as above. Syncope work up as per cardiology.

## 2020-06-26 NOTE — CONSULT NOTE ADULT - CONSULT REASON
Elevated Scr, Hyponatremia
Elevated troponin
elevated trop, syncope
occult positive stool  elevated liver tests
uti
Syncope

## 2020-07-13 NOTE — PROGRESS NOTE ADULT - NSHPATTENDINGPLANDISCUSS_GEN_ALL_CORE
patient and staff s/p fall over night, pt reports getting up to urinate, slipping on bed covers and falling face first into his fish tank sustaining 6cm laceration in between his eyes, would is clean with little bleeding, denies LOC

## 2021-02-11 NOTE — ED PROVIDER NOTE - HEME LYMPH, MLM
Immediate Postoperative / Post-Procedure Note    Preoperative Diagnosis: Left lower abdominal subcutaneous cyst         Postoperative Diagnosis: Same       Procedure(s): Excision of left lower abdominal subcutaneous cyst       Surgeon/Proceduralist: Harry    Assistant name and significant procedure(s) performed none    Anesthesia Type: Local        Estimated Blood Loss: 5 mL    Transfusion Summary: None    Specimen(s): Left lower abdominal subcutaneous cyst    Grafts/Implants: None       Complications: None             Electronically Signed On 07.03.2020 08:18  ___________________________________________________   Timmy Mcdonald MD    
No adenopathy or splenomegaly. No cervical or inguinal lymphadenopathy.

## 2021-04-17 NOTE — PROVIDER CONTACT NOTE (OTHER) - NAME OF MD/NP/PA/DO NOTIFIED:
Dinah Aldridge <<----- Click to add NO pertinent Past Medical History No pertinent past medical history

## 2021-08-12 NOTE — ED ADULT NURSE NOTE - CCCP TRG CHIEF CMPLNT
fall Zyclara Counseling:  I discussed with the patient the risks of imiquimod including but not limited to erythema, scaling, itching, weeping, crusting, and pain.  Patient understands that the inflammatory response to imiquimod is variable from person to person and was educated regarded proper titration schedule.  If flu-like symptoms develop, patient knows to discontinue the medication and contact us.

## 2021-08-30 NOTE — PHYSICAL THERAPY INITIAL EVALUATION ADULT - BALANCE TRAINING, PT EVAL
[FreeTextEntry1] : Margi Stewart is 44 years old with a history of mild hypertension and a history of mild asthma\par \par She presented in 2017 with shortness of breath and chest pain.  She underwent cardiac catheterization at that time which revealed normal coronaries except that at the beginning of the case there was significant spasm in the proximal LAD which was completely relieved with intracoronary nitroglycerin the rest of the coronaries were perfectly normal.  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  She had a sinus bradycardia in the 40s.  The beta-blocker was discontinued as was the Norvasc\par \par Work-up revealed normal enzymes normal EKG normal D-dimer\par 2D echo was normal\par Exercise nuclear stress test revealed normal myocardial perfusion no evidence of ischemia or infarction\par \par Patient was sent home without pain block or Norvasc and noted that her pressure was elevated and she had a bit of a headache.  She restarted Norvasc\par \par EKG today normal sinus rhythm probably within normal limits minor nonspecific ST-T wave changes unchanged Goal- Patient will demonstrate Fair+ dynamic standing balance in 1-2 weeks to improve performance and safety of standing activities, transfers and ambulation

## 2021-09-10 NOTE — ED PROVIDER NOTE - ENMT, MLM
Airway patent, Nasal mucosa clear. Mouth with normal mucosa. Throat has no vesicles, no oropharyngeal exudates and uvula is midline. fall risk

## 2022-02-17 NOTE — PROGRESS NOTE ADULT - PROBLEM/PLAN-3
[facility] Medicine Services  INPATIENT PROGRESS NOTE    Length of Stay: 3  Date of Admission: 2/14/2022  Primary Care Physician: Adolfo Ackerman MD    Subjective   Chief Complaint: Hypoxia  HPI: Awake and alert.  Doing okay today.    Review of Systems   Unable to perform ROS: Mental status change        All pertinent negatives and positives are as above. All other systems have been reviewed and are negative unless otherwise stated.     Objective    Temp:  [96.2 °F (35.7 °C)-97.6 °F (36.4 °C)] 96.2 °F (35.7 °C)  Heart Rate:  [71-94] 94  Resp:  [18-20] 20  BP: (118-158)/(61-81) 121/81    Physical Exam  Vitals reviewed.   Constitutional:       Appearance: He is well-developed.   HENT:      Head: Normocephalic and atraumatic.   Eyes:      Pupils: Pupils are equal, round, and reactive to light.   Cardiovascular:      Rate and Rhythm: Normal rate and regular rhythm.      Heart sounds: Normal heart sounds. No murmur heard.  No friction rub. No gallop.    Pulmonary:      Effort: Pulmonary effort is normal. No respiratory distress.      Breath sounds: Normal breath sounds. No wheezing or rales.   Chest:      Chest wall: No tenderness.   Abdominal:      General: Bowel sounds are normal. There is no distension.      Palpations: Abdomen is soft.      Tenderness: There is no abdominal tenderness.   Musculoskeletal:      Cervical back: Normal range of motion and neck supple.   Psychiatric:         Behavior: Behavior normal.       Results Review:  I have reviewed the labs, radiology results, and diagnostic studies.    Laboratory Data:   Results from last 7 days   Lab Units 02/17/22  0524 02/16/22  1127 02/15/22  0616 02/14/22  1804 02/14/22  1804   SODIUM mmol/L 149* 150* 152*   < > 150*   POTASSIUM mmol/L 4.2 4.1 4.6   < > 5.0   CHLORIDE mmol/L 118* 119* 118*   < > 110*   CO2 mmol/L 18.0* 20.0* 20.0*   < > 19.0*   BUN mg/dL 58* 67* 87*   < > 93*   CREATININE mg/dL 2.18* 2.42*  3.45*   < > 3.33*   GLUCOSE mg/dL 71 77 113*   < > 127*   CALCIUM mg/dL 8.2 8.8 9.3   < > 11.1*   BILIRUBIN mg/dL  --   --  0.4  --  0.5   ALK PHOS U/L  --   --  82  --  100   ALT (SGPT) U/L  --   --  17  --  20   AST (SGOT) U/L  --   --  28  --  31   ANION GAP mmol/L 13.0 11.0 14.0   < > 21.0*    < > = values in this interval not displayed.     Estimated Creatinine Clearance: 16.4 mL/min (A) (by C-G formula based on SCr of 2.18 mg/dL (H)).  Results from last 7 days   Lab Units 02/15/22  0616 02/14/22  1804   MAGNESIUM mg/dL 2.6* 3.0*         Results from last 7 days   Lab Units 02/15/22  0616 02/14/22  1804   WBC 10*3/mm3 8.51 10.48   HEMOGLOBIN g/dL 14.5 16.9   HEMATOCRIT % 45.3 50.6   PLATELETS 10*3/mm3 192 317           Culture Data:   Blood Culture   Date Value Ref Range Status   02/14/2022 No growth at 2 days  Preliminary   02/14/2022 No growth at 2 days  Preliminary     No results found for: URINECX  No results found for: RESPCX  No results found for: WOUNDCX  No results found for: STOOLCX  No components found for: BODYFLD    Radiology Data:   Imaging Results (Last 24 Hours)     ** No results found for the last 24 hours. **          I have reviewed the patient's current medications.     Assessment/Plan     Active Hospital Problems    Diagnosis    • Dehydration    • Acute renal failure (ARF) ([facility])        Plan:    1.  Acute kidney injury: Continue IV fluids.  Renal function continues to improve.  2.  Atrial fibrillation with rapid ventricular response: Oral Lopressor.  3.  Chronic hypoxic respiratory failure: Status post COVID-19.  4.  Cholelithiasis  5.  Renal mass: Per family this will be monitored.  No work-up.  6.  DVT prophylaxis: Eliquis.    Lengthy discussion with patient's daughter regarding patient wishes for the.  At this point we will continue to hydrate in order to correct the renal failure.  Likely they will proceed to transition to some sort of less aggressive plan of care, potentially comfort  measures once renal function is normalized.      The patient was evaluated during the global COVID-19 pandemic, and the diagnosis was suspected/considered upon their initial presentation.  Evaluation, treatment, and testing were consistent with current guidelines for patients who present with complaints or symptoms that may be related to COVID-19.    I confirmed that the patient's Advance Care Plan is present, code status is documented, or surrogate decision maker is listed in the patient's medical record.       Discharge Planning: I expect patient to be discharged to skilled facility in 1-2 days.        This document has been electronically signed by Glen Caro MD on February 17, 2022 14:34 CST       DISPLAY PLAN FREE TEXT

## 2022-05-10 NOTE — ED ADULT TRIAGE NOTE - ESI TRIAGE ACUITY LEVEL, MLM
3
Alcohol Intoxication  WHAT YOU NEED TO KNOW:  Alcohol intoxication is a harmful physical condition caused when you drink more alcohol than your body can handle. It is also called ethanol poisoning, or being drunk.  DISCHARGE INSTRUCTIONS:  Call your local emergency number (911 in the ) if:   •You have sudden trouble breathing or chest pain.  •You have a seizure.  •You feel sad enough to harm yourself or others.  Call your doctor if:   •You have hallucinations (you see or hear things that are not real).  •You cannot stop vomiting.  •You have questions or concerns about your condition or care.  Recommended alcohol limits:   •Men 21 to 64 years should limit alcohol to 2 drinks a day. Do not have more than 4 drinks in 1 day or more than 14 in 1 week.  •All women, and men 65 or older should limit alcohol to 1 drink in a day. Do not have more than 3 drinks in 1 day or more than 7 in 1 week. No amount of alcohol is okay during pregnancy.  Manage alcohol use:   •Decrease the amount you drink. This can help prevent health problems such as brain, heart, and liver damage, high blood pressure, diabetes, and cancer. If you cannot stop completely, healthcare providers can help you set goals to decrease the amount you drink.  •Plan weekly alcohol use. You will be less likely to drink more than the recommended limit if you plan ahead.  •Have food when you drink alcohol. Food will prevent alcohol from getting into your system too quickly. Eat before you have your first alcohol drink.  •Time your drinks carefully. Have no more than 1 drink in an hour. Have a liquid such as water, coffee, or a soft drink between alcohol drinks.  •Do not drive if you have had alcohol. Make sure someone who has not been drinking can help you get home.  •Do not drink alcohol if you are taking medicine. Alcohol is dangerous when you combine it with certain medicines, such as acetaminophen or blood pressure medicine. Talk to your healthcare provider about all the medicines you currently take.  For more information:   •Alcoholics Anonymous  Web Address: http://www.aa.org  •Substance Abuse and Mental Health Services Administration  [address]  [place] [address]  Web Address: http://www.[email or web address].gov  Follow up with your healthcare provider as directed: Write down your questions so you remember to ask them during your visits.

## 2022-08-10 NOTE — ED ADULT NURSE NOTE - TEMPLATE
Patient never picked up prescription for paxlovid but is completely symptoms free  No need to start and also pt outside of treatment window  Follow up as needed if symptoms change or worsen 
Fall

## 2023-01-01 ENCOUNTER — TRANSCRIPTION ENCOUNTER (OUTPATIENT)
Age: 88
End: 2023-01-01

## 2023-01-01 ENCOUNTER — INPATIENT (INPATIENT)
Facility: HOSPITAL | Age: 88
LOS: 8 days | Discharge: HOME HEALTH | DRG: 377 | End: 2023-12-01
Attending: INTERNAL MEDICINE | Admitting: INTERNAL MEDICINE
Payer: MEDICARE

## 2023-01-01 ENCOUNTER — INPATIENT (INPATIENT)
Facility: HOSPITAL | Age: 88
LOS: 6 days | Discharge: HOME | DRG: 65 | End: 2023-10-23
Attending: INTERNAL MEDICINE | Admitting: INTERNAL MEDICINE
Payer: MEDICARE

## 2023-01-01 ENCOUNTER — INPATIENT (INPATIENT)
Facility: HOSPITAL | Age: 88
LOS: 2 days | DRG: 682 | End: 2023-12-22
Attending: INTERNAL MEDICINE | Admitting: INTERNAL MEDICINE
Payer: MEDICARE

## 2023-01-01 ENCOUNTER — INPATIENT (INPATIENT)
Facility: HOSPITAL | Age: 88
LOS: 4 days | Discharge: NURSING, REHAB OR LONG-TERM CARE | End: 2023-08-15
Attending: INTERNAL MEDICINE | Admitting: INTERNAL MEDICINE
Payer: MEDICARE

## 2023-01-01 VITALS
DIASTOLIC BLOOD PRESSURE: 79 MMHG | SYSTOLIC BLOOD PRESSURE: 112 MMHG | OXYGEN SATURATION: 98 % | RESPIRATION RATE: 18 BRPM | HEART RATE: 92 BPM | TEMPERATURE: 99 F

## 2023-01-01 VITALS
OXYGEN SATURATION: 96 % | TEMPERATURE: 98 F | DIASTOLIC BLOOD PRESSURE: 81 MMHG | HEART RATE: 92 BPM | SYSTOLIC BLOOD PRESSURE: 119 MMHG | RESPIRATION RATE: 18 BRPM | WEIGHT: 110.01 LBS | HEIGHT: 64 IN

## 2023-01-01 VITALS — DIASTOLIC BLOOD PRESSURE: 52 MMHG | SYSTOLIC BLOOD PRESSURE: 79 MMHG | HEART RATE: 53 BPM | TEMPERATURE: 97 F

## 2023-01-01 VITALS
TEMPERATURE: 98 F | SYSTOLIC BLOOD PRESSURE: 89 MMHG | RESPIRATION RATE: 18 BRPM | HEART RATE: 79 BPM | DIASTOLIC BLOOD PRESSURE: 58 MMHG | OXYGEN SATURATION: 95 %

## 2023-01-01 VITALS
WEIGHT: 199.96 LBS | OXYGEN SATURATION: 90 % | DIASTOLIC BLOOD PRESSURE: 75 MMHG | RESPIRATION RATE: 22 BRPM | RESPIRATION RATE: 17 BRPM | OXYGEN SATURATION: 100 % | SYSTOLIC BLOOD PRESSURE: 160 MMHG | HEART RATE: 85 BPM | TEMPERATURE: 98 F | HEART RATE: 62 BPM | DIASTOLIC BLOOD PRESSURE: 60 MMHG | SYSTOLIC BLOOD PRESSURE: 141 MMHG | HEIGHT: 66 IN

## 2023-01-01 VITALS
DIASTOLIC BLOOD PRESSURE: 79 MMHG | RESPIRATION RATE: 16 BRPM | OXYGEN SATURATION: 93 % | HEART RATE: 91 BPM | TEMPERATURE: 98 F | SYSTOLIC BLOOD PRESSURE: 99 MMHG

## 2023-01-01 VITALS
WEIGHT: 130.07 LBS | TEMPERATURE: 98 F | HEIGHT: 64 IN | HEART RATE: 96 BPM | OXYGEN SATURATION: 95 % | RESPIRATION RATE: 20 BRPM | SYSTOLIC BLOOD PRESSURE: 113 MMHG | DIASTOLIC BLOOD PRESSURE: 77 MMHG

## 2023-01-01 DIAGNOSIS — R74.01 ELEVATION OF LEVELS OF LIVER TRANSAMINASE LEVELS: ICD-10-CM

## 2023-01-01 DIAGNOSIS — Z71.89 OTHER SPECIFIED COUNSELING: ICD-10-CM

## 2023-01-01 DIAGNOSIS — N17.9 ACUTE KIDNEY FAILURE, UNSPECIFIED: ICD-10-CM

## 2023-01-01 DIAGNOSIS — S82.91XA UNSPECIFIED FRACTURE OF RIGHT LOWER LEG, INITIAL ENCOUNTER FOR CLOSED FRACTURE: Chronic | ICD-10-CM

## 2023-01-01 DIAGNOSIS — Z51.5 ENCOUNTER FOR PALLIATIVE CARE: ICD-10-CM

## 2023-01-01 DIAGNOSIS — F41.9 ANXIETY DISORDER, UNSPECIFIED: ICD-10-CM

## 2023-01-01 DIAGNOSIS — I10 ESSENTIAL (PRIMARY) HYPERTENSION: ICD-10-CM

## 2023-01-01 DIAGNOSIS — Z98.49 CATARACT EXTRACTION STATUS, UNSPECIFIED EYE: Chronic | ICD-10-CM

## 2023-01-01 DIAGNOSIS — R29.6 REPEATED FALLS: ICD-10-CM

## 2023-01-01 DIAGNOSIS — E78.5 HYPERLIPIDEMIA, UNSPECIFIED: ICD-10-CM

## 2023-01-01 DIAGNOSIS — R52 PAIN, UNSPECIFIED: ICD-10-CM

## 2023-01-01 DIAGNOSIS — Z29.9 ENCOUNTER FOR PROPHYLACTIC MEASURES, UNSPECIFIED: ICD-10-CM

## 2023-01-01 DIAGNOSIS — R26.2 DIFFICULTY IN WALKING, NOT ELSEWHERE CLASSIFIED: ICD-10-CM

## 2023-01-01 DIAGNOSIS — I48.20 CHRONIC ATRIAL FIBRILLATION, UNSPECIFIED: ICD-10-CM

## 2023-01-01 DIAGNOSIS — Z90.710 ACQUIRED ABSENCE OF BOTH CERVIX AND UTERUS: Chronic | ICD-10-CM

## 2023-01-01 DIAGNOSIS — D64.9 ANEMIA, UNSPECIFIED: ICD-10-CM

## 2023-01-01 DIAGNOSIS — C54.1 MALIGNANT NEOPLASM OF ENDOMETRIUM: ICD-10-CM

## 2023-01-01 DIAGNOSIS — I77.1 STRICTURE OF ARTERY: ICD-10-CM

## 2023-01-01 DIAGNOSIS — S00.93XA CONTUSION OF UNSPECIFIED PART OF HEAD, INITIAL ENCOUNTER: ICD-10-CM

## 2023-01-01 DIAGNOSIS — I50.22 CHRONIC SYSTOLIC (CONGESTIVE) HEART FAILURE: ICD-10-CM

## 2023-01-01 DIAGNOSIS — I48.0 PAROXYSMAL ATRIAL FIBRILLATION: ICD-10-CM

## 2023-01-01 DIAGNOSIS — R06.03 ACUTE RESPIRATORY DISTRESS: ICD-10-CM

## 2023-01-01 LAB
-  AMPICILLIN: SIGNIFICANT CHANGE UP
-  AMPICILLIN: SIGNIFICANT CHANGE UP
-  CIPROFLOXACIN: SIGNIFICANT CHANGE UP
-  CIPROFLOXACIN: SIGNIFICANT CHANGE UP
-  LEVOFLOXACIN: SIGNIFICANT CHANGE UP
-  LEVOFLOXACIN: SIGNIFICANT CHANGE UP
-  NITROFURANTOIN: SIGNIFICANT CHANGE UP
-  NITROFURANTOIN: SIGNIFICANT CHANGE UP
-  TETRACYCLINE: SIGNIFICANT CHANGE UP
-  TETRACYCLINE: SIGNIFICANT CHANGE UP
-  VANCOMYCIN: SIGNIFICANT CHANGE UP
-  VANCOMYCIN: SIGNIFICANT CHANGE UP
ALBUMIN SERPL ELPH-MCNC: 2.9 G/DL — LOW (ref 3.3–5)
ALBUMIN SERPL ELPH-MCNC: 2.9 G/DL — LOW (ref 3.3–5)
ALBUMIN SERPL ELPH-MCNC: 3.2 G/DL — LOW (ref 3.3–5)
ALBUMIN SERPL ELPH-MCNC: 3.2 G/DL — LOW (ref 3.3–5)
ALBUMIN SERPL ELPH-MCNC: 3.3 G/DL — SIGNIFICANT CHANGE UP (ref 3.3–5)
ALBUMIN SERPL ELPH-MCNC: 3.6 G/DL — SIGNIFICANT CHANGE UP (ref 3.3–5)
ALBUMIN SERPL ELPH-MCNC: 3.7 G/DL — SIGNIFICANT CHANGE UP (ref 3.3–5)
ALBUMIN SERPL ELPH-MCNC: 3.9 G/DL — SIGNIFICANT CHANGE UP (ref 3.3–5)
ALP SERPL-CCNC: 115 U/L — SIGNIFICANT CHANGE UP (ref 40–120)
ALP SERPL-CCNC: 115 U/L — SIGNIFICANT CHANGE UP (ref 40–120)
ALP SERPL-CCNC: 130 U/L — HIGH (ref 40–120)
ALP SERPL-CCNC: 130 U/L — HIGH (ref 40–120)
ALP SERPL-CCNC: 136 U/L — HIGH (ref 40–120)
ALP SERPL-CCNC: 136 U/L — HIGH (ref 40–120)
ALP SERPL-CCNC: 175 U/L — HIGH (ref 40–120)
ALP SERPL-CCNC: 175 U/L — HIGH (ref 40–120)
ALP SERPL-CCNC: 196 U/L — HIGH (ref 40–120)
ALP SERPL-CCNC: 76 U/L — SIGNIFICANT CHANGE UP (ref 40–120)
ALP SERPL-CCNC: 85 U/L — SIGNIFICANT CHANGE UP (ref 40–120)
ALP SERPL-CCNC: 90 U/L — SIGNIFICANT CHANGE UP (ref 40–120)
ALT FLD-CCNC: 14 U/L — SIGNIFICANT CHANGE UP (ref 10–45)
ALT FLD-CCNC: 250 U/L — HIGH (ref 10–45)
ALT FLD-CCNC: 250 U/L — HIGH (ref 10–45)
ALT FLD-CCNC: 27 U/L — SIGNIFICANT CHANGE UP (ref 10–45)
ALT FLD-CCNC: 27 U/L — SIGNIFICANT CHANGE UP (ref 10–45)
ALT FLD-CCNC: 38 U/L — SIGNIFICANT CHANGE UP (ref 10–45)
ALT FLD-CCNC: 38 U/L — SIGNIFICANT CHANGE UP (ref 10–45)
ALT FLD-CCNC: 43 U/L — HIGH (ref 4–33)
ALT FLD-CCNC: 44 U/L — HIGH (ref 4–33)
ALT FLD-CCNC: 47 U/L — HIGH (ref 10–45)
ALT FLD-CCNC: 47 U/L — HIGH (ref 10–45)
ALT FLD-CCNC: 517 U/L — HIGH (ref 10–45)
ALT FLD-CCNC: 517 U/L — HIGH (ref 10–45)
ALT FLD-CCNC: 745 U/L — HIGH (ref 10–45)
ALT FLD-CCNC: 745 U/L — HIGH (ref 10–45)
ANION GAP SERPL CALC-SCNC: 10 MMOL/L — SIGNIFICANT CHANGE UP (ref 5–17)
ANION GAP SERPL CALC-SCNC: 10 MMOL/L — SIGNIFICANT CHANGE UP (ref 7–14)
ANION GAP SERPL CALC-SCNC: 12 MMOL/L — SIGNIFICANT CHANGE UP (ref 5–17)
ANION GAP SERPL CALC-SCNC: 12 MMOL/L — SIGNIFICANT CHANGE UP (ref 5–17)
ANION GAP SERPL CALC-SCNC: 13 MMOL/L — SIGNIFICANT CHANGE UP (ref 5–17)
ANION GAP SERPL CALC-SCNC: 13 MMOL/L — SIGNIFICANT CHANGE UP (ref 7–14)
ANION GAP SERPL CALC-SCNC: 13 MMOL/L — SIGNIFICANT CHANGE UP (ref 7–14)
ANION GAP SERPL CALC-SCNC: 14 MMOL/L — SIGNIFICANT CHANGE UP (ref 5–17)
ANION GAP SERPL CALC-SCNC: 15 MMOL/L — SIGNIFICANT CHANGE UP (ref 5–17)
ANION GAP SERPL CALC-SCNC: 15 MMOL/L — SIGNIFICANT CHANGE UP (ref 5–17)
ANION GAP SERPL CALC-SCNC: 16 MMOL/L — SIGNIFICANT CHANGE UP (ref 5–17)
ANION GAP SERPL CALC-SCNC: 16 MMOL/L — SIGNIFICANT CHANGE UP (ref 5–17)
ANION GAP SERPL CALC-SCNC: 18 MMOL/L — HIGH (ref 5–17)
ANION GAP SERPL CALC-SCNC: 18 MMOL/L — HIGH (ref 5–17)
ANION GAP SERPL CALC-SCNC: 19 MMOL/L — HIGH (ref 5–17)
ANION GAP SERPL CALC-SCNC: 19 MMOL/L — HIGH (ref 5–17)
ANION GAP SERPL CALC-SCNC: 22 MMOL/L — HIGH (ref 5–17)
ANION GAP SERPL CALC-SCNC: 22 MMOL/L — HIGH (ref 5–17)
ANION GAP SERPL CALC-SCNC: 23 MMOL/L — HIGH (ref 5–17)
ANION GAP SERPL CALC-SCNC: 23 MMOL/L — HIGH (ref 5–17)
ANION GAP SERPL CALC-SCNC: 8 MMOL/L — SIGNIFICANT CHANGE UP (ref 7–14)
APPEARANCE UR: ABNORMAL
APPEARANCE UR: ABNORMAL
APPEARANCE UR: CLEAR — SIGNIFICANT CHANGE UP
APTT BLD: 30 SEC — SIGNIFICANT CHANGE UP (ref 24.5–35.6)
APTT BLD: 30 SEC — SIGNIFICANT CHANGE UP (ref 24.5–35.6)
APTT BLD: 31.7 SEC — SIGNIFICANT CHANGE UP (ref 24.5–35.6)
APTT BLD: 31.7 SEC — SIGNIFICANT CHANGE UP (ref 24.5–35.6)
APTT BLD: 32.4 SEC — SIGNIFICANT CHANGE UP (ref 24.5–35.6)
APTT BLD: 32.4 SEC — SIGNIFICANT CHANGE UP (ref 24.5–35.6)
APTT BLD: 32.9 SEC — SIGNIFICANT CHANGE UP (ref 24.5–35.6)
APTT BLD: 32.9 SEC — SIGNIFICANT CHANGE UP (ref 24.5–35.6)
APTT BLD: 35.1 SEC — SIGNIFICANT CHANGE UP (ref 24.5–35.6)
APTT BLD: 35.1 SEC — SIGNIFICANT CHANGE UP (ref 24.5–35.6)
APTT BLD: 37.6 SEC — HIGH (ref 24.5–35.6)
APTT BLD: 37.6 SEC — HIGH (ref 24.5–35.6)
AST SERPL-CCNC: 1482 U/L — HIGH (ref 10–40)
AST SERPL-CCNC: 1482 U/L — HIGH (ref 10–40)
AST SERPL-CCNC: 24 U/L — SIGNIFICANT CHANGE UP (ref 10–40)
AST SERPL-CCNC: 24 U/L — SIGNIFICANT CHANGE UP (ref 10–40)
AST SERPL-CCNC: 25 U/L — SIGNIFICANT CHANGE UP (ref 10–40)
AST SERPL-CCNC: 39 U/L — SIGNIFICANT CHANGE UP (ref 10–40)
AST SERPL-CCNC: 39 U/L — SIGNIFICANT CHANGE UP (ref 10–40)
AST SERPL-CCNC: 420 U/L — HIGH (ref 10–40)
AST SERPL-CCNC: 420 U/L — HIGH (ref 10–40)
AST SERPL-CCNC: 47 U/L — HIGH (ref 10–40)
AST SERPL-CCNC: 47 U/L — HIGH (ref 10–40)
AST SERPL-CCNC: 57 U/L — HIGH (ref 10–40)
AST SERPL-CCNC: 57 U/L — HIGH (ref 10–40)
AST SERPL-CCNC: 81 U/L — HIGH (ref 4–32)
AST SERPL-CCNC: 85 U/L — HIGH (ref 4–32)
AST SERPL-CCNC: 882 U/L — HIGH (ref 10–40)
AST SERPL-CCNC: 882 U/L — HIGH (ref 10–40)
BACTERIA # UR AUTO: ABNORMAL /HPF
BACTERIA # UR AUTO: ABNORMAL /HPF
BACTERIA # UR AUTO: NEGATIVE /HPF — SIGNIFICANT CHANGE UP
BACTERIA # UR AUTO: NEGATIVE — SIGNIFICANT CHANGE UP
BASE EXCESS BLDV CALC-SCNC: -1.5 MMOL/L — SIGNIFICANT CHANGE UP (ref -2–3)
BASE EXCESS BLDV CALC-SCNC: -1.5 MMOL/L — SIGNIFICANT CHANGE UP (ref -2–3)
BASE EXCESS BLDV CALC-SCNC: -10.9 MMOL/L — LOW (ref -2–3)
BASE EXCESS BLDV CALC-SCNC: -10.9 MMOL/L — LOW (ref -2–3)
BASOPHILS # BLD AUTO: 0.02 K/UL — SIGNIFICANT CHANGE UP (ref 0–0.2)
BASOPHILS # BLD AUTO: 0.03 K/UL — SIGNIFICANT CHANGE UP (ref 0–0.2)
BASOPHILS # BLD AUTO: 0.04 K/UL — SIGNIFICANT CHANGE UP (ref 0–0.2)
BASOPHILS # BLD AUTO: 0.04 K/UL — SIGNIFICANT CHANGE UP (ref 0–0.2)
BASOPHILS # BLD AUTO: 0.06 K/UL — SIGNIFICANT CHANGE UP (ref 0–0.2)
BASOPHILS # BLD AUTO: 0.06 K/UL — SIGNIFICANT CHANGE UP (ref 0–0.2)
BASOPHILS NFR BLD AUTO: 0.2 % — SIGNIFICANT CHANGE UP (ref 0–2)
BASOPHILS NFR BLD AUTO: 0.2 % — SIGNIFICANT CHANGE UP (ref 0–2)
BASOPHILS NFR BLD AUTO: 0.3 % — SIGNIFICANT CHANGE UP (ref 0–2)
BASOPHILS NFR BLD AUTO: 0.4 % — SIGNIFICANT CHANGE UP (ref 0–2)
BASOPHILS NFR BLD AUTO: 0.6 % — SIGNIFICANT CHANGE UP (ref 0–2)
BASOPHILS NFR BLD AUTO: 0.6 % — SIGNIFICANT CHANGE UP (ref 0–2)
BILIRUB DIRECT SERPL-MCNC: 0.7 MG/DL — HIGH (ref 0–0.3)
BILIRUB DIRECT SERPL-MCNC: 0.7 MG/DL — HIGH (ref 0–0.3)
BILIRUB INDIRECT FLD-MCNC: 0.6 MG/DL — SIGNIFICANT CHANGE UP (ref 0.2–1)
BILIRUB INDIRECT FLD-MCNC: 0.6 MG/DL — SIGNIFICANT CHANGE UP (ref 0.2–1)
BILIRUB SERPL-MCNC: 0.4 MG/DL — SIGNIFICANT CHANGE UP (ref 0.2–1.2)
BILIRUB SERPL-MCNC: 0.4 MG/DL — SIGNIFICANT CHANGE UP (ref 0.2–1.2)
BILIRUB SERPL-MCNC: 0.5 MG/DL — SIGNIFICANT CHANGE UP (ref 0.2–1.2)
BILIRUB SERPL-MCNC: 0.6 MG/DL — SIGNIFICANT CHANGE UP (ref 0.2–1.2)
BILIRUB SERPL-MCNC: 0.6 MG/DL — SIGNIFICANT CHANGE UP (ref 0.2–1.2)
BILIRUB SERPL-MCNC: 0.7 MG/DL — SIGNIFICANT CHANGE UP (ref 0.2–1.2)
BILIRUB SERPL-MCNC: 0.7 MG/DL — SIGNIFICANT CHANGE UP (ref 0.2–1.2)
BILIRUB SERPL-MCNC: 0.8 MG/DL — SIGNIFICANT CHANGE UP (ref 0.2–1.2)
BILIRUB SERPL-MCNC: 1 MG/DL — SIGNIFICANT CHANGE UP (ref 0.2–1.2)
BILIRUB SERPL-MCNC: 1.3 MG/DL — HIGH (ref 0.2–1.2)
BILIRUB SERPL-MCNC: 1.5 MG/DL — HIGH (ref 0.2–1.2)
BILIRUB SERPL-MCNC: 1.5 MG/DL — HIGH (ref 0.2–1.2)
BILIRUB UR-MCNC: ABNORMAL
BILIRUB UR-MCNC: ABNORMAL
BILIRUB UR-MCNC: NEGATIVE — SIGNIFICANT CHANGE UP
BUN SERPL-MCNC: 22 MG/DL — SIGNIFICANT CHANGE UP (ref 7–23)
BUN SERPL-MCNC: 25 MG/DL — HIGH (ref 7–23)
BUN SERPL-MCNC: 26 MG/DL — HIGH (ref 7–23)
BUN SERPL-MCNC: 26 MG/DL — HIGH (ref 7–23)
BUN SERPL-MCNC: 27 MG/DL — HIGH (ref 7–23)
BUN SERPL-MCNC: 28 MG/DL — HIGH (ref 7–23)
BUN SERPL-MCNC: 29 MG/DL — HIGH (ref 7–23)
BUN SERPL-MCNC: 29 MG/DL — HIGH (ref 7–23)
BUN SERPL-MCNC: 30 MG/DL — HIGH (ref 7–23)
BUN SERPL-MCNC: 31 MG/DL — HIGH (ref 7–23)
BUN SERPL-MCNC: 31 MG/DL — HIGH (ref 7–23)
BUN SERPL-MCNC: 33 MG/DL — HIGH (ref 7–23)
BUN SERPL-MCNC: 35 MG/DL — HIGH (ref 7–23)
BUN SERPL-MCNC: 35 MG/DL — HIGH (ref 7–23)
BUN SERPL-MCNC: 36 MG/DL — HIGH (ref 7–23)
BUN SERPL-MCNC: 36 MG/DL — HIGH (ref 7–23)
BUN SERPL-MCNC: 37 MG/DL — HIGH (ref 7–23)
BUN SERPL-MCNC: 37 MG/DL — HIGH (ref 7–23)
BUN SERPL-MCNC: 43 MG/DL — HIGH (ref 7–23)
BUN SERPL-MCNC: 43 MG/DL — HIGH (ref 7–23)
BUN SERPL-MCNC: 47 MG/DL — HIGH (ref 7–23)
BUN SERPL-MCNC: 47 MG/DL — HIGH (ref 7–23)
BUN SERPL-MCNC: 51 MG/DL — HIGH (ref 7–23)
BUN SERPL-MCNC: 51 MG/DL — HIGH (ref 7–23)
BUN SERPL-MCNC: 59 MG/DL — HIGH (ref 7–23)
BUN SERPL-MCNC: 59 MG/DL — HIGH (ref 7–23)
BUN SERPL-MCNC: 60 MG/DL — HIGH (ref 7–23)
BUN SERPL-MCNC: 62 MG/DL — HIGH (ref 7–23)
BUN SERPL-MCNC: 62 MG/DL — HIGH (ref 7–23)
BUN SERPL-MCNC: 68 MG/DL — HIGH (ref 7–23)
BUN SERPL-MCNC: 68 MG/DL — HIGH (ref 7–23)
CA-I SERPL-SCNC: 1.05 MMOL/L — LOW (ref 1.15–1.33)
CA-I SERPL-SCNC: 1.05 MMOL/L — LOW (ref 1.15–1.33)
CA-I SERPL-SCNC: 1.18 MMOL/L — SIGNIFICANT CHANGE UP (ref 1.15–1.33)
CA-I SERPL-SCNC: 1.18 MMOL/L — SIGNIFICANT CHANGE UP (ref 1.15–1.33)
CALCIUM SERPL-MCNC: 7.4 MG/DL — LOW (ref 8.4–10.5)
CALCIUM SERPL-MCNC: 7.4 MG/DL — LOW (ref 8.4–10.5)
CALCIUM SERPL-MCNC: 8.5 MG/DL — SIGNIFICANT CHANGE UP (ref 8.4–10.5)
CALCIUM SERPL-MCNC: 8.5 MG/DL — SIGNIFICANT CHANGE UP (ref 8.4–10.5)
CALCIUM SERPL-MCNC: 8.6 MG/DL — SIGNIFICANT CHANGE UP (ref 8.4–10.5)
CALCIUM SERPL-MCNC: 8.6 MG/DL — SIGNIFICANT CHANGE UP (ref 8.4–10.5)
CALCIUM SERPL-MCNC: 8.7 MG/DL — SIGNIFICANT CHANGE UP (ref 8.4–10.5)
CALCIUM SERPL-MCNC: 8.8 MG/DL — SIGNIFICANT CHANGE UP (ref 8.4–10.5)
CALCIUM SERPL-MCNC: 8.9 MG/DL — SIGNIFICANT CHANGE UP (ref 8.4–10.5)
CALCIUM SERPL-MCNC: 9 MG/DL — SIGNIFICANT CHANGE UP (ref 8.4–10.5)
CALCIUM SERPL-MCNC: 9 MG/DL — SIGNIFICANT CHANGE UP (ref 8.4–10.5)
CALCIUM SERPL-MCNC: 9.2 MG/DL — SIGNIFICANT CHANGE UP (ref 8.4–10.5)
CALCIUM SERPL-MCNC: 9.3 MG/DL — SIGNIFICANT CHANGE UP (ref 8.4–10.5)
CALCIUM SERPL-MCNC: 9.4 MG/DL — SIGNIFICANT CHANGE UP (ref 8.4–10.5)
CALCIUM SERPL-MCNC: 9.4 MG/DL — SIGNIFICANT CHANGE UP (ref 8.4–10.5)
CALCIUM SERPL-MCNC: 9.6 MG/DL — SIGNIFICANT CHANGE UP (ref 8.4–10.5)
CALCIUM SERPL-MCNC: 9.8 MG/DL — SIGNIFICANT CHANGE UP (ref 8.4–10.5)
CALCIUM SERPL-MCNC: 9.8 MG/DL — SIGNIFICANT CHANGE UP (ref 8.4–10.5)
CAST: 6 /LPF — HIGH (ref 0–4)
CAST: >63 /LPF — HIGH (ref 0–4)
CAST: >63 /LPF — HIGH (ref 0–4)
CHLORIDE BLDV-SCNC: 104 MMOL/L — SIGNIFICANT CHANGE UP (ref 96–108)
CHLORIDE BLDV-SCNC: 104 MMOL/L — SIGNIFICANT CHANGE UP (ref 96–108)
CHLORIDE BLDV-SCNC: 99 MMOL/L — SIGNIFICANT CHANGE UP (ref 96–108)
CHLORIDE BLDV-SCNC: 99 MMOL/L — SIGNIFICANT CHANGE UP (ref 96–108)
CHLORIDE SERPL-SCNC: 100 MMOL/L — SIGNIFICANT CHANGE UP (ref 96–108)
CHLORIDE SERPL-SCNC: 100 MMOL/L — SIGNIFICANT CHANGE UP (ref 96–108)
CHLORIDE SERPL-SCNC: 100 MMOL/L — SIGNIFICANT CHANGE UP (ref 98–107)
CHLORIDE SERPL-SCNC: 100 MMOL/L — SIGNIFICANT CHANGE UP (ref 98–107)
CHLORIDE SERPL-SCNC: 101 MMOL/L — SIGNIFICANT CHANGE UP (ref 96–108)
CHLORIDE SERPL-SCNC: 101 MMOL/L — SIGNIFICANT CHANGE UP (ref 96–108)
CHLORIDE SERPL-SCNC: 102 MMOL/L — SIGNIFICANT CHANGE UP (ref 96–108)
CHLORIDE SERPL-SCNC: 102 MMOL/L — SIGNIFICANT CHANGE UP (ref 96–108)
CHLORIDE SERPL-SCNC: 103 MMOL/L — SIGNIFICANT CHANGE UP (ref 96–108)
CHLORIDE SERPL-SCNC: 104 MMOL/L — SIGNIFICANT CHANGE UP (ref 96–108)
CHLORIDE SERPL-SCNC: 105 MMOL/L — SIGNIFICANT CHANGE UP (ref 96–108)
CHLORIDE SERPL-SCNC: 106 MMOL/L — SIGNIFICANT CHANGE UP (ref 96–108)
CHLORIDE SERPL-SCNC: 91 MMOL/L — LOW (ref 96–108)
CHLORIDE SERPL-SCNC: 91 MMOL/L — LOW (ref 96–108)
CHLORIDE SERPL-SCNC: 96 MMOL/L — LOW (ref 98–107)
CHLORIDE SERPL-SCNC: 98 MMOL/L — SIGNIFICANT CHANGE UP (ref 96–108)
CHLORIDE SERPL-SCNC: 98 MMOL/L — SIGNIFICANT CHANGE UP (ref 96–108)
CHLORIDE SERPL-SCNC: 98 MMOL/L — SIGNIFICANT CHANGE UP (ref 98–107)
CHLORIDE SERPL-SCNC: 99 MMOL/L — SIGNIFICANT CHANGE UP (ref 96–108)
CHLORIDE SERPL-SCNC: 99 MMOL/L — SIGNIFICANT CHANGE UP (ref 96–108)
CHLORIDE SERPL-SCNC: 99 MMOL/L — SIGNIFICANT CHANGE UP (ref 98–107)
CHLORIDE SERPL-SCNC: 99 MMOL/L — SIGNIFICANT CHANGE UP (ref 98–107)
CHOLEST SERPL-MCNC: 173 MG/DL — SIGNIFICANT CHANGE UP
CHOLEST SERPL-MCNC: 173 MG/DL — SIGNIFICANT CHANGE UP
CK MB BLD-MCNC: 4.7 % — HIGH (ref 0–3.5)
CK MB BLD-MCNC: 4.7 % — HIGH (ref 0–3.5)
CK MB CFR SERPL CALC: 2.3 NG/ML — SIGNIFICANT CHANGE UP (ref 0–3.8)
CK MB CFR SERPL CALC: 2.3 NG/ML — SIGNIFICANT CHANGE UP (ref 0–3.8)
CK SERPL-CCNC: 49 U/L — SIGNIFICANT CHANGE UP (ref 25–170)
CK SERPL-CCNC: 49 U/L — SIGNIFICANT CHANGE UP (ref 25–170)
CO2 BLDV-SCNC: 19 MMOL/L — LOW (ref 22–26)
CO2 BLDV-SCNC: 19 MMOL/L — LOW (ref 22–26)
CO2 BLDV-SCNC: 25 MMOL/L — SIGNIFICANT CHANGE UP (ref 22–26)
CO2 BLDV-SCNC: 25 MMOL/L — SIGNIFICANT CHANGE UP (ref 22–26)
CO2 SERPL-SCNC: 15 MMOL/L — LOW (ref 22–31)
CO2 SERPL-SCNC: 15 MMOL/L — LOW (ref 22–31)
CO2 SERPL-SCNC: 16 MMOL/L — LOW (ref 22–31)
CO2 SERPL-SCNC: 16 MMOL/L — LOW (ref 22–31)
CO2 SERPL-SCNC: 20 MMOL/L — LOW (ref 22–31)
CO2 SERPL-SCNC: 21 MMOL/L — LOW (ref 22–31)
CO2 SERPL-SCNC: 22 MMOL/L — SIGNIFICANT CHANGE UP (ref 22–31)
CO2 SERPL-SCNC: 23 MMOL/L — SIGNIFICANT CHANGE UP (ref 22–31)
CO2 SERPL-SCNC: 24 MMOL/L — SIGNIFICANT CHANGE UP (ref 22–31)
CO2 SERPL-SCNC: 25 MMOL/L — SIGNIFICANT CHANGE UP (ref 22–31)
CO2 SERPL-SCNC: 26 MMOL/L — SIGNIFICANT CHANGE UP (ref 22–31)
COLOR SPEC: SIGNIFICANT CHANGE UP
COLOR SPEC: YELLOW — SIGNIFICANT CHANGE UP
CREAT SERPL-MCNC: 1.03 MG/DL — SIGNIFICANT CHANGE UP (ref 0.5–1.3)
CREAT SERPL-MCNC: 1.06 MG/DL — SIGNIFICANT CHANGE UP (ref 0.5–1.3)
CREAT SERPL-MCNC: 1.09 MG/DL — SIGNIFICANT CHANGE UP (ref 0.5–1.3)
CREAT SERPL-MCNC: 1.11 MG/DL — SIGNIFICANT CHANGE UP (ref 0.5–1.3)
CREAT SERPL-MCNC: 1.11 MG/DL — SIGNIFICANT CHANGE UP (ref 0.5–1.3)
CREAT SERPL-MCNC: 1.13 MG/DL — SIGNIFICANT CHANGE UP (ref 0.5–1.3)
CREAT SERPL-MCNC: 1.14 MG/DL — SIGNIFICANT CHANGE UP (ref 0.5–1.3)
CREAT SERPL-MCNC: 1.15 MG/DL — SIGNIFICANT CHANGE UP (ref 0.5–1.3)
CREAT SERPL-MCNC: 1.15 MG/DL — SIGNIFICANT CHANGE UP (ref 0.5–1.3)
CREAT SERPL-MCNC: 1.17 MG/DL — SIGNIFICANT CHANGE UP (ref 0.5–1.3)
CREAT SERPL-MCNC: 1.2 MG/DL — SIGNIFICANT CHANGE UP (ref 0.5–1.3)
CREAT SERPL-MCNC: 1.2 MG/DL — SIGNIFICANT CHANGE UP (ref 0.5–1.3)
CREAT SERPL-MCNC: 1.22 MG/DL — SIGNIFICANT CHANGE UP (ref 0.5–1.3)
CREAT SERPL-MCNC: 1.23 MG/DL — SIGNIFICANT CHANGE UP (ref 0.5–1.3)
CREAT SERPL-MCNC: 1.23 MG/DL — SIGNIFICANT CHANGE UP (ref 0.5–1.3)
CREAT SERPL-MCNC: 1.24 MG/DL — SIGNIFICANT CHANGE UP (ref 0.5–1.3)
CREAT SERPL-MCNC: 1.33 MG/DL — HIGH (ref 0.5–1.3)
CREAT SERPL-MCNC: 1.33 MG/DL — HIGH (ref 0.5–1.3)
CREAT SERPL-MCNC: 1.35 MG/DL — HIGH (ref 0.5–1.3)
CREAT SERPL-MCNC: 1.35 MG/DL — HIGH (ref 0.5–1.3)
CREAT SERPL-MCNC: 1.4 MG/DL — HIGH (ref 0.5–1.3)
CREAT SERPL-MCNC: 1.4 MG/DL — HIGH (ref 0.5–1.3)
CREAT SERPL-MCNC: 2.18 MG/DL — HIGH (ref 0.5–1.3)
CREAT SERPL-MCNC: 2.18 MG/DL — HIGH (ref 0.5–1.3)
CREAT SERPL-MCNC: 2.56 MG/DL — HIGH (ref 0.5–1.3)
CREAT SERPL-MCNC: 2.56 MG/DL — HIGH (ref 0.5–1.3)
CREAT SERPL-MCNC: 2.83 MG/DL — HIGH (ref 0.5–1.3)
CREAT SERPL-MCNC: 2.83 MG/DL — HIGH (ref 0.5–1.3)
CREAT SERPL-MCNC: 4.07 MG/DL — HIGH (ref 0.5–1.3)
CREAT SERPL-MCNC: 4.07 MG/DL — HIGH (ref 0.5–1.3)
CULTURE RESULTS: NO GROWTH — SIGNIFICANT CHANGE UP
CULTURE RESULTS: SIGNIFICANT CHANGE UP
DIFF PNL FLD: ABNORMAL
DIFF PNL FLD: NEGATIVE — SIGNIFICANT CHANGE UP
EGFR: 10 ML/MIN/1.73M2 — LOW
EGFR: 10 ML/MIN/1.73M2 — LOW
EGFR: 15 ML/MIN/1.73M2 — LOW
EGFR: 15 ML/MIN/1.73M2 — LOW
EGFR: 17 ML/MIN/1.73M2 — LOW
EGFR: 17 ML/MIN/1.73M2 — LOW
EGFR: 20 ML/MIN/1.73M2 — LOW
EGFR: 20 ML/MIN/1.73M2 — LOW
EGFR: 35 ML/MIN/1.73M2 — LOW
EGFR: 35 ML/MIN/1.73M2 — LOW
EGFR: 36 ML/MIN/1.73M2 — LOW
EGFR: 36 ML/MIN/1.73M2 — LOW
EGFR: 37 ML/MIN/1.73M2 — LOW
EGFR: 37 ML/MIN/1.73M2 — LOW
EGFR: 40 ML/MIN/1.73M2 — LOW
EGFR: 41 ML/MIN/1.73M2 — LOW
EGFR: 42 ML/MIN/1.73M2 — LOW
EGFR: 42 ML/MIN/1.73M2 — LOW
EGFR: 43 ML/MIN/1.73M2 — LOW
EGFR: 44 ML/MIN/1.73M2 — LOW
EGFR: 45 ML/MIN/1.73M2 — LOW
EGFR: 46 ML/MIN/1.73M2 — LOW
EGFR: 46 ML/MIN/1.73M2 — LOW
EGFR: 47 ML/MIN/1.73M2 — LOW
EGFR: 48 ML/MIN/1.73M2 — LOW
EGFR: 50 ML/MIN/1.73M2 — LOW
EOSINOPHIL # BLD AUTO: 0.01 K/UL — SIGNIFICANT CHANGE UP (ref 0–0.5)
EOSINOPHIL # BLD AUTO: 0.03 K/UL — SIGNIFICANT CHANGE UP (ref 0–0.5)
EOSINOPHIL # BLD AUTO: 0.03 K/UL — SIGNIFICANT CHANGE UP (ref 0–0.5)
EOSINOPHIL # BLD AUTO: 0.06 K/UL — SIGNIFICANT CHANGE UP (ref 0–0.5)
EOSINOPHIL # BLD AUTO: 0.1 K/UL — SIGNIFICANT CHANGE UP (ref 0–0.5)
EOSINOPHIL # BLD AUTO: 0.1 K/UL — SIGNIFICANT CHANGE UP (ref 0–0.5)
EOSINOPHIL # BLD AUTO: 0.15 K/UL — SIGNIFICANT CHANGE UP (ref 0–0.5)
EOSINOPHIL NFR BLD AUTO: 0.1 % — SIGNIFICANT CHANGE UP (ref 0–6)
EOSINOPHIL NFR BLD AUTO: 0.3 % — SIGNIFICANT CHANGE UP (ref 0–6)
EOSINOPHIL NFR BLD AUTO: 0.3 % — SIGNIFICANT CHANGE UP (ref 0–6)
EOSINOPHIL NFR BLD AUTO: 0.7 % — SIGNIFICANT CHANGE UP (ref 0–6)
EOSINOPHIL NFR BLD AUTO: 1.4 % — SIGNIFICANT CHANGE UP (ref 0–6)
EOSINOPHIL NFR BLD AUTO: 1.4 % — SIGNIFICANT CHANGE UP (ref 0–6)
EOSINOPHIL NFR BLD AUTO: 1.8 % — SIGNIFICANT CHANGE UP (ref 0–6)
EOSINOPHIL NFR BLD AUTO: 1.8 % — SIGNIFICANT CHANGE UP (ref 0–6)
EOSINOPHIL NFR BLD AUTO: 2.2 % — SIGNIFICANT CHANGE UP (ref 0–6)
EPI CELLS # UR: 2 /HPF — SIGNIFICANT CHANGE UP
FERRITIN SERPL-MCNC: 77 NG/ML — SIGNIFICANT CHANGE UP (ref 13–330)
FERRITIN SERPL-MCNC: 77 NG/ML — SIGNIFICANT CHANGE UP (ref 13–330)
FINE GRAN CASTS #/AREA URNS AUTO: PRESENT
FLUAV AG NPH QL: SIGNIFICANT CHANGE UP
FLUAV AG NPH QL: SIGNIFICANT CHANGE UP
FLUBV AG NPH QL: SIGNIFICANT CHANGE UP
FLUBV AG NPH QL: SIGNIFICANT CHANGE UP
GAS PNL BLDV: 132 MMOL/L — LOW (ref 136–145)
GAS PNL BLDV: 132 MMOL/L — LOW (ref 136–145)
GAS PNL BLDV: 133 MMOL/L — LOW (ref 136–145)
GAS PNL BLDV: 133 MMOL/L — LOW (ref 136–145)
GAS PNL BLDV: SIGNIFICANT CHANGE UP
GLUCOSE BLDC GLUCOMTR-MCNC: 100 MG/DL — HIGH (ref 70–99)
GLUCOSE BLDC GLUCOMTR-MCNC: 100 MG/DL — HIGH (ref 70–99)
GLUCOSE BLDC GLUCOMTR-MCNC: 101 MG/DL — HIGH (ref 70–99)
GLUCOSE BLDC GLUCOMTR-MCNC: 101 MG/DL — HIGH (ref 70–99)
GLUCOSE BLDC GLUCOMTR-MCNC: 111 MG/DL — HIGH (ref 70–99)
GLUCOSE BLDC GLUCOMTR-MCNC: 111 MG/DL — HIGH (ref 70–99)
GLUCOSE BLDC GLUCOMTR-MCNC: 114 MG/DL — HIGH (ref 70–99)
GLUCOSE BLDC GLUCOMTR-MCNC: 114 MG/DL — HIGH (ref 70–99)
GLUCOSE BLDC GLUCOMTR-MCNC: 120 MG/DL — HIGH (ref 70–99)
GLUCOSE BLDC GLUCOMTR-MCNC: 120 MG/DL — HIGH (ref 70–99)
GLUCOSE BLDC GLUCOMTR-MCNC: 125 MG/DL — HIGH (ref 70–99)
GLUCOSE BLDC GLUCOMTR-MCNC: 125 MG/DL — HIGH (ref 70–99)
GLUCOSE BLDC GLUCOMTR-MCNC: 128 MG/DL — HIGH (ref 70–99)
GLUCOSE BLDC GLUCOMTR-MCNC: 128 MG/DL — HIGH (ref 70–99)
GLUCOSE BLDC GLUCOMTR-MCNC: 153 MG/DL — HIGH (ref 70–99)
GLUCOSE BLDC GLUCOMTR-MCNC: 153 MG/DL — HIGH (ref 70–99)
GLUCOSE BLDC GLUCOMTR-MCNC: 24 MG/DL — CRITICAL LOW (ref 70–99)
GLUCOSE BLDC GLUCOMTR-MCNC: 24 MG/DL — CRITICAL LOW (ref 70–99)
GLUCOSE BLDC GLUCOMTR-MCNC: 26 MG/DL — CRITICAL LOW (ref 70–99)
GLUCOSE BLDC GLUCOMTR-MCNC: 26 MG/DL — CRITICAL LOW (ref 70–99)
GLUCOSE BLDC GLUCOMTR-MCNC: 65 MG/DL — LOW (ref 70–99)
GLUCOSE BLDC GLUCOMTR-MCNC: 65 MG/DL — LOW (ref 70–99)
GLUCOSE BLDC GLUCOMTR-MCNC: 97 MG/DL — SIGNIFICANT CHANGE UP (ref 70–99)
GLUCOSE BLDV-MCNC: 109 MG/DL — HIGH (ref 70–99)
GLUCOSE BLDV-MCNC: 109 MG/DL — HIGH (ref 70–99)
GLUCOSE BLDV-MCNC: 79 MG/DL — SIGNIFICANT CHANGE UP (ref 70–99)
GLUCOSE BLDV-MCNC: 79 MG/DL — SIGNIFICANT CHANGE UP (ref 70–99)
GLUCOSE SERPL-MCNC: 102 MG/DL — HIGH (ref 70–99)
GLUCOSE SERPL-MCNC: 103 MG/DL — HIGH (ref 70–99)
GLUCOSE SERPL-MCNC: 103 MG/DL — HIGH (ref 70–99)
GLUCOSE SERPL-MCNC: 105 MG/DL — HIGH (ref 70–99)
GLUCOSE SERPL-MCNC: 105 MG/DL — HIGH (ref 70–99)
GLUCOSE SERPL-MCNC: 111 MG/DL — HIGH (ref 70–99)
GLUCOSE SERPL-MCNC: 112 MG/DL — HIGH (ref 70–99)
GLUCOSE SERPL-MCNC: 112 MG/DL — HIGH (ref 70–99)
GLUCOSE SERPL-MCNC: 114 MG/DL — HIGH (ref 70–99)
GLUCOSE SERPL-MCNC: 119 MG/DL — HIGH (ref 70–99)
GLUCOSE SERPL-MCNC: 119 MG/DL — HIGH (ref 70–99)
GLUCOSE SERPL-MCNC: 129 MG/DL — HIGH (ref 70–99)
GLUCOSE SERPL-MCNC: 130 MG/DL — HIGH (ref 70–99)
GLUCOSE SERPL-MCNC: 145 MG/DL — HIGH (ref 70–99)
GLUCOSE SERPL-MCNC: 145 MG/DL — HIGH (ref 70–99)
GLUCOSE SERPL-MCNC: 149 MG/DL — HIGH (ref 70–99)
GLUCOSE SERPL-MCNC: 149 MG/DL — HIGH (ref 70–99)
GLUCOSE SERPL-MCNC: 43 MG/DL — CRITICAL LOW (ref 70–99)
GLUCOSE SERPL-MCNC: 43 MG/DL — CRITICAL LOW (ref 70–99)
GLUCOSE SERPL-MCNC: 73 MG/DL — SIGNIFICANT CHANGE UP (ref 70–99)
GLUCOSE SERPL-MCNC: 73 MG/DL — SIGNIFICANT CHANGE UP (ref 70–99)
GLUCOSE SERPL-MCNC: 80 MG/DL — SIGNIFICANT CHANGE UP (ref 70–99)
GLUCOSE SERPL-MCNC: 80 MG/DL — SIGNIFICANT CHANGE UP (ref 70–99)
GLUCOSE SERPL-MCNC: 81 MG/DL — SIGNIFICANT CHANGE UP (ref 70–99)
GLUCOSE SERPL-MCNC: 81 MG/DL — SIGNIFICANT CHANGE UP (ref 70–99)
GLUCOSE SERPL-MCNC: 83 MG/DL — SIGNIFICANT CHANGE UP (ref 70–99)
GLUCOSE SERPL-MCNC: 83 MG/DL — SIGNIFICANT CHANGE UP (ref 70–99)
GLUCOSE SERPL-MCNC: 86 MG/DL — SIGNIFICANT CHANGE UP (ref 70–99)
GLUCOSE SERPL-MCNC: 87 MG/DL — SIGNIFICANT CHANGE UP (ref 70–99)
GLUCOSE SERPL-MCNC: 90 MG/DL — SIGNIFICANT CHANGE UP (ref 70–99)
GLUCOSE SERPL-MCNC: 91 MG/DL — SIGNIFICANT CHANGE UP (ref 70–99)
GLUCOSE SERPL-MCNC: 91 MG/DL — SIGNIFICANT CHANGE UP (ref 70–99)
GLUCOSE UR QL: NEGATIVE MG/DL — SIGNIFICANT CHANGE UP
GLUCOSE UR QL: NEGATIVE — SIGNIFICANT CHANGE UP
HCO3 BLDV-SCNC: 17 MMOL/L — LOW (ref 22–29)
HCO3 BLDV-SCNC: 17 MMOL/L — LOW (ref 22–29)
HCO3 BLDV-SCNC: 24 MMOL/L — SIGNIFICANT CHANGE UP (ref 22–29)
HCO3 BLDV-SCNC: 24 MMOL/L — SIGNIFICANT CHANGE UP (ref 22–29)
HCT VFR BLD CALC: 25.8 % — LOW (ref 34.5–45)
HCT VFR BLD CALC: 25.8 % — LOW (ref 34.5–45)
HCT VFR BLD CALC: 32.7 % — LOW (ref 34.5–45)
HCT VFR BLD CALC: 32.7 % — LOW (ref 34.5–45)
HCT VFR BLD CALC: 33 % — LOW (ref 34.5–45)
HCT VFR BLD CALC: 33 % — LOW (ref 34.5–45)
HCT VFR BLD CALC: 35.5 % — SIGNIFICANT CHANGE UP (ref 34.5–45)
HCT VFR BLD CALC: 36.9 % — SIGNIFICANT CHANGE UP (ref 34.5–45)
HCT VFR BLD CALC: 36.9 % — SIGNIFICANT CHANGE UP (ref 34.5–45)
HCT VFR BLD CALC: 37.8 % — SIGNIFICANT CHANGE UP (ref 34.5–45)
HCT VFR BLD CALC: 37.8 % — SIGNIFICANT CHANGE UP (ref 34.5–45)
HCT VFR BLD CALC: 38.7 % — SIGNIFICANT CHANGE UP (ref 34.5–45)
HCT VFR BLD CALC: 38.7 % — SIGNIFICANT CHANGE UP (ref 34.5–45)
HCT VFR BLD CALC: 39.1 % — SIGNIFICANT CHANGE UP (ref 34.5–45)
HCT VFR BLD CALC: 39.1 % — SIGNIFICANT CHANGE UP (ref 34.5–45)
HCT VFR BLD CALC: 39.7 % — SIGNIFICANT CHANGE UP (ref 34.5–45)
HCT VFR BLD CALC: 39.8 % — SIGNIFICANT CHANGE UP (ref 34.5–45)
HCT VFR BLD CALC: 39.8 % — SIGNIFICANT CHANGE UP (ref 34.5–45)
HCT VFR BLD CALC: 40.1 % — SIGNIFICANT CHANGE UP (ref 34.5–45)
HCT VFR BLD CALC: 41.2 % — SIGNIFICANT CHANGE UP (ref 34.5–45)
HCT VFR BLD CALC: 41.6 % — SIGNIFICANT CHANGE UP (ref 34.5–45)
HCT VFR BLD CALC: 41.8 % — SIGNIFICANT CHANGE UP (ref 34.5–45)
HCT VFR BLD CALC: 41.8 % — SIGNIFICANT CHANGE UP (ref 34.5–45)
HCT VFR BLD CALC: 42.8 % — SIGNIFICANT CHANGE UP (ref 34.5–45)
HCT VFR BLD CALC: 44 % — SIGNIFICANT CHANGE UP (ref 34.5–45)
HCT VFR BLD CALC: 45.5 % — HIGH (ref 34.5–45)
HCT VFR BLD CALC: 45.5 % — HIGH (ref 34.5–45)
HCT VFR BLDA CALC: 26 % — LOW (ref 34.5–46.5)
HCT VFR BLDA CALC: 26 % — LOW (ref 34.5–46.5)
HCT VFR BLDA CALC: 36 % — SIGNIFICANT CHANGE UP (ref 34.5–46.5)
HCT VFR BLDA CALC: 36 % — SIGNIFICANT CHANGE UP (ref 34.5–46.5)
HDLC SERPL-MCNC: 53 MG/DL — SIGNIFICANT CHANGE UP
HDLC SERPL-MCNC: 53 MG/DL — SIGNIFICANT CHANGE UP
HGB BLD CALC-MCNC: 12.1 G/DL — SIGNIFICANT CHANGE UP (ref 11.7–16.1)
HGB BLD CALC-MCNC: 12.1 G/DL — SIGNIFICANT CHANGE UP (ref 11.7–16.1)
HGB BLD CALC-MCNC: 8.5 G/DL — LOW (ref 11.7–16.1)
HGB BLD CALC-MCNC: 8.5 G/DL — LOW (ref 11.7–16.1)
HGB BLD-MCNC: 10.1 G/DL — LOW (ref 11.5–15.5)
HGB BLD-MCNC: 10.1 G/DL — LOW (ref 11.5–15.5)
HGB BLD-MCNC: 10.3 G/DL — LOW (ref 11.5–15.5)
HGB BLD-MCNC: 10.3 G/DL — LOW (ref 11.5–15.5)
HGB BLD-MCNC: 11 G/DL — LOW (ref 11.5–15.5)
HGB BLD-MCNC: 11 G/DL — LOW (ref 11.5–15.5)
HGB BLD-MCNC: 11.4 G/DL — LOW (ref 11.5–15.5)
HGB BLD-MCNC: 11.4 G/DL — LOW (ref 11.5–15.5)
HGB BLD-MCNC: 11.5 G/DL — SIGNIFICANT CHANGE UP (ref 11.5–15.5)
HGB BLD-MCNC: 11.5 G/DL — SIGNIFICANT CHANGE UP (ref 11.5–15.5)
HGB BLD-MCNC: 12.1 G/DL — SIGNIFICANT CHANGE UP (ref 11.5–15.5)
HGB BLD-MCNC: 12.2 G/DL — SIGNIFICANT CHANGE UP (ref 11.5–15.5)
HGB BLD-MCNC: 12.2 G/DL — SIGNIFICANT CHANGE UP (ref 11.5–15.5)
HGB BLD-MCNC: 12.5 G/DL — SIGNIFICANT CHANGE UP (ref 11.5–15.5)
HGB BLD-MCNC: 12.6 G/DL — SIGNIFICANT CHANGE UP (ref 11.5–15.5)
HGB BLD-MCNC: 12.6 G/DL — SIGNIFICANT CHANGE UP (ref 11.5–15.5)
HGB BLD-MCNC: 13.1 G/DL — SIGNIFICANT CHANGE UP (ref 11.5–15.5)
HGB BLD-MCNC: 13.6 G/DL — SIGNIFICANT CHANGE UP (ref 11.5–15.5)
HGB BLD-MCNC: 13.6 G/DL — SIGNIFICANT CHANGE UP (ref 11.5–15.5)
HGB BLD-MCNC: 13.7 G/DL — SIGNIFICANT CHANGE UP (ref 11.5–15.5)
HGB BLD-MCNC: 13.7 G/DL — SIGNIFICANT CHANGE UP (ref 11.5–15.5)
HGB BLD-MCNC: 13.8 G/DL — SIGNIFICANT CHANGE UP (ref 11.5–15.5)
HGB BLD-MCNC: 14.3 G/DL — SIGNIFICANT CHANGE UP (ref 11.5–15.5)
HGB BLD-MCNC: 14.5 G/DL — SIGNIFICANT CHANGE UP (ref 11.5–15.5)
HGB BLD-MCNC: 8.1 G/DL — LOW (ref 11.5–15.5)
HGB BLD-MCNC: 8.1 G/DL — LOW (ref 11.5–15.5)
HYALINE CASTS # UR AUTO: 1 /LPF — SIGNIFICANT CHANGE UP (ref 0–2)
HYALINE CASTS # UR AUTO: PRESENT
IANC: 7.47 K/UL — HIGH (ref 1.8–7.4)
IMM GRANULOCYTES NFR BLD AUTO: 0.2 % — SIGNIFICANT CHANGE UP (ref 0–0.9)
IMM GRANULOCYTES NFR BLD AUTO: 0.2 % — SIGNIFICANT CHANGE UP (ref 0–0.9)
IMM GRANULOCYTES NFR BLD AUTO: 0.4 % — SIGNIFICANT CHANGE UP (ref 0–0.9)
IMM GRANULOCYTES NFR BLD AUTO: 0.5 % — SIGNIFICANT CHANGE UP (ref 0–0.9)
IMM GRANULOCYTES NFR BLD AUTO: 0.5 % — SIGNIFICANT CHANGE UP (ref 0–0.9)
IMM GRANULOCYTES NFR BLD AUTO: 1.4 % — HIGH (ref 0–0.9)
IMM GRANULOCYTES NFR BLD AUTO: 1.4 % — HIGH (ref 0–0.9)
INR BLD: 1.11 RATIO — SIGNIFICANT CHANGE UP (ref 0.85–1.18)
INR BLD: 1.11 RATIO — SIGNIFICANT CHANGE UP (ref 0.85–1.18)
INR BLD: 1.27 RATIO — HIGH (ref 0.85–1.18)
INR BLD: 1.27 RATIO — HIGH (ref 0.85–1.18)
INR BLD: 1.55 RATIO — HIGH (ref 0.85–1.18)
INR BLD: 1.55 RATIO — HIGH (ref 0.85–1.18)
INR BLD: 1.92 RATIO — HIGH (ref 0.85–1.18)
INR BLD: 1.92 RATIO — HIGH (ref 0.85–1.18)
INR BLD: 1.96 RATIO — HIGH (ref 0.85–1.18)
INR BLD: 1.96 RATIO — HIGH (ref 0.85–1.18)
INR BLD: 3.65 RATIO — HIGH (ref 0.85–1.18)
INR BLD: 3.65 RATIO — HIGH (ref 0.85–1.18)
IRON SATN MFR SERPL: 19 UG/DL — LOW (ref 30–160)
IRON SATN MFR SERPL: 19 UG/DL — LOW (ref 30–160)
IRON SATN MFR SERPL: 6 % — LOW (ref 14–50)
IRON SATN MFR SERPL: 6 % — LOW (ref 14–50)
KETONES UR-MCNC: 15 MG/DL
KETONES UR-MCNC: NEGATIVE MG/DL — SIGNIFICANT CHANGE UP
KETONES UR-MCNC: NEGATIVE MG/DL — SIGNIFICANT CHANGE UP
KETONES UR-MCNC: NEGATIVE — SIGNIFICANT CHANGE UP
LACTATE BLDV-MCNC: 1.1 MMOL/L — SIGNIFICANT CHANGE UP (ref 0.5–2)
LACTATE BLDV-MCNC: 1.1 MMOL/L — SIGNIFICANT CHANGE UP (ref 0.5–2)
LACTATE BLDV-MCNC: 9.3 MMOL/L — CRITICAL HIGH (ref 0.5–2)
LACTATE BLDV-MCNC: 9.3 MMOL/L — CRITICAL HIGH (ref 0.5–2)
LACTATE SERPL-SCNC: 1.2 MMOL/L — SIGNIFICANT CHANGE UP (ref 0.5–2)
LACTATE SERPL-SCNC: 1.2 MMOL/L — SIGNIFICANT CHANGE UP (ref 0.5–2)
LACTATE SERPL-SCNC: 9.1 MMOL/L — CRITICAL HIGH (ref 0.5–2)
LACTATE SERPL-SCNC: 9.1 MMOL/L — CRITICAL HIGH (ref 0.5–2)
LDH SERPL L TO P-CCNC: 4114 U/L — HIGH (ref 50–242)
LDH SERPL L TO P-CCNC: 4114 U/L — HIGH (ref 50–242)
LEUKOCYTE ESTERASE UR-ACNC: ABNORMAL
LEUKOCYTE ESTERASE UR-ACNC: NEGATIVE — SIGNIFICANT CHANGE UP
LIPID PNL WITH DIRECT LDL SERPL: 96 MG/DL — SIGNIFICANT CHANGE UP
LIPID PNL WITH DIRECT LDL SERPL: 96 MG/DL — SIGNIFICANT CHANGE UP
LYMPHOCYTES # BLD AUTO: 0.34 K/UL — LOW (ref 1–3.3)
LYMPHOCYTES # BLD AUTO: 0.34 K/UL — LOW (ref 1–3.3)
LYMPHOCYTES # BLD AUTO: 0.73 K/UL — LOW (ref 1–3.3)
LYMPHOCYTES # BLD AUTO: 0.73 K/UL — LOW (ref 1–3.3)
LYMPHOCYTES # BLD AUTO: 0.78 K/UL — LOW (ref 1–3.3)
LYMPHOCYTES # BLD AUTO: 0.78 K/UL — LOW (ref 1–3.3)
LYMPHOCYTES # BLD AUTO: 0.82 K/UL — LOW (ref 1–3.3)
LYMPHOCYTES # BLD AUTO: 0.84 K/UL — LOW (ref 1–3.3)
LYMPHOCYTES # BLD AUTO: 0.84 K/UL — LOW (ref 1–3.3)
LYMPHOCYTES # BLD AUTO: 0.91 K/UL — LOW (ref 1–3.3)
LYMPHOCYTES # BLD AUTO: 0.91 K/UL — LOW (ref 1–3.3)
LYMPHOCYTES # BLD AUTO: 0.94 K/UL — LOW (ref 1–3.3)
LYMPHOCYTES # BLD AUTO: 0.94 K/UL — LOW (ref 1–3.3)
LYMPHOCYTES # BLD AUTO: 1.09 K/UL — SIGNIFICANT CHANGE UP (ref 1–3.3)
LYMPHOCYTES # BLD AUTO: 1.7 % — LOW (ref 13–44)
LYMPHOCYTES # BLD AUTO: 1.7 % — LOW (ref 13–44)
LYMPHOCYTES # BLD AUTO: 10.1 % — LOW (ref 13–44)
LYMPHOCYTES # BLD AUTO: 10.1 % — LOW (ref 13–44)
LYMPHOCYTES # BLD AUTO: 11 % — LOW (ref 13–44)
LYMPHOCYTES # BLD AUTO: 11 % — LOW (ref 13–44)
LYMPHOCYTES # BLD AUTO: 11.9 % — LOW (ref 13–44)
LYMPHOCYTES # BLD AUTO: 11.9 % — LOW (ref 13–44)
LYMPHOCYTES # BLD AUTO: 16.1 % — SIGNIFICANT CHANGE UP (ref 13–44)
LYMPHOCYTES # BLD AUTO: 7.1 % — LOW (ref 13–44)
LYMPHOCYTES # BLD AUTO: 7.1 % — LOW (ref 13–44)
LYMPHOCYTES # BLD AUTO: 7.3 % — LOW (ref 13–44)
LYMPHOCYTES # BLD AUTO: 7.3 % — LOW (ref 13–44)
LYMPHOCYTES # BLD AUTO: 8.9 % — LOW (ref 13–44)
MAGNESIUM SERPL-MCNC: 1.9 MG/DL — SIGNIFICANT CHANGE UP (ref 1.6–2.6)
MAGNESIUM SERPL-MCNC: 1.9 MG/DL — SIGNIFICANT CHANGE UP (ref 1.6–2.6)
MAGNESIUM SERPL-MCNC: 2 MG/DL — SIGNIFICANT CHANGE UP (ref 1.6–2.6)
MAGNESIUM SERPL-MCNC: 2 MG/DL — SIGNIFICANT CHANGE UP (ref 1.6–2.6)
MAGNESIUM SERPL-MCNC: 2.1 MG/DL — SIGNIFICANT CHANGE UP (ref 1.6–2.6)
MAGNESIUM SERPL-MCNC: 2.3 MG/DL — SIGNIFICANT CHANGE UP (ref 1.6–2.6)
MAGNESIUM SERPL-MCNC: 2.4 MG/DL — SIGNIFICANT CHANGE UP (ref 1.6–2.6)
MCHC RBC-ENTMCNC: 28.6 PG — SIGNIFICANT CHANGE UP (ref 27–34)
MCHC RBC-ENTMCNC: 28.6 PG — SIGNIFICANT CHANGE UP (ref 27–34)
MCHC RBC-ENTMCNC: 28.9 PG — SIGNIFICANT CHANGE UP (ref 27–34)
MCHC RBC-ENTMCNC: 28.9 PG — SIGNIFICANT CHANGE UP (ref 27–34)
MCHC RBC-ENTMCNC: 29 PG — SIGNIFICANT CHANGE UP (ref 27–34)
MCHC RBC-ENTMCNC: 29 PG — SIGNIFICANT CHANGE UP (ref 27–34)
MCHC RBC-ENTMCNC: 29.1 PG — SIGNIFICANT CHANGE UP (ref 27–34)
MCHC RBC-ENTMCNC: 29.1 PG — SIGNIFICANT CHANGE UP (ref 27–34)
MCHC RBC-ENTMCNC: 29.2 PG — SIGNIFICANT CHANGE UP (ref 27–34)
MCHC RBC-ENTMCNC: 29.2 PG — SIGNIFICANT CHANGE UP (ref 27–34)
MCHC RBC-ENTMCNC: 29.3 PG — SIGNIFICANT CHANGE UP (ref 27–34)
MCHC RBC-ENTMCNC: 29.3 PG — SIGNIFICANT CHANGE UP (ref 27–34)
MCHC RBC-ENTMCNC: 29.8 PG — SIGNIFICANT CHANGE UP (ref 27–34)
MCHC RBC-ENTMCNC: 29.9 PG — SIGNIFICANT CHANGE UP (ref 27–34)
MCHC RBC-ENTMCNC: 29.9 PG — SIGNIFICANT CHANGE UP (ref 27–34)
MCHC RBC-ENTMCNC: 30 PG — SIGNIFICANT CHANGE UP (ref 27–34)
MCHC RBC-ENTMCNC: 30 PG — SIGNIFICANT CHANGE UP (ref 27–34)
MCHC RBC-ENTMCNC: 30.1 PG — SIGNIFICANT CHANGE UP (ref 27–34)
MCHC RBC-ENTMCNC: 30.2 PG — SIGNIFICANT CHANGE UP (ref 27–34)
MCHC RBC-ENTMCNC: 30.3 PG — SIGNIFICANT CHANGE UP (ref 27–34)
MCHC RBC-ENTMCNC: 30.4 GM/DL — LOW (ref 32–36)
MCHC RBC-ENTMCNC: 30.4 GM/DL — LOW (ref 32–36)
MCHC RBC-ENTMCNC: 30.4 PG — SIGNIFICANT CHANGE UP (ref 27–34)
MCHC RBC-ENTMCNC: 30.5 PG — SIGNIFICANT CHANGE UP (ref 27–34)
MCHC RBC-ENTMCNC: 30.9 GM/DL — LOW (ref 32–36)
MCHC RBC-ENTMCNC: 30.9 PG — SIGNIFICANT CHANGE UP (ref 27–34)
MCHC RBC-ENTMCNC: 31 GM/DL — LOW (ref 32–36)
MCHC RBC-ENTMCNC: 31 GM/DL — LOW (ref 32–36)
MCHC RBC-ENTMCNC: 31.1 PG — SIGNIFICANT CHANGE UP (ref 27–34)
MCHC RBC-ENTMCNC: 31.2 GM/DL — LOW (ref 32–36)
MCHC RBC-ENTMCNC: 31.3 GM/DL — LOW (ref 32–36)
MCHC RBC-ENTMCNC: 31.3 GM/DL — LOW (ref 32–36)
MCHC RBC-ENTMCNC: 31.4 GM/DL — LOW (ref 32–36)
MCHC RBC-ENTMCNC: 31.4 GM/DL — LOW (ref 32–36)
MCHC RBC-ENTMCNC: 31.5 GM/DL — LOW (ref 32–36)
MCHC RBC-ENTMCNC: 31.7 GM/DL — LOW (ref 32–36)
MCHC RBC-ENTMCNC: 31.7 GM/DL — LOW (ref 32–36)
MCHC RBC-ENTMCNC: 31.9 GM/DL — LOW (ref 32–36)
MCHC RBC-ENTMCNC: 31.9 GM/DL — LOW (ref 32–36)
MCHC RBC-ENTMCNC: 32 GM/DL — SIGNIFICANT CHANGE UP (ref 32–36)
MCHC RBC-ENTMCNC: 32 GM/DL — SIGNIFICANT CHANGE UP (ref 32–36)
MCHC RBC-ENTMCNC: 32.1 GM/DL — SIGNIFICANT CHANGE UP (ref 32–36)
MCHC RBC-ENTMCNC: 32.1 GM/DL — SIGNIFICANT CHANGE UP (ref 32–36)
MCHC RBC-ENTMCNC: 32.5 GM/DL — SIGNIFICANT CHANGE UP (ref 32–36)
MCHC RBC-ENTMCNC: 32.5 GM/DL — SIGNIFICANT CHANGE UP (ref 32–36)
MCHC RBC-ENTMCNC: 33 GM/DL — SIGNIFICANT CHANGE UP (ref 32–36)
MCHC RBC-ENTMCNC: 33 GM/DL — SIGNIFICANT CHANGE UP (ref 32–36)
MCHC RBC-ENTMCNC: 33.2 GM/DL — SIGNIFICANT CHANGE UP (ref 32–36)
MCHC RBC-ENTMCNC: 33.3 GM/DL — SIGNIFICANT CHANGE UP (ref 32–36)
MCHC RBC-ENTMCNC: 33.4 GM/DL — SIGNIFICANT CHANGE UP (ref 32–36)
MCHC RBC-ENTMCNC: 34.2 GM/DL — SIGNIFICANT CHANGE UP (ref 32–36)
MCV RBC AUTO: 90.8 FL — SIGNIFICANT CHANGE UP (ref 80–100)
MCV RBC AUTO: 90.9 FL — SIGNIFICANT CHANGE UP (ref 80–100)
MCV RBC AUTO: 90.9 FL — SIGNIFICANT CHANGE UP (ref 80–100)
MCV RBC AUTO: 91.5 FL — SIGNIFICANT CHANGE UP (ref 80–100)
MCV RBC AUTO: 91.5 FL — SIGNIFICANT CHANGE UP (ref 80–100)
MCV RBC AUTO: 91.9 FL — SIGNIFICANT CHANGE UP (ref 80–100)
MCV RBC AUTO: 92.4 FL — SIGNIFICANT CHANGE UP (ref 80–100)
MCV RBC AUTO: 92.4 FL — SIGNIFICANT CHANGE UP (ref 80–100)
MCV RBC AUTO: 92.9 FL — SIGNIFICANT CHANGE UP (ref 80–100)
MCV RBC AUTO: 92.9 FL — SIGNIFICANT CHANGE UP (ref 80–100)
MCV RBC AUTO: 93.1 FL — SIGNIFICANT CHANGE UP (ref 80–100)
MCV RBC AUTO: 93.1 FL — SIGNIFICANT CHANGE UP (ref 80–100)
MCV RBC AUTO: 93.2 FL — SIGNIFICANT CHANGE UP (ref 80–100)
MCV RBC AUTO: 94 FL — SIGNIFICANT CHANGE UP (ref 80–100)
MCV RBC AUTO: 94 FL — SIGNIFICANT CHANGE UP (ref 80–100)
MCV RBC AUTO: 94.2 FL — SIGNIFICANT CHANGE UP (ref 80–100)
MCV RBC AUTO: 94.8 FL — SIGNIFICANT CHANGE UP (ref 80–100)
MCV RBC AUTO: 94.8 FL — SIGNIFICANT CHANGE UP (ref 80–100)
MCV RBC AUTO: 95.2 FL — SIGNIFICANT CHANGE UP (ref 80–100)
MCV RBC AUTO: 95.5 FL — SIGNIFICANT CHANGE UP (ref 80–100)
MCV RBC AUTO: 95.5 FL — SIGNIFICANT CHANGE UP (ref 80–100)
MCV RBC AUTO: 95.6 FL — SIGNIFICANT CHANGE UP (ref 80–100)
MCV RBC AUTO: 95.6 FL — SIGNIFICANT CHANGE UP (ref 80–100)
MCV RBC AUTO: 96.6 FL — SIGNIFICANT CHANGE UP (ref 80–100)
METHOD TYPE: SIGNIFICANT CHANGE UP
METHOD TYPE: SIGNIFICANT CHANGE UP
MONOCYTES # BLD AUTO: 0.47 K/UL — SIGNIFICANT CHANGE UP (ref 0–0.9)
MONOCYTES # BLD AUTO: 0.66 K/UL — SIGNIFICANT CHANGE UP (ref 0–0.9)
MONOCYTES # BLD AUTO: 0.66 K/UL — SIGNIFICANT CHANGE UP (ref 0–0.9)
MONOCYTES # BLD AUTO: 0.69 K/UL — SIGNIFICANT CHANGE UP (ref 0–0.9)
MONOCYTES # BLD AUTO: 0.69 K/UL — SIGNIFICANT CHANGE UP (ref 0–0.9)
MONOCYTES # BLD AUTO: 0.78 K/UL — SIGNIFICANT CHANGE UP (ref 0–0.9)
MONOCYTES # BLD AUTO: 0.93 K/UL — HIGH (ref 0–0.9)
MONOCYTES # BLD AUTO: 0.93 K/UL — HIGH (ref 0–0.9)
MONOCYTES # BLD AUTO: 1.29 K/UL — HIGH (ref 0–0.9)
MONOCYTES # BLD AUTO: 1.29 K/UL — HIGH (ref 0–0.9)
MONOCYTES # BLD AUTO: 1.44 K/UL — HIGH (ref 0–0.9)
MONOCYTES # BLD AUTO: 1.44 K/UL — HIGH (ref 0–0.9)
MONOCYTES NFR BLD AUTO: 11.7 % — SIGNIFICANT CHANGE UP (ref 2–14)
MONOCYTES NFR BLD AUTO: 11.7 % — SIGNIFICANT CHANGE UP (ref 2–14)
MONOCYTES NFR BLD AUTO: 13.2 % — SIGNIFICANT CHANGE UP (ref 2–14)
MONOCYTES NFR BLD AUTO: 13.2 % — SIGNIFICANT CHANGE UP (ref 2–14)
MONOCYTES NFR BLD AUTO: 6.6 % — SIGNIFICANT CHANGE UP (ref 2–14)
MONOCYTES NFR BLD AUTO: 6.6 % — SIGNIFICANT CHANGE UP (ref 2–14)
MONOCYTES NFR BLD AUTO: 6.9 % — SIGNIFICANT CHANGE UP (ref 2–14)
MONOCYTES NFR BLD AUTO: 7.3 % — SIGNIFICANT CHANGE UP (ref 2–14)
MONOCYTES NFR BLD AUTO: 7.3 % — SIGNIFICANT CHANGE UP (ref 2–14)
MONOCYTES NFR BLD AUTO: 7.4 % — SIGNIFICANT CHANGE UP (ref 2–14)
MONOCYTES NFR BLD AUTO: 7.4 % — SIGNIFICANT CHANGE UP (ref 2–14)
MONOCYTES NFR BLD AUTO: 8.5 % — SIGNIFICANT CHANGE UP (ref 2–14)
MONOCYTES NFR BLD AUTO: 9.4 % — SIGNIFICANT CHANGE UP (ref 2–14)
MONOCYTES NFR BLD AUTO: 9.4 % — SIGNIFICANT CHANGE UP (ref 2–14)
MRSA PCR RESULT.: SIGNIFICANT CHANGE UP
MRSA PCR RESULT.: SIGNIFICANT CHANGE UP
NEUTROPHILS # BLD AUTO: 17.52 K/UL — HIGH (ref 1.8–7.4)
NEUTROPHILS # BLD AUTO: 17.52 K/UL — HIGH (ref 1.8–7.4)
NEUTROPHILS # BLD AUTO: 5.02 K/UL — SIGNIFICANT CHANGE UP (ref 1.8–7.4)
NEUTROPHILS # BLD AUTO: 5.1 K/UL — SIGNIFICANT CHANGE UP (ref 1.8–7.4)
NEUTROPHILS # BLD AUTO: 5.1 K/UL — SIGNIFICANT CHANGE UP (ref 1.8–7.4)
NEUTROPHILS # BLD AUTO: 6.41 K/UL — SIGNIFICANT CHANGE UP (ref 1.8–7.4)
NEUTROPHILS # BLD AUTO: 6.41 K/UL — SIGNIFICANT CHANGE UP (ref 1.8–7.4)
NEUTROPHILS # BLD AUTO: 7.47 K/UL — HIGH (ref 1.8–7.4)
NEUTROPHILS # BLD AUTO: 7.58 K/UL — HIGH (ref 1.8–7.4)
NEUTROPHILS # BLD AUTO: 7.58 K/UL — HIGH (ref 1.8–7.4)
NEUTROPHILS # BLD AUTO: 8.58 K/UL — HIGH (ref 1.8–7.4)
NEUTROPHILS # BLD AUTO: 8.58 K/UL — HIGH (ref 1.8–7.4)
NEUTROPHILS # BLD AUTO: 8.83 K/UL — HIGH (ref 1.8–7.4)
NEUTROPHILS # BLD AUTO: 8.83 K/UL — HIGH (ref 1.8–7.4)
NEUTROPHILS NFR BLD AUTO: 72.7 % — SIGNIFICANT CHANGE UP (ref 43–77)
NEUTROPHILS NFR BLD AUTO: 72.7 % — SIGNIFICANT CHANGE UP (ref 43–77)
NEUTROPHILS NFR BLD AUTO: 74.1 % — SIGNIFICANT CHANGE UP (ref 43–77)
NEUTROPHILS NFR BLD AUTO: 77 % — SIGNIFICANT CHANGE UP (ref 43–77)
NEUTROPHILS NFR BLD AUTO: 77 % — SIGNIFICANT CHANGE UP (ref 43–77)
NEUTROPHILS NFR BLD AUTO: 80 % — HIGH (ref 43–77)
NEUTROPHILS NFR BLD AUTO: 80 % — HIGH (ref 43–77)
NEUTROPHILS NFR BLD AUTO: 81.2 % — HIGH (ref 43–77)
NEUTROPHILS NFR BLD AUTO: 81.6 % — HIGH (ref 43–77)
NEUTROPHILS NFR BLD AUTO: 81.6 % — HIGH (ref 43–77)
NEUTROPHILS NFR BLD AUTO: 85.3 % — HIGH (ref 43–77)
NEUTROPHILS NFR BLD AUTO: 85.3 % — HIGH (ref 43–77)
NEUTROPHILS NFR BLD AUTO: 89.3 % — HIGH (ref 43–77)
NEUTROPHILS NFR BLD AUTO: 89.3 % — HIGH (ref 43–77)
NITRITE UR-MCNC: NEGATIVE — SIGNIFICANT CHANGE UP
NON HDL CHOLESTEROL: 120 MG/DL — SIGNIFICANT CHANGE UP
NON HDL CHOLESTEROL: 120 MG/DL — SIGNIFICANT CHANGE UP
NRBC # BLD: 0 /100 WBCS — SIGNIFICANT CHANGE UP (ref 0–0)
NRBC # BLD: 1 /100 WBCS — HIGH (ref 0–0)
NRBC # BLD: 1 /100 WBCS — HIGH (ref 0–0)
NRBC # FLD: 0 K/UL — SIGNIFICANT CHANGE UP (ref 0–0)
OB PNL STL: NEGATIVE — SIGNIFICANT CHANGE UP
OB PNL STL: NEGATIVE — SIGNIFICANT CHANGE UP
PCO2 BLDV: 41 MMHG — SIGNIFICANT CHANGE UP (ref 39–42)
PCO2 BLDV: 41 MMHG — SIGNIFICANT CHANGE UP (ref 39–42)
PCO2 BLDV: 46 MMHG — HIGH (ref 39–42)
PCO2 BLDV: 46 MMHG — HIGH (ref 39–42)
PH BLDV: 7.18 — CRITICAL LOW (ref 7.32–7.43)
PH BLDV: 7.18 — CRITICAL LOW (ref 7.32–7.43)
PH BLDV: 7.37 — SIGNIFICANT CHANGE UP (ref 7.32–7.43)
PH BLDV: 7.37 — SIGNIFICANT CHANGE UP (ref 7.32–7.43)
PH UR: 5 — SIGNIFICANT CHANGE UP (ref 5–8)
PH UR: 6.5 — SIGNIFICANT CHANGE UP (ref 5–8)
PHOSPHATE SERPL-MCNC: 3.1 MG/DL — SIGNIFICANT CHANGE UP (ref 2.5–4.5)
PHOSPHATE SERPL-MCNC: 3.2 MG/DL — SIGNIFICANT CHANGE UP (ref 2.5–4.5)
PHOSPHATE SERPL-MCNC: 4.1 MG/DL — SIGNIFICANT CHANGE UP (ref 2.5–4.5)
PHOSPHATE SERPL-MCNC: 4.5 MG/DL — SIGNIFICANT CHANGE UP (ref 2.5–4.5)
PHOSPHATE SERPL-MCNC: 4.5 MG/DL — SIGNIFICANT CHANGE UP (ref 2.5–4.5)
PLATELET # BLD AUTO: 235 K/UL — SIGNIFICANT CHANGE UP (ref 150–400)
PLATELET # BLD AUTO: 236 K/UL — SIGNIFICANT CHANGE UP (ref 150–400)
PLATELET # BLD AUTO: 237 K/UL — SIGNIFICANT CHANGE UP (ref 150–400)
PLATELET # BLD AUTO: 242 K/UL — SIGNIFICANT CHANGE UP (ref 150–400)
PLATELET # BLD AUTO: 242 K/UL — SIGNIFICANT CHANGE UP (ref 150–400)
PLATELET # BLD AUTO: 245 K/UL — SIGNIFICANT CHANGE UP (ref 150–400)
PLATELET # BLD AUTO: 254 K/UL — SIGNIFICANT CHANGE UP (ref 150–400)
PLATELET # BLD AUTO: 254 K/UL — SIGNIFICANT CHANGE UP (ref 150–400)
PLATELET # BLD AUTO: 275 K/UL — SIGNIFICANT CHANGE UP (ref 150–400)
PLATELET # BLD AUTO: 275 K/UL — SIGNIFICANT CHANGE UP (ref 150–400)
PLATELET # BLD AUTO: 287 K/UL — SIGNIFICANT CHANGE UP (ref 150–400)
PLATELET # BLD AUTO: 297 K/UL — SIGNIFICANT CHANGE UP (ref 150–400)
PLATELET # BLD AUTO: 305 K/UL — SIGNIFICANT CHANGE UP (ref 150–400)
PLATELET # BLD AUTO: 320 K/UL — SIGNIFICANT CHANGE UP (ref 150–400)
PLATELET # BLD AUTO: 320 K/UL — SIGNIFICANT CHANGE UP (ref 150–400)
PLATELET # BLD AUTO: 337 K/UL — SIGNIFICANT CHANGE UP (ref 150–400)
PLATELET # BLD AUTO: 337 K/UL — SIGNIFICANT CHANGE UP (ref 150–400)
PLATELET # BLD AUTO: 348 K/UL — SIGNIFICANT CHANGE UP (ref 150–400)
PLATELET # BLD AUTO: 363 K/UL — SIGNIFICANT CHANGE UP (ref 150–400)
PLATELET # BLD AUTO: 363 K/UL — SIGNIFICANT CHANGE UP (ref 150–400)
PLATELET # BLD AUTO: 370 K/UL — SIGNIFICANT CHANGE UP (ref 150–400)
PLATELET # BLD AUTO: 370 K/UL — SIGNIFICANT CHANGE UP (ref 150–400)
PLATELET # BLD AUTO: 387 K/UL — SIGNIFICANT CHANGE UP (ref 150–400)
PLATELET # BLD AUTO: 387 K/UL — SIGNIFICANT CHANGE UP (ref 150–400)
PLATELET # BLD AUTO: 441 K/UL — HIGH (ref 150–400)
PLATELET # BLD AUTO: 441 K/UL — HIGH (ref 150–400)
PLATELET # BLD AUTO: 449 K/UL — HIGH (ref 150–400)
PLATELET # BLD AUTO: 449 K/UL — HIGH (ref 150–400)
PO2 BLDV: 26 MMHG — SIGNIFICANT CHANGE UP (ref 25–45)
PO2 BLDV: 26 MMHG — SIGNIFICANT CHANGE UP (ref 25–45)
PO2 BLDV: 49 MMHG — HIGH (ref 25–45)
PO2 BLDV: 49 MMHG — HIGH (ref 25–45)
POTASSIUM BLDV-SCNC: 4.7 MMOL/L — SIGNIFICANT CHANGE UP (ref 3.5–5.1)
POTASSIUM BLDV-SCNC: 4.7 MMOL/L — SIGNIFICANT CHANGE UP (ref 3.5–5.1)
POTASSIUM BLDV-SCNC: 5.3 MMOL/L — HIGH (ref 3.5–5.1)
POTASSIUM BLDV-SCNC: 5.3 MMOL/L — HIGH (ref 3.5–5.1)
POTASSIUM SERPL-MCNC: 3.9 MMOL/L — SIGNIFICANT CHANGE UP (ref 3.5–5.3)
POTASSIUM SERPL-MCNC: 3.9 MMOL/L — SIGNIFICANT CHANGE UP (ref 3.5–5.3)
POTASSIUM SERPL-MCNC: 4.1 MMOL/L — SIGNIFICANT CHANGE UP (ref 3.5–5.3)
POTASSIUM SERPL-MCNC: 4.1 MMOL/L — SIGNIFICANT CHANGE UP (ref 3.5–5.3)
POTASSIUM SERPL-MCNC: 4.3 MMOL/L — SIGNIFICANT CHANGE UP (ref 3.5–5.3)
POTASSIUM SERPL-MCNC: 4.4 MMOL/L — SIGNIFICANT CHANGE UP (ref 3.5–5.3)
POTASSIUM SERPL-MCNC: 4.5 MMOL/L — SIGNIFICANT CHANGE UP (ref 3.5–5.3)
POTASSIUM SERPL-MCNC: 4.6 MMOL/L — SIGNIFICANT CHANGE UP (ref 3.5–5.3)
POTASSIUM SERPL-MCNC: 4.7 MMOL/L — SIGNIFICANT CHANGE UP (ref 3.5–5.3)
POTASSIUM SERPL-MCNC: 4.8 MMOL/L — SIGNIFICANT CHANGE UP (ref 3.5–5.3)
POTASSIUM SERPL-MCNC: 4.8 MMOL/L — SIGNIFICANT CHANGE UP (ref 3.5–5.3)
POTASSIUM SERPL-MCNC: 4.9 MMOL/L — SIGNIFICANT CHANGE UP (ref 3.5–5.3)
POTASSIUM SERPL-MCNC: 4.9 MMOL/L — SIGNIFICANT CHANGE UP (ref 3.5–5.3)
POTASSIUM SERPL-MCNC: 5 MMOL/L — SIGNIFICANT CHANGE UP (ref 3.5–5.3)
POTASSIUM SERPL-MCNC: 5.1 MMOL/L — SIGNIFICANT CHANGE UP (ref 3.5–5.3)
POTASSIUM SERPL-MCNC: 5.1 MMOL/L — SIGNIFICANT CHANGE UP (ref 3.5–5.3)
POTASSIUM SERPL-MCNC: 5.3 MMOL/L — SIGNIFICANT CHANGE UP (ref 3.5–5.3)
POTASSIUM SERPL-MCNC: 5.3 MMOL/L — SIGNIFICANT CHANGE UP (ref 3.5–5.3)
POTASSIUM SERPL-SCNC: 3.9 MMOL/L — SIGNIFICANT CHANGE UP (ref 3.5–5.3)
POTASSIUM SERPL-SCNC: 3.9 MMOL/L — SIGNIFICANT CHANGE UP (ref 3.5–5.3)
POTASSIUM SERPL-SCNC: 4.1 MMOL/L — SIGNIFICANT CHANGE UP (ref 3.5–5.3)
POTASSIUM SERPL-SCNC: 4.1 MMOL/L — SIGNIFICANT CHANGE UP (ref 3.5–5.3)
POTASSIUM SERPL-SCNC: 4.3 MMOL/L — SIGNIFICANT CHANGE UP (ref 3.5–5.3)
POTASSIUM SERPL-SCNC: 4.4 MMOL/L — SIGNIFICANT CHANGE UP (ref 3.5–5.3)
POTASSIUM SERPL-SCNC: 4.5 MMOL/L — SIGNIFICANT CHANGE UP (ref 3.5–5.3)
POTASSIUM SERPL-SCNC: 4.6 MMOL/L — SIGNIFICANT CHANGE UP (ref 3.5–5.3)
POTASSIUM SERPL-SCNC: 4.7 MMOL/L — SIGNIFICANT CHANGE UP (ref 3.5–5.3)
POTASSIUM SERPL-SCNC: 4.8 MMOL/L — SIGNIFICANT CHANGE UP (ref 3.5–5.3)
POTASSIUM SERPL-SCNC: 4.8 MMOL/L — SIGNIFICANT CHANGE UP (ref 3.5–5.3)
POTASSIUM SERPL-SCNC: 4.9 MMOL/L — SIGNIFICANT CHANGE UP (ref 3.5–5.3)
POTASSIUM SERPL-SCNC: 4.9 MMOL/L — SIGNIFICANT CHANGE UP (ref 3.5–5.3)
POTASSIUM SERPL-SCNC: 5 MMOL/L — SIGNIFICANT CHANGE UP (ref 3.5–5.3)
POTASSIUM SERPL-SCNC: 5.1 MMOL/L — SIGNIFICANT CHANGE UP (ref 3.5–5.3)
POTASSIUM SERPL-SCNC: 5.1 MMOL/L — SIGNIFICANT CHANGE UP (ref 3.5–5.3)
POTASSIUM SERPL-SCNC: 5.3 MMOL/L — SIGNIFICANT CHANGE UP (ref 3.5–5.3)
POTASSIUM SERPL-SCNC: 5.3 MMOL/L — SIGNIFICANT CHANGE UP (ref 3.5–5.3)
PROT SERPL-MCNC: 6.1 G/DL — SIGNIFICANT CHANGE UP (ref 6–8.3)
PROT SERPL-MCNC: 6.1 G/DL — SIGNIFICANT CHANGE UP (ref 6–8.3)
PROT SERPL-MCNC: 6.2 G/DL — SIGNIFICANT CHANGE UP (ref 6–8.3)
PROT SERPL-MCNC: 6.2 G/DL — SIGNIFICANT CHANGE UP (ref 6–8.3)
PROT SERPL-MCNC: 6.5 G/DL — SIGNIFICANT CHANGE UP (ref 6–8.3)
PROT SERPL-MCNC: 6.6 G/DL — SIGNIFICANT CHANGE UP (ref 6–8.3)
PROT SERPL-MCNC: 6.6 G/DL — SIGNIFICANT CHANGE UP (ref 6–8.3)
PROT SERPL-MCNC: 6.7 G/DL — SIGNIFICANT CHANGE UP (ref 6–8.3)
PROT SERPL-MCNC: 7 G/DL — SIGNIFICANT CHANGE UP (ref 6–8.3)
PROT SERPL-MCNC: 7 G/DL — SIGNIFICANT CHANGE UP (ref 6–8.3)
PROT SERPL-MCNC: 7.1 G/DL — SIGNIFICANT CHANGE UP (ref 6–8.3)
PROT SERPL-MCNC: 7.2 G/DL — SIGNIFICANT CHANGE UP (ref 6–8.3)
PROT SERPL-MCNC: 7.2 G/DL — SIGNIFICANT CHANGE UP (ref 6–8.3)
PROT SERPL-MCNC: 7.3 G/DL — SIGNIFICANT CHANGE UP (ref 6–8.3)
PROT UR-MCNC: 100 MG/DL
PROT UR-MCNC: SIGNIFICANT CHANGE UP
PROTHROM AB SERPL-ACNC: 12.2 SEC — SIGNIFICANT CHANGE UP (ref 9.5–13)
PROTHROM AB SERPL-ACNC: 12.2 SEC — SIGNIFICANT CHANGE UP (ref 9.5–13)
PROTHROM AB SERPL-ACNC: 13.2 SEC — HIGH (ref 9.5–13)
PROTHROM AB SERPL-ACNC: 13.2 SEC — HIGH (ref 9.5–13)
PROTHROM AB SERPL-ACNC: 16.1 SEC — HIGH (ref 9.5–13)
PROTHROM AB SERPL-ACNC: 16.1 SEC — HIGH (ref 9.5–13)
PROTHROM AB SERPL-ACNC: 19.8 SEC — HIGH (ref 9.5–13)
PROTHROM AB SERPL-ACNC: 19.8 SEC — HIGH (ref 9.5–13)
PROTHROM AB SERPL-ACNC: 21.1 SEC — HIGH (ref 9.5–13)
PROTHROM AB SERPL-ACNC: 21.1 SEC — HIGH (ref 9.5–13)
PROTHROM AB SERPL-ACNC: 38.7 SEC — HIGH (ref 9.5–13)
PROTHROM AB SERPL-ACNC: 38.7 SEC — HIGH (ref 9.5–13)
RBC # BLD: 2.71 M/UL — LOW (ref 3.8–5.2)
RBC # BLD: 2.71 M/UL — LOW (ref 3.8–5.2)
RBC # BLD: 3.45 M/UL — LOW (ref 3.8–5.2)
RBC # BLD: 3.45 M/UL — LOW (ref 3.8–5.2)
RBC # BLD: 3.54 M/UL — LOW (ref 3.8–5.2)
RBC # BLD: 3.54 M/UL — LOW (ref 3.8–5.2)
RBC # BLD: 3.81 M/UL — SIGNIFICANT CHANGE UP (ref 3.8–5.2)
RBC # BLD: 3.81 M/UL — SIGNIFICANT CHANGE UP (ref 3.8–5.2)
RBC # BLD: 3.82 M/UL — SIGNIFICANT CHANGE UP (ref 3.8–5.2)
RBC # BLD: 3.82 M/UL — SIGNIFICANT CHANGE UP (ref 3.8–5.2)
RBC # BLD: 3.86 M/UL — SIGNIFICANT CHANGE UP (ref 3.8–5.2)
RBC # BLD: 3.86 M/UL — SIGNIFICANT CHANGE UP (ref 3.8–5.2)
RBC # BLD: 4.02 M/UL — SIGNIFICANT CHANGE UP (ref 3.8–5.2)
RBC # BLD: 4.02 M/UL — SIGNIFICANT CHANGE UP (ref 3.8–5.2)
RBC # BLD: 4.11 M/UL — SIGNIFICANT CHANGE UP (ref 3.8–5.2)
RBC # BLD: 4.15 M/UL — SIGNIFICANT CHANGE UP (ref 3.8–5.2)
RBC # BLD: 4.15 M/UL — SIGNIFICANT CHANGE UP (ref 3.8–5.2)
RBC # BLD: 4.18 M/UL — SIGNIFICANT CHANGE UP (ref 3.8–5.2)
RBC # BLD: 4.18 M/UL — SIGNIFICANT CHANGE UP (ref 3.8–5.2)
RBC # BLD: 4.2 M/UL — SIGNIFICANT CHANGE UP (ref 3.8–5.2)
RBC # BLD: 4.2 M/UL — SIGNIFICANT CHANGE UP (ref 3.8–5.2)
RBC # BLD: 4.23 M/UL — SIGNIFICANT CHANGE UP (ref 3.8–5.2)
RBC # BLD: 4.23 M/UL — SIGNIFICANT CHANGE UP (ref 3.8–5.2)
RBC # BLD: 4.32 M/UL — SIGNIFICANT CHANGE UP (ref 3.8–5.2)
RBC # BLD: 4.41 M/UL — SIGNIFICANT CHANGE UP (ref 3.8–5.2)
RBC # BLD: 4.49 M/UL — SIGNIFICANT CHANGE UP (ref 3.8–5.2)
RBC # BLD: 4.49 M/UL — SIGNIFICANT CHANGE UP (ref 3.8–5.2)
RBC # BLD: 4.53 M/UL — SIGNIFICANT CHANGE UP (ref 3.8–5.2)
RBC # BLD: 4.58 M/UL — SIGNIFICANT CHANGE UP (ref 3.8–5.2)
RBC # BLD: 4.63 M/UL — SIGNIFICANT CHANGE UP (ref 3.8–5.2)
RBC # BLD: 4.76 M/UL — SIGNIFICANT CHANGE UP (ref 3.8–5.2)
RBC # BLD: 4.83 M/UL — SIGNIFICANT CHANGE UP (ref 3.8–5.2)
RBC # BLD: 4.83 M/UL — SIGNIFICANT CHANGE UP (ref 3.8–5.2)
RBC # FLD: 13.2 % — SIGNIFICANT CHANGE UP (ref 10.3–14.5)
RBC # FLD: 13.6 % — SIGNIFICANT CHANGE UP (ref 10.3–14.5)
RBC # FLD: 13.7 % — SIGNIFICANT CHANGE UP (ref 10.3–14.5)
RBC # FLD: 13.7 % — SIGNIFICANT CHANGE UP (ref 10.3–14.5)
RBC # FLD: 13.8 % — SIGNIFICANT CHANGE UP (ref 10.3–14.5)
RBC # FLD: 13.9 % — SIGNIFICANT CHANGE UP (ref 10.3–14.5)
RBC # FLD: 14 % — SIGNIFICANT CHANGE UP (ref 10.3–14.5)
RBC # FLD: 14.1 % — SIGNIFICANT CHANGE UP (ref 10.3–14.5)
RBC # FLD: 14.1 % — SIGNIFICANT CHANGE UP (ref 10.3–14.5)
RBC # FLD: 15.9 % — HIGH (ref 10.3–14.5)
RBC # FLD: 15.9 % — HIGH (ref 10.3–14.5)
RBC # FLD: 16.3 % — HIGH (ref 10.3–14.5)
RBC # FLD: 16.3 % — HIGH (ref 10.3–14.5)
RBC # FLD: 16.7 % — HIGH (ref 10.3–14.5)
RBC # FLD: 16.7 % — HIGH (ref 10.3–14.5)
RBC # FLD: 16.8 % — HIGH (ref 10.3–14.5)
RBC # FLD: 16.8 % — HIGH (ref 10.3–14.5)
RBC # FLD: 17.1 % — HIGH (ref 10.3–14.5)
RBC # FLD: 17.1 % — HIGH (ref 10.3–14.5)
RBC # FLD: 17.2 % — HIGH (ref 10.3–14.5)
RBC # FLD: 17.4 % — HIGH (ref 10.3–14.5)
RBC # FLD: 17.4 % — HIGH (ref 10.3–14.5)
RBC CASTS # UR COMP ASSIST: 0 /HPF — SIGNIFICANT CHANGE UP (ref 0–4)
RBC CASTS # UR COMP ASSIST: 2 /HPF — SIGNIFICANT CHANGE UP (ref 0–4)
RBC CASTS # UR COMP ASSIST: 3 /HPF — SIGNIFICANT CHANGE UP (ref 0–4)
RBC CASTS # UR COMP ASSIST: 3 /HPF — SIGNIFICANT CHANGE UP (ref 0–4)
REVIEW: SIGNIFICANT CHANGE UP
RSV RNA NPH QL NAA+NON-PROBE: SIGNIFICANT CHANGE UP
RSV RNA NPH QL NAA+NON-PROBE: SIGNIFICANT CHANGE UP
S AUREUS DNA NOSE QL NAA+PROBE: SIGNIFICANT CHANGE UP
S AUREUS DNA NOSE QL NAA+PROBE: SIGNIFICANT CHANGE UP
SAO2 % BLDV: 18.4 % — LOW (ref 67–88)
SAO2 % BLDV: 18.4 % — LOW (ref 67–88)
SAO2 % BLDV: 66.6 % — LOW (ref 67–88)
SAO2 % BLDV: 66.6 % — LOW (ref 67–88)
SARS-COV-2 RNA SPEC QL NAA+PROBE: DETECTED
SARS-COV-2 RNA SPEC QL NAA+PROBE: DETECTED
SODIUM SERPL-SCNC: 131 MMOL/L — LOW (ref 135–145)
SODIUM SERPL-SCNC: 132 MMOL/L — LOW (ref 135–145)
SODIUM SERPL-SCNC: 132 MMOL/L — LOW (ref 135–145)
SODIUM SERPL-SCNC: 133 MMOL/L — LOW (ref 135–145)
SODIUM SERPL-SCNC: 133 MMOL/L — LOW (ref 135–145)
SODIUM SERPL-SCNC: 135 MMOL/L — SIGNIFICANT CHANGE UP (ref 135–145)
SODIUM SERPL-SCNC: 136 MMOL/L — SIGNIFICANT CHANGE UP (ref 135–145)
SODIUM SERPL-SCNC: 137 MMOL/L — SIGNIFICANT CHANGE UP (ref 135–145)
SODIUM SERPL-SCNC: 137 MMOL/L — SIGNIFICANT CHANGE UP (ref 135–145)
SODIUM SERPL-SCNC: 138 MMOL/L — SIGNIFICANT CHANGE UP (ref 135–145)
SODIUM SERPL-SCNC: 139 MMOL/L — SIGNIFICANT CHANGE UP (ref 135–145)
SODIUM SERPL-SCNC: 140 MMOL/L — SIGNIFICANT CHANGE UP (ref 135–145)
SODIUM SERPL-SCNC: 141 MMOL/L — SIGNIFICANT CHANGE UP (ref 135–145)
SODIUM SERPL-SCNC: 142 MMOL/L — SIGNIFICANT CHANGE UP (ref 135–145)
SP GR SPEC: 1.02 — SIGNIFICANT CHANGE UP (ref 1.01–1.02)
SP GR SPEC: 1.02 — SIGNIFICANT CHANGE UP (ref 1–1.03)
SPECIMEN SOURCE: SIGNIFICANT CHANGE UP
SQUAMOUS # UR AUTO: 1 /HPF — SIGNIFICANT CHANGE UP (ref 0–5)
SQUAMOUS # UR AUTO: 17 /HPF — HIGH (ref 0–5)
SQUAMOUS # UR AUTO: 17 /HPF — HIGH (ref 0–5)
TIBC SERPL-MCNC: 313 UG/DL — SIGNIFICANT CHANGE UP (ref 220–430)
TIBC SERPL-MCNC: 313 UG/DL — SIGNIFICANT CHANGE UP (ref 220–430)
TRIGL SERPL-MCNC: 138 MG/DL — SIGNIFICANT CHANGE UP
TRIGL SERPL-MCNC: 138 MG/DL — SIGNIFICANT CHANGE UP
TROPONIN T, HIGH SENSITIVITY RESULT: 110 NG/L — HIGH (ref 0–51)
TROPONIN T, HIGH SENSITIVITY RESULT: 110 NG/L — HIGH (ref 0–51)
TROPONIN T, HIGH SENSITIVITY RESULT: 26 NG/L — SIGNIFICANT CHANGE UP (ref 0–51)
TROPONIN T, HIGH SENSITIVITY RESULT: 30 NG/L — SIGNIFICANT CHANGE UP (ref 0–51)
TROPONIN T, HIGH SENSITIVITY RESULT: 48 NG/L — SIGNIFICANT CHANGE UP (ref 0–51)
UIBC SERPL-MCNC: 294 UG/DL — SIGNIFICANT CHANGE UP (ref 110–370)
UIBC SERPL-MCNC: 294 UG/DL — SIGNIFICANT CHANGE UP (ref 110–370)
UROBILINOGEN FLD QL: 0.2 MG/DL — SIGNIFICANT CHANGE UP (ref 0.2–1)
UROBILINOGEN FLD QL: 1 MG/DL — SIGNIFICANT CHANGE UP (ref 0.2–1)
UROBILINOGEN FLD QL: 1 MG/DL — SIGNIFICANT CHANGE UP (ref 0.2–1)
UROBILINOGEN FLD QL: NEGATIVE — SIGNIFICANT CHANGE UP
VANCOMYCIN FLD-MCNC: 8.7 UG/ML — SIGNIFICANT CHANGE UP
VANCOMYCIN FLD-MCNC: 8.7 UG/ML — SIGNIFICANT CHANGE UP
WBC # BLD: 10.05 K/UL — SIGNIFICANT CHANGE UP (ref 3.8–10.5)
WBC # BLD: 10.38 K/UL — SIGNIFICANT CHANGE UP (ref 3.8–10.5)
WBC # BLD: 10.59 K/UL — HIGH (ref 3.8–10.5)
WBC # BLD: 11.03 K/UL — HIGH (ref 3.8–10.5)
WBC # BLD: 11.03 K/UL — HIGH (ref 3.8–10.5)
WBC # BLD: 11.78 K/UL — HIGH (ref 3.8–10.5)
WBC # BLD: 12.61 K/UL — HIGH (ref 3.8–10.5)
WBC # BLD: 12.61 K/UL — HIGH (ref 3.8–10.5)
WBC # BLD: 18.32 K/UL — HIGH (ref 3.8–10.5)
WBC # BLD: 18.32 K/UL — HIGH (ref 3.8–10.5)
WBC # BLD: 19.62 K/UL — HIGH (ref 3.8–10.5)
WBC # BLD: 19.62 K/UL — HIGH (ref 3.8–10.5)
WBC # BLD: 6.78 K/UL — SIGNIFICANT CHANGE UP (ref 3.8–10.5)
WBC # BLD: 7.03 K/UL — SIGNIFICANT CHANGE UP (ref 3.8–10.5)
WBC # BLD: 7.03 K/UL — SIGNIFICANT CHANGE UP (ref 3.8–10.5)
WBC # BLD: 7.62 K/UL — SIGNIFICANT CHANGE UP (ref 3.8–10.5)
WBC # BLD: 7.62 K/UL — SIGNIFICANT CHANGE UP (ref 3.8–10.5)
WBC # BLD: 8.03 K/UL — SIGNIFICANT CHANGE UP (ref 3.8–10.5)
WBC # BLD: 8.04 K/UL — SIGNIFICANT CHANGE UP (ref 3.8–10.5)
WBC # BLD: 8.04 K/UL — SIGNIFICANT CHANGE UP (ref 3.8–10.5)
WBC # BLD: 8.29 K/UL — SIGNIFICANT CHANGE UP (ref 3.8–10.5)
WBC # BLD: 8.29 K/UL — SIGNIFICANT CHANGE UP (ref 3.8–10.5)
WBC # BLD: 8.31 K/UL — SIGNIFICANT CHANGE UP (ref 3.8–10.5)
WBC # BLD: 8.31 K/UL — SIGNIFICANT CHANGE UP (ref 3.8–10.5)
WBC # BLD: 8.4 K/UL — SIGNIFICANT CHANGE UP (ref 3.8–10.5)
WBC # BLD: 8.4 K/UL — SIGNIFICANT CHANGE UP (ref 3.8–10.5)
WBC # BLD: 8.68 K/UL — SIGNIFICANT CHANGE UP (ref 3.8–10.5)
WBC # BLD: 9.2 K/UL — SIGNIFICANT CHANGE UP (ref 3.8–10.5)
WBC # BLD: 9.3 K/UL — SIGNIFICANT CHANGE UP (ref 3.8–10.5)
WBC # BLD: 9.3 K/UL — SIGNIFICANT CHANGE UP (ref 3.8–10.5)
WBC # FLD AUTO: 10.05 K/UL — SIGNIFICANT CHANGE UP (ref 3.8–10.5)
WBC # FLD AUTO: 10.38 K/UL — SIGNIFICANT CHANGE UP (ref 3.8–10.5)
WBC # FLD AUTO: 10.59 K/UL — HIGH (ref 3.8–10.5)
WBC # FLD AUTO: 11.03 K/UL — HIGH (ref 3.8–10.5)
WBC # FLD AUTO: 11.03 K/UL — HIGH (ref 3.8–10.5)
WBC # FLD AUTO: 11.78 K/UL — HIGH (ref 3.8–10.5)
WBC # FLD AUTO: 12.61 K/UL — HIGH (ref 3.8–10.5)
WBC # FLD AUTO: 12.61 K/UL — HIGH (ref 3.8–10.5)
WBC # FLD AUTO: 18.32 K/UL — HIGH (ref 3.8–10.5)
WBC # FLD AUTO: 18.32 K/UL — HIGH (ref 3.8–10.5)
WBC # FLD AUTO: 19.62 K/UL — HIGH (ref 3.8–10.5)
WBC # FLD AUTO: 19.62 K/UL — HIGH (ref 3.8–10.5)
WBC # FLD AUTO: 6.78 K/UL — SIGNIFICANT CHANGE UP (ref 3.8–10.5)
WBC # FLD AUTO: 7.03 K/UL — SIGNIFICANT CHANGE UP (ref 3.8–10.5)
WBC # FLD AUTO: 7.03 K/UL — SIGNIFICANT CHANGE UP (ref 3.8–10.5)
WBC # FLD AUTO: 7.62 K/UL — SIGNIFICANT CHANGE UP (ref 3.8–10.5)
WBC # FLD AUTO: 7.62 K/UL — SIGNIFICANT CHANGE UP (ref 3.8–10.5)
WBC # FLD AUTO: 8.03 K/UL — SIGNIFICANT CHANGE UP (ref 3.8–10.5)
WBC # FLD AUTO: 8.04 K/UL — SIGNIFICANT CHANGE UP (ref 3.8–10.5)
WBC # FLD AUTO: 8.04 K/UL — SIGNIFICANT CHANGE UP (ref 3.8–10.5)
WBC # FLD AUTO: 8.29 K/UL — SIGNIFICANT CHANGE UP (ref 3.8–10.5)
WBC # FLD AUTO: 8.29 K/UL — SIGNIFICANT CHANGE UP (ref 3.8–10.5)
WBC # FLD AUTO: 8.31 K/UL — SIGNIFICANT CHANGE UP (ref 3.8–10.5)
WBC # FLD AUTO: 8.31 K/UL — SIGNIFICANT CHANGE UP (ref 3.8–10.5)
WBC # FLD AUTO: 8.4 K/UL — SIGNIFICANT CHANGE UP (ref 3.8–10.5)
WBC # FLD AUTO: 8.4 K/UL — SIGNIFICANT CHANGE UP (ref 3.8–10.5)
WBC # FLD AUTO: 8.68 K/UL — SIGNIFICANT CHANGE UP (ref 3.8–10.5)
WBC # FLD AUTO: 9.2 K/UL — SIGNIFICANT CHANGE UP (ref 3.8–10.5)
WBC # FLD AUTO: 9.3 K/UL — SIGNIFICANT CHANGE UP (ref 3.8–10.5)
WBC # FLD AUTO: 9.3 K/UL — SIGNIFICANT CHANGE UP (ref 3.8–10.5)
WBC UR QL: 16 /HPF — HIGH (ref 0–5)
WBC UR QL: 24 /HPF — HIGH (ref 0–5)
WBC UR QL: 24 /HPF — HIGH (ref 0–5)
WBC UR QL: 3 /HPF — SIGNIFICANT CHANGE UP (ref 0–5)

## 2023-01-01 PROCEDURE — 93306 TTE W/DOPPLER COMPLETE: CPT | Mod: 26

## 2023-01-01 PROCEDURE — 73610 X-RAY EXAM OF ANKLE: CPT | Mod: 26,LT

## 2023-01-01 PROCEDURE — 72125 CT NECK SPINE W/O DYE: CPT | Mod: 26,MA

## 2023-01-01 PROCEDURE — 86900 BLOOD TYPING SEROLOGIC ABO: CPT

## 2023-01-01 PROCEDURE — 82330 ASSAY OF CALCIUM: CPT

## 2023-01-01 PROCEDURE — 92610 EVALUATE SWALLOWING FUNCTION: CPT

## 2023-01-01 PROCEDURE — 92526 ORAL FUNCTION THERAPY: CPT

## 2023-01-01 PROCEDURE — 99232 SBSQ HOSP IP/OBS MODERATE 35: CPT

## 2023-01-01 PROCEDURE — 81001 URINALYSIS AUTO W/SCOPE: CPT

## 2023-01-01 PROCEDURE — 99223 1ST HOSP IP/OBS HIGH 75: CPT

## 2023-01-01 PROCEDURE — 96374 THER/PROPH/DIAG INJ IV PUSH: CPT

## 2023-01-01 PROCEDURE — 93010 ELECTROCARDIOGRAM REPORT: CPT

## 2023-01-01 PROCEDURE — 80048 BASIC METABOLIC PNL TOTAL CA: CPT

## 2023-01-01 PROCEDURE — 85025 COMPLETE CBC W/AUTO DIFF WBC: CPT

## 2023-01-01 PROCEDURE — 73610 X-RAY EXAM OF ANKLE: CPT

## 2023-01-01 PROCEDURE — 82272 OCCULT BLD FECES 1-3 TESTS: CPT

## 2023-01-01 PROCEDURE — 82728 ASSAY OF FERRITIN: CPT

## 2023-01-01 PROCEDURE — 72170 X-RAY EXAM OF PELVIS: CPT | Mod: 26

## 2023-01-01 PROCEDURE — 97162 PT EVAL MOD COMPLEX 30 MIN: CPT

## 2023-01-01 PROCEDURE — P9016: CPT

## 2023-01-01 PROCEDURE — 70450 CT HEAD/BRAIN W/O DYE: CPT | Mod: 26,MA

## 2023-01-01 PROCEDURE — 70496 CT ANGIOGRAPHY HEAD: CPT

## 2023-01-01 PROCEDURE — 96375 TX/PRO/DX INJ NEW DRUG ADDON: CPT

## 2023-01-01 PROCEDURE — 84484 ASSAY OF TROPONIN QUANT: CPT

## 2023-01-01 PROCEDURE — 99285 EMERGENCY DEPT VISIT HI MDM: CPT

## 2023-01-01 PROCEDURE — 76700 US EXAM ABDOM COMPLETE: CPT | Mod: 26

## 2023-01-01 PROCEDURE — 83615 LACTATE (LD) (LDH) ENZYME: CPT

## 2023-01-01 PROCEDURE — 36430 TRANSFUSION BLD/BLD COMPNT: CPT

## 2023-01-01 PROCEDURE — 87641 MR-STAPH DNA AMP PROBE: CPT

## 2023-01-01 PROCEDURE — 82962 GLUCOSE BLOOD TEST: CPT

## 2023-01-01 PROCEDURE — 36415 COLL VENOUS BLD VENIPUNCTURE: CPT

## 2023-01-01 PROCEDURE — 82435 ASSAY OF BLOOD CHLORIDE: CPT

## 2023-01-01 PROCEDURE — 87040 BLOOD CULTURE FOR BACTERIA: CPT

## 2023-01-01 PROCEDURE — 70450 CT HEAD/BRAIN W/O DYE: CPT | Mod: 26,XU

## 2023-01-01 PROCEDURE — 87640 STAPH A DNA AMP PROBE: CPT

## 2023-01-01 PROCEDURE — 85027 COMPLETE CBC AUTOMATED: CPT

## 2023-01-01 PROCEDURE — 84295 ASSAY OF SERUM SODIUM: CPT

## 2023-01-01 PROCEDURE — 86850 RBC ANTIBODY SCREEN: CPT

## 2023-01-01 PROCEDURE — 73130 X-RAY EXAM OF HAND: CPT | Mod: 26,RT

## 2023-01-01 PROCEDURE — 73080 X-RAY EXAM OF ELBOW: CPT

## 2023-01-01 PROCEDURE — 85014 HEMATOCRIT: CPT

## 2023-01-01 PROCEDURE — 82550 ASSAY OF CK (CPK): CPT

## 2023-01-01 PROCEDURE — 0042T: CPT

## 2023-01-01 PROCEDURE — 70450 CT HEAD/BRAIN W/O DYE: CPT

## 2023-01-01 PROCEDURE — 87086 URINE CULTURE/COLONY COUNT: CPT

## 2023-01-01 PROCEDURE — 71045 X-RAY EXAM CHEST 1 VIEW: CPT | Mod: 26

## 2023-01-01 PROCEDURE — 82803 BLOOD GASES ANY COMBINATION: CPT

## 2023-01-01 PROCEDURE — 84100 ASSAY OF PHOSPHORUS: CPT

## 2023-01-01 PROCEDURE — 70551 MRI BRAIN STEM W/O DYE: CPT | Mod: 26

## 2023-01-01 PROCEDURE — 70551 MRI BRAIN STEM W/O DYE: CPT

## 2023-01-01 PROCEDURE — 70450 CT HEAD/BRAIN W/O DYE: CPT | Mod: 26

## 2023-01-01 PROCEDURE — 87637 SARSCOV2&INF A&B&RSV AMP PRB: CPT

## 2023-01-01 PROCEDURE — 80061 LIPID PANEL: CPT

## 2023-01-01 PROCEDURE — 71045 X-RAY EXAM CHEST 1 VIEW: CPT

## 2023-01-01 PROCEDURE — 83550 IRON BINDING TEST: CPT

## 2023-01-01 PROCEDURE — 73130 X-RAY EXAM OF HAND: CPT

## 2023-01-01 PROCEDURE — 71250 CT THORAX DX C-: CPT | Mod: 26

## 2023-01-01 PROCEDURE — 85610 PROTHROMBIN TIME: CPT

## 2023-01-01 PROCEDURE — 70498 CT ANGIOGRAPHY NECK: CPT

## 2023-01-01 PROCEDURE — 85018 HEMOGLOBIN: CPT

## 2023-01-01 PROCEDURE — 73090 X-RAY EXAM OF FOREARM: CPT | Mod: 26,LT

## 2023-01-01 PROCEDURE — 76705 ECHO EXAM OF ABDOMEN: CPT | Mod: 26

## 2023-01-01 PROCEDURE — 76700 US EXAM ABDOM COMPLETE: CPT

## 2023-01-01 PROCEDURE — 84132 ASSAY OF SERUM POTASSIUM: CPT

## 2023-01-01 PROCEDURE — 87077 CULTURE AEROBIC IDENTIFY: CPT

## 2023-01-01 PROCEDURE — 93005 ELECTROCARDIOGRAM TRACING: CPT

## 2023-01-01 PROCEDURE — 83880 ASSAY OF NATRIURETIC PEPTIDE: CPT

## 2023-01-01 PROCEDURE — 76770 US EXAM ABDO BACK WALL COMP: CPT | Mod: 26

## 2023-01-01 PROCEDURE — 80053 COMPREHEN METABOLIC PANEL: CPT

## 2023-01-01 PROCEDURE — 70496 CT ANGIOGRAPHY HEAD: CPT | Mod: 26

## 2023-01-01 PROCEDURE — 85730 THROMBOPLASTIN TIME PARTIAL: CPT

## 2023-01-01 PROCEDURE — 82947 ASSAY GLUCOSE BLOOD QUANT: CPT

## 2023-01-01 PROCEDURE — 71250 CT THORAX DX C-: CPT

## 2023-01-01 PROCEDURE — 70450 CT HEAD/BRAIN W/O DYE: CPT | Mod: MA

## 2023-01-01 PROCEDURE — 93306 TTE W/DOPPLER COMPLETE: CPT

## 2023-01-01 PROCEDURE — 99285 EMERGENCY DEPT VISIT HI MDM: CPT | Mod: GC

## 2023-01-01 PROCEDURE — 72125 CT NECK SPINE W/O DYE: CPT | Mod: MA

## 2023-01-01 PROCEDURE — 83540 ASSAY OF IRON: CPT

## 2023-01-01 PROCEDURE — 80202 ASSAY OF VANCOMYCIN: CPT

## 2023-01-01 PROCEDURE — 97166 OT EVAL MOD COMPLEX 45 MIN: CPT

## 2023-01-01 PROCEDURE — 86923 COMPATIBILITY TEST ELECTRIC: CPT

## 2023-01-01 PROCEDURE — 83605 ASSAY OF LACTIC ACID: CPT

## 2023-01-01 PROCEDURE — 87186 SC STD MICRODIL/AGAR DIL: CPT

## 2023-01-01 PROCEDURE — 99285 EMERGENCY DEPT VISIT HI MDM: CPT | Mod: FS

## 2023-01-01 PROCEDURE — 93923 UPR/LXTR ART STDY 3+ LVLS: CPT

## 2023-01-01 PROCEDURE — 73090 X-RAY EXAM OF FOREARM: CPT

## 2023-01-01 PROCEDURE — 73502 X-RAY EXAM HIP UNI 2-3 VIEWS: CPT | Mod: 26,RT

## 2023-01-01 PROCEDURE — 73630 X-RAY EXAM OF FOOT: CPT

## 2023-01-01 PROCEDURE — 80076 HEPATIC FUNCTION PANEL: CPT

## 2023-01-01 PROCEDURE — 70498 CT ANGIOGRAPHY NECK: CPT | Mod: 26

## 2023-01-01 PROCEDURE — 83735 ASSAY OF MAGNESIUM: CPT

## 2023-01-01 PROCEDURE — 82553 CREATINE MB FRACTION: CPT

## 2023-01-01 PROCEDURE — 86901 BLOOD TYPING SEROLOGIC RH(D): CPT

## 2023-01-01 PROCEDURE — 73080 X-RAY EXAM OF ELBOW: CPT | Mod: 26,RT

## 2023-01-01 PROCEDURE — 73630 X-RAY EXAM OF FOOT: CPT | Mod: 26,LT

## 2023-01-01 PROCEDURE — 76705 ECHO EXAM OF ABDOMEN: CPT

## 2023-01-01 PROCEDURE — 99221 1ST HOSP IP/OBS SF/LOW 40: CPT

## 2023-01-01 PROCEDURE — 76770 US EXAM ABDO BACK WALL COMP: CPT

## 2023-01-01 PROCEDURE — 72170 X-RAY EXAM OF PELVIS: CPT

## 2023-01-01 PROCEDURE — 97530 THERAPEUTIC ACTIVITIES: CPT

## 2023-01-01 PROCEDURE — 97110 THERAPEUTIC EXERCISES: CPT

## 2023-01-01 PROCEDURE — 93923 UPR/LXTR ART STDY 3+ LVLS: CPT | Mod: 26

## 2023-01-01 PROCEDURE — 73502 X-RAY EXAM HIP UNI 2-3 VIEWS: CPT

## 2023-01-01 RX ORDER — METOPROLOL TARTRATE 50 MG
25 TABLET ORAL DAILY
Refills: 0 | Status: DISCONTINUED | OUTPATIENT
Start: 2023-01-01 | End: 2023-01-01

## 2023-01-01 RX ORDER — ROBINUL 0.2 MG/ML
0.4 INJECTION INTRAMUSCULAR; INTRAVENOUS EVERY 4 HOURS
Refills: 0 | Status: DISCONTINUED | OUTPATIENT
Start: 2023-01-01 | End: 2023-01-01

## 2023-01-01 RX ORDER — MIRTAZAPINE 45 MG/1
15 TABLET, ORALLY DISINTEGRATING ORAL AT BEDTIME
Refills: 0 | Status: DISCONTINUED | OUTPATIENT
Start: 2023-01-01 | End: 2023-01-01

## 2023-01-01 RX ORDER — SODIUM CHLORIDE 9 MG/ML
1000 INJECTION, SOLUTION INTRAVENOUS
Refills: 0 | Status: DISCONTINUED | OUTPATIENT
Start: 2023-01-01 | End: 2023-01-01

## 2023-01-01 RX ORDER — METOPROLOL TARTRATE 50 MG
25 TABLET ORAL
Refills: 0 | Status: DISCONTINUED | OUTPATIENT
Start: 2023-01-01 | End: 2023-01-01

## 2023-01-01 RX ORDER — PANTOPRAZOLE SODIUM 20 MG/1
40 TABLET, DELAYED RELEASE ORAL
Refills: 0 | Status: DISCONTINUED | OUTPATIENT
Start: 2023-01-01 | End: 2023-01-01

## 2023-01-01 RX ORDER — ONDANSETRON 8 MG/1
4 TABLET, FILM COATED ORAL EVERY 8 HOURS
Refills: 0 | Status: DISCONTINUED | OUTPATIENT
Start: 2023-01-01 | End: 2023-01-01

## 2023-01-01 RX ORDER — ACETAMINOPHEN 500 MG
975 TABLET ORAL ONCE
Refills: 0 | Status: COMPLETED | OUTPATIENT
Start: 2023-01-01 | End: 2023-01-01

## 2023-01-01 RX ORDER — APIXABAN 2.5 MG/1
1 TABLET, FILM COATED ORAL
Qty: 60 | Refills: 0
Start: 2023-01-01 | End: 2023-01-01

## 2023-01-01 RX ORDER — HYDROMORPHONE HYDROCHLORIDE 2 MG/ML
0.4 INJECTION INTRAMUSCULAR; INTRAVENOUS; SUBCUTANEOUS
Refills: 0 | Status: DISCONTINUED | OUTPATIENT
Start: 2023-01-01 | End: 2023-01-01

## 2023-01-01 RX ORDER — LANOLIN/MINERAL OIL
0 LOTION (ML) TOPICAL
Refills: 0 | DISCHARGE

## 2023-01-01 RX ORDER — SACUBITRIL AND VALSARTAN 24; 26 MG/1; MG/1
1 TABLET, FILM COATED ORAL
Refills: 0 | Status: DISCONTINUED | OUTPATIENT
Start: 2023-01-01 | End: 2023-01-01

## 2023-01-01 RX ORDER — CHOLECALCIFEROL (VITAMIN D3) 125 MCG
1 CAPSULE ORAL
Refills: 0 | DISCHARGE

## 2023-01-01 RX ORDER — ASPIRIN/CALCIUM CARB/MAGNESIUM 324 MG
81 TABLET ORAL DAILY
Refills: 0 | Status: DISCONTINUED | OUTPATIENT
Start: 2023-01-01 | End: 2023-01-01

## 2023-01-01 RX ORDER — METOPROLOL TARTRATE 50 MG
25 TABLET ORAL ONCE
Refills: 0 | Status: COMPLETED | OUTPATIENT
Start: 2023-01-01 | End: 2023-01-01

## 2023-01-01 RX ORDER — ACETAMINOPHEN 500 MG
1000 TABLET ORAL ONCE
Refills: 0 | Status: COMPLETED | OUTPATIENT
Start: 2023-01-01 | End: 2023-01-01

## 2023-01-01 RX ORDER — MORPHINE SULFATE 50 MG/1
2 CAPSULE, EXTENDED RELEASE ORAL ONCE
Refills: 0 | Status: DISCONTINUED | OUTPATIENT
Start: 2023-01-01 | End: 2023-01-01

## 2023-01-01 RX ORDER — ACETAMINOPHEN 500 MG
650 TABLET ORAL EVERY 6 HOURS
Refills: 0 | Status: DISCONTINUED | OUTPATIENT
Start: 2023-01-01 | End: 2023-01-01

## 2023-01-01 RX ORDER — LIDOCAINE 4 G/100G
1 CREAM TOPICAL ONCE
Refills: 0 | Status: COMPLETED | OUTPATIENT
Start: 2023-01-01 | End: 2023-01-01

## 2023-01-01 RX ORDER — TRAZODONE HCL 50 MG
1 TABLET ORAL
Qty: 0 | Refills: 0 | DISCHARGE

## 2023-01-01 RX ORDER — ZOLPIDEM TARTRATE 10 MG/1
5 TABLET ORAL AT BEDTIME
Refills: 0 | Status: DISCONTINUED | OUTPATIENT
Start: 2023-01-01 | End: 2023-01-01

## 2023-01-01 RX ORDER — ALPRAZOLAM 0.25 MG
0.5 TABLET ORAL THREE TIMES A DAY
Refills: 0 | Status: DISCONTINUED | OUTPATIENT
Start: 2023-01-01 | End: 2023-01-01

## 2023-01-01 RX ORDER — APIXABAN 2.5 MG/1
1 TABLET, FILM COATED ORAL
Refills: 0 | DISCHARGE

## 2023-01-01 RX ORDER — RIVAROXABAN 15 MG-20MG
15 KIT ORAL
Refills: 0 | Status: DISCONTINUED | OUTPATIENT
Start: 2023-01-01 | End: 2023-01-01

## 2023-01-01 RX ORDER — SODIUM BICARBONATE 1 MEQ/ML
0.19 SYRINGE (ML) INTRAVENOUS
Qty: 150 | Refills: 0 | Status: DISCONTINUED | OUTPATIENT
Start: 2023-01-01 | End: 2023-01-01

## 2023-01-01 RX ORDER — VENLAFAXINE HCL 75 MG
75 CAPSULE, EXT RELEASE 24 HR ORAL DAILY
Refills: 0 | Status: DISCONTINUED | OUTPATIENT
Start: 2023-01-01 | End: 2023-01-01

## 2023-01-01 RX ORDER — APIXABAN 2.5 MG/1
2.5 TABLET, FILM COATED ORAL EVERY 12 HOURS
Refills: 0 | Status: DISCONTINUED | OUTPATIENT
Start: 2023-01-01 | End: 2023-01-01

## 2023-01-01 RX ORDER — ZOLPIDEM TARTRATE 10 MG/1
1 TABLET ORAL
Qty: 0 | Refills: 0 | DISCHARGE

## 2023-01-01 RX ORDER — METOPROLOL TARTRATE 50 MG
100 TABLET ORAL DAILY
Refills: 0 | Status: DISCONTINUED | OUTPATIENT
Start: 2023-01-01 | End: 2023-01-01

## 2023-01-01 RX ORDER — REMDESIVIR 5 MG/ML
100 INJECTION INTRAVENOUS EVERY 24 HOURS
Refills: 0 | Status: COMPLETED | OUTPATIENT
Start: 2023-01-01 | End: 2023-01-01

## 2023-01-01 RX ORDER — FUROSEMIDE 40 MG
20 TABLET ORAL ONCE
Refills: 0 | Status: COMPLETED | OUTPATIENT
Start: 2023-01-01 | End: 2023-01-01

## 2023-01-01 RX ORDER — METOPROLOL TARTRATE 50 MG
50 TABLET ORAL DAILY
Refills: 0 | Status: DISCONTINUED | OUTPATIENT
Start: 2023-01-01 | End: 2023-01-01

## 2023-01-01 RX ORDER — DIPHENHYDRAMINE HCL 50 MG
25 CAPSULE ORAL ONCE
Refills: 0 | Status: COMPLETED | OUTPATIENT
Start: 2023-01-01 | End: 2023-01-01

## 2023-01-01 RX ORDER — IRON SUCROSE 20 MG/ML
100 INJECTION, SOLUTION INTRAVENOUS ONCE
Refills: 0 | Status: COMPLETED | OUTPATIENT
Start: 2023-01-01 | End: 2023-01-01

## 2023-01-01 RX ORDER — POLYETHYLENE GLYCOL 3350 17 G/17G
17 POWDER, FOR SOLUTION ORAL
Refills: 0 | DISCHARGE

## 2023-01-01 RX ORDER — POLYETHYLENE GLYCOL 3350 17 G/17G
17 POWDER, FOR SOLUTION ORAL DAILY
Refills: 0 | Status: DISCONTINUED | OUTPATIENT
Start: 2023-01-01 | End: 2023-01-01

## 2023-01-01 RX ORDER — HYDROCORTISONE 1 %
1 OINTMENT (GRAM) TOPICAL
Refills: 0 | Status: DISCONTINUED | OUTPATIENT
Start: 2023-01-01 | End: 2023-01-01

## 2023-01-01 RX ORDER — METOPROLOL TARTRATE 50 MG
75 TABLET ORAL DAILY
Refills: 0 | Status: DISCONTINUED | OUTPATIENT
Start: 2023-01-01 | End: 2023-01-01

## 2023-01-01 RX ORDER — ATORVASTATIN CALCIUM 80 MG/1
80 TABLET, FILM COATED ORAL AT BEDTIME
Refills: 0 | Status: DISCONTINUED | OUTPATIENT
Start: 2023-01-01 | End: 2023-01-01

## 2023-01-01 RX ORDER — PANTOPRAZOLE SODIUM 20 MG/1
1 TABLET, DELAYED RELEASE ORAL
Refills: 0 | DISCHARGE
Start: 2023-01-01

## 2023-01-01 RX ORDER — MIDODRINE HYDROCHLORIDE 2.5 MG/1
10 TABLET ORAL THREE TIMES A DAY
Refills: 0 | Status: DISCONTINUED | OUTPATIENT
Start: 2023-01-01 | End: 2023-01-01

## 2023-01-01 RX ORDER — VENLAFAXINE HCL 75 MG
1 CAPSULE, EXT RELEASE 24 HR ORAL
Refills: 0 | DISCHARGE

## 2023-01-01 RX ORDER — NYSTATIN CREAM 100000 [USP'U]/G
1 CREAM TOPICAL
Qty: 0 | Refills: 0 | DISCHARGE
Start: 2023-01-01

## 2023-01-01 RX ORDER — CILOSTAZOL 100 MG/1
50 TABLET ORAL
Refills: 0 | Status: DISCONTINUED | OUTPATIENT
Start: 2023-01-01 | End: 2023-01-01

## 2023-01-01 RX ORDER — ZOLPIDEM TARTRATE 10 MG/1
1 TABLET ORAL
Refills: 0 | DISCHARGE

## 2023-01-01 RX ORDER — METOPROLOL TARTRATE 50 MG
3 TABLET ORAL
Qty: 0 | Refills: 0 | DISCHARGE
Start: 2023-01-01

## 2023-01-01 RX ORDER — MAGNESIUM SULFATE 500 MG/ML
1 VIAL (ML) INJECTION ONCE
Refills: 0 | Status: COMPLETED | OUTPATIENT
Start: 2023-01-01 | End: 2023-01-01

## 2023-01-01 RX ORDER — CHOLECALCIFEROL (VITAMIN D3) 125 MCG
1000 CAPSULE ORAL DAILY
Refills: 0 | Status: DISCONTINUED | OUTPATIENT
Start: 2023-01-01 | End: 2023-01-01

## 2023-01-01 RX ORDER — HYDROMORPHONE HYDROCHLORIDE 2 MG/ML
0.2 INJECTION INTRAMUSCULAR; INTRAVENOUS; SUBCUTANEOUS
Refills: 0 | Status: DISCONTINUED | OUTPATIENT
Start: 2023-01-01 | End: 2023-01-01

## 2023-01-01 RX ORDER — APIXABAN 2.5 MG/1
2.5 TABLET, FILM COATED ORAL
Refills: 0 | Status: DISCONTINUED | OUTPATIENT
Start: 2023-01-01 | End: 2023-01-01

## 2023-01-01 RX ORDER — CEFEPIME 1 G/1
1000 INJECTION, POWDER, FOR SOLUTION INTRAMUSCULAR; INTRAVENOUS EVERY 24 HOURS
Refills: 0 | Status: DISCONTINUED | OUTPATIENT
Start: 2023-01-01 | End: 2023-01-01

## 2023-01-01 RX ORDER — SODIUM CHLORIDE 9 MG/ML
1000 INJECTION INTRAMUSCULAR; INTRAVENOUS; SUBCUTANEOUS ONCE
Refills: 0 | Status: COMPLETED | OUTPATIENT
Start: 2023-01-01 | End: 2023-01-01

## 2023-01-01 RX ORDER — METOPROLOL TARTRATE 50 MG
1 TABLET ORAL
Qty: 0 | Refills: 0 | DISCHARGE
Start: 2023-01-01

## 2023-01-01 RX ORDER — LANOLIN ALCOHOL/MO/W.PET/CERES
3 CREAM (GRAM) TOPICAL AT BEDTIME
Refills: 0 | Status: DISCONTINUED | OUTPATIENT
Start: 2023-01-01 | End: 2023-01-01

## 2023-01-01 RX ORDER — ZOLPIDEM TARTRATE 10 MG/1
1 TABLET ORAL
Qty: 0 | Refills: 0 | DISCHARGE
Start: 2023-01-01

## 2023-01-01 RX ORDER — METOPROLOL TARTRATE 50 MG
5 TABLET ORAL ONCE
Refills: 0 | Status: COMPLETED | OUTPATIENT
Start: 2023-01-01 | End: 2023-01-01

## 2023-01-01 RX ORDER — SODIUM BICARBONATE 1 MEQ/ML
0.25 SYRINGE (ML) INTRAVENOUS
Qty: 150 | Refills: 0 | Status: DISCONTINUED | OUTPATIENT
Start: 2023-01-01 | End: 2023-01-01

## 2023-01-01 RX ORDER — ONDANSETRON 8 MG/1
4 TABLET, FILM COATED ORAL EVERY 4 HOURS
Refills: 0 | Status: DISCONTINUED | OUTPATIENT
Start: 2023-01-01 | End: 2023-01-01

## 2023-01-01 RX ORDER — IRON SUCROSE 20 MG/ML
300 INJECTION, SOLUTION INTRAVENOUS EVERY 24 HOURS
Refills: 0 | Status: COMPLETED | OUTPATIENT
Start: 2023-01-01 | End: 2023-01-01

## 2023-01-01 RX ORDER — ONDANSETRON 8 MG/1
1 TABLET, FILM COATED ORAL
Refills: 0 | DISCHARGE
Start: 2023-01-01

## 2023-01-01 RX ORDER — METOPROLOL TARTRATE 50 MG
1 TABLET ORAL
Refills: 0 | DISCHARGE

## 2023-01-01 RX ORDER — METOPROLOL TARTRATE 50 MG
5 TABLET ORAL EVERY 6 HOURS
Refills: 0 | Status: DISCONTINUED | OUTPATIENT
Start: 2023-01-01 | End: 2023-01-01

## 2023-01-01 RX ORDER — ATORVASTATIN CALCIUM 80 MG/1
1 TABLET, FILM COATED ORAL
Qty: 0 | Refills: 0 | DISCHARGE
Start: 2023-01-01

## 2023-01-01 RX ORDER — RIVAROXABAN 15 MG-20MG
1 KIT ORAL
Qty: 0 | Refills: 0 | DISCHARGE
Start: 2023-01-01

## 2023-01-01 RX ORDER — ATORVASTATIN CALCIUM 80 MG/1
10 TABLET, FILM COATED ORAL AT BEDTIME
Refills: 0 | Status: DISCONTINUED | OUTPATIENT
Start: 2023-01-01 | End: 2023-01-01

## 2023-01-01 RX ORDER — LANOLIN ALCOHOL/MO/W.PET/CERES
9 CREAM (GRAM) TOPICAL ONCE
Refills: 0 | Status: COMPLETED | OUTPATIENT
Start: 2023-01-01 | End: 2023-01-01

## 2023-01-01 RX ORDER — HYDROCORTISONE 1 %
1 OINTMENT (GRAM) TOPICAL
Qty: 0 | Refills: 0 | DISCHARGE
Start: 2023-01-01

## 2023-01-01 RX ORDER — METOPROLOL TARTRATE 50 MG
3 TABLET ORAL
Refills: 0 | DISCHARGE

## 2023-01-01 RX ORDER — CALAMINE AND ZINC OXIDE AND PHENOL 160; 10 MG/ML; MG/ML
1 LOTION TOPICAL
Refills: 0 | Status: DISCONTINUED | OUTPATIENT
Start: 2023-01-01 | End: 2023-01-01

## 2023-01-01 RX ORDER — CEFTRIAXONE 500 MG/1
1000 INJECTION, POWDER, FOR SOLUTION INTRAMUSCULAR; INTRAVENOUS EVERY 24 HOURS
Refills: 0 | Status: DISCONTINUED | OUTPATIENT
Start: 2023-01-01 | End: 2023-01-01

## 2023-01-01 RX ORDER — LANOLIN ALCOHOL/MO/W.PET/CERES
1 CREAM (GRAM) TOPICAL
Qty: 0 | Refills: 0 | DISCHARGE
Start: 2023-01-01

## 2023-01-01 RX ORDER — MIDODRINE HYDROCHLORIDE 2.5 MG/1
10 TABLET ORAL ONCE
Refills: 0 | Status: COMPLETED | OUTPATIENT
Start: 2023-01-01 | End: 2023-01-01

## 2023-01-01 RX ORDER — SODIUM CHLORIDE 9 MG/ML
500 INJECTION INTRAMUSCULAR; INTRAVENOUS; SUBCUTANEOUS ONCE
Refills: 0 | Status: COMPLETED | OUTPATIENT
Start: 2023-01-01 | End: 2023-01-01

## 2023-01-01 RX ORDER — DEXTROSE 50 % IN WATER 50 %
25 SYRINGE (ML) INTRAVENOUS ONCE
Refills: 0 | Status: COMPLETED | OUTPATIENT
Start: 2023-01-01 | End: 2023-01-01

## 2023-01-01 RX ORDER — SACUBITRIL AND VALSARTAN 24; 26 MG/1; MG/1
1 TABLET, FILM COATED ORAL
Qty: 0 | Refills: 0 | DISCHARGE
Start: 2023-01-01

## 2023-01-01 RX ORDER — VANCOMYCIN HCL 1 G
500 VIAL (EA) INTRAVENOUS ONCE
Refills: 0 | Status: DISCONTINUED | OUTPATIENT
Start: 2023-01-01 | End: 2023-01-01

## 2023-01-01 RX ORDER — MIRTAZAPINE 45 MG/1
1 TABLET, ORALLY DISINTEGRATING ORAL
Refills: 0 | DISCHARGE

## 2023-01-01 RX ORDER — ZOLPIDEM TARTRATE 10 MG/1
5 TABLET ORAL ONCE
Refills: 0 | Status: DISCONTINUED | OUTPATIENT
Start: 2023-01-01 | End: 2023-01-01

## 2023-01-01 RX ORDER — LANOLIN ALCOHOL/MO/W.PET/CERES
3 CREAM (GRAM) TOPICAL ONCE
Refills: 0 | Status: COMPLETED | OUTPATIENT
Start: 2023-01-01 | End: 2023-01-01

## 2023-01-01 RX ORDER — VANCOMYCIN HCL 1 G
1000 VIAL (EA) INTRAVENOUS ONCE
Refills: 0 | Status: COMPLETED | OUTPATIENT
Start: 2023-01-01 | End: 2023-01-01

## 2023-01-01 RX ORDER — RIVAROXABAN 15 MG-20MG
15 KIT ORAL DAILY
Refills: 0 | Status: DISCONTINUED | OUTPATIENT
Start: 2023-01-01 | End: 2023-01-01

## 2023-01-01 RX ORDER — CHLORHEXIDINE GLUCONATE 213 G/1000ML
1 SOLUTION TOPICAL
Refills: 0 | Status: DISCONTINUED | OUTPATIENT
Start: 2023-01-01 | End: 2023-01-01

## 2023-01-01 RX ORDER — REMDESIVIR 5 MG/ML
200 INJECTION INTRAVENOUS ONCE
Refills: 0 | Status: COMPLETED | OUTPATIENT
Start: 2023-01-01 | End: 2023-01-01

## 2023-01-01 RX ORDER — FUROSEMIDE 40 MG
40 TABLET ORAL ONCE
Refills: 0 | Status: COMPLETED | OUTPATIENT
Start: 2023-01-01 | End: 2023-01-01

## 2023-01-01 RX ORDER — SENNA PLUS 8.6 MG/1
2 TABLET ORAL AT BEDTIME
Refills: 0 | Status: DISCONTINUED | OUTPATIENT
Start: 2023-01-01 | End: 2023-01-01

## 2023-01-01 RX ORDER — NYSTATIN CREAM 100000 [USP'U]/G
1 CREAM TOPICAL
Refills: 0 | Status: DISCONTINUED | OUTPATIENT
Start: 2023-01-01 | End: 2023-01-01

## 2023-01-01 RX ORDER — DEXTROSE 50 % IN WATER 50 %
25 SYRINGE (ML) INTRAVENOUS ONCE
Refills: 0 | Status: DISCONTINUED | OUTPATIENT
Start: 2023-01-01 | End: 2023-01-01

## 2023-01-01 RX ORDER — POLYETHYLENE GLYCOL 3350 17 G/17G
17 POWDER, FOR SOLUTION ORAL ONCE
Refills: 0 | Status: COMPLETED | OUTPATIENT
Start: 2023-01-01 | End: 2023-01-01

## 2023-01-01 RX ORDER — APIXABAN 2.5 MG/1
5 TABLET, FILM COATED ORAL
Refills: 0 | Status: DISCONTINUED | OUTPATIENT
Start: 2023-01-01 | End: 2023-01-01

## 2023-01-01 RX ADMIN — Medication 1 DROP(S): at 05:20

## 2023-01-01 RX ADMIN — APIXABAN 2.5 MILLIGRAM(S): 2.5 TABLET, FILM COATED ORAL at 18:06

## 2023-01-01 RX ADMIN — Medication 50 MILLIGRAM(S): at 06:12

## 2023-01-01 RX ADMIN — SACUBITRIL AND VALSARTAN 1 TABLET(S): 24; 26 TABLET, FILM COATED ORAL at 17:27

## 2023-01-01 RX ADMIN — Medication 1 DROP(S): at 06:07

## 2023-01-01 RX ADMIN — MIDODRINE HYDROCHLORIDE 10 MILLIGRAM(S): 2.5 TABLET ORAL at 06:07

## 2023-01-01 RX ADMIN — Medication 75 MILLIGRAM(S): at 11:50

## 2023-01-01 RX ADMIN — ATORVASTATIN CALCIUM 80 MILLIGRAM(S): 80 TABLET, FILM COATED ORAL at 21:51

## 2023-01-01 RX ADMIN — APIXABAN 5 MILLIGRAM(S): 2.5 TABLET, FILM COATED ORAL at 05:31

## 2023-01-01 RX ADMIN — Medication 975 MILLIGRAM(S): at 20:42

## 2023-01-01 RX ADMIN — ZOLPIDEM TARTRATE 5 MILLIGRAM(S): 10 TABLET ORAL at 22:38

## 2023-01-01 RX ADMIN — Medication 75 MILLIGRAM(S): at 12:44

## 2023-01-01 RX ADMIN — ATORVASTATIN CALCIUM 80 MILLIGRAM(S): 80 TABLET, FILM COATED ORAL at 21:30

## 2023-01-01 RX ADMIN — Medication 1 DROP(S): at 05:43

## 2023-01-01 RX ADMIN — Medication 650 MILLIGRAM(S): at 16:34

## 2023-01-01 RX ADMIN — SACUBITRIL AND VALSARTAN 1 TABLET(S): 24; 26 TABLET, FILM COATED ORAL at 06:22

## 2023-01-01 RX ADMIN — MIRTAZAPINE 15 MILLIGRAM(S): 45 TABLET, ORALLY DISINTEGRATING ORAL at 21:38

## 2023-01-01 RX ADMIN — ATORVASTATIN CALCIUM 80 MILLIGRAM(S): 80 TABLET, FILM COATED ORAL at 23:21

## 2023-01-01 RX ADMIN — Medication 50 MILLIGRAM(S): at 06:22

## 2023-01-01 RX ADMIN — PANTOPRAZOLE SODIUM 40 MILLIGRAM(S): 20 TABLET, DELAYED RELEASE ORAL at 06:10

## 2023-01-01 RX ADMIN — Medication 75 MILLIGRAM(S): at 12:00

## 2023-01-01 RX ADMIN — Medication 75 MILLIGRAM(S): at 11:57

## 2023-01-01 RX ADMIN — Medication 1 APPLICATION(S): at 05:34

## 2023-01-01 RX ADMIN — Medication 1 DROP(S): at 17:47

## 2023-01-01 RX ADMIN — APIXABAN 2.5 MILLIGRAM(S): 2.5 TABLET, FILM COATED ORAL at 05:54

## 2023-01-01 RX ADMIN — APIXABAN 2.5 MILLIGRAM(S): 2.5 TABLET, FILM COATED ORAL at 16:24

## 2023-01-01 RX ADMIN — APIXABAN 2.5 MILLIGRAM(S): 2.5 TABLET, FILM COATED ORAL at 20:12

## 2023-01-01 RX ADMIN — POLYETHYLENE GLYCOL 3350 17 GRAM(S): 17 POWDER, FOR SOLUTION ORAL at 14:48

## 2023-01-01 RX ADMIN — Medication 3 MILLIGRAM(S): at 21:34

## 2023-01-01 RX ADMIN — PANTOPRAZOLE SODIUM 40 MILLIGRAM(S): 20 TABLET, DELAYED RELEASE ORAL at 05:00

## 2023-01-01 RX ADMIN — MIRTAZAPINE 15 MILLIGRAM(S): 45 TABLET, ORALLY DISINTEGRATING ORAL at 21:30

## 2023-01-01 RX ADMIN — ZOLPIDEM TARTRATE 5 MILLIGRAM(S): 10 TABLET ORAL at 22:45

## 2023-01-01 RX ADMIN — APIXABAN 2.5 MILLIGRAM(S): 2.5 TABLET, FILM COATED ORAL at 17:47

## 2023-01-01 RX ADMIN — Medication 650 MILLIGRAM(S): at 19:01

## 2023-01-01 RX ADMIN — Medication 50 MILLIGRAM(S): at 05:00

## 2023-01-01 RX ADMIN — Medication 650 MILLIGRAM(S): at 03:00

## 2023-01-01 RX ADMIN — SACUBITRIL AND VALSARTAN 1 TABLET(S): 24; 26 TABLET, FILM COATED ORAL at 18:05

## 2023-01-01 RX ADMIN — ZOLPIDEM TARTRATE 5 MILLIGRAM(S): 10 TABLET ORAL at 23:43

## 2023-01-01 RX ADMIN — Medication 0.5 MILLIGRAM(S): at 22:11

## 2023-01-01 RX ADMIN — SACUBITRIL AND VALSARTAN 1 TABLET(S): 24; 26 TABLET, FILM COATED ORAL at 05:57

## 2023-01-01 RX ADMIN — PANTOPRAZOLE SODIUM 40 MILLIGRAM(S): 20 TABLET, DELAYED RELEASE ORAL at 05:54

## 2023-01-01 RX ADMIN — Medication 9 MILLIGRAM(S): at 00:49

## 2023-01-01 RX ADMIN — PANTOPRAZOLE SODIUM 40 MILLIGRAM(S): 20 TABLET, DELAYED RELEASE ORAL at 08:48

## 2023-01-01 RX ADMIN — ATORVASTATIN CALCIUM 80 MILLIGRAM(S): 80 TABLET, FILM COATED ORAL at 21:40

## 2023-01-01 RX ADMIN — ZOLPIDEM TARTRATE 5 MILLIGRAM(S): 10 TABLET ORAL at 22:51

## 2023-01-01 RX ADMIN — Medication 25 MILLIGRAM(S): at 17:18

## 2023-01-01 RX ADMIN — NYSTATIN CREAM 1 APPLICATION(S): 100000 CREAM TOPICAL at 06:29

## 2023-01-01 RX ADMIN — APIXABAN 5 MILLIGRAM(S): 2.5 TABLET, FILM COATED ORAL at 05:23

## 2023-01-01 RX ADMIN — PANTOPRAZOLE SODIUM 40 MILLIGRAM(S): 20 TABLET, DELAYED RELEASE ORAL at 05:23

## 2023-01-01 RX ADMIN — ZOLPIDEM TARTRATE 5 MILLIGRAM(S): 10 TABLET ORAL at 00:08

## 2023-01-01 RX ADMIN — Medication 650 MILLIGRAM(S): at 00:52

## 2023-01-01 RX ADMIN — PANTOPRAZOLE SODIUM 40 MILLIGRAM(S): 20 TABLET, DELAYED RELEASE ORAL at 05:20

## 2023-01-01 RX ADMIN — Medication 0.5 MILLIGRAM(S): at 13:40

## 2023-01-01 RX ADMIN — MIRTAZAPINE 15 MILLIGRAM(S): 45 TABLET, ORALLY DISINTEGRATING ORAL at 21:41

## 2023-01-01 RX ADMIN — REMDESIVIR 200 MILLIGRAM(S): 5 INJECTION INTRAVENOUS at 17:26

## 2023-01-01 RX ADMIN — SACUBITRIL AND VALSARTAN 1 TABLET(S): 24; 26 TABLET, FILM COATED ORAL at 05:14

## 2023-01-01 RX ADMIN — Medication 50 MILLIGRAM(S): at 05:43

## 2023-01-01 RX ADMIN — MORPHINE SULFATE 2 MILLIGRAM(S): 50 CAPSULE, EXTENDED RELEASE ORAL at 22:26

## 2023-01-01 RX ADMIN — CALAMINE AND ZINC OXIDE AND PHENOL 1 APPLICATION(S): 160; 10 LOTION TOPICAL at 08:49

## 2023-01-01 RX ADMIN — IRON SUCROSE 210 MILLIGRAM(S): 20 INJECTION, SOLUTION INTRAVENOUS at 06:33

## 2023-01-01 RX ADMIN — Medication 650 MILLIGRAM(S): at 02:29

## 2023-01-01 RX ADMIN — Medication 25 MILLIGRAM(S): at 05:42

## 2023-01-01 RX ADMIN — Medication 1 APPLICATION(S): at 05:24

## 2023-01-01 RX ADMIN — ZOLPIDEM TARTRATE 5 MILLIGRAM(S): 10 TABLET ORAL at 22:32

## 2023-01-01 RX ADMIN — Medication 0.2 MILLIGRAM(S): at 05:34

## 2023-01-01 RX ADMIN — PANTOPRAZOLE SODIUM 40 MILLIGRAM(S): 20 TABLET, DELAYED RELEASE ORAL at 05:57

## 2023-01-01 RX ADMIN — SENNA PLUS 2 TABLET(S): 8.6 TABLET ORAL at 21:51

## 2023-01-01 RX ADMIN — ATORVASTATIN CALCIUM 80 MILLIGRAM(S): 80 TABLET, FILM COATED ORAL at 21:25

## 2023-01-01 RX ADMIN — MIRTAZAPINE 15 MILLIGRAM(S): 45 TABLET, ORALLY DISINTEGRATING ORAL at 21:15

## 2023-01-01 RX ADMIN — SODIUM CHLORIDE 75 MILLILITER(S): 9 INJECTION, SOLUTION INTRAVENOUS at 13:07

## 2023-01-01 RX ADMIN — CHLORHEXIDINE GLUCONATE 1 APPLICATION(S): 213 SOLUTION TOPICAL at 06:28

## 2023-01-01 RX ADMIN — MIRTAZAPINE 15 MILLIGRAM(S): 45 TABLET, ORALLY DISINTEGRATING ORAL at 21:12

## 2023-01-01 RX ADMIN — APIXABAN 5 MILLIGRAM(S): 2.5 TABLET, FILM COATED ORAL at 17:50

## 2023-01-01 RX ADMIN — CALAMINE AND ZINC OXIDE AND PHENOL 1 APPLICATION(S): 160; 10 LOTION TOPICAL at 06:13

## 2023-01-01 RX ADMIN — Medication 75 MILLIGRAM(S): at 12:29

## 2023-01-01 RX ADMIN — Medication 1 TABLET(S): at 11:20

## 2023-01-01 RX ADMIN — SACUBITRIL AND VALSARTAN 1 TABLET(S): 24; 26 TABLET, FILM COATED ORAL at 17:05

## 2023-01-01 RX ADMIN — Medication 650 MILLIGRAM(S): at 22:20

## 2023-01-01 RX ADMIN — ATORVASTATIN CALCIUM 80 MILLIGRAM(S): 80 TABLET, FILM COATED ORAL at 23:58

## 2023-01-01 RX ADMIN — Medication 75 MILLIGRAM(S): at 11:12

## 2023-01-01 RX ADMIN — Medication 75 MILLIGRAM(S): at 11:54

## 2023-01-01 RX ADMIN — Medication 100 MEQ/KG/HR: at 20:29

## 2023-01-01 RX ADMIN — ATORVASTATIN CALCIUM 10 MILLIGRAM(S): 80 TABLET, FILM COATED ORAL at 22:11

## 2023-01-01 RX ADMIN — APIXABAN 2.5 MILLIGRAM(S): 2.5 TABLET, FILM COATED ORAL at 05:43

## 2023-01-01 RX ADMIN — Medication 25 MILLIGRAM(S): at 12:21

## 2023-01-01 RX ADMIN — PANTOPRAZOLE SODIUM 40 MILLIGRAM(S): 20 TABLET, DELAYED RELEASE ORAL at 05:42

## 2023-01-01 RX ADMIN — SODIUM CHLORIDE 75 MILLILITER(S): 9 INJECTION, SOLUTION INTRAVENOUS at 23:58

## 2023-01-01 RX ADMIN — Medication 5 MILLIGRAM(S): at 02:50

## 2023-01-01 RX ADMIN — Medication 1000 MILLIGRAM(S): at 13:42

## 2023-01-01 RX ADMIN — ZOLPIDEM TARTRATE 5 MILLIGRAM(S): 10 TABLET ORAL at 23:22

## 2023-01-01 RX ADMIN — Medication 1 DROP(S): at 06:58

## 2023-01-01 RX ADMIN — Medication 0.2 MILLIGRAM(S): at 02:13

## 2023-01-01 RX ADMIN — PANTOPRAZOLE SODIUM 40 MILLIGRAM(S): 20 TABLET, DELAYED RELEASE ORAL at 22:32

## 2023-01-01 RX ADMIN — RIVAROXABAN 15 MILLIGRAM(S): KIT at 12:39

## 2023-01-01 RX ADMIN — Medication 1 DROP(S): at 20:13

## 2023-01-01 RX ADMIN — Medication 1 DROP(S): at 06:28

## 2023-01-01 RX ADMIN — Medication 1 APPLICATION(S): at 06:28

## 2023-01-01 RX ADMIN — Medication 100 MILLIGRAM(S): at 05:31

## 2023-01-01 RX ADMIN — APIXABAN 2.5 MILLIGRAM(S): 2.5 TABLET, FILM COATED ORAL at 08:27

## 2023-01-01 RX ADMIN — Medication 650 MILLIGRAM(S): at 01:27

## 2023-01-01 RX ADMIN — Medication 75 MILLIGRAM(S): at 12:09

## 2023-01-01 RX ADMIN — Medication 1 DROP(S): at 05:54

## 2023-01-01 RX ADMIN — PANTOPRAZOLE SODIUM 40 MILLIGRAM(S): 20 TABLET, DELAYED RELEASE ORAL at 06:28

## 2023-01-01 RX ADMIN — ZOLPIDEM TARTRATE 5 MILLIGRAM(S): 10 TABLET ORAL at 21:13

## 2023-01-01 RX ADMIN — Medication 25 MILLIGRAM(S): at 06:07

## 2023-01-01 RX ADMIN — CEFEPIME 100 MILLIGRAM(S): 1 INJECTION, POWDER, FOR SOLUTION INTRAMUSCULAR; INTRAVENOUS at 17:46

## 2023-01-01 RX ADMIN — CALAMINE AND ZINC OXIDE AND PHENOL 1 APPLICATION(S): 160; 10 LOTION TOPICAL at 21:30

## 2023-01-01 RX ADMIN — PANTOPRAZOLE SODIUM 40 MILLIGRAM(S): 20 TABLET, DELAYED RELEASE ORAL at 17:47

## 2023-01-01 RX ADMIN — REMDESIVIR 200 MILLIGRAM(S): 5 INJECTION INTRAVENOUS at 21:39

## 2023-01-01 RX ADMIN — APIXABAN 2.5 MILLIGRAM(S): 2.5 TABLET, FILM COATED ORAL at 06:22

## 2023-01-01 RX ADMIN — Medication 25 MILLILITER(S): at 09:10

## 2023-01-01 RX ADMIN — Medication 650 MILLIGRAM(S): at 01:52

## 2023-01-01 RX ADMIN — SACUBITRIL AND VALSARTAN 1 TABLET(S): 24; 26 TABLET, FILM COATED ORAL at 06:29

## 2023-01-01 RX ADMIN — CILOSTAZOL 50 MILLIGRAM(S): 100 TABLET ORAL at 17:47

## 2023-01-01 RX ADMIN — Medication 650 MILLIGRAM(S): at 17:13

## 2023-01-01 RX ADMIN — APIXABAN 5 MILLIGRAM(S): 2.5 TABLET, FILM COATED ORAL at 17:10

## 2023-01-01 RX ADMIN — Medication 1 DROP(S): at 16:24

## 2023-01-01 RX ADMIN — RIVAROXABAN 15 MILLIGRAM(S): KIT at 13:40

## 2023-01-01 RX ADMIN — ATORVASTATIN CALCIUM 80 MILLIGRAM(S): 80 TABLET, FILM COATED ORAL at 21:15

## 2023-01-01 RX ADMIN — Medication 75 MILLIGRAM(S): at 13:25

## 2023-01-01 RX ADMIN — Medication 75 MEQ/KG/HR: at 05:48

## 2023-01-01 RX ADMIN — PANTOPRAZOLE SODIUM 40 MILLIGRAM(S): 20 TABLET, DELAYED RELEASE ORAL at 18:04

## 2023-01-01 RX ADMIN — Medication 1 DROP(S): at 05:18

## 2023-01-01 RX ADMIN — SACUBITRIL AND VALSARTAN 1 TABLET(S): 24; 26 TABLET, FILM COATED ORAL at 05:43

## 2023-01-01 RX ADMIN — Medication 1000 UNIT(S): at 15:31

## 2023-01-01 RX ADMIN — MIDODRINE HYDROCHLORIDE 10 MILLIGRAM(S): 2.5 TABLET ORAL at 17:47

## 2023-01-01 RX ADMIN — Medication 650 MILLIGRAM(S): at 02:15

## 2023-01-01 RX ADMIN — Medication 400 MILLIGRAM(S): at 13:03

## 2023-01-01 RX ADMIN — Medication 75 MILLIGRAM(S): at 21:38

## 2023-01-01 RX ADMIN — ZOLPIDEM TARTRATE 5 MILLIGRAM(S): 10 TABLET ORAL at 21:12

## 2023-01-01 RX ADMIN — PANTOPRAZOLE SODIUM 40 MILLIGRAM(S): 20 TABLET, DELAYED RELEASE ORAL at 18:20

## 2023-01-01 RX ADMIN — SACUBITRIL AND VALSARTAN 1 TABLET(S): 24; 26 TABLET, FILM COATED ORAL at 17:33

## 2023-01-01 RX ADMIN — PANTOPRAZOLE SODIUM 40 MILLIGRAM(S): 20 TABLET, DELAYED RELEASE ORAL at 06:07

## 2023-01-01 RX ADMIN — CEFTRIAXONE 100 MILLIGRAM(S): 500 INJECTION, POWDER, FOR SOLUTION INTRAMUSCULAR; INTRAVENOUS at 01:33

## 2023-01-01 RX ADMIN — MIDODRINE HYDROCHLORIDE 10 MILLIGRAM(S): 2.5 TABLET ORAL at 11:20

## 2023-01-01 RX ADMIN — Medication 0.5 MILLIGRAM(S): at 06:29

## 2023-01-01 RX ADMIN — Medication 100 GRAM(S): at 15:29

## 2023-01-01 RX ADMIN — NYSTATIN CREAM 1 APPLICATION(S): 100000 CREAM TOPICAL at 16:23

## 2023-01-01 RX ADMIN — Medication 1000 UNIT(S): at 11:20

## 2023-01-01 RX ADMIN — NYSTATIN CREAM 1 APPLICATION(S): 100000 CREAM TOPICAL at 17:50

## 2023-01-01 RX ADMIN — Medication 650 MILLIGRAM(S): at 01:38

## 2023-01-01 RX ADMIN — Medication 1 DROP(S): at 17:29

## 2023-01-01 RX ADMIN — ATORVASTATIN CALCIUM 80 MILLIGRAM(S): 80 TABLET, FILM COATED ORAL at 21:09

## 2023-01-01 RX ADMIN — PANTOPRAZOLE SODIUM 40 MILLIGRAM(S): 20 TABLET, DELAYED RELEASE ORAL at 18:34

## 2023-01-01 RX ADMIN — Medication 100 MEQ/KG/HR: at 17:18

## 2023-01-01 RX ADMIN — APIXABAN 2.5 MILLIGRAM(S): 2.5 TABLET, FILM COATED ORAL at 05:01

## 2023-01-01 RX ADMIN — SODIUM CHLORIDE 500 MILLILITER(S): 9 INJECTION INTRAMUSCULAR; INTRAVENOUS; SUBCUTANEOUS at 20:58

## 2023-01-01 RX ADMIN — ZOLPIDEM TARTRATE 5 MILLIGRAM(S): 10 TABLET ORAL at 02:30

## 2023-01-01 RX ADMIN — CHLORHEXIDINE GLUCONATE 1 APPLICATION(S): 213 SOLUTION TOPICAL at 05:58

## 2023-01-01 RX ADMIN — Medication 81 MILLIGRAM(S): at 12:09

## 2023-01-01 RX ADMIN — Medication 1 APPLICATION(S): at 05:01

## 2023-01-01 RX ADMIN — APIXABAN 2.5 MILLIGRAM(S): 2.5 TABLET, FILM COATED ORAL at 18:34

## 2023-01-01 RX ADMIN — MIDODRINE HYDROCHLORIDE 10 MILLIGRAM(S): 2.5 TABLET ORAL at 20:29

## 2023-01-01 RX ADMIN — Medication 650 MILLIGRAM(S): at 05:30

## 2023-01-01 RX ADMIN — ZOLPIDEM TARTRATE 5 MILLIGRAM(S): 10 TABLET ORAL at 00:36

## 2023-01-01 RX ADMIN — Medication 1000 UNIT(S): at 12:00

## 2023-01-01 RX ADMIN — ATORVASTATIN CALCIUM 80 MILLIGRAM(S): 80 TABLET, FILM COATED ORAL at 21:36

## 2023-01-01 RX ADMIN — ATORVASTATIN CALCIUM 80 MILLIGRAM(S): 80 TABLET, FILM COATED ORAL at 21:34

## 2023-01-01 RX ADMIN — PANTOPRAZOLE SODIUM 40 MILLIGRAM(S): 20 TABLET, DELAYED RELEASE ORAL at 06:29

## 2023-01-01 RX ADMIN — ATORVASTATIN CALCIUM 80 MILLIGRAM(S): 80 TABLET, FILM COATED ORAL at 21:55

## 2023-01-01 RX ADMIN — Medication 5 MILLIGRAM(S): at 01:04

## 2023-01-01 RX ADMIN — Medication 75 MILLIGRAM(S): at 11:34

## 2023-01-01 RX ADMIN — Medication 75 MEQ/KG/HR: at 15:19

## 2023-01-01 RX ADMIN — MORPHINE SULFATE 2 MILLIGRAM(S): 50 CAPSULE, EXTENDED RELEASE ORAL at 22:47

## 2023-01-01 RX ADMIN — CILOSTAZOL 50 MILLIGRAM(S): 100 TABLET ORAL at 05:20

## 2023-01-01 RX ADMIN — Medication 1 APPLICATION(S): at 05:36

## 2023-01-01 RX ADMIN — APIXABAN 2.5 MILLIGRAM(S): 2.5 TABLET, FILM COATED ORAL at 06:07

## 2023-01-01 RX ADMIN — Medication 1 TABLET(S): at 15:31

## 2023-01-01 RX ADMIN — ATORVASTATIN CALCIUM 80 MILLIGRAM(S): 80 TABLET, FILM COATED ORAL at 21:13

## 2023-01-01 RX ADMIN — SACUBITRIL AND VALSARTAN 1 TABLET(S): 24; 26 TABLET, FILM COATED ORAL at 17:38

## 2023-01-01 RX ADMIN — PANTOPRAZOLE SODIUM 40 MILLIGRAM(S): 20 TABLET, DELAYED RELEASE ORAL at 18:27

## 2023-01-01 RX ADMIN — Medication 650 MILLIGRAM(S): at 23:57

## 2023-01-01 RX ADMIN — APIXABAN 2.5 MILLIGRAM(S): 2.5 TABLET, FILM COATED ORAL at 05:20

## 2023-01-01 RX ADMIN — MIDODRINE HYDROCHLORIDE 10 MILLIGRAM(S): 2.5 TABLET ORAL at 05:20

## 2023-01-01 RX ADMIN — PANTOPRAZOLE SODIUM 40 MILLIGRAM(S): 20 TABLET, DELAYED RELEASE ORAL at 05:31

## 2023-01-01 RX ADMIN — PANTOPRAZOLE SODIUM 40 MILLIGRAM(S): 20 TABLET, DELAYED RELEASE ORAL at 17:14

## 2023-01-01 RX ADMIN — Medication 25 MILLIGRAM(S): at 15:28

## 2023-01-01 RX ADMIN — Medication 650 MILLIGRAM(S): at 19:35

## 2023-01-01 RX ADMIN — MIRTAZAPINE 15 MILLIGRAM(S): 45 TABLET, ORALLY DISINTEGRATING ORAL at 21:36

## 2023-01-01 RX ADMIN — ZOLPIDEM TARTRATE 5 MILLIGRAM(S): 10 TABLET ORAL at 21:38

## 2023-01-01 RX ADMIN — ATORVASTATIN CALCIUM 10 MILLIGRAM(S): 80 TABLET, FILM COATED ORAL at 21:10

## 2023-01-01 RX ADMIN — PANTOPRAZOLE SODIUM 40 MILLIGRAM(S): 20 TABLET, DELAYED RELEASE ORAL at 17:29

## 2023-01-01 RX ADMIN — Medication 0.5 MILLIGRAM(S): at 21:33

## 2023-01-01 RX ADMIN — Medication 25 MILLIGRAM(S): at 20:58

## 2023-01-01 RX ADMIN — PANTOPRAZOLE SODIUM 40 MILLIGRAM(S): 20 TABLET, DELAYED RELEASE ORAL at 05:44

## 2023-01-01 RX ADMIN — PANTOPRAZOLE SODIUM 40 MILLIGRAM(S): 20 TABLET, DELAYED RELEASE ORAL at 08:26

## 2023-01-01 RX ADMIN — MIRTAZAPINE 15 MILLIGRAM(S): 45 TABLET, ORALLY DISINTEGRATING ORAL at 21:09

## 2023-01-01 RX ADMIN — Medication 25 MILLIGRAM(S): at 21:40

## 2023-01-01 RX ADMIN — PANTOPRAZOLE SODIUM 40 MILLIGRAM(S): 20 TABLET, DELAYED RELEASE ORAL at 18:06

## 2023-01-01 RX ADMIN — CILOSTAZOL 50 MILLIGRAM(S): 100 TABLET ORAL at 06:07

## 2023-01-01 RX ADMIN — PANTOPRAZOLE SODIUM 40 MILLIGRAM(S): 20 TABLET, DELAYED RELEASE ORAL at 17:33

## 2023-01-01 RX ADMIN — IRON SUCROSE 176.67 MILLIGRAM(S): 20 INJECTION, SOLUTION INTRAVENOUS at 11:49

## 2023-01-01 RX ADMIN — Medication 650 MILLIGRAM(S): at 01:08

## 2023-01-01 RX ADMIN — NYSTATIN CREAM 1 APPLICATION(S): 100000 CREAM TOPICAL at 17:44

## 2023-01-01 RX ADMIN — MIRTAZAPINE 15 MILLIGRAM(S): 45 TABLET, ORALLY DISINTEGRATING ORAL at 22:32

## 2023-01-01 RX ADMIN — REMDESIVIR 200 MILLIGRAM(S): 5 INJECTION INTRAVENOUS at 21:55

## 2023-01-01 RX ADMIN — Medication 1 DROP(S): at 05:57

## 2023-01-01 RX ADMIN — Medication 50 MILLIGRAM(S): at 05:01

## 2023-01-01 RX ADMIN — Medication 1 TABLET(S): at 12:00

## 2023-01-01 RX ADMIN — APIXABAN 2.5 MILLIGRAM(S): 2.5 TABLET, FILM COATED ORAL at 05:00

## 2023-01-01 RX ADMIN — SACUBITRIL AND VALSARTAN 1 TABLET(S): 24; 26 TABLET, FILM COATED ORAL at 05:01

## 2023-01-01 RX ADMIN — Medication 75 MILLIGRAM(S): at 05:23

## 2023-01-01 RX ADMIN — RIVAROXABAN 15 MILLIGRAM(S): KIT at 11:55

## 2023-01-01 RX ADMIN — Medication 75 MILLIGRAM(S): at 05:17

## 2023-01-01 RX ADMIN — Medication 75 MILLIGRAM(S): at 13:41

## 2023-01-01 RX ADMIN — Medication 5 MILLIGRAM(S): at 08:42

## 2023-01-01 RX ADMIN — MIDODRINE HYDROCHLORIDE 10 MILLIGRAM(S): 2.5 TABLET ORAL at 12:01

## 2023-01-01 RX ADMIN — Medication 1 APPLICATION(S): at 16:25

## 2023-01-01 RX ADMIN — PANTOPRAZOLE SODIUM 40 MILLIGRAM(S): 20 TABLET, DELAYED RELEASE ORAL at 05:43

## 2023-01-01 RX ADMIN — Medication 1 DROP(S): at 17:34

## 2023-01-01 RX ADMIN — Medication 1 APPLICATION(S): at 05:18

## 2023-01-01 RX ADMIN — Medication 50 MILLIGRAM(S): at 13:37

## 2023-01-01 RX ADMIN — Medication 1 APPLICATION(S): at 18:28

## 2023-01-01 RX ADMIN — APIXABAN 2.5 MILLIGRAM(S): 2.5 TABLET, FILM COATED ORAL at 05:23

## 2023-01-01 RX ADMIN — NYSTATIN CREAM 1 APPLICATION(S): 100000 CREAM TOPICAL at 05:31

## 2023-01-01 RX ADMIN — ZOLPIDEM TARTRATE 5 MILLIGRAM(S): 10 TABLET ORAL at 23:39

## 2023-01-01 RX ADMIN — Medication 650 MILLIGRAM(S): at 00:08

## 2023-01-01 RX ADMIN — Medication 75 MILLIGRAM(S): at 12:50

## 2023-01-01 RX ADMIN — PANTOPRAZOLE SODIUM 40 MILLIGRAM(S): 20 TABLET, DELAYED RELEASE ORAL at 05:55

## 2023-01-01 RX ADMIN — ONDANSETRON 4 MILLIGRAM(S): 8 TABLET, FILM COATED ORAL at 11:21

## 2023-01-01 RX ADMIN — PANTOPRAZOLE SODIUM 40 MILLIGRAM(S): 20 TABLET, DELAYED RELEASE ORAL at 17:27

## 2023-01-01 RX ADMIN — Medication 650 MILLIGRAM(S): at 00:57

## 2023-01-01 RX ADMIN — Medication 75 MILLIGRAM(S): at 12:58

## 2023-01-01 RX ADMIN — IRON SUCROSE 176.67 MILLIGRAM(S): 20 INJECTION, SOLUTION INTRAVENOUS at 13:37

## 2023-01-01 RX ADMIN — Medication 75 MILLIGRAM(S): at 11:30

## 2023-01-01 RX ADMIN — ZOLPIDEM TARTRATE 5 MILLIGRAM(S): 10 TABLET ORAL at 21:37

## 2023-01-01 RX ADMIN — CHLORHEXIDINE GLUCONATE 1 APPLICATION(S): 213 SOLUTION TOPICAL at 05:01

## 2023-01-01 RX ADMIN — SACUBITRIL AND VALSARTAN 1 TABLET(S): 24; 26 TABLET, FILM COATED ORAL at 05:30

## 2023-01-01 RX ADMIN — PANTOPRAZOLE SODIUM 40 MILLIGRAM(S): 20 TABLET, DELAYED RELEASE ORAL at 05:01

## 2023-01-01 RX ADMIN — Medication 1 APPLICATION(S): at 18:05

## 2023-01-01 RX ADMIN — IRON SUCROSE 176.67 MILLIGRAM(S): 20 INJECTION, SOLUTION INTRAVENOUS at 13:35

## 2023-01-01 RX ADMIN — SODIUM CHLORIDE 75 MILLILITER(S): 9 INJECTION, SOLUTION INTRAVENOUS at 12:16

## 2023-01-01 RX ADMIN — APIXABAN 2.5 MILLIGRAM(S): 2.5 TABLET, FILM COATED ORAL at 17:27

## 2023-01-01 RX ADMIN — Medication 1 APPLICATION(S): at 17:58

## 2023-01-01 RX ADMIN — SACUBITRIL AND VALSARTAN 1 TABLET(S): 24; 26 TABLET, FILM COATED ORAL at 18:06

## 2023-01-01 RX ADMIN — Medication 1 APPLICATION(S): at 05:35

## 2023-01-01 RX ADMIN — RIVAROXABAN 15 MILLIGRAM(S): KIT at 13:25

## 2023-01-01 RX ADMIN — ZOLPIDEM TARTRATE 5 MILLIGRAM(S): 10 TABLET ORAL at 21:30

## 2023-01-01 RX ADMIN — Medication 0.25 MILLIGRAM(S): at 20:24

## 2023-01-01 RX ADMIN — Medication 0.5 MILLIGRAM(S): at 21:31

## 2023-01-01 RX ADMIN — MIRTAZAPINE 15 MILLIGRAM(S): 45 TABLET, ORALLY DISINTEGRATING ORAL at 23:22

## 2023-01-01 RX ADMIN — SACUBITRIL AND VALSARTAN 1 TABLET(S): 24; 26 TABLET, FILM COATED ORAL at 05:23

## 2023-01-01 RX ADMIN — ZOLPIDEM TARTRATE 5 MILLIGRAM(S): 10 TABLET ORAL at 22:56

## 2023-01-01 RX ADMIN — CHLORHEXIDINE GLUCONATE 1 APPLICATION(S): 213 SOLUTION TOPICAL at 05:26

## 2023-01-01 RX ADMIN — NYSTATIN CREAM 1 APPLICATION(S): 100000 CREAM TOPICAL at 17:16

## 2023-01-01 RX ADMIN — Medication 0.5 MILLIGRAM(S): at 22:16

## 2023-01-01 RX ADMIN — Medication 1 DROP(S): at 18:06

## 2023-01-01 RX ADMIN — APIXABAN 2.5 MILLIGRAM(S): 2.5 TABLET, FILM COATED ORAL at 18:27

## 2023-01-01 RX ADMIN — ZOLPIDEM TARTRATE 5 MILLIGRAM(S): 10 TABLET ORAL at 23:46

## 2023-01-01 RX ADMIN — PANTOPRAZOLE SODIUM 40 MILLIGRAM(S): 20 TABLET, DELAYED RELEASE ORAL at 06:12

## 2023-01-01 RX ADMIN — Medication 75 MILLIGRAM(S): at 18:04

## 2023-01-01 RX ADMIN — Medication 1 DROP(S): at 17:14

## 2023-01-01 RX ADMIN — Medication 0.2 MILLIGRAM(S): at 21:29

## 2023-01-01 RX ADMIN — Medication 100 MILLIGRAM(S): at 05:14

## 2023-01-01 RX ADMIN — Medication 1 DROP(S): at 05:01

## 2023-01-01 RX ADMIN — Medication 650 MILLIGRAM(S): at 21:29

## 2023-01-01 RX ADMIN — MIRTAZAPINE 15 MILLIGRAM(S): 45 TABLET, ORALLY DISINTEGRATING ORAL at 21:37

## 2023-01-01 RX ADMIN — Medication 50 MILLIGRAM(S): at 16:24

## 2023-01-01 RX ADMIN — APIXABAN 5 MILLIGRAM(S): 2.5 TABLET, FILM COATED ORAL at 17:16

## 2023-01-01 RX ADMIN — NYSTATIN CREAM 1 APPLICATION(S): 100000 CREAM TOPICAL at 21:26

## 2023-01-01 RX ADMIN — APIXABAN 5 MILLIGRAM(S): 2.5 TABLET, FILM COATED ORAL at 17:38

## 2023-01-01 RX ADMIN — ATORVASTATIN CALCIUM 80 MILLIGRAM(S): 80 TABLET, FILM COATED ORAL at 21:26

## 2023-01-01 RX ADMIN — Medication 25 MILLIGRAM(S): at 09:02

## 2023-01-01 RX ADMIN — Medication 650 MILLIGRAM(S): at 08:26

## 2023-01-01 RX ADMIN — Medication 650 MILLIGRAM(S): at 01:42

## 2023-01-01 RX ADMIN — NYSTATIN CREAM 1 APPLICATION(S): 100000 CREAM TOPICAL at 05:42

## 2023-01-01 RX ADMIN — NYSTATIN CREAM 1 APPLICATION(S): 100000 CREAM TOPICAL at 08:37

## 2023-01-01 RX ADMIN — Medication 100 MILLIGRAM(S): at 05:44

## 2023-01-01 RX ADMIN — Medication 75 MILLIGRAM(S): at 11:41

## 2023-01-01 RX ADMIN — PANTOPRAZOLE SODIUM 40 MILLIGRAM(S): 20 TABLET, DELAYED RELEASE ORAL at 16:24

## 2023-01-01 RX ADMIN — APIXABAN 2.5 MILLIGRAM(S): 2.5 TABLET, FILM COATED ORAL at 08:48

## 2023-01-01 RX ADMIN — Medication 40 MILLIGRAM(S): at 19:07

## 2023-01-01 RX ADMIN — Medication 25 MILLIGRAM(S): at 05:55

## 2023-01-01 RX ADMIN — CHLORHEXIDINE GLUCONATE 1 APPLICATION(S): 213 SOLUTION TOPICAL at 06:03

## 2023-01-01 RX ADMIN — SACUBITRIL AND VALSARTAN 1 TABLET(S): 24; 26 TABLET, FILM COATED ORAL at 05:54

## 2023-01-01 RX ADMIN — Medication 1 APPLICATION(S): at 05:44

## 2023-01-01 RX ADMIN — SODIUM CHLORIDE 1000 MILLILITER(S): 9 INJECTION INTRAMUSCULAR; INTRAVENOUS; SUBCUTANEOUS at 14:48

## 2023-01-01 RX ADMIN — MIRTAZAPINE 15 MILLIGRAM(S): 45 TABLET, ORALLY DISINTEGRATING ORAL at 21:55

## 2023-01-01 RX ADMIN — ATORVASTATIN CALCIUM 80 MILLIGRAM(S): 80 TABLET, FILM COATED ORAL at 21:37

## 2023-01-01 RX ADMIN — CHLORHEXIDINE GLUCONATE 1 APPLICATION(S): 213 SOLUTION TOPICAL at 05:10

## 2023-01-01 RX ADMIN — Medication 975 MILLIGRAM(S): at 15:32

## 2023-01-01 RX ADMIN — Medication 25 MILLIGRAM(S): at 01:27

## 2023-01-01 RX ADMIN — APIXABAN 2.5 MILLIGRAM(S): 2.5 TABLET, FILM COATED ORAL at 18:20

## 2023-01-01 RX ADMIN — Medication 0.2 MILLIGRAM(S): at 21:38

## 2023-01-01 RX ADMIN — Medication 1 APPLICATION(S): at 21:15

## 2023-01-01 RX ADMIN — Medication 1 DROP(S): at 17:26

## 2023-01-01 RX ADMIN — APIXABAN 5 MILLIGRAM(S): 2.5 TABLET, FILM COATED ORAL at 05:44

## 2023-01-01 RX ADMIN — SACUBITRIL AND VALSARTAN 1 TABLET(S): 24; 26 TABLET, FILM COATED ORAL at 08:26

## 2023-01-01 RX ADMIN — Medication 1 DROP(S): at 05:24

## 2023-01-01 RX ADMIN — Medication 650 MILLIGRAM(S): at 18:10

## 2023-01-01 RX ADMIN — SACUBITRIL AND VALSARTAN 1 TABLET(S): 24; 26 TABLET, FILM COATED ORAL at 17:32

## 2023-01-01 RX ADMIN — Medication 250 MILLIGRAM(S): at 15:32

## 2023-01-01 RX ADMIN — ONDANSETRON 4 MILLIGRAM(S): 8 TABLET, FILM COATED ORAL at 14:50

## 2023-01-01 RX ADMIN — RIVAROXABAN 15 MILLIGRAM(S): KIT at 11:34

## 2023-01-01 RX ADMIN — Medication 1 DROP(S): at 05:10

## 2023-01-01 RX ADMIN — Medication 1 DROP(S): at 18:20

## 2023-01-01 RX ADMIN — Medication 50 MILLIGRAM(S): at 05:57

## 2023-01-01 RX ADMIN — APIXABAN 2.5 MILLIGRAM(S): 2.5 TABLET, FILM COATED ORAL at 17:33

## 2023-01-01 RX ADMIN — ZOLPIDEM TARTRATE 5 MILLIGRAM(S): 10 TABLET ORAL at 21:15

## 2023-01-01 RX ADMIN — APIXABAN 2.5 MILLIGRAM(S): 2.5 TABLET, FILM COATED ORAL at 06:10

## 2023-01-01 RX ADMIN — ATORVASTATIN CALCIUM 10 MILLIGRAM(S): 80 TABLET, FILM COATED ORAL at 21:06

## 2023-01-01 RX ADMIN — Medication 1 DROP(S): at 05:35

## 2023-01-01 RX ADMIN — Medication 1 DROP(S): at 18:34

## 2023-01-01 RX ADMIN — SACUBITRIL AND VALSARTAN 1 TABLET(S): 24; 26 TABLET, FILM COATED ORAL at 18:27

## 2023-01-01 RX ADMIN — APIXABAN 2.5 MILLIGRAM(S): 2.5 TABLET, FILM COATED ORAL at 17:14

## 2023-01-01 RX ADMIN — Medication 1 APPLICATION(S): at 06:03

## 2023-01-01 RX ADMIN — NYSTATIN CREAM 1 APPLICATION(S): 100000 CREAM TOPICAL at 05:35

## 2023-01-01 RX ADMIN — Medication 75 MILLIGRAM(S): at 11:20

## 2023-01-01 RX ADMIN — Medication 75 MILLIGRAM(S): at 13:37

## 2023-01-01 RX ADMIN — Medication 75 MILLIGRAM(S): at 06:30

## 2023-01-01 RX ADMIN — LIDOCAINE 1 PATCH: 4 CREAM TOPICAL at 22:26

## 2023-01-01 RX ADMIN — ATORVASTATIN CALCIUM 80 MILLIGRAM(S): 80 TABLET, FILM COATED ORAL at 22:32

## 2023-01-01 RX ADMIN — PANTOPRAZOLE SODIUM 40 MILLIGRAM(S): 20 TABLET, DELAYED RELEASE ORAL at 05:14

## 2023-01-01 RX ADMIN — Medication 650 MILLIGRAM(S): at 04:27

## 2023-01-01 RX ADMIN — APIXABAN 2.5 MILLIGRAM(S): 2.5 TABLET, FILM COATED ORAL at 17:26

## 2023-01-01 RX ADMIN — Medication 1 APPLICATION(S): at 17:34

## 2023-01-01 RX ADMIN — Medication 1 DROP(S): at 18:27

## 2023-01-01 RX ADMIN — Medication 50 MILLIGRAM(S): at 08:26

## 2023-01-01 RX ADMIN — APIXABAN 5 MILLIGRAM(S): 2.5 TABLET, FILM COATED ORAL at 05:14

## 2023-01-01 RX ADMIN — Medication 650 MILLIGRAM(S): at 20:15

## 2023-01-01 RX ADMIN — CEFEPIME 100 MILLIGRAM(S): 1 INJECTION, POWDER, FOR SOLUTION INTRAMUSCULAR; INTRAVENOUS at 18:34

## 2023-01-01 RX ADMIN — Medication 75 MILLIGRAM(S): at 12:39

## 2023-01-01 RX ADMIN — Medication 1 APPLICATION(S): at 17:27

## 2023-01-01 RX ADMIN — ATORVASTATIN CALCIUM 10 MILLIGRAM(S): 80 TABLET, FILM COATED ORAL at 21:30

## 2023-01-01 RX ADMIN — Medication 650 MILLIGRAM(S): at 12:01

## 2023-01-01 RX ADMIN — SODIUM CHLORIDE 500 MILLILITER(S): 9 INJECTION INTRAMUSCULAR; INTRAVENOUS; SUBCUTANEOUS at 15:28

## 2023-01-01 RX ADMIN — Medication 650 MILLIGRAM(S): at 13:01

## 2023-01-01 RX ADMIN — Medication 1 APPLICATION(S): at 18:18

## 2023-01-01 RX ADMIN — CALAMINE AND ZINC OXIDE AND PHENOL 1 APPLICATION(S): 160; 10 LOTION TOPICAL at 17:02

## 2023-01-01 RX ADMIN — Medication 75 MILLIGRAM(S): at 12:31

## 2023-01-01 RX ADMIN — CILOSTAZOL 50 MILLIGRAM(S): 100 TABLET ORAL at 00:27

## 2023-01-01 RX ADMIN — SACUBITRIL AND VALSARTAN 1 TABLET(S): 24; 26 TABLET, FILM COATED ORAL at 17:14

## 2023-01-01 RX ADMIN — Medication 75 MILLIGRAM(S): at 11:59

## 2023-01-01 NOTE — PHYSICAL THERAPY INITIAL EVALUATION ADULT - FOLLOWS COMMANDS/ANSWERS QUESTIONS, REHAB EVAL
Phone call rec'd from Dad requesting transportation to her appt on Friday and a [facility] reservation for the upcoming Sx.    BRIAN contacted [facility] [phone] and arranged a round trip ride for Wong's appt 10/20 at 11am,  is btw 9:15-9:45am and the return ride is on will call. Trip ID [id number]. BRIAN will call on 10/19 to confirm the ride. Dad updated.    BRIAN completed the screening below with family.     • Parents do have permanent housing located 15 miles or more from [facility].  • Parents are at least 18 years old or older and have valid State ID/'s license  • Parents have not tested positive COVID-19 or been exposed to any other contagious illnesses (I.e. cold, flu, TB, lice, COVID 19, etc.) **if pt is on isolation family confirms that quarantine period has been completed.   • Parents have no open DCFS cases or involvement  • Parents do not have a need for handicap accommodations  • Parent's primary language is Ivorian. Parents require the use of  services     Based on the assessment above patients family does meet criteria for [facility] referral. BRIAN submitted online referral for the following family members.       Mom: Lorena Kidd (: 10/06/1993) Phone: [phone]  Dad: Xavier Arriaza (: 1988) Phone: [phone]  Other: Pan Ricketts (: )     Soraya Locke, MSW, LCSW, CCTSW-MCS  Outpatient Pediatric Cardiac and Pediatric ED       100% of the time

## 2023-02-15 NOTE — DISCHARGE NOTE PROVIDER - NSDCCPGOAL_GEN_ALL_CORE_FT
To get better and follow your care plan as instructed. Fusiform Excision Additional Text (Leave Blank If You Do Not Want): The margin was drawn around the clinically apparent lesion.  A fusiform shape was then drawn on the skin incorporating the lesion and margins.  Incisions were then made along these lines to the appropriate tissue plane and the lesion was extirpated.

## 2023-08-10 NOTE — ED PROVIDER NOTE - CLINICAL SUMMARY MEDICAL DECISION MAKING FREE TEXT BOX
89-year-old female with PMH HTN, HLD, SVT status post ablation, on Eliquis presents for multiple falls.  Exam shows left occipital tenderness to palpation, normal S1-S2, lungs clear to auscultation bilaterally, 5 out of 5 strength in upper and lower extremities bilaterally, no midline spinal tenderness, no rib tenderness, abdomen soft nontender.  ANO x 3, no FND's.  Will obtain labs, CT head and C-spine, chest x-ray, UA/UC.  Dispo pending results.

## 2023-08-10 NOTE — PROVIDER CONTACT NOTE (OTHER) - ASSESSMENT
Pt was referred to ED CM for potential [facility] HHA services, Spoke with Pt at the bedside, she reported has private pay aide 5 daysX4 hrs and her grand dtr ( Kyung Lambert) is involved with pt's care. Pt has [employer] coverage and receives pension from [facility] and not eligible for [facility]TC services. Pt stated she is managing well at home at this time. No need for CM intervention at this time. ED attending was informed. Pt was referred to ED CM by ED attending for potential [facility] HHA services, Spoke with Pt at the bedside, she reported has private pay aide 5 daysX4 hrs and her grand dtr ( Kyung Lambert) is involved with pt's care. Pt has [employer] coverage and receives pension from [facility] and not eligible for [facility]TC services. Pt stated she is managing well at home at this time. No need for CM intervention at this time. ED attending was informed.

## 2023-08-10 NOTE — ED ADULT NURSE NOTE - OBJECTIVE STATEMENT
ISSA RN: Pt is alert and orientedx3, ambulatory at baseline with walker. Pt presents to the ED for "checkup". Pt seems to be a poor historian. Pt's triage note states pt had fall but denies LOC or AC use. Pt Now saying that she bumped head yesterday and is on Xarelto for A-Fib. Pts daughter called ambulance for pt today and is requesting admission for social  worker for overnight aide. Pt denies chest pain (in  A-Fib), shortness of breath, dyspnea on exertion, breathing is unlabored and even. Pt denies nausea, vomiting or diarrhea. Waiting further orders. Call bell within reach, bed in lowest position, will continue to monitor, handoff given to green 3 RN.

## 2023-08-10 NOTE — PROVIDER CONTACT NOTE (OTHER) - BACKGROUND
Pt is a 89-year-old female, AOX4, w/ PMH of HTN, HLD, SVT s/p ablation on 6/21/2018, endometrial CA s/p LYNETTE presents found on floor of home after fall. Pt is a 89-year-old female, AOX4, w/ PMH of HTN, HLD, SVT s/p ablation, endometrial CA s/p LYNETTE presents found on floor of home after fall.

## 2023-08-10 NOTE — ED ADULT NURSE NOTE - SUICIDE SCREENING QUESTION 2
This is a 26 yo m w a pmhx of adhd, homeless, insomnia comes to ed for prescription of medication for insomnia, Motrin and vitamin complex. Denies nausea, vomiting, fever, si, hi, wt lost, eating disorder,  hallucination > Pt recently moved from [place] and doesn't have a doctor yet and lives in a shelter in [place]  ny No

## 2023-08-10 NOTE — ED PROVIDER NOTE - PHYSICAL EXAMINATION
GENERAL: well appearing in no acute distress, non-toxic appearing  HEAD: normocephalic, atraumatic  HEENT: normal conjunctiva, oral mucosa moist  CARDIAC: regular rate and rhythm, normal S1S2, no appreciable murmurs, 2+ pulses in UE b/l  PULM: normal breath sounds, clear to ascultation bilaterally, no rales, rhonchi, wheezing  GI: abdomen nondistended, soft, nontender, no guarding, rebound tenderness  : no CVA tenderness b/l, no suprapubic tenderness  NEURO: no gross motor or sensory deficits, CN2-12 grossly intact, normal speech, PERRL, gait deferred, AAOx3  MSK: +ttp to left occipital region no hematoma or ecchymoses, no rib tenderness, no hip tenderness, no midline spinal tenderness, 5/5 strenght in UE and LE 5/5, no peripheral edema  SKIN: well-perfused, extremities warm, no visible rashes  PSYCH: appropriate mood and affect GENERAL: well appearing in no acute distress, non-toxic appearing  HEAD: normocephalic, atraumatic  HEENT: normal conjunctiva, oral mucosa moist  CARDIAC: regular rate and rhythm, normal S1S2, no appreciable murmurs, 2+ pulses in UE b/l  PULM: normal breath sounds, clear to ascultation bilaterally, no rales, rhonchi, wheezing  GI: abdomen nondistended, soft, nontender, no guarding, rebound tenderness  : no CVA tenderness b/l, no suprapubic tenderness  NEURO: no gross motor or sensory deficits, CN2-12 grossly intact, normal speech, PERRL, gait deferred, AAOx3  MSK: +ttp to left occipital region no hematoma or ecchymoses, no rib tenderness, no hip tenderness, no midline spinal tenderness, 5/5 strenght in UE and LE 5/5, no peripheral edema  SKIN: well-perfused, extremities warm, no visible rashes  PSYCH: appropriate mood and affect    Attending/Joe: Well-appearing, NAD; PERRL/EOMI, non-icterus, no cspine PT, supple, no JVD, RRR, CTAB; Abd-soft, NT/ND, no HSM; no LE edema, A&Ox2, nonfocal; Skin-warm/dry/no ecchymosis

## 2023-08-10 NOTE — ED ADULT TRIAGE NOTE - CHIEF COMPLAINT QUOTE
pt from home, fell yesterday and today at home, landing on buttock. denies LOC, head trauma and blood thinner use. family requesting a  for an overnight aide.  pt c/o back pain

## 2023-08-10 NOTE — ED PROVIDER NOTE - OBJECTIVE STATEMENT
89yF w/ PMH of HTN, HLD, SVT s/p ablation on 6/21/2018, endometrial CA s/p LYNETTE presents found on floor of home after fall, a/f syncopal eval,  rhabdomyolysis, SVT 89yF w/ PMH of HTN, HLD, SVT s/p ablation on 6/21/2018, endometrial CA s/p LYNETTE presents found on floor of home after fall, a/f syncopal eval,  rhabdomyolysis, SVT presents for multiple falls sent in by her daughter for concern.  Patient states that she had a fall yesterday and 1 earlier this week.  Patient states that she is falling due to clumsiness.  Patient uses a rollator to get around and thinks she may have tripped on the carpet but is unsure. Cannot remember the events of the fall but denies head strike, LOC.  Patient states that she is on Eliquis but unsure for what.  Patient states that she has some pain on the back right side of her head.  Denies any falls today.  Denies fevers, chills, cough, congestion, rhinorrhea, chest pain, shortness of breath, nausea, vomiting, constipation, diarrhea, dysuria.

## 2023-08-10 NOTE — ED PROVIDER NOTE - NSICDXPASTSURGICALHX_GEN_ALL_CORE_FT
PAST SURGICAL HISTORY:  [facility]     History of Colonoscopy- 2011/Sept     History of D&C- 8/12/11     Leg fracture, right Hip fx repair in Nov 2019    Status post cataract extraction OS    Status post hysterectomy endometrial cancer

## 2023-08-10 NOTE — ED PROVIDER NOTE - PROGRESS NOTE DETAILS
Mario Ramirez MD PGY-2: With daughter who states that patient has been having multiple falls and intermittent confusion and she feels uncomfortable with patient living at home alone although she does have an aide but the aide is not there 24 hours a day.   daughter requesting increasing hours of home health care.  Will consult . Mario Ramirez MD PGY-2:    assessed patient and situation and states that because she does not have Medicaid she cannot have increased hours and her aide.  Patient has a pension and would need to pay out-of-pocket for this. MD Mirela (PGY-2) Received sign out on patient. In brief, 89-year-old female with past medical history of hypertension, hyperlipidemia, SVT s/p ablation, endometrial cancer s/p LYNETTE, presenting with multiple falls.  Patient received CAT scans, which were not unremarkable.  Patient to be admitted for recurrent falls.

## 2023-08-11 NOTE — PHARMACOTHERAPY INTERVENTION NOTE - COMMENTS
Medication list updated in Outpatient Medication Record (OMR). Fill information from [facility]

## 2023-08-11 NOTE — H&P ADULT - NSHPPHYSICALEXAM_GEN_ALL_CORE
GENERAL APPEARANCE: Well developed, alert and cooperative. NAD.   HEENT:  PERRL, EOMI. External auditory canals normal, hearing grossly intact.  NECK: Neck supple, non-tender   CARDIAC: Normal S1 and S2. No S3, S4 or murmurs. Rhythm is irregularly irregular.  LUNGS: Clear to auscultation and percussion without rales, rhonchi, wheezing or diminished breath sounds.  ABDOMEN: Positive bowel sounds. Soft, nondistended, nontender. No guarding or rebound.   MUSCULOSKELETAL: ROM intact spine and extremities. No joint erythema or tenderness.   EXTREMITIES: No significant deformity or joint abnormality. No edema. Peripheral pulses intact.   NEUROLOGICAL: CN II-XII intact. Strength and sensation symmetric and intact throughout.   SKIN: ecchymosis on UE  PSYCHIATRIC: AOx3.Normal affect and behavior.

## 2023-08-11 NOTE — H&P ADULT - PROBLEM SELECTOR PLAN 1
-Pt with multiple falls over the last few weeks. Unwitnessed -Pt with multiple falls over the last few weeks. Unwitnessed. CT head no acute findings   -likely multifactorial related to deconditioning, benzo use. Low suspicion for acute CVA/TIA. Consider cardiac, arrhythmogenic etiology   -ekg unchanged from prior, pt denies chest pain  -f/u TTE  -monitor on tele  -check orthostatics  -PT eval  -consider decreased benzos  -CM c/s for increased care at home due to safety concern

## 2023-08-11 NOTE — H&P ADULT - HISTORY OF PRESENT ILLNESS
89yF w/ PMH of HTN, HLD, afib on xarelto, SVT s/p ablation on 6/21/2018, endometrial CA s/p LYNETTE presents for multiple falls sent in by her daughter for concern.  Patient states that she had a fall yesterday and 1 earlier this week.  Patient states that she is falling due to clumsiness. Reports she frequently trips over rugs. Other times she feels her legs are weak and give out on her. Less often pt reports feeling dizzy or lightheaded prior to falls. Patient uses a rollator to get around and thinks she may have tripped on the carpet yesterday but is unsure. She denies head strike or LOC. Denies dizziness, chest pain or palpitations. Patient states that she has some pain on the back right side of her head but now resolved.  Denies any falls today.  Denies fevers, chills, cough, congestion, rhinorrhea, chest pain, shortness of breath, nausea, vomiting, constipation, diarrhea, dysuria. Daughter states that patient has been having multiple falls and intermittent confusion and she feels uncomfortable with patient living at home alone although she does have an aide but the aide is not there 24 hours a day.

## 2023-08-11 NOTE — PATIENT PROFILE ADULT - FALL HARM RISK - HARM RISK INTERVENTIONS
Assistance with ambulation/Assistance OOB with selected safe patient handling equipment/Communicate Risk of Fall with Harm to all staff/Discuss with provider need for PT consult/Monitor gait and stability/Provide patient with walking aids - walker, cane, crutches/Reinforce activity limits and safety measures with patient and family/Tailored Fall Risk Interventions/Use of alarms - bed, chair and/or voice tab/Visual Cue: Yellow wristband and red socks/Bed in lowest position, wheels locked, appropriate side rails in place/Call bell, personal items and telephone in reach/Instruct patient to call for assistance before getting out of bed or chair/Non-slip footwear when patient is out of bed/[place] to call system/Physically safe environment - no spills, clutter or unnecessary equipment/Purposeful Proactive Rounding/Room/bathroom lighting operational, light cord in reach

## 2023-08-11 NOTE — PATIENT PROFILE ADULT - PATIENT'S SEXUAL ORIENTATION
HPI:  84 yr old F, POOR HISTORIAN/early dementia, former heavy smoker with PMHx HTN, hyperlipidemia, COPD, hypothyroidism, Stage IV CKD (baseline Cr ~3.0), anemia of chronic disease, CAD s/p CABG 2015 Dr. Blunt, chronic diastolic CHF(EF:55% by Echo 10/2020), renal artery stenosis s/p renal artery stent >20yrs ago, Carotid artery stenosis, PAD, who presents to [facility] ED 1/15/21 c/o progressively worsening SOB and cough x 3 days. Patient reports she can barely walk 10 feet prior to having to stop and rest. Patient further admits to chronic bilateral LE edema and significant orthopnea and she has been sleeping upright in a chair the past week. Patient also admits to chills and a nonproductive cough over the past few days. Patient was instructed to increase her Lasix 80mg PO daily dose to Lasix 80mg PO BID and start Metalazone 10mg PO BID prior to each dose by her cardiologist Dr. Horvath. Patient denies any chest pain, dizziness, palpitations, recent travel or sick contacts. Patient endorsing compliance w/ medications however daughter states that compliance with medication has been questionable over the last few months, as patient is becoming more forgetful.    In [facility] ED, BP: 170/68, HR: 80, RR:26, Temp: 98.4F, O2 sat: 80% on RA, improved to 99% with 10L NRB. EKG revealed NSR@67BPM with incomplete LBBB, TWI Lead I, AVL (similar to prior EKG 10/2020). CXR prelim read consistent with alveolar edema, bilateral patchy opacities/consolidation.    Labs notable for: WBC 14.5 with left shift, H/H 9.8/30.6, D-dimer 491, BUN/Cr 56/3.54, , CRP 4.86, Ferritin 180, Procalcitonin 0.29, Lactate 2.4, Troponin T 0.05, BNP 64,671, Initial COVID PCR negative.    Patient treated with Lasix 80mg IV x 1 dose, SL NTG 0.4mg PO x 1 dose, Decadron 6mg IV x 1 dose, Ceftriaxone 1g IV x 1 dose and Azithromycin 500mg IV x 1 dose.  Patient now admitted to cardiac telemetry for management of acute on chronic diastolic CHF exacerbation in setting of suspected CAP.    **Of note: Patient had a fall ~3 weeks ago for which she was admitted to [facility], CT imaging was negative as per daughter and fall thought to be secondary to hypotension.   (15 Mark 2021 21:30)    Pt's daughter at bedside reports hx of type 2 DM, but has not been on medications X 2 years, checks glucose occasionaly at home, is compliant with diabetic diet.   Pt started on solumedrol 40mg every 8 hours for management of respiratory status with resultant hyperglycemia.     PMH & Surgical Hx:CHF COPD  Essential hypertension  Type 2 diabetes mellitus  Hyperlipidemia  Disorder of kidney and ureter  Peripheral vascular disease  Anxiety state  Atherosclerosis of renal artery  CHF (congestive heart failure)  Coronary artery disease, angina presence unspecified, unspecified vessel or lesion type, unspecified whether native or transplanted heart  Essential hypertension  History of subarachnoid hemorrhage  CKD (chronic kidney disease), stage IV  COPD (chronic obstructive pulmonary disease)    FH:  DM: yes in son, mother  Thyroid: denies  Autoimmune: denies  Other: denies    SH:  Smoking: former smoker   Etoh: denies  Recreational Drugs: denies  Social Life: lives w son    Current Meds:  acetaminophen   Tablet .. 650 milliGRAM(s) Oral every 6 hours PRN  albuterol/ipratropium for Nebulization 3 milliLiter(s) Nebulizer every 6 hours  amLODIPine   Tablet 10 milliGRAM(s) Oral daily  aspirin enteric coated 81 milliGRAM(s) Oral daily  atorvastatin 20 milliGRAM(s) Oral at bedtime  benzonatate 100 milliGRAM(s) Oral every 8 hours PRN  budesonide  80 MICROgram(s)/formoterol 4.5 MICROgram(s) Inhaler 2 Puff(s) Inhalation two times a day  dextrose 40% Gel 15 Gram(s) Oral once  dextrose 5%. 1000 milliLiter(s) IV Continuous <Continuous>  dextrose 5%. 1000 milliLiter(s) IV Continuous <Continuous>  dextrose 50% Injectable 25 Gram(s) IV Push once  dextrose 50% Injectable 25 Gram(s) IV Push once  dextrose 50% Injectable 12.5 Gram(s) IV Push once  diazepam    Tablet 1 milliGRAM(s) Oral daily  donepezil 5 milliGRAM(s) Oral at bedtime  fluticasone propionate 50 MICROgram(s)/spray Nasal Spray 1 Spray(s) Both Nostrils two times a day  glucagon  Injectable 1 milliGRAM(s) IntraMuscular once  guaiFENesin   Syrup  (Sugar-Free) 100 milliGRAM(s) Oral every 6 hours PRN  heparin   Injectable 5000 Unit(s) SubCutaneous every 12 hours  influenza  Vaccine (HIGH DOSE) 0.7 milliLiter(s) IntraMuscular once  insulin glargine Injectable (LANTUS) 10 Unit(s) SubCutaneous at bedtime  insulin lispro (ADMELOG) corrective regimen sliding scale   SubCutaneous Before meals and at bedtime  insulin lispro Injectable (ADMELOG) 4 Unit(s) SubCutaneous three times a day with meals  levothyroxine 50 MICROGram(s) Oral daily  methylPREDNISolone sodium succinate Injectable 20 milliGRAM(s) IV Push every 8 hours  metoprolol tartrate 50 milliGRAM(s) Oral every 12 hours  piperacillin/tazobactam IVPB.. 2.25 Gram(s) IV Intermittent every 6 hours  senna 1 Tablet(s) Oral daily  sevelamer carbonate 1600 milliGRAM(s) Oral three times a day      Allergies:  No Known Allergies      ROS:  Denies the following except as indicated.    General: weight loss/weight gain, decreased appetite, fatigue  Eyes: Blurry vision, double vision, visual changes  ENT: Throat pain, changes in voice,   CV: palpitations, SOB, CP, cough  GI: NVD, difficulty swallowing, abdominal pain  : polyuria, dysuria  Endo: abnormal menses, temperature intolerance, decreased libido  MSK: weakness, joint pain  Skin: rash, dryness, diaphoresis  Heme: Easy bruising,bleeding  Neuro: HA, dizziness, lightheadedness, numbness tingling  Psych: Anxiety, Depression    Vital Signs Last 24 Hrs  T(C): 36.6 (23 Jan 2021 10:00), Max: 36.8 (22 Jan 2021 19:30)  T(F): 97.8 (23 Jan 2021 10:00), Max: 98.2 (22 Jan 2021 19:30)  HR: 66 (23 Jan 2021 10:00) (55 - 67)  BP: 148/65 (23 Jan 2021 10:00) (132/61 - 166/74)  BP(mean): 94 (23 Jan 2021 10:00) (92 - 109)  RR: 26 (23 Jan 2021 10:00) (15 - 35)  SpO2: 88% (23 Jan 2021 10:00) (85% - 100%)  Height (cm): 157.5 (01-22 @ 19:30)  Weight (kg): 54.2 (01-22 @ 19:30)  BMI (kg/m2): 21.8 (01-22 @ 19:30)    Constitutional: wn/wd in NAD.   HEENT: NCAT, MMM, OP clear, EOMI, , no proptosis or lid retraction  Neck: no thyromegaly or palpable thyroid nodules   Respiratory: lungs CTAB.  Cardiovascular: regular rhythm, normal S1 and S2, no audible murmurs  GI: soft, NT/ND, no masses/HSM appreciated.  Neurology: no tremors, DTR 2+  Skin: no visible rashes/lesions  Psychiatric: AAO x 3, normal affect/mood.  Ext: radial pulses intact, DP pulses intact, extremities warm, no cyanosis, clubbing or edema.       LABS:                        8.6    8.34  )-----------( 236      ( 23 Jan 2021 05:15 )             26.6     01-23    125<L>  |  82<L>  |  84<H>  ----------------------------<  138<H>  3.9   |  20<L>  |  4.82<H>    Ca    8.7      23 Jan 2021 05:15  Phos  7.5     01-23  Mg     1.9     01-23    TPro  6.1  /  Alb  2.7<L>  /  TBili  0.4  /  DBili  x   /  AST  45<H>  /  ALT  37  /  AlkPhos  184<H>  01-23          Thyroid Stimulating Hormone, Serum: 1.749 (10-13 @ 05:49)      Cholesterol, Serum: 191 mg/dL (01-16-21 @ 05:27)  HDL Cholesterol, Serum: 69 mg/dL (01-16-21 @ 05:27)  Triglycerides, Serum: 98 mg/dL (01-16-21 @ 05:27)  LDL Cholesterol Calculated: 102 mg/dL (01-16-21 @ 05:27)  Cholesterol, Serum: 119 mg/dL (10-13-20 @ 05:49)  HDL Cholesterol, Serum: 51 mg/dL (10-13-20 @ 05:49)  Triglycerides, Serum: 94 mg/dL (10-13-20 @ 05:49)  Direct LDL: 49 mg/dL (10-13-20 @ 05:49)      A1C with Estimated Average Glucose Result: 5.1 (01-16 @ 05:27)    CAPILLARY BLOOD GLUCOSE      POCT Blood Glucose.: 127 mg/dL (23 Jan 2021 09:14)  POCT Blood Glucose.: 125 mg/dL (23 Jan 2021 07:48)  POCT Blood Glucose.: 141 mg/dL (22 Jan 2021 22:21)  POCT Blood Glucose.: 265 mg/dL (22 Jan 2021 16:53)  POCT Blood Glucose.: 295 mg/dL (22 Jan 2021 11:46)                 HPI:  84 yr old F, POOR HISTORIAN/early dementia, former heavy smoker with PMHx HTN, hyperlipidemia, COPD, hypothyroidism, Stage IV CKD (baseline Cr ~3.0), anemia of chronic disease, CAD s/p CABG 2015 Dr. Blunt, chronic diastolic CHF(EF:55% by Echo 10/2020), renal artery stenosis s/p renal artery stent >20yrs ago, Carotid artery stenosis, PAD, who presents to [facility] ED 1/15/21 c/o progressively worsening SOB and cough x 3 days. Patient reports she can barely walk 10 feet prior to having to stop and rest. Patient further admits to chronic bilateral LE edema and significant orthopnea and she has been sleeping upright in a chair the past week. Patient also admits to chills and a nonproductive cough over the past few days. Patient was instructed to increase her Lasix 80mg PO daily dose to Lasix 80mg PO BID and start Metalazone 10mg PO BID prior to each dose by her cardiologist Dr. Horvath. Patient denies any chest pain, dizziness, palpitations, recent travel or sick contacts. Patient endorsing compliance w/ medications however daughter states that compliance with medication has been questionable over the last few months, as patient is becoming more forgetful.    In [facility] ED, BP: 170/68, HR: 80, RR:26, Temp: 98.4F, O2 sat: 80% on RA, improved to 99% with 10L NRB. EKG revealed NSR@67BPM with incomplete LBBB, TWI Lead I, AVL (similar to prior EKG 10/2020). CXR prelim read consistent with alveolar edema, bilateral patchy opacities/consolidation.    Labs notable for: WBC 14.5 with left shift, H/H 9.8/30.6, D-dimer 491, BUN/Cr 56/3.54, , CRP 4.86, Ferritin 180, Procalcitonin 0.29, Lactate 2.4, Troponin T 0.05, BNP 64,671, Initial COVID PCR negative.    Patient treated with Lasix 80mg IV x 1 dose, SL NTG 0.4mg PO x 1 dose, Decadron 6mg IV x 1 dose, Ceftriaxone 1g IV x 1 dose and Azithromycin 500mg IV x 1 dose.  Patient now admitted to cardiac telemetry for management of acute on chronic diastolic CHF exacerbation in setting of suspected CAP.    **Of note: Patient had a fall ~3 weeks ago for which she was admitted to [facility], CT imaging was negative as per daughter and fall thought to be secondary to hypotension.   (15 Mark 2021 21:30)    Pt's daughter at bedside reports hx of type 2 DM, but has not been on medications X 2 years, checks glucose occasionaly at home, is compliant with diabetic diet.   Pt started on solumedrol 40mg every 8 hours for management of respiratory status with resultant hyperglycemia.     PMH & Surgical Hx:CHF COPD  Essential hypertension  Type 2 diabetes mellitus  Hyperlipidemia  Disorder of kidney and ureter  Peripheral vascular disease  Anxiety state  Atherosclerosis of renal artery  CHF (congestive heart failure)  Coronary artery disease, angina presence unspecified, unspecified vessel or lesion type, unspecified whether native or transplanted heart  Essential hypertension  History of subarachnoid hemorrhage  CKD (chronic kidney disease), stage IV  COPD (chronic obstructive pulmonary disease)    FH:  DM: yes in son, mother  Thyroid: denies  Autoimmune: denies  Other: denies    SH:  Smoking: former smoker   Etoh: denies  Recreational Drugs: denies  Social Life: lives w son    Current Meds:  acetaminophen   Tablet .. 650 milliGRAM(s) Oral every 6 hours PRN  albuterol/ipratropium for Nebulization 3 milliLiter(s) Nebulizer every 6 hours  amLODIPine   Tablet 10 milliGRAM(s) Oral daily  aspirin enteric coated 81 milliGRAM(s) Oral daily  atorvastatin 20 milliGRAM(s) Oral at bedtime  benzonatate 100 milliGRAM(s) Oral every 8 hours PRN  budesonide  80 MICROgram(s)/formoterol 4.5 MICROgram(s) Inhaler 2 Puff(s) Inhalation two times a day  dextrose 40% Gel 15 Gram(s) Oral once  dextrose 5%. 1000 milliLiter(s) IV Continuous <Continuous>  dextrose 5%. 1000 milliLiter(s) IV Continuous <Continuous>  dextrose 50% Injectable 25 Gram(s) IV Push once  dextrose 50% Injectable 25 Gram(s) IV Push once  dextrose 50% Injectable 12.5 Gram(s) IV Push once  diazepam    Tablet 1 milliGRAM(s) Oral daily  donepezil 5 milliGRAM(s) Oral at bedtime  fluticasone propionate 50 MICROgram(s)/spray Nasal Spray 1 Spray(s) Both Nostrils two times a day  glucagon  Injectable 1 milliGRAM(s) IntraMuscular once  guaiFENesin   Syrup  (Sugar-Free) 100 milliGRAM(s) Oral every 6 hours PRN  heparin   Injectable 5000 Unit(s) SubCutaneous every 12 hours  influenza  Vaccine (HIGH DOSE) 0.7 milliLiter(s) IntraMuscular once  insulin glargine Injectable (LANTUS) 10 Unit(s) SubCutaneous at bedtime  insulin lispro (ADMELOG) corrective regimen sliding scale   SubCutaneous Before meals and at bedtime  insulin lispro Injectable (ADMELOG) 4 Unit(s) SubCutaneous three times a day with meals  levothyroxine 50 MICROGram(s) Oral daily  methylPREDNISolone sodium succinate Injectable 20 milliGRAM(s) IV Push every 8 hours  metoprolol tartrate 50 milliGRAM(s) Oral every 12 hours  piperacillin/tazobactam IVPB.. 2.25 Gram(s) IV Intermittent every 6 hours  senna 1 Tablet(s) Oral daily  sevelamer carbonate 1600 milliGRAM(s) Oral three times a day      Allergies:  No Known Allergies      ROS:  Denies the following except as indicated.    General: weight loss/weight gain, decreased appetite, fatigue  Eyes: Blurry vision, double vision, visual changes  ENT: Throat pain, changes in voice,   CV: palpitations, SOB, CP, cough  GI: NVD, difficulty swallowing, abdominal pain  : polyuria, dysuria  Endo: abnormal menses, temperature intolerance, decreased libido  MSK: weakness, joint pain  Skin: rash, dryness, diaphoresis  Heme: Easy bruising,bleeding  Neuro: HA, dizziness, lightheadedness, numbness tingling  Psych: Anxiety, Depression    T(C): 36.4 (01-22-21 @ 09:28), Max: 36.9 (01-21-21 @ 18:45)  HR: 54 (01-22-21 @ 09:20) (54 - 74)  BP: 116/61 (01-22-21 @ 09:20) (116/61 - 168/74)  RR: 20 (01-22-21 @ 09:20) (16 - 22)  SpO2: 90% (01-22-21 @ 09:20) (90% - 100%)  Wt(kg): --  I&O's Summary      Constitutional: wn/wd in NAD.   HEENT: NCAT, MMM, OP clear, EOMI, , no proptosis or lid retraction  Neck: no thyromegaly or palpable thyroid nodules   Respiratory: lungs CTAB.  Cardiovascular: regular rhythm, normal S1 and S2, no audible murmurs  GI: soft, NT/ND, no masses/HSM appreciated.  Neurology: no tremors, DTR 2+  Skin: no visible rashes/lesions  Psychiatric: AAO x 3, normal affect/mood.  Ext: radial pulses intact, DP pulses intact, extremities warm, no cyanosis, clubbing or edema.       LABS:     CARDIAC MARKERS:               8.4    7.90  )-----------( 228      ( 22 Jan 2021 06:52 )             26.0     130<L>  |  88<L>  |  74<H>  ----------------------------<  218<H>  4.1   |  21<L>  |  4.24<H>    Ca    8.7      22 Jan 2021 06:52  Phos  7.3     01-22  Mg     2.1     01-22        Thyroid Stimulating Hormone, Serum: 1.749 (10-13 @ 05:49)      Cholesterol, Serum: 191 mg/dL (01-16-21 @ 05:27)  HDL Cholesterol, Serum: 69 mg/dL (01-16-21 @ 05:27)  Triglycerides, Serum: 98 mg/dL (01-16-21 @ 05:27)  LDL Cholesterol Calculated: 102 mg/dL (01-16-21 @ 05:27)  Cholesterol, Serum: 119 mg/dL (10-13-20 @ 05:49)  HDL Cholesterol, Serum: 51 mg/dL (10-13-20 @ 05:49)  Triglycerides, Serum: 94 mg/dL (10-13-20 @ 05:49)  Direct LDL: 49 mg/dL (10-13-20 @ 05:49)      A1C with Estimated Average Glucose Result: 5.1 (01-16 @ 05:27)    CAPILLARY BLOOD GLUCOSE      POCT Blood Glucose.: 127 mg/dL (23 Jan 2021 09:14)  POCT Blood Glucose.: 125 mg/dL (23 Jan 2021 07:48)  POCT Blood Glucose.: 141 mg/dL (22 Jan 2021 22:21)  POCT Blood Glucose.: 265 mg/dL (22 Jan 2021 16:53)  POCT Blood Glucose.: 295 mg/dL (22 Jan 2021 11:46)                 Heterosexual

## 2023-08-11 NOTE — H&P ADULT - NSHPREVIEWOFSYSTEMS_GEN_ALL_CORE
CONSTITUTIONAL: +weakness No weight loss, fever, chills  HEENT:  Eyes:  No visual loss, blurred vision, double vision or yellow sclerae. Ears, Nose, Throat:  No hearing loss, sneezing, congestion, runny nose or sore throat.  SKIN:  +ecchymosis No rash or itching.  CARDIOVASCULAR:  No chest pain, chest pressure or chest discomfort. No palpitations or edema.  RESPIRATORY:  No shortness of breath, cough or sputum.  GASTROINTESTINAL:  No anorexia, nausea, vomiting or diarrhea. No abdominal pain or blood.  GENITOURINARY:  Denies hematuria, dysuria.   NEUROLOGICAL:  No headache, dizziness, syncope, paralysis, ataxia, numbness or tingling in the extremities. No change in bowel or bladder control.  MUSCULOSKELETAL: +chronic lower back pain   PSYCHIATRIC: +anxiety   ENDOCRINOLOGIC:  No reports of sweating, cold or heat intolerance. No polyuria or polydipsia.  ALLERGIES:  No history of asthma, hives, eczema or rhinitis.

## 2023-08-11 NOTE — H&P ADULT - TIME BILLING
I have spent a total of 76 minutes or greater to prepare to see the patient, obtaining and reviewing history, physical examination, explaining the diagnosis, prognosis and treatment plan with the patient/family/caregiver. I also have spent the time ordering studies and testing, interpreting results, medicine reconciliation and documentation as above.
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
## 2023-08-11 NOTE — H&P ADULT - NSICDXPASTSURGICALHX_GEN_ALL_CORE_FT
PAST SURGICAL HISTORY:  [facility]     History of Colonoscopy- 2011/Sept     History of D&C- 8/12/11     Leg fracture, right Hip fx repair in Nov 2019    Status post cataract extraction OS    Status post hysterectomy endometrial cancer

## 2023-08-11 NOTE — H&P ADULT - PROBLEM SELECTOR PLAN 4
-c/w alprazolam prn, consider down-titrating as tolerated  -c/w venlafaxine    #Insomnia:  -c/w Ambien

## 2023-08-11 NOTE — H&P ADULT - NSHPLABSRESULTS_GEN_ALL_CORE
(08-10 @ 15:14)                      13.7  10.59 )-----------( 237                 40.1    Neutrophils = -- (--%)  Lymphocytes = -- (--%)  Eosinophils = -- (--%)  Basophils = -- (--%)  Monocytes = -- (--%)  Bands = --%    08-10    132<L>  |  96<L>  |  29<H>  ----------------------------<  102<H>  4.5   |  23  |  1.17    Ca    9.3      10 Aug 2023 15:14    TPro  7.3  /  Alb  3.9  /  TBili  1.0  /  DBili  x   /  AST  85<H>  /  ALT  43<H>  /  AlkPhos  85  08-10      Urinalysis Basic - ( 10 Aug 2023 17:38 )    Color: Yellow / Appearance: Clear / S.024 / pH: x  Gluc: x / Ketone: 15 mg/dL  / Bili: Negative / Urobili: 0.2 mg/dL   Blood: x / Protein: 100 mg/dL / Nitrite: Negative   Leuk Esterase: Negative / RBC: 0 /HPF / WBC 3 /HPF   Sq Epi: x / Non Sq Epi: 1 /HPF / Bacteria: Negative /HPF    < from: CT Head No Cont (08.10.23 @ 19:08) >      IMPRESSION:    HEAD:  No acute intracranial hemorrhage, mass effect, or CT evidence of acute   intracranial pathology.    C-SPINE:  No acute fracture or traumatic subluxation.    < end of copied text >    < from: Xray Chest 1 View- PORTABLE-Urgent (Xray Chest 1 View- PORTABLE-Urgent .) (08.10.23 @ 15:34) >    IMPRESSION: No acute traumatic findings.    < end of copied text >          Labs personally reviewed  Imaging reviewed  EKG: pending (08-10 @ 15:14)                      13.7  10.59 )-----------( 237                 40.1    Neutrophils = -- (--%)  Lymphocytes = -- (--%)  Eosinophils = -- (--%)  Basophils = -- (--%)  Monocytes = -- (--%)  Bands = --%    08-10    132<L>  |  96<L>  |  29<H>  ----------------------------<  102<H>  4.5   |  23  |  1.17    Ca    9.3      10 Aug 2023 15:14    TPro  7.3  /  Alb  3.9  /  TBili  1.0  /  DBili  x   /  AST  85<H>  /  ALT  43<H>  /  AlkPhos  85  08-10      Urinalysis Basic - ( 10 Aug 2023 17:38 )    Color: Yellow / Appearance: Clear / S.024 / pH: x  Gluc: x / Ketone: 15 mg/dL  / Bili: Negative / Urobili: 0.2 mg/dL   Blood: x / Protein: 100 mg/dL / Nitrite: Negative   Leuk Esterase: Negative / RBC: 0 /HPF / WBC 3 /HPF   Sq Epi: x / Non Sq Epi: 1 /HPF / Bacteria: Negative /HPF    < from: CT Head No Cont (08.10.23 @ 19:08) >      IMPRESSION:    HEAD:  No acute intracranial hemorrhage, mass effect, or CT evidence of acute   intracranial pathology.    C-SPINE:  No acute fracture or traumatic subluxation.    < end of copied text >    < from: Xray Chest 1 View- PORTABLE-Urgent (Xray Chest 1 View- PORTABLE-Urgent .) (08.10.23 @ 15:34) >    IMPRESSION: No acute traumatic findings.    < end of copied text >          Labs personally reviewed  Imaging reviewed  EKG: afib, RBBB, no acute st changes, appears similar to prior EKG

## 2023-08-11 NOTE — H&P ADULT - ASSESSMENT
89yF w/ PMH of HTN, HLD, afib on xarelto, SVT s/p ablation on 6/21/2018, endometrial CA s/p LYNETTE presents for multiple unwitnessed falls admitted for further w/u

## 2023-08-12 NOTE — PROGRESS NOTE ADULT - SUBJECTIVE AND OBJECTIVE BOX
Patient is a 89y old  Female who presents with a chief complaint of fall (12 Aug 2023 08:25)      SUBJECTIVE / OVERNIGHT EVENTS:    Events noted.  Awake  No N/V  Tired    MEDICATIONS  (STANDING):  atorvastatin 10 milliGRAM(s) Oral at bedtime  metoprolol succinate ER 25 milliGRAM(s) Oral daily  pantoprazole    Tablet 40 milliGRAM(s) Oral before breakfast  rivaroxaban 15 milliGRAM(s) Oral daily  venlafaxine 75 milliGRAM(s) Oral daily    MEDICATIONS  (PRN):  acetaminophen     Tablet .. 650 milliGRAM(s) Oral every 6 hours PRN Temp greater or equal to 38C (100.4F), Mild Pain (1 - 3)  ALPRAZolam 0.5 milliGRAM(s) Oral three times a day PRN anxiety  aluminum hydroxide/magnesium hydroxide/simethicone Suspension 30 milliLiter(s) Oral every 4 hours PRN Dyspepsia  melatonin 3 milliGRAM(s) Oral at bedtime PRN Insomnia  ondansetron Injectable 4 milliGRAM(s) IV Push every 8 hours PRN Nausea and/or Vomiting  zolpidem 5 milliGRAM(s) Oral at bedtime PRN Insomnia  zolpidem 5 milliGRAM(s) Oral at bedtime PRN Insomnia        CAPILLARY BLOOD GLUCOSE        I&O's Summary      T(C): 36.6 (08-12-23 @ 05:50), Max: 36.7 (08-11-23 @ 21:05)  HR: 98 (08-12-23 @ 05:50) (98 - 102)  BP: 117/104 (08-12-23 @ 05:50) (105/89 - 117/104)  RR: 17 (08-12-23 @ 05:50) (17 - 19)  SpO2: 98% (08-12-23 @ 05:50) (94% - 98%)    PHYSICAL EXAM:    NECK: Supple, No JVD  CHEST/LUNG: Clear to auscultation bilaterally; No wheezing.  HEART: Regular rate and rhythm; No murmurs, rubs, or gallops  ABDOMEN: Soft, Nontender, Nondistended; Bowel sounds present  EXTREMITIES:   No edema  NEUROLOGY: AA      LABS:                        13.1   8.03  )-----------( 236      ( 12 Aug 2023 05:05 )             39.7     08-12    131<L>  |  99  |  33<H>  ----------------------------<  87  4.7   |  24  |  1.06    Ca    8.8      12 Aug 2023 05:05  Phos  3.2     08-12  Mg     2.40     08-12    TPro  7.1  /  Alb  3.7  /  TBili  0.8  /  DBili  x   /  AST  81<H>  /  ALT  44<H>  /  AlkPhos  90  08-11          Urinalysis Basic - ( 12 Aug 2023 05:05 )    Color: x / Appearance: x / SG: x / pH: x  Gluc: 87 mg/dL / Ketone: x  / Bili: x / Urobili: x   Blood: x / Protein: x / Nitrite: x   Leuk Esterase: x / RBC: x / WBC x   Sq Epi: x / Non Sq Epi: x / Bacteria: x      CAPILLARY BLOOD GLUCOSE            RADIOLOGY & ADDITIONAL TESTS:    Imaging Personally Reviewed:    Consultant(s) Notes Reviewed:      Care Discussed with Consultants/Other Providers:    Christian Luis MD, CMD, FACP    [id number] [place] [address]  Office Tel: [phone]  Cell: [phone]

## 2023-08-12 NOTE — CONSULT NOTE ADULT - SUBJECTIVE AND OBJECTIVE BOX
CARDIOLOGY CONSULT - Dr. Mccartney  Date of Service: 8/12/23      HPI:  89yF w/ PMH of HTN, HLD, afib on xarelto, SVT s/p ablation on 6/21/2018, endometrial CA s/p LYNETTE presents for multiple falls sent in by her daughter for concern.  Patient states that she had a fall yesterday and 1 earlier this week.  Patient states that she is falling due to clumsiness. Reports she frequently trips over rugs. Other times she feels her legs are weak and give out on her. Less often pt reports feeling dizzy or lightheaded prior to falls. Patient uses a rollator to get around and thinks she may have tripped on the carpet yesterday but is unsure. She denies head strike or LOC. Denies dizziness, chest pain or palpitations. Patient states that she has some pain on the back right side of her head but now resolved.  Denies any falls today.  Denies fevers, chills, cough, congestion, rhinorrhea, chest pain, shortness of breath, nausea, vomiting, constipation, diarrhea, dysuria. Daughter states that patient has been having multiple falls and intermittent confusion and she feels uncomfortable with patient living at home alone although she does have an aide but the aide is not there 24 hours a day.    (11 Aug 2023 03:11)      PAST MEDICAL & SURGICAL HISTORY:  Anxiety      Breast Lump      Insomnia      PE (pulmonary thromboembolism)      PAF (paroxysmal atrial fibrillation)  On Xarelto      HTN (hypertension)      HLD (hyperlipidemia)      Endometrial cancer  S/P LYNETTE with SBO      [facility]      History of Colonoscopy- 2011/Sept      History of D&C- 8/12/11      Status post hysterectomy  endometrial cancer      Status post cataract extraction  OS      Leg fracture, right  Hip fx repair in Nov 2019              PREVIOUS DIAGNOSTIC TESTING:    [ ] Echocardiogram:  [ ]  Catheterization:  [ ] Stress Test:  	    MEDICATIONS:  MEDICATIONS  (STANDING):  atorvastatin 10 milliGRAM(s) Oral at bedtime  metoprolol succinate ER 25 milliGRAM(s) Oral daily  pantoprazole    Tablet 40 milliGRAM(s) Oral before breakfast  rivaroxaban 15 milliGRAM(s) Oral daily  venlafaxine 75 milliGRAM(s) Oral daily      FAMILY HISTORY:  No pertinent family history in first degree relatives        SOCIAL HISTORY:    [ ] Non-smoker  [ ] Smoker  [ ] Alcohol    Allergies    soaps and creams causes rash uses only sensitive skin soap (Rash)  sulfa drugs (Unknown)  piperacillin-tazobactam (Rash)  neomycin (Unknown)  Voltaren (Rash)  Eye cream from the 1960s Neocortef ?spelling caused eyes to becomes swollen (Unknown)    Intolerances    	    REVIEW OF SYSTEMS:  CONSTITUTIONAL: No fever, weight loss, or fatigue  EYES: No eye pain, visual disturbances, or discharge  ENMT:  No difficulty hearing, tinnitus, vertigo; No sinus or throat pain  NECK: No pain or stiffness  RESPIRATORY: No cough, wheezing, chills or hemoptysis; No Shortness of Breath  CARDIOVASCULAR: No chest pain, palpitations, passing out, dizziness, or leg swelling  GASTROINTESTINAL: No abdominal or epigastric pain. No nausea, vomiting, or hematemesis; No diarrhea or constipation. No melena or hematochezia.  GENITOURINARY: No dysuria, frequency, hematuria, or incontinence  NEUROLOGICAL: No headaches, memory loss, loss of strength, numbness, or tremors  SKIN: No itching, burning, rashes, or lesions   	    [ ] All others negative	  [ ] Unable to obtain    PHYSICAL EXAM:  T(C): 36.6 (08-12-23 @ 05:50), Max: 36.7 (08-11-23 @ 18:01)  HR: 98 (08-12-23 @ 05:50) (98 - 102)  BP: 117/104 (08-12-23 @ 05:50) (105/89 - 139/90)  RR: 17 (08-12-23 @ 05:50) (17 - 19)  SpO2: 98% (08-12-23 @ 05:50) (94% - 100%)  Wt(kg): --  I&O's Summary      Appearance: Normal	  Psychiatry: A & O x 3, Mood & affect appropriate  HEENT:   Normal oral mucosa, PERRL, EOMI	  Lymphatic: No lymphadenopathy  Cardiovascular: Normal S1 S2,RRR, No JVD, No murmurs  Respiratory: Lungs clear to auscultation	  Gastrointestinal:  Soft, Non-tender, + BS	  Skin: No rashes, No ecchymoses, No cyanosis	  Neurologic: Non-focal  Extremities: Normal range of motion, No clubbing, cyanosis or edema  Vascular: Peripheral pulses palpable 2+ bilaterally    TELEMETRY: 	    ECG:  	  RADIOLOGY:  OTHER: 	  	  LABS:	 	    CARDIAC MARKERS:                                  13.1   8.03  )-----------( 236      ( 12 Aug 2023 05:05 )             39.7     08-12    131<L>  |  99  |  33<H>  ----------------------------<  87  4.7   |  24  |  1.06    Ca    8.8      12 Aug 2023 05:05  Phos  3.2     08-12  Mg     2.40     08-12    TPro  7.1  /  Alb  3.7  /  TBili  0.8  /  DBili  x   /  AST  81<H>  /  ALT  44<H>  /  AlkPhos  90  08-11      proBNP:   Lipid Profile:   HgA1c:   TSH:     ASSESSMENT/PLAN: 	              70 minutes spent on total encounter; more than 50% of the visit was spent counseling and/or coordinating care by the attending physician.

## 2023-08-12 NOTE — CONSULT NOTE ADULT - ASSESSMENT
89yF w/ PMH of HTN, HLD, afib on xarelto, SVT s/p ablation on 6/21/2018, endometrial CA s/p LYNETTE presents for multiple falls sent in by her daughter for concern.    #Falls  -afib on tele rates borderline elevated  -no evidence of heart block  -check orthostatics if feasible  -echo reveals severe LV dysfunction w poorly visualized av  -of note she had mild AS r yrs ago on echo    #Cardiomyopathy, Acute HfrEF  -etiology unclear  -possible rhythm versus ischemic versus valvular  -recommend deferring workup given advanced age and functional status  -cont BB  -low dose entresto    #Afib  -cont Xarelto  -cont BB    85 minutes spent on total encounter; more than 50% of the visit was spent counseling and/or coordinating care by the attending physician.

## 2023-08-13 NOTE — PROGRESS NOTE ADULT - SUBJECTIVE AND OBJECTIVE BOX
DELVIS MCBRIDE  89y  Female      Patient is a 89y old  Female who presents with a chief complaint of fall (13 Aug 2023 09:16)  Patient seen and examined,chart reviewed.comfortable,nad  no cp,no feverrrr,no cough    REVIEW OF SYSTEMS:  CONSTITUTIONAL: No fever  RESPIRATORY: No cough, hemoptysis or shortness of breath  CARDIOVASCULAR: No chest pain, palpitations, dizziness, or leg swelling  GASTROINTESTINAL: No abdominal pain. nausea, vomiting, hematemesis  GENITOURINARY: No dysuria, frequency, hematuria   NEUROLOGICAL: No headaches, no dizziness  MUSCULOSKELETAL: No joint pain or swelling;     INTERVAL HPI/OVERNIGHT EVENTS:  T(C): 36.7 (08-13-23 @ 17:30), Max: 36.7 (08-13-23 @ 06:05)  HR: 80 (08-13-23 @ 17:30) (80 - 90)  BP: 116/88 (08-13-23 @ 17:30) (112/69 - 143/86)  RR: 19 (08-13-23 @ 17:30) (17 - 19)  SpO2: 100% (08-13-23 @ 17:30) (98% - 100%)  Wt(kg): --  I&O's Summary    T(C): 36.7 (08-13-23 @ 17:30), Max: 36.7 (08-13-23 @ 06:05)  HR: 80 (08-13-23 @ 17:30) (80 - 90)  BP: 116/88 (08-13-23 @ 17:30) (112/69 - 143/86)  RR: 19 (08-13-23 @ 17:30) (17 - 19)  SpO2: 100% (08-13-23 @ 17:30) (98% - 100%)  Wt(kg): --Vital Signs Last 24 Hrs  T(C): 36.7 (13 Aug 2023 17:30), Max: 36.7 (13 Aug 2023 06:05)  T(F): 98.1 (13 Aug 2023 17:30), Max: 98.1 (13 Aug 2023 17:30)  HR: 80 (13 Aug 2023 17:30) (80 - 90)  BP: 116/88 (13 Aug 2023 17:30) (112/69 - 143/86)  BP(mean): --  RR: 19 (13 Aug 2023 17:30) (17 - 19)  SpO2: 100% (13 Aug 2023 17:30) (98% - 100%)    Parameters below as of 13 Aug 2023 17:30  Patient On (Oxygen Delivery Method): room air        LABS:                        14.3   10.38 )-----------( 297      ( 13 Aug 2023 09:50 )             42.8     08-13    135  |  99  |  29<H>  ----------------------------<  129<H>  4.4   |  26  |  1.22    Ca    8.8      13 Aug 2023 09:50  Phos  3.1     08-13  Mg     2.10     08-13        Urinalysis Basic - ( 13 Aug 2023 09:50 )    Color: x / Appearance: x / SG: x / pH: x  Gluc: 129 mg/dL / Ketone: x  / Bili: x / Urobili: x   Blood: x / Protein: x / Nitrite: x   Leuk Esterase: x / RBC: x / WBC x   Sq Epi: x / Non Sq Epi: x / Bacteria: x      CAPILLARY BLOOD GLUCOSE            Urinalysis Basic - ( 13 Aug 2023 09:50 )    Color: x / Appearance: x / SG: x / pH: x  Gluc: 129 mg/dL / Ketone: x  / Bili: x / Urobili: x   Blood: x / Protein: x / Nitrite: x   Leuk Esterase: x / RBC: x / WBC x   Sq Epi: x / Non Sq Epi: x / Bacteria: x        PAST MEDICAL & SURGICAL HISTORY:  Anxiety      Breast Lump      Insomnia      PE (pulmonary thromboembolism)      PAF (paroxysmal atrial fibrillation)  On Xarelto      HTN (hypertension)      HLD (hyperlipidemia)      Endometrial cancer  S/P LYNETTE with SBO      History of Breast Lump/[facility] Excision- [facility]      History of Colonoscopy- 2011/Sept      History of D&C- 8/12/11      Status post hysterectomy  endometrial cancer      Status post cataract extraction  OS      Leg fracture, right  Hip fx repair in Nov 2019          MEDICATIONS  (STANDING):  atorvastatin 10 milliGRAM(s) Oral at bedtime  calamine/zinc oxide Lotion 1 Application(s) Topical two times a day  pantoprazole    Tablet 40 milliGRAM(s) Oral before breakfast  rivaroxaban 15 milliGRAM(s) Oral daily  sacubitril 24 mG/valsartan 26 mG 1 Tablet(s) Oral two times a day  venlafaxine 75 milliGRAM(s) Oral daily    MEDICATIONS  (PRN):  acetaminophen     Tablet .. 650 milliGRAM(s) Oral every 6 hours PRN Temp greater or equal to 38C (100.4F), Mild Pain (1 - 3)  ALPRAZolam 0.5 milliGRAM(s) Oral three times a day PRN anxiety  aluminum hydroxide/magnesium hydroxide/simethicone Suspension 30 milliLiter(s) Oral every 4 hours PRN Dyspepsia  melatonin 3 milliGRAM(s) Oral at bedtime PRN Insomnia  ondansetron Injectable 4 milliGRAM(s) IV Push every 8 hours PRN Nausea and/or Vomiting  zolpidem 5 milliGRAM(s) Oral at bedtime PRN Insomnia  zolpidem 5 milliGRAM(s) Oral at bedtime PRN Insomnia        RADIOLOGY & ADDITIONAL TESTS:    Imaging Personally Reviewed:  [ ] YES  [ ] NO    Consultant(s) Notes Reviewed:  [ ] YES  [ ] NO    PHYSICAL EXAM:  GENERAL: Alert and awake lying in bed in no distress  HEAD:  Atraumatic, Normocephalic  EYES: EOMI, SOCORRO, conjunctiva and sclera clear  NECK: Supple, No JVD, Normal thyroid  NERVOUS SYSTEM:  Alert & Oriented X3, Motor and sensory systems are intact,   CHEST/LUNG: Bilateral clear breath sounds, no rhochi, no wheezing, no crepitations,  HEART: Regular rate and rhythm; No murmurs, rubs, or gallops  ABDOMEN: Soft, Nontender, Nondistended; Bowel sounds present  EXTREMITIES:   Peripheral Pulses are palpable, no  edema        Care Discussed with Consultants/Other Providers [ ] YES  [ ] NO      Code Status: [] Full Code [] DNR [] DNI [] Goals of Care:   Disposition: [] ICU [] Stroke Unit [] RCU []PCU []Floor [] Discharge Home         [facility]

## 2023-08-13 NOTE — PROGRESS NOTE ADULT - SUBJECTIVE AND OBJECTIVE BOX
CARDIOLOGY FOLLOW UP - Dr. Mccartney  Date of Service: 8/13/23  CC: no events    Review of Systems:  Constitutional: No fever, weight loss, or fatigue  Respiratory: No cough, wheezing, or hemoptysis, no shortness of breath  Cardiovascular: No chest pain, palpitations, passing out, dizziness, or leg swelling  Gastrointestinal: No abd or epigastric pain. No nausea, vomiting, or hematemesis; no diarrhea or consiptaiton, no melena or hematochezia  Vascular: No edema     TELEMETRY:    PHYSICAL EXAM:  T(C): 36.6 (08-13-23 @ 11:45), Max: 36.7 (08-13-23 @ 06:05)  HR: 88 (08-13-23 @ 11:45) (82 - 99)  BP: 112/69 (08-13-23 @ 11:45) (112/69 - 152/88)  RR: 18 (08-13-23 @ 11:45) (17 - 18)  SpO2: 99% (08-13-23 @ 11:45) (96% - 99%)  Wt(kg): --  I&O's Summary      Appearance: Normal	  Cardiovascular: Normal S1 S2,RRR, No JVD, No murmurs  Respiratory: Lungs clear to auscultation	  Gastrointestinal:  Soft, Non-tender, + BS	  Extremities: Normal range of motion, No clubbing, cyanosis or edema  Vascular: Peripheral pulses palpable 2+ bilaterally       Home Medications:  acetaminophen 325 mg oral tablet: 2 tab(s) orally every 6 hours, As needed, Temp greater or equal to 38C (100.4F), Mild Pain (1 - 3) (11 Aug 2023 01:38)  ALPRAZolam 0.5 mg oral tablet: 1 tab(s) orally 3 times a day, As Needed (11 Aug 2023 19:40)  Ambien 10 mg oral tablet: 1 tab(s) orally once a day (at bedtime), As Needed for sleep (11 Aug 2023 19:40)  aspirin 81 mg oral delayed release tablet: 1 tab(s) orally once a day (11 Aug 2023 01:38)  atorvastatin 10 mg oral tablet: 1 tab(s) orally once a day (at bedtime) (11 Aug 2023 19:40)  metoprolol succinate 25 mg oral tablet, extended release: 1 tab(s) orally once a day (11 Aug 2023 19:40)  rivaroxaban 20 mg oral tablet: 1 tab(s) orally every 24 hours (11 Aug 2023 19:40)  venlafaxine 75 mg oral capsule, extended release: 1 cap(s) orally once a day (11 Aug 2023 19:42)  Vitamin D3 1250 mcg (50,000 intl units) oral capsule: 1 cap(s) orally once a week (11 Aug 2023 19:40)        MEDICATIONS  (STANDING):  atorvastatin 10 milliGRAM(s) Oral at bedtime  metoprolol succinate ER 25 milliGRAM(s) Oral daily  pantoprazole    Tablet 40 milliGRAM(s) Oral before breakfast  rivaroxaban 15 milliGRAM(s) Oral daily  sacubitril 24 mG/valsartan 26 mG 1 Tablet(s) Oral two times a day  venlafaxine 75 milliGRAM(s) Oral daily        EKG:  RADIOLOGY:  DIAGNOSTIC TESTING:  [ ] Echocardiogram:  [ ] Catherterization:  [ ] Stress Test:  OTHER:     LABS:	 	                          14.3   10.38 )-----------( 297      ( 13 Aug 2023 09:50 )             42.8     08-13    135  |  99  |  29<H>  ----------------------------<  129<H>  4.4   |  26  |  1.22    Ca    8.8      13 Aug 2023 09:50  Phos  3.1     08-13  Mg     2.10     08-13            CARDIAC MARKERS:

## 2023-08-13 NOTE — PROVIDER CONTACT NOTE (OTHER) - ASSESSMENT
On assessment pt's back is noted red and itchy- appears to look like wheals throughout that are slightly raised and irregular shaped. Pt. c/o extreme itching. Floor stock lotion applied, pt. states slight relief.

## 2023-08-14 NOTE — PROGRESS NOTE ADULT - PROBLEM SELECTOR PLAN 5
-around baseline function. Likely FLD  -trend

## 2023-08-14 NOTE — PROGRESS NOTE ADULT - PROBLEM SELECTOR PLAN 2
-CHADSVASc score 4. C/w xarelto  -c/w metoprolol for rate control  -monitor on tele

## 2023-08-14 NOTE — PROGRESS NOTE ADULT - SUBJECTIVE AND OBJECTIVE BOX
DELVIS MCBRIDE  89y  Female      Patient is a 89y old  Female who presents with a chief complaint of fall (14 Aug 2023 09:50)      REVIEW OF SYSTEMS:  CONSTITUTIONAL: No fever  RESPIRATORY: No cough, hemoptysis or shortness of breath  CARDIOVASCULAR: No chest pain, palpitations, dizziness, or leg swelling  GASTROINTESTINAL: No abdominal pain. nausea, vomiting, hematemesis  GENITOURINARY: No dysuria, frequency, hematuria   NEUROLOGICAL: No headaches, no dizziness  MUSCULOSKELETAL: No joint pain or swelling;     INTERVAL HPI/OVERNIGHT EVENTS:  T(C): 36.7 (08-14-23 @ 17:00), Max: 36.7 (08-14-23 @ 13:00)  HR: 111 (08-14-23 @ 17:00) (91 - 111)  BP: 128/74 (08-14-23 @ 17:00) (110/63 - 128/74)  RR: 18 (08-14-23 @ 17:00) (16 - 18)  SpO2: 96% (08-14-23 @ 17:00) (95% - 96%)  Wt(kg): --  I&O's Summary    13 Aug 2023 07:01  -  14 Aug 2023 07:00  --------------------------------------------------------  IN: 0 mL / OUT: 1200 mL / NET: -1200 mL    14 Aug 2023 07:01  -  14 Aug 2023 22:34  --------------------------------------------------------  IN: 440 mL / OUT: 900 mL / NET: -460 mL      T(C): 36.7 (08-14-23 @ 17:00), Max: 36.7 (08-14-23 @ 13:00)  HR: 111 (08-14-23 @ 17:00) (91 - 111)  BP: 128/74 (08-14-23 @ 17:00) (110/63 - 128/74)  RR: 18 (08-14-23 @ 17:00) (16 - 18)  SpO2: 96% (08-14-23 @ 17:00) (95% - 96%)  Wt(kg): --Vital Signs Last 24 Hrs  T(C): 36.7 (14 Aug 2023 17:00), Max: 36.7 (14 Aug 2023 13:00)  T(F): 98.1 (14 Aug 2023 17:00), Max: 98.1 (14 Aug 2023 13:00)  HR: 111 (14 Aug 2023 17:00) (91 - 111)  BP: 128/74 (14 Aug 2023 17:00) (110/63 - 128/74)  BP(mean): --  RR: 18 (14 Aug 2023 17:00) (16 - 18)  SpO2: 96% (14 Aug 2023 17:00) (95% - 96%)    Parameters below as of 14 Aug 2023 17:00  Patient On (Oxygen Delivery Method): room air        LABS:                        14.5   11.78 )-----------( 287      ( 14 Aug 2023 07:11 )             44.0     08-14    133<L>  |  100  |  30<H>  ----------------------------<  90  4.3   |  23  |  1.14    Ca    8.7      14 Aug 2023 07:11  Phos  3.2     08-14  Mg     1.90     08-14        Urinalysis Basic - ( 14 Aug 2023 07:11 )    Color: x / Appearance: x / SG: x / pH: x  Gluc: 90 mg/dL / Ketone: x  / Bili: x / Urobili: x   Blood: x / Protein: x / Nitrite: x   Leuk Esterase: x / RBC: x / WBC x   Sq Epi: x / Non Sq Epi: x / Bacteria: x      CAPILLARY BLOOD GLUCOSE            Urinalysis Basic - ( 14 Aug 2023 07:11 )    Color: x / Appearance: x / SG: x / pH: x  Gluc: 90 mg/dL / Ketone: x  / Bili: x / Urobili: x   Blood: x / Protein: x / Nitrite: x   Leuk Esterase: x / RBC: x / WBC x   Sq Epi: x / Non Sq Epi: x / Bacteria: x        PAST MEDICAL & SURGICAL HISTORY:  Anxiety      Breast Lump      Insomnia      PE (pulmonary thromboembolism)      PAF (paroxysmal atrial fibrillation)  On Xarelto      HTN (hypertension)      HLD (hyperlipidemia)      Endometrial cancer  S/P LYNETTE with SBO      History of Breast Lump/Mass Excision- benighn- left- 1971      History of Colonoscopy- 2011/Sept      History of D&C- 8/12/11      Status post hysterectomy  endometrial cancer      Status post cataract extraction  OS      Leg fracture, right  Hip fx repair in Nov 2019          MEDICATIONS  (STANDING):  atorvastatin 10 milliGRAM(s) Oral at bedtime  calamine/zinc oxide Lotion 1 Application(s) Topical two times a day  metoprolol succinate ER 50 milliGRAM(s) Oral daily  pantoprazole    Tablet 40 milliGRAM(s) Oral before breakfast  rivaroxaban 15 milliGRAM(s) Oral daily  sacubitril 24 mG/valsartan 26 mG 1 Tablet(s) Oral two times a day  venlafaxine 75 milliGRAM(s) Oral daily    MEDICATIONS  (PRN):  acetaminophen     Tablet .. 650 milliGRAM(s) Oral every 6 hours PRN Temp greater or equal to 38C (100.4F), Mild Pain (1 - 3)  ALPRAZolam 0.5 milliGRAM(s) Oral three times a day PRN anxiety  aluminum hydroxide/magnesium hydroxide/simethicone Suspension 30 milliLiter(s) Oral every 4 hours PRN Dyspepsia  melatonin 3 milliGRAM(s) Oral at bedtime PRN Insomnia  ondansetron Injectable 4 milliGRAM(s) IV Push every 8 hours PRN Nausea and/or Vomiting  zolpidem 5 milliGRAM(s) Oral at bedtime PRN Insomnia  zolpidem 5 milliGRAM(s) Oral at bedtime PRN Insomnia        RADIOLOGY & ADDITIONAL TESTS:    Imaging Personally Reviewed:  [ ] YES  [ ] NO    Consultant(s) Notes Reviewed:  [ ] YES  [ ] NO    PHYSICAL EXAM:  GENERAL: Alert and awake lying in bed in no distress  HEAD:  Atraumatic, Normocephalic  EYES: EOMI, SOCORRO, conjunctiva and sclera clear  NECK: Supple, No JVD, Normal thyroid  NERVOUS SYSTEM:  Alert & Oriented X3, Motor and sensory systems are intact,   CHEST/LUNG: Bilateral clear breath sounds, no rhochi, no wheezing, no crepitations,  HEART: Regular rate and rhythm; No murmurs, rubs, or gallops  ABDOMEN: Soft, Nontender, Nondistended; Bowel sounds present  EXTREMITIES:   Peripheral Pulses are palpable, no  edema        Care Discussed with Consultants/Other Providers [ ] YES  [ ] NO      Code Status: [] Full Code [] DNR [] DNI [] Goals of Care:   Disposition: [] ICU [] Stroke Unit [] RCU []PCU []Floor [] Discharge Home         DALTON Phan.FACP     DELVIS MCBRIDE  89y  Female      Patient is a 89y old  Female who presents with a chief complaint of fall (14 Aug 2023 09:50)    comfortable,nad  REVIEW OF SYSTEMS:  CONSTITUTIONAL: No fever  RESPIRATORY: No cough, hemoptysis or shortness of breath  CARDIOVASCULAR: No chest pain, palpitations, dizziness, or leg swelling  GASTROINTESTINAL: No abdominal pain. nausea, vomiting, hematemesis  GENITOURINARY: No dysuria, frequency, hematuria   NEUROLOGICAL: No headaches, no dizziness  MUSCULOSKELETAL: No joint pain or swelling;     INTERVAL HPI/OVERNIGHT EVENTS:  T(C): 36.7 (08-14-23 @ 17:00), Max: 36.7 (08-14-23 @ 13:00)  HR: 111 (08-14-23 @ 17:00) (91 - 111)  BP: 128/74 (08-14-23 @ 17:00) (110/63 - 128/74)  RR: 18 (08-14-23 @ 17:00) (16 - 18)  SpO2: 96% (08-14-23 @ 17:00) (95% - 96%)  Wt(kg): --  I&O's Summary    13 Aug 2023 07:01  -  14 Aug 2023 07:00  --------------------------------------------------------  IN: 0 mL / OUT: 1200 mL / NET: -1200 mL    14 Aug 2023 07:01  -  14 Aug 2023 22:34  --------------------------------------------------------  IN: 440 mL / OUT: 900 mL / NET: -460 mL      T(C): 36.7 (08-14-23 @ 17:00), Max: 36.7 (08-14-23 @ 13:00)  HR: 111 (08-14-23 @ 17:00) (91 - 111)  BP: 128/74 (08-14-23 @ 17:00) (110/63 - 128/74)  RR: 18 (08-14-23 @ 17:00) (16 - 18)  SpO2: 96% (08-14-23 @ 17:00) (95% - 96%)  Wt(kg): --Vital Signs Last 24 Hrs  T(C): 36.7 (14 Aug 2023 17:00), Max: 36.7 (14 Aug 2023 13:00)  T(F): 98.1 (14 Aug 2023 17:00), Max: 98.1 (14 Aug 2023 13:00)  HR: 111 (14 Aug 2023 17:00) (91 - 111)  BP: 128/74 (14 Aug 2023 17:00) (110/63 - 128/74)  BP(mean): --  RR: 18 (14 Aug 2023 17:00) (16 - 18)  SpO2: 96% (14 Aug 2023 17:00) (95% - 96%)    Parameters below as of 14 Aug 2023 17:00  Patient On (Oxygen Delivery Method): room air        LABS:                        14.5   11.78 )-----------( 287      ( 14 Aug 2023 07:11 )             44.0     08-14    133<L>  |  100  |  30<H>  ----------------------------<  90  4.3   |  23  |  1.14    Ca    8.7      14 Aug 2023 07:11  Phos  3.2     08-14  Mg     1.90     08-14        Urinalysis Basic - ( 14 Aug 2023 07:11 )    Color: x / Appearance: x / SG: x / pH: x  Gluc: 90 mg/dL / Ketone: x  / Bili: x / Urobili: x   Blood: x / Protein: x / Nitrite: x   Leuk Esterase: x / RBC: x / WBC x   Sq Epi: x / Non Sq Epi: x / Bacteria: x      CAPILLARY BLOOD GLUCOSE            Urinalysis Basic - ( 14 Aug 2023 07:11 )    Color: x / Appearance: x / SG: x / pH: x  Gluc: 90 mg/dL / Ketone: x  / Bili: x / Urobili: x   Blood: x / Protein: x / Nitrite: x   Leuk Esterase: x / RBC: x / WBC x   Sq Epi: x / Non Sq Epi: x / Bacteria: x        PAST MEDICAL & SURGICAL HISTORY:  Anxiety      Breast Lump      Insomnia      PE (pulmonary thromboembolism)      PAF (paroxysmal atrial fibrillation)  On Xarelto      HTN (hypertension)      HLD (hyperlipidemia)      Endometrial cancer  S/P LYNETTE with SBO      History of Breast Lump/Mass Excision- benighn- left- 1971      History of Colonoscopy- 2011/Sept      History of D&C- 8/12/11      Status post hysterectomy  endometrial cancer      Status post cataract extraction  OS      Leg fracture, right  Hip fx repair in Nov 2019          MEDICATIONS  (STANDING):  atorvastatin 10 milliGRAM(s) Oral at bedtime  calamine/zinc oxide Lotion 1 Application(s) Topical two times a day  metoprolol succinate ER 50 milliGRAM(s) Oral daily  pantoprazole    Tablet 40 milliGRAM(s) Oral before breakfast  rivaroxaban 15 milliGRAM(s) Oral daily  sacubitril 24 mG/valsartan 26 mG 1 Tablet(s) Oral two times a day  venlafaxine 75 milliGRAM(s) Oral daily    MEDICATIONS  (PRN):  acetaminophen     Tablet .. 650 milliGRAM(s) Oral every 6 hours PRN Temp greater or equal to 38C (100.4F), Mild Pain (1 - 3)  ALPRAZolam 0.5 milliGRAM(s) Oral three times a day PRN anxiety  aluminum hydroxide/magnesium hydroxide/simethicone Suspension 30 milliLiter(s) Oral every 4 hours PRN Dyspepsia  melatonin 3 milliGRAM(s) Oral at bedtime PRN Insomnia  ondansetron Injectable 4 milliGRAM(s) IV Push every 8 hours PRN Nausea and/or Vomiting  zolpidem 5 milliGRAM(s) Oral at bedtime PRN Insomnia  zolpidem 5 milliGRAM(s) Oral at bedtime PRN Insomnia        RADIOLOGY & ADDITIONAL TESTS:    Imaging Personally Reviewed:  [ ] YES  [ ] NO    Consultant(s) Notes Reviewed:  [ ] YES  [ ] NO    PHYSICAL EXAM:  GENERAL: Alert and awake lying in bed in no distress  HEAD:  Atraumatic, Normocephalic  EYES: EOMI, SOCORRO, conjunctiva and sclera clear  NECK: Supple, No JVD, Normal thyroid  NERVOUS SYSTEM:  Alert & Oriented X3, Motor and sensory systems are intact,   CHEST/LUNG: Bilateral clear breath sounds, no rhochi, no wheezing, no crepitations,  HEART: Regular rate and rhythm; No murmurs, rubs, or gallops  ABDOMEN: Soft, Nontender, Nondistended; Bowel sounds present  EXTREMITIES:   Peripheral Pulses are palpable, no  edema        Care Discussed with Consultants/Other Providers [ ] YES  [ ] NO      Code Status: [] Full Code [] DNR [] DNI [] Goals of Care:   Disposition: [] ICU [] Stroke Unit [] RCU []PCU []Floor [] Discharge Home         DALTON Phan.FACP

## 2023-08-14 NOTE — PROGRESS NOTE ADULT - SUBJECTIVE AND OBJECTIVE BOX
CARDIOLOGY FOLLOW UP - Dr. Mccartney  DATE OF SERVICE: 8/14/23    CC no cp or sob      REVIEW OF SYSTEMS:  CONSTITUTIONAL: No fever, weight loss, or fatigue  RESPIRATORY: No cough, wheezing, chills or hemoptysis; No Shortness of Breath  CARDIOVASCULAR: No chest pain, palpitations, passing out, dizziness, or leg swelling  GASTROINTESTINAL: No abdominal or epigastric pain. No nausea, vomiting, or hematemesis; No diarrhea or constipation. No melena or hematochezia.  VASCULAR: No edema     PHYSICAL EXAM:  T(C): 36.6 (08-14-23 @ 05:00), Max: 36.8 (08-13-23 @ 21:30)  HR: 110 (08-14-23 @ 05:00) (60 - 110)  BP: 110/63 (08-14-23 @ 05:00) (110/63 - 118/73)  RR: 16 (08-14-23 @ 05:00) (16 - 19)  SpO2: 95% (08-14-23 @ 05:00) (95% - 100%)  Wt(kg): --  I&O's Summary    13 Aug 2023 07:01  -  14 Aug 2023 07:00  --------------------------------------------------------  IN: 0 mL / OUT: 1200 mL / NET: -1200 mL    14 Aug 2023 07:01  -  14 Aug 2023 09:51  --------------------------------------------------------  IN: 0 mL / OUT: 700 mL / NET: -700 mL        Appearance: Normal	  Cardiovascular: Normal S1 S2, irreg   Respiratory: Lungs clear to auscultation	  Gastrointestinal:  Soft, Non-tender, + BS	  Extremities: Normal range of motion, No clubbing, cyanosis or edema      Home Medications:  acetaminophen 325 mg oral tablet: 2 tab(s) orally every 6 hours, As needed, Temp greater or equal to 38C (100.4F), Mild Pain (1 - 3) (11 Aug 2023 01:38)  ALPRAZolam 0.5 mg oral tablet: 1 tab(s) orally 3 times a day, As Needed (11 Aug 2023 19:40)  Ambien 10 mg oral tablet: 1 tab(s) orally once a day (at bedtime), As Needed for sleep (11 Aug 2023 19:40)  aspirin 81 mg oral delayed release tablet: 1 tab(s) orally once a day (11 Aug 2023 01:38)  atorvastatin 10 mg oral tablet: 1 tab(s) orally once a day (at bedtime) (11 Aug 2023 19:40)  metoprolol succinate 25 mg oral tablet, extended release: 1 tab(s) orally once a day (11 Aug 2023 19:40)  rivaroxaban 20 mg oral tablet: 1 tab(s) orally every 24 hours (11 Aug 2023 19:40)  venlafaxine 75 mg oral capsule, extended release: 1 cap(s) orally once a day (11 Aug 2023 19:42)  Vitamin D3 1250 mcg (50,000 intl units) oral capsule: 1 cap(s) orally once a week (11 Aug 2023 19:40)      MEDICATIONS  (STANDING):  atorvastatin 10 milliGRAM(s) Oral at bedtime  calamine/zinc oxide Lotion 1 Application(s) Topical two times a day  metoprolol succinate ER 50 milliGRAM(s) Oral daily  pantoprazole    Tablet 40 milliGRAM(s) Oral before breakfast  rivaroxaban 15 milliGRAM(s) Oral daily  sacubitril 24 mG/valsartan 26 mG 1 Tablet(s) Oral two times a day  venlafaxine 75 milliGRAM(s) Oral daily      TELEMETRY: af hr 	    ECG:  	  RADIOLOGY:   DIAGNOSTIC TESTING:  [ ] Echocardiogram:  [ ]  Catheterization:  [ ] Stress Test:    OTHER: 	    LABS:	 	                            14.5   11.78 )-----------( 287      ( 14 Aug 2023 07:11 )             44.0     08-14    133<L>  |  100  |  30<H>  ----------------------------<  90  4.3   |  23  |  1.14    Ca    8.7      14 Aug 2023 07:11  Phos  3.2     08-14  Mg     1.90     08-14

## 2023-08-14 NOTE — PROGRESS NOTE ADULT - PROBLEM SELECTOR PLAN 1
TTE: Worsening LV function  Cardio/PT eval appreciated

## 2023-08-15 NOTE — DISCHARGE NOTE PROVIDER - CARE PROVIDER_API CALL
Tarun Davis  Internal Medicine  [address] [place], [place] [id number]  Phone: [phone]  Fax: [phone]  Follow Up Time:     Stas Mccartney  Cardiovascular Disease  [address] [facility], [address]  [place] [address]  Phone: [phone]  Fax: [phone]  Follow Up Time:

## 2023-08-15 NOTE — DISCHARGE NOTE PROVIDER - NSDCMRMEDTOKEN_GEN_ALL_CORE_FT
acetaminophen 325 mg oral tablet: 2 tab(s) orally every 6 hours, As needed, Temp greater or equal to 38C (100.4F), Mild Pain (1 - 3)  ALPRAZolam 0.5 mg oral tablet: 1 tab(s) orally 3 times a day, As Needed  Ambien 10 mg oral tablet: 1 tab(s) orally once a day (at bedtime), As Needed for sleep  aspirin 81 mg oral delayed release tablet: 1 tab(s) orally once a day  atorvastatin 10 mg oral tablet: 1 tab(s) orally once a day (at bedtime)  metoprolol succinate 25 mg oral tablet, extended release: 1 tab(s) orally once a day  rivaroxaban 20 mg oral tablet: 1 tab(s) orally every 24 hours  venlafaxine 75 mg oral capsule, extended release: 1 cap(s) orally once a day  Vitamin D3 1250 mcg (50,000 intl units) oral capsule: 1 cap(s) orally once a week   acetaminophen 325 mg oral tablet: 2 tab(s) orally every 6 hours, As needed, Temp greater or equal to 38C (100.4F), Mild Pain (1 - 3)  ALPRAZolam 0.5 mg oral tablet: 1 tab(s) orally 3 times a day, As Needed  atorvastatin 10 mg oral tablet: 1 tab(s) orally once a day (at bedtime)  melatonin 3 mg oral tablet: 1 tab(s) orally once a day (at bedtime) As needed Insomnia  metoprolol succinate 25 mg oral tablet, extended release: 3 tab(s) orally once a day  pantoprazole 40 mg oral delayed release tablet: 1 tab(s) orally once a day (before a meal)  rivaroxaban 15 mg oral tablet: 1 tab(s) orally once a day  sacubitril-valsartan 24 mg-26 mg oral tablet: 1 tab(s) orally 2 times a day  venlafaxine 75 mg oral capsule, extended release: 1 cap(s) orally once a day  Vitamin D3 1250 mcg (50,000 intl units) oral capsule: 1 cap(s) orally once a week  zolpidem 5 mg oral tablet: 1 tab(s) orally once a day (at bedtime) As needed Insomnia

## 2023-08-15 NOTE — PROGRESS NOTE ADULT - SUBJECTIVE AND OBJECTIVE BOX
CARDIOLOGY FOLLOW UP - Dr. Mccartney  DATE OF SERVICE: 8/15/23    CC no cp or sob      REVIEW OF SYSTEMS:  CONSTITUTIONAL: No fever, weight loss, or fatigue  RESPIRATORY: No cough, wheezing, chills or hemoptysis; No Shortness of Breath  CARDIOVASCULAR: No chest pain, palpitations, passing out, dizziness, or leg swelling  GASTROINTESTINAL: No abdominal or epigastric pain. No nausea, vomiting, or hematemesis; No diarrhea or constipation. No melena or hematochezia.  VASCULAR: No edema     PHYSICAL EXAM:  T(C): 36.5 (08-15-23 @ 06:10), Max: 36.7 (08-14-23 @ 13:00)  HR: 93 (08-15-23 @ 06:10) (91 - 115)  BP: 122/75 (08-15-23 @ 06:10) (103/80 - 128/74)  RR: 16 (08-15-23 @ 06:10) (16 - 18)  SpO2: 96% (08-15-23 @ 06:10) (96% - 96%)  Wt(kg): --  I&O's Summary    14 Aug 2023 07:01  -  15 Aug 2023 07:00  --------------------------------------------------------  IN: 440 mL / OUT: 900 mL / NET: -460 mL        Appearance: Normal	  Cardiovascular: Normal S1 S2, irreg   Respiratory: Lungs clear to auscultation	  Gastrointestinal:  Soft, Non-tender, + BS	  Extremities: Normal range of motion, No clubbing, cyanosis or edema      Home Medications:  acetaminophen 325 mg oral tablet: 2 tab(s) orally every 6 hours, As needed, Temp greater or equal to 38C (100.4F), Mild Pain (1 - 3) (11 Aug 2023 01:38)  ALPRAZolam 0.5 mg oral tablet: 1 tab(s) orally 3 times a day, As Needed (11 Aug 2023 19:40)  Ambien 10 mg oral tablet: 1 tab(s) orally once a day (at bedtime), As Needed for sleep (11 Aug 2023 19:40)  aspirin 81 mg oral delayed release tablet: 1 tab(s) orally once a day (11 Aug 2023 01:38)  atorvastatin 10 mg oral tablet: 1 tab(s) orally once a day (at bedtime) (11 Aug 2023 19:40)  metoprolol succinate 25 mg oral tablet, extended release: 1 tab(s) orally once a day (11 Aug 2023 19:40)  rivaroxaban 20 mg oral tablet: 1 tab(s) orally every 24 hours (11 Aug 2023 19:40)  venlafaxine 75 mg oral capsule, extended release: 1 cap(s) orally once a day (11 Aug 2023 19:42)  Vitamin D3 1250 mcg (50,000 intl units) oral capsule: 1 cap(s) orally once a week (11 Aug 2023 19:40)      MEDICATIONS  (STANDING):  atorvastatin 10 milliGRAM(s) Oral at bedtime  calamine/zinc oxide Lotion 1 Application(s) Topical two times a day  metoprolol succinate ER 50 milliGRAM(s) Oral daily  pantoprazole    Tablet 40 milliGRAM(s) Oral before breakfast  rivaroxaban 15 milliGRAM(s) Oral daily  sacubitril 24 mG/valsartan 26 mG 1 Tablet(s) Oral two times a day  venlafaxine 75 milliGRAM(s) Oral daily      TELEMETRY: 	afib hr 80-90    ECG:  	  RADIOLOGY:   DIAGNOSTIC TESTING:  [ ] Echocardiogram:  [ ]  Catheterization:  [ ] Stress Test:    OTHER: 	    LABS:	 	                            13.8   8.68  )-----------( 305      ( 15 Aug 2023 06:22 )             41.6     08-15    133<L>  |  100  |  28<H>  ----------------------------<  86  4.4   |  23  |  1.09    Ca    8.9      15 Aug 2023 06:22  Phos  4.1     08-15  Mg     2.30     08-15

## 2023-08-15 NOTE — PROGRESS NOTE ADULT - TIME BILLING
agree with above  CV stable  euvoelmic  cont entresto  inc BB as tolerated
agree with above  CV stable  euvoelmic  cont entresto  inc BB as tolerated
-PT  -d/w ACP
-PT  -d/w ACP

## 2023-08-15 NOTE — PROGRESS NOTE ADULT - ASSESSMENT
89yF w/ PMH of HTN, HLD, afib on xarelto, SVT s/p ablation on 6/21/2018, endometrial CA s/p LYNETTE presents for multiple falls sent in by her daughter for concern.    #Falls  -afib on tele rates borderline elevated  -no evidence of heart block  -echo reveals severe LV dysfunction w poorly visualized av  -of note she had mild AS r yrs ago on echo    #Cardiomyopathy, Acute HfrEF  -etiology unclear  -possible rhythm versus ischemic versus valvular  -recommend deferring ischemic workup given advanced age and functional status  -increase BB to 50  -low dose entresto    #Afib  -cont Xarelto  -inc BB      35 minutes spent on total encounter; more than 50% of the visit was spent counseling and/or coordinating care by the attending physician.  
ECHO 8/11/23: mild to mod MR , mild AR,  Severe global left ventricular systolic dysfunction. LVEF in the 25-30% decreased right ventricular systolic function.severe pulmonary hypertension.  ECHO 4/17/18: EF 55-60 % moderate to severe mitral stenosis. Normal left ventricular systolic function. mild aortic stenosis. Mild-moderate aortic regurgitation.  Normal right ventricular size and function.    a/p    89yF w/ PMH of HTN, HLD, afib on xarelto, SVT s/p ablation on 6/21/2018, endometrial CA s/p LYNETTE presents for multiple falls sent in by her daughter for concern.    #Falls  -afib on tele rates borderline elevated  -no evidence of heart block  -echo reveals severe LV dysfunction w poorly visualized av  -of note she had mild AS r yrs ago on echo    #Cardiomyopathy, Acute HfrEF  -etiology unclear  -possible rhythm versus ischemic versus valvular  -recommend deferring ischemic workup given advanced age and functional status  -c/w low dose entresto/ BB     #Afib  -cont Xarelto-  -rates intermittently elevated on tele -inc BB to 75 mg daily    
ECHO 8/11/23: mild to mod MR , mild AR,  Severe global left ventricular systolic dysfunction. LVEF in the 25-30% decreased right ventricular systolic function.severe pulmonary hypertension.  ECHO 4/17/18: EF 55-60 % moderate to severe mitral stenosis. Normal left ventricular systolic function. mild aortic stenosis. Mild-moderate aortic regurgitation.  Normal right ventricular size and function.    a/p    89yF w/ PMH of HTN, HLD, afib on xarelto, SVT s/p ablation on 6/21/2018, endometrial CA s/p LYNETTE presents for multiple falls sent in by her daughter for concern.    #Falls  -afib on tele rates borderline elevated  -no evidence of heart block  -echo reveals severe LV dysfunction w poorly visualized av  -of note she had mild AS r yrs ago on echo    #Cardiomyopathy, Acute HfrEF  -etiology unclear  -possible rhythm versus ischemic versus valvular  -recommend deferring ischemic workup given advanced age and functional status  -c/w low dose entresto/ BB     #Afib  -cont Xarelto-  -rates intermittently elevated on tele -inc BB to 75 mg daily    
89yF w/ PMH of HTN, HLD, afib on xarelto, SVT s/p ablation on 6/21/2018, endometrial CA s/p LYNETTE presents for multiple unwitnessed falls admitted for further w/u  

## 2023-08-15 NOTE — PROVIDER CONTACT NOTE (OTHER) - REASON
Home health aide
Patient BP elevated
Patient HR elevated
Rash to pt's back- noted red/itchy, appears to look like wheals throughout back
pulse 142, unsustained
patient's heart rate frequently going up to 150s-160s, unsustained, on tele monitor
sustain rapid afib asymptomatic
patient noted with increased confusion and restlessness

## 2023-08-15 NOTE — PROVIDER CONTACT NOTE (OTHER) - ACTION/TREATMENT ORDERED:
acp aware
acp made aware
awaiting provider orders.
no new orders given at this time
vital signs, blood glucose, melatonin 9 mg
ACP made aware- awaiting recommendations.
no new orders given at this time

## 2023-08-15 NOTE — DISCHARGE NOTE NURSING/CASE MANAGEMENT/SOCIAL WORK - NSDCVIVACCINE_GEN_ALL_CORE_FT
Tdap; 20-Nov-2019 15:40; Jaz Balderas ([facility]); [employer]; [id number] (Exp. Date: 17-Sep-2021); IntraMuscular; Deltoid Left.; 0.5 milliLiter(s); VIS (VIS Published: 09-May-2013, VIS Presented: 20-Nov-2019);

## 2023-08-15 NOTE — DISCHARGE NOTE PROVIDER - HOSPITAL COURSE
89yF w/ PMH of HTN, HLD, afib on xarelto, SVT s/p ablation on 6/21/2018, endometrial CA s/p LYNETTE presents for multiple unwitnessed falls admitted for further w/u.    Hospital Course:   Pt admitted with multiple falls, CTH and Cspine: negative for acute pathology. Cardiology following for HF and Afib. No evidence of heart block on telemetry. Echo reveals severe LV dysfunction w poorly visualized AV. Of note she had mild AS yrs ago on echo. PT recommended rehab.     Pt has PAF (paroxysmal atrial fibrillation): CHADSVASc score 4. C/w Xarelto and metoprolol for rate control, increased dose to optimize HR control. Pt with cardiomyopathy, acute HFrEF, etiology unclear, cardiology following- possible rhythm versus ischemic versus valvular. Recommend deferring ischemic workup given advanced age and functional status. c/w low dose Entresto/BB.     HTN (hypertension): c/w metoprolol.    Anxiety: c/w alprazolam prn, consider down-titrating as tolerated. c/w venlafaxine    Insomnia c/w Ambien.    Transaminitis: Labs around baseline function. Likely FLD.    Case discussed with  ****, labs/vitals/medications reviewed, Pt medically cleared for discharge to rehab with outpt follow up as directed.            89yF w/ PMH of HTN, HLD, Afib on Xarelto, SVT s/p ablation on 6/21/2018, endometrial CA s/p LYNETTE presents for multiple unwitnessed falls admitted for further w/u.    Hospital Course:   Pt admitted with multiple falls, CTH and Cspine: negative for acute pathology. Cardiology following for HF and Afib. No evidence of heart block on telemetry. Echo reveals severe LV dysfunction w poorly visualized AV. Of note she had mild AS yrs ago on echo. PT recommended rehab.     Pt has PAF (paroxysmal atrial fibrillation): CHADSVASc score 4. C/w Xarelto and metoprolol for rate control, increased dose to optimize HR control. Pt with cardiomyopathy, acute HFrEF, etiology unclear, cardiology following- possible rhythm versus ischemic versus valvular. Recommend deferring ischemic workup given advanced age and functional status. c/w low dose Entresto/BB.     HTN (hypertension): c/w metoprolol.    Anxiety: c/w alprazolam prn, consider down-titrating as tolerated. c/w venlafaxine.    Insomnia c/w Ambien.    Transaminitis: Labs around baseline function. Likely FLD.    Case discussed with Dr. Luis, labs/vitals/medications reviewed, Pt medically cleared for discharge to rehab with outpt follow up as directed.

## 2023-08-15 NOTE — DISCHARGE NOTE NURSING/CASE MANAGEMENT/SOCIAL WORK - PATIENT PORTAL LINK FT
You can access the FollowMyHealth Patient Portal offered by [facility] by registering at the following website: [email or web address]. By joining [facility]’s FollowMyHealth portal, you will also be able to view your health information using other applications (apps) compatible with our system.

## 2023-08-15 NOTE — PROVIDER CONTACT NOTE (OTHER) - DATE AND TIME:
12-Aug-2023 06:00
13-Aug-2023 17:10
14-Aug-2023 20:45
10-Aug-2023 17:15
11-Aug-2023 21:40
15-Aug-2023 00:00
11-Aug-2023 14:00
14-Aug-2023 04:00

## 2023-08-15 NOTE — DISCHARGE NOTE NURSING/CASE MANAGEMENT/SOCIAL WORK - NSDCPEFALRISK_GEN_ALL_CORE
For information on Fall & Injury Prevention, visit: [email or web address] OR  [email or web address] OR  https://www.cdc.gov/steadi/patient.html

## 2023-08-15 NOTE — DISCHARGE NOTE PROVIDER - NSDCCPCAREPLAN_GEN_ALL_CORE_FT
PRINCIPAL DISCHARGE DIAGNOSIS  Diagnosis: Multiple falls  Assessment and Plan of Treatment: You had a CT of your head and neck which was negative. You were evaluated by physical therapy who recommended rehab. Continue physical therapy at rehab to improve gait, strength, ambulation. Follow up with your primary care physician for further monitoring in 1-2 weeks. Please call to arrange appointment.      SECONDARY DISCHARGE DIAGNOSES  Diagnosis: PAF (paroxysmal atrial fibrillation)  Assessment and Plan of Treatment: You were seen by Dr. Mccartney and metoprolol was increased for improved heart rate control. Continue Xarelto (blood thinner) for stroke prevention. Follow up with your Cardiologist Dr. Mccartney for further monitoring in 1-2 weeks. Please call to arrange appointment.    Diagnosis: Heart failure with reduced ejection fraction  Assessment and Plan of Treatment: Ensure compliance with your medications as directed. Low salt intake. Restrict fluid intake based on your doctors recommendations. Monitor daily weights. If you note weight gain >2-3lbs in one week, follow up with your doctor or return to the hospital immediately. Follow up with your cardiologist Dr. Mccartney as outpatient in 1-2 weeks for further monitoring. Please call for appointment.    Diagnosis: HTN (hypertension)  Assessment and Plan of Treatment: Follow up with your primary care physician for further monitoring in 1-2 weeks. Please call to arrange appointment. Low salt diet. Ensure compliance with your medications as directed.    Diagnosis: Transaminitis  Assessment and Plan of Treatment: Stable, follow up with your primary care physician for further monitoring in 1-2 weeks. Please call to arrange appointment.    Diagnosis: Anxiety  Assessment and Plan of Treatment: Ensure compliance with your medications as directed. Follow up with your primary care physician for further monitoring in 1-2 weeks. Please call to arrange appointment.

## 2023-08-15 NOTE — PROVIDER CONTACT NOTE (OTHER) - NAME OF MD/NP/PA/DO NOTIFIED:
Roseanna Sanford (PATRICIA)
meenakshi pitt
Roseanna Sanford
PATRICIA Schmitz
Roseanna Sanford
Basilia Santiago (ACP)
Basilia Santiago (ACP)

## 2023-08-15 NOTE — PROVIDER CONTACT NOTE (OTHER) - SITUATION
pulse 142, unsustained
pt noted to be in rapid afib on tele asymptomatic
Patient  during PM vitals
Patient BP elevated
patient noted with increased confusion and restlessness
Rash to pt's back- noted red/itchy, appears to look like wheals throughout back
patient's heart rate frequently going up to 150s-160s, unsustained, on tele monitor

## 2023-09-15 NOTE — PROGRESS NOTE ADULT - SUBJECTIVE AND OBJECTIVE BOX
"  Caller: Cinthya Negro \"Judie\"    Relationship: Self    Best call back number: [phone]    What is the best time to reach you: ANY    Who are you requesting to speak with (clinical staff, provider,  specific staff member): CLINICAL      What was the call regarding: PATIENT STATES SHE HAS NO APPETITE AND WOULD LIKE TO KNOW IF THERE IS ANY MEDICATION THAT CAN BE PRESCRIBED TO HELP HER APPETITE.  PATIENT ALSO STATES SHE IS STILL HAVING ABDOMEN AND LEG PAIN.      " Patient seen and examined. Pain controlled. No acute events overnight                            12.3   11.41 )-----------( 210      ( 20 Nov 2019 15:48 )             37.1     20 Nov 2019 15:48    141    |  98     |  28     ----------------------------<  100    4.4     |  31     |  1.31     Ca    9.6        20 Nov 2019 15:48    TPro  7.4    /  Alb  4.2    /  TBili  0.3    /  DBili  x      /  AST  27     /  ALT  17     /  AlkPhos  87     20 Nov 2019 15:48    PT/INR - ( 20 Nov 2019 15:48 )   PT: 12.9 sec;   INR: 1.12 ratio         PTT - ( 20 Nov 2019 15:48 )  PTT:34.8 sec  Vital Signs Last 24 Hrs  T(C): 36.5 (11-21-19 @ 05:40), Max: 36.6 (11-20-19 @ 19:21)  T(F): 97.7 (11-21-19 @ 05:40), Max: 97.9 (11-20-19 @ 19:21)  HR: 55 (11-21-19 @ 05:40) (55 - 89)  BP: 113/66 (11-21-19 @ 05:40) (104/58 - 122/75)  RR: 17 (11-21-19 @ 05:40) (17 - 18)  SpO2: 97% (11-21-19 @ 05:40) (94% - 99%)    Physical Exam  Gen: NAD  RLE:   skin intact, no deformity of RLE   +ehl/fhl/ta/gs function  L2-S1 silt  Dp/pt pulse intact  No calf ttp  Compartments soft    A/P: 89y Female with Right Femoral Neck Fracture  - Plan for OR today with Dr Garcia for CRPP  - NPO, IV fluids while NPO  Pain control  SCD's  Bedrest  FU AM labs  Medical management appreciated- Cleared for OR  Incentive spirometry  Dr Garcia aware and agrees with plan

## 2023-10-16 NOTE — ED PROVIDER NOTE - PROGRESS NOTE DETAILS
MD Mirela (PGY-2) patient with continuing elevation of troponin.  Patient's troponin initially at 26 at 2:00 which increased to 48 at 5:00.  Spoke with patient's private cardiologist, Dr. Mccartney, who is recommending holding off NSTEMI treatment at this time, given patient's EKG without acute ischemic changes and patient is symptomatic at this time.  Dr. Dukse will see the patient once the patient has been admitted.

## 2023-10-16 NOTE — H&P ADULT - NSHPPHYSICALEXAM_GEN_ALL_CORE
Vital Signs Last 24 Hrs  T(C): 36.9 (16 Oct 2023 22:45), Max: 37 (16 Oct 2023 19:33)  T(F): 98.4 (16 Oct 2023 22:45), Max: 98.6 (16 Oct 2023 19:33)  HR: 104 (16 Oct 2023 22:45) (85 - 104)  BP: 122/77 (16 Oct 2023 22:45) (122/77 - 160/60)  RR: 16 (16 Oct 2023 22:45) (15 - 22)  SpO2: 95% (16 Oct 2023 22:45) (95% - 100%)    Parameters below as of 16 Oct 2023 22:45  Patient On (Oxygen Delivery Method): nasal cannula O2 Flow (L/min): 3    CONSTITUTIONAL: Frail, elderly well-groomed, in no apparent distress  EYES: No conjunctival or scleral injection, non-icteric;   ENMT: No external nasal lesions; MMM  NECK: Trachea midline without palpable neck mass; thyroid not enlarged and non-tender  RESPIRATORY: Breathing comfortably; no dullness to percussion; lungs CTA without wheeze/rhonchi/rales  CARDIOVASCULAR: +S1S2, RRR, no M/G/R; pedal pulses full and symmetric; no lower extremity edema  GASTROINTESTINAL: No palpable masses or tenderness, +BS throughout, no rebound/guarding; no hepatosplenomegaly; no hernia palpated  LYMPHATIC: No cervical LAD or tenderness  SKIN: No rashes or ulcers noted  NEUROLOGIC: CN II-XII intact; sensation intact in LEs b/l to light touch  PSYCHIATRIC: A&Ox2; mood and affect appropriate; appropriate insight and judgment

## 2023-10-16 NOTE — H&P ADULT - ASSESSMENT
89yo F w/ PMH of HTN, HLD, Afib on Xarelto, HFrEF (EF 30%), SVT s/p ablation, endometrial CA s/p LYNETTE pw fall from chair.

## 2023-10-16 NOTE — ED ADULT NURSE NOTE - OBJECTIVE STATEMENT
Patient BIBA from home. S/p unwitnessed fall while sitting on the kitchen table. PAtient stated "I tipped to my right side & I fell & hit my head on a chair" No loc. On eliquis. Uses cane or walker inside the house. Lives alone. Called EMS with her life call. Denies sob, dizziness, chest discomfort, nausea, vomiting, diarrhea, fever,cough or urinary symptoms.   Alert & oriented x3, C/o posterior neck pain, right arm pain (+) laceartion to forearm, tenderness to left & right back of head. (+) bump to left top of head. Patient BIBA from home. S/p unwitnessed fall while sitting on the kitchen table. PAtient stated "I tipped to my right side & I fell & hit my head on a chair" No loc. On eliquis. Uses cane or walker inside the house. Lives alone. Called EMS with her life alert call. Denies sob, dizziness, chest discomfort, nausea, vomiting, diarrhea, fever,cough or urinary symptoms.   Alert & oriented x3, C/o posterior neck pain, right arm pain (+) laceration to forearm, tenderness to left & right back of head. (+) bump to left top of head.

## 2023-10-16 NOTE — H&P ADULT - NSICDXPASTSURGICALHX_GEN_ALL_CORE_FT
PAST SURGICAL HISTORY:  [facility]     History of Colonoscopy- 2011/Sept     History of D&C- 8/12/11     Leg fracture, right Hip fx repair in Nov 2019    Status post cataract extraction OS    Status post hysterectomy endometrial cancer

## 2023-10-16 NOTE — ED ADULT NURSE NOTE - NSFALLHARMRISKINTERV_ED_ALL_ED
DASH/TLC (sodium and cholesterol restricted diet)/consistent carbohydrate (evening snack) Assistance OOB with selected safe patient handling equipment if applicable/Assistance with ambulation/Communicate risk of Fall with Harm to all staff, patient, and family/Monitor gait and stability/Provide patient with walking aids/Provide visual cue: red socks, yellow wristband, yellow gown, etc/Reinforce activity limits and safety measures with patient and family/Bed in lowest position, wheels locked, appropriate side rails in place/Call bell, personal items and telephone in reach/Instruct patient to call for assistance before getting out of bed/chair/stretcher/Non-slip footwear applied when patient is off stretcher/[place] to call system/Physically safe environment - no spills, clutter or unnecessary equipment/Purposeful Proactive Rounding/Room/bathroom lighting operational, light cord in reach

## 2023-10-16 NOTE — H&P ADULT - NSHPREVIEWOFSYSTEMS_GEN_ALL_CORE
CONSTITUTIONAL: No fever, weight loss  EYES: No eye pain, visual disturbances, or discharge  ENMT:  No difficulty hearing, tinnitus, vertigo; No sinus or throat pain  RESPIRATORY: No SOB. No cough, wheezing, chills or hemoptysis  CARDIOVASCULAR: No chest pain, palpitations, dizziness, or leg swelling  GASTROINTESTINAL: No abdominal or epigastric pain. No nausea, vomiting, or hematemesis; No diarrhea or constipation. No melena or hematochezia.  GENITOURINARY: No dysuria, frequency, hematuria, or incontinence  NEUROLOGICAL: No headaches, memory loss, loss of strength, numbness, or tremors  SKIN: No itching, burning, rashes, or lesions   LYMPH NODES: No enlarged glands  ENDOCRINE: No heat or cold intolerance; No hair loss  MUSCULOSKELETAL: + Joint pain. No swelling;  PSYCHIATRIC: No depression, anxiety, mood swings, or difficulty sleeping  HEME/LYMPH: No easy bruising, or bleeding gums

## 2023-10-16 NOTE — H&P ADULT - NSHPLABSRESULTS_GEN_ALL_CORE
LABS:                      12.5   6.78  )-----------( 235      ( 16 Oct 2023 14:05 )             39.7     10-16    140  |  105  |  22  ----------------------------<  114<H>  4.7   |  25  |  1.13    Ca    9.6      16 Oct 2023 14:05    TPro  6.5  /  Alb  3.6  /  TBili  0.5  /  DBili  x   /  AST  25  /  ALT  14  /  AlkPhos  76  10-16    CARDIAC MARKERS ( 16 Oct 2023 17:10 )  x     / x     / 40 U/L / x     / 2.1 ng/mL\    LIVER FUNCTIONS - ( 16 Oct 2023 14:05 )  Alb: 3.6 g/dL / Pro: 6.5 g/dL / ALK PHOS: 76 U/L / ALT: 14 U/L / AST: 25 U/L / GGT: x           PT/INR - ( 16 Oct 2023 19:38 )   PT: 13.2 sec;   INR: 1.27 ratio    PTT - ( 16 Oct 2023 19:38 )  PTT:32.4 sec    Urinalysis Basic - ( 16 Oct 2023 15:27 )  Color: Light Yellow / Appearance: Clear / S.018 / pH: x  Gluc: x / Ketone: Negative  / Bili: Negative / Urobili: Negative   Blood: x / Protein: Trace / Nitrite: Negative   Leuk Esterase: Moderate / RBC: 2 /hpf / WBC 16 /HPF   Sq Epi: x / Non Sq Epi: x / Bacteria: Negative    IMAGING:  CT Head Cervical Spine No Cont (10.16.23 @ 18:05)  IMPRESSION:  CT brain:  - No acute intracranial hemorrhage, brain edema, or mass effect.  - No displaced calvarial fracture. .    CT cervical spine:  - No acute fracture or traumatic subluxation.  - No prevertebral soft tissue swelling.  - Degenerative changes.    Xray Chest 1 View AP/PA (10.16.23 @ 13:54)  IMPRESSION:  - Left basilar atelectasis. Otherwise clear lungs.    [X] Imaging personally reviewed by me- No consolidation on CXR   [X] ECG personally interpreted by me- Afib HR 80 w/ RBBB

## 2023-10-16 NOTE — H&P ADULT - PROBLEM SELECTOR PLAN 2
- CHADSVASc: 4  - c/w Toprol 75mg qd  - c/w home Xarelto 15mg qhs  - Noted to have elevated troponins -> ED spoke w/ primary cards will eval in AM and to hold on NSTEMI tx for now given no symptoms and ECG unchanged

## 2023-10-16 NOTE — H&P ADULT - HISTORY OF PRESENT ILLNESS
Pt is a 89yo F w/ PMH of HTN, HLD, Afib on Xarelto, HFrEF (EF 30%), SVT s/p ablation, endometrial CA s/p LYNETTE pw fall from chair.    Pt providing limited hx, and unable to reach emergency contact (Daughter)- hx obtained from patient and chart review.    Fall from a chair w/ head strike and inability to stand up afterwards. Pt endorses it is due to weakness but unable to recall if had LOC. Otherwise denies any F/C, CP, SOB, palpitations, abd pain, N/V, constipation or diarrhea. Does endorse Right hip pain.    In the ED VSS w/ O2 on for comfort, and labs nonactionable though did have uptrending troponin w/ no CP. Imaging w/out acute fx. She was administered Tylenol, Lidocaine patch and morphine 2mg

## 2023-10-16 NOTE — H&P ADULT - HIV OFFER
Problem: Falls - Risk of:  Goal: Will remain free from falls  Description: Will remain free from falls  Outcome: met  Pt up with standby and cane. Problem: Pain:  Goal: Pain level will decrease  Description: Pain level will decrease  Outcome: Ongoing   Non pharm utilized    Problem: Musculor/Skeletal Functional Status  Goal: Highest potential functional level  Outcome: Ongoing   Encourage repositioning. Pt up self normally. Problem: Fluid Volume:  Goal: Hemodynamic stability will improve  Description: Hemodynamic stability will improve  Outcome: Ongoing   I/o tracked. Lasix ordered to reduce swelling BLE. Problem: Mobility - Impaired:  Goal: Mobility will improve  Description: Mobility will improve  Outcome: Ongoing   Pt having intense cramping BLE.  Flexeril added Opt out

## 2023-10-16 NOTE — ED PROVIDER NOTE - ATTENDING CONTRIBUTION TO CARE
MD Bishop:  patient seen and evaluated with the resident.  I was present for key portions of the History & Physical, and I agree with the Impression & Plan.    Patient is a 89-year-old female brought in by EMS after she activated life alert from home.  Patient fell out of chair at home, and was unable to get up.  Denies chest pain/SOB, denies hip/extremity pain.    Denies AC, denies LOL.     Patient takes baby aspirin.    Vital signs: Blood pressure blood pressure 160/60, respiration 22, heart rate 85, O2 sat 99% on 3L, 85% on room air  General: Geriatric female, fatigued appearing, skin is cool to touch, rectal temperature is pending   normocephalic/atraumatic  Neck: Supple mild midline tenderness to palpation  Pulmonary: Clear to auscultation bilaterally  Cardiac: Distant heart sounds   Soft, nondistended nontender abdomen:  Neuro: Awake alert wiggles toes moving all 4 extremities  Skin: Right vulvar forearm skin tear no active bleeding    Medical decision making:  Fall out of chair, possibly due to hypoxia, will keep on supplemental O2, and work up for a infectious pulmonary source.

## 2023-10-16 NOTE — ED PROVIDER NOTE - PHYSICAL EXAMINATION
Const: not in acute distress  Eyes: no conjunctival injection  HEENT: hematoma to left occiput. Moist MM.  Neck: Trachea midline. Midline cervical tenderness.  CVS: +S1/S2, Peripheral pulses 2+ and equal in all extremities.  RESP: Unlabored respiratory effort. Clear to auscultation bilaterally.  GI: Nontender/Nondistended, No CVA tenderness b/l.   MSK: Normocephalic/Atraumatic, No midline thoracic/lumbar spinal tenderness, no spinal step-offs. No Lower Extremities edema b/l.   Skin: 2 abrasions to R forearm  Neuro: Motor & Sensation grossly intact.  Psych: Awake, Alert, & Cooperative

## 2023-10-16 NOTE — ED ADULT NURSE NOTE - NSICDXPASTSURGICALHX_GEN_ALL_CORE_FT
PAST SURGICAL HISTORY:  [facility]     History of Colonoscopy- 2011/Sept     History of D&C- 8/12/11     Leg fracture, right Hip fx repair in Nov 2019    Status post cataract extraction OS    Status post hysterectomy endometrial cancer

## 2023-10-16 NOTE — ED PROVIDER NOTE - NSICDXPASTSURGICALHX_GEN_ALL_CORE_FT
PAST SURGICAL HISTORY:  [facility]     History of Colonoscopy- 2011/Sept     History of D&C- 8/12/11     Leg fracture, right Hip fx repair in Nov 2019    Status post cataract extraction OS    Status post hysterectomy endometrial cancer

## 2023-10-16 NOTE — H&P ADULT - PROBLEM SELECTOR PLAN 1
- Likely mechanical in nature though cannot r/o arrythmia vs syncope  - TTE from August reviewed w/ EF 25-30%  - Imaging w/out s/s of trauma  - No s/s of infectious etiology  - Fall precautions  - PT in AM  - Tele monitoring for arrythmia

## 2023-10-17 NOTE — PHYSICAL THERAPY INITIAL EVALUATION ADULT - ADDITIONAL COMMENTS
Pt lives at home alone, (from prior chart review, has a few steps to negotiate to enter the house; however no steps to negotiate through the garage and pt has a stair lift inside); pt reports has a HHA 5d/4hours to assist with bathing (reports bed baths); amb with rolling walker/sometimes a cane; wears glasses and +RH, owns a WC but does not use; family drives pt to MD appts and cares for grocery shopping.

## 2023-10-17 NOTE — PROVIDER CONTACT NOTE (CHANGE IN STATUS NOTIFICATION) - SITUATION
Pt noted to have some weakness on the L side more than right. Pt admitted for fall, RRT/code stroke called

## 2023-10-17 NOTE — PHYSICAL THERAPY INITIAL EVALUATION ADULT - PERTINENT HX OF CURRENT PROBLEM, REHAB EVAL
Pt is a 89yo F w/ PMH of HTN, HLD, Afib on Xarelto, HFrEF (EF 30%), SVT s/p ablation, endometrial CA s/p LYNETTE pw fall from chair. Pt providing limited hx, and unable to reach emergency contact (Daughter)- hx obtained from pt and chart review. Fall from a chair w/ head strike and inability to stand up afterwards. Pt endorses it is due to weakness but unable to recall if had LOC. Otherwise denies any F/C, CP, SOB, palpitations, abd pain, N/V, constipation or diarrhea. Does endorse Right hip pain. Hospital course: In the ED VSS w/ O2 on for comfort, and labs nonactionable though did have uptrending troponin w/ no CP. Imaging w/out acute fx. She was administered Tylenol, Lidocaine patch and morphine 2mg. Likely mechanical in nature though cannot r/o arrythmia vs syncope, TTE from August reviewed w/ EF 25-30%, Noted to have elevated troponins -> ED spoke w/ primary cards will eval in AM and to hold on NSTEMI tx for now given no symptoms and ECG unchanged. DVT ppx: On Xarelto. xray R hip;  no acute fractures or dislocations xray R forearm: No fracture. Mild ulnar deviation of the fifth digit at the MCP joint, potentially due to patient positioning. CXR: Left basilar atelectasis. Otherwise clear lungs. xray pelvis: Three partially threaded screws again seen within the right femoral neck from prior fracture fixation. Right femoral neck is similar in configuration to prior pelvic radiograph on this AP view. If there is clinical concern for an acute femoral neck fracture, recommend dedicated hip radiographs. Bilateral hip joint spaces are relatively preserved. No acute displaced fracture seen within the pelvis. Atherosclerotic calcifications are noted. CT brain: No acute intracranial hemorrhage, brain edema, or mass effect. No displaced calvarial fracture. . CT cervical spine: No acute fracture or traumatic subluxation. No prevertebral soft tissue swelling. Degenerative changes.

## 2023-10-17 NOTE — PATIENT PROFILE ADULT - NSPROPTRIGHTNOTIFYOBTAINDET_GEN_A_NUR
Physical Therapy Daily Progress Note    Patient: Mariajose Tabor   : 1952  Diagnosis/ICD-10 Code:  Acute right-sided back pain, unspecified back location [M54.9]  Referring practitioner: Didi Talbot MD  Date of Initial Visit: Type: THERAPY  Noted: 2020  Today's Date: 8/10/2020  Patient seen for 8 sessions         Mariajose Tabor reports:  Mild amount of pain medial to right shoulder blade.      Objective   See Exercise, Manual, and Modality Logs for complete treatment.       Assessment/Plan     Noted mild soft tissue restriction at medial border of scapula.  Patient tolerates manual therapy well.  Compliant with HEP.    Progress per Plan of Care           Manual Therapy:    25     mins  52790;  Therapeutic Exercise:         mins  81822;     Neuromuscular Yuliya:        mins  28699;    Therapeutic Activity:          mins  75228;     Gait Training:           mins  80129;     Ultrasound:          mins  95566;    Electrical Stimulation:         mins  14524 ( );  Dry Needling          mins self-pay    Timed Treatment:   25  mins   Total Treatment:     35   mins    Palmer Almendarez PT  Physical Therapist                     Patient confused

## 2023-10-17 NOTE — PHYSICAL THERAPY INITIAL EVALUATION ADULT - GENERAL OBSERVATIONS, REHAB EVAL
Pt a/w fall from chair, posterior neck pain and abrasions to the right forearm, CTH/xrays (-). Pt received supine on stretcher (orange 62L), +IVL, A&OX3 (self, place, month/year), follows simple commands, preference for eyes closed.

## 2023-10-17 NOTE — PROVIDER CONTACT NOTE (CHANGE IN STATUS NOTIFICATION) - ASSESSMENT
pt alert and orientedx4 with some confusion, VSS, unable to lift left arm and left leg but have full sensation on all extremities. Daughter Abhilash unable to verfiy how her baseline over the phone.

## 2023-10-17 NOTE — CONSULT NOTE ADULT - ATTENDING COMMENTS
code stroke called and neuro emergently assessed patient   Destini Piersony (17-Dec-1927) F w/ HTN, HLD, Afib on Xarelto, HFrEF (EF 30%), SVT s/p ablation, endometrial CA s/p LYNETTE Presented initially to the emergency department after a fall from her chair.  Stroke code called after the patient was noted by physical therapy this morning to have left-sided hemiplegia with a right-sided gaze preference and forced turning of the head towards the right.  LKW: 1332 10/16/2023.  Unclear time of symptom onset.  Collateral information obtained from patient's daughter Lindsey who was present when the patient initially presented to the ED.  Regarding the initial fall, patient activated life alert from home after she fell out of chair at home and was unable to get up.  Peña has an unclear recollection of the event. It was not witnessed by the patient's daughter though there was head strike reported and patient had a CT head done yesterday which was nonacute.  Per daughter, patient has a poor short-term memory. Daughter reports that she came to the hospital with her mother at initial presentation that time the mother was minimally responsive and the daughter noted head movement to both sides.  Daughter reports that the thuanetradha is typically in bed, uses a walker occasionally and usually uses a wheelchair to get around, more so these past few days.  Patient has a home nursing aide who helps with day-to-day activities.  Patient is A&O x3 at present, daughter reports patient is fully oriented at baseline. Patient reports her last dose of Xarelto for her a. fib was yesterday morning.  CT head and CTA head & neck are non-acute though  the CT perfusion reveals an area in the right parasagittal frontal parietal region with 15 cc of Tmax greater than 6 seconds without a core infarct.  At the time of exam, patient had forced right gaze deviation with a persistently rightward turned head.     Patient continues to have occasional jerking movement of bilateral upper extremities, nonrhythmic which the patient says have been present for a long time and she relates to her neck pain which has been present since her fall.  Currently patient reports inability to look towards the left, severe weakness of the left upper and lower extremity and less severe generalized weakness.  Patient denies headache, nausea, vomiting, dizziness, vision changes, hearing changes, focal numbness.     CTH no acute findings: question of R frontal hypod  CTA H/N neg   CTP with Tmax of 15mL in R frontal   NIHSS: 6  preMRS: 4  Patient was treated under wake-up stroke protocol though was not a tenecteplase candidate as she refused tenecteplase after discussion with family   Patient was not a thrombectomy candidate as there was no LVO on imaging      IMPRESSION  Acute onset R gaze deviation with R head turning, LLE pralysis & LUE weakness. 15 cc R parasagital frontal parietal area of tmax > 6 sec on CTP. Consistent with R distal ULYSSES vs ULYSSES branch occlusion.     RECOMMENDATION   - permissive HTN for 24 hours up to 220/110  - gradual normotension after 24 hrs  - telemetry monitoring  -STAT MRI head non contrast    - consider ILR   - start ASA 81 daily  - Hold Xarelto, decision to restart pending MRI results  - start atorva 80 mg daily, titrate based on lipid profile  - TTE  - Lipid panel  -HbA1C  - PT/OT  - dysphagia screen  Ayo Novak MD  Vascular Neurology  Office: [phone]

## 2023-10-17 NOTE — CHART NOTE - NSCHARTNOTEFT_GEN_A_CORE
This report was requested by: Janine Arceo | Reference #: [id number]    Practitioner Count: 4  Pharmacy Count: 2  Current Opioid Prescriptions: 0  Current Benzodiazepine Prescriptions: 1  Current Stimulant Prescriptions: 0      Patient Demographic Information (PDI)       PDI	First Name	Last Name	Birth Date	Gender	Street Address	[facility]	Zip Code  A	Basilia Srinivasan	12/17/1927	Female	[address]	[facility]	[id number]  B	Basilia Srinivasan	12/17/1927	Female	[phone]	[facility]	[id number]    Prescription Information      PDI Filter:    PDI	Current Rx	Drug Type	Rx Written	Rx Dispensed	Drug	Quantity	Days Supply	Prescriber Name	Prescriber HERSON #	Payment Method	Dispenser  A	Y	B	10/09/2023	10/10/2023	alprazolam 0.5 mg tablet	90	30	Tarun Davis MD	[id number]	Insurance	[facility]  A	Y		10/09/2023	10/10/2023	zolpidem tartrate 10 mg tablet	30	30	Tarun Davis MD	[id number]	Jc	[facility]  A	N		08/24/2023	08/24/2023	zolpidem tartrate 5 mg tablet	30	30	Jeison Romero	[id number]	Insurance	[facility]  A	N	B	08/24/2023	08/24/2023	alprazolam 0.25 mg tablet	30	30	Jeison Romero	[id number]	Insurance	[facility]  A	N	B	08/02/2023	08/03/2023	alprazolam 0.5 mg tablet	90	30	Tarun Davis MD	[id number]	Insurance	[facility]  A	N		05/17/2023	07/17/2023	zolpidem tartrate 10 mg tablet	30	30	Tarun Davis MD	[id number]	Jc	[facility]  A	N	B	06/24/2023	06/26/2023	alprazolam 0.5 mg tablet	90	30	Tarun Davis MD	[id number]	Insurance	[facility]  A	N		05/17/2023	06/16/2023	zolpidem tartrate 10 mg tablet	30	30	Tarun Davis MD	[id number]	Jc	[facility]  A	N	B	05/17/2023	05/23/2023	alprazolam 0.5 mg tablet	90	30	Tarun Davis MD	[id number]	Insurance	[facility]  A	N		05/17/2023	05/17/2023	zolpidem tartrate 10 mg tablet	30	30	Tarun Davis MD	[id number]	Jc	[facility]  A	N	B	04/24/2023	04/25/2023	alprazolam 0.5 mg tablet	90	30	Tarun Davis MD	[id number]	Insurance	[facility]  A	N		11/16/2022	04/13/2023	zolpidem tartrate 10 mg tablet	30	30	Tarun Davis MD	[id number]	Insurance	[facility]  A	N	B	03/20/2023	03/20/2023	alprazolam 0.5 mg tablet	90	30	Tarun Davis MD	[id number]	Insurance	[facility]  A	N		11/16/2022	03/17/2023	zolpidem tartrate 10 mg tablet	30	30	Tarun Davis MD	[id number]	Insurance	[facility]  A	N	B	02/20/2023	02/21/2023	alprazolam 0.5 mg tablet	90	30	Tarun Davsi MD	[id number]	Insurance	[facility]  A	N		11/16/2022	01/18/2023	zolpidem tartrate 10 mg tablet	30	30	Tarun Davis MD	[id number]	Insurance	[facility]  A	N		11/16/2022	01/17/2023	zolpidem tartrate 10 mg tablet	30	30	Tarun Davis MD	[id number]	Jc	[facility]  A	N	B	01/16/2023	01/17/2023	alprazolam 0.5 mg tablet	90	30	Tarun Davis MD	[id number]	Insurance	[facility]  A	N		11/16/2022	12/19/2022	zolpidem tartrate 10 mg tablet	30	30	Tarun Davis MD	[id number]	Jc	[facility]  A	N	B	12/12/2022	12/13/2022	alprazolam 0.5 mg tablet	90	30	Tarun Davis MD	[id number]	Insurance	[facility]  A	N	B	11/16/2022	11/17/2022	alprazolam 0.5 mg tablet	90	30	Tarun Davis MD	[id number]	Insurance	[facility]  A	N		11/16/2022	11/17/2022	zolpidem tartrate 10 mg tablet	30	30	Tarun Davis MD	[id number]	[facility]  A	N	B	10/17/2022	10/19/2022	alprazolam 0.5 mg tablet	90	30	Tarun Davis MD	[id number]	Insurance	[facility]  B	N	B	08/16/2023	08/20/2023	alprazolam 0.25 mg tablet	30	30	Walt Valladares MD	[id number]	Jc	[facility]  B	N		08/16/2023	08/16/2023	zolpidem tartrate 5 mg tablet	30	30	Walt Valladares MD	[id number]	Jc	[facility]  B	N	B	08/15/2023	08/15/2023	alprazolam 0.25 mg tablet	7	7	Nino Marr A	[id number]	Jc	[facility]

## 2023-10-17 NOTE — PATIENT PROFILE ADULT - BILL PAYMENT
Patient is a 32-year-old female with past medical history of hyperlipidemia, yeast infection presenting with vaginal itching and dysuria x4 days.  Patient reports developing vaginal itching and white-colored vaginal discharge associated with pelvic pressure and dysuria.  Patient reports symptoms similar to when she had yeast infection in the past.  Patient denies any fevers, chills, nausea, vomiting, flank pain, history of STDs, diarrhea, constipation.  Patient speaks radha and english;  translating per patient's preference. no

## 2023-10-17 NOTE — PATIENT PROFILE ADULT - FUNCTIONAL ASSESSMENT - DAILY ACTIVITY 2.
1 = Total assistance Fluconazole Counseling:  Patient counseled regarding adverse effects of fluconazole including but not limited to headache, diarrhea, nausea, upset stomach, liver function test abnormalities, taste disturbance, and stomach pain.  There is a rare possibility of liver failure that can occur when taking fluconazole.  The patient understands that monitoring of LFTs and kidney function test may be required, especially at baseline. The patient verbalized understanding of the proper use and possible adverse effects of fluconazole.  All of the patient's questions and concerns were addressed.

## 2023-10-18 NOTE — OCCUPATIONAL THERAPY INITIAL EVALUATION ADULT - BED MOBILITY TRAINING, PT EVAL
GOAL: Pt will require min A with all bed mobility tasks within 4 weeks to increase ability to engage in functional mobility tasks.

## 2023-10-18 NOTE — CONSULT NOTE ADULT - SUBJECTIVE AND OBJECTIVE BOX
CARDIOLOGY CONSULT - Dr. Mccartney       HPI:  Pt is a 89yo F w/ PMH of HTN, HLD, Afib on Xarelto, HFrEF (EF 30%), SVT s/p ablation, endometrial CA s/p LYNETTE pw fall from chair.    Pt providing limited hx, and unable to reach emergency contact (Daughter)- hx obtained from patient and chart review.    Fall from a chair w/ head strike and inability to stand up afterwards. Pt endorses it is due to weakness but unable to recall if had LOC. Otherwise denies any F/C, CP, SOB, palpitations, abd pain, N/V, constipation or diarrhea. Does endorse Right hip pain.    In the ED VSS w/ O2 on for comfort, and labs nonactionable though did have uptrending troponin w/ no CP. Imaging w/out acute fx. She was administered Tylenol, Lidocaine patch and morphine 2mg    (16 Oct 2023 23:56)      PAST MEDICAL & SURGICAL HISTORY:  Anxiety      Breast Lump      Insomnia      PE (pulmonary thromboembolism)      PAF (paroxysmal atrial fibrillation)  On Xarelto      HTN (hypertension)      HLD (hyperlipidemia)      Endometrial cancer  S/P LYNETTE with SBO      [facility]      History of Colonoscopy- 2011/Sept      History of D&C- 8/12/11      Status post hysterectomy  endometrial cancer      Status post cataract extraction  OS      Leg fracture, right  Hip fx repair in Nov 2019        PREVIOUS DIAGNOSTIC TESTING:    [x] Echocardiogram:  < from: TTE with Doppler (w/Cont) (08.11.23 @ 12:34) >  CONCLUSIONS:  1. Mitral annular calcification and calcified mitral  leaflets with decreased diastolic opening. Mild-moderate  mitral regurgitation. Mean transmitral valve gradient  equals 9 mm Hg, consistent with moderate mitral stenosis.  2. Aortic valve leaflet morphology not well visualized.  Mild aortic regurgitation.  3. Severely dilated left atrium.  LA volume index = 56  cc/m2.  4. Severe global left ventricular systolic dysfunction.  Estimated LVEF in the 25-30% range (by visual estimate).  Endocardial visualization enhanced with intravenous  injection of echo contrast (Definity).  5. Moderate right atrial enlargement.  6. Right ventricular enlargement with decreased right  ventricular systolic function.  7. Estimated right ventricular systolic pressure equals 74  mm Hg, assuming right atrial pressure equals 10 mm Hg,  consistent with severe pulmonary hypertension.  *** Compared with echocardiogram of 3/27/2018, left  ventricular systolic function has deteriorated  significantly.    < end of copied text >    [ ]  Catheterization:  [ ] Stress Test:  	    MEDICATIONS:  Home Medications:  acetaminophen 325 mg oral tablet: 2 tab(s) orally every 6 hours, As needed, Temp greater or equal to 38C (100.4F), Mild Pain (1 - 3) (17 Oct 2023 00:22)  ALPRAZolam 0.5 mg oral tablet: 1 tab(s) orally 3 times a day, As Needed (17 Oct 2023 00:22)  atorvastatin 10 mg oral tablet: 1 tab(s) orally once a day (at bedtime) (17 Oct 2023 00:22)  melatonin 3 mg oral tablet: 1 tab(s) orally once a day (at bedtime) As needed Insomnia (17 Oct 2023 00:22)  metoprolol succinate 25 mg oral tablet, extended release: 3 tab(s) orally once a day (17 Oct 2023 00:22)  pantoprazole 40 mg oral delayed release tablet: 1 tab(s) orally once a day (before a meal) (17 Oct 2023 00:22)  rivaroxaban 15 mg oral tablet: 1 tab(s) orally once a day (17 Oct 2023 00:22)  sacubitril-valsartan 24 mg-26 mg oral tablet: 1 tab(s) orally 2 times a day (17 Oct 2023 00:22)  venlafaxine 75 mg oral capsule, extended release: 1 cap(s) orally once a day (17 Oct 2023 00:22)  Vitamin D3 1250 mcg (50,000 intl units) oral capsule: 1 cap(s) orally once a week (17 Oct 2023 00:22)  zolpidem 5 mg oral tablet: 1 tab(s) orally once a day (at bedtime) As needed Insomnia (17 Oct 2023 00:22)      MEDICATIONS  (STANDING):  aspirin  chewable 81 milliGRAM(s) Oral daily  atorvastatin 80 milliGRAM(s) Oral at bedtime  metoprolol succinate ER 75 milliGRAM(s) Oral daily  nystatin Powder 1 Application(s) Topical two times a day  pantoprazole    Tablet 40 milliGRAM(s) Oral before breakfast  sacubitril 24 mG/valsartan 26 mG 1 Tablet(s) Oral two times a day  venlafaxine XR. 75 milliGRAM(s) Oral daily      FAMILY HISTORY:  No pertinent family history in first degree relatives        SOCIAL HISTORY:    [x] Non-smoker  [ ] Smoker  [ ] Alcohol    Allergies    soaps and creams causes rash uses only sensitive skin soap (Rash)  piperacillin-tazobactam (Rash)  sulfa drugs (Unknown)  Eye cream from the 1960s Neocortef ?spelling caused eyes to becomes swollen (Unknown)  Voltaren (Rash)  neomycin (Unknown)    Intolerances    	    REVIEW OF SYSTEMS:  CONSTITUTIONAL: No fever, weight loss, or fatigue  EYES: No eye pain, visual disturbances, or discharge  ENMT:  No difficulty hearing, tinnitus, vertigo; No sinus or throat pain  NECK: No pain or stiffness  RESPIRATORY: No cough, wheezing, chills or hemoptysis; No Shortness of Breath  CARDIOVASCULAR: No chest pain, palpitations, passing out, dizziness, or leg swelling  GASTROINTESTINAL: No abdominal or epigastric pain. No nausea, vomiting, or hematemesis; No diarrhea or constipation. No melena or hematochezia.  GENITOURINARY: No dysuria, frequency, hematuria, or incontinence  NEUROLOGICAL: No headaches, memory loss, loss of strength, numbness, or tremors  SKIN: No itching, burning, rashes, or lesions   	    [x] All others negative	  [ ] Unable to obtain    PHYSICAL EXAM:  T(C): 36.9 (10-18-23 @ 12:48), Max: 36.9 (10-18-23 @ 12:48)  HR: 107 (10-18-23 @ 12:48) (94 - 127)  BP: 129/79 (10-18-23 @ 12:48) (111/70 - 129/79)  RR: 18 (10-18-23 @ 12:48) (16 - 18)  SpO2: 99% (10-18-23 @ 12:48) (96% - 100%)  Wt(kg): --  I&O's Summary    17 Oct 2023 07:01  -  18 Oct 2023 07:00  --------------------------------------------------------  IN: 0 mL / OUT: 350 mL / NET: -350 mL        Appearance: Normal	  Psychiatry: A & O x 3, Mood & affect appropriate  HEENT:   Normal oral mucosa, PERRL, EOMI	  Lymphatic: No lymphadenopathy  Cardiovascular: Normal S1 S2,RRR, No JVD, No murmurs  Respiratory: Lungs clear to auscultation	  Gastrointestinal:  Soft, Non-tender, + BS	  Skin: No rashes, No ecchymoses, No cyanosis	  Neurologic: Non-focal  Extremities: Normal range of motion, No clubbing, cyanosis or edema  Vascular: Peripheral pulses palpable 2+ bilaterally    TELEMETRY: Afib 90-120s	    ECG:  	  RADIOLOGY:  < from: CT Brain Perfusion Maps Stroke (10.17.23 @ 11:32) >  IMPRESSION:    Brain CT: No acute hemorrhage, mass effect and no change compared with   prior study dated 10/16/2023.    Neck CTA: No hemodynamically significant stenosis.    Brain CTA: No large vessel occlusion or high-grade stenosis.    Perfusion maps. No core infarct. 15 cc of hypoperfusion within the right   frontal parietal parasagittal region.    Other results of the brain CT were discussed with Dr. Carmona at 11:18 AM   on 10/17/2023.    The results of the head and neck CTA and perfusion maps were discussed   with Dr. Carmona at 11:29 AM on 10/17/2023.    --- End of Report ---    < end of copied text >    < from: Xray Chest 1 View AP/PA (10.16.23 @ 13:54) >    FINDINGS:  The heart is magnified in this projection.  Left basilar patchy opacity likely atelectasis. Lungs otherwise clear.  No pleural effusion or pneumothorax.  No acute bony abnormalities.    IMPRESSION:  Left basilar atelectasis. Otherwise clear lungs.    --- End of Report ---    < end of copied text >      OTHER: 	  	  LABS:	 	    CARDIAC MARKERS:  Troponin T, High Sensitivity Result: 48 ng/L (10-17 @ 12:34)  Troponin T, High Sensitivity Result: 48 ng/L (10-16 @ 17:10)  Troponin T, High Sensitivity Result: 30 ng/L (10-16 @ 15:14)  Troponin T, High Sensitivity Result: 26 ng/L (10-16 @ 14:05)                                  12.6   10.05 )-----------( 236      ( 17 Oct 2023 12:34 )             39.8     10-17    137  |  101  |  25<H>  ----------------------------<  103<H>  5.0   |  26  |  1.13    Ca    9.2      17 Oct 2023 12:34  Phos  4.5     10-17  Mg     2.0     10-17    TPro  6.6  /  Alb  3.6  /  TBili  0.8  /  DBili  x   /  AST  24  /  ALT  14  /  AlkPhos  76  10-17    PT/INR - ( 17 Oct 2023 12:34 )   PT: 12.2 sec;   INR: 1.11 ratio         PTT - ( 17 Oct 2023 12:34 )  PTT:31.7 sec  proBNP:   Lipid Profile:   HgA1c:   TSH:       
Neurology - Consult Note    -  Spectra: [phone] ([facility]), [phone] ([facility])  -    HPI: DELVIS MCBRIDE, 89y (17-Dec-1927) F w/ PMHx significant for HTN, HLD, Afib on Xarelto, HFrEF (EF 30%), SVT s/p ablation, endometrial CA s/p LYNETTE Presented initially to the emergency department after a fall from her chair.  Stroke code called after the patient was noted by physical therapy this morning to have left-sided hemiplegia with a right-sided gaze preference and forced turning of the head towards the right.  LKW: 1332 10/16/2023.  Unclear time of symptom onset.  Collateral information obtained from patient's daughter Lindsey who was present when the patient initially presented to the ED.  Regarding the initial fall, patient activated life alert from home after she fell out of chair at home and was unable to get up.  Peña has an unclear recollection of the event. It was not witnessed by the patient's daughter though there was head strike reported and patient had a CT head done yesterday which was nonacute.  Per daughter, patient has a poor short-term memory. Daughter reports that she came to the hospital with her mother at initial presentation that time the mother was minimally responsive and the daughter noted head movement to both sides.  Daughter reports that the thuanetradha is typically in bed, uses a walker occasionally and usually uses a wheelchair to get around, more so these past few days.  Patient has a home nursing aide who helps with day-to-day activities.  Patient is A&O x3 at present, daughter reports patient is fully oriented at baseline. Patient reports her last dose of Xarelto for her a. fib was yesterday morning.  CT head and CTA head & neck are non-acute though  the CT perfusion reveals an area in the right parasagittal frontal parietal region with 15 cc of Tmax greater than 6 seconds without a core infarct.  At the time of exam, patient had forced right gaze deviation with a persistently rightward turned head.     Patient continues to have occasional jerking movement of bilateral upper extremities, nonrhythmic which the patient says have been present for a long time and she relates to her neck pain which has been present since her fall.  Currently patient reports inability to look towards the left, severe weakness of the left upper and lower extremity and less severe generalized weakness.  Patient denies headache, nausea, vomiting, dizziness, vision changes, hearing changes, focal numbness.    NIHSS: 6  preMRS: 4  Patient was treated under wake-up stroke protocol though was not a tenecteplase candidate as she refused tenecteplase  Patient was not a thrombectomy candidate as there was no LVO on imaging    Review of Systems:  All other review of systems is negative unless indicated above.    Allergies:  soaps and creams causes rash uses only sensitive skin soap (Rash)  piperacillin-tazobactam (Rash)  sulfa drugs (Unknown)  Eye cream from the 1960s Neocortef ?spelling caused eyes to becomes swollen (Unknown)  Voltaren (Rash)  neomycin (Unknown)      PMHx/PSHx/Family Hx: As above, otherwise see below   Anxiety    Hyperlipemia    Seasonal Allergies    Breast Lump    Initial Insomnia    Insomnia    Hyperlipidemia    Hypertension    Neuropathy associated with cancer    PE (pulmonary thromboembolism)    PAF (paroxysmal atrial fibrillation)    HTN (hypertension)    HLD (hyperlipidemia)    Endometrial cancer        Social Hx:  No current use of tobacco, alcohol, or illicit drugs      Medications:  MEDICATIONS  (STANDING):  atorvastatin 10 milliGRAM(s) Oral at bedtime  metoprolol succinate ER 75 milliGRAM(s) Oral daily  pantoprazole    Tablet 40 milliGRAM(s) Oral before breakfast  rivaroxaban 15 milliGRAM(s) Oral with dinner  sacubitril 24 mG/valsartan 26 mG 1 Tablet(s) Oral two times a day  venlafaxine XR. 75 milliGRAM(s) Oral daily    MEDICATIONS  (PRN):  acetaminophen     Tablet .. 650 milliGRAM(s) Oral every 6 hours PRN Temp greater or equal to 38C (100.4F), Mild Pain (1 - 3)  ALPRAZolam 0.5 milliGRAM(s) Oral three times a day PRN Anxiety      Vitals:  T(C): 37 (10-17-23 @ 12:33), Max: 37 (10-16-23 @ 19:33)  HR: 69 (10-17-23 @ 12:33) (69 - 113)  BP: 126/75 (10-17-23 @ 12:33) (101/70 - 134/84)  RR: 18 (10-17-23 @ 12:33) (15 - 18)  SpO2: 98% (10-17-23 @ 12:33) (95% - 99%)    Physical Examination:   General - R gaze deviation with r head turning. Head turning can be overcome  Cardiovascular - Peripheral pulses palpable, no edema  Eyes -  Fundoscopy not performed due to safety precautions in the setting of infection risk    Neurologic Exam:  Mental status - Awake, Alert, Oriented to person, place, and time. Speech slow, hypophonic, repetition and naming intact. Follows simple and complex commands. Attention/concentration, remote memory (including registration and recall), and fund of knowledge intact    Cranial nerves - PERRLA, No BTT on L side, R gaze deviation cannot cross midline, face sensation (V1-V3) intact b/l, facial strength intact without asymmetry b/l, hearing intact b/l, palate with symmetric elevation, trapezius 5/5 strength b/l, tongue midline on protrusion with full lateral movement    Motor - Decreasecd bulk and normal tone throughout.  Strength testing            R        Deltoid:  4    Biceps:  4    Triceps:  4    Wrist Extension:  4    Wrist Flexion:  4    Interossei:  4    :  4    Hip Flexion:  4    Hip Extension:  4    Knee Flexion:  4    Knee Extension:  4    Dorsiflexion:  4    Plantar Flexion:  4        L        Deltoid:  3    Biceps:  3    Triceps:  2    Wrist Extension:  2    Wrist Flexion:  2    Interossei:  2    :  2    Hip Flexion:  0    Hip Extension:  0    Knee Flexion:  0    Knee Extension:  0    Dorsiflexion:  0    Plantar Flexion:  0      Sensation - Light touch/temperature intact throughout    Coordination - Finger to Nose intact in RUE. Cannot perform with LUE 2/2 weakness. Occasional jerks of the chest, b/l neck and R upper extremity noted during the exam    Gait and station -   Not tested due to patient's safety concerns    Labs:                        12.6   10.05 )-----------( 236      ( 17 Oct 2023 12:34 )             39.8     10-17    137  |  101  |  25<H>  ----------------------------<  103<H>  5.0   |  26  |  1.13    Ca    9.2      17 Oct 2023 12:34  Phos  4.5     10-17  Mg     2.0     10-17    TPro  6.6  /  Alb  3.6  /  TBili  0.8  /  DBili  x   /  AST  24  /  ALT  14  /  AlkPhos  76  10-17    CAPILLARY BLOOD GLUCOSE  111 (17 Oct 2023 11:20)      POCT Blood Glucose.: 111 mg/dL (17 Oct 2023 10:48)    LIVER FUNCTIONS - ( 17 Oct 2023 12:34 )  Alb: 3.6 g/dL / Pro: 6.6 g/dL / ALK PHOS: 76 U/L / ALT: 14 U/L / AST: 24 U/L / GGT: x             PT/INR - ( 17 Oct 2023 12:34 )   PT: 12.2 sec;   INR: 1.11 ratio         PTT - ( 17 Oct 2023 12:34 )  PTT:31.7 sec  CSF:                  Radiology:  CT Head No Cont:  (16 Oct 2023 18:05)    < from: CT Angio Neck Stroke Protocol w/ IV Cont (10.17.23 @ 11:31) >  IMPRESSION:    Brain CT: No acute hemorrhage, mass effect and no change compared with   prior study dated 10/16/2023.    Neck CTA: No hemodynamically significant stenosis.    Brain CTA: No large vessel occlusion or high-grade stenosis.    Perfusion maps. No core infarct. 15 cc of hypoperfusion within the right   frontal parietal parasagittal region.    < end of copied text >

## 2023-10-18 NOTE — CONSULT NOTE ADULT - TIME BILLING
agree with above  Pt is a 89yo F w/ PMH of HTN, HLD, Afib on Xarelto, HFrEF (EF 30%), SVT s/p ablation, endometrial CA s/p LYNETTE pw fall from chair, c/f stroke.    #Fall  -s/p stroke code  -CTH, CTA H/N no significant findings  -Neuro work up  -F/u MRI  -Recent echo severe global lv systolic dysfxn, severe pHTN  -No need for ILR as patient has known afib    #Cardiomyopathy, Acute HfrEF  -etiology unclear  -Possible rhythm versus ischemic versus valvular  -Deferred ischemic eval in the past given advanced age and functional status  -c/w low dose entresto/ BB     #Afib  -cont Xarelto  -Continue metoprolol    #HTN  -Bp stable  -Cont meds

## 2023-10-18 NOTE — CONSULT NOTE ADULT - ASSESSMENT
ASSESSMENT       IMPRESSION  Acute onset R gaze deviation with R head turning, LLE pralysis & LUE weakness. 15 cc R parasagital frontal parietal area of tmax > 6 sec on CTP. Consistent with R distal ULYSSES vs ULYSSES branch occlusion.     RECOMMENDATION   [] permissive HTN for 24 hours up to 220/110  [] gradual normotension after 24 hrs  [] telemetry monitoring  [] STAT MRI head non contrast  [] loop recorder outpt vs inpatient  [] start ASA 81 daily  [] Hold Xarelto, decision to restart pending MRI results  [] start atorva 80 mg daily, titrate based on lipid profile  [] TTE  [] CBC, BMP  [] Lipid panel  [] HbA1C  [] PT/OT  [] dysphagia screen    Case seen with stroke fellow Dr. Colin Melgar under the supervision of Dr. Donte Novak. Note finalized upon attending attestation. 
ECHO 8/11/23: mild to mod MR , mild AR,  Severe global left ventricular systolic dysfunction. LVEF in the 25-30% decreased right ventricular systolic function.severe pulmonary hypertension.  ECHO 4/17/18: EF 55-60 % moderate to severe mitral stenosis. Normal left ventricular systolic function. mild aortic stenosis. Mild-moderate aortic regurgitation.  Normal right ventricular size and function.      A/p  Pt is a 89yo F w/ PMH of HTN, HLD, Afib on Xarelto, HFrEF (EF 30%), SVT s/p ablation, endometrial CA s/p LYNETTE pw fall from chair, c/f stroke.    #Fall  -s/p stroke code  -CTH, CTA H/N no significant findings  -Neuro work up  -F/u MRI  -Recent echo severe global lv systolic dysfxn, severe pHTN  -No need for ILR as patient has known afib    #Cardiomyopathy, Acute HfrEF  -etiology unclear  -Possible rhythm versus ischemic versus valvular  -Deferred ischemic eval in the past given advanced age and functional status  -c/w low dose entresto/ BB     #Afib  -cont Xarelto  -Continue metoprolol    #HTN  -Bp stable  -Cont meds

## 2023-10-18 NOTE — OCCUPATIONAL THERAPY INITIAL EVALUATION ADULT - DIAGNOSIS, OT EVAL
pt presents with decreased strength, endurance, cognition, ROM, postural control, and balance limiting their ability to engage in ADLs and functional tasks.

## 2023-10-18 NOTE — OCCUPATIONAL THERAPY INITIAL EVALUATION ADULT - LIVES WITH, PROFILE
as per chart, pt lives alone in a private house with a few steps to enter and a stair lift inside. pt receives bedbaths from home health aide that assists 5 days a week for 4 hours. pt ambulates with a cane or a walker and owns a wheelchair, however does not use it.

## 2023-10-19 NOTE — SWALLOW BEDSIDE ASSESSMENT ADULT - SWALLOW EVAL: DIAGNOSIS
Pt presents with a grossly functional oropharyngeal swallow mechanism. Pt appears deconditioned s/p recent fall. Swallow sequence is marked by reduced biting/tearing of hard solids with mildly prolonged mastication, likely attributed due to deconditioned/lethargic state. Improved oral management of soft solids with good oral clearance. One time cough post rapid intake of thin liquids via straw sips only. All remaining trials of cup sips of thins, purees, and chewables tolerated without overt signs or symptoms of penetration or aspiration.

## 2023-10-19 NOTE — SWALLOW BEDSIDE ASSESSMENT ADULT - COMMENTS
10/17 Code Stroke called: Pt noted to have some weakness on the L side more than right.  NIHSS: 6. preMRS: 4  Neurologic Exam:  Mental status - Awake, Alert, Oriented to person, place, and time. Speech slow, hypophonic, repetition and naming intact. Follows simple and complex commands. Attention/concentration, remote memory (including registration and recall), and fund of knowledge intact. R gaze deviation cannot cross midline.  Impression: IMPRESSION  Acute onset R gaze deviation with R head turning, LLE pralysis & LUE weakness. 15 cc R parasagital frontal parietal area of tmax > 6 sec on CTP. Consistent with R distal ULYSSES vs ULYSSES branch occlusion.  10/16: CXR - Left basilar atelectasis. Otherwise clear lungs.  10/17: CT Head - No acute hemorrhage, mass effect and no change compared with prior study dated 10/16/2023.  10/18: MRI Head - IMPRESSION: Moderate atrophy. Small vessel white matter ischemic changes. 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 Scattered supratentorial infarcts in multiple vascular distributions is suggestive of cardioembolic infarcts. No acute hemorrhage.    10/18: exam a bit improved. no more gaze deviation to R but still pref.  still with L weakness >R     No prior h/o speech/swallow w/u at this location per EMR

## 2023-10-19 NOTE — DIETITIAN INITIAL EVALUATION ADULT - NSICDXPASTSURGICALHX_GEN_ALL_CORE_FT
PAST SURGICAL HISTORY:  [facility]     History of Colonoscopy- 2011/Sept     History of D&C- 8/12/11     Leg fracture, right Hip fx repair in Nov 2019    Status post cataract extraction OS    Status post hysterectomy endometrial cancer

## 2023-10-19 NOTE — DIETITIAN INITIAL EVALUATION ADULT - PERTINENT LABORATORY DATA
10-17    137  |  101  |  25<H>  ----------------------------<  103<H>  5.0   |  26  |  1.13    Ca    9.2      17 Oct 2023 12:34    TPro  6.6  /  Alb  3.6  /  TBili  0.8  /  DBili  x   /  AST  24  /  ALT  14  /  AlkPhos  76  10-17

## 2023-10-19 NOTE — DIETITIAN INITIAL EVALUATION ADULT - OTHER INFO
- Dosing wt: 199.5 pounds (10/16) - ? accuracy in setting of [facility]  - RD unable to obtain bed scale weight.   - No new weights available per chart at this time.   - Wt hx per [facility] HIE in pounds: 146 (8/11), 130 (1/10/20).   - RD to continue to monitor weight trends as able.   - Nutritionally Pertinent Meds in-house: lopressor, atorvastatin, protonix.   - Nutritionally Pertinent Labs: high BG; high POCT.   - Cardio: HFrEF.   - Code stroke called 10/17.

## 2023-10-19 NOTE — SWALLOW BEDSIDE ASSESSMENT ADULT - SLP GENERAL OBSERVATIONS
Pt encountered bedside, AA&Ox1-2 (self), stating it is a doctor's office and next month is Thanksgiving. Pt re-oriented to exact temporal/spatial concepts. +Right gaze preference with reported neck stiffness when trying to move head/neck to midline. Unable to turn head to the left despite verbal/tactile cues. A pillow provided on the right side of her head to rest. Pt follows simple directives and answers simple yes/no questions. She reports that she gets food delivered through a service and her granddaughter will also provide cooked meals at home. Pt denies dysphagia or odynophagia. Pt is able to open eyes, however, appears to be overall deconditioned s/p recent fall and will require 1:1 assist for all meals.

## 2023-10-19 NOTE — DIETITIAN INITIAL EVALUATION ADULT - PERTINENT MEDS FT
MEDICATIONS  (STANDING):  aspirin  chewable 81 milliGRAM(s) Oral daily  atorvastatin 80 milliGRAM(s) Oral at bedtime  metoprolol succinate ER 75 milliGRAM(s) Oral daily  nystatin Powder 1 Application(s) Topical two times a day  pantoprazole    Tablet 40 milliGRAM(s) Oral before breakfast  rivaroxaban 15 milliGRAM(s) Oral with dinner  sacubitril 24 mG/valsartan 26 mG 1 Tablet(s) Oral two times a day  venlafaxine XR. 75 milliGRAM(s) Oral daily    MEDICATIONS  (PRN):  acetaminophen     Tablet .. 650 milliGRAM(s) Oral every 6 hours PRN Temp greater or equal to 38C (100.4F), Mild Pain (1 - 3)  ALPRAZolam 0.5 milliGRAM(s) Oral three times a day PRN Anxiety

## 2023-10-19 NOTE — DIETITIAN INITIAL EVALUATION ADULT - NSFNSPHYEXAMSKINFT_GEN_A_CORE
Per Flowsheets:  - R heel stage 1 pressure injury   - L heel stage 1 pressure injury   - Coccyx suspected DTI

## 2023-10-19 NOTE — SWALLOW BEDSIDE ASSESSMENT ADULT - SWALLOW EVAL: RECOMMENDED FEEDING/EATING TECHNIQUES
allow for swallow between intakes/check mouth frequently for oral residue/pocketing/maintain upright posture during/after eating for 30 mins/no straws/oral hygiene/position upright (90 degrees)/small sips/bites

## 2023-10-19 NOTE — DIETITIAN INITIAL EVALUATION ADULT - REASON INDICATOR FOR ASSESSMENT
Nutrition Consult for MST score 2 or > & pressure injury stage 2 or >.   Source: Chart review, Electronic Medical Record, team. Pt with dementia, A&Ox1-2.   Chart reviewed, events noted.

## 2023-10-19 NOTE — DIETITIAN INITIAL EVALUATION ADULT - OTHER CALCULATIONS
Fluid needs deferred to team.  Estimated needs using IBW in setting of ? accuracy of dosing weight with consideration for multiple pressure injuries.

## 2023-10-19 NOTE — DIETITIAN INITIAL EVALUATION ADULT - REASON FOR ADMISSION
Patient: Faby Le    Procedure Summary     Date: 05/04/23 Room / Location: [facility] OR 06 / [facility] MAIN OR    Anesthesia Start: 0729 Anesthesia Stop: 0900    Procedure: TOTAL KNEE ARTHROPLASTY WITH CORI ROBOT (Right: Knee) Diagnosis:       Osteoarthritis of knee, unspecified      (Osteoarthritis of knee, unspecified [M17.9])    Surgeons: Chino Herrera II, MD Provider: Angela Moralez MD    Anesthesia Type: general, ERAS Protocol ASA Status: 3          Anesthesia Type: general, ERAS Protocol    Vitals  Vitals Value Taken Time   /48 05/04/23 1101   Temp 97.3 °F (36.3 °C) 05/04/23 0856   Pulse 116 05/04/23 1101   Resp 13 05/04/23 1045   SpO2 89 % 05/04/23 1101   Vitals shown include unvalidated device data.        Post Anesthesia Care and Evaluation    Patient location during evaluation: PACU  Patient participation: complete - patient participated  Level of consciousness: awake and alert  Pain management: satisfactory to patient    Airway patency: patent  Anesthetic complications: No anesthetic complications  PONV Status: none  Cardiovascular status: acceptable  Respiratory status: acceptable  Hydration status: acceptable       Contusion of unspecified part of head, initial encounter    Per chart: 89yo F w/ PMH of HTN, HLD, Afib on Xarelto, HFrEF (EF 30%), SVT s/p ablation, endometrial CA s/p LYNETTE pw fall from chair. Pt providing limited hx, and unable to reach emergency contact (Daughter)- hx obtained from patient and chart review. Fall from a chair w/ head strike and inability to stand up afterwards. Pt endorses it is due to weakness but unable to recall if had LOC. Otherwise denies any F/C, CP, SOB, palpitations, abd pain, N/V, constipation or diarrhea. Does endorse Right hip pain.

## 2023-10-19 NOTE — DIETITIAN INITIAL EVALUATION ADULT - ORAL INTAKE PTA/DIET HISTORY
- Unable to assess intake/diet hx PTA at this time; RD to reassess as able.   - Per SLP, pt reported consuming regular diet PTA with regular texture/consistency.  - No known food allergies or intolerances noted per patient profile.   - No micronutrient supplementation PTA reported per outpatient medications list.   - SLP following for difficulty chewing/swallowing; recommendations below.

## 2023-10-19 NOTE — SWALLOW BEDSIDE ASSESSMENT ADULT - PHARYNGEAL PHASE
Within functional limits +Cough post rapid intake via straw only; resolved via small cup sips. Pt is able to feed self, however, required steadying of cup via SLP

## 2023-10-19 NOTE — DIETITIAN INITIAL EVALUATION ADULT - ADD RECOMMEND
1) Recommend liberalizing to diet free of therapeutic restrictions as tolerated.   	- Defer texture/consistency to SLP/team.   2) Recommend Multivitamin and vitamin C daily to promote wound healing pending no medical contraindications.  3) Continue to monitor PO intake, weight, labs, skin, GI status, and diet.  4) Nutrition care plan to remain consistent with pt GOC.  5) RD to follow up per protocol / remains available PRN.

## 2023-10-19 NOTE — DIETITIAN INITIAL EVALUATION ADULT - ENERGY INTAKE
NPO Pt NPO during time of RD visit pending formal Swallow Assessment by SLP secondary to pt failed dysphagia screen & code stroke on 10/17.

## 2023-10-19 NOTE — SWALLOW BEDSIDE ASSESSMENT ADULT - ADDITIONAL RECOMMENDATIONS
Monitor for s/s aspiration/laryngeal penetration. If noted:  D/C p.o. intake, provide non-oral nutrition/hydration/meds, and contact this service @ [phone]  Maintain good oral hygiene  LT. Pt/family/caregiver will demonstrate understanding and carryover of dysphagia management (safe swallow guidelines, compensatory strategies, dysphagia diet).  2. Pt will complete dysphagia exercises to improve swallow function.  3. Pt will tolerate recommended diet with no overt, clinical s/s of aspiration.

## 2023-10-19 NOTE — SWALLOW BEDSIDE ASSESSMENT ADULT - SLP PERTINENT HISTORY OF CURRENT PROBLEM
Pt is a 89yo F w/ PMH of HTN, HLD, Afib on Xarelto, HFrEF (EF 30%), SVT s/p ablation, endometrial CA s/p LYNETTE pw fall from chair.

## 2023-10-20 NOTE — DISCHARGE NOTE PROVIDER - CARE PROVIDER_API CALL
Ayo Novak  Neurology  [address], [address]  [place] [address]  Phone: [phone]  Fax: [phone]  Follow Up Time: 2 weeks

## 2023-10-20 NOTE — DISCHARGE NOTE PROVIDER - NSDCCAREPROVSEEN_GEN_ALL_CORE_FT
----- Message from Meche Solis sent at 2022  1:43 PM CDT -----  Contact: dalila ISAAC [phone]  Mom called requesting a call back from Dr. Haddad's nurse to schedule a   appt    Spoke with mom to schedule  appointment.       [facility] [place]

## 2023-10-20 NOTE — PROGRESS NOTE ADULT - PROBLEM SELECTOR PLAN 5
- c/w home Atorvastatin 10mg qhs

## 2023-10-20 NOTE — PHARMACOTHERAPY INTERVENTION NOTE - COMMENTS
Counseled patient on the following inpatient medication, indication, and possible side effects. Advised that this medication is replacing home medication Xarelto:    apixaban (Eliquis) 5mg 1 tab(s) twice daily     Provided medication card in English. Patient questions and concerns were answered and addressed. Patient demonstrated understanding.    Jennifer Hong, PharmD  PGY-1 Pharmacy Resident  [facility] [id number] or Teams         Counseled patient on the following inpatient medication, indication, and possible side effects. Advised that this medication is replacing home medication Xarelto:    apixaban (Eliquis) 5mg 1 tab(s) twice daily     Provided medication card in English. Patient questions and concerns were answered and addressed. Patient demonstrated understanding. Medication coverage confirmed with [facility] pharmacy - covered with $30 monthly copay.    Jennifer Hong, PharmD  PGY-1 Pharmacy Resident  [employer] [phone] or Teams

## 2023-10-20 NOTE — PROGRESS NOTE ADULT - PROBLEM SELECTOR PLAN 6
- c/w home Venlafaxine 75mg qd  - c/w home Alprazolam 0.5mg TID PRN

## 2023-10-20 NOTE — PROGRESS NOTE ADULT - PROBLEM SELECTOR PLAN 8
DVT ppx: On Xarelto  Diet: DASH

## 2023-10-20 NOTE — DISCHARGE NOTE PROVIDER - NSDCCPCAREPLAN_GEN_ALL_CORE_FT
PRINCIPAL DISCHARGE DIAGNOSIS  Diagnosis: Head contusion  Assessment and Plan of Treatment: Status post fall likely secondary to acute cerebral vascular accident. Patient was treated under wake-up stroke protocol though was not a tenecteplase candidate as she refused tenecteplase after discussion with family.Patient was not a thrombectomy candidate as there was no LVO on imaging.   Exam a bit improved. no more gaze deviation to Right, however still with left sided weakness.  MRI brain with embolic appearing infarcts noted R frontal as well as in left hemisphere.  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  stable for discharge on new medicaion to rehab      SECONDARY DISCHARGE DIAGNOSES  Diagnosis: Chronic atrial fibrillation  Assessment and Plan of Treatment: Acute CVA likely in setting of failed xarelto. Started on eliquis.    Diagnosis: HLD (hyperlipidemia)  Assessment and Plan of Treatment: Started on Atorvastatin 80 mg daily to prevent future stroke.    Diagnosis: HTN (hypertension)  Assessment and Plan of Treatment: continue your usual blood pressure medications

## 2023-10-20 NOTE — DISCHARGE NOTE PROVIDER - NSDCFUADDAPPT_GEN_ALL_CORE_FT
APPTS ARE READY TO BE MADE: [x ] YES    Best Family or Patient Contact (if needed):    Additional Information about above appointments (if needed):    1: please make sure to follow up with your cardiologist and primary  doctor  2:   3:     Other comments or requests:    APPTS ARE READY TO BE MADE: [x ] YES    Best Family or Patient Contact (if needed):    Additional Information about above appointments (if needed):    1: please make sure to follow up with your cardiologist and primary  doctor  2:   3:     Other comments or requests:       Patient is being discharged to [facility]. Patient/caregiver will arrange follow up appointments.

## 2023-10-20 NOTE — DISCHARGE NOTE PROVIDER - HOSPITAL COURSE
89yo F w/ PMH of HTN, HLD, Afib on Xarelto, HFrEF (EF 30%), SVT s/p ablation, endometrial CA s/p LYNETTE pw fall from chair.       Problem/Plan - 1:  ·  Problem: Fall in elderly patient.   ·  Plan: - Likely mechanical in nature though cannot r/o arrythmia vs syncope  - TTE from August reviewed w/ EF 25-30%  - Fall precautions  MRI brain: Acute CVA  Neuro/Cardio f/up noted.     Problem/Plan - 2:  ·  Problem: Chronic atrial fibrillation.   ·  Plan: - CHADSVASc: 4  - c/w Toprol  - Cardio f/up appreciated  Eliquis.     Problem/Plan - 3:  ·  Problem: Chronic HFrEF (heart failure with reduced ejection fraction).   ·  Plan: - No s/s of acute exacerbation   - c/w home Entresto 24-26.     Problem/Plan - 4:  ·  Problem: HTN (hypertension).   ·  Plan: - c/w home Entresto. 89yo F w/ PMH of HTN, HLD, Afib on Xarelto, HFrEF (EF 30%), SVT s/p ablation, endometrial CA s/p LYNETTE pw fall from chair.       Problem/Plan - 1:  ·  Problem: Fall in elderly patient.   ·  Plan: - Likely mechanical in nature though cannot r/o arrythmia vs syncope  - TTE from August reviewed w/ EF 25-30%  - Fall precautions  MRI brain: Acute CVA  Neuro/Cardio f/up noted.     Problem/Plan - 2:  ·  Problem: Chronic atrial fibrillation.   ·  Plan: - CHADSVASc: 4  - c/w Toprol  - Cardio f/up appreciated  Eliquis.     Problem/Plan - 3:  ·  Problem: Chronic HFrEF (heart failure with reduced ejection fraction).   ·  Plan: - No s/s of acute exacerbation   - c/w home Entresto 24-26.     Problem/Plan - 4:  ·  Problem: HTN (hypertension).   ·  Plan: - c/w home Entresto.    pt clear for discharge

## 2023-10-20 NOTE — DISCHARGE NOTE PROVIDER - NSDCMRMEDTOKEN_GEN_ALL_CORE_FT
acetaminophen 325 mg oral tablet: 2 tab(s) orally every 6 hours, As needed, Temp greater or equal to 38C (100.4F), Mild Pain (1 - 3)  ALPRAZolam 0.5 mg oral tablet: 1 tab(s) orally 3 times a day, As Needed  apixaban 5 mg oral tablet: 1 tab(s) orally 2 times a day  atorvastatin 10 mg oral tablet: 1 tab(s) orally once a day (at bedtime)  melatonin 3 mg oral tablet: 1 tab(s) orally once a day (at bedtime) As needed Insomnia  metoprolol succinate 25 mg oral tablet, extended release: 3 tab(s) orally once a day  pantoprazole 40 mg oral delayed release tablet: 1 tab(s) orally once a day (before a meal)  rivaroxaban 15 mg oral tablet: 1 tab(s) orally once a day  sacubitril-valsartan 24 mg-26 mg oral tablet: 1 tab(s) orally 2 times a day  venlafaxine 75 mg oral capsule, extended release: 1 cap(s) orally once a day  Vitamin D3 1250 mcg (50,000 intl units) oral capsule: 1 cap(s) orally once a week  zolpidem 5 mg oral tablet: 1 tab(s) orally once a day (at bedtime) As needed Insomnia   acetaminophen 325 mg oral tablet: 2 tab(s) orally every 6 hours, As needed, Temp greater or equal to 38C (100.4F), Mild Pain (1 - 3)  ALPRAZolam 0.5 mg oral tablet: 1 tab(s) orally 3 times a day, As Needed  apixaban 5 mg oral tablet: 1 tab(s) orally 2 times a day  atorvastatin 80 mg oral tablet: 1 tab(s) orally once a day (at bedtime)  melatonin 3 mg oral tablet: 1 tab(s) orally once a day (at bedtime) As needed Insomnia  metoprolol succinate 100 mg oral tablet, extended release: 1 tab(s) orally once a day  nystatin 100,000 units/g topical powder: 1 Apply topically to affected area 2 times a day  pantoprazole 40 mg oral delayed release tablet: 1 tab(s) orally once a day (before a meal)  sacubitril-valsartan 24 mg-26 mg oral tablet: 1 tab(s) orally 2 times a day  venlafaxine 75 mg oral capsule, extended release: 1 cap(s) orally once a day  Vitamin D3 1250 mcg (50,000 intl units) oral capsule: 1 cap(s) orally once a week  zolpidem 5 mg oral tablet: 1 tab(s) orally once a day (at bedtime) As needed Insomnia

## 2023-10-22 NOTE — PROGRESS NOTE ADULT - PROBLEM SELECTOR PLAN 1
- Likely mechanical in nature though cannot r/o arrythmia vs syncope  - TTE from August reviewed w/ EF 25-30%  - Fall precautions  MRI brain: Acute CVA  Neuro/Cardio f/up noted
- Likely mechanical in nature though cannot r/o arrythmia vs syncope  - TTE from August reviewed w/ EF 25-30%  - Fall precautions  MRI brain: Acute CVA  Neuro/Cardio f/up noted
- Likely mechanical in nature though cannot r/o arrythmia vs syncope  - TTE from August reviewed w/ EF 25-30%  - No s/s of infectious etiology  - Fall precautions  - Tele monitoring for arrythmia  MRI brain: Acute CVA  Neuro f/up noted
- Likely mechanical in nature though cannot r/o arrythmia vs syncope  - TTE from August reviewed w/ EF 25-30%  - No s/s of infectious etiology  - Fall precautions  - Tele monitoring for arrythmia  MRI brain: Acute CVA  Neuro f/up noted
- Likely mechanical in nature though cannot r/o arrythmia vs syncope  - TTE from August reviewed w/ EF 25-30%  - No s/s of infectious etiology  - Fall precautions  - Tele monitoring for arrythmia  MRI brain: no acute CVA  Neuro f/up noted
- Likely mechanical in nature though cannot r/o arrythmia vs syncope  - TTE from August reviewed w/ EF 25-30%  - No s/s of infectious etiology  - Fall precautions  - PT   - Tele monitoring for arrythmia

## 2023-10-22 NOTE — PROGRESS NOTE ADULT - PROBLEM SELECTOR PLAN 2
- MAICOVASc: 4  - c/w Toprol  - Cardio f/up appreciated  Eliquis
- CHADSVASc: 4  - c/w Toprol 75mg qd  - Cardio eval appreciated
- CHADSVASc: 4  - c/w Toprol 75mg qd  - c/w home Xarelto 15mg qhs  - Cardio eval

## 2023-10-22 NOTE — PROGRESS NOTE ADULT - PROBLEM SELECTOR PLAN 3
- No s/s of acute exacerbation   - c/w home Entresto 24-26

## 2023-10-22 NOTE — PROGRESS NOTE ADULT - PROBLEM SELECTOR PLAN 4
- c/w home Entresto

## 2023-10-22 NOTE — PROGRESS NOTE ADULT - PROBLEM SELECTOR PROBLEM 1
Fall in elderly patient

## 2023-10-23 NOTE — PROGRESS NOTE ADULT - SUBJECTIVE AND OBJECTIVE BOX
CARDIOLOGY FOLLOW UP - Dr. Mccartney  DATE OF SERVICE: 10/23/23    CC  No CV complaints    REVIEW OF SYSTEMS:  CONSTITUTIONAL: No fever, weight loss, or fatigue  RESPIRATORY: No cough, wheezing, chills or hemoptysis; No Shortness of Breath  CARDIOVASCULAR: No chest pain, palpitations, passing out, dizziness, or leg swelling  GASTROINTESTINAL: No abdominal or epigastric pain. No nausea, vomiting, or hematemesis; No diarrhea or constipation. No melena or hematochezia.  VASCULAR: No edema     PHYSICAL EXAM:  T(C): 37.2 (10-23-23 @ 05:38), Max: 37.2 (10-23-23 @ 05:38)  HR: 96 (10-23-23 @ 05:38) (86 - 98)  BP: 106/65 (10-23-23 @ 05:38) (90/62 - 106/65)  RR: 18 (10-23-23 @ 05:38) (18 - 18)  SpO2: 97% (10-23-23 @ 05:38) (96% - 100%)  Wt(kg): --  I&O's Summary    22 Oct 2023 07:01  -  23 Oct 2023 07:00  --------------------------------------------------------  IN: 1020 mL / OUT: 350 mL / NET: 670 mL        Appearance: Elderly female 	  Cardiovascular: Normal S1 S2,irregular rhythm, No JVD, No murmurs  Respiratory: Lungs clear to auscultation anteriorly b/l  Gastrointestinal:  Soft, Non-tender, + BS	  Extremities: Normal range of motion, No clubbing, cyanosis or edema      Home Medications:  acetaminophen 325 mg oral tablet: 2 tab(s) orally every 6 hours, As needed, Temp greater or equal to 38C (100.4F), Mild Pain (1 - 3) (17 Oct 2023 00:22)  ALPRAZolam 0.5 mg oral tablet: 1 tab(s) orally 3 times a day, As Needed (17 Oct 2023 00:22)  atorvastatin 10 mg oral tablet: 1 tab(s) orally once a day (at bedtime) (17 Oct 2023 00:22)  melatonin 3 mg oral tablet: 1 tab(s) orally once a day (at bedtime) As needed Insomnia (17 Oct 2023 00:22)  metoprolol succinate 25 mg oral tablet, extended release: 3 tab(s) orally once a day (17 Oct 2023 00:22)  pantoprazole 40 mg oral delayed release tablet: 1 tab(s) orally once a day (before a meal) (17 Oct 2023 00:22)  rivaroxaban 15 mg oral tablet: 1 tab(s) orally once a day (17 Oct 2023 00:22)  sacubitril-valsartan 24 mg-26 mg oral tablet: 1 tab(s) orally 2 times a day (17 Oct 2023 00:22)  venlafaxine 75 mg oral capsule, extended release: 1 cap(s) orally once a day (17 Oct 2023 00:22)  Vitamin D3 1250 mcg (50,000 intl units) oral capsule: 1 cap(s) orally once a week (17 Oct 2023 00:22)  zolpidem 5 mg oral tablet: 1 tab(s) orally once a day (at bedtime) As needed Insomnia (17 Oct 2023 00:22)      MEDICATIONS  (STANDING):  apixaban 5 milliGRAM(s) Oral two times a day  atorvastatin 80 milliGRAM(s) Oral at bedtime  hydrocortisone 2.5% Lotion 1 Application(s) Topical two times a day  metoprolol succinate  milliGRAM(s) Oral daily  nystatin Powder 1 Application(s) Topical two times a day  pantoprazole    Tablet 40 milliGRAM(s) Oral before breakfast  sacubitril 24 mG/valsartan 26 mG 1 Tablet(s) Oral two times a day  venlafaxine XR. 75 milliGRAM(s) Oral daily      TELEMETRY: Afib 60-80s 	    ECG:  	  RADIOLOGY:   DIAGNOSTIC TESTING:  [ ] Echocardiogram:  [ ]  Catheterization:  [ ] Stress Test:    OTHER: 	    LABS:	 	    Creatine Kinase, Serum: 49 U/L [25 - 170] (10-17 @ 12:34)  CKMB Units: 2.3 ng/mL [0.0 - 3.8] (10-17 @ 12:34)  Troponin T, High Sensitivity Result: 48 ng/L [0 - 51] (10-17 @ 12:34)  Troponin T, High Sensitivity Result: 48 ng/L [0 - 51] (10-16 @ 17:10)  Creatine Kinase, Serum: 40 U/L [25 - 170] (10-16 @ 17:10)  CKMB Units: 2.1 ng/mL [0.0 - 3.8] (10-16 @ 17:10)  Troponin T, High Sensitivity Result: 30 ng/L [0 - 51] (10-16 @ 15:14)  Troponin T, High Sensitivity Result: 26 ng/L [0 - 51] (10-16 @ 14:05)                    
Neurology Progress Note    S: Patient seen and examined. No new events overnight.  exam somewhat better. no gaze MRI with + embolic infarcts     Medication:        Medications: MEDICATIONS  (STANDING):  apixaban 5 milliGRAM(s) Oral two times a day  atorvastatin 80 milliGRAM(s) Oral at bedtime  metoprolol succinate  milliGRAM(s) Oral daily  nystatin Powder 1 Application(s) Topical two times a day  pantoprazole    Tablet 40 milliGRAM(s) Oral before breakfast  sacubitril 24 mG/valsartan 26 mG 1 Tablet(s) Oral two times a day  venlafaxine XR. 75 milliGRAM(s) Oral daily    MEDICATIONS  (PRN):  acetaminophen     Tablet .. 650 milliGRAM(s) Oral every 6 hours PRN Temp greater or equal to 38C (100.4F), Mild Pain (1 - 3)  ALPRAZolam 0.5 milliGRAM(s) Oral three times a day PRN Anxiety       Vitals:  Vital Signs Last 24 Hrs  T(C): 36.4 (22 Oct 2023 05:02), Max: 37.3 (21 Oct 2023 11:35)  T(F): 97.5 (22 Oct 2023 05:02), Max: 99.1 (21 Oct 2023 11:35)  HR: 92 (22 Oct 2023 05:02) (88 - 108)  BP: 104/62 (22 Oct 2023 05:02) (90/63 - 116/76)  BP(mean): --  RR: 18 (22 Oct 2023 05:02) (18 - 18)  SpO2: 98% (22 Oct 2023 05:02) (95% - 98%)    Parameters below as of 22 Oct 2023 05:02  Patient On (Oxygen Delivery Method): nasal cannula  O2 Flow (L/min): 2              General Exam:   General - R gaze deviation with r head turning. Head turning can be overcome  Cardiovascular - Peripheral pulses palpable, no edema  Eyes -  Fundoscopy not performed due to safety precautions in the setting of infection risk    Neurologic Exam:  Mental status - Awake, Alert, Oriented to person, place, and time. Speech slow, hypophonic, repetition and naming intact. Follows simple and complex commands. Attention/concentration, remote memory (including registration and recall), and fund of knowledge intact    Cranial nerves - PERRLA, No BTT on L side, R gaze pref but now can cross midline, face sensation (V1-V3) intact b/l, facial strength intact without asymmetry b/l, hearing intact b/l, palate with symmetric elevation, trapezius 5/5 strength b/l, tongue midline on protrusion with full lateral movement    Motor - Decreasecd bulk and normal tone throughout.  Strength testing            R        Deltoid:  4    Biceps:  4    Triceps:  4    Wrist Extension:  4    Wrist Flexion:  4    Interossei:  4    :  4    Hip Flexion:  4    Hip Extension:  4    Knee Flexion:  4    Knee Extension:  4    Dorsiflexion:  4    Plantar Flexion:  4        L        Deltoid:  3    Biceps:  3    Triceps:  2    Wrist Extension:  2    Wrist Flexion:  2    Interossei:  2    :  2    Hip Flexion:  0    Hip Extension:  0    Knee Flexion:  0    Knee Extension:  0    Dorsiflexion:  0    Plantar Flexion:  0      Sensation - Light touch/temperature intact throughout    Coordination - Finger to Nose intact in RUE. Cannot perform with LUE 2/2 weakness. Occasional jerks of the chest, b/l neck and R upper extremity noted during the exam    Gait and station -   Not tested due to patient's safety concerns  I personally reviewed the below data/images/labs:        LABS:  no new labs        CT Head No Cont:  (16 Oct 2023 18:05)    < from: CT Angio Neck Stroke Protocol w/ IV Cont (10.17.23 @ 11:31) >  IMPRESSION:    Brain CT: No acute hemorrhage, mass effect and no change compared with   prior study dated 10/16/2023.    Neck CTA: No hemodynamically significant stenosis.    Brain CTA: No large vessel occlusion or high-grade stenosis.    Perfusion maps. No core infarct. 15 cc of hypoperfusion within the right   frontal parietal parasagittal region.    < end of copied text >    < from: MR Head No Cont (10.18.23 @ 14:33) >    ACC: [id number] EXAM:  MR BRAIN   ORDERED BY: WINSOME MARTIN     PROCEDURE DATE:  10/18/2023          INTERPRETATION:  CLINICAL INDICATION: Fall on Xarelto      Magnetic resonance imaging of the brain was carried out with transaxial   SPGR, FLAIR, fast spin echo T2 weighted images, axial susceptibility   weighted series, diffusion weighted series and sagittal T1 weighted   series on a 1.5 Payton magnet.    Comparison is made with the prior CT/CTA of 10/17/2023.    Ventricular and sulcal prominence is consistent with moderate atrophy.   There are bilateral old cerebellar infarcts. There are scattered infarcts   identified in the supratentorial region there is an infarct in the left   occipital subcortical white matter, left posterior temporalcortex the   right frontal cortex, the right centrum semiovale and the right medial   frontal cortex adjacent to the anterior body of the right lateral   ventricle. Bilaterality cysts suggestive of cardioembolic source. There   is no acute hemorrhage. There is a focus of hemosiderin adjacent to the   lateral border of the atrium of the right lateral ventricle.    The sellar and parasellar structures are unremarkable.    The paranasal sinuses are clear. There has been previous bilateral lens   replacement surgery. Ligamentous hypertrophy at the C1-2 level likely   related to degenerative changes.    IMPRESSION: Moderate atrophy. Small vessel white matter ischemic changes.   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   No acute hemorrhage.    --- End of Report ---            LAMBERTO ENRIQUE MD; Attending Radiologist  This document has been electronically signed. Oct 18 2023  2:58PM    < end of copied text >  
Neurology Progress Note    S: Patient seen and examined. No new events overnight.  exam somewhat better. no gaze MRI with + embolic infarcts     Medication:    Medications: MEDICATIONS  (STANDING):  aspirin  chewable 81 milliGRAM(s) Oral daily  atorvastatin 80 milliGRAM(s) Oral at bedtime  metoprolol succinate ER 75 milliGRAM(s) Oral daily  nystatin Powder 1 Application(s) Topical two times a day  pantoprazole    Tablet 40 milliGRAM(s) Oral before breakfast  rivaroxaban 15 milliGRAM(s) Oral with dinner  sacubitril 24 mG/valsartan 26 mG 1 Tablet(s) Oral two times a day  venlafaxine XR. 75 milliGRAM(s) Oral daily    MEDICATIONS  (PRN):  acetaminophen     Tablet .. 650 milliGRAM(s) Oral every 6 hours PRN Temp greater or equal to 38C (100.4F), Mild Pain (1 - 3)  ALPRAZolam 0.5 milliGRAM(s) Oral three times a day PRN Anxiety       Vitals:  Vital Signs Last 24 Hrs  T(C): 37.3 (19 Oct 2023 04:04), Max: 37.3 (19 Oct 2023 04:04)  T(F): 99.2 (19 Oct 2023 04:04), Max: 99.2 (19 Oct 2023 04:04)  HR: 130 (19 Oct 2023 06:09) (74 - 139)  BP: 120/78 (19 Oct 2023 06:09) (101/72 - 129/79)  BP(mean): --  RR: 18 (19 Oct 2023 04:04) (18 - 18)  SpO2: 98% (19 Oct 2023 06:09) (95% - 99%)    Parameters below as of 19 Oct 2023 06:09  Patient On (Oxygen Delivery Method): nasal cannula  O2 Flow (L/min): 2              General Exam:   General - R gaze deviation with r head turning. Head turning can be overcome  Cardiovascular - Peripheral pulses palpable, no edema  Eyes -  Fundoscopy not performed due to safety precautions in the setting of infection risk    Neurologic Exam:  Mental status - Awake, Alert, Oriented to person, place, and time. Speech slow, hypophonic, repetition and naming intact. Follows simple and complex commands. Attention/concentration, remote memory (including registration and recall), and fund of knowledge intact    Cranial nerves - PERRLA, No BTT on L side, R gaze pref but now can cross midline, face sensation (V1-V3) intact b/l, facial strength intact without asymmetry b/l, hearing intact b/l, palate with symmetric elevation, trapezius 5/5 strength b/l, tongue midline on protrusion with full lateral movement    Motor - Decreasecd bulk and normal tone throughout.  Strength testing            R        Deltoid:  4    Biceps:  4    Triceps:  4    Wrist Extension:  4    Wrist Flexion:  4    Interossei:  4    :  4    Hip Flexion:  4    Hip Extension:  4    Knee Flexion:  4    Knee Extension:  4    Dorsiflexion:  4    Plantar Flexion:  4        L        Deltoid:  3    Biceps:  3    Triceps:  2    Wrist Extension:  2    Wrist Flexion:  2    Interossei:  2    :  2    Hip Flexion:  0    Hip Extension:  0    Knee Flexion:  0    Knee Extension:  0    Dorsiflexion:  0    Plantar Flexion:  0      Sensation - Light touch/temperature intact throughout    Coordination - Finger to Nose intact in RUE. Cannot perform with LUE 2/2 weakness. Occasional jerks of the chest, b/l neck and R upper extremity noted during the exam    Gait and station -   Not tested due to patient's safety concerns  I personally reviewed the below data/images/labs:      LABS:                          12.6   10.05 )-----------( 236      ( 17 Oct 2023 12:34 )             39.8     10-17    137  |  101  |  25<H>  ----------------------------<  103<H>  5.0   |  26  |  1.13    Ca    9.2      17 Oct 2023 12:34    TPro  6.6  /  Alb  3.6  /  TBili  0.8  /  DBili  x   /  AST  24  /  ALT  14  /  AlkPhos  76  10-17    LIVER FUNCTIONS - ( 17 Oct 2023 12:34 )  Alb: 3.6 g/dL / Pro: 6.6 g/dL / ALK PHOS: 76 U/L / ALT: 14 U/L / AST: 24 U/L / GGT: x           PT/INR - ( 17 Oct 2023 12:34 )   PT: 12.2 sec;   INR: 1.11 ratio         PTT - ( 17 Oct 2023 12:34 )  PTT:31.7 sec  Urinalysis Basic - ( 17 Oct 2023 12:34 )    Color: x / Appearance: x / SG: x / pH: x  Gluc: 103 mg/dL / Ketone: x  / Bili: x / Urobili: x   Blood: x / Protein: x / Nitrite: x   Leuk Esterase: x / RBC: x / WBC x   Sq Epi: x / Non Sq Epi: x / Bacteria: x            CT Head No Cont:  (16 Oct 2023 18:05)    < from: CT Angio Neck Stroke Protocol w/ IV Cont (10.17.23 @ 11:31) >  IMPRESSION:    Brain CT: No acute hemorrhage, mass effect and no change compared with   prior study dated 10/16/2023.    Neck CTA: No hemodynamically significant stenosis.    Brain CTA: No large vessel occlusion or high-grade stenosis.    Perfusion maps. No core infarct. 15 cc of hypoperfusion within the right   frontal parietal parasagittal region.    < end of copied text >    < from: MR Head No Cont (10.18.23 @ 14:33) >    ACC: [id number] EXAM:  MR BRAIN   ORDERED BY: WINSOME MARTIN     PROCEDURE DATE:  10/18/2023          INTERPRETATION:  CLINICAL INDICATION: Fall on Xarelto      Magnetic resonance imaging of the brain was carried out with transaxial   SPGR, FLAIR, fast spin echo T2 weighted images, axial susceptibility   weighted series, diffusion weighted series and sagittal T1 weighted   series on a 1.5 Payton magnet.    Comparison is made with the prior CT/CTA of 10/17/2023.    Ventricular and sulcal prominence is consistent with moderate atrophy.   There are bilateral old cerebellar infarcts. There are scattered infarcts   identified in the supratentorial region there is an infarct in the left   occipital subcortical white matter, left posterior temporalcortex the   right frontal cortex, the right centrum semiovale and the right medial   frontal cortex adjacent to the anterior body of the right lateral   ventricle. Bilaterality cysts suggestive of cardioembolic source. There   is no acute hemorrhage. There is a focus of hemosiderin adjacent to the   lateral border of the atrium of the right lateral ventricle.    The sellar and parasellar structures are unremarkable.    The paranasal sinuses are clear. There has been previous bilateral lens   replacement surgery. Ligamentous hypertrophy at the C1-2 level likely   related to degenerative changes.    IMPRESSION: Moderate atrophy. Small vessel white matter ischemic changes.   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   No acute hemorrhage.    --- End of Report ---            LAMBERTO ENRIQUE MD; Attending Radiologist  This document has been electronically signed. Oct 18 2023  2:58PM    < end of copied text >  
CARDIOLOGY FOLLOW UP - Dr. Mccartney  DATE OF SERVICE: 10/20/23    CC  No cv complaints    REVIEW OF SYSTEMS:  CONSTITUTIONAL: No fever, weight loss, or fatigue  RESPIRATORY: No cough, wheezing, chills or hemoptysis; No Shortness of Breath  CARDIOVASCULAR: No chest pain, palpitations, passing out, dizziness, or leg swelling  GASTROINTESTINAL: No abdominal or epigastric pain. No nausea, vomiting, or hematemesis; No diarrhea or constipation. No melena or hematochezia.  VASCULAR: No edema     PHYSICAL EXAM:  T(C): 36.3 (10-20-23 @ 04:12), Max: 36.8 (10-19-23 @ 11:30)  HR: 111 (10-20-23 @ 04:12) (106 - 119)  BP: 118/78 (10-20-23 @ 04:12) (102/72 - 118/78)  RR: 18 (10-20-23 @ 04:12) (16 - 18)  SpO2: 98% (10-20-23 @ 04:12) (97% - 98%)  Wt(kg): --  I&O's Summary    19 Oct 2023 07:01  -  20 Oct 2023 07:00  --------------------------------------------------------  IN: 140 mL / OUT: 200 mL / NET: -60 mL        Appearance: Elderly female   Cardiovascular: Normal S1 S2,Irregular rhythm, no murmurs  Respiratory: Lungs clear to auscultation	  Gastrointestinal:  Soft, Non-tender, + BS	  Extremities: Normal range of motion, No clubbing, cyanosis or edema      Home Medications:  acetaminophen 325 mg oral tablet: 2 tab(s) orally every 6 hours, As needed, Temp greater or equal to 38C (100.4F), Mild Pain (1 - 3) (17 Oct 2023 00:22)  ALPRAZolam 0.5 mg oral tablet: 1 tab(s) orally 3 times a day, As Needed (17 Oct 2023 00:22)  atorvastatin 10 mg oral tablet: 1 tab(s) orally once a day (at bedtime) (17 Oct 2023 00:22)  melatonin 3 mg oral tablet: 1 tab(s) orally once a day (at bedtime) As needed Insomnia (17 Oct 2023 00:22)  metoprolol succinate 25 mg oral tablet, extended release: 3 tab(s) orally once a day (17 Oct 2023 00:22)  pantoprazole 40 mg oral delayed release tablet: 1 tab(s) orally once a day (before a meal) (17 Oct 2023 00:22)  rivaroxaban 15 mg oral tablet: 1 tab(s) orally once a day (17 Oct 2023 00:22)  sacubitril-valsartan 24 mg-26 mg oral tablet: 1 tab(s) orally 2 times a day (17 Oct 2023 00:22)  venlafaxine 75 mg oral capsule, extended release: 1 cap(s) orally once a day (17 Oct 2023 00:22)  Vitamin D3 1250 mcg (50,000 intl units) oral capsule: 1 cap(s) orally once a week (17 Oct 2023 00:22)  zolpidem 5 mg oral tablet: 1 tab(s) orally once a day (at bedtime) As needed Insomnia (17 Oct 2023 00:22)      MEDICATIONS  (STANDING):  apixaban 5 milliGRAM(s) Oral two times a day  atorvastatin 80 milliGRAM(s) Oral at bedtime  metoprolol succinate ER 75 milliGRAM(s) Oral daily  nystatin Powder 1 Application(s) Topical two times a day  pantoprazole    Tablet 40 milliGRAM(s) Oral before breakfast  sacubitril 24 mG/valsartan 26 mG 1 Tablet(s) Oral two times a day  venlafaxine XR. 75 milliGRAM(s) Oral daily      TELEMETRY: Afib 115-130s overnight, now 90s	    ECG:  	  RADIOLOGY:   DIAGNOSTIC TESTING:  [x] Echocardiogram:  < from: TTE Congenital Anomalies W or WO Ultrasound Enhancing Agent (10.18.23 @ 11:39) >  CONCLUSIONS:      1. Left ventricular systolic function is mildly decreased with an ejection fraction of 47 % by Beaulieu's method of disks.   2. Normal right ventricular cavity size and reduced systolic function. Tricuspid annular tissue Doppler S' is 11.0 cm/s (normal >10 cm/s).   3. Moderate mitral regurgitation.   4. No pericardial effusion seen.   5. Mitral stenosis cannot be properly assessed by mean gradient with heart rate of 98 bpm.     6. Moderate calcification of the aortic valve leaflets.   7. Moderate mitral valve leaflet calcification.   8. Moderate to severe mitral valve stenosis.   9. Moderate tricuspid regurgitation.  10. No prior echocardiogram is available for comparison.  11. There isnormal LV mass and concentric remodeling.    < end of copied text >    [ ]  Catheterization:  [ ] Stress Test:    OTHER: 	    LABS:	 	    Creatine Kinase, Serum: 49 U/L [25 - 170] (10-17 @ 12:34)  CKMB Units: 2.3 ng/mL [0.0 - 3.8] (10-17 @ 12:34)  Troponin T, High Sensitivity Result: 48 ng/L [0 - 51] (10-17 @ 12:34)  Troponin T, High Sensitivity Result: 48 ng/L [0 - 51] (10-16 @ 17:10)  Creatine Kinase, Serum: 40 U/L [25 - 170] (10-16 @ 17:10)  CKMB Units: 2.1 ng/mL [0.0 - 3.8] (10-16 @ 17:10)  Troponin T, High Sensitivity Result: 30 ng/L [0 - 51] (10-16 @ 15:14)  Troponin T, High Sensitivity Result: 26 ng/L [0 - 51] (10-16 @ 14:05)                          13.6   10.05 )-----------( 254      ( 20 Oct 2023 05:30 )             41.8     10-20    142  |  104  |  62<H>  ----------------------------<  149<H>  4.5   |  23  |  1.24    Ca    9.3      20 Oct 2023 05:28              
Neurology Progress Note    S: Patient seen and examined. No new events overnight.  exam somewhat better. no gaze MRI with + embolic infarcts     Medication:      Medications: MEDICATIONS  (STANDING):  apixaban 5 milliGRAM(s) Oral two times a day  atorvastatin 80 milliGRAM(s) Oral at bedtime  metoprolol succinate  milliGRAM(s) Oral daily  nystatin Powder 1 Application(s) Topical two times a day  pantoprazole    Tablet 40 milliGRAM(s) Oral before breakfast  sacubitril 24 mG/valsartan 26 mG 1 Tablet(s) Oral two times a day  venlafaxine XR. 75 milliGRAM(s) Oral daily    MEDICATIONS  (PRN):  acetaminophen     Tablet .. 650 milliGRAM(s) Oral every 6 hours PRN Temp greater or equal to 38C (100.4F), Mild Pain (1 - 3)  ALPRAZolam 0.5 milliGRAM(s) Oral three times a day PRN Anxiety       Vitals:  Vital Signs Last 24 Hrs  T(C): 36.9 (21 Oct 2023 04:06), Max: 36.9 (21 Oct 2023 04:06)  T(F): 98.5 (21 Oct 2023 04:06), Max: 98.5 (21 Oct 2023 04:06)  HR: 104 (21 Oct 2023 04:06) (97 - 104)  BP: 123/72 (21 Oct 2023 04:06) (98/64 - 123/72)  BP(mean): --  RR: 18 (21 Oct 2023 04:06) (18 - 18)  SpO2: 97% (21 Oct 2023 04:06) (95% - 97%)    Parameters below as of 21 Oct 2023 04:06  Patient On (Oxygen Delivery Method): nasal cannula  O2 Flow (L/min): 2            General Exam:   General - R gaze deviation with r head turning. Head turning can be overcome  Cardiovascular - Peripheral pulses palpable, no edema  Eyes -  Fundoscopy not performed due to safety precautions in the setting of infection risk    Neurologic Exam:  Mental status - Awake, Alert, Oriented to person, place, and time. Speech slow, hypophonic, repetition and naming intact. Follows simple and complex commands. Attention/concentration, remote memory (including registration and recall), and fund of knowledge intact    Cranial nerves - PERRLA, No BTT on L side, R gaze pref but now can cross midline, face sensation (V1-V3) intact b/l, facial strength intact without asymmetry b/l, hearing intact b/l, palate with symmetric elevation, trapezius 5/5 strength b/l, tongue midline on protrusion with full lateral movement    Motor - Decreasecd bulk and normal tone throughout.  Strength testing            R        Deltoid:  4    Biceps:  4    Triceps:  4    Wrist Extension:  4    Wrist Flexion:  4    Interossei:  4    :  4    Hip Flexion:  4    Hip Extension:  4    Knee Flexion:  4    Knee Extension:  4    Dorsiflexion:  4    Plantar Flexion:  4        L        Deltoid:  3    Biceps:  3    Triceps:  2    Wrist Extension:  2    Wrist Flexion:  2    Interossei:  2    :  2    Hip Flexion:  0    Hip Extension:  0    Knee Flexion:  0    Knee Extension:  0    Dorsiflexion:  0    Plantar Flexion:  0      Sensation - Light touch/temperature intact throughout    Coordination - Finger to Nose intact in RUE. Cannot perform with LUE 2/2 weakness. Occasional jerks of the chest, b/l neck and R upper extremity noted during the exam    Gait and station -   Not tested due to patient's safety concerns  I personally reviewed the below data/images/labs:        LABS:  no new labs                         13.6   10.05 )-----------( 254      ( 20 Oct 2023 05:30 )             41.8     10-20    142  |  104  |  62<H>  ----------------------------<  149<H>  4.5   |  23  |  1.24    Ca    9.3      20 Oct 2023 05:28          Urinalysis Basic - ( 20 Oct 2023 05:28 )    Color: x / Appearance: x / SG: x / pH: x  Gluc: 149 mg/dL / Ketone: x  / Bili: x / Urobili: x   Blood: x / Protein: x / Nitrite: x   Leuk Esterase: x / RBC: x / WBC x   Sq Epi: x / Non Sq Epi: x / Bacteria: x                  CT Head No Cont:  (16 Oct 2023 18:05)    < from: CT Angio Neck Stroke Protocol w/ IV Cont (10.17.23 @ 11:31) >  IMPRESSION:    Brain CT: No acute hemorrhage, mass effect and no change compared with   prior study dated 10/16/2023.    Neck CTA: No hemodynamically significant stenosis.    Brain CTA: No large vessel occlusion or high-grade stenosis.    Perfusion maps. No core infarct. 15 cc of hypoperfusion within the right   frontal parietal parasagittal region.    < end of copied text >    < from: MR Head No Cont (10.18.23 @ 14:33) >    ACC: [id number] EXAM:  MR BRAIN   ORDERED BY: WINSOME MARTIN     PROCEDURE DATE:  10/18/2023          INTERPRETATION:  CLINICAL INDICATION: Fall on Xarelto      Magnetic resonance imaging of the brain was carried out with transaxial   SPGR, FLAIR, fast spin echo T2 weighted images, axial susceptibility   weighted series, diffusion weighted series and sagittal T1 weighted   series on a 1.5 Payton magnet.    Comparison is made with the prior CT/CTA of 10/17/2023.    Ventricular and sulcal prominence is consistent with moderate atrophy.   There are bilateral old cerebellar infarcts. There are scattered infarcts   identified in the supratentorial region there is an infarct in the left   occipital subcortical white matter, left posterior temporalcortex the   right frontal cortex, the right centrum semiovale and the right medial   frontal cortex adjacent to the anterior body of the right lateral   ventricle. Bilaterality cysts suggestive of cardioembolic source. There   is no acute hemorrhage. There is a focus of hemosiderin adjacent to the   lateral border of the atrium of the right lateral ventricle.    The sellar and parasellar structures are unremarkable.    The paranasal sinuses are clear. There has been previous bilateral lens   replacement surgery. Ligamentous hypertrophy at the C1-2 level likely   related to degenerative changes.    IMPRESSION: Moderate atrophy. Small vessel white matter ischemic changes.   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   No acute hemorrhage.    --- End of Report ---            LAMBERTO ENRIQUE MD; Attending Radiologist  This document has been electronically signed. Oct 18 2023  2:58PM    < end of copied text >  
CARDIOLOGY FOLLOW UP - Dr. Mccartney  DATE OF SERVICE: 10/19/23    CC  Tele events noted  No CV complaints    REVIEW OF SYSTEMS:  CONSTITUTIONAL: No fever, weight loss, or fatigue  RESPIRATORY: No cough, wheezing, chills or hemoptysis; No Shortness of Breath  CARDIOVASCULAR: No chest pain, palpitations, passing out, dizziness, or leg swelling  GASTROINTESTINAL: No abdominal or epigastric pain. No nausea, vomiting, or hematemesis; No diarrhea or constipation. No melena or hematochezia.  VASCULAR: No edema     PHYSICAL EXAM:  T(C): 37.3 (10-19-23 @ 04:04), Max: 37.3 (10-19-23 @ 04:04)  HR: 130 (10-19-23 @ 06:09) (74 - 139)  BP: 120/78 (10-19-23 @ 06:09) (101/72 - 129/79)  RR: 18 (10-19-23 @ 04:04) (18 - 18)  SpO2: 98% (10-19-23 @ 06:09) (95% - 99%)  Wt(kg): --  I&O's Summary    18 Oct 2023 07:01  -  19 Oct 2023 07:00  --------------------------------------------------------  IN: 0 mL / OUT: 250 mL / NET: -250 mL        Appearance: Elderly female 	  Cardiovascular: Normal S1 S2,Irregular rhythm, No JVD, No murmurs  Respiratory: Lungs clear to auscultation b/l  Gastrointestinal:  Soft, Non-tender, + BS	  Extremities: Normal range of motion, No clubbing, cyanosis or edema      Home Medications:  acetaminophen 325 mg oral tablet: 2 tab(s) orally every 6 hours, As needed, Temp greater or equal to 38C (100.4F), Mild Pain (1 - 3) (17 Oct 2023 00:22)  ALPRAZolam 0.5 mg oral tablet: 1 tab(s) orally 3 times a day, As Needed (17 Oct 2023 00:22)  atorvastatin 10 mg oral tablet: 1 tab(s) orally once a day (at bedtime) (17 Oct 2023 00:22)  melatonin 3 mg oral tablet: 1 tab(s) orally once a day (at bedtime) As needed Insomnia (17 Oct 2023 00:22)  metoprolol succinate 25 mg oral tablet, extended release: 3 tab(s) orally once a day (17 Oct 2023 00:22)  pantoprazole 40 mg oral delayed release tablet: 1 tab(s) orally once a day (before a meal) (17 Oct 2023 00:22)  rivaroxaban 15 mg oral tablet: 1 tab(s) orally once a day (17 Oct 2023 00:22)  sacubitril-valsartan 24 mg-26 mg oral tablet: 1 tab(s) orally 2 times a day (17 Oct 2023 00:22)  venlafaxine 75 mg oral capsule, extended release: 1 cap(s) orally once a day (17 Oct 2023 00:22)  Vitamin D3 1250 mcg (50,000 intl units) oral capsule: 1 cap(s) orally once a week (17 Oct 2023 00:22)  zolpidem 5 mg oral tablet: 1 tab(s) orally once a day (at bedtime) As needed Insomnia (17 Oct 2023 00:22)      MEDICATIONS  (STANDING):  aspirin  chewable 81 milliGRAM(s) Oral daily  atorvastatin 80 milliGRAM(s) Oral at bedtime  metoprolol tartrate Injectable 5 milliGRAM(s) IV Push every 6 hours  nystatin Powder 1 Application(s) Topical two times a day  pantoprazole    Tablet 40 milliGRAM(s) Oral before breakfast  sacubitril 24 mG/valsartan 26 mG 1 Tablet(s) Oral two times a day  venlafaxine XR. 75 milliGRAM(s) Oral daily      TELEMETRY: Afib rvr 130-150s	    ECG:  	  RADIOLOGY:   < from: MR Head No Cont (10.18.23 @ 14:33) >  IMPRESSION: Moderate atrophy. Small vessel white matter ischemic changes.   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   No acute hemorrhage.    --- End of Report ---    < end of copied text >    DIAGNOSTIC TESTING:  [ ] Echocardiogram:  [ ]  Catheterization:  [ ] Stress Test:    OTHER: 	    LABS:	 	    Creatine Kinase, Serum: 49 U/L [25 - 170] (10-17 @ 12:34)  CKMB Units: 2.3 ng/mL [0.0 - 3.8] (10-17 @ 12:34)  Troponin T, High Sensitivity Result: 48 ng/L [0 - 51] (10-17 @ 12:34)  Troponin T, High Sensitivity Result: 48 ng/L [0 - 51] (10-16 @ 17:10)  Creatine Kinase, Serum: 40 U/L [25 - 170] (10-16 @ 17:10)  CKMB Units: 2.1 ng/mL [0.0 - 3.8] (10-16 @ 17:10)  Troponin T, High Sensitivity Result: 30 ng/L [0 - 51] (10-16 @ 15:14)  Troponin T, High Sensitivity Result: 26 ng/L [0 - 51] (10-16 @ 14:05)                          12.6   10.05 )-----------( 236      ( 17 Oct 2023 12:34 )             39.8     10-17    137  |  101  |  25<H>  ----------------------------<  103<H>  5.0   |  26  |  1.13    Ca    9.2      17 Oct 2023 12:34    TPro  6.6  /  Alb  3.6  /  TBili  0.8  /  DBili  x   /  AST  24  /  ALT  14  /  AlkPhos  76  10-17    PT/INR - ( 17 Oct 2023 12:34 )   PT: 12.2 sec;   INR: 1.11 ratio         PTT - ( 17 Oct 2023 12:34 )  PTT:31.7 sec        
Neurology Progress Note    S: Patient seen and examined. No new events overnight.  exam somewhat better. no gaze MRI with + embolic infarcts     Medication:      Medications: MEDICATIONS  (STANDING):  apixaban 5 milliGRAM(s) Oral two times a day  atorvastatin 80 milliGRAM(s) Oral at bedtime  hydrocortisone 2.5% Lotion 1 Application(s) Topical two times a day  metoprolol succinate  milliGRAM(s) Oral daily  nystatin Powder 1 Application(s) Topical two times a day  pantoprazole    Tablet 40 milliGRAM(s) Oral before breakfast  sacubitril 24 mG/valsartan 26 mG 1 Tablet(s) Oral two times a day  venlafaxine XR. 75 milliGRAM(s) Oral daily    MEDICATIONS  (PRN):  acetaminophen     Tablet .. 650 milliGRAM(s) Oral every 6 hours PRN Temp greater or equal to 38C (100.4F), Mild Pain (1 - 3)  ALPRAZolam 0.5 milliGRAM(s) Oral three times a day PRN Anxiety       Vitals:  Vital Signs Last 24 Hrs  T(C): 37.2 (23 Oct 2023 05:38), Max: 37.2 (23 Oct 2023 05:38)  T(F): 98.9 (23 Oct 2023 05:38), Max: 98.9 (23 Oct 2023 05:38)  HR: 96 (23 Oct 2023 05:38) (86 - 98)  BP: 106/65 (23 Oct 2023 05:38) (90/62 - 106/65)  BP(mean): --  RR: 18 (23 Oct 2023 05:38) (18 - 18)  SpO2: 97% (23 Oct 2023 05:38) (96% - 100%)    Parameters below as of 23 Oct 2023 07:15  Patient On (Oxygen Delivery Method): room air               General Exam:   General - R gaze deviation with r head turning. Head turning can be overcome  Cardiovascular - Peripheral pulses palpable, no edema  Eyes -  Fundoscopy not performed due to safety precautions in the setting of infection risk    Neurologic Exam:  Mental status - Awake, Alert, Oriented to person, place, and time. Speech slow, hypophonic, repetition and naming intact. Follows simple and complex commands. Attention/concentration, remote memory (including registration and recall), and fund of knowledge intact    Cranial nerves - PERRLA, No BTT on L side, R gaze pref but now can cross midline, face sensation (V1-V3) intact b/l, facial strength intact without asymmetry b/l, hearing intact b/l, palate with symmetric elevation, trapezius 5/5 strength b/l, tongue midline on protrusion with full lateral movement    Motor - Decreasecd bulk and normal tone throughout.  Strength testing            R        Deltoid:  4    Biceps:  4    Triceps:  4    Wrist Extension:  4    Wrist Flexion:  4    Interossei:  4    :  4    Hip Flexion:  4    Hip Extension:  4    Knee Flexion:  4    Knee Extension:  4    Dorsiflexion:  4    Plantar Flexion:  4        L        Deltoid:  3    Biceps:  3    Triceps:  2    Wrist Extension:  2    Wrist Flexion:  2    Interossei:  2    :  2    Hip Flexion:  0    Hip Extension:  0    Knee Flexion:  0    Knee Extension:  0    Dorsiflexion:  0    Plantar Flexion:  0      Sensation - Light touch/temperature intact throughout    Coordination - Finger to Nose intact in RUE. Cannot perform with LUE 2/2 weakness. Occasional jerks of the chest, b/l neck and R upper extremity noted during the exam    Gait and station -   Not tested due to patient's safety concerns  I personally reviewed the below data/images/labs:        LABS:  no new labs        CT Head No Cont:  (16 Oct 2023 18:05)    < from: CT Angio Neck Stroke Protocol w/ IV Cont (10.17.23 @ 11:31) >  IMPRESSION:    Brain CT: No acute hemorrhage, mass effect and no change compared with   prior study dated 10/16/2023.    Neck CTA: No hemodynamically significant stenosis.    Brain CTA: No large vessel occlusion or high-grade stenosis.    Perfusion maps. No core infarct. 15 cc of hypoperfusion within the right   frontal parietal parasagittal region.    < end of copied text >    < from: MR Head No Cont (10.18.23 @ 14:33) >    ACC: [id number] EXAM:  MR BRAIN   ORDERED BY: WINSOME MARTIN     PROCEDURE DATE:  10/18/2023          INTERPRETATION:  CLINICAL INDICATION: Fall on Xarelto      Magnetic resonance imaging of the brain was carried out with transaxial   SPGR, FLAIR, fast spin echo T2 weighted images, axial susceptibility   weighted series, diffusion weighted series and sagittal T1 weighted   series on a 1.5 Payton magnet.    Comparison is made with the prior CT/CTA of 10/17/2023.    Ventricular and sulcal prominence is consistent with moderate atrophy.   There are bilateral old cerebellar infarcts. There are scattered infarcts   identified in the supratentorial region there is an infarct in the left   occipital subcortical white matter, left posterior temporalcortex the   right frontal cortex, the right centrum semiovale and the right medial   frontal cortex adjacent to the anterior body of the right lateral   ventricle. Bilaterality cysts suggestive of cardioembolic source. There   is no acute hemorrhage. There is a focus of hemosiderin adjacent to the   lateral border of the atrium of the right lateral ventricle.    The sellar and parasellar structures are unremarkable.    The paranasal sinuses are clear. There has been previous bilateral lens   replacement surgery. Ligamentous hypertrophy at the C1-2 level likely   related to degenerative changes.    IMPRESSION: Moderate atrophy. Small vessel white matter ischemic changes.   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   No acute hemorrhage.    --- End of Report ---            LAMBERTO ENRIQUE MD; Attending Radiologist  This document has been electronically signed. Oct 18 2023  2:58PM    < end of copied text >  
CARDIOLOGY FOLLOW UP NOTE - DR. HANSEN    Patient Name: DELVIS MCBRIDE    Date of Service: 10-21-23 @ 14:10    Patient seen and examined    Subjective:    cv: denies chest pain, dyspnea, palpitations, dizziness  pulmonary: denies cough  GI: denies abdominal pain, nausea, vomiting  vascular/legs: no edema   skin: no rash  ROS: otherwise negative   overnight events:      PHYSICAL EXAM:  T(C): 37.3 (10-21-23 @ 11:35), Max: 37.3 (10-21-23 @ 11:35)  HR: 108 (10-21-23 @ 11:35) (98 - 108)  BP: 90/63 (10-21-23 @ 11:35) (90/63 - 123/72)  RR: 18 (10-21-23 @ 11:35) (18 - 18)  SpO2: 97% (10-21-23 @ 11:35) (96% - 97%)  Wt(kg): --  I&O's Summary    20 Oct 2023 07:  -  21 Oct 2023 07:00  --------------------------------------------------------  IN: 1280 mL / OUT: 250 mL / NET: 1030 mL    21 Oct 2023 07:01  -  21 Oct 2023 14:10  --------------------------------------------------------  IN: 240 mL / OUT: 200 mL / NET: 40 mL      Daily     Daily Weight in k.2 (21 Oct 2023 06:40)    Appearance: Normal	  Cardiovascular: Normal S1 S2,RRR, No JVD, No murmurs  Respiratory: Lungs clear to auscultation	  Gastrointestinal:  Soft, Non-tender, + BS	  Extremities: Normal range of motion, No clubbing, cyanosis or edema      Home Medications:  acetaminophen 325 mg oral tablet: 2 tab(s) orally every 6 hours, As needed, Temp greater or equal to 38C (100.4F), Mild Pain (1 - 3) (17 Oct 2023 00:)  ALPRAZolam 0.5 mg oral tablet: 1 tab(s) orally 3 times a day, As Needed (17 Oct 2023 00:22)  atorvastatin 10 mg oral tablet: 1 tab(s) orally once a day (at bedtime) (17 Oct 2023 00:22)  melatonin 3 mg oral tablet: 1 tab(s) orally once a day (at bedtime) As needed Insomnia (17 Oct 2023 00:22)  metoprolol succinate 25 mg oral tablet, extended release: 3 tab(s) orally once a day (17 Oct 2023 00:22)  pantoprazole 40 mg oral delayed release tablet: 1 tab(s) orally once a day (before a meal) (17 Oct 2023 00:22)  rivaroxaban 15 mg oral tablet: 1 tab(s) orally once a day (17 Oct 2023 00:22)  sacubitril-valsartan 24 mg-26 mg oral tablet: 1 tab(s) orally 2 times a day (17 Oct 2023 00:22)  venlafaxine 75 mg oral capsule, extended release: 1 cap(s) orally once a day (17 Oct 2023 00:22)  Vitamin D3 1250 mcg (50,000 intl units) oral capsule: 1 cap(s) orally once a week (17 Oct 2023 00:22)  zolpidem 5 mg oral tablet: 1 tab(s) orally once a day (at bedtime) As needed Insomnia (17 Oct 2023 00:22)      MEDICATIONS  (STANDING):  apixaban 5 milliGRAM(s) Oral two times a day  atorvastatin 80 milliGRAM(s) Oral at bedtime  metoprolol succinate  milliGRAM(s) Oral daily  nystatin Powder 1 Application(s) Topical two times a day  pantoprazole    Tablet 40 milliGRAM(s) Oral before breakfast  sacubitril 24 mG/valsartan 26 mG 1 Tablet(s) Oral two times a day  venlafaxine XR. 75 milliGRAM(s) Oral daily      TELEMETRY: 	    ECG:  	  RADIOLOGY:   DIAGNOSTIC TESTING:  [ ] Echocardiogram:  [ ] Catheterization:  [ ] Stress Test:    OTHER: 	    LABS:	 	    CARDIAC MARKERS:        Troponin T, High Sensitivity Result: 48 ng/L (10-17 @ 12:34)  Troponin T, High Sensitivity Result: 48 ng/L (10-16 @ 17:10)  Troponin T, High Sensitivity Result: 30 ng/L (10-16 @ 15:14)                                13.6   10.05 )-----------( 254      ( 20 Oct 2023 05:30 )             41.8     10-21    139  |  105  |  59<H>  ----------------------------<  130<H>  4.4   |  24  |  1.20    Ca    8.8      21 Oct 2023 06:02      proBNP:     Lipid Profile:   HgA1c:     Creatinine: 1.20 mg/dL (10-21-23 @ 06:02)  Creatinine: 1.24 mg/dL (10-20-23 @ 05:28)  Creatinine: 1.24 mg/dL (10-19-23 @ 13:23)            
CARDIOLOGY FOLLOW UP NOTE - DR. HANSEN    Patient Name: DELVIS MCBRIDE    Date of Service: 10-22-23 @ 14:01    Patient seen and examined    Subjective:    cv: denies chest pain, dyspnea, palpitations, dizziness  pulmonary: denies cough  GI: denies abdominal pain, nausea, vomiting  vascular/legs: no edema   skin: no rash  ROS: otherwise negative   overnight events:      PHYSICAL EXAM:  T(C): 36.3 (10-22-23 @ 11:05), Max: 36.7 (10-21-23 @ 21:02)  HR: 86 (10-22-23 @ 11:05) (86 - 93)  BP: 92/60 (10-22-23 @ 11:05) (92/60 - 116/76)  RR: 18 (10-22-23 @ 11:05) (18 - 18)  SpO2: 100% (10-22-23 @ 11:05) (95% - 100%)  Wt(kg): --  I&O's Summary    21 Oct 2023 07:  -  22 Oct 2023 07:00  --------------------------------------------------------  IN: 820 mL / OUT: 350 mL / NET: 470 mL    22 Oct 2023 07:01  -  22 Oct 2023 14:01  --------------------------------------------------------  IN: 480 mL / OUT: 200 mL / NET: 280 mL      Daily     Daily Weight in k.7 (22 Oct 2023 06:58)    Appearance: Normal	  Cardiovascular: Normal S1 S2,RRR, No JVD, No murmurs  Respiratory: Lungs clear to auscultation	  Gastrointestinal:  Soft, Non-tender, + BS	  Extremities: Normal range of motion, No clubbing, cyanosis or edema      Home Medications:  acetaminophen 325 mg oral tablet: 2 tab(s) orally every 6 hours, As needed, Temp greater or equal to 38C (100.4F), Mild Pain (1 - 3) (17 Oct 2023 00:22)  ALPRAZolam 0.5 mg oral tablet: 1 tab(s) orally 3 times a day, As Needed (17 Oct 2023 00:22)  atorvastatin 10 mg oral tablet: 1 tab(s) orally once a day (at bedtime) (17 Oct 2023 00:22)  melatonin 3 mg oral tablet: 1 tab(s) orally once a day (at bedtime) As needed Insomnia (17 Oct 2023 00:22)  metoprolol succinate 25 mg oral tablet, extended release: 3 tab(s) orally once a day (17 Oct 2023 00:22)  pantoprazole 40 mg oral delayed release tablet: 1 tab(s) orally once a day (before a meal) (17 Oct 2023 00:22)  rivaroxaban 15 mg oral tablet: 1 tab(s) orally once a day (17 Oct 2023 00:22)  sacubitril-valsartan 24 mg-26 mg oral tablet: 1 tab(s) orally 2 times a day (17 Oct 2023 00:22)  venlafaxine 75 mg oral capsule, extended release: 1 cap(s) orally once a day (17 Oct 2023 00:22)  Vitamin D3 1250 mcg (50,000 intl units) oral capsule: 1 cap(s) orally once a week (17 Oct 2023 00:22)  zolpidem 5 mg oral tablet: 1 tab(s) orally once a day (at bedtime) As needed Insomnia (17 Oct 2023 00:22)      MEDICATIONS  (STANDING):  apixaban 5 milliGRAM(s) Oral two times a day  atorvastatin 80 milliGRAM(s) Oral at bedtime  metoprolol succinate  milliGRAM(s) Oral daily  nystatin Powder 1 Application(s) Topical two times a day  pantoprazole    Tablet 40 milliGRAM(s) Oral before breakfast  sacubitril 24 mG/valsartan 26 mG 1 Tablet(s) Oral two times a day  venlafaxine XR. 75 milliGRAM(s) Oral daily      TELEMETRY: 	    ECG:  	  RADIOLOGY:   DIAGNOSTIC TESTING:  [ ] Echocardiogram:  [ ] Catheterization:  [ ] Stress Test:    OTHER: 	    LABS:	 	    CARDIAC MARKERS:                  10-    139  |  105  |  59<H>  ----------------------------<  130<H>  4.4   |  24  |  1.20    Ca    8.8      21 Oct 2023 06:02      proBNP:     Lipid Profile:   HgA1c:     Creatinine: 1.20 mg/dL (10-21-23 @ 06:02)  Creatinine: 1.24 mg/dL (10-20-23 @ 05:28)            
Neurology Progress Note    S: Patient seen and examined. No new events overnight.  exam somewhat better. no gaze     Medication:  acetaminophen     Tablet .. 650 milliGRAM(s) Oral every 6 hours PRN  ALPRAZolam 0.5 milliGRAM(s) Oral three times a day PRN  aspirin  chewable 81 milliGRAM(s) Oral daily  atorvastatin 80 milliGRAM(s) Oral at bedtime  metoprolol succinate ER 75 milliGRAM(s) Oral daily  pantoprazole    Tablet 40 milliGRAM(s) Oral before breakfast  sacubitril 24 mG/valsartan 26 mG 1 Tablet(s) Oral two times a day  venlafaxine XR. 75 milliGRAM(s) Oral daily      Vitals:  Vital Signs Last 24 Hrs  T(C): 36.8 (18 Oct 2023 04:07), Max: 37 (17 Oct 2023 12:33)  T(F): 98.3 (18 Oct 2023 04:07), Max: 98.6 (17 Oct 2023 12:33)  HR: 96 (18 Oct 2023 10:53) (69 - 127)  BP: 120/84 (18 Oct 2023 10:53) (111/70 - 127/75)  BP(mean): --  RR: 16 (18 Oct 2023 04:07) (16 - 18)  SpO2: 100% (18 Oct 2023 04:07) (96% - 100%)    Parameters below as of 18 Oct 2023 04:07  Patient On (Oxygen Delivery Method): nasal cannula        General Exam:   General - R gaze deviation with r head turning. Head turning can be overcome  Cardiovascular - Peripheral pulses palpable, no edema  Eyes -  Fundoscopy not performed due to safety precautions in the setting of infection risk    Neurologic Exam:  Mental status - Awake, Alert, Oriented to person, place, and time. Speech slow, hypophonic, repetition and naming intact. Follows simple and complex commands. Attention/concentration, remote memory (including registration and recall), and fund of knowledge intact    Cranial nerves - PERRLA, No BTT on L side, R gaze pref but now can cross midline, face sensation (V1-V3) intact b/l, facial strength intact without asymmetry b/l, hearing intact b/l, palate with symmetric elevation, trapezius 5/5 strength b/l, tongue midline on protrusion with full lateral movement    Motor - Decreasecd bulk and normal tone throughout.  Strength testing            R        Deltoid:  4    Biceps:  4    Triceps:  4    Wrist Extension:  4    Wrist Flexion:  4    Interossei:  4    :  4    Hip Flexion:  4    Hip Extension:  4    Knee Flexion:  4    Knee Extension:  4    Dorsiflexion:  4    Plantar Flexion:  4        L        Deltoid:  3    Biceps:  3    Triceps:  2    Wrist Extension:  2    Wrist Flexion:  2    Interossei:  2    :  2    Hip Flexion:  0    Hip Extension:  0    Knee Flexion:  0    Knee Extension:  0    Dorsiflexion:  0    Plantar Flexion:  0      Sensation - Light touch/temperature intact throughout    Coordination - Finger to Nose intact in RUE. Cannot perform with LUE 2/2 weakness. Occasional jerks of the chest, b/l neck and R upper extremity noted during the exam    Gait and station -   Not tested due to patient's safety concerns  I personally reviewed the below data/images/labs:      CBC Full  -  ( 17 Oct 2023 12:34 )  WBC Count : 10.05 K/uL  RBC Count : 4.18 M/uL  Hemoglobin : 12.6 g/dL  Hematocrit : 39.8 %  Platelet Count - Automated : 236 K/uL  Mean Cell Volume : 95.2 fl  Mean Cell Hemoglobin : 30.1 pg  Mean Cell Hemoglobin Concentration : 31.7 gm/dL  Auto Neutrophil # : 8.58 K/uL  Auto Lymphocyte # : 0.73 K/uL  Auto Monocyte # : 0.66 K/uL  Auto Eosinophil # : 0.01 K/uL  Auto Basophil # : 0.03 K/uL  Auto Neutrophil % : 85.3 %  Auto Lymphocyte % : 7.3 %  Auto Monocyte % : 6.6 %  Auto Eosinophil % : 0.1 %  Auto Basophil % : 0.3 %    10-17    137  |  101  |  25<H>  ----------------------------<  103<H>  5.0   |  26  |  1.13    Ca    9.2      17 Oct 2023 12:34  Phos  4.5     10-17  Mg     2.0     10-17    TPro  6.6  /  Alb  3.6  /  TBili  0.8  /  DBili  x   /  AST  24  /  ALT  14  /  AlkPhos  76  10-17    LIVER FUNCTIONS - ( 17 Oct 2023 12:34 )  Alb: 3.6 g/dL / Pro: 6.6 g/dL / ALK PHOS: 76 U/L / ALT: 14 U/L / AST: 24 U/L / GGT: x           PT/INR - ( 17 Oct 2023 12:34 )   PT: 12.2 sec;   INR: 1.11 ratio         PTT - ( 17 Oct 2023 12:34 )  PTT:31.7 sec  Urinalysis Basic - ( 17 Oct 2023 12:34 )    Color: x / Appearance: x / SG: x / pH: x  Gluc: 103 mg/dL / Ketone: x  / Bili: x / Urobili: x   Blood: x / Protein: x / Nitrite: x   Leuk Esterase: x / RBC: x / WBC x   Sq Epi: x / Non Sq Epi: x / Bacteria: x        CT Head No Cont:  (16 Oct 2023 18:05)    < from: CT Angio Neck Stroke Protocol w/ IV Cont (10.17.23 @ 11:31) >  IMPRESSION:    Brain CT: No acute hemorrhage, mass effect and no change compared with   prior study dated 10/16/2023.    Neck CTA: No hemodynamically significant stenosis.    Brain CTA: No large vessel occlusion or high-grade stenosis.    Perfusion maps. No core infarct. 15 cc of hypoperfusion within the right   frontal parietal parasagittal region.    < end of copied text >    
Neurology Progress Note    S: Patient seen and examined. No new events overnight.  exam somewhat better. no gaze MRI with + embolic infarcts     Medication:    Medications: MEDICATIONS  (STANDING):  apixaban 5 milliGRAM(s) Oral two times a day  atorvastatin 80 milliGRAM(s) Oral at bedtime  metoprolol succinate  milliGRAM(s) Oral daily  nystatin Powder 1 Application(s) Topical two times a day  pantoprazole    Tablet 40 milliGRAM(s) Oral before breakfast  sacubitril 24 mG/valsartan 26 mG 1 Tablet(s) Oral two times a day  venlafaxine XR. 75 milliGRAM(s) Oral daily    MEDICATIONS  (PRN):  acetaminophen     Tablet .. 650 milliGRAM(s) Oral every 6 hours PRN Temp greater or equal to 38C (100.4F), Mild Pain (1 - 3)  ALPRAZolam 0.5 milliGRAM(s) Oral three times a day PRN Anxiety       Vitals:  Vital Signs Last 24 Hrs  T(C): 36.3 (20 Oct 2023 04:12), Max: 36.8 (19 Oct 2023 11:30)  T(F): 97.4 (20 Oct 2023 04:12), Max: 98.3 (19 Oct 2023 11:30)  HR: 111 (20 Oct 2023 04:12) (106 - 119)  BP: 118/78 (20 Oct 2023 04:12) (102/72 - 118/78)  BP(mean): --  RR: 18 (20 Oct 2023 04:12) (16 - 18)  SpO2: 98% (20 Oct 2023 04:12) (97% - 98%)    Parameters below as of 20 Oct 2023 04:12  Patient On (Oxygen Delivery Method): room air             General Exam:   General - R gaze deviation with r head turning. Head turning can be overcome  Cardiovascular - Peripheral pulses palpable, no edema  Eyes -  Fundoscopy not performed due to safety precautions in the setting of infection risk    Neurologic Exam:  Mental status - Awake, Alert, Oriented to person, place, and time. Speech slow, hypophonic, repetition and naming intact. Follows simple and complex commands. Attention/concentration, remote memory (including registration and recall), and fund of knowledge intact    Cranial nerves - PERRLA, No BTT on L side, R gaze pref but now can cross midline, face sensation (V1-V3) intact b/l, facial strength intact without asymmetry b/l, hearing intact b/l, palate with symmetric elevation, trapezius 5/5 strength b/l, tongue midline on protrusion with full lateral movement    Motor - Decreasecd bulk and normal tone throughout.  Strength testing            R        Deltoid:  4    Biceps:  4    Triceps:  4    Wrist Extension:  4    Wrist Flexion:  4    Interossei:  4    :  4    Hip Flexion:  4    Hip Extension:  4    Knee Flexion:  4    Knee Extension:  4    Dorsiflexion:  4    Plantar Flexion:  4        L        Deltoid:  3    Biceps:  3    Triceps:  2    Wrist Extension:  2    Wrist Flexion:  2    Interossei:  2    :  2    Hip Flexion:  0    Hip Extension:  0    Knee Flexion:  0    Knee Extension:  0    Dorsiflexion:  0    Plantar Flexion:  0      Sensation - Light touch/temperature intact throughout    Coordination - Finger to Nose intact in RUE. Cannot perform with LUE 2/2 weakness. Occasional jerks of the chest, b/l neck and R upper extremity noted during the exam    Gait and station -   Not tested due to patient's safety concerns  I personally reviewed the below data/images/labs:        LABS:                          13.6   10.05 )-----------( 254      ( 20 Oct 2023 05:30 )             41.8     10-20    142  |  104  |  62<H>  ----------------------------<  149<H>  4.5   |  23  |  1.24    Ca    9.3      20 Oct 2023 05:28          Urinalysis Basic - ( 20 Oct 2023 05:28 )    Color: x / Appearance: x / SG: x / pH: x  Gluc: 149 mg/dL / Ketone: x  / Bili: x / Urobili: x   Blood: x / Protein: x / Nitrite: x   Leuk Esterase: x / RBC: x / WBC x   Sq Epi: x / Non Sq Epi: x / Bacteria: x                  CT Head No Cont:  (16 Oct 2023 18:05)    < from: CT Angio Neck Stroke Protocol w/ IV Cont (10.17.23 @ 11:31) >  IMPRESSION:    Brain CT: No acute hemorrhage, mass effect and no change compared with   prior study dated 10/16/2023.    Neck CTA: No hemodynamically significant stenosis.    Brain CTA: No large vessel occlusion or high-grade stenosis.    Perfusion maps. No core infarct. 15 cc of hypoperfusion within the right   frontal parietal parasagittal region.    < end of copied text >    < from: MR Head No Cont (10.18.23 @ 14:33) >    ACC: [id number] EXAM:  MR BRAIN   ORDERED BY: WINSOME MARTIN     PROCEDURE DATE:  10/18/2023          INTERPRETATION:  CLINICAL INDICATION: Fall on Xarelto      Magnetic resonance imaging of the brain was carried out with transaxial   SPGR, FLAIR, fast spin echo T2 weighted images, axial susceptibility   weighted series, diffusion weighted series and sagittal T1 weighted   series on a 1.5 Payton magnet.    Comparison is made with the prior CT/CTA of 10/17/2023.    Ventricular and sulcal prominence is consistent with moderate atrophy.   There are bilateral old cerebellar infarcts. There are scattered infarcts   identified in the supratentorial region there is an infarct in the left   occipital subcortical white matter, left posterior temporalcortex the   right frontal cortex, the right centrum semiovale and the right medial   frontal cortex adjacent to the anterior body of the right lateral   ventricle. Bilaterality cysts suggestive of cardioembolic source. There   is no acute hemorrhage. There is a focus of hemosiderin adjacent to the   lateral border of the atrium of the right lateral ventricle.    The sellar and parasellar structures are unremarkable.    The paranasal sinuses are clear. There has been previous bilateral lens   replacement surgery. Ligamentous hypertrophy at the C1-2 level likely   related to degenerative changes.    IMPRESSION: Moderate atrophy. Small vessel white matter ischemic changes.   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   No acute hemorrhage.    --- End of Report ---            LAMBERTO ENRIQUE MD; Attending Radiologist  This document has been electronically signed. Oct 18 2023  2:58PM    < end of copied text >  
Patient is a 89y old  Female who presents with a chief complaint of Fall (21 Oct 2023 14:10)      SUBJECTIVE / OVERNIGHT EVENTS:    Events noted.  More alert  No N/V    MEDICATIONS  (STANDING):  apixaban 5 milliGRAM(s) Oral two times a day  atorvastatin 80 milliGRAM(s) Oral at bedtime  metoprolol succinate  milliGRAM(s) Oral daily  nystatin Powder 1 Application(s) Topical two times a day  pantoprazole    Tablet 40 milliGRAM(s) Oral before breakfast  sacubitril 24 mG/valsartan 26 mG 1 Tablet(s) Oral two times a day  venlafaxine XR. 75 milliGRAM(s) Oral daily    MEDICATIONS  (PRN):  acetaminophen     Tablet .. 650 milliGRAM(s) Oral every 6 hours PRN Temp greater or equal to 38C (100.4F), Mild Pain (1 - 3)  ALPRAZolam 0.5 milliGRAM(s) Oral three times a day PRN Anxiety        CAPILLARY BLOOD GLUCOSE        I&O's Summary    20 Oct 2023 07:01  -  21 Oct 2023 07:00  --------------------------------------------------------  IN: 1280 mL / OUT: 250 mL / NET: 1030 mL    21 Oct 2023 07:01  -  22 Oct 2023 02:04  --------------------------------------------------------  IN: 820 mL / OUT: 350 mL / NET: 470 mL        T(C): 36.7 (10-21-23 @ 21:02), Max: 37.3 (10-21-23 @ 11:35)  HR: 93 (10-21-23 @ 21:02) (88 - 108)  BP: 103/65 (10-21-23 @ 21:02) (90/63 - 123/72)  RR: 18 (10-21-23 @ 21:02) (18 - 18)  SpO2: 98% (10-21-23 @ 21:02) (95% - 98%)    PHYSICAL EXAM:    NECK: Supple, No JVD  CHEST/LUNG: Clear to auscultation bilaterally; No wheezing.  HEART: Regular rate and rhythm; No murmurs, rubs, or gallops  ABDOMEN: Soft, Nontender, Nondistended; Bowel sounds present  EXTREMITIES:   No edema  NEUROLOGY: AA      LABS:                        13.6   10.05 )-----------( 254      ( 20 Oct 2023 05:30 )             41.8     10-21    139  |  105  |  59<H>  ----------------------------<  130<H>  4.4   |  24  |  1.20    Ca    8.8      21 Oct 2023 06:02            Urinalysis Basic - ( 21 Oct 2023 06:02 )    Color: x / Appearance: x / SG: x / pH: x  Gluc: 130 mg/dL / Ketone: x  / Bili: x / Urobili: x   Blood: x / Protein: x / Nitrite: x   Leuk Esterase: x / RBC: x / WBC x   Sq Epi: x / Non Sq Epi: x / Bacteria: x      CAPILLARY BLOOD GLUCOSE            RADIOLOGY & ADDITIONAL TESTS:    Imaging Personally Reviewed:    Consultant(s) Notes Reviewed:      Care Discussed with Consultants/Other Providers:    Christian Luis MD, CMD, FACP    [address] [place] [address]  Office Tel: [phone]  Cell: [phone]  
Patient is a 89y old  Female who presents with a chief complaint of Fall (17 Oct 2023 13:13)      SUBJECTIVE / OVERNIGHT EVENTS:    Events noted.  CONSTITUTIONAL: No fever,  or fatigue  RESPIRATORY: No cough, wheezing,  No shortness of breath  CARDIOVASCULAR: No chest pain, palpitations, dizziness, or leg swelling  GASTROINTESTINAL: No abdominal or epigastric pain. No nausea, vomiting.      MEDICATIONS  (STANDING):  aspirin  chewable 81 milliGRAM(s) Oral daily  atorvastatin 10 milliGRAM(s) Oral at bedtime  metoprolol succinate ER 75 milliGRAM(s) Oral daily  pantoprazole    Tablet 40 milliGRAM(s) Oral before breakfast  sacubitril 24 mG/valsartan 26 mG 1 Tablet(s) Oral two times a day  venlafaxine XR. 75 milliGRAM(s) Oral daily    MEDICATIONS  (PRN):  acetaminophen     Tablet .. 650 milliGRAM(s) Oral every 6 hours PRN Temp greater or equal to 38C (100.4F), Mild Pain (1 - 3)  ALPRAZolam 0.5 milliGRAM(s) Oral three times a day PRN Anxiety        CAPILLARY BLOOD GLUCOSE  111 (17 Oct 2023 11:20)      POCT Blood Glucose.: 111 mg/dL (17 Oct 2023 10:48)    I&O's Summary      T(C): 36.3 (10-17-23 @ 22:27), Max: 37 (10-17-23 @ 12:33)  HR: 127 (10-17-23 @ 22:27) (69 - 127)  BP: 127/75 (10-17-23 @ 22:27) (101/70 - 127/75)  RR: 18 (10-17-23 @ 22:27) (18 - 18)  SpO2: 97% (10-17-23 @ 22:27) (95% - 99%)    PHYSICAL EXAM:    NECK: Supple, No JVD  CHEST/LUNG: Clear to auscultation bilaterally; No wheezing.  HEART: Regular rate and rhythm; No murmurs, rubs, or gallops  ABDOMEN: Soft, Nontender, Nondistended; Bowel sounds present  EXTREMITIES:   No edema  NEUROLOGY: AAO      LABS:                        12.6   10.05 )-----------( 236      ( 17 Oct 2023 12:34 )             39.8     10-17    137  |  101  |  25<H>  ----------------------------<  103<H>  5.0   |  26  |  1.13    Ca    9.2      17 Oct 2023 12:34  Phos  4.5     10-17  Mg     2.0     10-17    TPro  6.6  /  Alb  3.6  /  TBili  0.8  /  DBili  x   /  AST  24  /  ALT  14  /  AlkPhos  76  10-17    PT/INR - ( 17 Oct 2023 12:34 )   PT: 12.2 sec;   INR: 1.11 ratio         PTT - ( 17 Oct 2023 12:34 )  PTT:31.7 sec  CARDIAC MARKERS ( 17 Oct 2023 12:34 )  x     / x     / 49 U/L / x     / 2.3 ng/mL  CARDIAC MARKERS ( 16 Oct 2023 17:10 )  x     / x     / 40 U/L / x     / 2.1 ng/mL      Urinalysis Basic - ( 17 Oct 2023 12:34 )    Color: x / Appearance: x / SG: x / pH: x  Gluc: 103 mg/dL / Ketone: x  / Bili: x / Urobili: x   Blood: x / Protein: x / Nitrite: x   Leuk Esterase: x / RBC: x / WBC x   Sq Epi: x / Non Sq Epi: x / Bacteria: x      CAPILLARY BLOOD GLUCOSE  111 (17 Oct 2023 11:20)      POCT Blood Glucose.: 111 mg/dL (17 Oct 2023 10:48)        RADIOLOGY & ADDITIONAL TESTS:    Imaging Personally Reviewed:    Consultant(s) Notes Reviewed:      Care Discussed with Consultants/Other Providers:    Christian Luis MD, CMD, FACP    [address] [place] [address]  Office Tel: [phone]  Cell: [phone]  
Patient is a 89y old  Female who presents with a chief complaint of Fall (21 Oct 2023 14:10)      SUBJECTIVE / OVERNIGHT EVENTS:    Events noted.  More alert  No N/V    MEDICATIONS  (STANDING):  apixaban 5 milliGRAM(s) Oral two times a day  atorvastatin 80 milliGRAM(s) Oral at bedtime  metoprolol succinate  milliGRAM(s) Oral daily  nystatin Powder 1 Application(s) Topical two times a day  pantoprazole    Tablet 40 milliGRAM(s) Oral before breakfast  sacubitril 24 mG/valsartan 26 mG 1 Tablet(s) Oral two times a day  venlafaxine XR. 75 milliGRAM(s) Oral daily    MEDICATIONS  (PRN):  acetaminophen     Tablet .. 650 milliGRAM(s) Oral every 6 hours PRN Temp greater or equal to 38C (100.4F), Mild Pain (1 - 3)  ALPRAZolam 0.5 milliGRAM(s) Oral three times a day PRN Anxiety        CAPILLARY BLOOD GLUCOSE        I&O's Summary    20 Oct 2023 07:01  -  21 Oct 2023 07:00  --------------------------------------------------------  IN: 1280 mL / OUT: 250 mL / NET: 1030 mL    21 Oct 2023 07:01  -  22 Oct 2023 02:04  --------------------------------------------------------  IN: 820 mL / OUT: 350 mL / NET: 470 mL        T(C): 36.7 (10-21-23 @ 21:02), Max: 37.3 (10-21-23 @ 11:35)  HR: 93 (10-21-23 @ 21:02) (88 - 108)  BP: 103/65 (10-21-23 @ 21:02) (90/63 - 123/72)  RR: 18 (10-21-23 @ 21:02) (18 - 18)  SpO2: 98% (10-21-23 @ 21:02) (95% - 98%)    PHYSICAL EXAM:    NECK: Supple, No JVD  CHEST/LUNG: Clear to auscultation bilaterally; No wheezing.  HEART: Regular rate and rhythm; No murmurs, rubs, or gallops  ABDOMEN: Soft, Nontender, Nondistended; Bowel sounds present  EXTREMITIES:   No edema  NEUROLOGY: AA      LABS:                        13.6   10.05 )-----------( 254      ( 20 Oct 2023 05:30 )             41.8     10-21    139  |  105  |  59<H>  ----------------------------<  130<H>  4.4   |  24  |  1.20    Ca    8.8      21 Oct 2023 06:02            Urinalysis Basic - ( 21 Oct 2023 06:02 )    Color: x / Appearance: x / SG: x / pH: x  Gluc: 130 mg/dL / Ketone: x  / Bili: x / Urobili: x   Blood: x / Protein: x / Nitrite: x   Leuk Esterase: x / RBC: x / WBC x   Sq Epi: x / Non Sq Epi: x / Bacteria: x      CAPILLARY BLOOD GLUCOSE            RADIOLOGY & ADDITIONAL TESTS:    Imaging Personally Reviewed:    Consultant(s) Notes Reviewed:      Care Discussed with Consultants/Other Providers:    Christian Luis MD, CMD, FACP    [address] [place] [address]  Office Tel: [phone]  Cell: [phone]  
  SUBJECTIVE / OVERNIGHT EVENTS:    Events noted.  Sleepy arousable  No N/V      MEDICATIONS  (STANDING):  apixaban 5 milliGRAM(s) Oral two times a day  atorvastatin 80 milliGRAM(s) Oral at bedtime  metoprolol succinate ER 75 milliGRAM(s) Oral daily  nystatin Powder 1 Application(s) Topical two times a day  pantoprazole    Tablet 40 milliGRAM(s) Oral before breakfast  sacubitril 24 mG/valsartan 26 mG 1 Tablet(s) Oral two times a day  venlafaxine XR. 75 milliGRAM(s) Oral daily    MEDICATIONS  (PRN):  acetaminophen     Tablet .. 650 milliGRAM(s) Oral every 6 hours PRN Temp greater or equal to 38C (100.4F), Mild Pain (1 - 3)  ALPRAZolam 0.5 milliGRAM(s) Oral three times a day PRN Anxiety        CAPILLARY BLOOD GLUCOSE        I&O's Summary    18 Oct 2023 07:01  -  19 Oct 2023 07:00  --------------------------------------------------------  IN: 0 mL / OUT: 250 mL / NET: -250 mL    19 Oct 2023 07:01  -  20 Oct 2023 01:15  --------------------------------------------------------  IN: 140 mL / OUT: 200 mL / NET: -60 mL        T(C): 36.3 (10-19-23 @ 21:35), Max: 37.3 (10-19-23 @ 04:04)  HR: 114 (10-19-23 @ 21:35) (74 - 130)  BP: 102/72 (10-19-23 @ 21:35) (101/72 - 120/78)  RR: 18 (10-19-23 @ 21:35) (16 - 18)  SpO2: 97% (10-19-23 @ 21:35) (97% - 98%)    PHYSICAL EXAM:    NECK: Supple, No JVD  CHEST/LUNG: Clear to auscultation bilaterally; No wheezing.  HEART: Regular rate and rhythm; No murmurs, rubs, or gallops  ABDOMEN: Soft, Nontender, Nondistended; Bowel sounds present  EXTREMITIES:   No edema  NEUROLOGY: Lethargic      LABS:                        14.5   12.61 )-----------( 275      ( 19 Oct 2023 13:23 )             45.5     10-19    141  |  102  |  47<H>  ----------------------------<  145<H>  4.9   |  21<L>  |  1.24    Ca    9.8      19 Oct 2023 13:23            Urinalysis Basic - ( 19 Oct 2023 13:23 )    Color: x / Appearance: x / SG: x / pH: x  Gluc: 145 mg/dL / Ketone: x  / Bili: x / Urobili: x   Blood: x / Protein: x / Nitrite: x   Leuk Esterase: x / RBC: x / WBC x   Sq Epi: x / Non Sq Epi: x / Bacteria: x      CAPILLARY BLOOD GLUCOSE            RADIOLOGY & ADDITIONAL TESTS:    Imaging Personally Reviewed:    Consultant(s) Notes Reviewed:      Care Discussed with Consultants/Other Providers:    Christian Luis MD, CMD, FACP    [address] [place] [address]  Office Tel: [phone]  Cell: [phone]  
Patient is a 89y old  Female who presents with a chief complaint of Fall (17 Oct 2023 13:13)      SUBJECTIVE / OVERNIGHT EVENTS:    Events noted.  CONSTITUTIONAL: No fever,  or fatigue  RESPIRATORY: No cough, wheezing,  No shortness of breath  CARDIOVASCULAR: No chest pain, palpitations, dizziness, or leg swelling  GASTROINTESTINAL: No abdominal or epigastric pain. No nausea, vomiting.      MEDICATIONS  (STANDING):  aspirin  chewable 81 milliGRAM(s) Oral daily  atorvastatin 10 milliGRAM(s) Oral at bedtime  metoprolol succinate ER 75 milliGRAM(s) Oral daily  pantoprazole    Tablet 40 milliGRAM(s) Oral before breakfast  sacubitril 24 mG/valsartan 26 mG 1 Tablet(s) Oral two times a day  venlafaxine XR. 75 milliGRAM(s) Oral daily    MEDICATIONS  (PRN):  acetaminophen     Tablet .. 650 milliGRAM(s) Oral every 6 hours PRN Temp greater or equal to 38C (100.4F), Mild Pain (1 - 3)  ALPRAZolam 0.5 milliGRAM(s) Oral three times a day PRN Anxiety        CAPILLARY BLOOD GLUCOSE  111 (17 Oct 2023 11:20)      POCT Blood Glucose.: 111 mg/dL (17 Oct 2023 10:48)    I&O's Summary      T(C): 36.3 (10-17-23 @ 22:27), Max: 37 (10-17-23 @ 12:33)  HR: 127 (10-17-23 @ 22:27) (69 - 127)  BP: 127/75 (10-17-23 @ 22:27) (101/70 - 127/75)  RR: 18 (10-17-23 @ 22:27) (18 - 18)  SpO2: 97% (10-17-23 @ 22:27) (95% - 99%)    PHYSICAL EXAM:    NECK: Supple, No JVD  CHEST/LUNG: Clear to auscultation bilaterally; No wheezing.  HEART: Regular rate and rhythm; No murmurs, rubs, or gallops  ABDOMEN: Soft, Nontender, Nondistended; Bowel sounds present  EXTREMITIES:   No edema  NEUROLOGY: AAO      LABS:                        12.6   10.05 )-----------( 236      ( 17 Oct 2023 12:34 )             39.8     10-17    137  |  101  |  25<H>  ----------------------------<  103<H>  5.0   |  26  |  1.13    Ca    9.2      17 Oct 2023 12:34  Phos  4.5     10-17  Mg     2.0     10-17    TPro  6.6  /  Alb  3.6  /  TBili  0.8  /  DBili  x   /  AST  24  /  ALT  14  /  AlkPhos  76  10-17    PT/INR - ( 17 Oct 2023 12:34 )   PT: 12.2 sec;   INR: 1.11 ratio         PTT - ( 17 Oct 2023 12:34 )  PTT:31.7 sec  CARDIAC MARKERS ( 17 Oct 2023 12:34 )  x     / x     / 49 U/L / x     / 2.3 ng/mL  CARDIAC MARKERS ( 16 Oct 2023 17:10 )  x     / x     / 40 U/L / x     / 2.1 ng/mL      Urinalysis Basic - ( 17 Oct 2023 12:34 )    Color: x / Appearance: x / SG: x / pH: x  Gluc: 103 mg/dL / Ketone: x  / Bili: x / Urobili: x   Blood: x / Protein: x / Nitrite: x   Leuk Esterase: x / RBC: x / WBC x   Sq Epi: x / Non Sq Epi: x / Bacteria: x      CAPILLARY BLOOD GLUCOSE  111 (17 Oct 2023 11:20)      POCT Blood Glucose.: 111 mg/dL (17 Oct 2023 10:48)        RADIOLOGY & ADDITIONAL TESTS:    Imaging Personally Reviewed:    Consultant(s) Notes Reviewed:      Care Discussed with Consultants/Other Providers:    Christian Luis MD, CMD, FACP    [address] [place] [address]  Office Tel: [phone]  Cell: [phone]  
  SUBJECTIVE / OVERNIGHT EVENTS:    Events noted.  Sleepy arousable  No N/V      MEDICATIONS  (STANDING):  apixaban 5 milliGRAM(s) Oral two times a day  atorvastatin 80 milliGRAM(s) Oral at bedtime  metoprolol succinate ER 75 milliGRAM(s) Oral daily  nystatin Powder 1 Application(s) Topical two times a day  pantoprazole    Tablet 40 milliGRAM(s) Oral before breakfast  sacubitril 24 mG/valsartan 26 mG 1 Tablet(s) Oral two times a day  venlafaxine XR. 75 milliGRAM(s) Oral daily    MEDICATIONS  (PRN):  acetaminophen     Tablet .. 650 milliGRAM(s) Oral every 6 hours PRN Temp greater or equal to 38C (100.4F), Mild Pain (1 - 3)  ALPRAZolam 0.5 milliGRAM(s) Oral three times a day PRN Anxiety        CAPILLARY BLOOD GLUCOSE        I&O's Summary    18 Oct 2023 07:01  -  19 Oct 2023 07:00  --------------------------------------------------------  IN: 0 mL / OUT: 250 mL / NET: -250 mL    19 Oct 2023 07:01  -  20 Oct 2023 01:15  --------------------------------------------------------  IN: 140 mL / OUT: 200 mL / NET: -60 mL        T(C): 36.3 (10-19-23 @ 21:35), Max: 37.3 (10-19-23 @ 04:04)  HR: 114 (10-19-23 @ 21:35) (74 - 130)  BP: 102/72 (10-19-23 @ 21:35) (101/72 - 120/78)  RR: 18 (10-19-23 @ 21:35) (16 - 18)  SpO2: 97% (10-19-23 @ 21:35) (97% - 98%)    PHYSICAL EXAM:    NECK: Supple, No JVD  CHEST/LUNG: Clear to auscultation bilaterally; No wheezing.  HEART: Regular rate and rhythm; No murmurs, rubs, or gallops  ABDOMEN: Soft, Nontender, Nondistended; Bowel sounds present  EXTREMITIES:   No edema  NEUROLOGY: Lethargic      LABS:                        14.5   12.61 )-----------( 275      ( 19 Oct 2023 13:23 )             45.5     10-19    141  |  102  |  47<H>  ----------------------------<  145<H>  4.9   |  21<L>  |  1.24    Ca    9.8      19 Oct 2023 13:23            Urinalysis Basic - ( 19 Oct 2023 13:23 )    Color: x / Appearance: x / SG: x / pH: x  Gluc: 145 mg/dL / Ketone: x  / Bili: x / Urobili: x   Blood: x / Protein: x / Nitrite: x   Leuk Esterase: x / RBC: x / WBC x   Sq Epi: x / Non Sq Epi: x / Bacteria: x      CAPILLARY BLOOD GLUCOSE            RADIOLOGY & ADDITIONAL TESTS:    Imaging Personally Reviewed:    Consultant(s) Notes Reviewed:      Care Discussed with Consultants/Other Providers:    Christian Luis MD, CMD, FACP    [address] [place] [address]  Office Tel: [phone]  Cell: [phone]

## 2023-10-23 NOTE — DISCHARGE NOTE NURSING/CASE MANAGEMENT/SOCIAL WORK - NSDCFUADDAPPT_GEN_ALL_CORE_FT
APPTS ARE READY TO BE MADE: [x ] YES    Best Family or Patient Contact (if needed):    Additional Information about above appointments (if needed):    1: please make sure to follow up with your cardiologist and primary  doctor  2:   3:     Other comments or requests:

## 2023-10-23 NOTE — DISCHARGE NOTE NURSING/CASE MANAGEMENT/SOCIAL WORK - NSDCVIVACCINE_GEN_ALL_CORE_FT
Tdap; 20-Nov-2019 15:40; Jaz Balderas ([facility]); [employer]; [id number] (Exp. Date: 17-Sep-2021); IntraMuscular; Deltoid Left.; 0.5 milliLiter(s); VIS (VIS Published: 09-May-2013, VIS Presented: 20-Nov-2019);

## 2023-10-23 NOTE — PROGRESS NOTE ADULT - TIME BILLING
Agree with above PA note.  cv stable  cont current tx  cont rate control with bb
Agree with above PA note.  cv stable  cont current tx  cont rate control with bb
Agree with above PA note.  cv stable  rates remain elevated with bb iv   add iv dilt for now  cont med tx of severe cmp  when tolerating po start doac and oral metoprolol

## 2023-10-23 NOTE — PROGRESS NOTE ADULT - PROVIDER SPECIALTY LIST ADULT
Cardiology
Cardiology
Neurology
Cardiology
Neurology
Cardiology
Cardiology
Neurology
Internal Medicine

## 2023-10-23 NOTE — DISCHARGE NOTE NURSING/CASE MANAGEMENT/SOCIAL WORK - NSDCPEFALRISK_GEN_ALL_CORE
For information on Fall & Injury Prevention, visit: [email or web address] OR  [email or web address] OR  https://www.cdc.gov/steadi/patient.html

## 2023-10-23 NOTE — PROGRESS NOTE ADULT - ASSESSMENT
89y (17-Dec-1927) F w/ HTN, HLD, Afib on Xarelto, HFrEF (EF 30%), SVT s/p ablation, endometrial CA s/p LYNETTE Presented initially to the emergency department after a fall from her chair.  Stroke code called after the patient was noted by physical therapy this morning to have left-sided hemiplegia with a right-sided gaze preference and forced turning of the head towards the right.  LKW: 1332 10/16/2023.  Unclear time of symptom onset.  Collateral information obtained from patient's daughter Lindsey who was present when the patient initially presented to the ED.  Regarding the initial fall, patient activated life alert from home after she fell out of chair at home and was unable to get up.  Peña has an unclear recollection of the event. It was not witnessed by the patient's daughter though there was head strike reported and patient had a CT head done yesterday which was nonacute.  Per daughter, patient has a poor short-term memory. Daughter reports that she came to the hospital with her mother at initial presentation that time the mother was minimally responsive and the daughter noted head movement to both sides.  Daughter reports that the thuanetradha is typically in bed, uses a walker occasionally and usually uses a wheelchair to get around, more so these past few days.  Patient has a home nursing aide who helps with day-to-day activities.  Patient is A&O x3 at present, daughter reports patient is fully oriented at baseline. Patient reports her last dose of Xarelto for her a. fib was yesterday morning.  CT head and CTA head & neck are non-acute though  the CT perfusion reveals an area in the right parasagittal frontal parietal region with 15 cc of Tmax greater than 6 seconds without a core infarct.  At the time of exam, patient had forced right gaze deviation with a persistently rightward turned head.     Patient continues to have occasional jerking movement of bilateral upper extremities, nonrhythmic which the patient says have been present for a long time and she relates to her neck pain which has been present since her fall.  Currently patient reports inability to look towards the left, severe weakness of the left upper and lower extremity and less severe generalized weakness.  Patient denies headache, nausea, vomiting, dizziness, vision changes, hearing changes, focal numbness.     CTH no acute findings: question of R frontal hypod  CTA H/N neg   CTP with Tmax of 15mL in R frontal   NIHSS: 6  preMRS: 4  Patient was treated under wake-up stroke protocol though was not a tenecteplase candidate as she refused tenecteplase after discussion with family   Patient was not a thrombectomy candidate as there was no LVO on imaging  10/18 exam a bit improved. no more gaze deviation to R but still pref.  still with L weakness >R     IMPRESSION  Acute onset R gaze deviation with R head turning, LLE pralysis & LUE weakness. 15 cc R parasagital frontal parietal area of tmax > 6 sec on CTP. Consistent with R distal ULYSSES vs ULYSSES branch occlusion.     RECOMMENDATION   - gradual normotension after 24 hrs  - telemetry monitoring  - MRI head non contrast  still pending. to help decide when to stop asa adn resum DOAC   - consider ILR   - st art ASA 81 daily  - Hold Xarelto, decision to restart pending MRI results  - start atorva 80 mg daily, titrate based on lipid profile  - TTE  - Lipid panel  -HbA1C  - PT/OT  - dysphagia screen  Ayo Novak MD  Vascular Neurology  Office: [phone] .   
  89y (17-Dec-1927) F w/ HTN, HLD, Afib on Xarelto, HFrEF (EF 30%), SVT s/p ablation, endometrial CA s/p LYNETTE Presented initially to the emergency department after a fall from her chair.  Stroke code called after the patient was noted by physical therapy this morning to have left-sided hemiplegia with a right-sided gaze preference and forced turning of the head towards the right.  LKW: 1332 10/16/2023.  Unclear time of symptom onset.  Collateral information obtained from patient's daughter Lindsey who was present when the patient initially presented to the ED.  Regarding the initial fall, patient activated life alert from home after she fell out of chair at home and was unable to get up.  Peña has an unclear recollection of the event. It was not witnessed by the patient's daughter though there was head strike reported and patient had a CT head done yesterday which was nonacute.  Per daughter, patient has a poor short-term memory. Daughter reports that she came to the hospital with her mother at initial presentation that time the mother was minimally responsive and the daughter noted head movement to both sides.  Daughter reports that the thuanetradha is typically in bed, uses a walker occasionally and usually uses a wheelchair to get around, more so these past few days.  Patient has a home nursing aide who helps with day-to-day activities.  Patient is A&O x3 at present, daughter reports patient is fully oriented at baseline. Patient reports her last dose of Xarelto for her a. fib was yesterday morning.  CT head and CTA head & neck are non-acute though  the CT perfusion reveals an area in the right parasagittal frontal parietal region with 15 cc of Tmax greater than 6 seconds without a core infarct.  At the time of exam, patient had forced right gaze deviation with a persistently rightward turned head.     Patient continues to have occasional jerking movement of bilateral upper extremities, nonrhythmic which the patient says have been present for a long time and she relates to her neck pain which has been present since her fall.  Currently patient reports inability to look towards the left, severe weakness of the left upper and lower extremity and less severe generalized weakness.  Patient denies headache, nausea, vomiting, dizziness, vision changes, hearing changes, focal numbness.     CTH no acute findings: question of R frontal hypod  CTA H/N neg   CTP with Tmax of 15mL in R frontal   NIHSS: 6  preMRS: 4  Patient was treated under wake-up stroke protocol though was not a tenecteplase candidate as she refused tenecteplase after discussion with family   Patient was not a thrombectomy candidate as there was no LVO on imaging  10/18 exam a bit improved. no more gaze deviation to R but still pref.  still with L weakness >R   MRI brain with embolic appearing infarcts noted R frontal as well as in left hemisphere.  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 15 cc R parasagital frontal parietal area of tmax > 6 sec on CTP. Consistent with R distal ULYSSES vs ULYSSES branch occlusion.     RECOMMENDATION   - MRI reveiwed, would stop ASA and resume DOAC>  would consider this Xarleto failure.  suggest eliquis 5mg BID.  (Cr < 1.5 and weight > 60kg) --> now on eliquis   - Hold Xarelto, decision to restart pending MRI results  - telemetry monitoring   - start atorva 80 mg daily, titrate based on lipid profile  - TTE unlikely to  ; done 10/18   -HbA1C  - PT/OT  - dysphagia screen  yAo Novak MD  Vascular Neurology  Office: [phone] .   
  89y (17-Dec-1927) F w/ HTN, HLD, Afib on Xarelto, HFrEF (EF 30%), SVT s/p ablation, endometrial CA s/p LYNETTE Presented initially to the emergency department after a fall from her chair.  Stroke code called after the patient was noted by physical therapy this morning to have left-sided hemiplegia with a right-sided gaze preference and forced turning of the head towards the right.  LKW: 1332 10/16/2023.  Unclear time of symptom onset.  Collateral information obtained from patient's daughter Lindsey who was present when the patient initially presented to the ED.  Regarding the initial fall, patient activated life alert from home after she fell out of chair at home and was unable to get up.  Peña has an unclear recollection of the event. It was not witnessed by the patient's daughter though there was head strike reported and patient had a CT head done yesterday which was nonacute.  Per daughter, patient has a poor short-term memory. Daughter reports that she came to the hospital with her mother at initial presentation that time the mother was minimally responsive and the daughter noted head movement to both sides.  Daughter reports that the thuanetradha is typically in bed, uses a walker occasionally and usually uses a wheelchair to get around, more so these past few days.  Patient has a home nursing aide who helps with day-to-day activities.  Patient is A&O x3 at present, daughter reports patient is fully oriented at baseline. Patient reports her last dose of Xarelto for her a. fib was yesterday morning.  CT head and CTA head & neck are non-acute though  the CT perfusion reveals an area in the right parasagittal frontal parietal region with 15 cc of Tmax greater than 6 seconds without a core infarct.  At the time of exam, patient had forced right gaze deviation with a persistently rightward turned head.     Patient continues to have occasional jerking movement of bilateral upper extremities, nonrhythmic which the patient says have been present for a long time and she relates to her neck pain which has been present since her fall.  Currently patient reports inability to look towards the left, severe weakness of the left upper and lower extremity and less severe generalized weakness.  Patient denies headache, nausea, vomiting, dizziness, vision changes, hearing changes, focal numbness.     CTH no acute findings: question of R frontal hypod  CTA H/N neg   CTP with Tmax of 15mL in R frontal   NIHSS: 6  preMRS: 4  Patient was treated under wake-up stroke protocol though was not a tenecteplase candidate as she refused tenecteplase after discussion with family   Patient was not a thrombectomy candidate as there was no LVO on imaging  10/18 exam a bit improved. no more gaze deviation to R but still pref.  still with L weakness >R   MRI brain with embolic appearing infarcts noted R frontal as well as in left hemisphere.  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 15 cc R parasagital frontal parietal area of tmax > 6 sec on CTP. Consistent with R distal ULYSSES vs ULYSSES branch occlusion.     RECOMMENDATION   - MRI reveiwed, would stop ASA and resume DOAC>  would consider this Xarleto failure.  suggest eliquis 5mg BID.  (Cr < 1.5 and weight > 60kg) --> now on eliquis   - telemetry monitoring   - start atorva 80 mg daily, titrate based on lipid profile  - TTE unlikely to  ; done 10/18   -HbA1C  - PT/OT  - dysphagia screen  - d/c planning RICHI Novak MD  Vascular Neurology  Office: [phone] .   
  89y (17-Dec-1927) F w/ HTN, HLD, Afib on Xarelto, HFrEF (EF 30%), SVT s/p ablation, endometrial CA s/p LYNETTE Presented initially to the emergency department after a fall from her chair.  Stroke code called after the patient was noted by physical therapy this morning to have left-sided hemiplegia with a right-sided gaze preference and forced turning of the head towards the right.  LKW: 1332 10/16/2023.  Unclear time of symptom onset.  Collateral information obtained from patient's daughter Lindsey who was present when the patient initially presented to the ED.  Regarding the initial fall, patient activated life alert from home after she fell out of chair at home and was unable to get up.  Peña has an unclear recollection of the event. It was not witnessed by the patient's daughter though there was head strike reported and patient had a CT head done yesterday which was nonacute.  Per daughter, patient has a poor short-term memory. Daughter reports that she came to the hospital with her mother at initial presentation that time the mother was minimally responsive and the daughter noted head movement to both sides.  Daughter reports that the thuanetradha is typically in bed, uses a walker occasionally and usually uses a wheelchair to get around, more so these past few days.  Patient has a home nursing aide who helps with day-to-day activities.  Patient is A&O x3 at present, daughter reports patient is fully oriented at baseline. Patient reports her last dose of Xarelto for her a. fib was yesterday morning.  CT head and CTA head & neck are non-acute though  the CT perfusion reveals an area in the right parasagittal frontal parietal region with 15 cc of Tmax greater than 6 seconds without a core infarct.  At the time of exam, patient had forced right gaze deviation with a persistently rightward turned head.     Patient continues to have occasional jerking movement of bilateral upper extremities, nonrhythmic which the patient says have been present for a long time and she relates to her neck pain which has been present since her fall.  Currently patient reports inability to look towards the left, severe weakness of the left upper and lower extremity and less severe generalized weakness.  Patient denies headache, nausea, vomiting, dizziness, vision changes, hearing changes, focal numbness.     CTH no acute findings: question of R frontal hypod  CTA H/N neg   CTP with Tmax of 15mL in R frontal   NIHSS: 6  preMRS: 4  Patient was treated under wake-up stroke protocol though was not a tenecteplase candidate as she refused tenecteplase after discussion with family   Patient was not a thrombectomy candidate as there was no LVO on imaging  10/18 exam a bit improved. no more gaze deviation to R but still pref.  still with L weakness >R   MRI brain with embolic appearing infarcts noted R frontal as well as in left hemisphere.  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 15 cc R parasagital frontal parietal area of tmax > 6 sec on CTP. Consistent with R distal ULYSSES vs ULYSSES branch occlusion.     RECOMMENDATION   - MRI reveiwed, would stop ASA and resume DOAC>  would consider this Xarleto failure.  suggest eliquis 5mg BID.  (Cr < 1.5 and weight > 60kg) --> now on eliquis   - telemetry monitoring   - start atorva 80 mg daily, titrate based on lipid profile  - TTE unlikely to  ; done 10/18   -HbA1C  - PT/OT  - dysphagia screen  - d/c planning RICHI Novak MD  Vascular Neurology  Office: [phone] .   
89yo F w/ PMH of HTN, HLD, Afib on Xarelto, HFrEF (EF 30%), SVT s/p ablation, endometrial CA s/p LYNETTE pw fall from chair.
ECHO 8/11/23: mild to mod MR , mild AR,  Severe global left ventricular systolic dysfunction. LVEF in the 25-30% decreased right ventricular systolic function.severe pulmonary hypertension.  ECHO 4/17/18: EF 55-60 % moderate to severe mitral stenosis. Normal left ventricular systolic function. mild aortic stenosis. Mild-moderate aortic regurgitation.  Normal right ventricular size and function.      A/p  Pt is a 89yo F w/ PMH of HTN, HLD, Afib on Xarelto, HFrEF (EF 30%), SVT s/p ablation, endometrial CA s/p LYNETTE pw fall from chair, c/f stroke.    #Fall  -s/p stroke code  -CTH, CTA H/N no significant findings  -Neuro work up  -MRI no e/o cva  -No need for ILR as patient has known afib  -Echo as below    #Cardiomyopathy, Acute HfrEF  -etiology unclear  -Possible rhythm versus ischemic versus valvular  -Deferred ischemic eval in the past given advanced age and functional status  -c/w low dose entresto/ BB   -Echo with mildly decreased lvef (47%), mod MR, mod-severe MS, mod TR  -Euvolemic on exam    #Afib  -Did not receive cardizem gtt yesterday as her rates improved  -Rates elevated on tele overnight, although 90s on tele this am  -Increase metoprolol to 100mg qd  -Started on eliquis d/t xarelto failure    #HTN  -Bp stable  -Meds as above      
ECHO 8/11/23: mild to mod MR , mild AR,  Severe global left ventricular systolic dysfunction. LVEF in the 25-30% decreased right ventricular systolic function.severe pulmonary hypertension.  ECHO 4/17/18: EF 55-60 % moderate to severe mitral stenosis. Normal left ventricular systolic function. mild aortic stenosis. Mild-moderate aortic regurgitation.  Normal right ventricular size and function.      A/p  Pt is a 89yo F w/ PMH of HTN, HLD, Afib on Xarelto, HFrEF (EF 30%), SVT s/p ablation, endometrial CA s/p LYNETTE pw fall from chair, c/f stroke.    #Fall  -s/p stroke code  -CTH, CTA H/N no significant findings  -Neuro work up  -MRI no e/o cva  -No need for ILR as patient has known afib  -Echo as below    #Cardiomyopathy, Acute HfrEF  -etiology unclear  -Possible rhythm versus ischemic versus valvular  -Deferred ischemic eval in the past given advanced age and functional status  -c/w low dose entresto/ BB   -Echo with mildly decreased lvef (47%), mod MR, mod-severe MS, mod TR  -Euvolemic on exam    #Afib  -Did not receive cardizem gtt yesterday as her rates improved  -Rates stable  -metoprolol to 100mg qd  -on eliquis d/t xarelto failure    #HTN  -Bp stable  -Meds as above      35 minutes spent on total encounter; more than 50% of the visit was spent counseling and/or coordinating care by the attending physician.  
ECHO 8/11/23: mild to mod MR , mild AR,  Severe global left ventricular systolic dysfunction. LVEF in the 25-30% decreased right ventricular systolic function.severe pulmonary hypertension.  ECHO 4/17/18: EF 55-60 % moderate to severe mitral stenosis. Normal left ventricular systolic function. mild aortic stenosis. Mild-moderate aortic regurgitation.  Normal right ventricular size and function.      A/p  Pt is a 89yo F w/ PMH of HTN, HLD, Afib on Xarelto, HFrEF (EF 30%), SVT s/p ablation, endometrial CA s/p LYNETTE pw fall from chair, c/f stroke.    #Fall  -s/p stroke code  -CTH, CTA H/N no significant findings  -Neuro work up  -MRI no e/o cva  -Recent echo severe global lv systolic dysfxn, severe pHTN  -No need for ILR as patient has known afib    #Cardiomyopathy, Acute HfrEF  -etiology unclear  -Possible rhythm versus ischemic versus valvular  -Deferred ischemic eval in the past given advanced age and functional status  -c/w low dose entresto/ BB     #Afib  -Rates elevated on tele  -Presently unable to take PO so pt has been getting iv metoprolol w/o improvement of rates  -Recommend starting diltiazem gtt 10mg/hr for better rate control  -Resume xarelto when cleared by speech & swallow  -Eventually will resume po bb    #HTN  -Bp stable  -Meds as above      
ECHO 8/11/23: mild to mod MR , mild AR,  Severe global left ventricular systolic dysfunction. LVEF in the 25-30% decreased right ventricular systolic function.severe pulmonary hypertension.  ECHO 4/17/18: EF 55-60 % moderate to severe mitral stenosis. Normal left ventricular systolic function. mild aortic stenosis. Mild-moderate aortic regurgitation.  Normal right ventricular size and function.      A/p  Pt is a 89yo F w/ PMH of HTN, HLD, Afib on Xarelto, HFrEF (EF 30%), SVT s/p ablation, endometrial CA s/p LYNETTE pw fall from chair, c/f stroke.    #Fall  -s/p stroke code  -b/l cerebellar infarcts on brain imaging   -neuro f/u noted  -?xarelto failure, start eliquis per neuro  -anti plat per neuro  -No need for ILR as patient has known afib  -Echo as below    #Cardiomyopathy, Acute HfrEF  -etiology unclear  -Possible rhythm versus ischemic versus valvular  -Deferred ischemic eval in the past given advanced age and functional status  -c/w low dose entresto/ BB   -Echo with mildly decreased lvef (47%), mod MR, mod-severe MS, mod TR  -Euvolemic on exam    #Afib  -Rates stable  -metoprolol 100mg qd  -on eliquis d/t xarelto failure    #HTN  -Bp stable  -Meds as above      35 minutes spent on total encounter; more than 50% of the visit was spent counseling and/or coordinating care by the attending physician.  
  89y (17-Dec-1927) F w/ HTN, HLD, Afib on Xarelto, HFrEF (EF 30%), SVT s/p ablation, endometrial CA s/p LYNETTE Presented initially to the emergency department after a fall from her chair.  Stroke code called after the patient was noted by physical therapy this morning to have left-sided hemiplegia with a right-sided gaze preference and forced turning of the head towards the right.  LKW: 1332 10/16/2023.  Unclear time of symptom onset.  Collateral information obtained from patient's daughter Lindsey who was present when the patient initially presented to the ED.  Regarding the initial fall, patient activated life alert from home after she fell out of chair at home and was unable to get up.  Peña has an unclear recollection of the event. It was not witnessed by the patient's daughter though there was head strike reported and patient had a CT head done yesterday which was nonacute.  Per daughter, patient has a poor short-term memory. Daughter reports that she came to the hospital with her mother at initial presentation that time the mother was minimally responsive and the daughter noted head movement to both sides.  Daughter reports that the thuanetradha is typically in bed, uses a walker occasionally and usually uses a wheelchair to get around, more so these past few days.  Patient has a home nursing aide who helps with day-to-day activities.  Patient is A&O x3 at present, daughter reports patient is fully oriented at baseline. Patient reports her last dose of Xarelto for her a. fib was yesterday morning.  CT head and CTA head & neck are non-acute though  the CT perfusion reveals an area in the right parasagittal frontal parietal region with 15 cc of Tmax greater than 6 seconds without a core infarct.  At the time of exam, patient had forced right gaze deviation with a persistently rightward turned head.     Patient continues to have occasional jerking movement of bilateral upper extremities, nonrhythmic which the patient says have been present for a long time and she relates to her neck pain which has been present since her fall.  Currently patient reports inability to look towards the left, severe weakness of the left upper and lower extremity and less severe generalized weakness.  Patient denies headache, nausea, vomiting, dizziness, vision changes, hearing changes, focal numbness.     CTH no acute findings: question of R frontal hypod  CTA H/N neg   CTP with Tmax of 15mL in R frontal   NIHSS: 6  preMRS: 4  Patient was treated under wake-up stroke protocol though was not a tenecteplase candidate as she refused tenecteplase after discussion with family   Patient was not a thrombectomy candidate as there was no LVO on imaging  10/18 exam a bit improved. no more gaze deviation to R but still pref.  still with L weakness >R   MRI brain with embolic appearing infarcts noted R frontal as well as in left hemisphere.  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 15 cc R parasagital frontal parietal area of tmax > 6 sec on CTP. Consistent with R distal ULYSSES vs ULYSSES branch occlusion.     RECOMMENDATION   - MRI reveiwed, would stop ASA and resume DOAC>  would consider this Xarleto failure.  suggest eliquis 5mg BID.  (Cr < 1.5 and weight > 60kg)   - Hold Xarelto, decision to restart pending MRI results  - telemetry monitoring   - start atorva 80 mg daily, titrate based on lipid profile  - TTE unlikely to  ; done 10/18   -HbA1C  - PT/OT  - dysphagia screen  Ayo Novak MD  Vascular Neurology  Office: [phone] .   
  89y (17-Dec-1927) F w/ HTN, HLD, Afib on Xarelto, HFrEF (EF 30%), SVT s/p ablation, endometrial CA s/p LYNETTE Presented initially to the emergency department after a fall from her chair.  Stroke code called after the patient was noted by physical therapy this morning to have left-sided hemiplegia with a right-sided gaze preference and forced turning of the head towards the right.  LKW: 1332 10/16/2023.  Unclear time of symptom onset.  Collateral information obtained from patient's daughter Lindsey who was present when the patient initially presented to the ED.  Regarding the initial fall, patient activated life alert from home after she fell out of chair at home and was unable to get up.  Peña has an unclear recollection of the event. It was not witnessed by the patient's daughter though there was head strike reported and patient had a CT head done yesterday which was nonacute.  Per daughter, patient has a poor short-term memory. Daughter reports that she came to the hospital with her mother at initial presentation that time the mother was minimally responsive and the daughter noted head movement to both sides.  Daughter reports that the thuanetradha is typically in bed, uses a walker occasionally and usually uses a wheelchair to get around, more so these past few days.  Patient has a home nursing aide who helps with day-to-day activities.  Patient is A&O x3 at present, daughter reports patient is fully oriented at baseline. Patient reports her last dose of Xarelto for her a. fib was yesterday morning.  CT head and CTA head & neck are non-acute though  the CT perfusion reveals an area in the right parasagittal frontal parietal region with 15 cc of Tmax greater than 6 seconds without a core infarct.  At the time of exam, patient had forced right gaze deviation with a persistently rightward turned head.     Patient continues to have occasional jerking movement of bilateral upper extremities, nonrhythmic which the patient says have been present for a long time and she relates to her neck pain which has been present since her fall.  Currently patient reports inability to look towards the left, severe weakness of the left upper and lower extremity and less severe generalized weakness.  Patient denies headache, nausea, vomiting, dizziness, vision changes, hearing changes, focal numbness.     CTH no acute findings: question of R frontal hypod  CTA H/N neg   CTP with Tmax of 15mL in R frontal   NIHSS: 6  preMRS: 4  Patient was treated under wake-up stroke protocol though was not a tenecteplase candidate as she refused tenecteplase after discussion with family   Patient was not a thrombectomy candidate as there was no LVO on imaging  10/18 exam a bit improved. no more gaze deviation to R but still pref.  still with L weakness >R   MRI brain with embolic appearing infarcts noted R frontal as well as in left hemisphere.  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 15 cc R parasagital frontal parietal area of tmax > 6 sec on CTP. Consistent with R distal ULYSSES vs ULYSSES branch occlusion.     RECOMMENDATION   - MRI reveiwed, would stop ASA and resume DOAC>  would consider this Xarleto failure.  suggest eliquis 5mg BID.  (Cr < 1.5 and weight > 60kg) --> now on eliquis   - telemetry monitoring   - start atorva 80 mg daily, titrate based on lipid profile  - TTE unlikely to  ; done 10/18   -HbA1C  - PT/OT  - dysphagia screen  - d/c planning RICHI Novak MD  Vascular Neurology  Office: [phone] .   
89yo F w/ PMH of HTN, HLD, Afib on Xarelto, HFrEF (EF 30%), SVT s/p ablation, endometrial CA s/p LYNETTE pw fall from chair.
89yo F w/ PMH of HTN, HLD, Afib on Xarelto, HFrEF (EF 30%), SVT s/p ablation, endometrial CA s/p LYNETTE pw fall from chair.
ECHO 8/11/23: mild to mod MR , mild AR,  Severe global left ventricular systolic dysfunction. LVEF in the 25-30% decreased right ventricular systolic function.severe pulmonary hypertension.  ECHO 4/17/18: EF 55-60 % moderate to severe mitral stenosis. Normal left ventricular systolic function. mild aortic stenosis. Mild-moderate aortic regurgitation.  Normal right ventricular size and function.      A/p  Pt is a 89yo F w/ PMH of HTN, HLD, Afib on Xarelto, HFrEF (EF 30%), SVT s/p ablation, endometrial CA s/p LYNETTE pw fall from chair, c/f stroke.    #Fall  -s/p stroke code  -b/l cerebellar infarcts on brain imaging   -neuro f/u noted  -?xarelto failure, start eliquis per neuro  -anti plat per neuro  -No need for ILR as patient has known afib  -Echo as below    #Cardiomyopathy, Acute HfrEF  -etiology unclear  -Possible rhythm versus ischemic versus valvular  -Deferred ischemic eval in the past given advanced age and functional status  -c/w low dose entresto/ BB   -Echo with mildly decreased lvef (47%), mod MR, mod-severe MS, mod TR  -Euvolemic on exam    #Afib  -Rates stable  -metoprolol 100mg qd  -on eliquis d/t xarelto failure    #HTN  -Bp soft but stable  -Meds as above  
89yo F w/ PMH of HTN, HLD, Afib on Xarelto, HFrEF (EF 30%), SVT s/p ablation, endometrial CA s/p LYNETTE pw fall from chair.
89yo F w/ PMH of HTN, HLD, Afib on Xarelto, HFrEF (EF 30%), SVT s/p ablation, endometrial CA s/p LYNETTE pw fall from chair.
89yo F w/ PMH of HTN, HLD, Afib on Xarelto, HFrEF (EF 30%), SVT s/p ablation, endometrial CA s/p LYNETTE pw fall from chair.

## 2023-10-23 NOTE — DISCHARGE NOTE NURSING/CASE MANAGEMENT/SOCIAL WORK - PATIENT PORTAL LINK FT
You can access the FollowMyHealth Patient Portal offered by [facility] by registering at the following website: [email or web address]. By joining [employer]’s FollowMyHealth portal, you will also be able to view your health information using other applications (apps) compatible with our system.

## 2023-11-22 NOTE — ED ADULT NURSE REASSESSMENT NOTE - NS ED NURSE REASSESS COMMENT FT1
Called provider Delfina at [phone] to notify her patient is more lethargic than she was on arrival. Patient A&Ox3 and responsive to verbal stimuli with continued stimulation. Called provider Martha at [phone] to notify her patient is more lethargic than she was on arrival. Patient A&Ox3 and responsive to verbal stimuli with continued stimulation.

## 2023-11-22 NOTE — ED PROVIDER NOTE - MUSCULOSKELETAL, MLM
Spine appears normal, range of motion is not limited, no muscle or joint tenderness Spine appears normal, range of motion is not limited, no muscle or joint tenderness. L foot with no wounds, swelling, ecchymosis, or deformity.

## 2023-11-22 NOTE — ED PROVIDER NOTE - NSICDXPASTSURGICALHX_GEN_ALL_CORE_FT
PAST SURGICAL HISTORY:  [facility]     History of Colonoscopy- 2011/Sept     History of D&C- 8/12/11     Leg fracture, right Hip fx repair in Nov 2019    Status post cataract extraction OS    Status post hysterectomy endometrial cancer     PAST SURGICAL HISTORY:  [facility]     History of Colonoscopy- 2011/Sept     History of D&C- 8/12/11     Leg fracture, right Hip fx repair in Nov 2019    Status post cataract extraction OS    Status post hysterectomy endometrial cancer     PAST SURGICAL HISTORY:  [facility]     History of Colonoscopy- 2011/Sept     History of D&C- 8/12/11     Leg fracture, right Hip fx repair in Nov 2019    Status post cataract extraction OS    Status post hysterectomy endometrial cancer

## 2023-11-22 NOTE — ED PROVIDER NOTE - NS ED ATTENDING STATEMENT MOD
This was a shared visit with the TAL. I reviewed and verified the documentation and independently performed the documented:

## 2023-11-22 NOTE — ED ADULT NURSE REASSESSMENT NOTE - NS ED NURSE REASSESS COMMENT FT1
Called provider Martha at [phone] to notify her patient must be consent prior to RN releasing Blood. Provider to discuss plan of care with attending and will come down to assess patient.

## 2023-11-22 NOTE — CONSULT NOTE ADULT - SUBJECTIVE AND OBJECTIVE BOX
Chief Complaint:  Patient is a 89y old  Female who presents with a chief complaint of GI bleed, acute blood loss anemia (22 Nov 2023 15:38)      Date of service: 11-22-23 @ 22:52    HPI:    The patient is a 89 year old female with hx of SVT s/p ablation, afib on a/c, recent CVA presenting with dark stool. She was noted to have a hgb of about 8 on outpt labs. She was recently started on xarelto.  She has limited participation in the interview.    The patient does not participate in the interview.    Allergies:  soaps and creams causes rash uses only sensitive skin soap (Rash)  piperacillin-tazobactam (Rash)  Voltaren (Rash)  neomycin (Unknown)  sulfa drugs (Unknown)  Eye cream from the 1960s Neocortef ?spelling caused eyes to becomes swollen (Unknown)      Home Medications:    Hospital Medications:  acetaminophen     Tablet .. 650 milliGRAM(s) Oral every 6 hours PRN  artificial  tears Solution 1 Drop(s) Both EYES two times a day  atorvastatin 80 milliGRAM(s) Oral at bedtime  chlorhexidine 2% Cloths 1 Application(s) Topical <User Schedule>  melatonin 3 milliGRAM(s) Oral at bedtime PRN  metoprolol succinate ER 50 milliGRAM(s) Oral daily  mirtazapine 15 milliGRAM(s) Oral at bedtime  pantoprazole    Tablet 40 milliGRAM(s) Oral two times a day  sacubitril 24 mG/valsartan 26 mG 1 Tablet(s) Oral two times a day  sodium chloride 0.45%. 1000 milliLiter(s) IV Continuous <Continuous>  venlafaxine XR. 75 milliGRAM(s) Oral daily  zolpidem 5 milliGRAM(s) Oral at bedtime PRN  zolpidem 5 milliGRAM(s) Oral at bedtime PRN      PMHX/PSHX:  Anxiety    Hyperlipemia    Seasonal Allergies    Breast Lump    Initial Insomnia    Insomnia    Hyperlipidemia    Hypertension    Neuropathy associated with cancer    PE (pulmonary thromboembolism)    PAF (paroxysmal atrial fibrillation)    HTN (hypertension)    HLD (hyperlipidemia)    Endometrial cancer    History of Breast Lump[facility] Excision- [facility]- [facility]    History of Colonoscopy- 2011/Sept    History of D&C- 8/12/11    Status post hysterectomy    Status post cataract extraction    Leg fracture, right        Family history:  No pertinent family history in first degree relatives    No pertinent family history in first degree relatives        Social History:   Denies ethanol use.  Denies illicit drug use.    ROS:     General:  No wt loss, fevers, chills, night sweats, fatigue,   Eyes:  Good vision, no reported pain  ENT:  No sore throat, pain, runny nose, dysphagia  CV:  No pain, palpitations, hypo/hypertension  Resp:  No dyspnea, cough, tachypnea, wheezing  GI:  See HPI  :  No pain, bleeding, incontinence, nocturia  Muscle:  No pain, weakness  Neuro:  No weakness, tingling, memory problems  Psych:  No fatigue, insomnia, mood problems, depression  Endocrine:  No polyuria, polydipsia, cold/heat intolerance  Heme:  No petechiae, ecchymosis, easy bruisability  Integumentary:  No rash, edema      PHYSICAL EXAM:     GENERAL:  Appears stated age, well-groomed, well-nourished, no distress  HEENT:  NC/AT,  conjunctivae anicteric, clear and pink,   NECK: supple, trachea midline  CHEST:  Full & symmetric excursion, no increased effort, breath sounds clear  HEART:  Regular rhythm, no JVD  ABDOMEN:  Soft, non-tender, non-distended, normoactive bowel sounds,  no masses , no hepatosplenomegaly  EXTREMITIES:  no cyanosis,clubbing or edema  SKIN:  No rash, erythema, or, ecchymoses, no jaundice  NEURO:  Alert, non-focal, no asterixis  PSYCH: Appropriate affect, oriented to place and time  RECTAL: no stool      Vital Signs:  Vital Signs Last 24 Hrs  T(C): 36.8 (22 Nov 2023 21:59), Max: 36.8 (22 Nov 2023 21:59)  T(F): 98.3 (22 Nov 2023 21:59), Max: 98.3 (22 Nov 2023 21:59)  HR: 66 (22 Nov 2023 21:59) (66 - 97)  BP: 103/66 (22 Nov 2023 21:59) (103/66 - 158/94)  BP(mean): 90 (22 Nov 2023 14:26) (90 - 90)  RR: 16 (22 Nov 2023 21:59) (12 - 22)  SpO2: 100% (22 Nov 2023 21:59) (91% - 100%)    Parameters below as of 22 Nov 2023 21:59  Patient On (Oxygen Delivery Method): nasal cannula  O2 Flow (L/min): 2    Daily Height in cm: 162.56 (22 Nov 2023 12:10)    Daily     LABS: Labs personally reviewed by me:                        8.1    7.03  )-----------( 363      ( 22 Nov 2023 12:43 )             25.8     11-22    136  |  103  |  43<H>  ----------------------------<  112<H>  4.7   |  21<L>  |  1.33<H>    Ca    8.6      22 Nov 2023 12:43    TPro  6.2  /  Alb  3.2<L>  /  TBili  0.4  /  DBili  x   /  AST  57<H>  /  ALT  47<H>  /  AlkPhos  136<H>  11-22    LIVER FUNCTIONS - ( 22 Nov 2023 12:43 )  Alb: 3.2 g/dL / Pro: 6.2 g/dL / ALK PHOS: 136 U/L / ALT: 47 U/L / AST: 57 U/L / GGT: x           PT/INR - ( 22 Nov 2023 12:43 )   PT: 16.1 sec;   INR: 1.55 ratio         PTT - ( 22 Nov 2023 12:43 )  PTT:30.0 sec  Urinalysis Basic - ( 22 Nov 2023 12:43 )    Color: x / Appearance: x / SG: x / pH: x  Gluc: 112 mg/dL / Ketone: x  / Bili: x / Urobili: x   Blood: x / Protein: x / Nitrite: x   Leuk Esterase: x / RBC: x / WBC x   Sq Epi: x / Non Sq Epi: x / Bacteria: x          Imaging personally reviewed by me:

## 2023-11-22 NOTE — CONSULT NOTE ADULT - ASSESSMENT
89 year old female with abrupt drop in hgb suspicious for GI blood loss.     1. Drop in hgb  in light of age/comorbidities, favor conservative mgmt.  Check iron/ferritin, replete if needed  PPI for gi ppx  Consider low dose a/c once stable  reserve endoscopy for overt hemorrhage  usual diet once alert    2. afib on a/c  as above    3. CHF    4. COVID 19 PCR positive      Advanced care planning forms were discussed. Code status including forceful chest compressions, defibrillation and intubation were discussed. The risks benefits and alternatives to pertinent gastrointestinal procedures and interventions were discussed in detail and all questions were answered. Duration: 15 Minutes.    [facility]  Luis Enrique Delgado M.D.   [address] [place] [address]  Office: [phone]          Advanced care planning forms were discussed. Code status including forceful chest compressions, defibrillation and intubation were discussed. The risks benefits and alternatives to pertinent gastrointestinal procedures and interventions were discussed in detail and all questions were answered. Duration: 15 Minutes.    [facility]  Luis Enrique Delgado M.D.   [address], [place]  Office: [phone]

## 2023-11-22 NOTE — ED PROVIDER NOTE - PROGRESS NOTE DETAILS
Spoke with Dr. Luis, reports Dr. Tapia covering him. Spoke with Dr. Tapia, plan for admission to her service. - Ani Barakat PA-C

## 2023-11-22 NOTE — ED PROVIDER NOTE - ATTENDING APP SHARED VISIT CONTRIBUTION OF CARE
Attending MD Suarez:   I personally have seen and examined this patient.  Physician assistant note reviewed and agree on plan of care and except where noted.  See below for details.     Seen in [facility]    89F with PMH/PSH including HTN, HLD, AFib, HFrEF (EF 30%), SVT s/p ablation, endometrial cancer s/p LYNETTE, recent admission s/p fall found to have embolic infarcts on MRI with L sided weakness presents to the ED brought in by EMS from rehab with dark stools, anemia and generalized weakness.  Saloni was switched from Xarelto to Eliquis recently but held her Eliquis yesterday.  Saloni has been bed bound but with L foot pain since fall/hospitalization.  Denies chest pain, shortness of breath, abdominal pain, nausea, vomiting, diarrhea, urinary complaints. Denies bloody stools, hematuria, epistaxis, gingival bleeding.  Denies severe headache, sudden change in vision.     Exam:   General: NAD, AAOx1  HENT: head NCAT, airway patent  Eyes: anicteric, no conjunctival pallor  Lungs: lungs CTAB with good inspiratory effort, no wheezing, no rhonchi, no rales, no increased work of breathing sat'ing low 90s on RA, improved to mid to high 90s on 2L NC  Cardiac: +S1S2, no obvious m/r/g, irregular  GI: abdomen soft with +BS, NT, ND  : no CVAT  MSK: ranging neck and extremities freely, LUE and LLE 4+/5, RUE and RLE 5/5 strength  Neuro: moving all extremities spontaneously, nonfocal  Psych: normal mood and affect     A/P: 89F with dark stools, generalized weakness, will evaluate for but not limited to GIB, colitis, symptomatic anemia, viral illness, will also eval for occult infection like PNA, UTI, will obtain labs, will hold off on IVFs given EF, will obtain EKG, will obtain FOB, will consider PPI

## 2023-11-22 NOTE — ED PROVIDER NOTE - OBJECTIVE STATEMENT
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 Pt reports her eliquis was held yesterday. Pt's only complaint is that her L foot hurts, chronic since CVA during admission. Pt reports she has lukasz bed bound x 1 month. Denies fever/chills, dizziness, cough, CP, SOB, abdominal pain, BRBPR, dysuria, hematuria, HA. 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 Pt reports her eliquis was held yesterday. Pt's only complaint is that her L foot hurts, chronic since CVA during admission. Pt reports she has lukasz bed bound x 1 month. Denies fever/chills, dizziness, cough, CP, SOB, abdominal pain, BRBPR, dysuria, hematuria, HA. 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 Pt reports her eliquis was held yesterday. Pt's only complaint is that her L foot hurts, chronic since CVA during admission. Pt reports she has been bed bound x 1 month. Denies fever/chills, dizziness, cough, CP, SOB, abdominal pain, BRBPR, dysuria, hematuria, HA.

## 2023-11-22 NOTE — PATIENT PROFILE ADULT - FALL HARM RISK - HARM RISK INTERVENTIONS

## 2023-11-22 NOTE — H&P ADULT - NSICDXPASTSURGICALHX_GEN_ALL_CORE_FT
PAST SURGICAL HISTORY:  [facility]     History of Colonoscopy- 2011/Sept     History of D&C- 8/12/11     Leg fracture, right Hip fx repair in Nov 2019    Status post cataract extraction OS    Status post hysterectomy endometrial cancer

## 2023-11-22 NOTE — ED ADULT NURSE NOTE - OBJECTIVE STATEMENT
89 y/o female BIBA with c/o dark stools. A&Ox4. Bed bound as of the last month. 89 y/o female BIBA with c/o dark stools. A&Ox4. Bed bound as of the last month. PMH DVT on eliquis, previous CVA with left sided deficits. As per EMS facility noted 2 days of "dark tarey" stools. Patient endorses no complaints other than left heel pain which she states she has had since the stroke, no obvious wound to the left foot. Patient endorses feeling "tired" EMS states the facility recently increased her Ambien dosage. NSR on the monitor. SPO2 95% on room air. EKG performed. Patient denies chest pain, fever, chills, n/v/d, urinary symptoms, abdominal pain.

## 2023-11-22 NOTE — ED ADULT NURSE NOTE - NSICDXPASTSURGICALHX_GEN_ALL_CORE_FT
PAST SURGICAL HISTORY:  [facility]     History of Colonoscopy- 2011/Sept     History of D&C- 8/12/11     Leg fracture, right Hip fx repair in Nov 2019    Status post cataract extraction OS    Status post hysterectomy endometrial cancer

## 2023-11-22 NOTE — H&P ADULT - HISTORY OF PRESENT ILLNESS
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 Pt reports her eliquis was held yesterday. Pt's only complaint is that her L foot hurts, chronic since CVA during admission. Pt reports she has been bed bound x 1 month. Denies fever/chills, dizziness, cough, CP, SOB, abdominal pain, BRBPR, dysuria, hematuria, HA.

## 2023-11-22 NOTE — H&P ADULT - ASSESSMENT
T(C): 36.6 (11-22-23 @ 12:10), Max: 36.6 (11-22-23 @ 12:10)  HR: 94 (11-22-23 @ 14:26) (92 - 94)  BP: 107/81 (11-22-23 @ 14:26) (107/81 - 119/81)  RR: 22 (11-22-23 @ 14:26) (18 - 22)  SpO2: 99% (11-22-23 @ 14:26) (91% - 99%)    PHYSICAL EXAM:  GENERAL: NAD, well-developed  HEAD:  Atraumatic, Normocephalic  EYES: EOMI, PERRLA, conjunctiva and sclera clear  NECK: Supple, No JVD  CHEST/LUNG: Clear to auscultation bilaterally; No wheeze  HEART: Regular rate and rhythm; No murmurs, rubs, or gallops  ABDOMEN: Soft, Nontender, Nondistended; Bowel sounds present  EXTREMITIES:  2+ Peripheral Pulses, No clubbing, cyanosis, or edema  PSYCH: AAOx3  NEUROLOGY: non-focal  SKIN: No rashes or lesions    A/P    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 Pt reports her eliquis was held yesterday. Pt's only complaint is that her L foot hurts, chronic since CVA during admission. Pt reports she has been bed bound x 1 month. Denies fever/chills, dizziness, cough, CP, SOB, abdominal pain, BRBPR, dysuria, hematuria, HA.  FOund to have guaiac neg stool but slightly elevated BUN/Cr, possibly 2/2 GI bleed. Hgb dropped to 8.1. will transfuse, hold ELiquis, get GI consult.  ptn is covid Positive. asymptomatic outside of weakness. will get ID input. not hypoxic.  dvt ppx SCD  T(C): 36.6 (11-22-23 @ 12:10), Max: 36.6 (11-22-23 @ 12:10)  HR: 94 (11-22-23 @ 14:26) (92 - 94)  BP: 107/81 (11-22-23 @ 14:26) (107/81 - 119/81)  RR: 22 (11-22-23 @ 14:26) (18 - 22)  SpO2: 99% (11-22-23 @ 14:26) (91% - 99%)    PHYSICAL EXAM:  GENERAL: NAD, well-developed  HEAD:  Atraumatic, Normocephalic  EYES: EOMI, PERRLA, conjunctiva and sclera clear  NECK: Supple, No JVD  CHEST/LUNG: Clear to auscultation bilaterally; No wheeze  HEART: Regular rate and rhythm; No murmurs, rubs, or gallops  ABDOMEN: Soft, Nontender, Nondistended; Bowel sounds present  EXTREMITIES:  2+ Peripheral Pulses, No clubbing, cyanosis, or edema  PSYCH: AAOx3  NEUROLOGY: non-focal  SKIN: No rashes or lesions    A/P    89yo F with PMH of HTN, HLD, A.fib, HFrEF (EF 30%), SVT s/p ablation, endometrial CA s/p LYNETTE, admission 1 month ago for fall and 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 Pt reports her eliquis was held yesterday. Pt's only complaint is that her L foot hurts, chronic since CVA during admission. Pt reports she has been bed bound x 1 month. Denies fever/chills, dizziness, cough, CP, SOB, abdominal pain, BRBPR, dysuria, hematuria, HA.  FOund to have guaiac neg stool but slightly elevated BUN/Cr, possibly 2/2 GI bleed. Hgb dropped to 8.1. will transfuse, start PPI, hold ELiquis, get GI consult.  ptn is covid Positive. asymptomatic outside of weakness. will get ID input. not hypoxic.place on 2 days of remdesivir  dvt ppx SCD

## 2023-11-23 NOTE — CONSULT NOTE ADULT - ASSESSMENT
ECHO 8/11/23: mild to mod MR , mild AR,  Severe global left ventricular systolic dysfunction. LVEF in the 25-30% decreased right ventricular systolic function.severe pulmonary hypertension.  ECHO 4/17/18: EF 55-60 % moderate to severe mitral stenosis. Normal left ventricular systolic function. mild aortic stenosis. Mild-moderate aortic regurgitation.  Normal right ventricular size and function.      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    #Anemia  -likely GIB  -trend H/H  -hold AC  -appreciate GI eval, conservative mgmt      #CVA  -recent workup revealed cerebellar infarcts on brain imaging   -switched from xarelto to eliquis  -ac now on hold    #Cardiomyopathy  --Deferred ischemic eval in the past given advanced age and functional status  -c/w low dose entresto/ BB   -Echo with mildly decreased lvef (47%), mod MR, mod-severe MS, mod TR  -Euvolemic on exam    #Afib  -Rates stable  -metoprolol 50 mg qd  -on eliquis d/t xarelto failure    #HTN  -Bp soft but stable  -Meds as above    #COVID+  mgmt per primary    77 minutes spent on total encounter; more than 50% of the visit was spent counseling and/or coordinating care by the attending physician.

## 2023-11-23 NOTE — CONSULT NOTE ADULT - ASSESSMENT
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  [facility], Division of Infectious Diseases  [phone]  After 5pm on weekdays and all day on weekends - please call [phone]

## 2023-11-23 NOTE — CONSULT NOTE ADULT - SUBJECTIVE AND OBJECTIVE BOX
CARDIOLOGY CONSULT - Dr. 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 Pt reports her eliquis was held yesterday. Pt's only complaint is that her L foot hurts, chronic since CVA during admission. Pt reports she has been bed bound x 1 month. Denies fever/chills, dizziness, cough, CP, SOB, abdominal pain, BRBPR, dysuria, hematuria, HA. (22 Nov 2023 15:38)      PAST MEDICAL & SURGICAL HISTORY:  Anxiety      Breast Lump      Insomnia      PE (pulmonary thromboembolism)      PAF (paroxysmal atrial fibrillation)  On Xarelto      HTN (hypertension)      HLD (hyperlipidemia)      Endometrial cancer  S/P LYNETTE with SBO      [facility]      History of Colonoscopy- 2011/Sept      History of D&C- 8/12/11      Status post hysterectomy  endometrial cancer      Status post cataract extraction  OS      Leg fracture, right  Hip fx repair in Nov 2019              PREVIOUS DIAGNOSTIC TESTING:    [ ] Echocardiogram:  [ ]  Catheterization:  [ ] Stress Test:  	    MEDICATIONS:  MEDICATIONS  (STANDING):  artificial  tears Solution 1 Drop(s) Both EYES two times a day  atorvastatin 80 milliGRAM(s) Oral at bedtime  chlorhexidine 2% Cloths 1 Application(s) Topical <User Schedule>  metoprolol succinate ER 50 milliGRAM(s) Oral daily  mirtazapine 15 milliGRAM(s) Oral at bedtime  pantoprazole    Tablet 40 milliGRAM(s) Oral two times a day  remdesivir  IVPB 100 milliGRAM(s) IV Intermittent every 24 hours  sacubitril 24 mG/valsartan 26 mG 1 Tablet(s) Oral two times a day  sodium chloride 0.45%. 1000 milliLiter(s) (75 mL/Hr) IV Continuous <Continuous>  venlafaxine XR. 75 milliGRAM(s) Oral daily      FAMILY HISTORY:  No pertinent family history in first degree relatives        SOCIAL HISTORY:    [ ] Non-smoker  [ ] Smoker  [ ] Alcohol    Allergies    soaps and creams causes rash uses only sensitive skin soap (Rash)  piperacillin-tazobactam (Rash)  Voltaren (Rash)  neomycin (Unknown)  sulfa drugs (Unknown)  Eye cream from the 1960s Neocortef ?spelling caused eyes to becomes swollen (Unknown)    Intolerances    	    REVIEW OF SYSTEMS:  CONSTITUTIONAL: No fever, weight loss, or fatigue  EYES: No eye pain, visual disturbances, or discharge  ENMT:  No difficulty hearing, tinnitus, vertigo; No sinus or throat pain  NECK: No pain or stiffness  RESPIRATORY: No cough, wheezing, chills or hemoptysis; No Shortness of Breath  CARDIOVASCULAR: No chest pain, palpitations, passing out, dizziness, or leg swelling  GASTROINTESTINAL: No abdominal or epigastric pain. No nausea, vomiting, or hematemesis; No diarrhea or constipation. No melena or hematochezia.  GENITOURINARY: No dysuria, frequency, hematuria, or incontinence  NEUROLOGICAL: No headaches, memory loss, loss of strength, numbness, or tremors  SKIN: No itching, burning, rashes, or lesions   	    [ ] All others negative	  [ ] Unable to obtain    PHYSICAL EXAM:  T(C): 36.6 (11-23-23 @ 07:38), Max: 36.9 (11-23-23 @ 05:45)  HR: 96 (11-23-23 @ 07:38) (65 - 97)  BP: 112/73 (11-23-23 @ 07:38) (100/68 - 158/94)  RR: 18 (11-23-23 @ 07:38) (12 - 22)  SpO2: 100% (11-23-23 @ 07:38) (91% - 100%)  Wt(kg): --  I&O's Summary      Appearance: Normal	  Psychiatry: A & O x 3, Mood & affect appropriate  HEENT:   Normal oral mucosa, PERRL, EOMI	  Lymphatic: No lymphadenopathy  Cardiovascular: Normal S1 S2,RRR, No JVD, No murmurs  Respiratory: Lungs clear to auscultation	  Gastrointestinal:  Soft, Non-tender, + BS	  Skin: No rashes, No ecchymoses, No cyanosis	  Neurologic: Non-focal  Extremities: Normal range of motion, No clubbing, cyanosis or edema  Vascular: Peripheral pulses palpable 2+ bilaterally    TELEMETRY: 	    ECG:  	  RADIOLOGY:  OTHER: 	  	  LABS:	 	    CARDIAC MARKERS:                                  8.1    7.03  )-----------( 363      ( 22 Nov 2023 12:43 )             25.8     11-22    136  |  103  |  43<H>  ----------------------------<  112<H>  4.7   |  21<L>  |  1.33<H>    Ca    8.6      22 Nov 2023 12:43    TPro  6.2  /  Alb  3.2<L>  /  TBili  0.4  /  DBili  x   /  AST  57<H>  /  ALT  47<H>  /  AlkPhos  136<H>  11-22    PT/INR - ( 22 Nov 2023 12:43 )   PT: 16.1 sec;   INR: 1.55 ratio         PTT - ( 22 Nov 2023 12:43 )  PTT:30.0 sec  proBNP:   Lipid Profile:   HgA1c:   TSH:     ASSESSMENT/PLAN: 	              70 minutes spent on total encounter; more than 50% of the visit was spent counseling and/or coordinating care by the attending physician.

## 2023-11-23 NOTE — CONSULT NOTE ADULT - SUBJECTIVE AND OBJECTIVE BOX
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 Pt reports her eliquis was held yesterday. Pt's only complaint is that her L foot hurts, chronic since CVA during admission. Pt reports she has been bed bound x 1 month. Denies fever/chills, dizziness, cough, CP, SOB, abdominal pain, BRBPR, dysuria, hematuria, HA. (22 Nov 2023 15:38)       PAST MEDICAL & SURGICAL HISTORY:  Anxiety      Breast Lump      Insomnia      PE (pulmonary thromboembolism)      PAF (paroxysmal atrial fibrillation)  On Xarelto      HTN (hypertension)      HLD (hyperlipidemia)      Endometrial cancer  S/P LYNETTE with SBO      [facility]      History of Colonoscopy- 2011/Sept      History of D&C- 8/12/11      Status post hysterectomy  endometrial cancer      Status post cataract extraction  OS      Leg fracture, right  Hip fx repair in Nov 2019          Allergies: Allergies    soaps and creams causes rash uses only sensitive skin soap (Rash)  piperacillin-tazobactam (Rash)  Voltaren (Rash)  neomycin (Unknown)  sulfa drugs (Unknown)  Eye cream from the 1960s Neocortef ?spelling caused eyes to becomes swollen (Unknown)    Intolerances        Social History: Denies toxic habits including tobacco, ETOH or illicit drugs.    Family History: FAMILY HISTORY:  No pertinent family history in first degree relatives    . No family history of strokes    Medications: MEDICATIONS  (STANDING):  artificial  tears Solution 1 Drop(s) Both EYES two times a day  atorvastatin 80 milliGRAM(s) Oral at bedtime  chlorhexidine 2% Cloths 1 Application(s) Topical <User Schedule>  metoprolol succinate ER 50 milliGRAM(s) Oral daily  mirtazapine 15 milliGRAM(s) Oral at bedtime  pantoprazole    Tablet 40 milliGRAM(s) Oral two times a day  remdesivir  IVPB 100 milliGRAM(s) IV Intermittent every 24 hours  sacubitril 24 mG/valsartan 26 mG 1 Tablet(s) Oral two times a day  sodium chloride 0.45%. 1000 milliLiter(s) (75 mL/Hr) IV Continuous <Continuous>  venlafaxine XR. 75 milliGRAM(s) Oral daily    MEDICATIONS  (PRN):  acetaminophen     Tablet .. 650 milliGRAM(s) Oral every 6 hours PRN Temp greater or equal to 38C (100.4F), Mild Pain (1 - 3)  melatonin 3 milliGRAM(s) Oral at bedtime PRN Insomnia  zolpidem 5 milliGRAM(s) Oral at bedtime PRN Insomnia  zolpidem 5 milliGRAM(s) Oral at bedtime PRN Insomnia      Review of Systems:  CONSTITUTIONAL:  No weight loss, fever, chills, weakness or fatigue.  HEENT:  Eyes:  No visual loss, blurred vision, double vision or yellow sclera. Ears, Nose, Throat:  No hearing loss, sneezing, congestion, runny nose or sore throat.  SKIN:  No rash or itching.  CARDIOVASCULAR:  No chest pain, chest pressure or chest discomfort. No palpitations or edema.  RESPIRATORY:  No shortness of breath, cough or sputum.  GASTROINTESTINAL:  No anorexia, nausea, vomiting or diarrhea. No abdominal pain or blood.  GENITOURINARY:  No burning on urination or incontinence   NEUROLOGICAL:  No headache, dizziness, syncope, paralysis, ataxia, numbness or tingling in the extremities. No change in bowel or bladder control. no limb weakness. no vision changes.   MUSCULOSKELETAL:  No muscle, back pain, joint pain or stiffness.  HEMATOLOGIC:  No anemia, bleeding or bruising.  LYMPHATICS:  No enlarged nodes. No history of splenectomy.  PSYCHIATRIC:  No history of depression or anxiety.  ENDOCRINOLOGIC:  No reports of sweating, cold or heat intolerance. No polyuria or polydipsia.      Vitals:  Vital Signs Last 24 Hrs  T(C): 36.6 (23 Nov 2023 07:38), Max: 36.9 (23 Nov 2023 05:45)  T(F): 97.8 (23 Nov 2023 07:38), Max: 98.4 (23 Nov 2023 05:45)  HR: 96 (23 Nov 2023 07:38) (65 - 97)  BP: 112/73 (23 Nov 2023 07:38) (100/68 - 158/94)  BP(mean): 90 (22 Nov 2023 14:26) (90 - 90)  RR: 18 (23 Nov 2023 07:38) (12 - 22)  SpO2: 100% (23 Nov 2023 07:38) (91% - 100%)    Parameters below as of 23 Nov 2023 07:38  Patient On (Oxygen Delivery Method): nasal cannula  O2 Flow (L/min): 2      General Exam:   General Appearance: Appropriately dressed and in no acute distress       Head: Normocephalic, atraumatic and no dysmorphic features  Ear, Nose, and Throat: Moist mucous membranes  CVS: S1S2+  Resp: No SOB, no wheeze or rhonchi  GI: soft NT/ND  Extremities: No edema or cyanosis  Skin: No bruises or rashes     Neurological Exam:  Mental Status: Awake, alert and oriented x 2.  Able to follow simple and complex verbal commands. Able to name and repeat. fluent speech. No obvious aphasia or dysarthria noted.   Cranial Nerves: PERRL, EOMI, VFFC, sensation V1-V3 intact,  no obvious facial asymmetry, equal elevation of palate, scm/trap 5/5, tongue is midline on protrusion. no obvious papilledema on fundoscopic exam. hearing is grossly intact.   Motor: Normal bulk, tone and strength throughout. mild L HP from stroke moving uppers > lowers   Sensation: Intact to light touch and pinprick throughout. no right/left confusion. no extinction to tactile on DSS.   Reflexes: 1+ throughout at biceps, brachioradialis, triceps, patellars and ankles bilaterally and equal. No clonus. R toe and L toe were both downgoing.  Coordination: No dysmetria on FNF    Gait: unable     Data/Labs/Imaging which I personally reviewed.     Labs:     CBC Full  -  ( 23 Nov 2023 07:23 )  WBC Count : 8.40 K/uL  RBC Count : 3.45 M/uL  Hemoglobin : 10.1 g/dL  Hematocrit : 32.7 %  Platelet Count - Automated : 348 K/uL  Mean Cell Volume : 94.8 fl  Mean Cell Hemoglobin : 29.3 pg  Mean Cell Hemoglobin Concentration : 30.9 gm/dL  Auto Neutrophil # : x  Auto Lymphocyte # : x  Auto Monocyte # : x  Auto Eosinophil # : x  Auto Basophil # : x  Auto Neutrophil % : x  Auto Lymphocyte % : x  Auto Monocyte % : x  Auto Eosinophil % : x  Auto Basophil % : x    11-23    138  |  103  |  35<H>  ----------------------------<  73  4.6   |  22  |  1.11    Ca    8.5      23 Nov 2023 07:23    TPro  6.2  /  Alb  3.2<L>  /  TBili  0.4  /  DBili  x   /  AST  57<H>  /  ALT  47<H>  /  AlkPhos  136<H>  11-22    LIVER FUNCTIONS - ( 22 Nov 2023 12:43 )  Alb: 3.2 g/dL / Pro: 6.2 g/dL / ALK PHOS: 136 U/L / ALT: 47 U/L / AST: 57 U/L / GGT: x           PT/INR - ( 22 Nov 2023 12:43 )   PT: 16.1 sec;   INR: 1.55 ratio         PTT - ( 22 Nov 2023 12:43 )  PTT:30.0 sec  Urinalysis Basic - ( 23 Nov 2023 07:23 )    Color: x / Appearance: x / SG: x / pH: x  Gluc: 73 mg/dL / Ketone: x  / Bili: x / Urobili: x   Blood: x / Protein: x / Nitrite: x   Leuk Esterase: x / RBC: x / WBC x   Sq Epi: x / Non Sq Epi: x / Bacteria: x        < from: MR Head No Cont (10.18.23 @ 14:33) >    ACC: [id number] EXAM:  MR BRAIN   ORDERED BY: WINSOME MARTIN     PROCEDURE DATE:  10/18/2023          INTERPRETATION:  CLINICAL INDICATION: Fall on Xarelto      Magnetic resonance imaging of the brain was carried out with transaxial   SPGR, FLAIR, fast spin echo T2 weighted images, axial susceptibility   weighted series, diffusion weighted series and sagittal T1 weighted   series on a 1.5 Payton magnet.    Comparison is made with the prior CT/CTA of 10/17/2023.    Ventricular and sulcal prominence is consistent with moderate atrophy.   There are bilateral old cerebellar infarcts. There are scattered infarcts   identified in the supratentorial region there is an infarct in the left   occipital subcortical white matter, left posterior temporalcortex the   right frontal cortex, the right centrum semiovale and the right medial   frontal cortex adjacent to the anterior body of the right lateral   ventricle. Bilaterality cysts suggestive of cardioembolic source. There   is no acute hemorrhage. There is a focus of hemosiderin adjacent to the   lateral border of the atrium of the right lateral ventricle.    The sellar and parasellar structures are unremarkable.    The paranasal sinuses are clear. There has been previous bilateral lens   replacement surgery. Ligamentous hypertrophy at the C1-2 level likely   related to degenerative changes.    IMPRESSION: Moderate atrophy. Small vessel white matter ischemic changes.   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   No acute hemorrhage.    --- End of Report ---            LAMBERTO ENRIQUE MD; Attending Radiologist  This document has been electronically signed. Oct 18 2023  2:58PM    < end of copied text >

## 2023-11-23 NOTE — CONSULT NOTE ADULT - SUBJECTIVE AND OBJECTIVE BOX
Chintan Giron M.D.  [facility], Division of Infectious Diseases  [phone]  After 5pm on weekdays and all day on weekends - please call [phone]  [facility], Division of Infectious Diseases  MONTEZ Chapman, GUERITA Roth G. 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 Pt reports her eliquis was held yesterday. Pt's only complaint is that her L foot hurts, chronic since CVA during admission. Pt reports she has been bed bound x 1 month. Denies fever/chills, dizziness, cough, CP, SOB, abdominal pain, BRBPR, dysuria, hematuria, HA. (22 Nov 2023 15:38)    ROS: 10 point review of systems completed, pertinent positives and negatives as per HPI.    Allergies: soaps and creams causes rash uses only sensitive skin soap (Rash)  piperacillin-tazobactam (Rash)  Voltaren (Rash)  neomycin (Unknown)  sulfa drugs (Unknown)  Eye cream from the 1960s Neocortef ?spelling caused eyes to becomes swollen (Unknown)    PMH -- Anxiety    Hyperlipemia    Seasonal Allergies    Breast Lump    Initial Insomnia    Insomnia    Hyperlipidemia    Hypertension    Neuropathy associated with cancer    PE (pulmonary thromboembolism)    PAF (paroxysmal atrial fibrillation)    HTN (hypertension)    HLD (hyperlipidemia)    Endometrial cancer      PSH -- History of Breast Lump/[facility]cision- [facility]- [facility]    History of Colonoscopy- 2011/Sept    History of D&C- 8/12/11    Status post hysterectomy    Status post cataract extraction    Leg fracture, right      FH -- No pertinent family history in first degree relatives    No pertinent family history in first degree relatives      Social History -- denies tobacco, alcohol or illicit drug use    Physical Exam--  Vital Signs Last 24 Hrs  T(F): 97.8 (23 Nov 2023 07:38), Max: 98.4 (23 Nov 2023 05:45)  HR: 96 (23 Nov 2023 07:38) (65 - 97)  BP: 112/73 (23 Nov 2023 07:38) (100/68 - 158/94)  RR: 18 (23 Nov 2023 07:38) (12 - 22)  SpO2: 100% (23 Nov 2023 07:38) (91% - 100%)  General: nontoxic-appearing, no acute distress  HEENT: anicteric, NC  Lungs: decreased breath sounds  Heart: S1, S2, normal rate.  Abdomen: Soft. Nondistended. Nontender.   Neuro: responds to questions appropriately  Back: No costovertebral angle tenderness.  Extremities: No LE edema.   Skin: Warm. Dry. No rash.  Psychiatric: Appropriate affect and mood for situation.     Laboratory & Imaging Data--  CBC:                       10.1   8.40  )-----------( 348      ( 23 Nov 2023 07:23 )             32.7     WBC Count: 8.40 K/uL (11-23-23 @ 07:23)  WBC Count: 7.03 K/uL (11-22-23 @ 12:43)    CMP: 11-23    138  |  103  |  35<H>  ----------------------------<  73  4.6   |  22  |  1.11    Ca    8.5      23 Nov 2023 07:23    TPro  6.2  /  Alb  3.2<L>  /  TBili  0.4  /  DBili  x   /  AST  57<H>  /  ALT  47<H>  /  AlkPhos  136<H>  11-22    LIVER FUNCTIONS - ( 22 Nov 2023 12:43 )  Alb: 3.2 g/dL / Pro: 6.2 g/dL / ALK PHOS: 136 U/L / ALT: 47 U/L / AST: 57 U/L / GGT: x           Urinalysis Basic - ( 23 Nov 2023 07:23 )    Color: x / Appearance: x / SG: x / pH: x  Gluc: 73 mg/dL / Ketone: x  / Bili: x / Urobili: x   Blood: x / Protein: x / Nitrite: x   Leuk Esterase: x / RBC: x / WBC x   Sq Epi: x / Non Sq Epi: x / Bacteria: x      Microbiology:       Radiology--  ***  Active Medications--  acetaminophen     Tablet .. 650 milliGRAM(s) Oral every 6 hours PRN  artificial  tears Solution 1 Drop(s) Both EYES two times a day  atorvastatin 80 milliGRAM(s) Oral at bedtime  chlorhexidine 2% Cloths 1 Application(s) Topical <User Schedule>  melatonin 3 milliGRAM(s) Oral at bedtime PRN  metoprolol succinate ER 50 milliGRAM(s) Oral daily  mirtazapine 15 milliGRAM(s) Oral at bedtime  pantoprazole    Tablet 40 milliGRAM(s) Oral two times a day  remdesivir  IVPB 100 milliGRAM(s) IV Intermittent every 24 hours  sacubitril 24 mG/valsartan 26 mG 1 Tablet(s) Oral two times a day  sodium chloride 0.45%. 1000 milliLiter(s) IV Continuous <Continuous>  venlafaxine XR. 75 milliGRAM(s) Oral daily  zolpidem 5 milliGRAM(s) Oral at bedtime PRN  zolpidem 5 milliGRAM(s) Oral at bedtime PRN    Antimicrobials:   remdesivir  IVPB 100 milliGRAM(s) IV Intermittent every 24 hours    Immunologic:

## 2023-11-23 NOTE — CONSULT NOTE ADULT - ASSESSMENT
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  old bialtereal cerebellar infarcts   LDL 96   mental status wax and waines but on my exam AAOx3   Impression:   Embolic infarcts, ESUS. now subacute/chronic   COVID     - AP/AC held given GIB; resume when able   - f/u GI  - high dose statin therapy with LDL goal <  70mg/dL for secondary stroke prevention   - COVID precuations  - getting remdesevir ; f/u ID   - Hemoglobin A1c and lipid panel  - telemetry  - PT/OT   - check FS, glucose control <180  - GI/DVT ppx  - Counseling on diet, exercise, and medication adherence was done  - Counseling on smoking cessation and alcohol consumption offered when appropriate.  - Pain assessed and judicious use of narcotics when appropriate was discussed.    - Stroke education given when appropriate.  - Importance of fall prevention discussed.   - Differential diagnosis and plan of care discussed with patient and/or family and primary team  - Thank you for allowing me to participate in the care of this patient. Call with questions.     Ayo Novak MD  Vascular Neurology  Office: [phone]

## 2023-11-24 NOTE — DIETITIAN INITIAL EVALUATION ADULT - PHYSCIAL ASSESSMENT
Weight Hx Per:  - Source: electronic medical record, previous RD notes  - UBW: unknown   - Reported weight changes: none     Weight Hx Per [facility] HIE: 146 pounds (8/11/23), 130 pounds (1/10/20), 130 pounds (11/20/2019)    Per previous RD notes: 199.5 pounds (10/16) - ? accuracy    Current Admission Weights:  - Dosing weight: 110 pounds/49.9 kg (11/24) (Pt appears to be closer to this weight)     Weight Change:  - unable to determine at this time; some weight loss noted. Will continue to monitor weight trends as available/able.     IBW: 120 pounds   %IBW: 92%

## 2023-11-24 NOTE — DIETITIAN INITIAL EVALUATION ADULT - REASON INDICATOR FOR ASSESSMENT
Nutrition consult warranted for: MST score 2 or more   Information obtained from: electronic medical record, previous RD notes. Of note, patient with hx of dementia, A&Ox1-2.  Chart reviewed, events noted.

## 2023-11-24 NOTE — DIETITIAN INITIAL EVALUATION ADULT - ADD RECOMMEND
1) BMI<19 nutrition risk notification sent   2) Continue current diet order; defer textures and consistencies to team  3) RD to add Mighty Shake 3x/day (600 nicolas, 21 Gm protein) to help optimize PO intake.   4) Monitor and encourage PO intake. Encourage use of daily menus. Honor dietary preferences as expressed as able.   5) Monitor weights, labs, hydration status, bowels, and skin integrity.

## 2023-11-24 NOTE — DIETITIAN INITIAL EVALUATION ADULT - NSFNSGIIOFT_GEN_A_CORE
- Pt denies nausea, vomiting, diarrhea, or constipation.   - Last BM: 11/21; not currently ordered for bowel regimen

## 2023-11-24 NOTE — DIETITIAN INITIAL EVALUATION ADULT - NSICDXPASTSURGICALHX_GEN_ALL_CORE_FT
PAST SURGICAL HISTORY:  [facility]     History of Colonoscopy- 2011/Sept     History of D&C- 8/12/11     Leg fracture, right Hip fx repair in Nov 2019    Status post cataract extraction OS    Status post hysterectomy endometrial cancer

## 2023-11-24 NOTE — DIETITIAN INITIAL EVALUATION ADULT - PERTINENT MEDS FT
MEDICATIONS  (STANDING):  apixaban 2.5 milliGRAM(s) Oral every 12 hours  artificial  tears Solution 1 Drop(s) Both EYES two times a day  atorvastatin 80 milliGRAM(s) Oral at bedtime  chlorhexidine 2% Cloths 1 Application(s) Topical <User Schedule>  iron sucrose IVPB 300 milliGRAM(s) IV Intermittent every 24 hours  metoprolol succinate ER 50 milliGRAM(s) Oral daily  mirtazapine 15 milliGRAM(s) Oral at bedtime  pantoprazole    Tablet 40 milliGRAM(s) Oral two times a day  remdesivir  IVPB 100 milliGRAM(s) IV Intermittent every 24 hours  sacubitril 24 mG/valsartan 26 mG 1 Tablet(s) Oral two times a day  sodium chloride 0.45%. 1000 milliLiter(s) (75 mL/Hr) IV Continuous <Continuous>  venlafaxine XR. 75 milliGRAM(s) Oral daily    MEDICATIONS  (PRN):  acetaminophen     Tablet .. 650 milliGRAM(s) Oral every 6 hours PRN Temp greater or equal to 38C (100.4F), Mild Pain (1 - 3)  melatonin 3 milliGRAM(s) Oral at bedtime PRN Insomnia  zolpidem 5 milliGRAM(s) Oral at bedtime PRN Insomnia  zolpidem 5 milliGRAM(s) Oral at bedtime PRN Insomnia

## 2023-11-24 NOTE — DIETITIAN INITIAL EVALUATION ADULT - ORAL INTAKE PTA/DIET HISTORY
Pt unable to participate in nutrition evaluation at this time.  -No specific diet hx noted.  -Baseline tolerance to chewing/swallowing, and baseline provision of energy/nutrient intake unclear.   -No documented food allergies.   -No indication of prior vitamins/supplement intake PTA.

## 2023-11-24 NOTE — DIETITIAN INITIAL EVALUATION ADULT - PERTINENT LABORATORY DATA
OK for temporary    11-24    140  |  106  |  37<H>  ----------------------------<  81  4.5   |  20<L>  |  1.23    Ca    8.7      24 Nov 2023 07:04    TPro  6.1  /  Alb  2.9<L>  /  TBili  0.6  /  DBili  x   /  AST  47<H>  /  ALT  38  /  AlkPhos  130<H>  11-24

## 2023-11-24 NOTE — DIETITIAN INITIAL EVALUATION ADULT - ENERGY INTAKE
- Pt is currently ordered for a soft and bite sized diet. Per team, eating fairly. Of note, ordered for Remeron.

## 2023-11-25 NOTE — CHART NOTE - NSCHARTNOTEFT_GEN_A_CORE
Rash on chest    Patient had red rash on chest c/o itching  reports that it worsened on 11/24. Reports in the past had a similar rash on her back a while ago, (not while inpatient). No SOB, throat swelling hives    Plan  Hydrocortisone cream  consider derm consult if no improvement    Will endorse to primary day team, attending to follow    Valeria Ha NP  [facility]  [id number]

## 2023-11-26 NOTE — PROGRESS NOTE ADULT - PROBLEM SELECTOR PLAN 1
Initially secondary to low osmolar intake given low urine osm in setting of poor PO intake and excess free water intake. Urine studies now consistent with SIADH for which will try tolvaptan 15 mg X 1 dose today and recheck serum BMP 6-8 hours after dose to monitor for over correction. Patient advised to drink to thirst today after medication given. Follow up TSH and cortisol. Remeron discontinued as potential etiology for SIADH. Monitor serum sodium. show

## 2023-11-27 NOTE — CONSULT NOTE ADULT - ASSESSMENT
___________________________  ASSESSMENT AND PLAN  #  ddx:     Recommendations:   -     The patient's chart was reviewed in addition to being seen and examined at bedside with the dermatology attending Dr. Nash.  Recommendations were communicated with the primary team.  Please page [phone] with a 10-digit call-back number for further related questions.    Sharifa Fontanez MD  Resident Physician, PGY-3  [facility]  Pager: [phone]  Office: [phone] ___________________________  ASSESSMENT AND PLAN  #Eczematous dermatitis, favor contact dermatitis    Recommendations:   - Delano moisturization with vaseline or aquaphor  - May consider stopping cholorhexidine as may be irritating to skin   - START triamcinolone 0.1% ointment BID 2 weeks on / 1 week off. Repeat cycles as necessary. Please ask the pharmacy to dispense multiple tubes   - Patient may follow-up outpatient if not resolved in 3-4 weeks   Patient can follow up with us in the [facility] located at [address] [place] [address] [address] [address] upon discharge. Our office will call to schedule an appointment but if patient does not hear from us within a few days of discharge, please instruct patient to call our office. Office phone number is [phone].    The patient's chart was reviewed in addition to being seen and examined at bedside with the dermatology attending Dr. Nash.  Recommendations were communicated with the primary team.  Please page [phone] with a 10-digit call-back number for further related questions.    Sharifa Fontanez MD  Resident Physician, PGY-3  [facility]  Pager: [phone]  Office: [phone]

## 2023-11-27 NOTE — CONSULT NOTE ADULT - SUBJECTIVE AND OBJECTIVE BOX
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 Pt reports her eliquis was held yesterday. Pt's only complaint is that her L foot hurts, chronic since CVA during admission. Pt reports she has been bed bound x 1 month. Denies fever/chills, dizziness, cough, CP, SOB, abdominal pain, BRBPR, dysuria, hematuria, HA. (22 Nov 2023 15:38)        PAST MEDICAL & SURGICAL HISTORY:  Anxiety      Breast Lump      Insomnia      PE (pulmonary thromboembolism)      PAF (paroxysmal atrial fibrillation)  On Xarelto      HTN (hypertension)      HLD (hyperlipidemia)      Endometrial cancer  S/P LYNETTE with SBO      History of Breast Lump/Mass Excision- [facility]- left- 1971      History of Colonoscopy- 2011/Sept      History of D&C- 8/12/11      Status post hysterectomy  endometrial cancer      Status post cataract extraction  OS      Leg fracture, right  Hip fx repair in Nov 2019          Review of Systems:    General: no fevers/chills, no fatigue 	  Skin/Breast: see HPI  Ophthalmologic: no change in vision  ENT: no change in hearing  Respiratory and Thorax: no SOB or cough  Cardiovascular: no palpitations or chest pain  Gastrointestinal: no abdominal pain or blood in stool   Genitourinary: no dysuria or frequency  Musculoskeletal: no joint pains or weakness	  Neurological: no weakness, numbness , or tingling    MEDICATIONS  (STANDING):  apixaban 2.5 milliGRAM(s) Oral every 12 hours  artificial  tears Solution 1 Drop(s) Both EYES two times a day  atorvastatin 80 milliGRAM(s) Oral at bedtime  chlorhexidine 2% Cloths 1 Application(s) Topical <User Schedule>  hydrocortisone 1% Cream 1 Application(s) Topical two times a day  metoprolol succinate ER 50 milliGRAM(s) Oral daily  mirtazapine 15 milliGRAM(s) Oral at bedtime  pantoprazole    Tablet 40 milliGRAM(s) Oral two times a day  sacubitril 24 mG/valsartan 26 mG 1 Tablet(s) Oral two times a day  venlafaxine XR. 75 milliGRAM(s) Oral daily    ALLERGIES: soaps and creams causes rash uses only sensitive skin soap  piperacillin-tazobactam  Voltaren  neomycin  sulfa drugs  Eye cream from the 1960s Neocortef ?spelling caused eyes to becomes swollen        SOCIAL HISTORY:  ____________________________________  Social History:  - Denies tobacco use  - Denies EtOH consumption  - Denies illicit drug use  FAMILY HISTORY:  No pertinent family history in first degree relatives          VITAL SIGNS LAST 24 HOURS:  T(F): 98.1 (11-27 @ 13:17), Max: 98.5 (11-26 @ 20:23)  HR: 67 (11-27 @ 13:17) (67 - 103)  BP: 103/67 (11-27 @ 13:17) (103/65 - 113/76)  RR: 18 (11-27 @ 13:17) (18 - 18)    PHYSICAL EXAM:     The patient was alert and conversant and in no apparent distress.  OP showed no ulcerations  There was no visible lymphadenopathy.  Conjunctiva were non injected  There was no clubbing or edema of extremities.  The scalp, hair, face, eyebrows, lips, OP, neck, chest, back,   extremities X 4, nails were examined.  There was no hyperhidrosis or bromhidrosis.    Of note on skin exam:   ____________________________________    LABS:                        10.3   8.29  )-----------( 387      ( 26 Nov 2023 07:28 )             33.0     11-26    142  |  106  |  26<H>  ----------------------------<  91  4.1   |  20<L>  |  1.15    Ca    9.0      26 Nov 2023 07:28        Urinalysis Basic - ( 26 Nov 2023 07:28 )    Color: x / Appearance: x / SG: x / pH: x  Gluc: 91 mg/dL / Ketone: x  / Bili: x / Urobili: x   Blood: x / Protein: x / Nitrite: x   Leuk Esterase: x / RBC: x / WBC x   Sq Epi: x / Non Sq Epi: x / Bacteria: x     HPI:  89yo F with PMH of HTN, HLD, A.fib, HFrEF (EF 30%), SVT s/p ablation, endometrial CA s/p LYNETTE, admission 1 month ago for fall and 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 Pt reports her eliquis was held yesterday. Pt's only complaint is that her L foot hurts, chronic since CVA during admission. Pt reports she has been bed bound x 1 month. Denies fever/chills, dizziness, cough, CP, SOB, abdominal pain, BRBPR, dysuria, hematuria, HA. (22 Nov 2023 15:38)    Derm hx:   Dermatology consulted for pruritic rash on the chest and upper back x weeks. Nurse at bedside notes intermittent hives. States they have put a new cream on it at home prior to admission, not improving with hydrocortisone thus far.     PAST MEDICAL & SURGICAL HISTORY:  Anxiety      Breast Lump      Insomnia      PE (pulmonary thromboembolism)      PAF (paroxysmal atrial fibrillation)  On Xarelto      HTN (hypertension)      HLD (hyperlipidemia)      Endometrial cancer  S/P LYNETTE with SBO      [facility] of Breast L[facility]- [facility]      History of Colonoscopy- 2011/Sept      History of D&C- 8/12/11      Status post hysterectomy  endometrial cancer      Status post cataract extraction  OS      Leg fracture, right  Hip fx repair in Nov 2019          Review of Systems:    As above     MEDICATIONS  (STANDING):  apixaban 2.5 milliGRAM(s) Oral every 12 hours  artificial  tears Solution 1 Drop(s) Both EYES two times a day  atorvastatin 80 milliGRAM(s) Oral at bedtime  chlorhexidine 2% Cloths 1 Application(s) Topical <User Schedule>  hydrocortisone 1% Cream 1 Application(s) Topical two times a day  metoprolol succinate ER 50 milliGRAM(s) Oral daily  mirtazapine 15 milliGRAM(s) Oral at bedtime  pantoprazole    Tablet 40 milliGRAM(s) Oral two times a day  sacubitril 24 mG/valsartan 26 mG 1 Tablet(s) Oral two times a day  venlafaxine XR. 75 milliGRAM(s) Oral daily    ALLERGIES: soaps and creams causes rash uses only sensitive skin soap  piperacillin-tazobactam  Voltaren  neomycin  sulfa drugs  Eye cream from the 1960s Neocortef ?spelling caused eyes to becomes swollen        SOCIAL HISTORY:  ____________________________________  Social History:  - Denies tobacco use  - Denies EtOH consumption  - Denies illicit drug use  FAMILY HISTORY:  No pertinent family history in first degree relatives          VITAL SIGNS LAST 24 HOURS:  T(F): 98.1 (11-27 @ 13:17), Max: 98.5 (11-26 @ 20:23)  HR: 67 (11-27 @ 13:17) (67 - 103)  BP: 103/67 (11-27 @ 13:17) (103/65 - 113/76)  RR: 18 (11-27 @ 13:17) (18 - 18)    PHYSICAL EXAM:     The patient was alert and conversant and in no apparent distress.  OP showed no ulcerations  There was no visible lymphadenopathy.  Conjunctiva were non injected  There was no clubbing or edema of extremities.  The scalp, hair, face, eyebrows, lips, OP, neck, chest, back,   extremities X 4, nails were examined.  There was no hyperhidrosis or bromhidrosis.    Of note on skin exam:   - erythematous mildly scaly well demarcated plaque on the chest   - few erythematous blanching patches on the back   ____________________________________    LABS:                        10.3   8.29  )-----------( 387      ( 26 Nov 2023 07:28 )             33.0     11-26    142  |  106  |  26<H>  ----------------------------<  91  4.1   |  20<L>  |  1.15    Ca    9.0      26 Nov 2023 07:28        Urinalysis Basic - ( 26 Nov 2023 07:28 )    Color: x / Appearance: x / SG: x / pH: x  Gluc: 91 mg/dL / Ketone: x  / Bili: x / Urobili: x   Blood: x / Protein: x / Nitrite: x   Leuk Esterase: x / RBC: x / WBC x   Sq Epi: x / Non Sq Epi: x / Bacteria: x

## 2023-11-27 NOTE — CONSULT NOTE ADULT - CONSULT REQUESTED DATE/TIME
23-Nov-2023 07:02
22-Nov-2023 22:52
23-Nov-2023 09:26
27-Nov-2023 14:00
23-Nov-2023 08:05
nontender/no rebound tenderness/no rigidity/soft/no guarding

## 2023-11-27 NOTE — CONSULT NOTE ADULT - REASON FOR ADMISSION
GI bleed, acute blood loss anemia

## 2023-11-28 NOTE — PHYSICAL THERAPY INITIAL EVALUATION ADULT - PERTINENT HX OF CURRENT PROBLEM, REHAB EVAL
89 y/oF admitted 11/22 from rehab p/w dark stools, generalized weakness, and anemia (reported Hgb in 8s). Pt reports her eliquis was held yesterday. Pt's only complaint is that her L foot hurts, chronic since CVA during admission. Pt reports she has been bed bound x 1 month. As per H&P, found to have guaiac neg stool but slightly elevated BUN/Cr, possibly 2/2 GI bleed. Hgb dropped to 8.1. will transfuse, start PPI, hold Eliquis, get GI consult. Pt is covid Positive. asymptomatic outside of weakness. Placed on remdesivir.  Dermatology consulted for eczematous dermatitis, favor contact dermatitis. 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

## 2023-11-28 NOTE — PHYSICAL THERAPY INITIAL EVALUATION ADULT - ADDITIONAL COMMENTS
As per last PT note from last admission 10/23, pt performed  bed mobility mod/max assist x1,  denice sitting eob req cga. sit/stand mod/max assist x2 using RW, ambulated 2-3 shuffling steps towards HOB max a X2.

## 2023-11-28 NOTE — PROVIDER CONTACT NOTE (OTHER) - SITUATION
pt choking in room , slightly cyanotic , pt choking on scrambled eggs , suctioned , vomited up some residual scrambled eggs VS 97.4 122 24   94/ 62  O2 sat 88% room air , placed on O2 2LNC with O2 sat

## 2023-11-29 NOTE — SWALLOW BEDSIDE ASSESSMENT ADULT - SLP PERTINENT HISTORY OF CURRENT PROBLEM
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 Pt reports her eliquis was held yesterday. Pt's only complaint is that her L foot hurts, chronic since CVA during admission. Pt reports she has been bed bound x 1 month. Denies fever/chills, dizziness, cough, CP, SOB, abdominal pain, BRBPR, dysuria, hematuria, HA.
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 Pt reports her eliquis was held yesterday. Pt's only complaint is that her L foot hurts, chronic since CVA during admission. Pt reports she has been bed bound x 1 month. Denies fever/chills, dizziness, cough, CP, SOB, abdominal pain, BRBPR, dysuria, hematuria, HA.

## 2023-11-29 NOTE — SWALLOW BEDSIDE ASSESSMENT ADULT - SWALLOW EVAL: DIAGNOSIS
89 y/o female w/ recent CVA, found to be COVID+, s/p choking episode on 11/28. Today, patient presents with insufficient mentation to support safe PO intake. Pt lethargic, briefly rousing to tactile/ verbal stimuli, but not sustaining alertness; nonverbal, intermittent vocalizations in response to questions. Not following commands. Not oriented to feeding when presented with empty spoon/ ice chip to labial surface. Unable to safely administer PO trials at this time.
89 y/o female w/ recent CVA, found to be COVID+, s/p choking episode on 11/28. Pt presents with clinical signs of a mild oral dysphagia, with mildly prolonged mastication & delayed oral transit with hard solid consistencies. Pharyngeal swallow trigger judged to be timely, though audible swallows are appreciated across textures. No signs of aspiration. No complaints voiced. Suspect yesterday's choking episode was isolated event - patient reports she was "eating way too fast". Would reinitiate diet and pursue further instrumental assessment of swallowing (i.e., MBS) only if patient demonstrates further difficulty.

## 2023-11-29 NOTE — SWALLOW BEDSIDE ASSESSMENT ADULT - COMMENTS
COVID-19 infection   CXR no focal consolidation  not hypoxic, no need for steroids  s/p remdesivir x 3 days, completed 11/24 11/28: Per RN to Provider note -> pt choking in room , slightly cyanotic , pt choking on scrambled eggs , suctioned , vomited up some residual scrambled eggs VS 97.4 122 24   94/ 62  O2 sat 88% room air , placed on O2 2LNC with O2 sat. Await speech and swallow evaluation.     Pt seen for bedside swallow evaluation on 10/19/23 with recommendations for an easy to chew diet with thin liquids. Seen for evaluation this morning; however, mentation not supportive of PO intake. Call received stating patient with improved mentation this afternoon.

## 2023-11-29 NOTE — SWALLOW BEDSIDE ASSESSMENT ADULT - SLP GENERAL OBSERVATIONS
Pt encountered in bed, asleep, on room air. Difficult to rouse; not sustaining alertness despite cueing. Not following directives. Nonverbal, occasional vocalizations noted in response to verbal prompts.
Pt encountered in bed, awake/alert, on room air. Ox3. Able to follow directives. Forgetful at times.

## 2023-11-29 NOTE — SWALLOW BEDSIDE ASSESSMENT ADULT - SWALLOW EVAL: PATIENT/FAMILY GOALS STATEMENT
Pt unable to state goals
To eat and drink. Denies trouble swallowing other than yesterday's choking episode. States was eating too fast because she was very hungry.

## 2023-11-29 NOTE — SWALLOW BEDSIDE ASSESSMENT ADULT - ASR SWALLOW RECOMMEND DIAG
Defer at this time. If further choking episodes occur or if patient demonstrates difficulty, would proceed with MBS.
Defer instrumetnal exam; will f/u at the bedside once mentation is improved

## 2023-11-29 NOTE — SWALLOW BEDSIDE ASSESSMENT ADULT - SWALLOW EVAL: RECOMMENDED DIET
Continue NPO, with non-oral nutrition/hydration/medications.
Resume soft/bite size diet, thin liquids

## 2023-11-29 NOTE — SWALLOW BEDSIDE ASSESSMENT ADULT - SPECIFY REASON(S)
To subjectively assess swallow function, r/o dysphagia
To subjectively assess swallow function, r/o dysphagia

## 2023-11-29 NOTE — SWALLOW BEDSIDE ASSESSMENT ADULT - COMMENTS
COVID-19 infection   CXR no focal consolidation  not hypoxic, no need for steroids  s/p remdesivir x 3 days, completed 11/24    Pt seen for bedside swallow evaluation on 10/19/23 with recommendations for an easy to chew diet with thin liquids COVID-19 infection   CXR no focal consolidation  not hypoxic, no need for steroids  s/p remdesivir x 3 days, completed 11/24 11/28: Per RN to Provider note -> pt choking in room , slightly cyanotic , pt choking on scrambled eggs , suctioned , vomited up some residual scrambled eggs VS 97.4 122 24   94/ 62  O2 sat 88% room air , placed on O2 2LNC with O2 sat. Await speech and swallow evaluation.     Pt seen for bedside swallow evaluation on 10/19/23 with recommendations for an easy to chew diet with thin liquids

## 2023-11-29 NOTE — SWALLOW BEDSIDE ASSESSMENT ADULT - ASPIRATION PRECAUTIONS
Monitor for s/s aspiration/laryngeal penetration. If noted:  D/C p.o. intake, provide non-oral nutrition/hydration/meds, and contact this service @ [phone]
Maintain aspiration precautions for secretions and if enteral feeds are initiated.

## 2023-11-29 NOTE — SWALLOW BEDSIDE ASSESSMENT ADULT - SWALLOW EVAL: RECOMMENDED FEEDING/EATING TECHNIQUES
SLOW RATE OF INTAKE; only provide PO if patient is fully awake and alert/maintain upright posture during/after eating for 30 mins

## 2023-12-01 NOTE — PROGRESS NOTE ADULT - REASON FOR ADMISSION
GI bleed, acute blood loss anemia

## 2023-12-01 NOTE — PROGRESS NOTE ADULT - ASSESSMENT
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      Betsy Melgar M.D.  [facility], Division of Infectious Diseases  [phone]  After 5pm on weekdays and all day on weekends - please call [phone]
89 f w afib on a/c recent cva p/w drop in hgb    1. Drop in hgb  appropriate response to transfusion  ppi daily  re-challenged w/low dose a/c per medicine/cardio/neuro  no further bleeding, cbc stable    2. cva, afib on a/c    3. Dysphagia  appreciate speech and swallow  aspiration precautions w/po intake   
89 f w afib on a/c recent cva p/w drop in hgb    1. Drop in hgb  appropriate response to trnasfusion  no further bleeding noted  Diet tolerated   ppi daily    rechallenge w low dose a/c started 11/23  per medicine/cardio/neuro    2. cva, afib on a/c       
89 f w afib on a/c recent cva p/w drop in hgb    1. Drop in hgb  h/h now stable    2. cva, afib on a/c    3. Dysphagia  appreciate speech and swallow  aspiration precautions w/po intake   
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 Pt reports her eliquis was held 11/21  Pt's only complaint is that her L foot hurts, chronic since CVA during previous admission. Pt reports she has been bed bound x 1 month. Denies fever/chills, dizziness, cough, CP, SOB, abdominal pain, BRBPR, dysuria, hematuria, HA.    Cardio/GI/ID f/up noted  Hb 10.3 stable  S/p IV RDV  Cw Eliquis    
ECHO 8/11/23: mild to mod MR , mild AR,  Severe global left ventricular systolic dysfunction. LVEF in the 25-30% decreased right ventricular systolic function.severe pulmonary hypertension.  ECHO 4/17/18: EF 55-60 % moderate to severe mitral stenosis. Normal left ventricular systolic function. mild aortic stenosis. Mild-moderate aortic regurgitation.  Normal right ventricular size and function.      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    #Anemia  -likely GIB  -tolerating lower dose eliquis, cont for now   -trend heme  -appreciate GI eval, conservative mgmt    #CVA  -recent workup revealed cerebellar infarcts on brain imaging   -switched from xarelto to eliquis  -cont low dose a/c  -Speech and swallow eval    #Cardiomyopathy  -Deferred ischemic eval in the past given advanced age and functional status  -c/w low dose entresto/ BB   -Echo with mildly decreased lvef (47%), mod MR, mod-severe MS, mod TR  -Euvolemic on exam    #Afib  -Rates stable  -metoprolol 50 mg qd  -on eliquis d/t xarelto failure    #HTN  -cont current meds    #COVID+  -mgmt per primary, ID   -S&S eval  -Recommend checking cxr       
ECHO 8/11/23: mild to mod MR , mild AR,  Severe global left ventricular systolic dysfunction. LVEF in the 25-30% decreased right ventricular systolic function.severe pulmonary hypertension.  ECHO 4/17/18: EF 55-60 % moderate to severe mitral stenosis. Normal left ventricular systolic function. mild aortic stenosis. Mild-moderate aortic regurgitation.  Normal right ventricular size and function.      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    #Anemia  -likely GIB  -tolerating lower dose eliquis, cont for now   -trend heme  -appreciate GI eval, conservative mgmt    #CVA  -recent workup revealed cerebellar infarcts on brain imaging   -switched from xarelto to eliquis  -cont low dose a/c  -Speech and swallow eval noted     #Cardiomyopathy  -Deferred ischemic eval in the past given advanced age and functional status  -Echo with mildly decreased lvef (47%), mod MR, mod-severe MS, mod TR  -Continue metoprolol 75mg qd for better rate control  -Entresto on hold 2/2 soft bp   -volume status stable    #Afib  -on eliquis d/t xarelto failure  -Continue metoprolol 75mg qd    #HTN  -Cont current meds    #COVID+  -mgmt per primary, ID   -S&S eval noted  -CXR noted with small b/l pleural effusions and c/f pna  -Per ID, monitor off abx       
ECHO 8/11/23: mild to mod MR , mild AR,  Severe global left ventricular systolic dysfunction. LVEF in the 25-30% decreased right ventricular systolic function.severe pulmonary hypertension.  ECHO 4/17/18: EF 55-60 % moderate to severe mitral stenosis. Normal left ventricular systolic function. mild aortic stenosis. Mild-moderate aortic regurgitation.  Normal right ventricular size and function.      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    #Anemia  -likely GIB  -tolerating lower dose eliquis, cont for now   -trend heme  -appreciate GI eval, conservative mgmt    #CVA  -recent workup revealed cerebellar infarcts on brain imaging   -switched from xarelto to eliquis  -cont low dose a/c  -Speech and swallow eval noted     #Cardiomyopathy  -Deferred ischemic eval in the past given advanced age and functional status  -Echo with mildly decreased lvef (47%), mod MR, mod-severe MS, mod TR  -Euvolemic on exam  -Stop entresto - Increase metoprolol to 75mg qd as below for elevated HR     #Afib  -on eliquis d/t xarelto failure  -Rates elevated   -Stop entresto  -Increase metoprolol to 75mg qd     #HTN  -Med changes at above     #COVID+  -mgmt per primary, ID   -S&S eval noted  -F/u CXR         d/w acp
ECHO 8/11/23: mild to mod MR , mild AR,  Severe global left ventricular systolic dysfunction. LVEF in the 25-30% decreased right ventricular systolic function.severe pulmonary hypertension.  ECHO 4/17/18: EF 55-60 % moderate to severe mitral stenosis. Normal left ventricular systolic function. mild aortic stenosis. Mild-moderate aortic regurgitation.  Normal right ventricular size and function.      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    #Anemia  -likely GIB  -trend H/H  -tolerating lower dose eliquis, cont for now   -trend heme  -appreciate GI eval, conservative mgmt      #CVA  -recent workup revealed cerebellar infarcts on brain imaging   -switched from xarelto to eliquis  -cont low dose a/c    #Cardiomyopathy  -Deferred ischemic eval in the past given advanced age and functional status  -c/w low dose entresto/ BB   -Echo with mildly decreased lvef (47%), mod MR, mod-severe MS, mod TR  -Euvolemic on exam    #Afib  -Rates stable  -metoprolol 50 mg qd  -on eliquis d/t xarelto failure    #HTN  -cont current meds    #COVID+  mgmt per primary    38 minutes spent on total encounter; more than 50% of the visit was spent counseling and/or coordinating care by the attending physician.    
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  old bialtereal cerebellar infarcts   LDL 96   mental status wax and waines but on my exam AAOx3     Impression:   Embolic infarcts, ESUS. now subacute/chronic   COVID       - rash triamcinolone   - AP/AC was  held given GIB; resume when able ; back on DOAC eliquis 2.5mg BID   - f/u GI  - high dose statin therapy with LDL goal <  70mg/dL for secondary stroke prevention   - COVID precuations  - was getting remdesevir ; f/u ID ; off now   - telemetry  - PT/OT   - check FS, glucose control <180  - GI/DVT ppx   - Thank you for allowing me to participate in the care of this patient. Call with questions.   ryan Garvey MD  Vascular Neurology  Office: [phone]   
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  old bialtereal cerebellar infarcts   LDL 96   mental status wax and waines but on my exam AAOx3   Impression:   Embolic infarcts, ESUS. now subacute/chronic   COVID     - rash on chest. f/u derm. triamcinolone cream  - AP/AC held given GIB; resume when able ; back on DOAC eliquis 2.5mg BID   - f/u GI  - high dose statin therapy with LDL goal <  70mg/dL for secondary stroke prevention   - COVID precuations  - getting remdesevir ; f/u ID ; off now   - telemetry  - PT/OT   - check FS, glucose control <180  - GI/DVT ppx   - Thank you for allowing me to participate in the care of this patient. Call with questions.   dc planning   Ayo Novak MD  Vascular Neurology  Office: [phone]   
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 and L-sided weakness, switched from xarelto to eliquis, BIBEMS from rehab c/o dark stools, generalized weakness, and anemia    COVID-19 infection   CXR no focal consolidation  not hypoxic, no need for steroids  s/p remdesivir x 3 days, completed 11/24  isolation per infection control policy   Derm following for eczematous dermatitis    11/28 noted in am choked on eggs and vomited up and desaturated, may have aspirated  CXR with small b/l pleural effusions, possible L base atelectasis or pneumonia  SLP eval noted    Recommendations:  Continue to monitor off antibiotics for now  monitor temps/WBC, if any fever or worsening, start on ceftriaxone 1g IV Q24h  continue supportive care, aspiration precautions       Betsy Melgar M.D.  [facility], Division of Infectious Diseases  [phone]  After 5pm on weekdays and all day on weekends - please call [phone]
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 Pt reports her eliquis was held 11/21  Pt's only complaint is that her L foot hurts, chronic since CVA during previous admission. Pt reports she has been bed bound x 1 month. Denies fever/chills, dizziness, cough, CP, SOB, abdominal pain, BRBPR, dysuria, hematuria, HA.    FOund to have guaiac neg stool but slightly elevated BUN/Cr, possibly 2/2 GI bleed.   Hgb dropped to 8.1. s/p 1 U PRBC, rpt HGB on 11.23: 10.1, 11/24; 11.4  cont PPI,   tolerating lower dose Eliquis. no GI bleed,   due to advanced age GI will not proceed w endoscopy.  ptn is at risk of CVA, has h/o Afib, cont eliquis but at a lower dose 2.5 mg bid,   iron level is low, will order a total of 1 gm IV IRON over 4 days.   ptn is  covid +:  on remdesivir x 3 days, not hypoxic.  questionable aspiration on breakfast couple days ago, passed swallow eval, back on a soft diet  DC planning   PT eval      
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 Pt reports her eliquis was held 11/21  Pt's only complaint is that her L foot hurts, chronic since CVA during previous admission. Pt reports she has been bed bound x 1 month. Denies fever/chills, dizziness, cough, CP, SOB, abdominal pain, BRBPR, dysuria, hematuria, HA.    FOund to have guaiac neg stool but slightly elevated BUN/Cr, possibly 2/2 GI bleed.   Hgb dropped to 8.1. s/p 1 U PRBC, rpt HGB on 11.23: 10.1, 11/24; 11.4  cont PPI,   tolerating lower dose Eliquis. no GI bleed, on remdesivir. once completes remdesivir DC plan  due to advanced age GI will not proceed w endoscopy.  ptn is at risk of CVA, has h/o Afib, cont eliquis but at a lower dose 2.5 mg bid,   iron level is low, will order a total of 1 gm IV IRON over 4 days.   ptn is  covid +:  on remdesivir x 3 days, not hypoxic.   PT rizwan      
89 f w afib on a/c recent cva p/w drop in hgb    1. Drop in hgb  appropriate response to transfusion  no further bleeding noted  Diet tolerated   ppi daily  rechallenge w low dose a/c started 11/23  per medicine/cardio/neuro    2. cva, afib on a/c       
89 f w afib on a/c recent cva p/w drop in hgb    1. Drop in hgb  appropriate response to transfusion  no further bleeding noted  Diet tolerated   ppi daily  rechallenge w low dose a/c started 11/23  per medicine/cardio/neuro    2. cva, afib on a/c    3. Dysphagia  appreciate speech and swallow     
89 f w afib on a/c recent cva p/w drop in hgb    1. Drop in hgb  appropriate response to trnasfusion  no further bleeding noted  Diet tolerated   ppi daily    rechallenge w low dose a/c started 11/23  per medicine/cardio/neuro    2. cva, afib on a/c       
89yo F with   HTN, HLD, A.fib, HFrEF (EF 30%), SVT s/p ablation, endometrial CA s/p LYNETTE, admission 1 month ago for fall and found to have embolic infarcts on MRI brain and L-sided weakness, switched from xarelto to eliquis, BIBEMS from rehab c/o dark stools, generalized weakness, and anemia (reported Hgb in 8s)   Prior stroke workup:    CTH no acute findings: question of R frontal hypod  CTA H/N neg   CTP with Tmax of 15mL in R frontal   NIHSS: 6  preMRS: 4   MRI brain with embolic appearing infarcts noted R frontal as well as in left hemisphere.  old bialtereal cerebellar infarcts   LDL 96   mental status wax and waines but on my exam AAOx3     Impression:   Embolic infarcts, ESUS. now subacute/chronic   COVID   - f/u CXR   - SS eval--> failed, rec NPO   - rash on chest. f/u derm. triamcinolone cream  - AP/AC held given GIB; resume when able ; back on DOAC eliquis 2.5mg BID   - f/u GI  - high dose statin therapy with LDL goal <  70mg/dL for secondary stroke prevention   - COVID precuations  - was getting remdesevir ; f/u ID ; off now   - telemetry  - PT/OT   - check FS, glucose control <180  - GI/DVT ppx   - Thank you for allowing me to participate in the care of this patient. Call with questions.   ryan Garvey MD  Vascular Neurology  Office: [phone]   
89yo F with   HTN, HLD, A.fib, HFrEF (EF 30%), SVT s/p ablation, endometrial CA s/p LYNETTE, admission 1 month ago for fall and found to have embolic infarcts on MRI brain and L-sided weakness, switched from xarelto to eliquis, BIBEMS from rehab c/o dark stools, generalized weakness, and anemia (reported Hgb in 8s)   Prior stroke workup:    CTH no acute findings: question of R frontal hypod  CTA H/N neg   CTP with Tmax of 15mL in R frontal   NIHSS: 6  preMRS: 4   MRI brain with embolic appearing infarcts noted R frontal as well as in left hemisphere.  old bialtereal cerebellar infarcts   LDL 96   mental status wax and waines but on my exam AAOx3   Impression:   Embolic infarcts, ESUS. now subacute/chronic   COVID     - AP/AC held given GIB; resume when able   - f/u GI  - high dose statin therapy with LDL goal <  70mg/dL for secondary stroke prevention   - COVID precuations  - getting remdesevir ; f/u ID   - telemetry  - PT/OT   - check FS, glucose control <180  - GI/DVT ppx   - Thank you for allowing me to participate in the care of this patient. Call with questions.     Ayo Novak MD  Vascular Neurology  Office: [phone]   
89yo F with PMH of HTN, HLD, A.fib, HFrEF (EF 30%), SVT s/p ablation, endometrial CA s/p LYNETTE, admission 1 month ago for fall and found to have embolic infarcts on MRI brain and L-sided weakness, switched from xarelto to eliquis, BIBEMS from rehab c/o dark stools, generalized weakness, and anemia    COVID  CXR no focal consolidation  not hypoxic  no need for steroids  inflammatory markers  supportive care  isolation per infection control policy   s/p remdesivir x 3 days, completed 11/24    Chintan Giron M.D.  [facility], Division of Infectious Diseases  [phone]  After 5pm on weekdays and all day on weekends - please call [phone] 
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 Pt reports her eliquis was held 11/21  Pt's only complaint is that her L foot hurts, chronic since CVA during previous admission. Pt reports she has been bed bound x 1 month. Denies fever/chills, dizziness, cough, CP, SOB, abdominal pain, BRBPR, dysuria, hematuria, HA.    Cardio/GI/ID f/up noted  S/p IV RDV  Cw Eliquis    Dermatitis:    Derm eval appreciated    
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  old bialtereal cerebellar infarcts   LDL 96   mental status wax and waines but on my exam AAOx3   Impression:   Embolic infarcts, ESUS. now subacute/chronic   COVID     - rash on chest. f/u derm. hydrocortisone cream given   - AP/AC held given GIB; resume when able ; back on DOAC eliquis 2.5mg BID   - f/u GI  - high dose statin therapy with LDL goal <  70mg/dL for secondary stroke prevention   - COVID precuations  - getting remdesevir ; f/u ID ; off now   - telemetry  - PT/OT   - check FS, glucose control <180  - GI/DVT ppx   - Thank you for allowing me to participate in the care of this patient. Call with questions.     Ayo Novak MD  Vascular Neurology  Office: [phone]   
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  old bialtereal cerebellar infarcts   LDL 96   mental status wax and waines but on my exam AAOx3   Impression:   Embolic infarcts, ESUS. now subacute/chronic   COVID     - rash on chest. f/u derm. hydrocortisone cream given   - AP/AC held given GIB; resume when able ; back on DOAC eliquis 2.5mg BID   - f/u GI  - high dose statin therapy with LDL goal <  70mg/dL for secondary stroke prevention   - COVID precuations  - getting remdesevir ; f/u ID ; off now   - telemetry  - PT/OT   - check FS, glucose control <180  - GI/DVT ppx   - Thank you for allowing me to participate in the care of this patient. Call with questions.     Ayo Novak MD  Vascular Neurology  Office: [phone]   
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 Pt reports her eliquis was held 11/21  Pt's only complaint is that her L foot hurts, chronic since CVA during previous admission. Pt reports she has been bed bound x 1 month. Denies fever/chills, dizziness, cough, CP, SOB, abdominal pain, BRBPR, dysuria, hematuria, HA.    ?Aspiration- S/S f/up noted. Resume soft/bite size diet, thin liquids  Cardio/GI/ID f/up noted  S/p IV RDV  Cw Eliquis    Dermatitis:    Triamcinolone cream    
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 Pt reports her eliquis was held 11/21  Pt's only complaint is that her L foot hurts, chronic since CVA during previous admission. Pt reports she has been bed bound x 1 month. Denies fever/chills, dizziness, cough, CP, SOB, abdominal pain, BRBPR, dysuria, hematuria, HA.    FOund to have guaiac neg stool but slightly elevated BUN/Cr, possibly 2/2 GI bleed.   Hgb dropped to 8.1. s/p 1 U PRBC, rpt HGB on 11.23: 10.1  cont PPI,   no evidence of active GI bleed.   possible she is s/p GI bleed.   due to advanced age GI will not proceed w endoscopy.  ptn is at risk of CVA, has h/o Afib, will resume eliquis but at a lower dose 2.5 mg bid,   iron level is low, will order a total of 1 gm IV IRON over 4 days.   ptn is  covid +:  on remdesivir x 3 days, not hypoxic.   PT rizwan      
89 f w afib on a/c recent cva p/w drop in hgb    1. Drop in hgb  appropriate response to transfusion  no further bleeding noted  Diet tolerated   ppi daily  rechallenge w low dose a/c started 11/23  per medicine/cardio/neuro    2. cva, afib on a/c       
89 f w afib on a/c recent cva p/w drop in hgb    1. Drop in hgb  appropriate response to trnasfusion  no further bleedng observed  pt alert, will start regular diet  ppi daily  can rechallenge w low dose a/c per medicine/cardio/neuro    2. cva, afib on a/c        Advanced care planning forms were discussed. Code status including forceful chest compressions, defibrillation and intubation were discussed. The risks benefits and alternatives to pertinent gastrointestinal procedures and interventions were discussed in detail and all questions were answered. Duration: 15 Minutes.    [facility]  Luis Enrique Delgado M.D.   [address], [place]  Office: [phone]  
89yo F with PMH of HTN, HLD, A.fib, HFrEF (EF 30%), SVT s/p ablation, endometrial CA s/p LYNETTE, admission 1 month ago for fall and found to have embolic infarcts on MRI brain and L-sided weakness, switched from xarelto to eliquis, BIBEMS from rehab c/o dark stools, generalized weakness, and anemia    COVID-19 infection   CXR no focal consolidation  not hypoxic, no need for steroids  s/p remdesivir x 3 days, completed 11/24  no indication for antibiotics at this time  isolation per infection control policy   Derm following for eczematous dermatitis    noted later this am choked on eggs and vomited up and desaturated  CXR and SLP eval pending, can monitor off antibiotics for now  monitor temps/WBC, if any fever or worsening, start on ceftriaxone   continue supportive care, aspiration precautions       Betsy Melgar M.D.  [facility], Division of Infectious Diseases  [phone]  After 5pm on weekdays and all day on weekends - please call [phone]
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  old bialtereal cerebellar infarcts   LDL 96   mental status wax and waines but on my exam AAOx3     Impression:   Embolic infarcts, ESUS. now subacute/chronic   COVID       - rash triamcinolone   - AP/AC was  held given GIB; resume when able ; back on DOAC eliquis 2.5mg BID   - f/u GI  - high dose statin therapy with LDL goal <  70mg/dL for secondary stroke prevention   - COVID precuations  - was getting remdesevir ; f/u ID ; off now   - telemetry  - PT/OT   - check FS, glucose control <180  - GI/DVT ppx   - Thank you for allowing me to participate in the care of this patient. Call with questions.   ryan Garvey MD  Vascular Neurology  Office: [phone]   
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 and L-sided weakness, switched from xarelto to eliquis, BIBEMS from rehab c/o dark stools, generalized weakness, and anemia    COVID  patient at high risk for progression  CXR no focal consolidatoin  monitor liver enzymes while on remdesivir  no need for steroids  trend inflammatory markers  supportive care  isolation per infection control policy   not hypoxic    Recommendations  c/w remdesivir to complete 3 day course w/ last day today    Chintan Giron M.D.  [facility], Division of Infectious Diseases  [phone]  After 5pm on weekdays and all day on weekends - please call [phone] 
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 and L-sided weakness, switched from xarelto to eliquis, BIBEMS from rehab c/o dark stools, generalized weakness, and anemia    COVID  patient at high risk for progression  CXR no focal consolidatoin  monitor liver enzymes while on remdesivir  no need for steroids  trend inflammatory markers  supportive care  isolation per infection control policy   not hypoxic    Recommendations  s/p remdesivir x 3 days, completed 11/24    Chintan Giron M.D.  [facility], Division of Infectious Diseases  [phone]  After 5pm on weekdays and all day on weekends - please call [phone] 
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 and L-sided weakness, switched from xarelto to eliquis, BIBEMS from rehab c/o dark stools, generalized weakness, and anemia    COVID-19 infection   s/p remdesivir x 3 days, completed 11/24  isolation per infection control policy     11/28 noted in am choked on eggs and vomited up and desaturated, may have aspirated  CXR with small b/l pleural effusions, suspect likely L base atelectasis >pneumonia  SLP eval noted  Remains afebrile, no leukocytosis and on RA    Recommendations:  Continue off antibiotics  monitor temps/WBC, if any fever or worsening, start on ceftriaxone 1g IV Q24h  continue supportive care, aspiration precautions   Stable from ID standpoint at this time.    ID will sign off at this time but remains available for any further questions/concerns.    Betsy Melgar M.D.  [facility], Division of Infectious Diseases  [phone]  After 5pm on weekdays and all day on weekends - please call [phone]
89 f w afib on a/c recent cva p/w drop in hgb    1. Drop in hgb  appropriate response to trnasfusion  no further bleedng observed  pt alert, will start regular diet  ppi daily  can rechallenge w low dose a/c per medicine/cardio/neuro    2. cva, afib on a/c        Advanced care planning forms were discussed. Code status including forceful chest compressions, defibrillation and intubation were discussed. The risks benefits and alternatives to pertinent gastrointestinal procedures and interventions were discussed in detail and all questions were answered. Duration: 15 Minutes.    [facility]  Luis Enrique Delgado M.D.   [address], [place]  Office: [phone]  
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 Pt reports her eliquis was held 11/21  Pt's only complaint is that her L foot hurts, chronic since CVA during previous admission. Pt reports she has been bed bound x 1 month. Denies fever/chills, dizziness, cough, CP, SOB, abdominal pain, BRBPR, dysuria, hematuria, HA.    FOund to have guaiac neg stool but slightly elevated BUN/Cr, possibly 2/2 GI bleed.   Hgb dropped to 8.1. s/p 1 U PRBC, rpt HGB on 11.23: 10.1, 11/24; 11.4  cont PPI,   tolerating lower dose Eliquis. no GI bleed,   due to advanced age GI will not proceed w endoscopy.  ptn is at risk of CVA, has h/o Afib, cont eliquis but at a lower dose 2.5 mg bid,   iron level is low, will order a total of 1 gm IV IRON over 4 days.   ptn is  covid +:  on remdesivir x 3 days, not hypoxic.  questionable aspiration on breakfast couple days ago, passed swallow eval, back on a soft diet  DC planning   PT eval      
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 Pt reports her eliquis was held 11/21  Pt's only complaint is that her L foot hurts, chronic since CVA during previous admission. Pt reports she has been bed bound x 1 month. Denies fever/chills, dizziness, cough, CP, SOB, abdominal pain, BRBPR, dysuria, hematuria, HA.    Found to have guaiac neg stool but slightly elevated BUN/Cr, possibly 2/2 GI bleed.   Hgb dropped to 8.1. s/p 1 U PRBC, rpt HGB on 11.23: 10.1, 11/24; 11.4  cont PPI,   tolerating lower dose Eliquis. no GI bleed, on remdesivir. once completes remdesivir DC plan  due to advanced age GI will not proceed w endoscopy.  ptn is at risk of CVA, has h/o Afib, cont eliquis but at a lower dose 2.5 mg bid,   iron level is low, will order a total of 1 gm IV IRON over 4 days.   ptn is  covid +:  on remdesivir x 3 days, not hypoxic.   PT rizwan      
ECHO 8/11/23: mild to mod MR , mild AR,  Severe global left ventricular systolic dysfunction. LVEF in the 25-30% decreased right ventricular systolic function.severe pulmonary hypertension.  ECHO 4/17/18: EF 55-60 % moderate to severe mitral stenosis. Normal left ventricular systolic function. mild aortic stenosis. Mild-moderate aortic regurgitation.  Normal right ventricular size and function.      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    #Anemia  -likely GIB  -tolerating lower dose eliquis, cont for now   -trend heme  -appreciate GI eval, conservative mgmt    #CVA  -recent workup revealed cerebellar infarcts on brain imaging   -switched from xarelto to eliquis  -cont low dose a/c    #Cardiomyopathy  -Deferred ischemic eval in the past given advanced age and functional status  -c/w low dose entresto/ BB   -Echo with mildly decreased lvef (47%), mod MR, mod-severe MS, mod TR  -Euvolemic on exam    #Afib  -Rates stable  -metoprolol 50 mg qd  -on eliquis d/t xarelto failure    #HTN  -cont current meds    #COVID+  mgmt per primary      
ECHO 8/11/23: mild to mod MR , mild AR,  Severe global left ventricular systolic dysfunction. LVEF in the 25-30% decreased right ventricular systolic function.severe pulmonary hypertension.  ECHO 4/17/18: EF 55-60 % moderate to severe mitral stenosis. Normal left ventricular systolic function. mild aortic stenosis. Mild-moderate aortic regurgitation.  Normal right ventricular size and function.      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    #Anemia  -likely GIB  -tolerating lower dose eliquis, cont for now   -trend heme  -appreciate GI eval, conservative mgmt    #CVA  -recent workup revealed cerebellar infarcts on brain imaging   -switched from xarelto to eliquis  -cont low dose a/c    #Cardiomyopathy  -Deferred ischemic eval in the past given advanced age and functional status  -c/w low dose entresto/ BB   -Echo with mildly decreased lvef (47%), mod MR, mod-severe MS, mod TR  -Euvolemic on exam    #Afib  -Rates stable  -metoprolol 50 mg qd  -on eliquis d/t xarelto failure    #HTN  -cont current meds    #COVID+  -mgmt per primary, ID       
ECHO 8/11/23: mild to mod MR , mild AR,  Severe global left ventricular systolic dysfunction. LVEF in the 25-30% decreased right ventricular systolic function.severe pulmonary hypertension.  ECHO 4/17/18: EF 55-60 % moderate to severe mitral stenosis. Normal left ventricular systolic function. mild aortic stenosis. Mild-moderate aortic regurgitation.  Normal right ventricular size and function.      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    #Anemia  -likely GIB  -tolerating lower dose eliquis, cont for now   -trend heme  -appreciate GI eval, conservative mgmt    #CVA  -recent workup revealed cerebellar infarcts on brain imaging   -switched from xarelto to eliquis  -cont low dose a/c  -Speech and swallow eval noted     #Cardiomyopathy  -Deferred ischemic eval in the past given advanced age and functional status  -Echo with mildly decreased lvef (47%), mod MR, mod-severe MS, mod TR  -Euvolemic on exam  -Continue metoprolol 75mg qd for better rate control  -Entresto on hold 2/2 soft bp     #Afib  -on eliquis d/t xarelto failure  -Rates elevated - somewhat improved  -Continue metoprolol 75mg qd    #HTN  -Cont current meds    #COVID+  -mgmt per primary, ID   -S&S eval noted  -CXR noted with small b/l pleural effusions and c/f pna  -Per ID, monitor off abx       
ECHO 8/11/23: mild to mod MR , mild AR,  Severe global left ventricular systolic dysfunction. LVEF in the 25-30% decreased right ventricular systolic function.severe pulmonary hypertension.  ECHO 4/17/18: EF 55-60 % moderate to severe mitral stenosis. Normal left ventricular systolic function. mild aortic stenosis. Mild-moderate aortic regurgitation.  Normal right ventricular size and function.      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    #Anemia  -likely GIB  -trend H/H  -tolerating lower dose eliquis, cont for now   -trend heme  -appreciate GI eval, conservative mgmt    #CVA  -recent workup revealed cerebellar infarcts on brain imaging   -switched from xarelto to eliquis  -cont low dose a/c    #Cardiomyopathy  -Deferred ischemic eval in the past given advanced age and functional status  -c/w low dose entresto/ BB   -Echo with mildly decreased lvef (47%), mod MR, mod-severe MS, mod TR  -Euvolemic on exam    #Afib  -Rates stable  -metoprolol 50 mg qd  -on eliquis d/t xarelto failure    #HTN  -cont current meds    #COVID+  mgmt per primary      
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 Pt reports her eliquis was held 11/21  Pt's only complaint is that her L foot hurts, chronic since CVA during previous admission. Pt reports she has been bed bound x 1 month. Denies fever/chills, dizziness, cough, CP, SOB, abdominal pain, BRBPR, dysuria, hematuria, HA.    ?Aspiration-will monitor  Cardio/GI/ID f/up noted  S/p IV RDV  Cw Eliquis    Dermatitis:    Derm rizwan appreciated  Triamcinolone cream

## 2023-12-01 NOTE — PROGRESS NOTE ADULT - SUBJECTIVE AND OBJECTIVE BOX
Interval Events:   no GI Bleeding         acetaminophen     Tablet .. 650 milliGRAM(s) Oral every 6 hours PRN  apixaban 2.5 milliGRAM(s) Oral every 12 hours  artificial  tears Solution 1 Drop(s) Both EYES two times a day  atorvastatin 80 milliGRAM(s) Oral at bedtime  chlorhexidine 2% Cloths 1 Application(s) Topical <User Schedule>  hydrocortisone 1% Cream 1 Application(s) Topical two times a day  iron sucrose IVPB 300 milliGRAM(s) IV Intermittent every 24 hours  melatonin 3 milliGRAM(s) Oral at bedtime PRN  metoprolol succinate ER 50 milliGRAM(s) Oral daily  mirtazapine 15 milliGRAM(s) Oral at bedtime  pantoprazole    Tablet 40 milliGRAM(s) Oral two times a day  sacubitril 24 mG/valsartan 26 mG 1 Tablet(s) Oral two times a day  sodium chloride 0.45%. 1000 milliLiter(s) IV Continuous <Continuous>  venlafaxine XR. 75 milliGRAM(s) Oral daily  zolpidem 5 milliGRAM(s) Oral at bedtime PRN  zolpidem 5 milliGRAM(s) Oral at bedtime PRN                            11.4   8.04  )-----------( 370      ( 24 Nov 2023 07:04 )             35.5       Hemoglobin: 11.4 g/dL (11-24 @ 07:04)  Hemoglobin: 10.1 g/dL (11-23 @ 07:23)  Hemoglobin: 8.1 g/dL (11-22 @ 12:43)      11-24    140  |  106  |  37<H>  ----------------------------<  81  4.5   |  20<L>  |  1.23    Ca    8.7      24 Nov 2023 07:04    TPro  6.1  /  Alb  2.9<L>  /  TBili  0.6  /  DBili  x   /  AST  47<H>  /  ALT  38  /  AlkPhos  130<H>  11-24    Creatinine Trend: 1.23<--, 1.11<--, 1.33<--    COAGS:           T(C): 36.9 (11-25-23 @ 04:18), Max: 36.9 (11-25-23 @ 04:18)  HR: 96 (11-25-23 @ 04:18) (86 - 99)  BP: 100/57 (11-25-23 @ 04:18) (100/57 - 115/78)  RR: 19 (11-25-23 @ 04:18) (18 - 19)  SpO2: 95% (11-25-23 @ 04:18) (93% - 95%)  Wt(kg): --    I&O's Summary    24 Nov 2023 07:01  -  25 Nov 2023 07:00  --------------------------------------------------------  IN: 360 mL / OUT: 400 mL / NET: -40 mL        Review of Systems:  General:  No wt loss, fevers, chills, night sweats, fatigue,   Eyes:  Good vision, no reported pain  ENT:  No sore throat, pain, runny nose, dysphagia  CV:  No pain, palpitations, hypo/hypertension  Resp:  No dyspnea, cough, tachypnea, wheezing  GI:  See HPI  :  No pain, bleeding, incontinence, nocturia  Muscle:  No pain, weakness  Neuro:  No weakness, tingling, memory problems  Psych:  No fatigue, insomnia, mood problems, depression  Endocrine:  No polyuria, polydipsia, cold/heat intolerance  Heme:  No petechiae, ecchymosis, easy bruisability  Integumentary:  No rash, edema        PHYSICAL EXAM:     GENERAL:  Appears stated age, well-groomed, well-nourished, no distress  HEENT:  NC/AT,  conjunctivae anicteric, clear and pink,   NECK: supple, trachea midline  CHEST:  Full & symmetric excursion, no increased effort, breath sounds clear  HEART:  Regular rhythm, no JVD  ABDOMEN:  Soft, non-tender, non-distended, normoactive bowel sounds,  no masses , no hepatosplenomegaly  EXTREMITIES:  no cyanosis,clubbing or edema  SKIN:  No rash, erythema, or, ecchymoses, no jaundice  NEURO:  Alert, non-focal, no asterixis  PSYCH: Appropriate affect, oriented to place and time  RECTAL: Deferred               
        Interval Events:   no GI Bleeding         acetaminophen     Tablet .. 650 milliGRAM(s) Oral every 6 hours PRN  apixaban 2.5 milliGRAM(s) Oral every 12 hours  artificial  tears Solution 1 Drop(s) Both EYES two times a day  atorvastatin 80 milliGRAM(s) Oral at bedtime  chlorhexidine 2% Cloths 1 Application(s) Topical <User Schedule>  hydrocortisone 1% Cream 1 Application(s) Topical two times a day  iron sucrose IVPB 300 milliGRAM(s) IV Intermittent every 24 hours  melatonin 3 milliGRAM(s) Oral at bedtime PRN  metoprolol succinate ER 50 milliGRAM(s) Oral daily  mirtazapine 15 milliGRAM(s) Oral at bedtime  pantoprazole    Tablet 40 milliGRAM(s) Oral two times a day  sacubitril 24 mG/valsartan 26 mG 1 Tablet(s) Oral two times a day  sodium chloride 0.45%. 1000 milliLiter(s) IV Continuous <Continuous>  venlafaxine XR. 75 milliGRAM(s) Oral daily  zolpidem 5 milliGRAM(s) Oral at bedtime PRN  zolpidem 5 milliGRAM(s) Oral at bedtime PRN                            11.4   8.04  )-----------( 370      ( 24 Nov 2023 07:04 )             35.5       Hemoglobin: 11.4 g/dL (11-24 @ 07:04)  Hemoglobin: 10.1 g/dL (11-23 @ 07:23)  Hemoglobin: 8.1 g/dL (11-22 @ 12:43)      11-24    140  |  106  |  37<H>  ----------------------------<  81  4.5   |  20<L>  |  1.23    Ca    8.7      24 Nov 2023 07:04    TPro  6.1  /  Alb  2.9<L>  /  TBili  0.6  /  DBili  x   /  AST  47<H>  /  ALT  38  /  AlkPhos  130<H>  11-24    Creatinine Trend: 1.23<--, 1.11<--, 1.33<--    COAGS:           T(C): 36.9 (11-25-23 @ 04:18), Max: 36.9 (11-25-23 @ 04:18)  HR: 96 (11-25-23 @ 04:18) (86 - 99)  BP: 100/57 (11-25-23 @ 04:18) (100/57 - 115/78)  RR: 19 (11-25-23 @ 04:18) (18 - 19)  SpO2: 95% (11-25-23 @ 04:18) (93% - 95%)  Wt(kg): --    I&O's Summary    24 Nov 2023 07:01  -  25 Nov 2023 07:00  --------------------------------------------------------  IN: 360 mL / OUT: 400 mL / NET: -40 mL        Review of Systems:  General:  No wt loss, fevers, chills, night sweats, fatigue,   Eyes:  Good vision, no reported pain  ENT:  No sore throat, pain, runny nose, dysphagia  CV:  No pain, palpitations, hypo/hypertension  Resp:  No dyspnea, cough, tachypnea, wheezing  GI:  See HPI  :  No pain, bleeding, incontinence, nocturia  Muscle:  No pain, weakness  Neuro:  No weakness, tingling, memory problems  Psych:  No fatigue, insomnia, mood problems, depression  Endocrine:  No polyuria, polydipsia, cold/heat intolerance  Heme:  No petechiae, ecchymosis, easy bruisability  Integumentary:  No rash, edema        PHYSICAL EXAM:     GENERAL:  Appears stated age, well-groomed, well-nourished, no distress  HEENT:  NC/AT,  conjunctivae anicteric, clear and pink,   NECK: supple, trachea midline  CHEST:  Full & symmetric excursion, no increased effort, breath sounds clear  HEART:  Regular rhythm, no JVD  ABDOMEN:  Soft, non-tender, non-distended, normoactive bowel sounds,  no masses , no hepatosplenomegaly  EXTREMITIES:  no cyanosis,clubbing or edema  SKIN:  No rash, erythema, or, ecchymoses, no jaundice  NEURO:  Alert, non-focal, no asterixis  PSYCH: Appropriate affect, oriented to place and time  RECTAL: Deferred               
CARDIOLOGY FOLLOW UP - Dr. Mccartney  DATE OF SERVICE: 11/25/23     CC no cp or sob       REVIEW OF SYSTEMS:  CONSTITUTIONAL: No fever, weight loss, or fatigue  RESPIRATORY: No cough, wheezing, chills or hemoptysis; No Shortness of Breath  CARDIOVASCULAR: No chest pain, palpitations, passing out, dizziness, or leg swelling  GASTROINTESTINAL: No abdominal or epigastric pain. No nausea, vomiting, or hematemesis; No diarrhea or constipation. No melena or hematochezia.  VASCULAR: No edema     PHYSICAL EXAM:  T(C): 36.9 (11-25-23 @ 04:18), Max: 36.9 (11-25-23 @ 04:18)  HR: 96 (11-25-23 @ 04:18) (86 - 99)  BP: 100/57 (11-25-23 @ 04:18) (100/57 - 115/78)  RR: 19 (11-25-23 @ 04:18) (18 - 19)  SpO2: 95% (11-25-23 @ 04:18) (93% - 95%)  Wt(kg): --  I&O's Summary    24 Nov 2023 07:01  -  25 Nov 2023 07:00  --------------------------------------------------------  IN: 360 mL / OUT: 400 mL / NET: -40 mL        Appearance: Normal	  Cardiovascular: Normal S1 S2,RRR, No JVD, No murmurs  Respiratory: Lungs clear to auscultation	  Gastrointestinal:  Soft, Non-tender, + BS	  Extremities: Normal range of motion, No clubbing, cyanosis or edema      Home Medications:  acetaminophen 325 mg oral tablet: 2 tab(s) orally every 6 hours, As needed, Temp greater or equal to 38C (100.4F), Mild Pain (1 - 3) (22 Nov 2023 14:42)  Artificial Tears ophthalmic solution: 1 drop(s) in each eye once a day (22 Nov 2023 14:45)  atorvastatin 80 mg oral tablet: 1 tab(s) orally once a day (at bedtime) (22 Nov 2023 14:42)  melatonin 3 mg oral tablet: 1 tab(s) orally once a day (at bedtime) As needed Insomnia (22 Nov 2023 14:42)  Metoprolol Succinate ER 50 mg oral tablet, extended release: 1 tab(s) orally every 12 hours , hold for SBP &lt;100 or HR &lt;55 (22 Nov 2023 14:45)  mirtazapine 15 mg oral tablet: 1 tab(s) orally once a day (at bedtime) (22 Nov 2023 14:45)  pantoprazole 40 mg oral delayed release tablet: 1 tab(s) orally 2 times a day (22 Nov 2023 14:38)  polyethylene glycol 3350 oral powder for reconstitution: 17 gram(s) orally once a day as needed for  constipation (22 Nov 2023 14:46)  sacubitril-valsartan 24 mg-26 mg oral tablet: 1 tab(s) orally 2 times a day (22 Nov 2023 14:42)  venlafaxine 75 mg oral capsule, extended release: 1 cap(s) orally once a day (22 Nov 2023 14:42)  Vitamin D3 1250 mcg (50,000 intl units) oral capsule: 1 cap(s) orally once a week (22 Nov 2023 14:42)  Zofran 4 mg oral tablet: 1 tab(s) orally every 8 hours as needed for  nausea x 7 days (22 Nov 2023 14:45)  zolpidem 10 mg oral tablet: 1 tab(s) orally once a day (at bedtime) (22 Nov 2023 14:45)      MEDICATIONS  (STANDING):  apixaban 2.5 milliGRAM(s) Oral every 12 hours  artificial  tears Solution 1 Drop(s) Both EYES two times a day  atorvastatin 80 milliGRAM(s) Oral at bedtime  chlorhexidine 2% Cloths 1 Application(s) Topical <User Schedule>  hydrocortisone 1% Cream 1 Application(s) Topical two times a day  iron sucrose IVPB 300 milliGRAM(s) IV Intermittent every 24 hours  metoprolol succinate ER 50 milliGRAM(s) Oral daily  mirtazapine 15 milliGRAM(s) Oral at bedtime  pantoprazole    Tablet 40 milliGRAM(s) Oral two times a day  sacubitril 24 mG/valsartan 26 mG 1 Tablet(s) Oral two times a day  sodium chloride 0.45%. 1000 milliLiter(s) (75 mL/Hr) IV Continuous <Continuous>  venlafaxine XR. 75 milliGRAM(s) Oral daily      TELEMETRY: 	    ECG:  	  RADIOLOGY:   DIAGNOSTIC TESTING:  [ ] Echocardiogram:  [ ]  Catheterization:  [ ] Stress Test:    OTHER: 	    LABS:	 	                            11.4   8.04  )-----------( 370      ( 24 Nov 2023 07:04 )             35.5     11-24    140  |  106  |  37<H>  ----------------------------<  81  4.5   |  20<L>  |  1.23    Ca    8.7      24 Nov 2023 07:04    TPro  6.1  /  Alb  2.9<L>  /  TBili  0.6  /  DBili  x   /  AST  47<H>  /  ALT  38  /  AlkPhos  130<H>  11-24    PT/INR - ( 24 Nov 2023 07:04 )   PT: 19.8 sec;   INR: 1.92 ratio         PTT - ( 24 Nov 2023 07:04 )  PTT:35.1 sec        
CARDIOLOGY FOLLOW UP - Dr. Mccartney  DATE OF SERVICE: 11/26/23      no acute cv events       REVIEW OF SYSTEMS:  CONSTITUTIONAL: No fever, weight loss, or fatigue  RESPIRATORY: No cough, wheezing, chills or hemoptysis; No Shortness of Breath  CARDIOVASCULAR: No chest pain, palpitations, passing out, dizziness, or leg swelling  GASTROINTESTINAL: No abdominal or epigastric pain. No nausea, vomiting, or hematemesis; No diarrhea or constipation. No melena or hematochezia.  VASCULAR: No edema     PHYSICAL EXAM:  T(C): 36.5 (11-26-23 @ 05:38), Max: 36.5 (11-26-23 @ 05:38)  HR: 106 (11-26-23 @ 06:27) (96 - 113)  BP: 120/73 (11-26-23 @ 05:38) (105/60 - 120/73)  RR: 18 (11-26-23 @ 05:38) (18 - 19)  SpO2: 93% (11-26-23 @ 05:38) (93% - 97%)  Wt(kg): --  I&O's Summary    25 Nov 2023 07:01  -  26 Nov 2023 07:00  --------------------------------------------------------  IN: 840 mL / OUT: 0 mL / NET: 840 mL        Appearance: Normal	  Cardiovascular: Normal S1 S2,RRR, No JVD, No murmurs  Respiratory: Lungs clear to auscultation	  Gastrointestinal:  Soft, Non-tender, + BS	  Extremities: Normal range of motion, No clubbing, cyanosis or edema      Home Medications:  acetaminophen 325 mg oral tablet: 2 tab(s) orally every 6 hours, As needed, Temp greater or equal to 38C (100.4F), Mild Pain (1 - 3) (22 Nov 2023 14:42)  Artificial Tears ophthalmic solution: 1 drop(s) in each eye once a day (22 Nov 2023 14:45)  atorvastatin 80 mg oral tablet: 1 tab(s) orally once a day (at bedtime) (22 Nov 2023 14:42)  melatonin 3 mg oral tablet: 1 tab(s) orally once a day (at bedtime) As needed Insomnia (22 Nov 2023 14:42)  Metoprolol Succinate ER 50 mg oral tablet, extended release: 1 tab(s) orally every 12 hours , hold for SBP &lt;100 or HR &lt;55 (22 Nov 2023 14:45)  mirtazapine 15 mg oral tablet: 1 tab(s) orally once a day (at bedtime) (22 Nov 2023 14:45)  pantoprazole 40 mg oral delayed release tablet: 1 tab(s) orally 2 times a day (22 Nov 2023 14:38)  polyethylene glycol 3350 oral powder for reconstitution: 17 gram(s) orally once a day as needed for  constipation (22 Nov 2023 14:46)  sacubitril-valsartan 24 mg-26 mg oral tablet: 1 tab(s) orally 2 times a day (22 Nov 2023 14:42)  venlafaxine 75 mg oral capsule, extended release: 1 cap(s) orally once a day (22 Nov 2023 14:42)  Vitamin D3 1250 mcg (50,000 intl units) oral capsule: 1 cap(s) orally once a week (22 Nov 2023 14:42)  Zofran 4 mg oral tablet: 1 tab(s) orally every 8 hours as needed for  nausea x 7 days (22 Nov 2023 14:45)  zolpidem 10 mg oral tablet: 1 tab(s) orally once a day (at bedtime) (22 Nov 2023 14:45)      MEDICATIONS  (STANDING):  apixaban 2.5 milliGRAM(s) Oral every 12 hours  artificial  tears Solution 1 Drop(s) Both EYES two times a day  atorvastatin 80 milliGRAM(s) Oral at bedtime  chlorhexidine 2% Cloths 1 Application(s) Topical <User Schedule>  hydrocortisone 1% Cream 1 Application(s) Topical two times a day  metoprolol succinate ER 50 milliGRAM(s) Oral daily  mirtazapine 15 milliGRAM(s) Oral at bedtime  pantoprazole    Tablet 40 milliGRAM(s) Oral two times a day  sacubitril 24 mG/valsartan 26 mG 1 Tablet(s) Oral two times a day  venlafaxine XR. 75 milliGRAM(s) Oral daily      TELEMETRY: 	    ECG:  	  RADIOLOGY:   DIAGNOSTIC TESTING:  [ ] Echocardiogram:  [ ]  Catheterization:  [ ] Stress Test:    OTHER: 	    LABS:	 	                            10.3   8.29  )-----------( 387      ( 26 Nov 2023 07:28 )             33.0     11-26    142  |  106  |  26<H>  ----------------------------<  91  4.1   |  20<L>  |  1.15    Ca    9.0      26 Nov 2023 07:28              
Neurology        S: patient seen. on covid precuations         Medications: MEDICATIONS  (STANDING):  apixaban 2.5 milliGRAM(s) Oral every 12 hours  artificial  tears Solution 1 Drop(s) Both EYES two times a day  atorvastatin 80 milliGRAM(s) Oral at bedtime  hydrocortisone 1% Cream 1 Application(s) Topical two times a day  metoprolol succinate ER 50 milliGRAM(s) Oral daily  mirtazapine 15 milliGRAM(s) Oral at bedtime  pantoprazole    Tablet 40 milliGRAM(s) Oral two times a day  sacubitril 24 mG/valsartan 26 mG 1 Tablet(s) Oral two times a day  triamcinolone 0.1% Ointment 1 Application(s) Topical two times a day  venlafaxine XR. 75 milliGRAM(s) Oral daily    MEDICATIONS  (PRN):  acetaminophen     Tablet .. 650 milliGRAM(s) Oral every 6 hours PRN Temp greater or equal to 38C (100.4F), Mild Pain (1 - 3)  melatonin 3 milliGRAM(s) Oral at bedtime PRN Insomnia  zolpidem 5 milliGRAM(s) Oral at bedtime PRN Insomnia  zolpidem 5 milliGRAM(s) Oral at bedtime PRN Insomnia       Vitals:  Vital Signs Last 24 Hrs  T(C): 36.6 (28 Nov 2023 04:49), Max: 36.7 (27 Nov 2023 13:17)  T(F): 97.9 (28 Nov 2023 04:49), Max: 98.1 (27 Nov 2023 13:17)  HR: 102 (28 Nov 2023 04:49) (67 - 102)  BP: 103/66 (28 Nov 2023 04:49) (100/65 - 103/67)  BP(mean): --  RR: 18 (28 Nov 2023 04:49) (18 - 18)  SpO2: 97% (28 Nov 2023 04:49) (95% - 97%)    Parameters below as of 28 Nov 2023 04:49  Patient On (Oxygen Delivery Method): room air                      General Exam:   General Appearance: Appropriately dressed and in no acute distress       Head: Normocephalic, atraumatic and no dysmorphic features  Ear, Nose, and Throat: Moist mucous membranes  CVS: S1S2+  Resp: No SOB, no wheeze or rhonchi  GI: soft NT/ND  Extremities: No edema or cyanosis  Skin: No bruises or rashes     Neurological Exam:  Mental Status: Awake, alert and oriented x 2.  Able to follow simple and complex verbal commands. Able to name and repeat. fluent speech. No obvious aphasia or dysarthria noted.   Cranial Nerves: PERRL, EOMI, VFFC, sensation V1-V3 intact,  no obvious facial asymmetry, equal elevation of palate, scm/trap 5/5, tongue is midline on protrusion. no obvious papilledema on fundoscopic exam. hearing is grossly intact.   Motor: Normal bulk, tone and strength throughout. mild L HP from stroke moving uppers > lowers   Sensation: Intact to light touch and pinprick throughout. no right/left confusion. no extinction to tactile on DSS.   Reflexes: 1+ throughout at biceps, brachioradialis, triceps, patellars and ankles bilaterally and equal. No clonus. R toe and L toe were both downgoing.  Coordination: No dysmetria on FNF    Gait: unable     Data/Labs/Imaging which I personally reviewed.     Labs:       LABS:no new labs         < from: MR Head No Cont (10.18.23 @ 14:33) >    ACC: [id number] EXAM:  MR BRAIN   ORDERED BY: WINSOME MARTIN     PROCEDURE DATE:  10/18/2023          INTERPRETATION:  CLINICAL INDICATION: Fall on Xarelto      Magnetic resonance imaging of the brain was carried out with transaxial   SPGR, FLAIR, fast spin echo T2 weighted images, axial susceptibility   weighted series, diffusion weighted series and sagittal T1 weighted   series on a 1.5 Payton magnet.    Comparison is made with the prior CT/CTA of 10/17/2023.    Ventricular and sulcal prominence is consistent with moderate atrophy.   There are bilateral old cerebellar infarcts. There are scattered infarcts   identified in the supratentorial region there is an infarct in the left   occipital subcortical white matter, left posterior temporalcortex the   right frontal cortex, the right centrum semiovale and the right medial   frontal cortex adjacent to the anterior body of the right lateral   ventricle. Bilaterality cysts suggestive of cardioembolic source. There   is no acute hemorrhage. There is a focus of hemosiderin adjacent to the   lateral border of the atrium of the right lateral ventricle.    The sellar and parasellar structures are unremarkable.    The paranasal sinuses are clear. There has been previous bilateral lens   replacement surgery. Ligamentous hypertrophy at the C1-2 level likely   related to degenerative changes.    IMPRESSION: Moderate atrophy. Small vessel white matter ischemic changes.   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   No acute hemorrhage.    --- End of Report ---            LAMBERTO ENRIQUE MD; Attending Radiologist  This document has been electronically signed. Oct 18 2023  2:58PM    < end of copied text >    
Neurology        S: patient seen. on covid precuations         Medications: MEDICATIONS  (STANDING):  apixaban 2.5 milliGRAM(s) Oral every 12 hours  artificial  tears Solution 1 Drop(s) Both EYES two times a day  atorvastatin 80 milliGRAM(s) Oral at bedtime  hydrocortisone 1% Cream 1 Application(s) Topical two times a day  metoprolol succinate ER 75 milliGRAM(s) Oral daily  mirtazapine 15 milliGRAM(s) Oral at bedtime  pantoprazole    Tablet 40 milliGRAM(s) Oral two times a day  triamcinolone 0.1% Ointment 1 Application(s) Topical two times a day  venlafaxine XR. 75 milliGRAM(s) Oral daily    MEDICATIONS  (PRN):  acetaminophen     Tablet .. 650 milliGRAM(s) Oral every 6 hours PRN Temp greater or equal to 38C (100.4F), Mild Pain (1 - 3)  melatonin 3 milliGRAM(s) Oral at bedtime PRN Insomnia  zolpidem 5 milliGRAM(s) Oral at bedtime PRN Insomnia  zolpidem 5 milliGRAM(s) Oral at bedtime PRN Insomnia       Vitals:  Vital Signs Last 24 Hrs  T(C): 36.7 (29 Nov 2023 08:07), Max: 36.9 (28 Nov 2023 19:20)  T(F): 98.1 (29 Nov 2023 08:07), Max: 98.4 (28 Nov 2023 19:20)  HR: 107 (29 Nov 2023 08:07) (106 - 114)  BP: 100/65 (29 Nov 2023 08:07) (100/65 - 112/76)  BP(mean): --  RR: 18 (29 Nov 2023 08:07) (18 - 20)  SpO2: 94% (29 Nov 2023 08:07) (93% - 100%)    Parameters below as of 29 Nov 2023 08:07  Patient On (Oxygen Delivery Method): room air                   General Exam:   General Appearance: Appropriately dressed and in no acute distress       Head: Normocephalic, atraumatic and no dysmorphic features  Ear, Nose, and Throat: Moist mucous membranes  CVS: S1S2+  Resp: No SOB, no wheeze or rhonchi  GI: soft NT/ND  Extremities: No edema or cyanosis  Skin: No bruises or rashes     Neurological Exam:  Mental Status: Awake, alert and oriented x 2.  Able to follow simple and complex verbal commands. Able to name and repeat. fluent speech. No obvious aphasia or dysarthria noted.   Cranial Nerves: PERRL, EOMI, VFFC, sensation V1-V3 intact,  no obvious facial asymmetry, equal elevation of palate, scm/trap 5/5, tongue is midline on protrusion. no obvious papilledema on fundoscopic exam. hearing is grossly intact.   Motor: Normal bulk, tone and strength throughout. mild L HP from stroke moving uppers > lowers   Sensation: Intact to light touch and pinprick throughout. no right/left confusion. no extinction to tactile on DSS.   Reflexes: 1+ throughout at biceps, brachioradialis, triceps, patellars and ankles bilaterally and equal. No clonus. R toe and L toe were both downgoing.  Coordination: No dysmetria on FNF    Gait: unable     Data/Labs/Imaging which I personally reviewed.     Labs:       LABS:no new labs         < from: MR Head No Cont (10.18.23 @ 14:33) >    ACC: [id number] EXAM:  MR BRAIN   ORDERED BY: WINSOME MARTIN     PROCEDURE DATE:  10/18/2023          INTERPRETATION:  CLINICAL INDICATION: Fall on Xarelto      Magnetic resonance imaging of the brain was carried out with transaxial   SPGR, FLAIR, fast spin echo T2 weighted images, axial susceptibility   weighted series, diffusion weighted series and sagittal T1 weighted   series on a 1.5 Payton magnet.    Comparison is made with the prior CT/CTA of 10/17/2023.    Ventricular and sulcal prominence is consistent with moderate atrophy.   There are bilateral old cerebellar infarcts. There are scattered infarcts   identified in the supratentorial region there is an infarct in the left   occipital subcortical white matter, left posterior temporalcortex the   right frontal cortex, the right centrum semiovale and the right medial   frontal cortex adjacent to the anterior body of the right lateral   ventricle. Bilaterality cysts suggestive of cardioembolic source. There   is no acute hemorrhage. There is a focus of hemosiderin adjacent to the   lateral border of the atrium of the right lateral ventricle.    The sellar and parasellar structures are unremarkable.    The paranasal sinuses are clear. There has been previous bilateral lens   replacement surgery. Ligamentous hypertrophy at the C1-2 level likely   related to degenerative changes.    IMPRESSION: Moderate atrophy. Small vessel white matter ischemic changes.   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   No acute hemorrhage.    --- End of Report ---            LAMBERTO ENRIQUE MD; Attending Radiologist  This document has been electronically signed. Oct 18 2023  2:58PM    < end of copied text >    
Neurology        S: patient seen. on covid precuations ; rash on chest         Medications: MEDICATIONS  (STANDING):  apixaban 2.5 milliGRAM(s) Oral every 12 hours  artificial  tears Solution 1 Drop(s) Both EYES two times a day  atorvastatin 80 milliGRAM(s) Oral at bedtime  chlorhexidine 2% Cloths 1 Application(s) Topical <User Schedule>  hydrocortisone 1% Cream 1 Application(s) Topical two times a day  iron sucrose IVPB 300 milliGRAM(s) IV Intermittent every 24 hours  metoprolol succinate ER 50 milliGRAM(s) Oral daily  mirtazapine 15 milliGRAM(s) Oral at bedtime  pantoprazole    Tablet 40 milliGRAM(s) Oral two times a day  sacubitril 24 mG/valsartan 26 mG 1 Tablet(s) Oral two times a day  sodium chloride 0.45%. 1000 milliLiter(s) (75 mL/Hr) IV Continuous <Continuous>  venlafaxine XR. 75 milliGRAM(s) Oral daily    MEDICATIONS  (PRN):  acetaminophen     Tablet .. 650 milliGRAM(s) Oral every 6 hours PRN Temp greater or equal to 38C (100.4F), Mild Pain (1 - 3)  melatonin 3 milliGRAM(s) Oral at bedtime PRN Insomnia  zolpidem 5 milliGRAM(s) Oral at bedtime PRN Insomnia  zolpidem 5 milliGRAM(s) Oral at bedtime PRN Insomnia       Vitals:  Vital Signs Last 24 Hrs  T(C): 36.9 (25 Nov 2023 04:18), Max: 36.9 (25 Nov 2023 04:18)  T(F): 98.4 (25 Nov 2023 04:18), Max: 98.4 (25 Nov 2023 04:18)  HR: 96 (25 Nov 2023 04:18) (86 - 99)  BP: 100/57 (25 Nov 2023 04:18) (100/57 - 115/78)  BP(mean): --  RR: 19 (25 Nov 2023 04:18) (18 - 19)  SpO2: 95% (25 Nov 2023 04:18) (93% - 95%)    Parameters below as of 25 Nov 2023 04:18  Patient On (Oxygen Delivery Method): room air              General Exam:   General Appearance: Appropriately dressed and in no acute distress       Head: Normocephalic, atraumatic and no dysmorphic features  Ear, Nose, and Throat: Moist mucous membranes  CVS: S1S2+  Resp: No SOB, no wheeze or rhonchi  GI: soft NT/ND  Extremities: No edema or cyanosis  Skin: No bruises or rashes     Neurological Exam:  Mental Status: Awake, alert and oriented x 2.  Able to follow simple and complex verbal commands. Able to name and repeat. fluent speech. No obvious aphasia or dysarthria noted.   Cranial Nerves: PERRL, EOMI, VFFC, sensation V1-V3 intact,  no obvious facial asymmetry, equal elevation of palate, scm/trap 5/5, tongue is midline on protrusion. no obvious papilledema on fundoscopic exam. hearing is grossly intact.   Motor: Normal bulk, tone and strength throughout. mild L HP from stroke moving uppers > lowers   Sensation: Intact to light touch and pinprick throughout. no right/left confusion. no extinction to tactile on DSS.   Reflexes: 1+ throughout at biceps, brachioradialis, triceps, patellars and ankles bilaterally and equal. No clonus. R toe and L toe were both downgoing.  Coordination: No dysmetria on FNF    Gait: unable     Data/Labs/Imaging which I personally reviewed.     Labs:     LABS:                          11.4   8.04  )-----------( 370      ( 24 Nov 2023 07:04 )             35.5     11-24    140  |  106  |  37<H>  ----------------------------<  81  4.5   |  20<L>  |  1.23    Ca    8.7      24 Nov 2023 07:04    TPro  6.1  /  Alb  2.9<L>  /  TBili  0.6  /  DBili  x   /  AST  47<H>  /  ALT  38  /  AlkPhos  130<H>  11-24    LIVER FUNCTIONS - ( 24 Nov 2023 07:04 )  Alb: 2.9 g/dL / Pro: 6.1 g/dL / ALK PHOS: 130 U/L / ALT: 38 U/L / AST: 47 U/L / GGT: x           PT/INR - ( 24 Nov 2023 07:04 )   PT: 19.8 sec;   INR: 1.92 ratio         PTT - ( 24 Nov 2023 07:04 )  PTT:35.1 sec  Urinalysis Basic - ( 24 Nov 2023 07:04 )    Color: x / Appearance: x / SG: x / pH: x  Gluc: 81 mg/dL / Ketone: x  / Bili: x / Urobili: x   Blood: x / Protein: x / Nitrite: x   Leuk Esterase: x / RBC: x / WBC x   Sq Epi: x / Non Sq Epi: x / Bacteria: x            < from: MR Head No Cont (10.18.23 @ 14:33) >    ACC: [id number] EXAM:  MR BRAIN   ORDERED BY: WINSOME MARTIN     PROCEDURE DATE:  10/18/2023          INTERPRETATION:  CLINICAL INDICATION: Fall on Xarelto      Magnetic resonance imaging of the brain was carried out with transaxial   SPGR, FLAIR, fast spin echo T2 weighted images, axial susceptibility   weighted series, diffusion weighted series and sagittal T1 weighted   series on a 1.5 Payton magnet.    Comparison is made with the prior CT/CTA of 10/17/2023.    Ventricular and sulcal prominence is consistent with moderate atrophy.   There are bilateral old cerebellar infarcts. There are scattered infarcts   identified in the supratentorial region there is an infarct in the left   occipital subcortical white matter, left posterior temporalcortex the   right frontal cortex, the right centrum semiovale and the right medial   frontal cortex adjacent to the anterior body of the right lateral   ventricle. Bilaterality cysts suggestive of cardioembolic source. There   is no acute hemorrhage. There is a focus of hemosiderin adjacent to the   lateral border of the atrium of the right lateral ventricle.    The sellar and parasellar structures are unremarkable.    The paranasal sinuses are clear. There has been previous bilateral lens   replacement surgery. Ligamentous hypertrophy at the C1-2 level likely   related to degenerative changes.    IMPRESSION: Moderate atrophy. Small vessel white matter ischemic changes.   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   No acute hemorrhage.    --- End of Report ---            LAMBERTO ENRIQUE MD; Attending Radiologist  This document has been electronically signed. Oct 18 2023  2:58PM    < end of copied text >    
[facility], Division of Infectious Diseases  [facility], [place], [place]. [place]  [phone]  ([phone] - weekdays after 5pm and weekends)    Name: DELVIS MCBRIDE  Age/Gender: 89y Female  MRN: [id number]    Interval History:  Notes reviewed.   No concerning overnight events.  Afebrile.     Allergies: soaps and creams causes rash uses only sensitive skin soap (Rash)  piperacillin-tazobactam (Rash)  Voltaren (Rash)  neomycin (Unknown)  sulfa drugs (Unknown)  Eye cream from the 1960s Neocortef ?spelling caused eyes to becomes swollen (Unknown)      Objective:  Vitals:   T(F): 97.8 (11-24-23 @ 12:51), Max: 98.1 (11-24-23 @ 04:59)  HR: 99 (11-24-23 @ 12:51) (92 - 99)  BP: 115/78 (11-24-23 @ 12:51) (109/77 - 115/84)  RR: 18 (11-24-23 @ 12:51) (18 - 18)  SpO2: 94% (11-24-23 @ 12:51) (94% - 98%)  Physical Examination:  General: no acute distress  HEENT: anicteric  Cardio: normal rate  Resp: breathing comfortably on RA   Abd: soft, NT, ND  Ext: no LE edema  Skin: warm, dry    Laboratory Studies:  CBC:                       11.4   8.04  )-----------( 370      ( 24 Nov 2023 07:04 )             35.5     WBC Trend:  8.04 11-24-23 @ 07:04  8.40 11-23-23 @ 07:23  7.03 11-22-23 @ 12:43    CMP: 11-24    140  |  106  |  37<H>  ----------------------------<  81  4.5   |  20<L>  |  1.23    Ca    8.7      24 Nov 2023 07:04    TPro  6.1  /  Alb  2.9<L>  /  TBili  0.6  /  DBili  x   /  AST  47<H>  /  ALT  38  /  AlkPhos  130<H>  11-24      LIVER FUNCTIONS - ( 24 Nov 2023 07:04 )  Alb: 2.9 g/dL / Pro: 6.1 g/dL / ALK PHOS: 130 U/L / ALT: 38 U/L / AST: 47 U/L / GGT: x             Urinalysis Basic - ( 24 Nov 2023 07:04 )    Color: x / Appearance: x / SG: x / pH: x  Gluc: 81 mg/dL / Ketone: x  / Bili: x / Urobili: x   Blood: x / Protein: x / Nitrite: x   Leuk Esterase: x / RBC: x / WBC x   Sq Epi: x / Non Sq Epi: x / Bacteria: x      Microbiology: reviewed       Radiology: reviewed     Medications:  acetaminophen     Tablet .. 650 milliGRAM(s) Oral every 6 hours PRN  apixaban 2.5 milliGRAM(s) Oral every 12 hours  artificial  tears Solution 1 Drop(s) Both EYES two times a day  atorvastatin 80 milliGRAM(s) Oral at bedtime  chlorhexidine 2% Cloths 1 Application(s) Topical <User Schedule>  iron sucrose IVPB 300 milliGRAM(s) IV Intermittent every 24 hours  melatonin 3 milliGRAM(s) Oral at bedtime PRN  metoprolol succinate ER 50 milliGRAM(s) Oral daily  mirtazapine 15 milliGRAM(s) Oral at bedtime  pantoprazole    Tablet 40 milliGRAM(s) Oral two times a day  remdesivir  IVPB 100 milliGRAM(s) IV Intermittent every 24 hours  sacubitril 24 mG/valsartan 26 mG 1 Tablet(s) Oral two times a day  sodium chloride 0.45%. 1000 milliLiter(s) IV Continuous <Continuous>  venlafaxine XR. 75 milliGRAM(s) Oral daily  zolpidem 5 milliGRAM(s) Oral at bedtime PRN  zolpidem 5 milliGRAM(s) Oral at bedtime PRN    Antimicrobials:  remdesivir  IVPB 100 milliGRAM(s) IV Intermittent every 24 hours  
Patient is a 89y old  Female who presents with a chief complaint of GI bleed, acute blood loss anemia (01 Dec 2023 14:46)      SUBJECTIVE / OVERNIGHT EVENTS: no new events, awaiting DC today    MEDICATIONS  (STANDING):  apixaban 2.5 milliGRAM(s) Oral every 12 hours  artificial  tears Solution 1 Drop(s) Both EYES two times a day  atorvastatin 80 milliGRAM(s) Oral at bedtime  hydrocortisone 1% Cream 1 Application(s) Topical two times a day  metoprolol succinate ER 75 milliGRAM(s) Oral daily  mirtazapine 15 milliGRAM(s) Oral at bedtime  pantoprazole    Tablet 40 milliGRAM(s) Oral two times a day  triamcinolone 0.1% Ointment 1 Application(s) Topical two times a day  venlafaxine XR. 75 milliGRAM(s) Oral daily    MEDICATIONS  (PRN):  acetaminophen     Tablet .. 650 milliGRAM(s) Oral every 6 hours PRN Temp greater or equal to 38C (100.4F), Mild Pain (1 - 3)  melatonin 3 milliGRAM(s) Oral at bedtime PRN Insomnia  zolpidem 5 milliGRAM(s) Oral at bedtime PRN Insomnia      Vital Signs Last 24 Hrs  T(F): 98.8 (12-01-23 @ 11:54), Max: 98.8 (12-01-23 @ 11:54)  HR: 92 (12-01-23 @ 11:54) (92 - 100)  BP: 112/79 (12-01-23 @ 11:54) (100/61 - 112/79)  RR: 18 (12-01-23 @ 11:54) (18 - 18)  SpO2: 98% (12-01-23 @ 11:54) (95% - 98%)  Telemetry:   CAPILLARY BLOOD GLUCOSE        I&O's Summary    30 Nov 2023 07:01  -  01 Dec 2023 07:00  --------------------------------------------------------  IN: 1040 mL / OUT: 0 mL / NET: 1040 mL    01 Dec 2023 07:01  -  01 Dec 2023 19:28  --------------------------------------------------------  IN: 840 mL / OUT: 400 mL / NET: 440 mL        PHYSICAL EXAM:  GENERAL: NAD, well-developed  HEAD:  Atraumatic, Normocephalic  EYES: EOMI, PERRLA, conjunctiva and sclera clear  NECK: Supple, No JVD  CHEST/LUNG: Clear to auscultation bilaterally; No wheeze  HEART: Regular rate and rhythm; No murmurs, rubs, or gallops  ABDOMEN: Soft, Nontender, Nondistended; Bowel sounds present  EXTREMITIES:  2+ Peripheral Pulses, No clubbing, cyanosis, or edema  PSYCH: AAOx3  NEUROLOGY: non-focal  SKIN: No rashes or lesions    LABS:                        11.5   7.62  )-----------( 348      ( 30 Nov 2023 07:29 )             36.9     11-30    141  |  105  |  36<H>  ----------------------------<  80  4.3   |  23  |  1.40<H>    Ca    8.9      30 Nov 2023 07:28            Urinalysis Basic - ( 30 Nov 2023 07:28 )    Color: x / Appearance: x / SG: x / pH: x  Gluc: 80 mg/dL / Ketone: x  / Bili: x / Urobili: x   Blood: x / Protein: x / Nitrite: x   Leuk Esterase: x / RBC: x / WBC x   Sq Epi: x / Non Sq Epi: x / Bacteria: x        RADIOLOGY & ADDITIONAL TESTS:    Imaging Personally Reviewed:    Consultant(s) Notes Reviewed:      Care Discussed with Consultants/Other Providers:  
Patient is a 89y old  Female who presents with a chief complaint of GI bleed, acute blood loss anemia (26 Nov 2023 18:08)      SUBJECTIVE / OVERNIGHT EVENTS:    Events noted.  Confused  No N/V  No SOB    MEDICATIONS  (STANDING):  apixaban 2.5 milliGRAM(s) Oral every 12 hours  artificial  tears Solution 1 Drop(s) Both EYES two times a day  atorvastatin 80 milliGRAM(s) Oral at bedtime  chlorhexidine 2% Cloths 1 Application(s) Topical <User Schedule>  hydrocortisone 1% Cream 1 Application(s) Topical two times a day  metoprolol succinate ER 50 milliGRAM(s) Oral daily  mirtazapine 15 milliGRAM(s) Oral at bedtime  pantoprazole    Tablet 40 milliGRAM(s) Oral two times a day  sacubitril 24 mG/valsartan 26 mG 1 Tablet(s) Oral two times a day  venlafaxine XR. 75 milliGRAM(s) Oral daily    MEDICATIONS  (PRN):  acetaminophen     Tablet .. 650 milliGRAM(s) Oral every 6 hours PRN Temp greater or equal to 38C (100.4F), Mild Pain (1 - 3)  melatonin 3 milliGRAM(s) Oral at bedtime PRN Insomnia  zolpidem 5 milliGRAM(s) Oral at bedtime PRN Insomnia  zolpidem 5 milliGRAM(s) Oral at bedtime PRN Insomnia        CAPILLARY BLOOD GLUCOSE        I&O's Summary    25 Nov 2023 07:01  -  26 Nov 2023 07:00  --------------------------------------------------------  IN: 840 mL / OUT: 0 mL / NET: 840 mL    26 Nov 2023 07:01  -  26 Nov 2023 23:23  --------------------------------------------------------  IN: 680 mL / OUT: 0 mL / NET: 680 mL        T(C): 36.9 (11-26-23 @ 20:23), Max: 36.9 (11-26-23 @ 20:23)  HR: 80 (11-26-23 @ 20:23) (80 - 113)  BP: 103/65 (11-26-23 @ 20:23) (91/63 - 120/73)  RR: 18 (11-26-23 @ 20:23) (18 - 18)  SpO2: 95% (11-26-23 @ 20:23) (93% - 96%)    PHYSICAL EXAM:    NECK: Supple, No JVD  CHEST/LUNG: Clear to auscultation bilaterally; No wheezing.  HEART: Regular rate and rhythm; No murmurs, rubs, or gallops  ABDOMEN: Soft, Nontender, Nondistended; Bowel sounds present  EXTREMITIES:   No edema  NEUROLOGY:  Awake/Confused      LABS:                        10.3   8.29  )-----------( 387      ( 26 Nov 2023 07:28 )             33.0     11-26    142  |  106  |  26<H>  ----------------------------<  91  4.1   |  20<L>  |  1.15    Ca    9.0      26 Nov 2023 07:28            Urinalysis Basic - ( 26 Nov 2023 07:28 )    Color: x / Appearance: x / SG: x / pH: x  Gluc: 91 mg/dL / Ketone: x  / Bili: x / Urobili: x   Blood: x / Protein: x / Nitrite: x   Leuk Esterase: x / RBC: x / WBC x   Sq Epi: x / Non Sq Epi: x / Bacteria: x      CAPILLARY BLOOD GLUCOSE            RADIOLOGY & ADDITIONAL TESTS:    Imaging Personally Reviewed:    Consultant(s) Notes Reviewed:      Care Discussed with Consultants/Other Providers:    Christian Luis MD, CMD, FACP    [address] [place] [address]  Office Tel: [phone]  Cell: [phone]  
Patient is a 89y old  Female who presents with a chief complaint of GI bleed, acute blood loss anemia (28 Nov 2023 14:10)      SUBJECTIVE / OVERNIGHT EVENTS:    Events noted.  CONSTITUTIONAL: ? Aspiration  RESPIRATORY: No cough, wheezing,  No shortness of breath  CARDIOVASCULAR: No chest pain, palpitations  GASTROINTESTINAL: No abdominal or epigastric pain.      MEDICATIONS  (STANDING):  apixaban 2.5 milliGRAM(s) Oral every 12 hours  artificial  tears Solution 1 Drop(s) Both EYES two times a day  atorvastatin 80 milliGRAM(s) Oral at bedtime  hydrocortisone 1% Cream 1 Application(s) Topical two times a day  metoprolol succinate ER 50 milliGRAM(s) Oral daily  mirtazapine 15 milliGRAM(s) Oral at bedtime  pantoprazole    Tablet 40 milliGRAM(s) Oral two times a day  sacubitril 24 mG/valsartan 26 mG 1 Tablet(s) Oral two times a day  triamcinolone 0.1% Ointment 1 Application(s) Topical two times a day  venlafaxine XR. 75 milliGRAM(s) Oral daily    MEDICATIONS  (PRN):  acetaminophen     Tablet .. 650 milliGRAM(s) Oral every 6 hours PRN Temp greater or equal to 38C (100.4F), Mild Pain (1 - 3)  melatonin 3 milliGRAM(s) Oral at bedtime PRN Insomnia  zolpidem 5 milliGRAM(s) Oral at bedtime PRN Insomnia  zolpidem 5 milliGRAM(s) Oral at bedtime PRN Insomnia        CAPILLARY BLOOD GLUCOSE        I&O's Summary    27 Nov 2023 07:01  -  28 Nov 2023 07:00  --------------------------------------------------------  IN: 1010 mL / OUT: 1150 mL / NET: -140 mL    28 Nov 2023 07:01  -  28 Nov 2023 23:20  --------------------------------------------------------  IN: 100 mL / OUT: 300 mL / NET: -200 mL        T(C): 36.9 (11-28-23 @ 19:20), Max: 36.9 (11-28-23 @ 19:20)  HR: 114 (11-28-23 @ 19:20) (102 - 122)  BP: 100/67 (11-28-23 @ 19:20) (94/60 - 103/66)  RR: 18 (11-28-23 @ 19:20) (18 - 26)  SpO2: 93% (11-28-23 @ 19:20) (88% - 100%)    PHYSICAL EXAM:    NECK: Supple, No JVD  CHEST/LUNG: Clear to auscultation bilaterally; No wheezing.  HEART: Regular rate and rhythm; No murmurs, rubs, or gallops  ABDOMEN: Soft, Nontender, Nondistended; Bowel sounds present  EXTREMITIES:   No edema  NEUROLOGY: AAO       LABS:                  CAPILLARY BLOOD GLUCOSE            RADIOLOGY & ADDITIONAL TESTS:    Imaging Personally Reviewed:    Consultant(s) Notes Reviewed:      Care Discussed with Consultants/Other Providers:    Christian Luis MD, CMD, FACP    [address] [place] [address]  Office Tel: [phone]  Cell: [phone]  
Patient is a 89y old  Female who presents with a chief complaint of GI bleed, acute blood loss anemia (28 Nov 2023 14:10)      SUBJECTIVE / OVERNIGHT EVENTS:    Events noted.  CONSTITUTIONAL: ? Aspiration  RESPIRATORY: No cough, wheezing,  No shortness of breath  CARDIOVASCULAR: No chest pain, palpitations  GASTROINTESTINAL: No abdominal or epigastric pain.      MEDICATIONS  (STANDING):  apixaban 2.5 milliGRAM(s) Oral every 12 hours  artificial  tears Solution 1 Drop(s) Both EYES two times a day  atorvastatin 80 milliGRAM(s) Oral at bedtime  hydrocortisone 1% Cream 1 Application(s) Topical two times a day  metoprolol succinate ER 50 milliGRAM(s) Oral daily  mirtazapine 15 milliGRAM(s) Oral at bedtime  pantoprazole    Tablet 40 milliGRAM(s) Oral two times a day  sacubitril 24 mG/valsartan 26 mG 1 Tablet(s) Oral two times a day  triamcinolone 0.1% Ointment 1 Application(s) Topical two times a day  venlafaxine XR. 75 milliGRAM(s) Oral daily    MEDICATIONS  (PRN):  acetaminophen     Tablet .. 650 milliGRAM(s) Oral every 6 hours PRN Temp greater or equal to 38C (100.4F), Mild Pain (1 - 3)  melatonin 3 milliGRAM(s) Oral at bedtime PRN Insomnia  zolpidem 5 milliGRAM(s) Oral at bedtime PRN Insomnia  zolpidem 5 milliGRAM(s) Oral at bedtime PRN Insomnia        CAPILLARY BLOOD GLUCOSE        I&O's Summary    27 Nov 2023 07:01  -  28 Nov 2023 07:00  --------------------------------------------------------  IN: 1010 mL / OUT: 1150 mL / NET: -140 mL    28 Nov 2023 07:01  -  28 Nov 2023 23:20  --------------------------------------------------------  IN: 100 mL / OUT: 300 mL / NET: -200 mL        T(C): 36.9 (11-28-23 @ 19:20), Max: 36.9 (11-28-23 @ 19:20)  HR: 114 (11-28-23 @ 19:20) (102 - 122)  BP: 100/67 (11-28-23 @ 19:20) (94/60 - 103/66)  RR: 18 (11-28-23 @ 19:20) (18 - 26)  SpO2: 93% (11-28-23 @ 19:20) (88% - 100%)    PHYSICAL EXAM:    NECK: Supple, No JVD  CHEST/LUNG: Clear to auscultation bilaterally; No wheezing.  HEART: Regular rate and rhythm; No murmurs, rubs, or gallops  ABDOMEN: Soft, Nontender, Nondistended; Bowel sounds present  EXTREMITIES:   No edema  NEUROLOGY: AAO       LABS:                  CAPILLARY BLOOD GLUCOSE            RADIOLOGY & ADDITIONAL TESTS:    Imaging Personally Reviewed:    Consultant(s) Notes Reviewed:      Care Discussed with Consultants/Other Providers:    Christian Luis MD, CMD, FACP    [address] [place] [address]  Office Tel: [phone]  Cell: [phone]  
Patient is a 89y old  Female who presents with a chief complaint of GI bleed, acute blood loss anemia (30 Nov 2023 10:35)      SUBJECTIVE / OVERNIGHT EVENTS: ptn feels well    MEDICATIONS  (STANDING):  apixaban 2.5 milliGRAM(s) Oral every 12 hours  artificial  tears Solution 1 Drop(s) Both EYES two times a day  atorvastatin 80 milliGRAM(s) Oral at bedtime  hydrocortisone 1% Cream 1 Application(s) Topical two times a day  metoprolol succinate ER 75 milliGRAM(s) Oral daily  mirtazapine 15 milliGRAM(s) Oral at bedtime  pantoprazole    Tablet 40 milliGRAM(s) Oral two times a day  triamcinolone 0.1% Ointment 1 Application(s) Topical two times a day  venlafaxine XR. 75 milliGRAM(s) Oral daily    MEDICATIONS  (PRN):  acetaminophen     Tablet .. 650 milliGRAM(s) Oral every 6 hours PRN Temp greater or equal to 38C (100.4F), Mild Pain (1 - 3)  melatonin 3 milliGRAM(s) Oral at bedtime PRN Insomnia  zolpidem 5 milliGRAM(s) Oral at bedtime PRN Insomnia  zolpidem 5 milliGRAM(s) Oral once PRN Insomnia      Vital Signs Last 24 Hrs  T(F): 97.8 (11-30-23 @ 20:05), Max: 97.8 (11-30-23 @ 05:12)  HR: 100 (11-30-23 @ 20:05) (96 - 104)  BP: 100/61 (11-30-23 @ 20:05) (97/61 - 109/80)  RR: 18 (11-30-23 @ 20:05) (18 - 18)  SpO2: 95% (11-30-23 @ 20:05) (95% - 95%)  Telemetry:   CAPILLARY BLOOD GLUCOSE        I&O's Summary    29 Nov 2023 07:01  -  30 Nov 2023 07:00  --------------------------------------------------------  IN: 680 mL / OUT: 0 mL / NET: 680 mL    30 Nov 2023 07:01  -  30 Nov 2023 22:47  --------------------------------------------------------  IN: 680 mL / OUT: 0 mL / NET: 680 mL        PHYSICAL EXAM:  GENERAL: NAD, well-developed  HEAD:  Atraumatic, Normocephalic  EYES: EOMI, PERRLA, conjunctiva and sclera clear  NECK: Supple, No JVD  CHEST/LUNG: Clear to auscultation bilaterally; No wheeze  HEART: Regular rate and rhythm; No murmurs, rubs, or gallops  ABDOMEN: Soft, Nontender, Nondistended; Bowel sounds present  EXTREMITIES:  2+ Peripheral Pulses, No clubbing, cyanosis, or edema  PSYCH: AAOx3  NEUROLOGY: non-focal  SKIN: No rashes or lesions    LABS:                        11.5   7.62  )-----------( 348      ( 30 Nov 2023 07:29 )             36.9     11-30    141  |  105  |  36<H>  ----------------------------<  80  4.3   |  23  |  1.40<H>    Ca    8.9      30 Nov 2023 07:28    TPro  6.7  /  Alb  3.3  /  TBili  1.3<H>  /  DBili  x   /  AST  39  /  ALT  27  /  AlkPhos  115  11-29    PT/INR - ( 29 Nov 2023 11:39 )   PT: 21.1 sec;   INR: 1.96 ratio         PTT - ( 29 Nov 2023 11:39 )  PTT:37.6 sec      Urinalysis Basic - ( 30 Nov 2023 07:28 )    Color: x / Appearance: x / SG: x / pH: x  Gluc: 80 mg/dL / Ketone: x  / Bili: x / Urobili: x   Blood: x / Protein: x / Nitrite: x   Leuk Esterase: x / RBC: x / WBC x   Sq Epi: x / Non Sq Epi: x / Bacteria: x        RADIOLOGY & ADDITIONAL TESTS:    Imaging Personally Reviewed:    Consultant(s) Notes Reviewed:      Care Discussed with Consultants/Other Providers:  
        Interval Events:   no GI Bleeding         acetaminophen     Tablet .. 650 milliGRAM(s) Oral every 6 hours PRN  apixaban 2.5 milliGRAM(s) Oral every 12 hours  artificial  tears Solution 1 Drop(s) Both EYES two times a day  atorvastatin 80 milliGRAM(s) Oral at bedtime  chlorhexidine 2% Cloths 1 Application(s) Topical <User Schedule>  hydrocortisone 1% Cream 1 Application(s) Topical two times a day  iron sucrose IVPB 300 milliGRAM(s) IV Intermittent every 24 hours  melatonin 3 milliGRAM(s) Oral at bedtime PRN  metoprolol succinate ER 50 milliGRAM(s) Oral daily  mirtazapine 15 milliGRAM(s) Oral at bedtime  pantoprazole    Tablet 40 milliGRAM(s) Oral two times a day  sacubitril 24 mG/valsartan 26 mG 1 Tablet(s) Oral two times a day  sodium chloride 0.45%. 1000 milliLiter(s) IV Continuous <Continuous>  venlafaxine XR. 75 milliGRAM(s) Oral daily  zolpidem 5 milliGRAM(s) Oral at bedtime PRN  zolpidem 5 milliGRAM(s) Oral at bedtime PRN                            11.4   8.04  )-----------( 370      ( 24 Nov 2023 07:04 )             35.5       Hemoglobin: 11.4 g/dL (11-24 @ 07:04)  Hemoglobin: 10.1 g/dL (11-23 @ 07:23)  Hemoglobin: 8.1 g/dL (11-22 @ 12:43)      11-24    140  |  106  |  37<H>  ----------------------------<  81  4.5   |  20<L>  |  1.23    Ca    8.7      24 Nov 2023 07:04    TPro  6.1  /  Alb  2.9<L>  /  TBili  0.6  /  DBili  x   /  AST  47<H>  /  ALT  38  /  AlkPhos  130<H>  11-24    Creatinine Trend: 1.23<--, 1.11<--, 1.33<--    COAGS:           T(C): 36.9 (11-25-23 @ 04:18), Max: 36.9 (11-25-23 @ 04:18)  HR: 96 (11-25-23 @ 04:18) (86 - 99)  BP: 100/57 (11-25-23 @ 04:18) (100/57 - 115/78)  RR: 19 (11-25-23 @ 04:18) (18 - 19)  SpO2: 95% (11-25-23 @ 04:18) (93% - 95%)  Wt(kg): --    I&O's Summary    24 Nov 2023 07:01  -  25 Nov 2023 07:00  --------------------------------------------------------  IN: 360 mL / OUT: 400 mL / NET: -40 mL        Review of Systems:  General:  No wt loss, fevers, chills, night sweats, fatigue,   Eyes:  Good vision, no reported pain  ENT:  No sore throat, pain, runny nose, dysphagia  CV:  No pain, palpitations, hypo/hypertension  Resp:  No dyspnea, cough, tachypnea, wheezing  GI:  See HPI  :  No pain, bleeding, incontinence, nocturia  Muscle:  No pain, weakness  Neuro:  No weakness, tingling, memory problems  Psych:  No fatigue, insomnia, mood problems, depression  Endocrine:  No polyuria, polydipsia, cold/heat intolerance  Heme:  No petechiae, ecchymosis, easy bruisability  Integumentary:  No rash, edema        PHYSICAL EXAM:     GENERAL:  Appears stated age, well-groomed, well-nourished, no distress  HEENT:  NC/AT,  conjunctivae anicteric, clear and pink,   NECK: supple, trachea midline  CHEST:  Full & symmetric excursion, no increased effort, breath sounds clear  HEART:  Regular rhythm, no JVD  ABDOMEN:  Soft, non-tender, non-distended, normoactive bowel sounds,  no masses , no hepatosplenomegaly  EXTREMITIES:  no cyanosis,clubbing or edema  SKIN:  No rash, erythema, or, ecchymoses, no jaundice  NEURO:  Alert, non-focal, no asterixis  PSYCH: Appropriate affect, oriented to place and time  RECTAL: Deferred               
  Chief Complaint:  Patient is a 89y old  Female who presents with a chief complaint of GI bleed, acute blood loss anemia (23 Nov 2023 09:26)      Date of service 11-23-23 @ 09:45      Interval Events:   no GI Bleeding    Hospital Medications:  acetaminophen     Tablet .. 650 milliGRAM(s) Oral every 6 hours PRN  artificial  tears Solution 1 Drop(s) Both EYES two times a day  atorvastatin 80 milliGRAM(s) Oral at bedtime  chlorhexidine 2% Cloths 1 Application(s) Topical <User Schedule>  melatonin 3 milliGRAM(s) Oral at bedtime PRN  metoprolol succinate ER 50 milliGRAM(s) Oral daily  mirtazapine 15 milliGRAM(s) Oral at bedtime  pantoprazole    Tablet 40 milliGRAM(s) Oral two times a day  remdesivir  IVPB 100 milliGRAM(s) IV Intermittent every 24 hours  sacubitril 24 mG/valsartan 26 mG 1 Tablet(s) Oral two times a day  sodium chloride 0.45%. 1000 milliLiter(s) IV Continuous <Continuous>  venlafaxine XR. 75 milliGRAM(s) Oral daily  zolpidem 5 milliGRAM(s) Oral at bedtime PRN  zolpidem 5 milliGRAM(s) Oral at bedtime PRN        Review of Systems:  General:  No wt loss, fevers, chills, night sweats, fatigue,   Eyes:  Good vision, no reported pain  ENT:  No sore throat, pain, runny nose, dysphagia  CV:  No pain, palpitations, hypo/hypertension  Resp:  No dyspnea, cough, tachypnea, wheezing  GI:  See HPI  :  No pain, bleeding, incontinence, nocturia  Muscle:  No pain, weakness  Neuro:  No weakness, tingling, memory problems  Psych:  No fatigue, insomnia, mood problems, depression  Endocrine:  No polyuria, polydipsia, cold/heat intolerance  Heme:  No petechiae, ecchymosis, easy bruisability  Integumentary:  No rash, edema    PHYSICAL EXAM:   Vital Signs:  Vital Signs Last 24 Hrs  T(C): 36.6 (23 Nov 2023 07:38), Max: 36.9 (23 Nov 2023 05:45)  T(F): 97.8 (23 Nov 2023 07:38), Max: 98.4 (23 Nov 2023 05:45)  HR: 96 (23 Nov 2023 07:38) (65 - 97)  BP: 112/73 (23 Nov 2023 07:38) (100/68 - 158/94)  BP(mean): 90 (22 Nov 2023 14:26) (90 - 90)  RR: 18 (23 Nov 2023 07:38) (12 - 22)  SpO2: 100% (23 Nov 2023 07:38) (91% - 100%)    Parameters below as of 23 Nov 2023 07:38  Patient On (Oxygen Delivery Method): nasal cannula  O2 Flow (L/min): 2    Daily Height in cm: 162.56 (22 Nov 2023 12:10)    Daily       PHYSICAL EXAM:     GENERAL:  Appears stated age, well-groomed, well-nourished, no distress  HEENT:  NC/AT,  conjunctivae anicteric, clear and pink,   NECK: supple, trachea midline  CHEST:  Full & symmetric excursion, no increased effort, breath sounds clear  HEART:  Regular rhythm, no JVD  ABDOMEN:  Soft, non-tender, non-distended, normoactive bowel sounds,  no masses , no hepatosplenomegaly  EXTREMITIES:  no cyanosis,clubbing or edema  SKIN:  No rash, erythema, or, ecchymoses, no jaundice  NEURO:  Alert, non-focal, no asterixis  PSYCH: Appropriate affect, oriented to place and time  RECTAL: Deferred      LABS Personally reviewed by me:                        10.1   8.40  )-----------( 348      ( 23 Nov 2023 07:23 )             32.7     Mean Cell Volume: 94.8 fl (11-23-23 @ 07:23)    11-23    138  |  103  |  35<H>  ----------------------------<  73  4.6   |  22  |  1.11    Ca    8.5      23 Nov 2023 07:23    TPro  6.2  /  Alb  3.2<L>  /  TBili  0.4  /  DBili  x   /  AST  57<H>  /  ALT  47<H>  /  AlkPhos  136<H>  11-22    LIVER FUNCTIONS - ( 22 Nov 2023 12:43 )  Alb: 3.2 g/dL / Pro: 6.2 g/dL / ALK PHOS: 136 U/L / ALT: 47 U/L / AST: 57 U/L / GGT: x           PT/INR - ( 22 Nov 2023 12:43 )   PT: 16.1 sec;   INR: 1.55 ratio         PTT - ( 22 Nov 2023 12:43 )  PTT:30.0 sec  Urinalysis Basic - ( 23 Nov 2023 07:23 )    Color: x / Appearance: x / SG: x / pH: x  Gluc: 73 mg/dL / Ketone: x  / Bili: x / Urobili: x   Blood: x / Protein: x / Nitrite: x   Leuk Esterase: x / RBC: x / WBC x   Sq Epi: x / Non Sq Epi: x / Bacteria: x                              10.1   8.40  )-----------( 348      ( 23 Nov 2023 07:23 )             32.7                         8.1    7.03  )-----------( 363      ( 22 Nov 2023 12:43 )             25.8       Imaging personally reviewed by me:          
  Chief Complaint:  Patient is a 89y old  Female who presents with a chief complaint of GI bleed, acute blood loss anemia (23 Nov 2023 09:26)      Date of service 11-23-23 @ 09:45      Interval Events:   no GI Bleeding    Hospital Medications:  acetaminophen     Tablet .. 650 milliGRAM(s) Oral every 6 hours PRN  artificial  tears Solution 1 Drop(s) Both EYES two times a day  atorvastatin 80 milliGRAM(s) Oral at bedtime  chlorhexidine 2% Cloths 1 Application(s) Topical <User Schedule>  melatonin 3 milliGRAM(s) Oral at bedtime PRN  metoprolol succinate ER 50 milliGRAM(s) Oral daily  mirtazapine 15 milliGRAM(s) Oral at bedtime  pantoprazole    Tablet 40 milliGRAM(s) Oral two times a day  remdesivir  IVPB 100 milliGRAM(s) IV Intermittent every 24 hours  sacubitril 24 mG/valsartan 26 mG 1 Tablet(s) Oral two times a day  sodium chloride 0.45%. 1000 milliLiter(s) IV Continuous <Continuous>  venlafaxine XR. 75 milliGRAM(s) Oral daily  zolpidem 5 milliGRAM(s) Oral at bedtime PRN  zolpidem 5 milliGRAM(s) Oral at bedtime PRN        Review of Systems:  General:  No wt loss, fevers, chills, night sweats, fatigue,   Eyes:  Good vision, no reported pain  ENT:  No sore throat, pain, runny nose, dysphagia  CV:  No pain, palpitations, hypo/hypertension  Resp:  No dyspnea, cough, tachypnea, wheezing  GI:  See HPI  :  No pain, bleeding, incontinence, nocturia  Muscle:  No pain, weakness  Neuro:  No weakness, tingling, memory problems  Psych:  No fatigue, insomnia, mood problems, depression  Endocrine:  No polyuria, polydipsia, cold/heat intolerance  Heme:  No petechiae, ecchymosis, easy bruisability  Integumentary:  No rash, edema    PHYSICAL EXAM:   Vital Signs:  Vital Signs Last 24 Hrs  T(C): 36.6 (23 Nov 2023 07:38), Max: 36.9 (23 Nov 2023 05:45)  T(F): 97.8 (23 Nov 2023 07:38), Max: 98.4 (23 Nov 2023 05:45)  HR: 96 (23 Nov 2023 07:38) (65 - 97)  BP: 112/73 (23 Nov 2023 07:38) (100/68 - 158/94)  BP(mean): 90 (22 Nov 2023 14:26) (90 - 90)  RR: 18 (23 Nov 2023 07:38) (12 - 22)  SpO2: 100% (23 Nov 2023 07:38) (91% - 100%)    Parameters below as of 23 Nov 2023 07:38  Patient On (Oxygen Delivery Method): nasal cannula  O2 Flow (L/min): 2    Daily Height in cm: 162.56 (22 Nov 2023 12:10)    Daily       PHYSICAL EXAM:     GENERAL:  Appears stated age, well-groomed, well-nourished, no distress  HEENT:  NC/AT,  conjunctivae anicteric, clear and pink,   NECK: supple, trachea midline  CHEST:  Full & symmetric excursion, no increased effort, breath sounds clear  HEART:  Regular rhythm, no JVD  ABDOMEN:  Soft, non-tender, non-distended, normoactive bowel sounds,  no masses , no hepatosplenomegaly  EXTREMITIES:  no cyanosis,clubbing or edema  SKIN:  No rash, erythema, or, ecchymoses, no jaundice  NEURO:  Alert, non-focal, no asterixis  PSYCH: Appropriate affect, oriented to place and time  RECTAL: Deferred      LABS Personally reviewed by me:                        10.1   8.40  )-----------( 348      ( 23 Nov 2023 07:23 )             32.7     Mean Cell Volume: 94.8 fl (11-23-23 @ 07:23)    11-23    138  |  103  |  35<H>  ----------------------------<  73  4.6   |  22  |  1.11    Ca    8.5      23 Nov 2023 07:23    TPro  6.2  /  Alb  3.2<L>  /  TBili  0.4  /  DBili  x   /  AST  57<H>  /  ALT  47<H>  /  AlkPhos  136<H>  11-22    LIVER FUNCTIONS - ( 22 Nov 2023 12:43 )  Alb: 3.2 g/dL / Pro: 6.2 g/dL / ALK PHOS: 136 U/L / ALT: 47 U/L / AST: 57 U/L / GGT: x           PT/INR - ( 22 Nov 2023 12:43 )   PT: 16.1 sec;   INR: 1.55 ratio         PTT - ( 22 Nov 2023 12:43 )  PTT:30.0 sec  Urinalysis Basic - ( 23 Nov 2023 07:23 )    Color: x / Appearance: x / SG: x / pH: x  Gluc: 73 mg/dL / Ketone: x  / Bili: x / Urobili: x   Blood: x / Protein: x / Nitrite: x   Leuk Esterase: x / RBC: x / WBC x   Sq Epi: x / Non Sq Epi: x / Bacteria: x                              10.1   8.40  )-----------( 348      ( 23 Nov 2023 07:23 )             32.7                         8.1    7.03  )-----------( 363      ( 22 Nov 2023 12:43 )             25.8       Imaging personally reviewed by me:          
CARDIOLOGY FOLLOW UP - Dr. Mccartney  DATE OF SERVICE: 11/29/23    CC  No acute cv events     REVIEW OF SYSTEMS:  Unable to obtain     PHYSICAL EXAM:  T(C): 36.7 (11-29-23 @ 08:07), Max: 36.9 (11-28-23 @ 19:20)  HR: 107 (11-29-23 @ 08:07) (106 - 114)  BP: 100/65 (11-29-23 @ 08:07) (100/65 - 112/76)  RR: 18 (11-29-23 @ 08:07) (18 - 20)  SpO2: 94% (11-29-23 @ 08:07) (93% - 100%)  Wt(kg): --  I&O's Summary    28 Nov 2023 07:01  -  29 Nov 2023 07:00  --------------------------------------------------------  IN: 100 mL / OUT: 300 mL / NET: -200 mL        Appearance: Elderly female, lethargic	  Cardiovascular: Normal S1 S2,RRR, No JVD, No murmurs  Respiratory: Rhonchi anteriorly L>R  Gastrointestinal:  Soft, Non-tender, + BS	  Extremities: Normal range of motion, No clubbing, cyanosis or edema      Home Medications:  acetaminophen 325 mg oral tablet: 2 tab(s) orally every 6 hours, As needed, Temp greater or equal to 38C (100.4F), Mild Pain (1 - 3) (22 Nov 2023 14:42)  Artificial Tears ophthalmic solution: 1 drop(s) in each eye once a day (22 Nov 2023 14:45)  atorvastatin 80 mg oral tablet: 1 tab(s) orally once a day (at bedtime) (22 Nov 2023 14:42)  melatonin 3 mg oral tablet: 1 tab(s) orally once a day (at bedtime) As needed Insomnia (22 Nov 2023 14:42)  Metoprolol Succinate ER 50 mg oral tablet, extended release: 1 tab(s) orally every 12 hours , hold for SBP &lt;100 or HR &lt;55 (22 Nov 2023 14:45)  mirtazapine 15 mg oral tablet: 1 tab(s) orally once a day (at bedtime) (22 Nov 2023 14:45)  pantoprazole 40 mg oral delayed release tablet: 1 tab(s) orally 2 times a day (22 Nov 2023 14:38)  polyethylene glycol 3350 oral powder for reconstitution: 17 gram(s) orally once a day as needed for  constipation (22 Nov 2023 14:46)  sacubitril-valsartan 24 mg-26 mg oral tablet: 1 tab(s) orally 2 times a day (22 Nov 2023 14:42)  venlafaxine 75 mg oral capsule, extended release: 1 cap(s) orally once a day (22 Nov 2023 14:42)  Vitamin D3 1250 mcg (50,000 intl units) oral capsule: 1 cap(s) orally once a week (22 Nov 2023 14:42)  Zofran 4 mg oral tablet: 1 tab(s) orally every 8 hours as needed for  nausea x 7 days (22 Nov 2023 14:45)  zolpidem 10 mg oral tablet: 1 tab(s) orally once a day (at bedtime) (22 Nov 2023 14:45)      MEDICATIONS  (STANDING):  apixaban 2.5 milliGRAM(s) Oral every 12 hours  artificial  tears Solution 1 Drop(s) Both EYES two times a day  atorvastatin 80 milliGRAM(s) Oral at bedtime  hydrocortisone 1% Cream 1 Application(s) Topical two times a day  metoprolol succinate ER 50 milliGRAM(s) Oral daily  mirtazapine 15 milliGRAM(s) Oral at bedtime  pantoprazole    Tablet 40 milliGRAM(s) Oral two times a day  sacubitril 24 mG/valsartan 26 mG 1 Tablet(s) Oral two times a day  triamcinolone 0.1% Ointment 1 Application(s) Topical two times a day  venlafaxine XR. 75 milliGRAM(s) Oral daily      TELEMETRY: 	    ECG:  	  RADIOLOGY:   DIAGNOSTIC TESTING:  [ ] Echocardiogram:  [ ]  Catheterization:  [ ] Stress Test:    OTHER: 	    LABS:	 	                      
CARDIOLOGY FOLLOW UP - Dr. Mccartney  DATE OF SERVICE: 11/30/23    CC  No acute CV events    REVIEW OF SYSTEMS:  CONSTITUTIONAL: No fever, weight loss, or fatigue  RESPIRATORY: No cough, wheezing, chills or hemoptysis; No Shortness of Breath  CARDIOVASCULAR: No chest pain, palpitations, passing out, dizziness, or leg swelling  GASTROINTESTINAL: No abdominal or epigastric pain. No nausea, vomiting, or hematemesis; No diarrhea or constipation. No melena or hematochezia.  VASCULAR: No edema     PHYSICAL EXAM:  T(C): 36.6 (11-30-23 @ 05:12), Max: 36.9 (11-29-23 @ 11:58)  HR: 104 (11-30-23 @ 05:12) (97 - 107)  BP: 109/80 (11-30-23 @ 05:12) (100/65 - 122/87)  RR: 18 (11-30-23 @ 05:12) (18 - 18)  SpO2: 95% (11-30-23 @ 05:12) (94% - 98%)  Wt(kg): --  I&O's Summary    29 Nov 2023 07:01  -  30 Nov 2023 07:00  --------------------------------------------------------  IN: 680 mL / OUT: 0 mL / NET: 680 mL        Appearance: Elderly female 	  Cardiovascular: Normal S1 S2,RRR, No JVD, No murmurs  Respiratory: Lungs clear to auscultation b/l   Gastrointestinal:  Soft, Non-tender, + BS	  Extremities: Normal range of motion, No clubbing, cyanosis or edema      Home Medications:  acetaminophen 325 mg oral tablet: 2 tab(s) orally every 6 hours, As needed, Temp greater or equal to 38C (100.4F), Mild Pain (1 - 3) (22 Nov 2023 14:42)  Artificial Tears ophthalmic solution: 1 drop(s) in each eye once a day (22 Nov 2023 14:45)  atorvastatin 80 mg oral tablet: 1 tab(s) orally once a day (at bedtime) (22 Nov 2023 14:42)  melatonin 3 mg oral tablet: 1 tab(s) orally once a day (at bedtime) As needed Insomnia (22 Nov 2023 14:42)  Metoprolol Succinate ER 50 mg oral tablet, extended release: 1 tab(s) orally every 12 hours , hold for SBP &lt;100 or HR &lt;55 (22 Nov 2023 14:45)  mirtazapine 15 mg oral tablet: 1 tab(s) orally once a day (at bedtime) (22 Nov 2023 14:45)  pantoprazole 40 mg oral delayed release tablet: 1 tab(s) orally 2 times a day (22 Nov 2023 14:38)  polyethylene glycol 3350 oral powder for reconstitution: 17 gram(s) orally once a day as needed for  constipation (22 Nov 2023 14:46)  sacubitril-valsartan 24 mg-26 mg oral tablet: 1 tab(s) orally 2 times a day (22 Nov 2023 14:42)  venlafaxine 75 mg oral capsule, extended release: 1 cap(s) orally once a day (22 Nov 2023 14:42)  Vitamin D3 1250 mcg (50,000 intl units) oral capsule: 1 cap(s) orally once a week (22 Nov 2023 14:42)  Zofran 4 mg oral tablet: 1 tab(s) orally every 8 hours as needed for  nausea x 7 days (22 Nov 2023 14:45)  zolpidem 10 mg oral tablet: 1 tab(s) orally once a day (at bedtime) (22 Nov 2023 14:45)      MEDICATIONS  (STANDING):  apixaban 2.5 milliGRAM(s) Oral every 12 hours  artificial  tears Solution 1 Drop(s) Both EYES two times a day  atorvastatin 80 milliGRAM(s) Oral at bedtime  hydrocortisone 1% Cream 1 Application(s) Topical two times a day  metoprolol succinate ER 75 milliGRAM(s) Oral daily  mirtazapine 15 milliGRAM(s) Oral at bedtime  pantoprazole    Tablet 40 milliGRAM(s) Oral two times a day  triamcinolone 0.1% Ointment 1 Application(s) Topical two times a day  venlafaxine XR. 75 milliGRAM(s) Oral daily      TELEMETRY: 	    ECG:  	  RADIOLOGY:   < from: Xray Chest 1 View- PORTABLE-Routine (Xray Chest 1 View- PORTABLE-Routine .) (11.28.23 @ 15:40) >  IMPRESSION:  New, small bilateral pleural effusions.  Left basilar atelectasis and/or pneumonia.    --- End of Report ---    < end of copied text >    DIAGNOSTIC TESTING:  [ ] Echocardiogram:  [ ]  Catheterization:  [ ] Stress Test:    OTHER: 	    LABS:	 	                            11.5   7.62  )-----------( 348      ( 30 Nov 2023 07:29 )             36.9     11-30    141  |  105  |  36<H>  ----------------------------<  80  4.3   |  23  |  1.40<H>    Ca    8.9      30 Nov 2023 07:28    TPro  6.7  /  Alb  3.3  /  TBili  1.3<H>  /  DBili  x   /  AST  39  /  ALT  27  /  AlkPhos  115  11-29    PT/INR - ( 29 Nov 2023 11:39 )   PT: 21.1 sec;   INR: 1.96 ratio         PTT - ( 29 Nov 2023 11:39 )  PTT:37.6 sec        
Interval Events:       no GI Bleeding         acetaminophen     Tablet .. 650 milliGRAM(s) Oral every 6 hours PRN  apixaban 2.5 milliGRAM(s) Oral every 12 hours  artificial  tears Solution 1 Drop(s) Both EYES two times a day  atorvastatin 80 milliGRAM(s) Oral at bedtime  chlorhexidine 2% Cloths 1 Application(s) Topical <User Schedule>  hydrocortisone 1% Cream 1 Application(s) Topical two times a day  iron sucrose IVPB 300 milliGRAM(s) IV Intermittent every 24 hours  melatonin 3 milliGRAM(s) Oral at bedtime PRN  metoprolol succinate ER 50 milliGRAM(s) Oral daily  mirtazapine 15 milliGRAM(s) Oral at bedtime  pantoprazole    Tablet 40 milliGRAM(s) Oral two times a day  sacubitril 24 mG/valsartan 26 mG 1 Tablet(s) Oral two times a day  sodium chloride 0.45%. 1000 milliLiter(s) IV Continuous <Continuous>  venlafaxine XR. 75 milliGRAM(s) Oral daily  zolpidem 5 milliGRAM(s) Oral at bedtime PRN  zolpidem 5 milliGRAM(s) Oral at bedtime PRN    LABS:                        11.5   7.62  )-----------( 348      ( 30 Nov 2023 07:29 )             36.9     11-30    141  |  105  |  36<H>  ----------------------------<  80  4.3   |  23  |  1.40<H>    Ca    8.9      30 Nov 2023 07:28    TPro  6.7  /  Alb  3.3  /  TBili  1.3<H>  /  DBili  x   /  AST  39  /  ALT  27  /  AlkPhos  115  11-29    PT/INR - ( 29 Nov 2023 11:39 )   PT: 21.1 sec;   INR: 1.96 ratio         PTT - ( 29 Nov 2023 11:39 )  PTT:37.6 sec  Urinalysis Basic - ( 30 Nov 2023 07:28 )    Color: x / Appearance: x / SG: x / pH: x  Gluc: 80 mg/dL / Ketone: x  / Bili: x / Urobili: x   Blood: x / Protein: x / Nitrite: x   Leuk Esterase: x / RBC: x / WBC x   Sq Epi: x / Non Sq Epi: x / Bacteria: x          Vital Signs:   Vital Signs Last 24 Hrs  T(C): 36.6 (30 Nov 2023 05:12), Max: 36.9 (29 Nov 2023 11:58)  T(F): 97.8 (30 Nov 2023 05:12), Max: 98.5 (29 Nov 2023 11:58)  HR: 104 (30 Nov 2023 05:12) (97 - 107)  BP: 109/80 (30 Nov 2023 05:12) (100/65 - 122/87)  BP(mean): --  RR: 18 (30 Nov 2023 05:12) (18 - 18)  SpO2: 95% (30 Nov 2023 05:12) (94% - 98%)    Parameters below as of 30 Nov 2023 05:12  Patient On (Oxygen Delivery Method): room air      Daily     Daily         Review of Systems:  General:  No wt loss, fevers, chills, night sweats, fatigue,   Eyes:  Good vision, no reported pain  ENT:  No sore throat, pain, runny nose, dysphagia  CV:  No pain, palpitations, hypo/hypertension  Resp:  No dyspnea, cough, tachypnea, wheezing  GI:  See HPI  :  No pain, bleeding, incontinence, nocturia  Muscle:  No pain, weakness  Neuro:  No weakness, tingling, memory problems  Psych:  No fatigue, insomnia, mood problems, depression  Endocrine:  No polyuria, polydipsia, cold/heat intolerance  Heme:  No petechiae, ecchymosis, easy bruisability  Integumentary:  No rash, edema        PHYSICAL EXAM:     GENERAL:  Appears stated age, well-groomed, well-nourished, no distress  HEENT:  NC/AT,  conjunctivae anicteric, clear and pink,   NECK: supple, trachea midline  CHEST:  Full & symmetric excursion, no increased effort, breath sounds clear  HEART:  Regular rhythm, no JVD  ABDOMEN:  Soft, non-tender, non-distended, normoactive bowel sounds,  no masses , no hepatosplenomegaly  EXTREMITIES:  no cyanosis,clubbing or edema  SKIN:  No rash, erythema, or, ecchymoses, no jaundice  NEURO:  Alert, non-focal, no asterixis  PSYCH: Appropriate affect, oriented to place and time  RECTAL: Deferred               
Neurology        S: patient seen. on covid precuations         Medications: MEDICATIONS  (STANDING):  apixaban 2.5 milliGRAM(s) Oral every 12 hours  artificial  tears Solution 1 Drop(s) Both EYES two times a day  atorvastatin 80 milliGRAM(s) Oral at bedtime  chlorhexidine 2% Cloths 1 Application(s) Topical <User Schedule>  hydrocortisone 1% Cream 1 Application(s) Topical two times a day  metoprolol succinate ER 50 milliGRAM(s) Oral daily  mirtazapine 15 milliGRAM(s) Oral at bedtime  pantoprazole    Tablet 40 milliGRAM(s) Oral two times a day  sacubitril 24 mG/valsartan 26 mG 1 Tablet(s) Oral two times a day  venlafaxine XR. 75 milliGRAM(s) Oral daily    MEDICATIONS  (PRN):  acetaminophen     Tablet .. 650 milliGRAM(s) Oral every 6 hours PRN Temp greater or equal to 38C (100.4F), Mild Pain (1 - 3)  melatonin 3 milliGRAM(s) Oral at bedtime PRN Insomnia  zolpidem 5 milliGRAM(s) Oral at bedtime PRN Insomnia  zolpidem 5 milliGRAM(s) Oral at bedtime PRN Insomnia       Vitals:  Vital Signs Last 24 Hrs  T(C): 36.5 (27 Nov 2023 05:02), Max: 36.9 (26 Nov 2023 20:23)  T(F): 97.7 (27 Nov 2023 05:02), Max: 98.5 (26 Nov 2023 20:23)  HR: 103 (27 Nov 2023 05:02) (80 - 103)  BP: 113/76 (27 Nov 2023 05:02) (91/63 - 113/76)  BP(mean): --  RR: 18 (27 Nov 2023 05:02) (18 - 18)  SpO2: 95% (27 Nov 2023 05:02) (95% - 96%)    Parameters below as of 27 Nov 2023 05:02  Patient On (Oxygen Delivery Method): room air                General Exam:   General Appearance: Appropriately dressed and in no acute distress       Head: Normocephalic, atraumatic and no dysmorphic features  Ear, Nose, and Throat: Moist mucous membranes  CVS: S1S2+  Resp: No SOB, no wheeze or rhonchi  GI: soft NT/ND  Extremities: No edema or cyanosis  Skin: No bruises or rashes     Neurological Exam:  Mental Status: Awake, alert and oriented x 2.  Able to follow simple and complex verbal commands. Able to name and repeat. fluent speech. No obvious aphasia or dysarthria noted.   Cranial Nerves: PERRL, EOMI, VFFC, sensation V1-V3 intact,  no obvious facial asymmetry, equal elevation of palate, scm/trap 5/5, tongue is midline on protrusion. no obvious papilledema on fundoscopic exam. hearing is grossly intact.   Motor: Normal bulk, tone and strength throughout. mild L HP from stroke moving uppers > lowers   Sensation: Intact to light touch and pinprick throughout. no right/left confusion. no extinction to tactile on DSS.   Reflexes: 1+ throughout at biceps, brachioradialis, triceps, patellars and ankles bilaterally and equal. No clonus. R toe and L toe were both downgoing.  Coordination: No dysmetria on FNF    Gait: unable     Data/Labs/Imaging which I personally reviewed.     Labs:     LABS:                          10.3   8.29  )-----------( 387      ( 26 Nov 2023 07:28 )             33.0     11-26    142  |  106  |  26<H>  ----------------------------<  91  4.1   |  20<L>  |  1.15    Ca    9.0      26 Nov 2023 07:28          Urinalysis Basic - ( 26 Nov 2023 07:28 )    Color: x / Appearance: x / SG: x / pH: x  Gluc: 91 mg/dL / Ketone: x  / Bili: x / Urobili: x   Blood: x / Protein: x / Nitrite: x   Leuk Esterase: x / RBC: x / WBC x   Sq Epi: x / Non Sq Epi: x / Bacteria: x          < from: MR Head No Cont (10.18.23 @ 14:33) >    ACC: [id number] EXAM:  MR BRAIN   ORDERED BY: WINSOME MARTIN     PROCEDURE DATE:  10/18/2023          INTERPRETATION:  CLINICAL INDICATION: Fall on Xarelto      Magnetic resonance imaging of the brain was carried out with transaxial   SPGR, FLAIR, fast spin echo T2 weighted images, axial susceptibility   weighted series, diffusion weighted series and sagittal T1 weighted   series on a 1.5 Payton magnet.    Comparison is made with the prior CT/CTA of 10/17/2023.    Ventricular and sulcal prominence is consistent with moderate atrophy.   There are bilateral old cerebellar infarcts. There are scattered infarcts   identified in the supratentorial region there is an infarct in the left   occipital subcortical white matter, left posterior temporalcortex the   right frontal cortex, the right centrum semiovale and the right medial   frontal cortex adjacent to the anterior body of the right lateral   ventricle. Bilaterality cysts suggestive of cardioembolic source. There   is no acute hemorrhage. There is a focus of hemosiderin adjacent to the   lateral border of the atrium of the right lateral ventricle.    The sellar and parasellar structures are unremarkable.    The paranasal sinuses are clear. There has been previous bilateral lens   replacement surgery. Ligamentous hypertrophy at the C1-2 level likely   related to degenerative changes.    IMPRESSION: Moderate atrophy. Small vessel white matter ischemic changes.   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   No acute hemorrhage.    --- End of Report ---            LAMBERTO ENRIQUE MD; Attending Radiologist  This document has been electronically signed. Oct 18 2023  2:58PM    < end of copied text >    
Neurology        S: patient seen. on covid precuations         Medications: MEDICATIONS  (STANDING):  apixaban 2.5 milliGRAM(s) Oral every 12 hours  artificial  tears Solution 1 Drop(s) Both EYES two times a day  atorvastatin 80 milliGRAM(s) Oral at bedtime  hydrocortisone 1% Cream 1 Application(s) Topical two times a day  metoprolol succinate ER 75 milliGRAM(s) Oral daily  mirtazapine 15 milliGRAM(s) Oral at bedtime  pantoprazole    Tablet 40 milliGRAM(s) Oral two times a day  triamcinolone 0.1% Ointment 1 Application(s) Topical two times a day  venlafaxine XR. 75 milliGRAM(s) Oral daily    MEDICATIONS  (PRN):  acetaminophen     Tablet .. 650 milliGRAM(s) Oral every 6 hours PRN Temp greater or equal to 38C (100.4F), Mild Pain (1 - 3)  melatonin 3 milliGRAM(s) Oral at bedtime PRN Insomnia       Vitals:  Vital Signs Last 24 Hrs  T(C): 36.6 (30 Nov 2023 05:12), Max: 36.9 (29 Nov 2023 11:58)  T(F): 97.8 (30 Nov 2023 05:12), Max: 98.5 (29 Nov 2023 11:58)  HR: 104 (30 Nov 2023 05:12) (97 - 107)  BP: 109/80 (30 Nov 2023 05:12) (100/65 - 122/87)  BP(mean): --  RR: 18 (30 Nov 2023 05:12) (18 - 18)  SpO2: 95% (30 Nov 2023 05:12) (94% - 98%)    Parameters below as of 30 Nov 2023 05:12  Patient On (Oxygen Delivery Method): room air            General Exam:   General Appearance: Appropriately dressed and in no acute distress       Head: Normocephalic, atraumatic and no dysmorphic features  Ear, Nose, and Throat: Moist mucous membranes  CVS: S1S2+  Resp: No SOB, no wheeze or rhonchi  GI: soft NT/ND  Extremities: No edema or cyanosis  Skin: No bruises or rashes     Neurological Exam:  Mental Status: Awake, alert and oriented x 2.  Able to follow simple and complex verbal commands. Able to name and repeat. fluent speech. No obvious aphasia or dysarthria noted.   Cranial Nerves: PERRL, EOMI, VFFC, sensation V1-V3 intact,  no obvious facial asymmetry, equal elevation of palate, scm/trap 5/5, tongue is midline on protrusion. no obvious papilledema on fundoscopic exam. hearing is grossly intact.   Motor: Normal bulk, tone and strength throughout. mild L HP from stroke moving uppers > lowers   Sensation: Intact to light touch and pinprick throughout. no right/left confusion. no extinction to tactile on DSS.   Reflexes: 1+ throughout at biceps, brachioradialis, triceps, patellars and ankles bilaterally and equal. No clonus. R toe and L toe were both downgoing.  Coordination: No dysmetria on FNF    Gait: unable     Data/Labs/Imaging which I personally reviewed.     Labs:         LABS:                          11.5   7.62  )-----------( 348      ( 30 Nov 2023 07:29 )             36.9     11-30    141  |  105  |  36<H>  ----------------------------<  80  4.3   |  23  |  1.40<H>    Ca    8.9      30 Nov 2023 07:28    TPro  6.7  /  Alb  3.3  /  TBili  1.3<H>  /  DBili  x   /  AST  39  /  ALT  27  /  AlkPhos  115  11-29    LIVER FUNCTIONS - ( 29 Nov 2023 11:39 )  Alb: 3.3 g/dL / Pro: 6.7 g/dL / ALK PHOS: 115 U/L / ALT: 27 U/L / AST: 39 U/L / GGT: x           PT/INR - ( 29 Nov 2023 11:39 )   PT: 21.1 sec;   INR: 1.96 ratio         PTT - ( 29 Nov 2023 11:39 )  PTT:37.6 sec  Urinalysis Basic - ( 30 Nov 2023 07:28 )    Color: x / Appearance: x / SG: x / pH: x  Gluc: 80 mg/dL / Ketone: x  / Bili: x / Urobili: x   Blood: x / Protein: x / Nitrite: x   Leuk Esterase: x / RBC: x / WBC x   Sq Epi: x / Non Sq Epi: x / Bacteria: x              < from: MR Head No Cont (10.18.23 @ 14:33) >    ACC: [id number] EXAM:  MR BRAIN   ORDERED BY: WINSOME DEVEN MARIO     PROCEDURE DATE:  10/18/2023          INTERPRETATION:  CLINICAL INDICATION: Fall on Xarelto      Magnetic resonance imaging of the brain was carried out with transaxial   SPGR, FLAIR, fast spin echo T2 weighted images, axial susceptibility   weighted series, diffusion weighted series and sagittal T1 weighted   series on a 1.5 Payton magnet.    Comparison is made with the prior CT/CTA of 10/17/2023.    Ventricular and sulcal prominence is consistent with moderate atrophy.   There are bilateral old cerebellar infarcts. There are scattered infarcts   identified in the supratentorial region there is an infarct in the left   occipital subcortical white matter, left posterior temporalcortex the   right frontal cortex, the right centrum semiovale and the right medial   frontal cortex adjacent to the anterior body of the right lateral   ventricle. Bilaterality cysts suggestive of cardioembolic source. There   is no acute hemorrhage. There is a focus of hemosiderin adjacent to the   lateral border of the atrium of the right lateral ventricle.    The sellar and parasellar structures are unremarkable.    The paranasal sinuses are clear. There has been previous bilateral lens   replacement surgery. Ligamentous hypertrophy at the C1-2 level likely   related to degenerative changes.    IMPRESSION: Moderate atrophy. Small vessel white matter ischemic changes.   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   No acute hemorrhage.    --- End of Report ---            LAMBERTO ENRIQUE MD; Attending Radiologist  This document has been electronically signed. Oct 18 2023  2:58PM    < end of copied text >    
[facility]  [facility] Chapman Y. Patel, S. Shah, G. Casimir  [phone]  ([phone] - weekdays after 5pm and weekends)    Name: DELVIS MCBRIDE  Age/Gender: 89y Female  MRN: [id number]    Interval History:  Patient seen and examined this morning.   No new complaints noted.  Notes reviewed  No concerning overnight events  Afebrile     Allergies: soaps and creams causes rash uses only sensitive skin soap (Rash)  piperacillin-tazobactam (Rash)  Voltaren (Rash)  neomycin (Unknown)  sulfa drugs (Unknown)  Eye cream from the 1960s Neocortef ?spelling caused eyes to becomes swollen (Unknown)      Objective:  Vitals:   T(F): 97.4 (11-28-23 @ 11:53), Max: 98.1 (11-27-23 @ 13:17)  HR: 114 (11-28-23 @ 11:53) (67 - 114)  BP: 102/69 (11-28-23 @ 11:53) (100/65 - 103/67)  RR: 18 (11-28-23 @ 11:53) (18 - 18)  SpO2: 100% (11-28-23 @ 11:53) (95% - 100%)  Physical Examination:  General: no acute distress, RA  HEENT: NC/AT, anicteric, neck supple  Respiratory: no acc muscle use, breathing comfortably  Cardiovascular: S1 and S2 present  Gastrointestinal: normal appearing, nondistended  Extremities: no edema, no cyanosis  Skin: eczema rash on chest    Laboratory Studies:  CBC:   WBC Trend:  8.29 11-26-23 @ 07:28  8.04 11-24-23 @ 07:04  8.40 11-23-23 @ 07:23  7.03 11-22-23 @ 12:43    CMP:       Creatinine: 1.15 mg/dL (11-26-23 @ 07:28)  Creatinine: 1.23 mg/dL (11-24-23 @ 07:04)  Creatinine: 1.11 mg/dL (11-23-23 @ 07:23)  Creatinine: 1.33 mg/dL (11-22-23 @ 12:43)    Microbiology: reviewed   SARS-CoV-2 Result: Detected (22 Nov 2023 12:42)    Radiology: reviewed     Medications:  acetaminophen     Tablet .. 650 milliGRAM(s) Oral every 6 hours PRN  apixaban 2.5 milliGRAM(s) Oral every 12 hours  artificial  tears Solution 1 Drop(s) Both EYES two times a day  atorvastatin 80 milliGRAM(s) Oral at bedtime  hydrocortisone 1% Cream 1 Application(s) Topical two times a day  melatonin 3 milliGRAM(s) Oral at bedtime PRN  metoprolol succinate ER 50 milliGRAM(s) Oral daily  mirtazapine 15 milliGRAM(s) Oral at bedtime  pantoprazole    Tablet 40 milliGRAM(s) Oral two times a day  sacubitril 24 mG/valsartan 26 mG 1 Tablet(s) Oral two times a day  triamcinolone 0.1% Ointment 1 Application(s) Topical two times a day  venlafaxine XR. 75 milliGRAM(s) Oral daily  zolpidem 5 milliGRAM(s) Oral at bedtime PRN  zolpidem 5 milliGRAM(s) Oral at bedtime PRN    Prior/Completed Antimicrobials:  remdesivir  IVPB  remdesivir  IVPB  
[facility]  [facility] Y. Patel, S. Shah, G. Casimir  [phone]  ([phone] - weekdays after 5pm and weekends)    Name: DELVIS MCBRIDE  Age/Gender: 89y Female  MRN: [id number]    Interval History:  Patient seen and examined this morning.   States she feels good, no new complaints.   Notes reviewed  No concerning overnight events  Afebrile     Allergies: soaps and creams causes rash uses only sensitive skin soap (Rash)  piperacillin-tazobactam (Rash)  Voltaren (Rash)  neomycin (Unknown)  sulfa drugs (Unknown)  Eye cream from the 1960s Neocortef ?spelling caused eyes to becomes swollen (Unknown)      Objective:  Vitals:   T(F): 97.6 (11-30-23 @ 11:43), Max: 97.8 (11-30-23 @ 05:12)  HR: 96 (11-30-23 @ 11:43) (96 - 105)  BP: 97/61 (11-30-23 @ 11:43) (97/61 - 109/80)  RR: 18 (11-30-23 @ 11:43) (18 - 18)  SpO2: 95% (11-30-23 @ 11:43) (94% - 98%)  Physical Examination:  General: no acute distress, RA, nontoxic   HEENT: NC/AT, anicteric, neck supple  Respiratory: no acc muscle use, breathing comfortably  Cardiovascular: S1 and S2 present  Gastrointestinal: normal appearing, nondistended  Neuro: AAOx3, no obvious focal deficits   Extremities: no edema, no cyanosis    Laboratory Studies:  CBC:                       11.5   7.62  )-----------( 348      ( 30 Nov 2023 07:29 )             36.9     WBC Trend:  7.62 11-30-23 @ 07:29  8.31 11-29-23 @ 11:39  8.29 11-26-23 @ 07:28  8.04 11-24-23 @ 07:04    CMP: 11-30    141  |  105  |  36<H>  ----------------------------<  80  4.3   |  23  |  1.40<H>    Ca    8.9      30 Nov 2023 07:28    TPro  6.7  /  Alb  3.3  /  TBili  1.3<H>  /  DBili  x   /  AST  39  /  ALT  27  /  AlkPhos  115  11-29    Creatinine: 1.40 mg/dL (11-30-23 @ 07:28)  Creatinine: 1.35 mg/dL (11-29-23 @ 11:39)  Creatinine: 1.15 mg/dL (11-26-23 @ 07:28)  Creatinine: 1.23 mg/dL (11-24-23 @ 07:04)    LIVER FUNCTIONS - ( 29 Nov 2023 11:39 )  Alb: 3.3 g/dL / Pro: 6.7 g/dL / ALK PHOS: 115 U/L / ALT: 27 U/L / AST: 39 U/L / GGT: x           Microbiology: reviewed   SARS-CoV-2 Result: Detected (22 Nov 2023 12:42)    Radiology: reviewed     Medications:  acetaminophen     Tablet .. 650 milliGRAM(s) Oral every 6 hours PRN  apixaban 2.5 milliGRAM(s) Oral every 12 hours  artificial  tears Solution 1 Drop(s) Both EYES two times a day  atorvastatin 80 milliGRAM(s) Oral at bedtime  hydrocortisone 1% Cream 1 Application(s) Topical two times a day  melatonin 3 milliGRAM(s) Oral at bedtime PRN  metoprolol succinate ER 75 milliGRAM(s) Oral daily  mirtazapine 15 milliGRAM(s) Oral at bedtime  pantoprazole    Tablet 40 milliGRAM(s) Oral two times a day  triamcinolone 0.1% Ointment 1 Application(s) Topical two times a day  venlafaxine XR. 75 milliGRAM(s) Oral daily    Prior/Completed Antimicrobials:  remdesivir  IVPB  remdesivir  IVPB  
[facility]  [facility] Y. Patel, S. Shah, G. Mack  [phone]  ([phone] - weekdays after 5pm and weekends)    Name: DELVIS MCBRIDE  Age/Gender: 89y Female  MRN: [id number]    Interval History:  Patient seen and examined this morning.   Resting comfortably. Notes reviewed  No concerning overnight events  Afebrile     Allergies: soaps and creams causes rash uses only sensitive skin soap (Rash)  piperacillin-tazobactam (Rash)  Voltaren (Rash)  neomycin (Unknown)  sulfa drugs (Unknown)  Eye cream from the 1960s Neocortef ?spelling caused eyes to becomes swollen (Unknown)      Objective:  Vitals:   T(F): 97.7 (11-27-23 @ 05:02), Max: 98.5 (11-26-23 @ 20:23)  HR: 103 (11-27-23 @ 05:02) (80 - 103)  BP: 113/76 (11-27-23 @ 05:02) (91/63 - 113/76)  RR: 18 (11-27-23 @ 05:02) (18 - 18)  SpO2: 95% (11-27-23 @ 05:02) (95% - 96%)  Physical Examination:  General: no acute distress, RA  HEENT: NC/AT, anicteric, neck supple  Respiratory: no acc muscle use, breathing comfortably  Cardiovascular: S1 and S2 present  Gastrointestinal: normal appearing, nondistended  Extremities: no cyanosis  Skin: no visible rash    Laboratory Studies:  CBC:                       10.3   8.29  )-----------( 387      ( 26 Nov 2023 07:28 )             33.0     WBC Trend:  8.29 11-26-23 @ 07:28  8.04 11-24-23 @ 07:04  8.40 11-23-23 @ 07:23  7.03 11-22-23 @ 12:43    CMP: 11-26    142  |  106  |  26<H>  ----------------------------<  91  4.1   |  20<L>  |  1.15    Ca    9.0      26 Nov 2023 07:28      Creatinine: 1.15 mg/dL (11-26-23 @ 07:28)  Creatinine: 1.23 mg/dL (11-24-23 @ 07:04)  Creatinine: 1.11 mg/dL (11-23-23 @ 07:23)  Creatinine: 1.33 mg/dL (11-22-23 @ 12:43)    Microbiology: reviewed   SARS-CoV-2 Result: Detected (22 Nov 2023 12:42)    Radiology: reviewed     Medications:  acetaminophen     Tablet .. 650 milliGRAM(s) Oral every 6 hours PRN  apixaban 2.5 milliGRAM(s) Oral every 12 hours  artificial  tears Solution 1 Drop(s) Both EYES two times a day  atorvastatin 80 milliGRAM(s) Oral at bedtime  chlorhexidine 2% Cloths 1 Application(s) Topical <User Schedule>  hydrocortisone 1% Cream 1 Application(s) Topical two times a day  melatonin 3 milliGRAM(s) Oral at bedtime PRN  metoprolol succinate ER 50 milliGRAM(s) Oral daily  mirtazapine 15 milliGRAM(s) Oral at bedtime  pantoprazole    Tablet 40 milliGRAM(s) Oral two times a day  sacubitril 24 mG/valsartan 26 mG 1 Tablet(s) Oral two times a day  venlafaxine XR. 75 milliGRAM(s) Oral daily  zolpidem 5 milliGRAM(s) Oral at bedtime PRN  zolpidem 5 milliGRAM(s) Oral at bedtime PRN    Current Antimicrobials:    Prior/Completed Antimicrobials:  remdesivir  IVPB  remdesivir  IVPB  
[facility], Division of Infectious Diseases  [facility] Y. Patel, S. Shah, G. Mack  [phone]  ([phone] - weekdays after 5pm and weekends)    Name: DELVIS MCBRIDE  Age/Gender: 89y Female  MRN: [id number]    Interval History:  Notes reviewed.   No concerning overnight events.  Afebrile.     Allergies: soaps and creams causes rash uses only sensitive skin soap (Rash)  piperacillin-tazobactam (Rash)  Voltaren (Rash)  neomycin (Unknown)  sulfa drugs (Unknown)  Eye cream from the 1960s Neocortef ?spelling caused eyes to becomes swollen (Unknown)      Objective:  Vitals:   T(F): 98.4 (11-25-23 @ 04:18), Max: 98.4 (11-25-23 @ 04:18)  HR: 96 (11-25-23 @ 04:18) (86 - 99)  BP: 100/57 (11-25-23 @ 04:18) (100/57 - 115/78)  RR: 19 (11-25-23 @ 04:18) (18 - 19)  SpO2: 95% (11-25-23 @ 04:18) (93% - 95%)  Physical Examination:  General: no acute distress  HEENT: anicteric  Cardio: normal rate  Resp: breathing comfortably on RA   Abd: soft, NT, ND  Ext: no LE edema  Skin: warm, dry    Laboratory Studies:  CBC:                       11.4   8.04  )-----------( 370      ( 24 Nov 2023 07:04 )             35.5     WBC Trend:  8.04 11-24-23 @ 07:04  8.40 11-23-23 @ 07:23  7.03 11-22-23 @ 12:43    CMP: 11-24    140  |  106  |  37<H>  ----------------------------<  81  4.5   |  20<L>  |  1.23    Ca    8.7      24 Nov 2023 07:04    TPro  6.1  /  Alb  2.9<L>  /  TBili  0.6  /  DBili  x   /  AST  47<H>  /  ALT  38  /  AlkPhos  130<H>  11-24      LIVER FUNCTIONS - ( 24 Nov 2023 07:04 )  Alb: 2.9 g/dL / Pro: 6.1 g/dL / ALK PHOS: 130 U/L / ALT: 38 U/L / AST: 47 U/L / GGT: x             Urinalysis Basic - ( 24 Nov 2023 07:04 )    Color: x / Appearance: x / SG: x / pH: x  Gluc: 81 mg/dL / Ketone: x  / Bili: x / Urobili: x   Blood: x / Protein: x / Nitrite: x   Leuk Esterase: x / RBC: x / WBC x   Sq Epi: x / Non Sq Epi: x / Bacteria: x      Microbiology: reviewed       Radiology: reviewed     Medications:  acetaminophen     Tablet .. 650 milliGRAM(s) Oral every 6 hours PRN  apixaban 2.5 milliGRAM(s) Oral every 12 hours  artificial  tears Solution 1 Drop(s) Both EYES two times a day  atorvastatin 80 milliGRAM(s) Oral at bedtime  chlorhexidine 2% Cloths 1 Application(s) Topical <User Schedule>  hydrocortisone 1% Cream 1 Application(s) Topical two times a day  iron sucrose IVPB 300 milliGRAM(s) IV Intermittent every 24 hours  melatonin 3 milliGRAM(s) Oral at bedtime PRN  metoprolol succinate ER 50 milliGRAM(s) Oral daily  mirtazapine 15 milliGRAM(s) Oral at bedtime  pantoprazole    Tablet 40 milliGRAM(s) Oral two times a day  sacubitril 24 mG/valsartan 26 mG 1 Tablet(s) Oral two times a day  sodium chloride 0.45%. 1000 milliLiter(s) IV Continuous <Continuous>  venlafaxine XR. 75 milliGRAM(s) Oral daily  zolpidem 5 milliGRAM(s) Oral at bedtime PRN  zolpidem 5 milliGRAM(s) Oral at bedtime PRN    Antimicrobials:  
Patient is a 89y old  Female who presents with a chief complaint of Anemia     (24 Nov 2023 11:01)      SUBJECTIVE / OVERNIGHT EVENTS: no new events. tolerating lower dose Eliquis. no GI bleed, on remdesivir. once completes remdesivir DC plan    MEDICATIONS  (STANDING):  apixaban 2.5 milliGRAM(s) Oral every 12 hours  artificial  tears Solution 1 Drop(s) Both EYES two times a day  atorvastatin 80 milliGRAM(s) Oral at bedtime  chlorhexidine 2% Cloths 1 Application(s) Topical <User Schedule>  iron sucrose IVPB 300 milliGRAM(s) IV Intermittent every 24 hours  metoprolol succinate ER 50 milliGRAM(s) Oral daily  mirtazapine 15 milliGRAM(s) Oral at bedtime  pantoprazole    Tablet 40 milliGRAM(s) Oral two times a day  remdesivir  IVPB 100 milliGRAM(s) IV Intermittent every 24 hours  sacubitril 24 mG/valsartan 26 mG 1 Tablet(s) Oral two times a day  sodium chloride 0.45%. 1000 milliLiter(s) (75 mL/Hr) IV Continuous <Continuous>  venlafaxine XR. 75 milliGRAM(s) Oral daily    MEDICATIONS  (PRN):  acetaminophen     Tablet .. 650 milliGRAM(s) Oral every 6 hours PRN Temp greater or equal to 38C (100.4F), Mild Pain (1 - 3)  melatonin 3 milliGRAM(s) Oral at bedtime PRN Insomnia  zolpidem 5 milliGRAM(s) Oral at bedtime PRN Insomnia  zolpidem 5 milliGRAM(s) Oral at bedtime PRN Insomnia      Vital Signs Last 24 Hrs  T(F): 97.8 (11-24-23 @ 12:51), Max: 98.1 (11-24-23 @ 04:59)  HR: 99 (11-24-23 @ 12:51) (92 - 99)  BP: 115/78 (11-24-23 @ 12:51) (109/77 - 115/84)  RR: 18 (11-24-23 @ 12:51) (18 - 18)  SpO2: 94% (11-24-23 @ 12:51) (94% - 98%)  Telemetry:   CAPILLARY BLOOD GLUCOSE        I&O's Summary    23 Nov 2023 07:01  -  24 Nov 2023 07:00  --------------------------------------------------------  IN: 2510 mL / OUT: 1050 mL / NET: 1460 mL        PHYSICAL EXAM:  GENERAL: NAD, well-developed  HEAD:  Atraumatic, Normocephalic  EYES: EOMI, PERRLA, conjunctiva and sclera clear  NECK: Supple, No JVD  CHEST/LUNG: Clear to auscultation bilaterally; No wheeze  HEART: Regular rate and rhythm; No murmurs, rubs, or gallops  ABDOMEN: Soft, Nontender, Nondistended; Bowel sounds present  EXTREMITIES:  2+ Peripheral Pulses, No clubbing, cyanosis, or edema  PSYCH: AAOx3  NEUROLOGY: non-focal  SKIN: No rashes or lesions    LABS:                        11.4   8.04  )-----------( 370      ( 24 Nov 2023 07:04 )             35.5     11-24    140  |  106  |  37<H>  ----------------------------<  81  4.5   |  20<L>  |  1.23    Ca    8.7      24 Nov 2023 07:04    TPro  6.1  /  Alb  2.9<L>  /  TBili  0.6  /  DBili  x   /  AST  47<H>  /  ALT  38  /  AlkPhos  130<H>  11-24    PT/INR - ( 24 Nov 2023 07:04 )   PT: 19.8 sec;   INR: 1.92 ratio         PTT - ( 24 Nov 2023 07:04 )  PTT:35.1 sec      Urinalysis Basic - ( 24 Nov 2023 07:04 )    Color: x / Appearance: x / SG: x / pH: x  Gluc: 81 mg/dL / Ketone: x  / Bili: x / Urobili: x   Blood: x / Protein: x / Nitrite: x   Leuk Esterase: x / RBC: x / WBC x   Sq Epi: x / Non Sq Epi: x / Bacteria: x        RADIOLOGY & ADDITIONAL TESTS:    Imaging Personally Reviewed:    Consultant(s) Notes Reviewed:      Care Discussed with Consultants/Other Providers:  
Patient is a 89y old  Female who presents with a chief complaint of GI bleed, acute blood loss anemia (23 Nov 2023 09:44)      SUBJECTIVE / OVERNIGHT EVENTS: no evidence of active GI bleed. possible she is s/p GI bleed. due to advanced age GI will not proceed w endoscopy. ptn is at risk of CVA, will resume eliquis but at a lower dose 2.5 mg bid, iron level is low, will order a total of 1 gm IV IRON over 4 days. s/p 1 UPRBC. for covid ptn is on remdesivir x 3 days, not hypoxic.     MEDICATIONS  (STANDING):  apixaban 2.5 milliGRAM(s) Oral every 12 hours  artificial  tears Solution 1 Drop(s) Both EYES two times a day  atorvastatin 80 milliGRAM(s) Oral at bedtime  chlorhexidine 2% Cloths 1 Application(s) Topical <User Schedule>  iron sucrose IVPB 300 milliGRAM(s) IV Intermittent every 24 hours  metoprolol succinate ER 50 milliGRAM(s) Oral daily  mirtazapine 15 milliGRAM(s) Oral at bedtime  pantoprazole    Tablet 40 milliGRAM(s) Oral two times a day  remdesivir  IVPB 100 milliGRAM(s) IV Intermittent every 24 hours  sacubitril 24 mG/valsartan 26 mG 1 Tablet(s) Oral two times a day  sodium chloride 0.45%. 1000 milliLiter(s) (75 mL/Hr) IV Continuous <Continuous>  venlafaxine XR. 75 milliGRAM(s) Oral daily    MEDICATIONS  (PRN):  acetaminophen     Tablet .. 650 milliGRAM(s) Oral every 6 hours PRN Temp greater or equal to 38C (100.4F), Mild Pain (1 - 3)  melatonin 3 milliGRAM(s) Oral at bedtime PRN Insomnia  zolpidem 5 milliGRAM(s) Oral at bedtime PRN Insomnia  zolpidem 5 milliGRAM(s) Oral at bedtime PRN Insomnia      Vital Signs Last 24 Hrs  T(F): 97.8 (11-23-23 @ 07:38), Max: 98.4 (11-23-23 @ 05:45)  HR: 96 (11-23-23 @ 07:38) (65 - 97)  BP: 112/73 (11-23-23 @ 07:38) (100/68 - 158/94)  RR: 18 (11-23-23 @ 07:38) (12 - 22)  SpO2: 100% (11-23-23 @ 07:38) (97% - 100%)  Telemetry:   CAPILLARY BLOOD GLUCOSE        I&O's Summary    23 Nov 2023 07:01  -  23 Nov 2023 12:49  --------------------------------------------------------  IN: 120 mL / OUT: 0 mL / NET: 120 mL        PHYSICAL EXAM:  GENERAL: NAD, well-developed  HEAD:  Atraumatic, Normocephalic  EYES: EOMI, PERRLA, conjunctiva and sclera clear  NECK: Supple, No JVD  CHEST/LUNG: Clear to auscultation bilaterally; No wheeze  HEART: Regular rate and rhythm; No murmurs, rubs, or gallops  ABDOMEN: Soft, Nontender, Nondistended; Bowel sounds present  EXTREMITIES:  2+ Peripheral Pulses, No clubbing, cyanosis, or edema  PSYCH: AAOx3  NEUROLOGY: non-focal  SKIN: No rashes or lesions    LABS:                        10.1   8.40  )-----------( 348      ( 23 Nov 2023 07:23 )             32.7     11-23    138  |  103  |  35<H>  ----------------------------<  73  4.6   |  22  |  1.11    Ca    8.5      23 Nov 2023 07:23    TPro  6.2  /  Alb  3.2<L>  /  TBili  0.4  /  DBili  x   /  AST  57<H>  /  ALT  47<H>  /  AlkPhos  136<H>  11-22    PT/INR - ( 22 Nov 2023 12:43 )   PT: 16.1 sec;   INR: 1.55 ratio         PTT - ( 22 Nov 2023 12:43 )  PTT:30.0 sec      Urinalysis Basic - ( 23 Nov 2023 07:23 )    Color: x / Appearance: x / SG: x / pH: x  Gluc: 73 mg/dL / Ketone: x  / Bili: x / Urobili: x   Blood: x / Protein: x / Nitrite: x   Leuk Esterase: x / RBC: x / WBC x   Sq Epi: x / Non Sq Epi: x / Bacteria: x        RADIOLOGY & ADDITIONAL TESTS:    Imaging Personally Reviewed:    Consultant(s) Notes Reviewed:      Care Discussed with Consultants/Other Providers:  
CARDIOLOGY FOLLOW UP - Dr. Mccartney  DATE OF SERVICE: 11/27/23    CC  No CV complaints    REVIEW OF SYSTEMS:  CONSTITUTIONAL: No fever, weight loss, or fatigue  RESPIRATORY: No cough, wheezing, chills or hemoptysis; No Shortness of Breath  CARDIOVASCULAR: No chest pain, palpitations, passing out, dizziness, or leg swelling  GASTROINTESTINAL: No abdominal or epigastric pain. No nausea, vomiting, or hematemesis; No diarrhea or constipation. No melena or hematochezia.  VASCULAR: No edema     PHYSICAL EXAM:  T(C): 36.5 (11-27-23 @ 05:02), Max: 36.9 (11-26-23 @ 20:23)  HR: 103 (11-27-23 @ 05:02) (80 - 103)  BP: 113/76 (11-27-23 @ 05:02) (91/63 - 113/76)  RR: 18 (11-27-23 @ 05:02) (18 - 18)  SpO2: 95% (11-27-23 @ 05:02) (95% - 96%)  Wt(kg): --  I&O's Summary    26 Nov 2023 07:01  -  27 Nov 2023 07:00  --------------------------------------------------------  IN: 1160 mL / OUT: 0 mL / NET: 1160 mL        Appearance: Elderly female 	  Cardiovascular: Normal S1 S2,RRR, No JVD, No murmurs  Respiratory: Lungs clear to auscultation b/l   Gastrointestinal:  Soft, Non-tender, + BS	  Extremities: Normal range of motion, No clubbing, cyanosis or edema      Home Medications:  acetaminophen 325 mg oral tablet: 2 tab(s) orally every 6 hours, As needed, Temp greater or equal to 38C (100.4F), Mild Pain (1 - 3) (22 Nov 2023 14:42)  Artificial Tears ophthalmic solution: 1 drop(s) in each eye once a day (22 Nov 2023 14:45)  atorvastatin 80 mg oral tablet: 1 tab(s) orally once a day (at bedtime) (22 Nov 2023 14:42)  melatonin 3 mg oral tablet: 1 tab(s) orally once a day (at bedtime) As needed Insomnia (22 Nov 2023 14:42)  Metoprolol Succinate ER 50 mg oral tablet, extended release: 1 tab(s) orally every 12 hours , hold for SBP &lt;100 or HR &lt;55 (22 Nov 2023 14:45)  mirtazapine 15 mg oral tablet: 1 tab(s) orally once a day (at bedtime) (22 Nov 2023 14:45)  pantoprazole 40 mg oral delayed release tablet: 1 tab(s) orally 2 times a day (22 Nov 2023 14:38)  polyethylene glycol 3350 oral powder for reconstitution: 17 gram(s) orally once a day as needed for  constipation (22 Nov 2023 14:46)  sacubitril-valsartan 24 mg-26 mg oral tablet: 1 tab(s) orally 2 times a day (22 Nov 2023 14:42)  venlafaxine 75 mg oral capsule, extended release: 1 cap(s) orally once a day (22 Nov 2023 14:42)  Vitamin D3 1250 mcg (50,000 intl units) oral capsule: 1 cap(s) orally once a week (22 Nov 2023 14:42)  Zofran 4 mg oral tablet: 1 tab(s) orally every 8 hours as needed for  nausea x 7 days (22 Nov 2023 14:45)  zolpidem 10 mg oral tablet: 1 tab(s) orally once a day (at bedtime) (22 Nov 2023 14:45)      MEDICATIONS  (STANDING):  apixaban 2.5 milliGRAM(s) Oral every 12 hours  artificial  tears Solution 1 Drop(s) Both EYES two times a day  atorvastatin 80 milliGRAM(s) Oral at bedtime  chlorhexidine 2% Cloths 1 Application(s) Topical <User Schedule>  hydrocortisone 1% Cream 1 Application(s) Topical two times a day  metoprolol succinate ER 50 milliGRAM(s) Oral daily  mirtazapine 15 milliGRAM(s) Oral at bedtime  pantoprazole    Tablet 40 milliGRAM(s) Oral two times a day  sacubitril 24 mG/valsartan 26 mG 1 Tablet(s) Oral two times a day  venlafaxine XR. 75 milliGRAM(s) Oral daily      TELEMETRY: 	    ECG:  	  RADIOLOGY:   DIAGNOSTIC TESTING:  [ ] Echocardiogram:  [ ]  Catheterization:  [ ] Stress Test:    OTHER: 	    LABS:	 	                            10.3   8.29  )-----------( 387      ( 26 Nov 2023 07:28 )             33.0     11-26    142  |  106  |  26<H>  ----------------------------<  91  4.1   |  20<L>  |  1.15    Ca    9.0      26 Nov 2023 07:28              
CARDIOLOGY FOLLOW UP - Dr. Mccartney  DATE OF SERVICE: 11/28/23    CC  Event noted  No CV complaints     REVIEW OF SYSTEMS:  CONSTITUTIONAL: No fever, weight loss, or fatigue  RESPIRATORY: No cough, wheezing, chills or hemoptysis; No Shortness of Breath  CARDIOVASCULAR: No chest pain, palpitations, passing out, dizziness, or leg swelling  GASTROINTESTINAL: No abdominal or epigastric pain. No nausea, vomiting, or hematemesis; No diarrhea or constipation. No melena or hematochezia.  VASCULAR: No edema     PHYSICAL EXAM:  T(C): 36.6 (11-28-23 @ 04:49), Max: 36.7 (11-27-23 @ 13:17)  HR: 102 (11-28-23 @ 04:49) (67 - 102)  BP: 103/66 (11-28-23 @ 04:49) (100/65 - 103/67)  RR: 18 (11-28-23 @ 04:49) (18 - 18)  SpO2: 97% (11-28-23 @ 04:49) (95% - 97%)  Wt(kg): --  I&O's Summary    27 Nov 2023 07:01  -  28 Nov 2023 07:00  --------------------------------------------------------  IN: 1010 mL / OUT: 1150 mL / NET: -140 mL        Appearance: Elderly female 	  Cardiovascular: Normal S1 S2,RRR, No JVD, No murmurs  Respiratory: Lungs clear to auscultation b/l   Gastrointestinal:  Soft, Non-tender, + BS	  Extremities: Normal range of motion, No clubbing, cyanosis or edema      Home Medications:  acetaminophen 325 mg oral tablet: 2 tab(s) orally every 6 hours, As needed, Temp greater or equal to 38C (100.4F), Mild Pain (1 - 3) (22 Nov 2023 14:42)  Artificial Tears ophthalmic solution: 1 drop(s) in each eye once a day (22 Nov 2023 14:45)  atorvastatin 80 mg oral tablet: 1 tab(s) orally once a day (at bedtime) (22 Nov 2023 14:42)  melatonin 3 mg oral tablet: 1 tab(s) orally once a day (at bedtime) As needed Insomnia (22 Nov 2023 14:42)  Metoprolol Succinate ER 50 mg oral tablet, extended release: 1 tab(s) orally every 12 hours , hold for SBP &lt;100 or HR &lt;55 (22 Nov 2023 14:45)  mirtazapine 15 mg oral tablet: 1 tab(s) orally once a day (at bedtime) (22 Nov 2023 14:45)  pantoprazole 40 mg oral delayed release tablet: 1 tab(s) orally 2 times a day (22 Nov 2023 14:38)  polyethylene glycol 3350 oral powder for reconstitution: 17 gram(s) orally once a day as needed for  constipation (22 Nov 2023 14:46)  sacubitril-valsartan 24 mg-26 mg oral tablet: 1 tab(s) orally 2 times a day (22 Nov 2023 14:42)  venlafaxine 75 mg oral capsule, extended release: 1 cap(s) orally once a day (22 Nov 2023 14:42)  Vitamin D3 1250 mcg (50,000 intl units) oral capsule: 1 cap(s) orally once a week (22 Nov 2023 14:42)  Zofran 4 mg oral tablet: 1 tab(s) orally every 8 hours as needed for  nausea x 7 days (22 Nov 2023 14:45)  zolpidem 10 mg oral tablet: 1 tab(s) orally once a day (at bedtime) (22 Nov 2023 14:45)      MEDICATIONS  (STANDING):  apixaban 2.5 milliGRAM(s) Oral every 12 hours  artificial  tears Solution 1 Drop(s) Both EYES two times a day  atorvastatin 80 milliGRAM(s) Oral at bedtime  hydrocortisone 1% Cream 1 Application(s) Topical two times a day  metoprolol succinate ER 50 milliGRAM(s) Oral daily  mirtazapine 15 milliGRAM(s) Oral at bedtime  pantoprazole    Tablet 40 milliGRAM(s) Oral two times a day  sacubitril 24 mG/valsartan 26 mG 1 Tablet(s) Oral two times a day  triamcinolone 0.1% Ointment 1 Application(s) Topical two times a day  venlafaxine XR. 75 milliGRAM(s) Oral daily      TELEMETRY: 	    ECG:  	  RADIOLOGY:   DIAGNOSTIC TESTING:  [ ] Echocardiogram:  [ ]  Catheterization:  [ ] Stress Test:    OTHER: 	    LABS:	 	                      
CARDIOLOGY FOLLOW UP NOTE - DR. HANSEN    Patient Name: DELVIS MCBRIDE    Date of Service: 11-24-23 @ 15:13    Patient seen and examined    Subjective:    cv: denies chest pain, dyspnea, palpitations, dizziness  pulmonary: denies cough  GI: denies abdominal pain, nausea, vomiting  vascular/legs: no edema   skin: no rash  ROS: otherwise negative   overnight events:      PHYSICAL EXAM:  T(C): 36.6 (11-24-23 @ 12:51), Max: 36.7 (11-24-23 @ 04:59)  HR: 99 (11-24-23 @ 12:51) (92 - 99)  BP: 115/78 (11-24-23 @ 12:51) (109/77 - 115/84)  RR: 18 (11-24-23 @ 12:51) (18 - 18)  SpO2: 94% (11-24-23 @ 12:51) (94% - 98%)  Wt(kg): --  I&O's Summary    23 Nov 2023 07:01  -  24 Nov 2023 07:00  --------------------------------------------------------  IN: 2510 mL / OUT: 1050 mL / NET: 1460 mL      Daily     Daily     Appearance: Normal	  Cardiovascular: Normal S1 S2,RRR, No JVD, No murmurs  Respiratory: Lungs clear to auscultation	  Gastrointestinal:  Soft, Non-tender, + BS	  Extremities: Normal range of motion, No clubbing, cyanosis or edema      Home Medications:  acetaminophen 325 mg oral tablet: 2 tab(s) orally every 6 hours, As needed, Temp greater or equal to 38C (100.4F), Mild Pain (1 - 3) (22 Nov 2023 14:42)  Artificial Tears ophthalmic solution: 1 drop(s) in each eye once a day (22 Nov 2023 14:45)  atorvastatin 80 mg oral tablet: 1 tab(s) orally once a day (at bedtime) (22 Nov 2023 14:42)  melatonin 3 mg oral tablet: 1 tab(s) orally once a day (at bedtime) As needed Insomnia (22 Nov 2023 14:42)  Metoprolol Succinate ER 50 mg oral tablet, extended release: 1 tab(s) orally every 12 hours , hold for SBP &lt;100 or HR &lt;55 (22 Nov 2023 14:45)  mirtazapine 15 mg oral tablet: 1 tab(s) orally once a day (at bedtime) (22 Nov 2023 14:45)  pantoprazole 40 mg oral delayed release tablet: 1 tab(s) orally 2 times a day (22 Nov 2023 14:38)  polyethylene glycol 3350 oral powder for reconstitution: 17 gram(s) orally once a day as needed for  constipation (22 Nov 2023 14:46)  sacubitril-valsartan 24 mg-26 mg oral tablet: 1 tab(s) orally 2 times a day (22 Nov 2023 14:42)  venlafaxine 75 mg oral capsule, extended release: 1 cap(s) orally once a day (22 Nov 2023 14:42)  Vitamin D3 1250 mcg (50,000 intl units) oral capsule: 1 cap(s) orally once a week (22 Nov 2023 14:42)  Zofran 4 mg oral tablet: 1 tab(s) orally every 8 hours as needed for  nausea x 7 days (22 Nov 2023 14:45)  zolpidem 10 mg oral tablet: 1 tab(s) orally once a day (at bedtime) (22 Nov 2023 14:45)      MEDICATIONS  (STANDING):  apixaban 2.5 milliGRAM(s) Oral every 12 hours  artificial  tears Solution 1 Drop(s) Both EYES two times a day  atorvastatin 80 milliGRAM(s) Oral at bedtime  chlorhexidine 2% Cloths 1 Application(s) Topical <User Schedule>  iron sucrose IVPB 300 milliGRAM(s) IV Intermittent every 24 hours  metoprolol succinate ER 50 milliGRAM(s) Oral daily  mirtazapine 15 milliGRAM(s) Oral at bedtime  pantoprazole    Tablet 40 milliGRAM(s) Oral two times a day  remdesivir  IVPB 100 milliGRAM(s) IV Intermittent every 24 hours  sacubitril 24 mG/valsartan 26 mG 1 Tablet(s) Oral two times a day  sodium chloride 0.45%. 1000 milliLiter(s) (75 mL/Hr) IV Continuous <Continuous>  venlafaxine XR. 75 milliGRAM(s) Oral daily      TELEMETRY: 	    ECG:  	  RADIOLOGY:   DIAGNOSTIC TESTING:  [ ] Echocardiogram:  [ ] Catheterization:  [ ] Stress Test:    OTHER: 	    LABS:	 	    CARDIAC MARKERS:                                      11.4   8.04  )-----------( 370      ( 24 Nov 2023 07:04 )             35.5     11-24    140  |  106  |  37<H>  ----------------------------<  81  4.5   |  20<L>  |  1.23    Ca    8.7      24 Nov 2023 07:04    TPro  6.1  /  Alb  2.9<L>  /  TBili  0.6  /  DBili  x   /  AST  47<H>  /  ALT  38  /  AlkPhos  130<H>  11-24    proBNP:   PT/INR - ( 24 Nov 2023 07:04 )   PT: 19.8 sec;   INR: 1.92 ratio         PTT - ( 24 Nov 2023 07:04 )  PTT:35.1 sec  Lipid Profile:   HgA1c:     Creatinine: 1.23 mg/dL (11-24-23 @ 07:04)  Creatinine: 1.11 mg/dL (11-23-23 @ 07:23)  Creatinine: 1.33 mg/dL (11-22-23 @ 12:43)            
Neurology        S: patient seen. on covid precuations         Medications: MEDICATIONS  (STANDING):  apixaban 2.5 milliGRAM(s) Oral every 12 hours  artificial  tears Solution 1 Drop(s) Both EYES two times a day  atorvastatin 80 milliGRAM(s) Oral at bedtime  hydrocortisone 1% Cream 1 Application(s) Topical two times a day  metoprolol succinate ER 75 milliGRAM(s) Oral daily  mirtazapine 15 milliGRAM(s) Oral at bedtime  pantoprazole    Tablet 40 milliGRAM(s) Oral two times a day  triamcinolone 0.1% Ointment 1 Application(s) Topical two times a day  venlafaxine XR. 75 milliGRAM(s) Oral daily    MEDICATIONS  (PRN):  acetaminophen     Tablet .. 650 milliGRAM(s) Oral every 6 hours PRN Temp greater or equal to 38C (100.4F), Mild Pain (1 - 3)  melatonin 3 milliGRAM(s) Oral at bedtime PRN Insomnia  zolpidem 5 milliGRAM(s) Oral at bedtime PRN Insomnia       Vitals:  Vital Signs Last 24 Hrs  T(C): 36.9 (01 Dec 2023 05:19), Max: 36.9 (01 Dec 2023 05:19)  T(F): 98.4 (01 Dec 2023 05:19), Max: 98.4 (01 Dec 2023 05:19)  HR: 99 (01 Dec 2023 05:19) (96 - 100)  BP: 110/65 (01 Dec 2023 05:19) (97/61 - 110/65)  BP(mean): --  RR: 18 (01 Dec 2023 05:19) (18 - 18)  SpO2: 97% (01 Dec 2023 05:19) (95% - 97%)    Parameters below as of 01 Dec 2023 05:19  Patient On (Oxygen Delivery Method): room air           General Exam:   General Appearance: Appropriately dressed and in no acute distress       Head: Normocephalic, atraumatic and no dysmorphic features  Ear, Nose, and Throat: Moist mucous membranes  CVS: S1S2+  Resp: No SOB, no wheeze or rhonchi  GI: soft NT/ND  Extremities: No edema or cyanosis  Skin: No bruises or rashes     Neurological Exam:  Mental Status: Awake, alert and oriented x 2.  Able to follow simple and complex verbal commands. Able to name and repeat. fluent speech. No obvious aphasia or dysarthria noted.   Cranial Nerves: PERRL, EOMI, VFFC, sensation V1-V3 intact,  no obvious facial asymmetry, equal elevation of palate, scm/trap 5/5, tongue is midline on protrusion. no obvious papilledema on fundoscopic exam. hearing is grossly intact.   Motor: Normal bulk, tone and strength throughout. mild L HP from stroke moving uppers > lowers   Sensation: Intact to light touch and pinprick throughout. no right/left confusion. no extinction to tactile on DSS.   Reflexes: 1+ throughout at biceps, brachioradialis, triceps, patellars and ankles bilaterally and equal. No clonus. R toe and L toe were both downgoing.  Coordination: No dysmetria on FNF    Gait: unable     Data/Labs/Imaging which I personally reviewed.     Labs:       LABS:                          11.5   7.62  )-----------( 348      ( 30 Nov 2023 07:29 )             36.9     11-30    141  |  105  |  36<H>  ----------------------------<  80  4.3   |  23  |  1.40<H>    Ca    8.9      30 Nov 2023 07:28    TPro  6.7  /  Alb  3.3  /  TBili  1.3<H>  /  DBili  x   /  AST  39  /  ALT  27  /  AlkPhos  115  11-29    LIVER FUNCTIONS - ( 29 Nov 2023 11:39 )  Alb: 3.3 g/dL / Pro: 6.7 g/dL / ALK PHOS: 115 U/L / ALT: 27 U/L / AST: 39 U/L / GGT: x           PT/INR - ( 29 Nov 2023 11:39 )   PT: 21.1 sec;   INR: 1.96 ratio         PTT - ( 29 Nov 2023 11:39 )  PTT:37.6 sec  Urinalysis Basic - ( 30 Nov 2023 07:28 )    Color: x / Appearance: x / SG: x / pH: x  Gluc: 80 mg/dL / Ketone: x  / Bili: x / Urobili: x   Blood: x / Protein: x / Nitrite: x   Leuk Esterase: x / RBC: x / WBC x   Sq Epi: x / Non Sq Epi: x / Bacteria: x             < from: MR Head No Cont (10.18.23 @ 14:33) >    ACC: [id number] EXAM:  MR BRAIN   ORDERED BY: WINSOME MARTIN     PROCEDURE DATE:  10/18/2023          INTERPRETATION:  CLINICAL INDICATION: Fall on Xarelto      Magnetic resonance imaging of the brain was carried out with transaxial   SPGR, FLAIR, fast spin echo T2 weighted images, axial susceptibility   weighted series, diffusion weighted series and sagittal T1 weighted   series on a 1.5 Payton magnet.    Comparison is made with the prior CT/CTA of 10/17/2023.    Ventricular and sulcal prominence is consistent with moderate atrophy.   There are bilateral old cerebellar infarcts. There are scattered infarcts   identified in the supratentorial region there is an infarct in the left   occipital subcortical white matter, left posterior temporalcortex the   right frontal cortex, the right centrum semiovale and the right medial   frontal cortex adjacent to the anterior body of the right lateral   ventricle. Bilaterality cysts suggestive of cardioembolic source. There   is no acute hemorrhage. There is a focus of hemosiderin adjacent to the   lateral border of the atrium of the right lateral ventricle.    The sellar and parasellar structures are unremarkable.    The paranasal sinuses are clear. There has been previous bilateral lens   replacement surgery. Ligamentous hypertrophy at the C1-2 level likely   related to degenerative changes.    IMPRESSION: Moderate atrophy. Small vessel white matter ischemic changes.   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   No acute hemorrhage.    --- End of Report ---         LAMBERTO ENRIQUE MD; Attending Radiologist  This document has been electronically signed. Oct 18 2023  2:58PM    < end of copied text >    
[facility]  [facility] [address]. Patel, S. Shah, G. Makc  [phone]  ([phone] - weekdays after 5pm and weekends)    Name: DELVIS MCBRIDE  Age/Gender: 89y Female  MRN: [id number]    Interval History:  Patient seen and examined this morning.   Resting comfortably. Notes reviewed  No concerning overnight events  Afebrile     Allergies: soaps and creams causes rash uses only sensitive skin soap (Rash)  piperacillin-tazobactam (Rash)  Voltaren (Rash)  neomycin (Unknown)  sulfa drugs (Unknown)  Eye cream from the 1960s Neocortef ?spelling caused eyes to becomes swollen (Unknown)      Objective:  Vitals:   T(F): 98.5 (11-29-23 @ 11:58), Max: 98.5 (11-29-23 @ 11:58)  HR: 107 (11-29-23 @ 11:58) (107 - 114)  BP: 122/87 (11-29-23 @ 11:58) (100/65 - 122/87)  RR: 18 (11-29-23 @ 11:58) (18 - 18)  SpO2: 94% (11-29-23 @ 11:58) (93% - 94%)  Physical Examination:  General: no acute distress, RA, nontoxic  HEENT: NC/AT, anicteric, neck supple  Respiratory: no acc muscle use, breathing comfortably  Cardiovascular: S1 and S2 present  Gastrointestinal: normal appearing, nondistended  Extremities: no edema, no cyanosis    Laboratory Studies:  CBC:                       12.1   8.31  )-----------( 320      ( 29 Nov 2023 11:39 )             39.1     WBC Trend:  8.31 11-29-23 @ 11:39  8.29 11-26-23 @ 07:28  8.04 11-24-23 @ 07:04  8.40 11-23-23 @ 07:23    CMP: 11-29    142  |  104  |  31<H>  ----------------------------<  86  3.9   |  25  |  1.35<H>    Ca    9.3      29 Nov 2023 11:39    TPro  6.7  /  Alb  3.3  /  TBili  1.3<H>  /  DBili  x   /  AST  39  /  ALT  27  /  AlkPhos  115  11-29    Creatinine: 1.35 mg/dL (11-29-23 @ 11:39)  Creatinine: 1.15 mg/dL (11-26-23 @ 07:28)  Creatinine: 1.23 mg/dL (11-24-23 @ 07:04)  Creatinine: 1.11 mg/dL (11-23-23 @ 07:23)    LIVER FUNCTIONS - ( 29 Nov 2023 11:39 )  Alb: 3.3 g/dL / Pro: 6.7 g/dL / ALK PHOS: 115 U/L / ALT: 27 U/L / AST: 39 U/L / GGT: x           Microbiology: reviewed   SARS-CoV-2 Result: Detected (22 Nov 2023 12:42)    Radiology: reviewed   < from: Xray Chest 1 View- PORTABLE-Routine (Xray Chest 1 View- PORTABLE-Routine .) (11.28.23 @ 15:40) >  FINDINGS:  Difficult to assess heart size in this AP projection.  New small bilateral pleural effusions.  No right lung focal consolidation. Patchiness at left lung base may be   due to atelectasis or to pneumonia in the right clinical setting. No   pneumothorax.    IMPRESSION:  New, small bilateral pleural effusions.  Left basilar atelectasis and/or pneumonia.    < end of copied text >    Medications:  acetaminophen     Tablet .. 650 milliGRAM(s) Oral every 6 hours PRN  apixaban 2.5 milliGRAM(s) Oral every 12 hours  artificial  tears Solution 1 Drop(s) Both EYES two times a day  atorvastatin 80 milliGRAM(s) Oral at bedtime  hydrocortisone 1% Cream 1 Application(s) Topical two times a day  melatonin 3 milliGRAM(s) Oral at bedtime PRN  metoprolol succinate ER 75 milliGRAM(s) Oral daily  mirtazapine 15 milliGRAM(s) Oral at bedtime  pantoprazole    Tablet 40 milliGRAM(s) Oral two times a day  triamcinolone 0.1% Ointment 1 Application(s) Topical two times a day  venlafaxine XR. 75 milliGRAM(s) Oral daily  zolpidem 5 milliGRAM(s) Oral at bedtime PRN  zolpidem 5 milliGRAM(s) Oral at bedtime PRN    Current Antimicrobials:    Prior/Completed Antimicrobials:  remdesivir  IVPB  remdesivir  IVPB  
Patient is a 89y old  Female who presents with a chief complaint of Anemia     (24 Nov 2023 11:01)      SUBJECTIVE / OVERNIGHT EVENTS: no new events. tolerating lower dose Eliquis. no GI bleed, on remdesivir. once completes remdesivir DC plan    MEDICATIONS  (STANDING):  apixaban 2.5 milliGRAM(s) Oral every 12 hours  artificial  tears Solution 1 Drop(s) Both EYES two times a day  atorvastatin 80 milliGRAM(s) Oral at bedtime  chlorhexidine 2% Cloths 1 Application(s) Topical <User Schedule>  iron sucrose IVPB 300 milliGRAM(s) IV Intermittent every 24 hours  metoprolol succinate ER 50 milliGRAM(s) Oral daily  mirtazapine 15 milliGRAM(s) Oral at bedtime  pantoprazole    Tablet 40 milliGRAM(s) Oral two times a day  remdesivir  IVPB 100 milliGRAM(s) IV Intermittent every 24 hours  sacubitril 24 mG/valsartan 26 mG 1 Tablet(s) Oral two times a day  sodium chloride 0.45%. 1000 milliLiter(s) (75 mL/Hr) IV Continuous <Continuous>  venlafaxine XR. 75 milliGRAM(s) Oral daily    MEDICATIONS  (PRN):  acetaminophen     Tablet .. 650 milliGRAM(s) Oral every 6 hours PRN Temp greater or equal to 38C (100.4F), Mild Pain (1 - 3)  melatonin 3 milliGRAM(s) Oral at bedtime PRN Insomnia  zolpidem 5 milliGRAM(s) Oral at bedtime PRN Insomnia  zolpidem 5 milliGRAM(s) Oral at bedtime PRN Insomnia      Vital Signs Last 24 Hrs  T(F): 97.8 (11-24-23 @ 12:51), Max: 98.1 (11-24-23 @ 04:59)  HR: 99 (11-24-23 @ 12:51) (92 - 99)  BP: 115/78 (11-24-23 @ 12:51) (109/77 - 115/84)  RR: 18 (11-24-23 @ 12:51) (18 - 18)  SpO2: 94% (11-24-23 @ 12:51) (94% - 98%)  Telemetry:   CAPILLARY BLOOD GLUCOSE        I&O's Summary    23 Nov 2023 07:01  -  24 Nov 2023 07:00  --------------------------------------------------------  IN: 2510 mL / OUT: 1050 mL / NET: 1460 mL        PHYSICAL EXAM:  GENERAL: NAD, well-developed  HEAD:  Atraumatic, Normocephalic  EYES: EOMI, PERRLA, conjunctiva and sclera clear  NECK: Supple, No JVD  CHEST/LUNG: Clear to auscultation bilaterally; No wheeze  HEART: Regular rate and rhythm; No murmurs, rubs, or gallops  ABDOMEN: Soft, Nontender, Nondistended; Bowel sounds present  EXTREMITIES:  2+ Peripheral Pulses, No clubbing, cyanosis, or edema  PSYCH: AAOx3  NEUROLOGY: non-focal  SKIN: No rashes or lesions    LABS:                        11.4   8.04  )-----------( 370      ( 24 Nov 2023 07:04 )             35.5     11-24    140  |  106  |  37<H>  ----------------------------<  81  4.5   |  20<L>  |  1.23    Ca    8.7      24 Nov 2023 07:04    TPro  6.1  /  Alb  2.9<L>  /  TBili  0.6  /  DBili  x   /  AST  47<H>  /  ALT  38  /  AlkPhos  130<H>  11-24    PT/INR - ( 24 Nov 2023 07:04 )   PT: 19.8 sec;   INR: 1.92 ratio         PTT - ( 24 Nov 2023 07:04 )  PTT:35.1 sec      Urinalysis Basic - ( 24 Nov 2023 07:04 )    Color: x / Appearance: x / SG: x / pH: x  Gluc: 81 mg/dL / Ketone: x  / Bili: x / Urobili: x   Blood: x / Protein: x / Nitrite: x   Leuk Esterase: x / RBC: x / WBC x   Sq Epi: x / Non Sq Epi: x / Bacteria: x        RADIOLOGY & ADDITIONAL TESTS:    Imaging Personally Reviewed:    Consultant(s) Notes Reviewed:      Care Discussed with Consultants/Other Providers:  
        Interval Events:   no GI Bleeding         acetaminophen     Tablet .. 650 milliGRAM(s) Oral every 6 hours PRN  apixaban 2.5 milliGRAM(s) Oral every 12 hours  artificial  tears Solution 1 Drop(s) Both EYES two times a day  atorvastatin 80 milliGRAM(s) Oral at bedtime  chlorhexidine 2% Cloths 1 Application(s) Topical <User Schedule>  hydrocortisone 1% Cream 1 Application(s) Topical two times a day  iron sucrose IVPB 300 milliGRAM(s) IV Intermittent every 24 hours  melatonin 3 milliGRAM(s) Oral at bedtime PRN  metoprolol succinate ER 50 milliGRAM(s) Oral daily  mirtazapine 15 milliGRAM(s) Oral at bedtime  pantoprazole    Tablet 40 milliGRAM(s) Oral two times a day  sacubitril 24 mG/valsartan 26 mG 1 Tablet(s) Oral two times a day  sodium chloride 0.45%. 1000 milliLiter(s) IV Continuous <Continuous>  venlafaxine XR. 75 milliGRAM(s) Oral daily  zolpidem 5 milliGRAM(s) Oral at bedtime PRN  zolpidem 5 milliGRAM(s) Oral at bedtime PRN                            11.4   8.04  )-----------( 370      ( 24 Nov 2023 07:04 )             35.5       Hemoglobin: 11.4 g/dL (11-24 @ 07:04)  Hemoglobin: 10.1 g/dL (11-23 @ 07:23)  Hemoglobin: 8.1 g/dL (11-22 @ 12:43)      11-24    140  |  106  |  37<H>  ----------------------------<  81  4.5   |  20<L>  |  1.23    Ca    8.7      24 Nov 2023 07:04    TPro  6.1  /  Alb  2.9<L>  /  TBili  0.6  /  DBili  x   /  AST  47<H>  /  ALT  38  /  AlkPhos  130<H>  11-24    Creatinine Trend: 1.23<--, 1.11<--, 1.33<--    COAGS:           T(C): 36.9 (11-25-23 @ 04:18), Max: 36.9 (11-25-23 @ 04:18)  HR: 96 (11-25-23 @ 04:18) (86 - 99)  BP: 100/57 (11-25-23 @ 04:18) (100/57 - 115/78)  RR: 19 (11-25-23 @ 04:18) (18 - 19)  SpO2: 95% (11-25-23 @ 04:18) (93% - 95%)  Wt(kg): --    I&O's Summary    24 Nov 2023 07:01  -  25 Nov 2023 07:00  --------------------------------------------------------  IN: 360 mL / OUT: 400 mL / NET: -40 mL        Review of Systems:  General:  No wt loss, fevers, chills, night sweats, fatigue,   Eyes:  Good vision, no reported pain  ENT:  No sore throat, pain, runny nose, dysphagia  CV:  No pain, palpitations, hypo/hypertension  Resp:  No dyspnea, cough, tachypnea, wheezing  GI:  See HPI  :  No pain, bleeding, incontinence, nocturia  Muscle:  No pain, weakness  Neuro:  No weakness, tingling, memory problems  Psych:  No fatigue, insomnia, mood problems, depression  Endocrine:  No polyuria, polydipsia, cold/heat intolerance  Heme:  No petechiae, ecchymosis, easy bruisability  Integumentary:  No rash, edema        PHYSICAL EXAM:     GENERAL:  Appears stated age, well-groomed, well-nourished, no distress  HEENT:  NC/AT,  conjunctivae anicteric, clear and pink,   NECK: supple, trachea midline  CHEST:  Full & symmetric excursion, no increased effort, breath sounds clear  HEART:  Regular rhythm, no JVD  ABDOMEN:  Soft, non-tender, non-distended, normoactive bowel sounds,  no masses , no hepatosplenomegaly  EXTREMITIES:  no cyanosis,clubbing or edema  SKIN:  No rash, erythema, or, ecchymoses, no jaundice  NEURO:  Alert, non-focal, no asterixis  PSYCH: Appropriate affect, oriented to place and time  RECTAL: Deferred               
  Chief Complaint:  Patient is a 89y old  Female who presents with a chief complaint of GI bleed, acute blood loss anemia (23 Nov 2023 09:26)      Date of service 11-25-23       Interval Events:   no GI Bleeding         acetaminophen     Tablet .. 650 milliGRAM(s) Oral every 6 hours PRN  apixaban 2.5 milliGRAM(s) Oral every 12 hours  artificial  tears Solution 1 Drop(s) Both EYES two times a day  atorvastatin 80 milliGRAM(s) Oral at bedtime  chlorhexidine 2% Cloths 1 Application(s) Topical <User Schedule>  hydrocortisone 1% Cream 1 Application(s) Topical two times a day  iron sucrose IVPB 300 milliGRAM(s) IV Intermittent every 24 hours  melatonin 3 milliGRAM(s) Oral at bedtime PRN  metoprolol succinate ER 50 milliGRAM(s) Oral daily  mirtazapine 15 milliGRAM(s) Oral at bedtime  pantoprazole    Tablet 40 milliGRAM(s) Oral two times a day  sacubitril 24 mG/valsartan 26 mG 1 Tablet(s) Oral two times a day  sodium chloride 0.45%. 1000 milliLiter(s) IV Continuous <Continuous>  venlafaxine XR. 75 milliGRAM(s) Oral daily  zolpidem 5 milliGRAM(s) Oral at bedtime PRN  zolpidem 5 milliGRAM(s) Oral at bedtime PRN                            11.4   8.04  )-----------( 370      ( 24 Nov 2023 07:04 )             35.5       Hemoglobin: 11.4 g/dL (11-24 @ 07:04)  Hemoglobin: 10.1 g/dL (11-23 @ 07:23)  Hemoglobin: 8.1 g/dL (11-22 @ 12:43)      11-24    140  |  106  |  37<H>  ----------------------------<  81  4.5   |  20<L>  |  1.23    Ca    8.7      24 Nov 2023 07:04    TPro  6.1  /  Alb  2.9<L>  /  TBili  0.6  /  DBili  x   /  AST  47<H>  /  ALT  38  /  AlkPhos  130<H>  11-24    Creatinine Trend: 1.23<--, 1.11<--, 1.33<--    COAGS:           T(C): 36.9 (11-25-23 @ 04:18), Max: 36.9 (11-25-23 @ 04:18)  HR: 96 (11-25-23 @ 04:18) (86 - 99)  BP: 100/57 (11-25-23 @ 04:18) (100/57 - 115/78)  RR: 19 (11-25-23 @ 04:18) (18 - 19)  SpO2: 95% (11-25-23 @ 04:18) (93% - 95%)  Wt(kg): --    I&O's Summary    24 Nov 2023 07:01  -  25 Nov 2023 07:00  --------------------------------------------------------  IN: 360 mL / OUT: 400 mL / NET: -40 mL        Review of Systems:  General:  No wt loss, fevers, chills, night sweats, fatigue,   Eyes:  Good vision, no reported pain  ENT:  No sore throat, pain, runny nose, dysphagia  CV:  No pain, palpitations, hypo/hypertension  Resp:  No dyspnea, cough, tachypnea, wheezing  GI:  See HPI  :  No pain, bleeding, incontinence, nocturia  Muscle:  No pain, weakness  Neuro:  No weakness, tingling, memory problems  Psych:  No fatigue, insomnia, mood problems, depression  Endocrine:  No polyuria, polydipsia, cold/heat intolerance  Heme:  No petechiae, ecchymosis, easy bruisability  Integumentary:  No rash, edema        PHYSICAL EXAM:     GENERAL:  Appears stated age, well-groomed, well-nourished, no distress  HEENT:  NC/AT,  conjunctivae anicteric, clear and pink,   NECK: supple, trachea midline  CHEST:  Full & symmetric excursion, no increased effort, breath sounds clear  HEART:  Regular rhythm, no JVD  ABDOMEN:  Soft, non-tender, non-distended, normoactive bowel sounds,  no masses , no hepatosplenomegaly  EXTREMITIES:  no cyanosis,clubbing or edema  SKIN:  No rash, erythema, or, ecchymoses, no jaundice  NEURO:  Alert, non-focal, no asterixis  PSYCH: Appropriate affect, oriented to place and time  RECTAL: Deferred               
CARDIOLOGY FOLLOW UP - Dr. Mccartney  DATE OF SERVICE: 12/1/23     CC no acute events       REVIEW OF SYSTEMS:  CONSTITUTIONAL: No fever, weight loss, or fatigue  RESPIRATORY: No cough, wheezing, chills or hemoptysis; No Shortness of Breath  CARDIOVASCULAR: No chest pain, palpitations, passing out, dizziness, or leg swelling  GASTROINTESTINAL: No abdominal or epigastric pain. No nausea, vomiting, or hematemesis; No diarrhea or constipation. No melena or hematochezia.  VASCULAR: No edema     PHYSICAL EXAM:  T(C): 37.1 (12-01-23 @ 11:54), Max: 37.1 (12-01-23 @ 11:54)  HR: 92 (12-01-23 @ 11:54) (92 - 100)  BP: 112/79 (12-01-23 @ 11:54) (100/61 - 112/79)  RR: 18 (12-01-23 @ 11:54) (18 - 18)  SpO2: 98% (12-01-23 @ 11:54) (95% - 98%)  Wt(kg): --  I&O's Summary    30 Nov 2023 07:01  -  01 Dec 2023 07:00  --------------------------------------------------------  IN: 1040 mL / OUT: 0 mL / NET: 1040 mL        Appearance: Normal	  Cardiovascular: Normal S1 S2,RRR, No JVD, No murmurs  Respiratory: Lungs clear to auscultation	  Gastrointestinal:  Soft, Non-tender, + BS	  Extremities: Normal range of motion, No clubbing, cyanosis or edema      Home Medications:  acetaminophen 325 mg oral tablet: 2 tab(s) orally every 6 hours, As needed, Temp greater or equal to 38C (100.4F), Mild Pain (1 - 3) (22 Nov 2023 14:42)  Artificial Tears ophthalmic solution: 1 drop(s) in each eye once a day (22 Nov 2023 14:45)  atorvastatin 80 mg oral tablet: 1 tab(s) orally once a day (at bedtime) (22 Nov 2023 14:42)  melatonin 3 mg oral tablet: 1 tab(s) orally once a day (at bedtime) As needed Insomnia (22 Nov 2023 14:42)  Metoprolol Succinate ER 50 mg oral tablet, extended release: 1 tab(s) orally every 12 hours , hold for SBP &lt;100 or HR &lt;55 (22 Nov 2023 14:45)  mirtazapine 15 mg oral tablet: 1 tab(s) orally once a day (at bedtime) (22 Nov 2023 14:45)  pantoprazole 40 mg oral delayed release tablet: 1 tab(s) orally 2 times a day (22 Nov 2023 14:38)  polyethylene glycol 3350 oral powder for reconstitution: 17 gram(s) orally once a day as needed for  constipation (22 Nov 2023 14:46)  sacubitril-valsartan 24 mg-26 mg oral tablet: 1 tab(s) orally 2 times a day (22 Nov 2023 14:42)  venlafaxine 75 mg oral capsule, extended release: 1 cap(s) orally once a day (22 Nov 2023 14:42)  Vitamin D3 1250 mcg (50,000 intl units) oral capsule: 1 cap(s) orally once a week (22 Nov 2023 14:42)  Zofran 4 mg oral tablet: 1 tab(s) orally every 8 hours as needed for  nausea x 7 days (22 Nov 2023 14:45)  zolpidem 10 mg oral tablet: 1 tab(s) orally once a day (at bedtime) (22 Nov 2023 14:45)      MEDICATIONS  (STANDING):  apixaban 2.5 milliGRAM(s) Oral every 12 hours  artificial  tears Solution 1 Drop(s) Both EYES two times a day  atorvastatin 80 milliGRAM(s) Oral at bedtime  hydrocortisone 1% Cream 1 Application(s) Topical two times a day  metoprolol succinate ER 75 milliGRAM(s) Oral daily  mirtazapine 15 milliGRAM(s) Oral at bedtime  pantoprazole    Tablet 40 milliGRAM(s) Oral two times a day  triamcinolone 0.1% Ointment 1 Application(s) Topical two times a day  venlafaxine XR. 75 milliGRAM(s) Oral daily      TELEMETRY: 	    ECG:  	  RADIOLOGY:   DIAGNOSTIC TESTING:  [ ] Echocardiogram:  [ ]  Catheterization:  [ ] Stress Test:    OTHER: 	    LABS:	 	                            11.5   7.62  )-----------( 348      ( 30 Nov 2023 07:29 )             36.9     11-30    141  |  105  |  36<H>  ----------------------------<  80  4.3   |  23  |  1.40<H>    Ca    8.9      30 Nov 2023 07:28              
Interval Events:       no GI Bleeding         acetaminophen     Tablet .. 650 milliGRAM(s) Oral every 6 hours PRN  apixaban 2.5 milliGRAM(s) Oral every 12 hours  artificial  tears Solution 1 Drop(s) Both EYES two times a day  atorvastatin 80 milliGRAM(s) Oral at bedtime  chlorhexidine 2% Cloths 1 Application(s) Topical <User Schedule>  hydrocortisone 1% Cream 1 Application(s) Topical two times a day  iron sucrose IVPB 300 milliGRAM(s) IV Intermittent every 24 hours  melatonin 3 milliGRAM(s) Oral at bedtime PRN  metoprolol succinate ER 50 milliGRAM(s) Oral daily  mirtazapine 15 milliGRAM(s) Oral at bedtime  pantoprazole    Tablet 40 milliGRAM(s) Oral two times a day  sacubitril 24 mG/valsartan 26 mG 1 Tablet(s) Oral two times a day  sodium chloride 0.45%. 1000 milliLiter(s) IV Continuous <Continuous>  venlafaxine XR. 75 milliGRAM(s) Oral daily  zolpidem 5 milliGRAM(s) Oral at bedtime PRN  zolpidem 5 milliGRAM(s) Oral at bedtime PRN    LABS:                        11.5   7.62  )-----------( 348      ( 30 Nov 2023 07:29 )             36.9     11-30    141  |  105  |  36<H>  ----------------------------<  80  4.3   |  23  |  1.40<H>    Ca    8.9      30 Nov 2023 07:28    TPro  6.7  /  Alb  3.3  /  TBili  1.3<H>  /  DBili  x   /  AST  39  /  ALT  27  /  AlkPhos  115  11-29    PT/INR - ( 29 Nov 2023 11:39 )   PT: 21.1 sec;   INR: 1.96 ratio         PTT - ( 29 Nov 2023 11:39 )  PTT:37.6 sec  Urinalysis Basic - ( 30 Nov 2023 07:28 )    Color: x / Appearance: x / SG: x / pH: x  Gluc: 80 mg/dL / Ketone: x  / Bili: x / Urobili: x   Blood: x / Protein: x / Nitrite: x   Leuk Esterase: x / RBC: x / WBC x   Sq Epi: x / Non Sq Epi: x / Bacteria: x          Vital Signs:   Vital Signs Last 24 Hrs  T(C): 36.6 (30 Nov 2023 05:12), Max: 36.9 (29 Nov 2023 11:58)  T(F): 97.8 (30 Nov 2023 05:12), Max: 98.5 (29 Nov 2023 11:58)  HR: 104 (30 Nov 2023 05:12) (97 - 107)  BP: 109/80 (30 Nov 2023 05:12) (100/65 - 122/87)  BP(mean): --  RR: 18 (30 Nov 2023 05:12) (18 - 18)  SpO2: 95% (30 Nov 2023 05:12) (94% - 98%)    Parameters below as of 30 Nov 2023 05:12  Patient On (Oxygen Delivery Method): room air      Daily     Daily         Review of Systems:  General:  No wt loss, fevers, chills, night sweats, fatigue,   Eyes:  Good vision, no reported pain  ENT:  No sore throat, pain, runny nose, dysphagia  CV:  No pain, palpitations, hypo/hypertension  Resp:  No dyspnea, cough, tachypnea, wheezing  GI:  See HPI  :  No pain, bleeding, incontinence, nocturia  Muscle:  No pain, weakness  Neuro:  No weakness, tingling, memory problems  Psych:  No fatigue, insomnia, mood problems, depression  Endocrine:  No polyuria, polydipsia, cold/heat intolerance  Heme:  No petechiae, ecchymosis, easy bruisability  Integumentary:  No rash, edema        PHYSICAL EXAM:     GENERAL:  Appears stated age, well-groomed, well-nourished, no distress  HEENT:  NC/AT,  conjunctivae anicteric, clear and pink,   NECK: supple, trachea midline  CHEST:  Full & symmetric excursion, no increased effort, breath sounds clear  HEART:  Regular rhythm, no JVD  ABDOMEN:  Soft, non-tender, non-distended, normoactive bowel sounds,  no masses , no hepatosplenomegaly  EXTREMITIES:  no cyanosis,clubbing or edema  SKIN:  No rash, erythema, or, ecchymoses, no jaundice  NEURO:  Alert, non-focal, no asterixis  PSYCH: Appropriate affect, oriented to place and time  RECTAL: Deferred               
Neurology        S: patient seen. on covid precuations       Medications: MEDICATIONS  (STANDING):  apixaban 2.5 milliGRAM(s) Oral every 12 hours  artificial  tears Solution 1 Drop(s) Both EYES two times a day  atorvastatin 80 milliGRAM(s) Oral at bedtime  chlorhexidine 2% Cloths 1 Application(s) Topical <User Schedule>  iron sucrose IVPB 300 milliGRAM(s) IV Intermittent every 24 hours  metoprolol succinate ER 50 milliGRAM(s) Oral daily  mirtazapine 15 milliGRAM(s) Oral at bedtime  pantoprazole    Tablet 40 milliGRAM(s) Oral two times a day  remdesivir  IVPB 100 milliGRAM(s) IV Intermittent every 24 hours  sacubitril 24 mG/valsartan 26 mG 1 Tablet(s) Oral two times a day  sodium chloride 0.45%. 1000 milliLiter(s) (75 mL/Hr) IV Continuous <Continuous>  venlafaxine XR. 75 milliGRAM(s) Oral daily    MEDICATIONS  (PRN):  acetaminophen     Tablet .. 650 milliGRAM(s) Oral every 6 hours PRN Temp greater or equal to 38C (100.4F), Mild Pain (1 - 3)  melatonin 3 milliGRAM(s) Oral at bedtime PRN Insomnia  zolpidem 5 milliGRAM(s) Oral at bedtime PRN Insomnia  zolpidem 5 milliGRAM(s) Oral at bedtime PRN Insomnia       Vitals:  Vital Signs Last 24 Hrs  T(C): 36.7 (24 Nov 2023 04:59), Max: 36.8 (23 Nov 2023 13:35)  T(F): 98.1 (24 Nov 2023 04:59), Max: 98.3 (23 Nov 2023 13:35)  HR: 92 (24 Nov 2023 04:59) (91 - 97)  BP: 115/84 (24 Nov 2023 04:59) (93/63 - 115/84)  BP(mean): --  RR: 18 (24 Nov 2023 04:59) (18 - 18)  SpO2: 98% (24 Nov 2023 04:59) (94% - 98%)    Parameters below as of 24 Nov 2023 04:59  Patient On (Oxygen Delivery Method): room air          General Exam:   General Appearance: Appropriately dressed and in no acute distress       Head: Normocephalic, atraumatic and no dysmorphic features  Ear, Nose, and Throat: Moist mucous membranes  CVS: S1S2+  Resp: No SOB, no wheeze or rhonchi  GI: soft NT/ND  Extremities: No edema or cyanosis  Skin: No bruises or rashes     Neurological Exam:  Mental Status: Awake, alert and oriented x 2.  Able to follow simple and complex verbal commands. Able to name and repeat. fluent speech. No obvious aphasia or dysarthria noted.   Cranial Nerves: PERRL, EOMI, VFFC, sensation V1-V3 intact,  no obvious facial asymmetry, equal elevation of palate, scm/trap 5/5, tongue is midline on protrusion. no obvious papilledema on fundoscopic exam. hearing is grossly intact.   Motor: Normal bulk, tone and strength throughout. mild L HP from stroke moving uppers > lowers   Sensation: Intact to light touch and pinprick throughout. no right/left confusion. no extinction to tactile on DSS.   Reflexes: 1+ throughout at biceps, brachioradialis, triceps, patellars and ankles bilaterally and equal. No clonus. R toe and L toe were both downgoing.  Coordination: No dysmetria on FNF    Gait: unable     Data/Labs/Imaging which I personally reviewed.     Labs:       LABS:                          11.4   8.04  )-----------( 370      ( 24 Nov 2023 07:04 )             35.5     11-24    140  |  106  |  37<H>  ----------------------------<  81  4.5   |  20<L>  |  1.23    Ca    8.7      24 Nov 2023 07:04    TPro  6.1  /  Alb  2.9<L>  /  TBili  0.6  /  DBili  x   /  AST  47<H>  /  ALT  38  /  AlkPhos  130<H>  11-24    LIVER FUNCTIONS - ( 24 Nov 2023 07:04 )  Alb: 2.9 g/dL / Pro: 6.1 g/dL / ALK PHOS: 130 U/L / ALT: 38 U/L / AST: 47 U/L / GGT: x           PT/INR - ( 24 Nov 2023 07:04 )   PT: 19.8 sec;   INR: 1.92 ratio         PTT - ( 24 Nov 2023 07:04 )  PTT:35.1 sec  Urinalysis Basic - ( 24 Nov 2023 07:04 )    Color: x / Appearance: x / SG: x / pH: x  Gluc: 81 mg/dL / Ketone: x  / Bili: x / Urobili: x   Blood: x / Protein: x / Nitrite: x   Leuk Esterase: x / RBC: x / WBC x   Sq Epi: x / Non Sq Epi: x / Bacteria: x            < from: MR Head No Cont (10.18.23 @ 14:33) >    ACC: [id number] EXAM:  MR BRAIN   ORDERED BY: WINSOME MARTIN     PROCEDURE DATE:  10/18/2023          INTERPRETATION:  CLINICAL INDICATION: Fall on Xarelto      Magnetic resonance imaging of the brain was carried out with transaxial   SPGR, FLAIR, fast spin echo T2 weighted images, axial susceptibility   weighted series, diffusion weighted series and sagittal T1 weighted   series on a 1.5 Payton magnet.    Comparison is made with the prior CT/CTA of 10/17/2023.    Ventricular and sulcal prominence is consistent with moderate atrophy.   There are bilateral old cerebellar infarcts. There are scattered infarcts   identified in the supratentorial region there is an infarct in the left   occipital subcortical white matter, left posterior temporalcortex the   right frontal cortex, the right centrum semiovale and the right medial   frontal cortex adjacent to the anterior body of the right lateral   ventricle. Bilaterality cysts suggestive of cardioembolic source. There   is no acute hemorrhage. There is a focus of hemosiderin adjacent to the   lateral border of the atrium of the right lateral ventricle.    The sellar and parasellar structures are unremarkable.    The paranasal sinuses are clear. There has been previous bilateral lens   replacement surgery. Ligamentous hypertrophy at the C1-2 level likely   related to degenerative changes.    IMPRESSION: Moderate atrophy. Small vessel white matter ischemic changes.   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   No acute hemorrhage.    --- End of Report ---            LAMBERTO ENRIQUE MD; Attending Radiologist  This document has been electronically signed. Oct 18 2023  2:58PM    < end of copied text >    
[facility], Division of Infectious Diseases  [facility] [address] [facility], [place]. [place], [place]. [place]  [phone]  ([phone] - weekdays after 5pm and weekends)    Name: DELVIS MCBRIDE  Age/Gender: 89y Female  MRN: [id number]    Interval History:  Notes reviewed.   No concerning overnight events.  Afebrile.     Allergies: soaps and creams causes rash uses only sensitive skin soap (Rash)  piperacillin-tazobactam (Rash)  Voltaren (Rash)  neomycin (Unknown)  sulfa drugs (Unknown)  Eye cream from the 1960s Neocortef ?spelling caused eyes to becomes swollen (Unknown)      Objective:  Vitals:   T(F): 97.6 (11-26-23 @ 11:41), Max: 97.7 (11-26-23 @ 05:38)  HR: 83 (11-26-23 @ 11:41) (83 - 113)  BP: 91/63 (11-26-23 @ 11:41) (91/63 - 120/73)  RR: 18 (11-26-23 @ 11:41) (18 - 19)  SpO2: 96% (11-26-23 @ 11:41) (93% - 97%)  Physical Examination:  General: no acute distress  HEENT: anicteric  Cardio: normal rate  Resp: breathing comfortably on RA   Abd: soft, ND  Ext: no LE edema  Skin: warm, dry    Laboratory Studies:  CBC:                       10.3   8.29  )-----------( 387      ( 26 Nov 2023 07:28 )             33.0     WBC Trend:  8.29 11-26-23 @ 07:28  8.04 11-24-23 @ 07:04  8.40 11-23-23 @ 07:23  7.03 11-22-23 @ 12:43    CMP: 11-26    142  |  106  |  26<H>  ----------------------------<  91  4.1   |  20<L>  |  1.15    Ca    9.0      26 Nov 2023 07:28            Urinalysis Basic - ( 26 Nov 2023 07:28 )    Color: x / Appearance: x / SG: x / pH: x  Gluc: 91 mg/dL / Ketone: x  / Bili: x / Urobili: x   Blood: x / Protein: x / Nitrite: x   Leuk Esterase: x / RBC: x / WBC x   Sq Epi: x / Non Sq Epi: x / Bacteria: x      Microbiology: reviewed       Radiology: reviewed     Medications:  acetaminophen     Tablet .. 650 milliGRAM(s) Oral every 6 hours PRN  apixaban 2.5 milliGRAM(s) Oral every 12 hours  artificial  tears Solution 1 Drop(s) Both EYES two times a day  atorvastatin 80 milliGRAM(s) Oral at bedtime  chlorhexidine 2% Cloths 1 Application(s) Topical <User Schedule>  hydrocortisone 1% Cream 1 Application(s) Topical two times a day  melatonin 3 milliGRAM(s) Oral at bedtime PRN  metoprolol succinate ER 50 milliGRAM(s) Oral daily  mirtazapine 15 milliGRAM(s) Oral at bedtime  pantoprazole    Tablet 40 milliGRAM(s) Oral two times a day  sacubitril 24 mG/valsartan 26 mG 1 Tablet(s) Oral two times a day  venlafaxine XR. 75 milliGRAM(s) Oral daily  zolpidem 5 milliGRAM(s) Oral at bedtime PRN  zolpidem 5 milliGRAM(s) Oral at bedtime PRN    Antimicrobials:  
Patient is a 89y old  Female who presents with a chief complaint of GI bleed, acute blood loss anemia (26 Nov 2023 18:08)      SUBJECTIVE / OVERNIGHT EVENTS:    Events noted.  Confused  No N/V  No SOB    MEDICATIONS  (STANDING):  apixaban 2.5 milliGRAM(s) Oral every 12 hours  artificial  tears Solution 1 Drop(s) Both EYES two times a day  atorvastatin 80 milliGRAM(s) Oral at bedtime  chlorhexidine 2% Cloths 1 Application(s) Topical <User Schedule>  hydrocortisone 1% Cream 1 Application(s) Topical two times a day  metoprolol succinate ER 50 milliGRAM(s) Oral daily  mirtazapine 15 milliGRAM(s) Oral at bedtime  pantoprazole    Tablet 40 milliGRAM(s) Oral two times a day  sacubitril 24 mG/valsartan 26 mG 1 Tablet(s) Oral two times a day  venlafaxine XR. 75 milliGRAM(s) Oral daily    MEDICATIONS  (PRN):  acetaminophen     Tablet .. 650 milliGRAM(s) Oral every 6 hours PRN Temp greater or equal to 38C (100.4F), Mild Pain (1 - 3)  melatonin 3 milliGRAM(s) Oral at bedtime PRN Insomnia  zolpidem 5 milliGRAM(s) Oral at bedtime PRN Insomnia  zolpidem 5 milliGRAM(s) Oral at bedtime PRN Insomnia        CAPILLARY BLOOD GLUCOSE        I&O's Summary    25 Nov 2023 07:01  -  26 Nov 2023 07:00  --------------------------------------------------------  IN: 840 mL / OUT: 0 mL / NET: 840 mL    26 Nov 2023 07:01  -  26 Nov 2023 23:23  --------------------------------------------------------  IN: 680 mL / OUT: 0 mL / NET: 680 mL        T(C): 36.9 (11-26-23 @ 20:23), Max: 36.9 (11-26-23 @ 20:23)  HR: 80 (11-26-23 @ 20:23) (80 - 113)  BP: 103/65 (11-26-23 @ 20:23) (91/63 - 120/73)  RR: 18 (11-26-23 @ 20:23) (18 - 18)  SpO2: 95% (11-26-23 @ 20:23) (93% - 96%)    PHYSICAL EXAM:    NECK: Supple, No JVD  CHEST/LUNG: Clear to auscultation bilaterally; No wheezing.  HEART: Regular rate and rhythm; No murmurs, rubs, or gallops  ABDOMEN: Soft, Nontender, Nondistended; Bowel sounds present  EXTREMITIES:   No edema  NEUROLOGY:  Awake/Confused      LABS:                        10.3   8.29  )-----------( 387      ( 26 Nov 2023 07:28 )             33.0     11-26    142  |  106  |  26<H>  ----------------------------<  91  4.1   |  20<L>  |  1.15    Ca    9.0      26 Nov 2023 07:28            Urinalysis Basic - ( 26 Nov 2023 07:28 )    Color: x / Appearance: x / SG: x / pH: x  Gluc: 91 mg/dL / Ketone: x  / Bili: x / Urobili: x   Blood: x / Protein: x / Nitrite: x   Leuk Esterase: x / RBC: x / WBC x   Sq Epi: x / Non Sq Epi: x / Bacteria: x      CAPILLARY BLOOD GLUCOSE            RADIOLOGY & ADDITIONAL TESTS:    Imaging Personally Reviewed:    Consultant(s) Notes Reviewed:      Care Discussed with Consultants/Other Providers:    Christian Luis MD, CMD, FACP    [id number] [place] [address]  Office Tel: [phone]  Cell: [phone]

## 2023-12-01 NOTE — DISCHARGE NOTE PROVIDER - NSDCCAREPROVSEEN_GEN_ALL_CORE_FT
Sandy, Luis Enrique Mccartney, Jose A Mccartney, Stas Ortiz, Vanessa Luis, Christian Novak, Ayo Gomez, Edelmira MESSINA

## 2023-12-01 NOTE — PROGRESS NOTE ADULT - TIME BILLING
Agree with above NP note.  cv stable  cont current tx
Agree with above PA note.  cv stable  cont current tx
agree with above  cv stable  cont current mgmt
agree with above  cv stable  cont current mgmt
Agree with above PA note.  cv stable  cont current tx

## 2023-12-01 NOTE — DISCHARGE NOTE PROVIDER - NSDCCPCAREPLAN_GEN_ALL_CORE_FT
PRINCIPAL DISCHARGE DIAGNOSIS  Diagnosis: Anemia  Assessment and Plan of Treatment: s/p Transfuse 1U PRBC, Stable.      SECONDARY DISCHARGE DIAGNOSES  Diagnosis: 2019 novel coronavirus disease (COVID-19)  Assessment and Plan of Treatment: resolved

## 2023-12-01 NOTE — DISCHARGE NOTE PROVIDER - DETAILS OF MALNUTRITION DIAGNOSIS/DIAGNOSES
This patient has been assessed with a concern for Malnutrition and was treated during this hospitalization for the following Nutrition diagnosis/diagnoses:     -  11/24/2023: Underweight (BMI < 19)

## 2023-12-01 NOTE — DISCHARGE NOTE NURSING/CASE MANAGEMENT/SOCIAL WORK - NSDCPEFALRISK_GEN_ALL_CORE
For information on Fall & Injury Prevention, visit: [email or web address] OR  [email or web address] OR  https://www.cdc.gov/steadi/patient.html

## 2023-12-01 NOTE — PROGRESS NOTE ADULT - NUTRITIONAL ASSESSMENT
This patient has been assessed with a concern for Malnutrition and has been determined to have a diagnosis/diagnoses of Underweight (BMI < 19).    This patient is being managed with:   Diet Soft and Bite Sized-  Entered: Nov 23 2023  9:44AM  
This patient has been assessed with a concern for Malnutrition and has been determined to have a diagnosis/diagnoses of Underweight (BMI < 19).    This patient is being managed with:   Diet Soft and Bite Sized-  Entered: Nov 23 2023  9:44AM  
This patient has been assessed with a concern for Malnutrition and has been determined to have a diagnosis/diagnoses of Underweight (BMI < 19).    This patient is being managed with:   Diet NPO-  Except Medications  With Ice Chips/Sips of Water  Entered: Nov 28 2023  9:02AM  
This patient has been assessed with a concern for Malnutrition and has been determined to have a diagnosis/diagnoses of Underweight (BMI < 19).    This patient is being managed with:   Diet Soft and Bite Sized-  Entered: Nov 23 2023  9:44AM  
This patient has been assessed with a concern for Malnutrition and has been determined to have a diagnosis/diagnoses of Underweight (BMI < 19).    This patient is being managed with:   Diet Soft and Bite Sized-  Entered: Nov 23 2023  9:44AM  
This patient has been assessed with a concern for Malnutrition and has been determined to have a diagnosis/diagnoses of Underweight (BMI < 19).    This patient is being managed with:   Diet Soft and Bite Sized-  Entered: Nov 29 2023  5:04PM  
This patient has been assessed with a concern for Malnutrition and has been determined to have a diagnosis/diagnoses of Underweight (BMI < 19).    This patient is being managed with:   Diet Soft and Bite Sized-  Entered: Nov 23 2023  9:44AM  
This patient has been assessed with a concern for Malnutrition and has been determined to have a diagnosis/diagnoses of Underweight (BMI < 19).    This patient is being managed with:   Diet Soft and Bite Sized-  Entered: Nov 29 2023  5:04PM  
This patient has been assessed with a concern for Malnutrition and has been determined to have a diagnosis/diagnoses of Underweight (BMI < 19).    This patient is being managed with:   Diet Soft and Bite Sized-  Entered: Nov 29 2023  5:04PM  
This patient has been assessed with a concern for Malnutrition and has been determined to have a diagnosis/diagnoses of Underweight (BMI < 19).    This patient is being managed with:   Diet Soft and Bite Sized-  Entered: Nov 23 2023  9:44AM  

## 2023-12-01 NOTE — DISCHARGE NOTE NURSING/CASE MANAGEMENT/SOCIAL WORK - NSDCVIVACCINE_GEN_ALL_CORE_FT
Tdap; 20-Nov-2019 15:40; Jaz Balderas (OMAR); [employer]; [id number] (Exp. Date: 17-Sep-2021); IntraMuscular; Deltoid Left.; 0.5 milliLiter(s); VIS (VIS Published: 09-May-2013, VIS Presented: 20-Nov-2019);

## 2023-12-01 NOTE — DISCHARGE NOTE NURSING/CASE MANAGEMENT/SOCIAL WORK - PATIENT PORTAL LINK FT
You can access the FollowMyHealth Patient Portal offered by [facility] by registering at the following website: [email or web address]. By joining [employer]’s FollowMyHealth portal, you will also be able to view your health information using other applications (apps) compatible with our system.

## 2023-12-01 NOTE — DISCHARGE NOTE PROVIDER - HOSPITAL COURSE
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 Pt reports her eliquis was held 11/21  Pt's only complaint is that her L foot hurts, chronic since CVA during previous admission. Pt reports she has been bed bound x 1 month. Denies fever/chills, dizziness, cough, CP, SOB, abdominal pain, BRBPR, dysuria, hematuria, HA.    FOund to have guaiac neg stool but slightly elevated BUN/Cr, possibly 2/2 GI bleed.   Hgb dropped to 8.1. s/p 1 U PRBC, rpt HGB on 11.23: 10.1, 11/24; 11.4  cont PPI,   tolerating lower dose Eliquis. no GI bleed,   due to advanced age GI will not proceed w endoscopy.  ptn is at risk of CVA, has h/o Afib, cont eliquis but at a lower dose 2.5 mg bid,   iron level is low, will order a total of 1 gm IV IRON over 4 days.   ptn is  covid +:  on remdesivir x 3 days, not hypoxic.  questionable aspiration on breakfast couple days ago, passed swallow eval, back on a soft diet  Stable to DC to [facility] D/W Dr. Tapia

## 2023-12-01 NOTE — DISCHARGE NOTE PROVIDER - NSDCMRMEDTOKEN_GEN_ALL_CORE_FT
acetaminophen 325 mg oral tablet: 2 tab(s) orally every 6 hours, As needed, Temp greater or equal to 38C (100.4F), Mild Pain (1 - 3)  apixaban 5 mg oral tablet: 1 tab(s) orally 2 times a day  Artificial Tears ophthalmic solution: 1 drop(s) in each eye once a day  atorvastatin 80 mg oral tablet: 1 tab(s) orally once a day (at bedtime)  hydrocortisone 1% topical cream: 1 Apply topically to affected area 2 times a day  melatonin 3 mg oral tablet: 1 tab(s) orally once a day (at bedtime) As needed Insomnia  mirtazapine 15 mg oral tablet: 1 tab(s) orally once a day (at bedtime)  pantoprazole 40 mg oral delayed release tablet: 1 tab(s) orally 2 times a day  polyethylene glycol 3350 oral powder for reconstitution: 17 gram(s) orally once a day as needed for  constipation  sacubitril-valsartan 24 mg-26 mg oral tablet: 1 tab(s) orally 2 times a day  triamcinolone 0.1% topical ointment: 1 Apply topically to affected area 2 times a day  venlafaxine 75 mg oral capsule, extended release: 1 cap(s) orally once a day  Vitamin D3 1250 mcg (50,000 intl units) oral capsule: 1 cap(s) orally once a week  Zofran 4 mg oral tablet: 1 tab(s) orally every 8 hours as needed for  nausea x 7 days  zolpidem 10 mg oral tablet: 1 tab(s) orally once a day (at bedtime)

## 2023-12-19 NOTE — ED ADULT NURSE REASSESSMENT NOTE - NS ED NURSE REASSESS COMMENT FT1
Patient sitting comfortably in bed. Patient changed and cleaned. Patient placed on Purewick for hygiene and to obtain urine sample. Updated on POC. No acute distress present at this time. Bed is in lowest position.

## 2023-12-19 NOTE — H&P ADULT - NSHPSOCIALHISTORY_GEN_ALL_CORE
T(C): 36.5 (12-19-23 @ 15:10), Max: 36.5 (12-19-23 @ 15:10)  HR: 92 (12-19-23 @ 15:10) (92 - 98)  BP: 100/77 (12-19-23 @ 15:10) (100/77 - 113/77)  RR: 18 (12-19-23 @ 15:10) (18 - 20)  SpO2: 98% (12-19-23 @ 15:10) (95% - 98%)    PHYSICAL EXAM:  GENERAL: NAD, well-developed  HEAD:  Atraumatic, Normocephalic  EYES: EOMI, PERRLA, conjunctiva and sclera clear  NECK: Supple, No JVD  CHEST/LUNG: Clear to auscultation bilaterally; No wheeze  HEART: Regular rate and rhythm; No murmurs, rubs, or gallops  ABDOMEN: Soft, Nontender, Nondistended; Bowel sounds present  EXTREMITIES:  2+ Peripheral Pulses, No clubbing, cyanosis, or edema  PSYCH: AAOx3  NEUROLOGY: non-focal  SKIN: No rashes or lesions

## 2023-12-19 NOTE — ED PROVIDER NOTE - PHYSICAL EXAMINATION
MSK: left foot pain to lifting. No focal areas of pain to palpation. Well-healing pressure ulcer present on left heel.

## 2023-12-19 NOTE — ED PROVIDER NOTE - ATTENDING CONTRIBUTION TO CARE
Attending MD SYDNEY Weeks I performed a history and physical exam of the patient and discussed their management with the resident. I reviewed the resident's note and agree with the documented findings and plan of care, except as noted. My medical decision making and observations are as follows:    89 F with PMH HTN, HLD, paroxysmal AF on Xarelto, history of PE on Eliquis presenting from [facility] for fall with head strike while eating this morning.  Per physician at [facility], patient also incidentally noted to have elevated creatinine of 2.18 and requesting evaluation for ALLAN.  Patient only complaining of left foot pain for unspecified duration of time. Does not recall details of the fall.  Denies headache, dizziness, change in hearing or vision, chest pain, shortness of breath, abdominal pain, GI symptoms, dysuria.    On exam, patient in no acute distress, ABCs intact.  No tenderness to palpation of face, scalp, C/T/L spine, trunk, extremities.  Pelvis stable.  Heart and lungs clear to auscultation, abdomen soft nontender, no CVA tenderness.  Mucous membranes slightly dry.  Well-healing ulcer noted to left heel without erythema or drainage, no deformity to left foot, pulses, sensation, range of motion intact.    MDM–elderly female on AC presenting for evaluation of head strike and incidental ALLAN noted on outpatient labs.  Will order CT head, x-ray chest, ankle, foot to evaluate for traumatic injury. Attending MD SYDNEY Weeks I performed a history and physical exam of the patient and discussed their management with the resident. I reviewed the resident's note and agree with the documented findings and plan of care, except as noted. My medical decision making and observations are as follows:    89 F with PMH HTN, HLD, paroxysmal AF on Xarelto, history of PE on Eliquis presenting from [facility] for fall with head strike while eating this morning.  Per physician at [facility], patient also incidentally noted to have elevated creatinine of 2.18 and requesting evaluation for ALLAN.  Patient only complaining of left foot pain for unspecified duration of time. Does not recall details of the fall.  Denies headache, dizziness, change in hearing or vision, chest pain, shortness of breath, abdominal pain, GI symptoms, dysuria.    On exam, patient in no acute distress, ABCs intact.  No tenderness to palpation of face, scalp, C/T/L spine, trunk, extremities.  Pelvis stable.  Heart and lungs clear to auscultation, abdomen soft nontender, no CVA tenderness.  Mucous membranes slightly dry.  Well-healing ulcer noted to left heel without erythema or drainage, no deformity to left foot, pulses, sensation, range of motion intact.    MDM–elderly female on AC presenting for evaluation of head strike and incidental ALLAN noted on outpatient labs.  Will order CT head, x-ray chest, ankle, foot to evaluate for traumatic injury.

## 2023-12-19 NOTE — ED PROCEDURE NOTE - US CPT CODES
53374 Abdominal Ultrasound (GB/FAST)/83145 Retroperitioneal Abdominal (Aorta/Renal) 11272 Abdominal Ultrasound (GB/FAST)/95322 Retroperitioneal Abdominal (Aorta/Renal)

## 2023-12-19 NOTE — H&P ADULT - HISTORY OF PRESENT ILLNESS
89-year-old female on Eliquis for PE prophylaxis and A-fib presenting to the emergency department due to fall with head trauma this morning.  Patient states she was being bathed and fell but does not remember the details of the fall.  She does not member if she hit her head but denies any changes in visions, headache, weakness numbness to extremities.  She  also states that she has left foot pain. as per Dr. Diaz from [facility] ptn  was found to have an elevated creatinine of 2.18 this morning which was abnormal for her.  She would like to be evaluated for ALLAN.  Denies fevers, chills, bodies, nausea, vomiting, diarrhea. 89-year-old female on Eliquis for PE prophylaxis and A-fib presenting to the emergency department due to fall with head trauma this morning.  Patient states she was being bathed and fell but does not remember the details of the fall.  She does not member if she hit her head but denies any changes in visions, headache, weakness numbness to extremities.  She  also states that she has left foot pain. as per Dr. Diaz from [facility] ptn  was found to have an elevated creatinine of 2.18 this morning which was abnormal for her.  She would like to be evaluated for ALLAN.  Denies fevers, chills, bodies, nausea, vomiting, diarrhea.

## 2023-12-19 NOTE — ED ADULT NURSE NOTE - CAS EDN DISCHARGE ASSESSMENT
Patient Name: Cherri Diaz  Attending MD: Ryan Winter DO  MRN: [id number]  YOB: 1983  Account Number: [id number]  Age: 35  Admit Type: Outpatient  Gender: Female  Instrument Name: JOHN FORBES 5631  Procedure Date No Time: 2/7/2019  Grafts or Implants: Grafts or Implants Not Applicable  Procedure:            Colonoscopy  Pre-Op Diagnosis:     Screening in patient at increased risk: Family history                        of 1st-degree relative with colorectal cancer, This is                        the patient's first colonoscopy  Providers:            Ryan Winter DO  Referring MD:         Silvana Garcia Md  Sedation:             Monitored Anesthesia Care  Procedure:       Pre-Anesthesia Assessment:       - Prior to the procedure, a History and Physical was performed, and       patient medications and allergies were reviewed. The patient is       competent. The risks and benefits of the procedure and the sedation       options and risks were discussed with the patient. All questions were       answered and informed consent was obtained. Patient identification and       proposed procedure were verified by the physician in the pre-procedure       area. Mental Status Examination: alert and oriented. Airway Examination:       normal oropharyngeal airway and neck mobility. Respiratory Examination:       clear to auscultation. CV Examination: normal. Prophylactic Antibiotics:       The patient does not require prophylactic antibiotics. Prior       Anticoagulants: The patient has taken no previous anticoagulant or       antiplatelet agents. ASA Grade Assessment: I - A normal, healthy       patient. After reviewing the risks and benefits, the patient was deemed       in satisfactory condition to undergo the procedure. The anesthesia plan       was to use monitored anesthesia care (MAC). Immediately prior to       administration of medications, the patient was re-assessed for adequacy       to receive  sedatives. The heart rate, respiratory rate, oxygen       saturations, blood pressure, adequacy of pulmonary ventilation, and       response to care were monitored throughout the procedure. The physical       status of the patient was re-assessed after the procedure.       A History and Physical was performed prior to the procedure. Patient       medications and allergies were reviewed. The risks and benefits of the       procedure and sedation options were discussed. Questions were answered       and informed consent was obtained. Patient identification and proposed       procedure were verified. The patient was deemed in satisfactory       condition to undergo the procedure. The heart rate, respiratory rate,       oxygen saturations, blood pressure and response to sedation were       monitored throughout the procedure.       The endoscope was passed under direct vision. The Colonoscope was       introduced through the anus and advanced to the terminal ileum, with       identification of the appendiceal orifice and IC valve. . The physical       status of the patient was re-assessed after the procedure. The       colonoscopy was performed without difficulty. The patient tolerated the       procedure well. The quality of the bowel preparation was excellent. The       terminal ileum, ileocecal valve, appendiceal orifice, and rectum were       photographed.  Scope Withdrawal Time 0 hours 8 minutes 51 seconds  Findings:       The perianal and digital rectal examinations were normal.       A few small-mouthed diverticula were found in the ascending colon.       Non-bleeding internal hemorrhoids were found during retroflexion. The       hemorrhoids were mild.       The terminal ileum appeared normal.  Post-Op Diagnosis:       - Diverticulosis in the ascending colon.       - Non-bleeding internal hemorrhoids.       - The examined portion of the ileum was normal.       - No specimens collected.  Recommendation:       -  Patient has a contact number available for emergencies. The signs and       symptoms of potential delayed complications were discussed with the       patient. Return to normal activities tomorrow. Written discharge       instructions were provided to the patient.       - Repeat colonoscopy in 5 years for screening purposes.  Complications:       No immediate complications.  WON Heredia DO  2/7/2019 9:58:55 AM  This report has been signed electronically by the above.  Number of Addenda: 0  Procedure Code(s):    --- Professional ---                        54088, Colonoscopy, flexible; diagnostic, including                        collection of specimen(s) by brushing or washing, when                        performed (separate procedure)  CPT copyright 2017 [employer]. All rights reserved.  The codes documented in this report are preliminary and upon  review may  be revised to meet current compliance requirements.  Total Procedure Duration Time 0 hours 14 minutes 45 seconds  Estimated Blood Loss:       Estimated blood loss: none.  Scope In: 8:27:41 AM  Scope Out: 8:42:26 AM       No specimens removed during this procedure unless otherwise noted.       No assistants present.   Awake

## 2023-12-19 NOTE — ED PROVIDER NOTE - NSICDXPASTSURGICALHX_GEN_ALL_CORE_FT
PAST SURGICAL HISTORY:  [facility]     History of Colonoscopy- 2011/Sept     History of D&C- 8/12/11     Leg fracture, right Hip fx repair in Nov 2019    Status post cataract extraction OS    Status post hysterectomy endometrial cancer     PAST SURGICAL HISTORY:  [facility]     History of Colonoscopy- 2011/Sept     History of D&C- 8/12/11     Leg fracture, right Hip fx repair in Nov 2019    Status post cataract extraction OS    Status post hysterectomy endometrial cancer

## 2023-12-19 NOTE — ED ADULT NURSE NOTE - OBJECTIVE STATEMENT
89y F AxO x3 (disoriented to event) came in via EMS from [facility] for a fall. Per EMS patient was being bathed earlier this morning by staff when she slipped and fell on the bath mat. Patient denies LOC, cannot remember if she hit her head. Patient is endorsing pain in the left foot that began after the fall. Pedal pulses are present and equal bilaterally. Patient has full ROM and sensation x4 extremities. No other obvious signs of deformity, injury, lacerations, or abrasions. Eyes are PERRL. PMH of HTN, AFib, HLD, and respiratory disorder. Patient is normally on O2 for comfort, presents to the ED on 3L NC. Denies fever, chills, headache, blurry vision, dizziness, n/v/d, dysuria, weakness, numbness, tingling, SOB, and chest pain. Bed is in lowest position. 89y F AxO x3 (disoriented to event) came in via EMS from [facility] for a fall. Per EMS patient was being bathed earlier this morning by staff when she slipped and fell on the bath mat. Patient denies LOC, cannot remember if she hit her head. Patient is endorsing pain in the left foot that began after the fall. Pedal pulses are present and equal bilaterally. Patient has full ROM and sensation x4 extremities. No other obvious signs of deformity, injury, lacerations, or abrasions. Eyes are PERRL. PMH of HTN, AFib, HLD, and respiratory disorder. Patient is normally on O2 for comfort, presents to the ED on 3L NC. Denies fever, chills, headache, blurry vision, dizziness, n/v/d, dysuria, weakness, numbness, tingling, SOB, and chest pain. Bed is in lowest position.

## 2023-12-19 NOTE — ED PROVIDER NOTE - CLINICAL SUMMARY MEDICAL DECISION MAKING FREE TEXT BOX
89-year-old female on Eliquis presenting due to fall with head trauma, left foot pain, and new ALLAN per patient's physician Dr. Diaz from [facility].  Will order CT head to evaluate for any signs of intracranial hemorrhage and x-ray of left foot to evaluate for any signs of fractures.  Will order CBC, CMP, PT/PTT–INR, to evaluate for any signs of kidney failure.  Will order UA display with any signs of UTI. 89-year-old female on Eliquis presenting due to fall with head trauma, left foot pain, and new ALLAN per patient's physician Dr. Diaz from [facility].  Will order CT head to evaluate for any signs of intracranial hemorrhage and x-ray of left foot to evaluate for any signs of fractures.  Will order CBC, CMP, PT/PTT–INR, to evaluate for any signs of kidney failure.  Will order UA display with any signs of UTI.

## 2023-12-19 NOTE — H&P ADULT - NSICDXPASTSURGICALHX_GEN_ALL_CORE_FT
PAST SURGICAL HISTORY:  [facility]     History of Colonoscopy- 2011/Sept     History of D&C- 8/12/11     Leg fracture, right Hip fx repair in Nov 2019    Status post cataract extraction OS    Status post hysterectomy endometrial cancer     PAST SURGICAL HISTORY:  [facility]     History of Colonoscopy- 2011/Sept     History of D&C- 8/12/11     Leg fracture, right Hip fx repair in Nov 2019    Status post cataract extraction OS    Status post hysterectomy endometrial cancer

## 2023-12-19 NOTE — H&P ADULT - ADDITIONAL PE
T(C): 36.5 (12-19-23 @ 15:10), Max: 36.5 (12-19-23 @ 15:10)  HR: 92 (12-19-23 @ 15:10) (92 - 98)  BP: 100/77 (12-19-23 @ 15:10) (100/77 - 113/77)  RR: 18 (12-19-23 @ 15:10) (18 - 20)  SpO2: 98% (12-19-23 @ 15:10) (95% - 98%)

## 2023-12-19 NOTE — ED ADULT NURSE NOTE - NSFALLHARMRISKINTERV_ED_ALL_ED
Assistance OOB with selected safe patient handling equipment if applicable/Assistance with ambulation/Communicate risk of Fall with Harm to all staff, patient, and family/Monitor gait and stability/Provide patient with walking aids/Provide visual cue: red socks, yellow wristband, yellow gown, etc/Reinforce activity limits and safety measures with patient and family/Bed in lowest position, wheels locked, appropriate side rails in place/Call bell, personal items and telephone in reach/Instruct patient to call for assistance before getting out of bed/chair/stretcher/Non-slip footwear applied when patient is off stretcher/[place] to call system/Physically safe environment - no spills, clutter or unnecessary equipment/Purposeful Proactive Rounding/Room/bathroom lighting operational, light cord in reach Assistance OOB with selected safe patient handling equipment if applicable/Assistance with ambulation/Communicate risk of Fall with Harm to all staff, patient, and family/Monitor gait and stability/Provide patient with walking aids/Provide visual cue: red socks, yellow wristband, yellow gown, etc/Reinforce activity limits and safety measures with patient and family/Bed in lowest position, wheels locked, appropriate side rails in place/Call bell, personal items and telephone in reach/Instruct patient to call for assistance before getting out of bed/chair/stretcher/Non-slip footwear applied when patient is off stretcher/[place] to call system/Physically safe environment - no spills, clutter or unnecessary equipment/Purposeful Proactive Rounding/Room/bathroom lighting operational, light cord in reach

## 2023-12-19 NOTE — ED PROVIDER NOTE - PROGRESS NOTE DETAILS
Dr. Cisse (Attending Physician)  Signed out to me. LFTs elevated. Pleural effusions bilaterally. Added on Pro-BNP which is increased to 19,000 most recently in 4000s. Will admit and give lasix.

## 2023-12-19 NOTE — ED PROVIDER NOTE - OBJECTIVE STATEMENT
89-year-old female on Eliquis for PE prophylaxis and A-fib presenting to the emergency department due to fall with head trauma this morning.  Patient states she was being bathed and fell but does not remember the details of the fall.  She does not member she hit her head but denies any changes in visions, headache, weakness numbness to extremities.  She is also states that she has left foot pain.  I talked with the patient's physician Dr. Diaz from [facility] who states that she also was found to have an elevated creatinine of 2.18 this morning which was abnormal for her.  She would like to be evaluated for ALLAN.  Denies fevers, chills, bodies, nausea, vomiting, diarrhea. 89-year-old female on Eliquis for PE prophylaxis and A-fib presenting to the emergency department due to fall with head trauma this morning.  Patient states she was being bathed and fell but does not remember the details of the fall.  She does not member she hit her head but denies any changes in visions, headache, weakness numbness to extremities.  She is also states that she has left foot pain.  I talked with the patient's physician Dr. Diaz from [facility] who states that she also was found to have an elevated creatinine of 2.18 this morning which was abnormal for her.  She would like to be evaluated for ALLAN.  Denies fevers, chills, bodies, nausea, vomiting, diarrhea.

## 2023-12-19 NOTE — H&P ADULT - ASSESSMENT
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  ptn was deemed to be at risk of CVA, has h/o Afib, eliquis was cont but at a lower dose 2.5 mg bid,   iron level was low, s/p 1 gm IV IRON over 4 days.   covid +:  on remdesivir x 3 days, not hypoxic. was asymptomatic    today return to the ED post  a fall at [facility]  possible head trauma. has no HA.   Patient states she was being bathed and fell but does not remember the details of the fall.    She does not member if she hit her head but denies any changes in visions, headache, weakness numbness to extremities.  She  also states that she has left foot pain. as per Dr. Diaz from [facility] ptn  was found to have an elevated creatinine of 2.18 this morning which was abnormal for her.  She would like to be evaluated for ALLAN.  Denies fevers, chills, bodies, nausea, vomiting, diarrhea.  Pt's only complaint is that her L foot hurts, chronic since CVA during previous admission. Pt reports she has been bed bound x 1 month. Denies fever/chills, dizziness, cough, CP, SOB, abdominal pain, BRBPR, dysuria, hematuria, HA.    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  ptn was deemed to be at risk of CVA, has h/o Afib, eliquis was cont but at a lower dose 2.5 mg bid,   iron level was low, s/p 1 gm IV IRON over 4 days.   covid +:  on remdesivir x 3 days, not hypoxic. was asymptomatic    today return to the ED post  a fall at [facility]  possible head trauma. has no HA.   Patient states she was being bathed and fell but does not remember the details of the fall.    She does not member if she hit her head but denies any changes in visions, headache, weakness numbness to extremities.  She  also states that she has left foot pain. as [facility]  was found to have an elevated creatinine of 2.18 this morning which was abnormal for her.  She would like to be evaluated for ALLAN.  Denies fevers, chills, bodies, nausea, vomiting, diarrhea.  Pt's only complaint is that her L foot hurts, chronic since CVA during previous admission. Pt reports she has been bed bound x 1 month. Denies fever/chills, dizziness, cough, CP, SOB, abdominal pain, BRBPR, dysuria, hematuria, HA.    - renal sono in the ED: no hydro  - check orthostatics  - h/o severe MS,  card called  - start IVF  - check bladder scan  - UA w pyuria, UCx sent  - start CTX  - cont outptn meds  - abstain from NSAIDs/ACE-I  DVT ppx not necessary 2/2 chronic AC

## 2023-12-20 NOTE — CHART NOTE - NSCHARTNOTEFT_GEN_A_CORE
Patient is a 89y old Female admitted for urosepsis; s/p Fall at [facility].    Rapid response team for hypotension.    Patient was seen and examined at the bedside by the rapid response team.    Vital Signs Last 24 Hrs*   Vital Signs Last 24 Hrs  T(C): 36.4 (20 Dec 2023 16:51), Max: 37 (19 Dec 2023 20:30)  T(F): 97.5 (20 Dec 2023 16:51), Max: 98.6 (19 Dec 2023 20:30)  HR: 98 (20 Dec 2023 16:51) (97 - 160)  BP: 76/40 (20 Dec 2023 16:51) (76/40 - 126/93)  BP(mean): --  RR: 19 (20 Dec 2023 16:51) (17 - 19)  SpO2: 97% (20 Dec 2023 16:51) (96% - 100%)    Parameters below as of 20 Dec 2023 16:51  Patient On (Oxygen Delivery Method): nasal cannula  O2 Flow (L/min): 4      Assessment:  Rapid Response called for 89y year old Female with a past medical history of with PMH of HTN, HLD, A.fib, severe MS, HFrEF (EF 30%), SVT s/p ablation, endometrial CA s/p LYNETTE.    Plan:  - spoke with MICU and daughter, Leonie Devries. Verified DNR/DNI and MOLST form.  - patient was made comfort care, measures in place  - Palliative consulted, will see patient in AM.  - Endorsed to PM team    Attending Dr. Gomez notified.    Jeanne Ramos PA-C  Department of Medicine  Spectralink #[id number] Patient is a 89y old Female admitted for urosepsis; s/p Fall at [facility].    Rapid response team for hypotension.    Patient was seen and examined at the bedside by the rapid response team.    Vital Signs Last 24 Hrs*   Vital Signs Last 24 Hrs  T(C): 36.4 (20 Dec 2023 16:51), Max: 37 (19 Dec 2023 20:30)  T(F): 97.5 (20 Dec 2023 16:51), Max: 98.6 (19 Dec 2023 20:30)  HR: 98 (20 Dec 2023 16:51) (97 - 160)  BP: 76/40 (20 Dec 2023 16:51) (76/40 - 126/93)  BP(mean): --  RR: 19 (20 Dec 2023 16:51) (17 - 19)  SpO2: 97% (20 Dec 2023 16:51) (96% - 100%)    Parameters below as of 20 Dec 2023 16:51  Patient On (Oxygen Delivery Method): nasal cannula  O2 Flow (L/min): 4      Assessment:  Rapid Response called for 89y year old Female with a past medical history of with PMH of HTN, HLD, A.fib, severe MS, HFrEF (EF 30%), SVT s/p ablation, endometrial CA s/p LYNETTE.    Plan:  - spoke with MICU and daughter, Leonie Devries. Verified DNR/DNI and MOLST form.  - patient was made comfort care, measures in place  - Palliative consulted, will see patient in AM.  - Endorsed to PM team    Attending Dr. Gomez notified.    Jeanne Ramos PA-C  Department of Medicine  Spectralink #[id number]

## 2023-12-20 NOTE — RAPID RESPONSE TEAM SUMMARY - NSSITUATIONBACKGROUNDRRT_GEN_ALL_CORE
RRT called due to difficulty w/ pulse oximetry waveform. Multiple attempts on multiple fingers were tried, however w/ poor waveform SpO2 48%, RRT was called. Patient was not in any respiratory distress though placed on a non-rebreather until accurate pulse oximetry placed. Pulse oximeter placed on ear w/ SpO2 97% on onbrebreather. Patient weaned to NC (which she was on prior to RRT) w/ maintained pulse ox at 94-97% w/ good waveform. Patient only complaining that she was thirsty. BG was noted to be 46 during RRT and was given 1 amp D50. Patient resumed on D5+LR gtt.   Other vitals: BP 130s/80s, HR 110s, temp afebrile recently checked in rectal temp. Also no melena seen.    Recommendations:  - Try ears for pulse oximetry  - If unable to get good waveform consistently consider ABG to confirm good oxygenation  - May benefit from CHRISTIANO/PVRs and/or vascular surgery eval for likely significant peripheral vascular disease, especially in her lower extremities (see prior RRT note).   - Follow up on CTH and labs from prior RRT     Primary team present throughout RRT.        RRT called due to difficulty w/ pulse oximetry waveform. Multiple attempts on multiple fingers were tried, however w/ poor waveform SpO2 48%, RRT was called. Patient was not in any respiratory distress though placed on a non-rebreather until accurate pulse oximetry placed. Pulse oximeter placed on ear w/ SpO2 97% on onbrebreather. Patient weaned to NC (which she was on prior to RRT) w/ maintained pulse ox at 94-97% w/ good waveform. Patient only complaining that she was thirsty. BG was noted to be 46 during RRT and was given 1 amp D50. Patient resumed on D5+LR gtt.   Other vitals: BP 130s/80s, HR 110s, temp afebrile recently checked in rectal temp. Also no melena seen.    Recommendations:  - Try ears for pulse oximetry  - If unable to get good waveform consistently consider ABG to confirm good oxygenation  - May benefit from CHRISTIANO/PVRs and/or vascular surgery eval for likely significant peripheral vascular disease, especially in her lower extremities (see prior RRT note).   - Follow up on CTH and labs from prior RRT     Primary team present throughout RRT.       UPDATE:  Labs from RRT reviewed, including a rising WBC, a significant lactic acidosis, and a rapidly worsening hepatitis, given pattern likely hepatocellular in origin. Unclear if patient's lactic acidosis is in the setting of a worsening urinary tract infection given the severe hypoglycemia and hypotension and likely developing shock liver w/ rapidly rising transaminitis.     Given the patient's rapidly worsening decompensation and given the patient's advanced age and comorbidities, it is imperative that the primary team conduct an extensive goals of care discussion knowing her prognosis and to decide if a more conservative/comfort route should be pursued.   In the interrim  would recommend:  - Increasing LR fluid rate  - Administering 1 amp of bicarb  - Rechecking VBG/ABG for improvement in lactate in 4 hours  - Consider calling nephro and ICU given rapid decompensation and risk for worsening on the floors.       RRT called due to difficulty w/ pulse oximetry waveform. Multiple attempts on multiple fingers were tried, however w/ poor waveform SpO2 48%, RRT was called. Patient was not in any respiratory distress though placed on a non-rebreather until accurate pulse oximetry placed. Pulse oximeter placed on ear w/ SpO2 97% on onbrebreather. Patient weaned to NC (which she was on prior to RRT) w/ maintained pulse ox at 94-97% w/ good waveform. Patient only complaining that she was thirsty. BG was noted to be 46 during RRT and was given 1 amp D50. Patient resumed on D5+LR gtt.   Other vitals: BP 130s/80s, HR 110s, temp afebrile recently checked in rectal temp. Also no melena seen.    Recommendations:  - Try ears for pulse oximetry  - If unable to get good waveform consistently consider ABG to confirm good oxygenation  - May benefit from CHRISTIANO/PVRs and/or vascular surgery eval for likely significant peripheral vascular disease, especially in her lower extremities (see prior RRT note).   - Follow up on CTH and labs from prior RRT     Primary team present throughout RRT.       UPDATE:  Labs from RRT reviewed, including a rising WBC, a significant lactic acidosis, and a rapidly worsening hepatitis, given pattern likely hepatocellular in origin. Unclear if patient's lactic acidosis is in the setting of a worsening urinary tract infection given the severe hypoglycemia and hypotension and likely developing shock liver w/ rapidly rising transaminitis.     Given the patient's rapidly worsening decompensation and given the patient's advanced age and comorbidities, it is imperative that the primary team conduct an extensive goals of care discussion knowing her prognosis and to decide if a more conservative/comfort route should be pursued.   In the interrim  would recommend:  - Increasing LR fluid rate  - Administering 1 amp of bicarb  - Rechecking VBG/ABG for improvement in lactate in 4 hours  - Consider ICU eval given rapid decompensation    Recs communicated to primary ACP, who communicated patient's change in status to primary attending Dr. Gomez

## 2023-12-20 NOTE — PROGRESS NOTE ADULT - ASSESSMENT
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  ptn was deemed to be at risk of CVA, has h/o Afib, eliquis was cont but at a lower dose 2.5 mg bid,   iron level was low, s/p 1 gm IV IRON over 4 days.   covid +:  on remdesivir x 3 days, not hypoxic. was asymptomatic    12/19 return to the ED post  a mechanical fall at [facility]  possible head trauma. has no HA.   Patient states she was being bathed and fell but does not remember the details of the fall.    She does not member if she hit her head but denies any changes in visions, headache, weakness numbness to extremities.  She  also states that she has left foot pain. as per Dr. Diaz from [facility] ptn  was found to have an elevated creatinine of 2.18 this morning which was abnormal for her.  She would like to be evaluated for ALLAN.  Denies fevers, chills, bodies, nausea, vomiting, diarrhea.  Pt's only complaint is that her L foot hurts, chronic since CVA during previous admission. Pt reports she has been bed bound x 1 month. Denies fever/chills, dizziness, cough, CP, SOB, abdominal pain, BRBPR, dysuria, hematuria, HA.    - renal sono in the ED: no hydro  - hpotension, start Midodrine, IVF  - poor LE perfusion, seen by vascular, has pain, started on pletal  - h/o severe MS,    - on  IVF  - UA w pyuria, UCx sent  - on Cefepime  - cont outptn meds  -  ptn w severe dyspnea and hypotension this am. GOC d/w daughter x 45 min:  ptn is DNR/DNI w comfort measures. will cont treating w IV Abx and IVF. she has pain in LE w poor perfusion. seen by vascular, PLETAL was recommended.     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  ptn was deemed to be at risk of CVA, has h/o Afib, eliquis was cont but at a lower dose 2.5 mg bid,   iron level was low, s/p 1 gm IV IRON over 4 days.   covid +:  on remdesivir x 3 days, not hypoxic. was asymptomatic    12/19 return to the ED post  a mechanical fall at [facility]  possible head trauma. has no HA.   Patient states she was being bathed and fell but does not remember the details of the fall.    She does not member if she hit her head but denies any changes in visions, headache, weakness numbness to extremities.  She  also states that she has left foot pain. as per Dr. Diaz from [facility] ptn  was found to have an elevated creatinine of 2.18 this morning which was abnormal for her.  She would like to be evaluated for ALLAN.  Denies fevers, chills, bodies, nausea, vomiting, diarrhea.  Pt's only complaint is that her L foot hurts, chronic since CVA during previous admission. Pt reports she has been bed bound x 1 month. Denies fever/chills, dizziness, cough, CP, SOB, abdominal pain, BRBPR, dysuria, hematuria, HA.    - renal sono in the ED: no hydro  - hpotension, start Midodrine, IVF  - poor LE perfusion, seen by vascular, has pain, started on pletal  - h/o severe MS,    - on  IVF  - UA w pyuria, UCx sent  - on Cefepime  - cont outptn meds  -  ptn w severe dyspnea and hypotension this am. GOC d/w daughter x 45 min:  ptn is DNR/DNI w comfort measures. will cont treating w IV Abx and IVF. she has pain in LE w poor perfusion. seen by vascular, PLETAL was recommended.     DVT ppx not necessary 2/2 chronic AC

## 2023-12-20 NOTE — CHART NOTE - NSCHARTNOTEFT_GEN_A_CORE
Ptient s/p RRT for  severe hypoglycemia. Nurse states FS was 24mg/dl and RP 26mg/dl- States patient is not diabetic and refusing to eat.  Patient noted with hypotension BP 84/61 and IVF and D5 injection given. Daughter and Dr Tapia updated. See RRT note for further details

## 2023-12-20 NOTE — CONSULT NOTE ADULT - ASSESSMENT
89 yo F with HTN, HLD, atrial fibrillation on eliquis, HFrEF (EF 30%), SVT s/p ablation, endometrial CA s/p LYNETTE presenting to initially with head trauma but CT head without gross findings. Course c/b hypotension and hypoglycemia likely iso septic shock with potential urinary source.    #Severe Septic Shock  #Ischemic Liver Injury  #ALLAN on CKD   #HAGMA  #Severe Protein Calorie Malnutrition  - pH 7.18 I 46 I 26 I lactate 9.0, HCO3 15 AG 23  - AST 1482  and bilirubin 1.5  - likely urinary source given bacteria and casts in urine although without symptoms of dysuria or hematuria  - s/p ceftriaxone and ordered for cefepime and vancomycin for sepsis control  - s/p 1L IVF during RRT   - per nephro, recommended for sodium bicarbonate 150 mEq 100 cc/hr for metabolic acidosis process   - GOC with patient and family - patient with capacity at the time of eval but opting for DNR/DNI (see above). After discussions with daughter (Leonie), decision was made for comfort care and to pursue symptom based approach.  - please consider palliative care evaluation    Patient is not a MICU candidate at this time. Please call back with questions. 89 yo F with HTN, HLD, atrial fibrillation on eliquis, HFrEF (EF 30%), SVT s/p ablation, endometrial CA s/p LYNETTE presenting to initially with head trauma but CT head without gross findings. Course c/b hypotension and hypoglycemia likely iso septic shock with potential urinary source.    #Severe Septic Shock  #Ischemic Liver Injury  #ALLAN on CKD   #HAGMA  #Severe Protein Calorie Malnutrition  - pH 7.18 I 46 I 26 I lactate 9.0, HCO3 15 AG 23  - AST 1482  and bilirubin 1.5  - likely urinary source given bacteria and casts in urine although without symptoms of dysuria or hematuria  - s/p ceftriaxone and ordered for cefepime and vancomycin for sepsis control  - s/p 1L IVF during RRT   - per nephro, recommended for sodium bicarbonate 150 mEq 100 cc/hr for metabolic acidosis process   - GOC with patient and family - patient with capacity at the time of eval but opting for DNR/DNI (see above). After discussions with daughter (Leonie), decision was made for comfort care and to pursue symptom based approach.  - please consider palliative care evaluation    Patient is not a MICU candidate at this time. Please call back with questions.

## 2023-12-20 NOTE — CONSULT NOTE ADULT - ASSESSMENT
89-year-old female with pAF and PE on DOAC, anxiety, HTN, HLD, insomnia endometrial Ca s/p LYNETTE and SBO,,  presenting to the emergency department due to fall with head trauma.  Patient states she was being bathed and fell but does not remember the details of the fall.  She does not member if she hit her head but denies any changes in visions, headache, weakness numbness to extremities.  She  also states that she has left foot pain. as per Dr. Diaz from [facility] ptn  was found to have an elevated creatinine of 2.18 this morning which was abnormal for her.  She would like to be evaluated for ALLAN.     CTH with chronic changes noted.  multiple chronic bilateral cerebellar infarcts ; no actue findings   UA+     Imprssion:   1) Fall likely related to hypotension/dehydration on top of old bilateral cerebellar strokes   2) HA, stable   3) AMS toxic metabolic on top of mild dementia   4) chronic bilateral cerebelalr strokes     - RRT eventually called for hypotension   - b12, RPR, TSH  - IVF   - CTX for infection  - DOAC for AF and PE  - statin therapy  - check orthostatics   - check vessels carotid duplex    - Hemoglobin A1c and lipid panel  - can defer routine EEG for now.  doubt seizure   - telemetry  - PT/OT  - TTE and cardio eval  - ID called   - check FS, glucose control <180  - GI/DVT ppx  - Counseling on diet, exercise, and medication adherence was done  - Counseling on smoking cessation and alcohol consumption offered when appropriate.  - Pain assessed and judicious use of narcotics when appropriate was discussed.    - Stroke education given when appropriate.  - Importance of fall prevention discussed.   - Differential diagnosis and plan of care discussed with patient and/or family and primary team  - Thank you for allowing me to participate in the care of this patient. Call with questions.   Ayo Novak MD  Vascular Neurology  Office: [phone]  89-year-old female with pAF and PE on DOAC, anxiety, HTN, HLD, insomnia endometrial Ca s/p LYNETTE and SBO,,  presenting to the emergency department due to fall with head trauma.  Patient states she was being bathed and fell but does not remember the details of the fall.  She does not member if she hit her head but denies any changes in visions, headache, weakness numbness to extremities.  She  also states that she has left foot pain. as per Dr. Diaz from [facility] ptn  was found to have an elevated creatinine of 2.18 this morning which was abnormal for her.  She would like to be evaluated for ALLAN.     CTH with chronic changes noted.  multiple chronic bilateral cerebellar infarcts ; no actue findings   UA+     Imprssion:   1) Fall likely related to hypotension/dehydration on top of old bilateral cerebellar strokes   2) HA, stable   3) AMS toxic metabolic on top of mild dementia   4) chronic bilateral cerebelalr strokes     - RRT eventually called for hypotension   - b12, RPR, TSH  - IVF   - CTX for infection  - DOAC for AF and PE  - statin therapy  - check orthostatics   - check vessels carotid duplex    - Hemoglobin A1c and lipid panel  - can defer routine EEG for now.  doubt seizure   - telemetry  - PT/OT  - TTE and cardio eval  - ID called   - check FS, glucose control <180  - GI/DVT ppx  - Counseling on diet, exercise, and medication adherence was done  - Counseling on smoking cessation and alcohol consumption offered when appropriate.  - Pain assessed and judicious use of narcotics when appropriate was discussed.    - Stroke education given when appropriate.  - Importance of fall prevention discussed.   - Differential diagnosis and plan of care discussed with patient and/or family and primary team  - Thank you for allowing me to participate in the care of this patient. Call with questions.   Ayo Novak MD  Vascular Neurology  Office: [phone]

## 2023-12-20 NOTE — CONSULT NOTE ADULT - SUBJECTIVE AND OBJECTIVE BOX
Neurology Consult    Reason for Consult: Patient is a 89y old  Female who presents with a chief complaint of     HPI:  89-year-old female on Eliquis for PE prophylaxis and A-fib presenting to the emergency department due to fall with head trauma this morning.  Patient states she was being bathed and fell but does not remember the details of the fall.  She does not member if she hit her head but denies any changes in visions, headache, weakness numbness to extremities.  She  also states that she has left foot pain. as per Dr. Diaz from [facility] ptn  was found to have an elevated creatinine of 2.18 this morning which was abnormal for her.  She would like to be evaluated for ALLAN.  Denies fevers, chills, bodies, nausea, vomiting, diarrhea. (19 Dec 2023 21:26)       PAST MEDICAL & SURGICAL HISTORY:  Anxiety      Breast Lump      Insomnia      PE (pulmonary thromboembolism)      PAF (paroxysmal atrial fibrillation)  On Xarelto      HTN (hypertension)      HLD (hyperlipidemia)      Endometrial cancer  S/P LYNETTE with SBO      [facility]      History of Colonoscopy- 2011/Sept      History of D&C- 8/12/11      Status post hysterectomy  endometrial cancer      Status post cataract extraction  OS      Leg fracture, right  Hip fx repair in Nov 2019          Allergies: Allergies    piperacillin-tazobactam (Rash)  soaps and creams causes rash uses only sensitive skin soap (Rash)  sulfa drugs (Unknown)  Voltaren (Rash)  Eye cream from the 1960s Neocortef ?spelling caused eyes to becomes swollen (Unknown)  neomycin (Unknown)    Intolerances        Social History: Denies toxic habits including tobacco, ETOH or illicit drugs.    Family History: FAMILY HISTORY:  No pertinent family history in first degree relatives    . No family history of strokes    Medications: MEDICATIONS  (STANDING):  apixaban 2.5 milliGRAM(s) Oral two times a day  artificial  tears Solution 1 Drop(s) Both EYES two times a day  atorvastatin 80 milliGRAM(s) Oral at bedtime  cefTRIAXone   IVPB 1000 milliGRAM(s) IV Intermittent every 24 hours  cholecalciferol 1000 Unit(s) Oral daily  dextrose 50% Injectable 25 Gram(s) IV Push once  metoprolol succinate ER 25 milliGRAM(s) Oral <User Schedule>  mirtazapine 15 milliGRAM(s) Oral at bedtime  multivitamin 1 Tablet(s) Oral daily  pantoprazole    Tablet 40 milliGRAM(s) Oral two times a day  sodium chloride 0.45%. 1000 milliLiter(s) (75 mL/Hr) IV Continuous <Continuous>  venlafaxine XR. 75 milliGRAM(s) Oral daily    MEDICATIONS  (PRN):  acetaminophen     Tablet .. 650 milliGRAM(s) Oral every 6 hours PRN Temp greater or equal to 38C (100.4F), Mild Pain (1 - 3)  polyethylene glycol 3350 17 Gram(s) Oral daily PRN for constipation      Review of Systems:  CONSTITUTIONAL:  No weight loss, fever, chills, weakness or fatigue.  HEENT:  Eyes:  No visual loss, blurred vision, double vision or yellow sclera. Ears, Nose, Throat:  No hearing loss, sneezing, congestion, runny nose or sore throat.  SKIN:  No rash or itching.  CARDIOVASCULAR:  No chest pain, chest pressure or chest discomfort. No palpitations or edema.  RESPIRATORY:  No shortness of breath, cough or sputum.  GASTROINTESTINAL:  No anorexia, nausea, vomiting or diarrhea. No abdominal pain or blood.  GENITOURINARY:  No burning on urination or incontinence   NEUROLOGICAL:  No headache, dizziness, syncope, paralysis, ataxia, numbness or tingling in the extremities. No change in bowel or bladder control. no limb weakness. no vision changes.   MUSCULOSKELETAL:  No muscle, back pain, joint pain or stiffness.  HEMATOLOGIC:  No anemia, bleeding or bruising.  LYMPHATICS:  No enlarged nodes. No history of splenectomy.  PSYCHIATRIC:  No history of depression or anxiety.  ENDOCRINOLOGIC:  No reports of sweating, cold or heat intolerance. No polyuria or polydipsia.      Vitals:  Vital Signs Last 24 Hrs  T(C): 36.4 (20 Dec 2023 05:04), Max: 37 (19 Dec 2023 20:30)  T(F): 97.5 (20 Dec 2023 05:04), Max: 98.6 (19 Dec 2023 20:30)  HR: 160 (20 Dec 2023 08:45) (92 - 160)  BP: 126/93 (20 Dec 2023 08:45) (100/61 - 126/93)  BP(mean): --  RR: 18 (20 Dec 2023 05:04) (17 - 20)  SpO2: 98% (20 Dec 2023 05:04) (96% - 98%)    Parameters below as of 20 Dec 2023 05:04  Patient On (Oxygen Delivery Method): nasal cannula  O2 Flow (L/min): 3    Orthostatic VS      General Exam:   General Appearance: Appropriately dressed and in no acute distress       Head: Normocephalic, atraumatic and no dysmorphic features  Ear, Nose, and Throat: Moist mucous membranes  CVS: S1S2+  Resp: No SOB, no wheeze or rhonchi  GI: soft NT/ND  Extremities: No edema or cyanosis  Skin:  distal LE discoloration but |+ pulse      Neurological Exam:  Mental Status: Awake, alert and oriented x 2.  Able to follow simple and complex verbal commands. Able to name and repeat. fluent speech. No obvious aphasia or dysarthria noted.   Cranial Nerves: PERRL, EOMI, VFFC, sensation V1-V3 intact,  no obvious facial asymmetry, equal elevation of palate, scm/trap 5/5, tongue is midline on protrusion. no obvious papilledema on fundoscopic exam. hearing is grossly intact.   Motor:  HENAO uppers 4, lowers 2/5   Sensation: Intact to light touch and pinprick throughout. no right/left confusion. no extinction to tactile on DSS.   Reflexes: 1+ throughout at biceps, brachioradialis, triceps, patellars and ankles bilaterally and equal. No clonus. R toe and L toe were both downgoing.  Coordination: No dysmetria on FNF    Gait: deferred     Data/Labs/Imaging which I personally reviewed.     Labs:     CBC Full  -  ( 19 Dec 2023 12:03 )  WBC Count : 11.03 K/uL  RBC Count : 3.81 M/uL  Hemoglobin : 11.0 g/dL  Hematocrit : 35.5 %  Platelet Count - Automated : 441 K/uL  Mean Cell Volume : 93.2 fl  Mean Cell Hemoglobin : 28.9 pg  Mean Cell Hemoglobin Concentration : 31.0 gm/dL  Auto Neutrophil # : 8.83 K/uL  Auto Lymphocyte # : 0.78 K/uL  Auto Monocyte # : 1.29 K/uL  Auto Eosinophil # : 0.03 K/uL  Auto Basophil # : 0.04 K/uL  Auto Neutrophil % : 80.0 %  Auto Lymphocyte % : 7.1 %  Auto Monocyte % : 11.7 %  Auto Eosinophil % : 0.3 %  Auto Basophil % : 0.4 %    12-20    138  |  100  |  60<H>  ----------------------------<  43<LL>  5.1   |  16<L>  |  2.56<H>    Ca    9.2      20 Dec 2023 06:41    TPro  7.2  /  Alb  3.3  /  TBili  1.3<H>  /  DBili  0.7<H>  /  AST  882<H>  /  ALT  517<H>  /  AlkPhos  196<H>  12-20    LIVER FUNCTIONS - ( 20 Dec 2023 06:41 )  Alb: 3.3 g/dL / Pro: 7.2 g/dL / ALK PHOS: 196 U/L / ALT: 517 U/L / AST: 882 U/L / GGT: x           PT/INR - ( 19 Dec 2023 12:03 )   PT: 38.7 sec;   INR: 3.65 ratio         PTT - ( 19 Dec 2023 12:03 )  PTT:32.9 sec  Urinalysis Basic - ( 20 Dec 2023 06:41 )    Color: x / Appearance: x / SG: x / pH: x  Gluc: 43 mg/dL / Ketone: x  / Bili: x / Urobili: x   Blood: x / Protein: x / Nitrite: x   Leuk Esterase: x / RBC: x / WBC x   Sq Epi: x / Non Sq Epi: x / Bacteria: x      < from: CT Head No Cont (12.20.23 @ 10:18) >    ACC: [id number] EXAM:  CT BRAIN   ORDERED BY:  CHAVEZ CESAR     PROCEDURE DATE:  12/20/2023          INTERPRETATION:  .    CLINICAL INFORMATION: Altered mental status.    TECHNIQUE: Multiple axial CT images of the head were obtained without   contrast. Sagittal and coronal reconstructed images were acquired from   the source data.    COMPARISON: Prior CT study of the head from 12/19/2023. Prior brain MRI   study from 10/18/2023.    FINDINGS: There is no acute intracranial hemorrhage, mass effect, shift   of the midline structures, herniation, extra-axial fluid collection, or   hydrocephalus.    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    There is diffuse cerebral volume loss with prominence of the sulci,   fissures, and cisternal spaces which is normal for the patient's age.   Mild ventriculomegaly appears unchanged. 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    The paranasal sinuses and tympanomastoid cavities are clear. The   calvarium is intact. There is evidence of bilateral cataract removal.   Bilateral senile scleral plaques are noted.    IMPRESSION: No acute intracranial hemorrhage, mass effect, or shift of   the midline structures.    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    --- End of Report ---             KATE REYNOSO MD; Attending Radiologist  This document has been electronically signed. Dec 20 2023 10:39AM    < end of copied text >       Neurology Consult    Reason for Consult: Patient is a 89y old  Female who presents with a chief complaint of     HPI:  89-year-old female on Eliquis for PE prophylaxis and A-fib presenting to the emergency department due to fall with head trauma this morning.  Patient states she was being bathed and fell but does not remember the details of the fall.  She does not member if she hit her head but denies any changes in visions, headache, weakness numbness to extremities.  She  also states that she has left foot pain. as per Dr. Diaz from [facility] ptn  was found to have an elevated creatinine of 2.18 this morning which was abnormal for her.  She would like to be evaluated for ALLAN.  Denies fevers, chills, bodies, nausea, vomiting, diarrhea. (19 Dec 2023 21:26)       PAST MEDICAL & SURGICAL HISTORY:  Anxiety      Breast Lump      Insomnia      PE (pulmonary thromboembolism)      PAF (paroxysmal atrial fibrillation)  On Xarelto      HTN (hypertension)      HLD (hyperlipidemia)      Endometrial cancer  S/P LYNETTE with SBO      [facility]      History of Colonoscopy- 2011/Sept      History of D&C- 8/12/11      Status post hysterectomy  endometrial cancer      Status post cataract extraction  OS      Leg fracture, right  Hip fx repair in Nov 2019          Allergies: Allergies    piperacillin-tazobactam (Rash)  soaps and creams causes rash uses only sensitive skin soap (Rash)  sulfa drugs (Unknown)  Voltaren (Rash)  Eye cream from the 1960s Neocortef ?spelling caused eyes to becomes swollen (Unknown)  neomycin (Unknown)    Intolerances        Social History: Denies toxic habits including tobacco, ETOH or illicit drugs.    Family History: FAMILY HISTORY:  No pertinent family history in first degree relatives    . No family history of strokes    Medications: MEDICATIONS  (STANDING):  apixaban 2.5 milliGRAM(s) Oral two times a day  artificial  tears Solution 1 Drop(s) Both EYES two times a day  atorvastatin 80 milliGRAM(s) Oral at bedtime  cefTRIAXone   IVPB 1000 milliGRAM(s) IV Intermittent every 24 hours  cholecalciferol 1000 Unit(s) Oral daily  dextrose 50% Injectable 25 Gram(s) IV Push once  metoprolol succinate ER 25 milliGRAM(s) Oral <User Schedule>  mirtazapine 15 milliGRAM(s) Oral at bedtime  multivitamin 1 Tablet(s) Oral daily  pantoprazole    Tablet 40 milliGRAM(s) Oral two times a day  sodium chloride 0.45%. 1000 milliLiter(s) (75 mL/Hr) IV Continuous <Continuous>  venlafaxine XR. 75 milliGRAM(s) Oral daily    MEDICATIONS  (PRN):  acetaminophen     Tablet .. 650 milliGRAM(s) Oral every 6 hours PRN Temp greater or equal to 38C (100.4F), Mild Pain (1 - 3)  polyethylene glycol 3350 17 Gram(s) Oral daily PRN for constipation      Review of Systems:  CONSTITUTIONAL:  No weight loss, fever, chills, weakness or fatigue.  HEENT:  Eyes:  No visual loss, blurred vision, double vision or yellow sclera. Ears, Nose, Throat:  No hearing loss, sneezing, congestion, runny nose or sore throat.  SKIN:  No rash or itching.  CARDIOVASCULAR:  No chest pain, chest pressure or chest discomfort. No palpitations or edema.  RESPIRATORY:  No shortness of breath, cough or sputum.  GASTROINTESTINAL:  No anorexia, nausea, vomiting or diarrhea. No abdominal pain or blood.  GENITOURINARY:  No burning on urination or incontinence   NEUROLOGICAL:  No headache, dizziness, syncope, paralysis, ataxia, numbness or tingling in the extremities. No change in bowel or bladder control. no limb weakness. no vision changes.   MUSCULOSKELETAL:  No muscle, back pain, joint pain or stiffness.  HEMATOLOGIC:  No anemia, bleeding or bruising.  LYMPHATICS:  No enlarged nodes. No history of splenectomy.  PSYCHIATRIC:  No history of depression or anxiety.  ENDOCRINOLOGIC:  No reports of sweating, cold or heat intolerance. No polyuria or polydipsia.      Vitals:  Vital Signs Last 24 Hrs  T(C): 36.4 (20 Dec 2023 05:04), Max: 37 (19 Dec 2023 20:30)  T(F): 97.5 (20 Dec 2023 05:04), Max: 98.6 (19 Dec 2023 20:30)  HR: 160 (20 Dec 2023 08:45) (92 - 160)  BP: 126/93 (20 Dec 2023 08:45) (100/61 - 126/93)  BP(mean): --  RR: 18 (20 Dec 2023 05:04) (17 - 20)  SpO2: 98% (20 Dec 2023 05:04) (96% - 98%)    Parameters below as of 20 Dec 2023 05:04  Patient On (Oxygen Delivery Method): nasal cannula  O2 Flow (L/min): 3    Orthostatic VS      General Exam:   General Appearance: Appropriately dressed and in no acute distress       Head: Normocephalic, atraumatic and no dysmorphic features  Ear, Nose, and Throat: Moist mucous membranes  CVS: S1S2+  Resp: No SOB, no wheeze or rhonchi  GI: soft NT/ND  Extremities: No edema or cyanosis  Skin:  distal LE discoloration but |+ pulse      Neurological Exam:  Mental Status: Awake, alert and oriented x 2.  Able to follow simple and complex verbal commands. Able to name and repeat. fluent speech. No obvious aphasia or dysarthria noted.   Cranial Nerves: PERRL, EOMI, VFFC, sensation V1-V3 intact,  no obvious facial asymmetry, equal elevation of palate, scm/trap 5/5, tongue is midline on protrusion. no obvious papilledema on fundoscopic exam. hearing is grossly intact.   Motor:  HENAO uppers 4, lowers 2/5   Sensation: Intact to light touch and pinprick throughout. no right/left confusion. no extinction to tactile on DSS.   Reflexes: 1+ throughout at biceps, brachioradialis, triceps, patellars and ankles bilaterally and equal. No clonus. R toe and L toe were both downgoing.  Coordination: No dysmetria on FNF    Gait: deferred     Data/Labs/Imaging which I personally reviewed.     Labs:     CBC Full  -  ( 19 Dec 2023 12:03 )  WBC Count : 11.03 K/uL  RBC Count : 3.81 M/uL  Hemoglobin : 11.0 g/dL  Hematocrit : 35.5 %  Platelet Count - Automated : 441 K/uL  Mean Cell Volume : 93.2 fl  Mean Cell Hemoglobin : 28.9 pg  Mean Cell Hemoglobin Concentration : 31.0 gm/dL  Auto Neutrophil # : 8.83 K/uL  Auto Lymphocyte # : 0.78 K/uL  Auto Monocyte # : 1.29 K/uL  Auto Eosinophil # : 0.03 K/uL  Auto Basophil # : 0.04 K/uL  Auto Neutrophil % : 80.0 %  Auto Lymphocyte % : 7.1 %  Auto Monocyte % : 11.7 %  Auto Eosinophil % : 0.3 %  Auto Basophil % : 0.4 %    12-20    138  |  100  |  60<H>  ----------------------------<  43<LL>  5.1   |  16<L>  |  2.56<H>    Ca    9.2      20 Dec 2023 06:41    TPro  7.2  /  Alb  3.3  /  TBili  1.3<H>  /  DBili  0.7<H>  /  AST  882<H>  /  ALT  517<H>  /  AlkPhos  196<H>  12-20    LIVER FUNCTIONS - ( 20 Dec 2023 06:41 )  Alb: 3.3 g/dL / Pro: 7.2 g/dL / ALK PHOS: 196 U/L / ALT: 517 U/L / AST: 882 U/L / GGT: x           PT/INR - ( 19 Dec 2023 12:03 )   PT: 38.7 sec;   INR: 3.65 ratio         PTT - ( 19 Dec 2023 12:03 )  PTT:32.9 sec  Urinalysis Basic - ( 20 Dec 2023 06:41 )    Color: x / Appearance: x / SG: x / pH: x  Gluc: 43 mg/dL / Ketone: x  / Bili: x / Urobili: x   Blood: x / Protein: x / Nitrite: x   Leuk Esterase: x / RBC: x / WBC x   Sq Epi: x / Non Sq Epi: x / Bacteria: x      < from: CT Head No Cont (12.20.23 @ 10:18) >    ACC: [id number] EXAM:  CT BRAIN   ORDERED BY:  CHAVEZ CESAR     PROCEDURE DATE:  12/20/2023          INTERPRETATION:  .    CLINICAL INFORMATION: Altered mental status.    TECHNIQUE: Multiple axial CT images of the head were obtained without   contrast. Sagittal and coronal reconstructed images were acquired from   the source data.    COMPARISON: Prior CT study of the head from 12/19/2023. Prior brain MRI   study from 10/18/2023.    FINDINGS: There is no acute intracranial hemorrhage, mass effect, shift   of the midline structures, herniation, extra-axial fluid collection, or   hydrocephalus.    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    There is diffuse cerebral volume loss with prominence of the sulci,   fissures, and cisternal spaces which is normal for the patient's age.   Mild ventriculomegaly appears unchanged. 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    The paranasal sinuses and tympanomastoid cavities are clear. The   calvarium is intact. There is evidence of bilateral cataract removal.   Bilateral senile scleral plaques are noted.    IMPRESSION: No acute intracranial hemorrhage, mass effect, or shift of   the midline structures.    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    --- End of Report ---             KATE REYNOSO MD; Attending Radiologist  This document has been electronically signed. Dec 20 2023 10:39AM    < end of copied text >       07-Nov-2023 13:55

## 2023-12-20 NOTE — CONSULT NOTE ADULT - CRANIAL NERVE
moderate art insuff w mod to severe trophic skin changes   Pulse exam                         right         left   femoral        +2            +2  dpa                +1            +1  pta                 +1            +1  no wounds  sig w  tenderness from ischemia and hypoperfusion

## 2023-12-20 NOTE — CONSULT NOTE ADULT - ASSESSMENT
89F PMH A-fib on eliquis, HTN, HLD, bed bound, presenting to the emergency department due to fall with head trauma this morning.  Patient states she was being bathed and fell but does not remember the details of the fall.  She does not member if she hit her head but denies any changes in visions, headache, weakness numbness to extremities. RRT called for hypotension and hypoxia. Improved with fluid resuscitation and non-rebreather, noted to have increase lactic acidosis iso increasing WBC. Vascular surgery called for evaluation of lower extremities     Recommendations:   - No acute surgical intervention  - CHRISTIANO/PVR with toe pressures  - Venous duplex of bilateral lower extremities  - Will follow    Discussed with fellow on behalf of attending     Vascular [id number]  89F PMH A-fib on eliquis, HTN, HLD, bed bound, presenting to the emergency department due to fall with head trauma this morning.  Patient states she was being bathed and fell but does not remember the details of the fall.  She does not member if she hit her head but denies any changes in visions, headache, weakness numbness to extremities. RRT called for hypotension and hypoxia. Improved with fluid resuscitation and non-rebreather, noted to have increase lactic acidosis iso increasing WBC. Vascular surgery called for evaluation of lower extremities     Recommendations:   - No acute surgical intervention  - CHRISTIANO/PVR with toe pressures  - Venous duplex of bilateral lower extremities  - Will follow    Discussed with fellow on behalf of attending     Vascular [id number]  89F PMH A-fib on eliquis, HTN, HLD, bed bound, presenting to the emergency department due to fall with head trauma this morning.  Patient states she was being bathed and fell but does not remember the details of the fall.  She does not member if she hit her head but denies any changes in visions, headache, weakness numbness to extremities. RRT called for hypotension and hypoxia. Improved with fluid resuscitation and non-rebreather, noted to have increase lactic acidosis iso increasing WBC. Vascular surgery called for evaluation of lower extremities   Impression   pt's le sx  c/w mild rest pain     Recommendations:   - No acute surgical intervention  - CHRISTIANO/PVR with toe pressures  - Venous duplex of bilateral lower extremities  recommend trial of pletal 50 bid   - Will follow    Discussed with fellow on behalf of attending     Vascular 9001  89F PMH A-fib on eliquis, HTN, HLD, bed bound, presenting to the emergency department due to fall with head trauma this morning.  Patient states she was being bathed and fell but does not remember the details of the fall.  She does not member if she hit her head but denies any changes in visions, headache, weakness numbness to extremities. RRT called for hypotension and hypoxia. Improved with fluid resuscitation and non-rebreather, noted to have increase lactic acidosis iso increasing WBC. Vascular surgery called for evaluation of lower extremities   Impression   pt's le sx  c/w mild rest pain     Recommendations:   - No acute surgical intervention  - CHRISTIANO/PVR with toe pressures  - Venous duplex of bilateral lower extremities  recommend trial of pletal 50 bid   - Will follow    Discussed with fellow on behalf of attending     Vascular [id number]

## 2023-12-20 NOTE — CONSULT NOTE ADULT - SUBJECTIVE AND OBJECTIVE BOX
VASCULAR SURGERY CONSULT NOTE    HPI: 89F PMH A-fib on eliquis, HTN, HLD, bed bound, presenting to the emergency department due to fall with head trauma this morning.  Patient states she was being bathed and fell but does not remember the details of the fall.  She does not member if she hit her head but denies any changes in visions, headache, weakness numbness to extremities. RRT called for hypotension and hypoxia. Improved with fluid resuscitation and non-rebreather, noted to have increase lactic acidosis iso increasing WBC.     Vascular surgery called for evaluation of lower extremities      PMH/PSH: Anxiety    Breast Lump    Insomnia    PE (pulmonary thromboembolism)    PAF (paroxysmal atrial fibrillation)    HTN (hypertension)    HLD (hyperlipidemia)    Endometrial cancer    History of Breast Lump/[facility] Excision- [facility]- left- 1971    History of Colonoscopy- 2011/Sept    History of D&C- 8/12/11    Status post hysterectomy    Status post cataract extraction    Leg fracture, right        MEDS:apixaban 2.5 milliGRAM(s) Oral two times a day  artificial  tears Solution 1 Drop(s) Both EYES two times a day  atorvastatin 80 milliGRAM(s) Oral at bedtime  cefepime   IVPB 1000 milliGRAM(s) IV Intermittent every 24 hours  cholecalciferol 1000 Unit(s) Oral daily  dextrose 50% Injectable 25 Gram(s) IV Push once  metoprolol succinate ER 25 milliGRAM(s) Oral <User Schedule>  mirtazapine 15 milliGRAM(s) Oral at bedtime  multivitamin 1 Tablet(s) Oral daily  pantoprazole    Tablet 40 milliGRAM(s) Oral two times a day  sodium bicarbonate  Infusion 0.254 mEq/kG/Hr (100 mL/Hr) IV Continuous <Continuous>  vancomycin  IVPB 1000 milliGRAM(s) IV Intermittent once  venlafaxine XR. 75 milliGRAM(s) Oral daily  acetaminophen     Tablet .. 650 milliGRAM(s) Oral every 6 hours PRN Temp greater or equal to 38C (100.4F), Mild Pain (1 - 3)  polyethylene glycol 3350 17 Gram(s) Oral daily PRN for constipation      ALLERGIES: NKDA    REVIEW OF SYSTEMS: All ROS negative except as per HPI.  ____________________________________________    VITALS:T(C): 36.8, Max: 37 (12-19)  T(F): 98.2, Max: 98.6 (12-19)  HR: 126 (92 - 160)  BP: 103/72 (100/61 - 126/93)  BP(mean): --  ABP: --  ABP(mean): --  RR: 18 (17 - 19)  SpO2: 98% (96% - 98%)  CVP(mm Hg): --  CVP(cm H2O): --  nasal cannula 3        PHYSICAL EXAM:  General: AAOx3, no acute distress.  Respiratory: breathing comfortably, no increased WOB   Abdomen: soft, nontender, nondistended, no rebound, no guarding  Extremities: Moves all four.   - RLE: blue discoloration of ties, dopplerable PT, popliteal palpable femoral. No DP signals  - LLE: blue discoloration of ties, dopplerable PT, popliteal palpable femoral. No DP signals  ____________________________________________    LABS:                      12.2  19.62 )-----------( 449    ( 20 Dec 2023 12:24 )             40.1  136  |  98  |  60<H>  ----------------------------<  83    (12-20)  5.3   |  15<L>  |  2.83<H>          Ca    8.8      12-20  Mg    x  Phos  x        LIVER FUNCTIONS - ( 20 Dec 2023 12:24 )  Alb: 3.3 g/dL / Pro: 7.0 g/dL / ALK PHOS: 196 U/L / ALT: 745 U/L / AST: 1482 U/L / GGT: x      PT/INR - ( 19 Dec 2023 12:03 )   PT: 38.7 sec;   INR: 3.65 ratio         PTT - ( 19 Dec 2023 12:03 )  PTT:32.9 sec  Urinalysis Basic - ( 20 Dec 2023 12:24 )    Color: x / Appearance: x / SG: x / pH: x  Gluc: 83 mg/dL / Ketone: x  / Bili: x / Urobili: x   Blood: x / Protein: x / Nitrite: x   Leuk Esterase: x / RBC: x / WBC x   Sq Epi: x / Non Sq Epi: x / Bacteria: x      ____________________________________________    RADIOLOGY & ADDITIONAL STUDIES:     VASCULAR SURGERY CONSULT NOTE    HPI: 89F PMH A-fib on eliquis, HTN, HLD, bed bound, presenting to the emergency department due to fall with head trauma this morning.  Patient states she was being bathed and fell but does not remember the details of the fall.  She does not member if she hit her head but denies any changes in visions, headache, weakness numbness to extremities. RRT called for hypotension and hypoxia. Improved with fluid resuscitation and non-rebreather, noted to have increase lactic acidosis iso increasing WBC.     Vascular surgery called for evaluation of lower extremities   VASCULAR SURG ATT ADDENDUM  Pt c/o  ada foot pain  even when lying in bed      PMH/PSH: Anxiety    Breast Lump    Insomnia    PE (pulmonary thromboembolism)    PAF (paroxysmal atrial fibrillation)    HTN (hypertension)    HLD (hyperlipidemia)    Endometrial cancer    [facility]    History of Colon[facility]- 2011/Sept    History of D&C- 8/12/11    Status post hysterectomy    Status post cataract extraction    Leg fracture, right        MEDS:apixaban 2.5 milliGRAM(s) Oral two times a day  artificial  tears Solution 1 Drop(s) Both EYES two times a day  atorvastatin 80 milliGRAM(s) Oral at bedtime  cefepime   IVPB 1000 milliGRAM(s) IV Intermittent every 24 hours  cholecalciferol 1000 Unit(s) Oral daily  dextrose 50% Injectable 25 Gram(s) IV Push once  metoprolol succinate ER 25 milliGRAM(s) Oral <User Schedule>  mirtazapine 15 milliGRAM(s) Oral at bedtime  multivitamin 1 Tablet(s) Oral daily  pantoprazole    Tablet 40 milliGRAM(s) Oral two times a day  sodium bicarbonate  Infusion 0.254 mEq/kG/Hr (100 mL/Hr) IV Continuous <Continuous>  vancomycin  IVPB 1000 milliGRAM(s) IV Intermittent once  venlafaxine XR. 75 milliGRAM(s) Oral daily  acetaminophen     Tablet .. 650 milliGRAM(s) Oral every 6 hours PRN Temp greater or equal to 38C (100.4F), Mild Pain (1 - 3)  polyethylene glycol 3350 17 Gram(s) Oral daily PRN for constipation      ALLERGIES: NKDA    REVIEW OF SYSTEMS: All ROS negative except as per HPI.  ____________________________________________    VITALS:T(C): 36.8, Max: 37 (12-19)  T(F): 98.2, Max: 98.6 (12-19)  HR: 126 (92 - 160)  BP: 103/72 (100/61 - 126/93)  BP(mean): --  ABP: --  ABP(mean): --  RR: 18 (17 - 19)  SpO2: 98% (96% - 98%)  CVP(mm Hg): --  CVP(cm H2O): --  nasal cannula 3        PHYSICAL EXAM:  General: AAOx3, no acute distress.  Respiratory: breathing comfortably, no increased WOB   Abdomen: soft, nontender, nondistended, no rebound, no guarding  Extremities: Moves all four.   - RLE: blue discoloration of ties, dopplerable PT, popliteal palpable femoral. No DP signals  - LLE: blue discoloration of ties, dopplerable PT, popliteal palpable femoral. No DP signals  ____________________________________________    LABS:                      12.2  19.62 )-----------( 449    ( 20 Dec 2023 12:24 )             40.1  136  |  98  |  60<H>  ----------------------------<  83    (12-20)  5.3   |  15<L>  |  2.83<H>          Ca    8.8      12-20  Mg    x  Phos  x        LIVER FUNCTIONS - ( 20 Dec 2023 12:24 )  Alb: 3.3 g/dL / Pro: 7.0 g/dL / ALK PHOS: 196 U/L / ALT: 745 U/L / AST: 1482 U/L / GGT: x      PT/INR - ( 19 Dec 2023 12:03 )   PT: 38.7 sec;   INR: 3.65 ratio         PTT - ( 19 Dec 2023 12:03 )  PTT:32.9 sec  Urinalysis Basic - ( 20 Dec 2023 12:24 )    Color: x / Appearance: x / SG: x / pH: x  Gluc: 83 mg/dL / Ketone: x  / Bili: x / Urobili: x   Blood: x / Protein: x / Nitrite: x   Leuk Esterase: x / RBC: x / WBC x   Sq Epi: x / Non Sq Epi: x / Bacteria: x     VASCULAR SURGERY CONSULT NOTE    HPI: 89F PMH A-fib on eliquis, HTN, HLD, bed bound, presenting to the emergency department due to fall with head trauma this morning.  Patient states she was being bathed and fell but does not remember the details of the fall.  She does not member if she hit her head but denies any changes in visions, headache, weakness numbness to extremities. RRT called for hypotension and hypoxia. Improved with fluid resuscitation and non-rebreather, noted to have increase lactic acidosis iso increasing WBC.     Vascular surgery called for evaluation of lower extremities   VASCULAR SURG ATT ADDENDUM  Pt c/o  ada foot pain  even when lying in bed      PMH/PSH: Anxiety    Breast Lump    Insomnia    PE (pulmonary thromboembolism)    PAF (paroxysmal atrial fibrillation)    HTN (hypertension)    HLD (hyperlipidemia)    Endometrial cancer    [facility]    History of Colonoscopy- 2011/Sept    History of D&C- 8/12/11    Status post hysterectomy    Status post cataract extraction    Leg fracture, right        MEDS:apixaban 2.5 milliGRAM(s) Oral two times a day  artificial  tears Solution 1 Drop(s) Both EYES two times a day  atorvastatin 80 milliGRAM(s) Oral at bedtime  cefepime   IVPB 1000 milliGRAM(s) IV Intermittent every 24 hours  cholecalciferol 1000 Unit(s) Oral daily  dextrose 50% Injectable 25 Gram(s) IV Push once  metoprolol succinate ER 25 milliGRAM(s) Oral <User Schedule>  mirtazapine 15 milliGRAM(s) Oral at bedtime  multivitamin 1 Tablet(s) Oral daily  pantoprazole    Tablet 40 milliGRAM(s) Oral two times a day  sodium bicarbonate  Infusion 0.254 mEq/kG/Hr (100 mL/Hr) IV Continuous <Continuous>  vancomycin  IVPB 1000 milliGRAM(s) IV Intermittent once  venlafaxine XR. 75 milliGRAM(s) Oral daily  acetaminophen     Tablet .. 650 milliGRAM(s) Oral every 6 hours PRN Temp greater or equal to 38C (100.4F), Mild Pain (1 - 3)  polyethylene glycol 3350 17 Gram(s) Oral daily PRN for constipation      ALLERGIES: NKDA    REVIEW OF SYSTEMS: All ROS negative except as per HPI.  ____________________________________________    VITALS:T(C): 36.8, Max: 37 (12-19)  T(F): 98.2, Max: 98.6 (12-19)  HR: 126 (92 - 160)  BP: 103/72 (100/61 - 126/93)  BP(mean): --  ABP: --  ABP(mean): --  RR: 18 (17 - 19)  SpO2: 98% (96% - 98%)  CVP(mm Hg): --  CVP(cm H2O): --  nasal cannula 3        PHYSICAL EXAM:  General: AAOx3, no acute distress.  Respiratory: breathing comfortably, no increased WOB   Abdomen: soft, nontender, nondistended, no rebound, no guarding  Extremities: Moves all four.   - RLE: blue discoloration of ties, dopplerable PT, popliteal palpable femoral. No DP signals  - LLE: blue discoloration of ties, dopplerable PT, popliteal palpable femoral. No DP signals  ____________________________________________    LABS:                      12.2  19.62 )-----------( 449    ( 20 Dec 2023 12:24 )             40.1  136  |  98  |  60<H>  ----------------------------<  83    (12-20)  5.3   |  15<L>  |  2.83<H>          Ca    8.8      12-20  Mg    x  Phos  x        LIVER FUNCTIONS - ( 20 Dec 2023 12:24 )  Alb: 3.3 g/dL / Pro: 7.0 g/dL / ALK PHOS: 196 U/L / ALT: 745 U/L / AST: 1482 U/L / GGT: x      PT/INR - ( 19 Dec 2023 12:03 )   PT: 38.7 sec;   INR: 3.65 ratio         PTT - ( 19 Dec 2023 12:03 )  PTT:32.9 sec  Urinalysis Basic - ( 20 Dec 2023 12:24 )    Color: x / Appearance: x / SG: x / pH: x  Gluc: 83 mg/dL / Ketone: x  / Bili: x / Urobili: x   Blood: x / Protein: x / Nitrite: x   Leuk Esterase: x / RBC: x / WBC x   Sq Epi: x / Non Sq Epi: x / Bacteria: x

## 2023-12-20 NOTE — CONSULT NOTE ADULT - ASSESSMENT
Patient is a 89 year old female with PMH of Afib on eliquis, HLD, HTN who presented to the emergency department due to fall with possible head trauma in the morning at [facility] while being bathed.     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  [facility], Division of Infectious Diseases  [phone]  After 5pm on weekdays and all day on weekends - please call [phone]       Patient is a 89 year old female with PMH of Afib on eliquis, HLD, HTN who presented to the emergency department due to fall with possible head trauma in the morning at [facility] while being bathed.     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  [facility], Division of Infectious Diseases  [phone]  After 5pm on weekdays and all day on weekends - please call [phone]       Patient is a 89 year old female with PMH of Afib on eliquis, HLD, HTN who presented to the emergency department due to fall with possible head trauma in the morning at [facility] while being bathed.     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  [facility], Division of Infectious Diseases  [phone]  After 5pm on weekdays and all day on weekends - please call [phone]       Patient is a 89 year old female with PMH of Afib on eliquis, HLD, HTN who presented to the emergency department due to fall with possible head trauma in the morning at [facility] while being bathed.     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  [facility], Division of Infectious Diseases  [phone]  After 5pm on weekdays and all day on weekends - please call [phone]

## 2023-12-20 NOTE — RAPID RESPONSE TEAM SUMMARY - NSSITUATIONBACKGROUNDRRT_GEN_ALL_CORE
RRT called for severe hypoglycemia. Patient found to be hypotensive 24 and repeat 26, at which point RRT was called since there was no order for urgent D50. On arrival patient in distress, BP 84/61, HR 130s irregular (afib confirmed on EKG), SpO2 97% on minimal O2 via NC, temp Afebrile.  Administered 1L fluid via bolus. Repeat BG 120s. On exam patient alert but complaining of pain in her head and on her left foot. Head and lung sounds otherwise normal. Brief vision exam w/ intact visual fields but poor recognition of fingers in front of her. Pulses were 1+. B/l toes appeared cyanotic (though unclear chronicity, however patient w/ intact sensation and movement of her toes.  Patient's BP improved 130s/80s, HR decrease 100s-110s after above interventions. Attempted to get labs and additional access however w/ limited success due to poor vasculature even w/ U/S assistance. No available Accucath during RRT.    Recommended the following:  - Continue LR 75 cc/hr - while pt w/ ALLAN she is clinically hypovolemic  - CTH to r/o head bleeding  - CBC,CMP, coags, type and screen, VBG  - GOC given patient's advanced age and multiple comorbidities, may benefit from palliative care eval if initial GOC are unclear.     Primary ACP at bedside, to relay this to attending. In agreement w/ all recommendations.

## 2023-12-20 NOTE — CONSULT NOTE ADULT - SUBJECTIVE AND OBJECTIVE BOX
Patient is a 89y old  Female who presents with a chief complaint of     HPI:  89-year-old female on Eliquis for PE prophylaxis and A-fib presenting to the emergency department due to fall with head trauma this morning.  Patient states she was being bathed and fell but does not remember the details of the fall.  She does not member if she hit her head but denies any changes in visions, headache, weakness numbness to extremities.  She  also states that she has left foot pain. as per Dr. Diaz from [facility] ptn  was found to have an elevated creatinine of 2.18 this morning which was abnormal for her.  She would like to be evaluated for ALLAN.  Denies fevers, chills, bodies, nausea, vomiting, diarrhea. (19 Dec 2023 21:26)    Pt states that she feels okay today, no new complaints aside from thirst.     As far as renal hx is concerned, the patient states that she does not know of any renal issues in herself or in her family. She has not noticed any change in UOP or hematuria and does occasionally use NSAIDs at home however doesnt remember how often.     per the patient she has not been eating or drinking for several months now.    Lactic acid 9.1 today.       PAST MEDICAL & SURGICAL HISTORY:  Anxiety      Breast Lump      Insomnia      PE (pulmonary thromboembolism)      PAF (paroxysmal atrial fibrillation)  On Xarelto      HTN (hypertension)      HLD (hyperlipidemia)      Endometrial cancer  S/P LYNETTE with SBO      [facility]      History of Colonoscopy- 2011/Sept      History of D&C- 8/12/11      Status post hysterectomy  endometrial cancer      Status post cataract extraction  OS      Leg fracture, right  Hip fx repair in Nov 2019          MEDICATIONS  (STANDING):  apixaban 2.5 milliGRAM(s) Oral two times a day  artificial  tears Solution 1 Drop(s) Both EYES two times a day  atorvastatin 80 milliGRAM(s) Oral at bedtime  cefepime   IVPB 1000 milliGRAM(s) IV Intermittent every 24 hours  cholecalciferol 1000 Unit(s) Oral daily  dextrose 50% Injectable 25 Gram(s) IV Push once  metoprolol succinate ER 25 milliGRAM(s) Oral <User Schedule>  mirtazapine 15 milliGRAM(s) Oral at bedtime  multivitamin 1 Tablet(s) Oral daily  pantoprazole    Tablet 40 milliGRAM(s) Oral two times a day  sodium bicarbonate  Infusion 0.254 mEq/kG/Hr (100 mL/Hr) IV Continuous <Continuous>  vancomycin  IVPB 1000 milliGRAM(s) IV Intermittent once  venlafaxine XR. 75 milliGRAM(s) Oral daily      Allergies    piperacillin-tazobactam (Rash)  soaps and creams causes rash uses only sensitive skin soap (Rash)  sulfa drugs (Unknown)  Voltaren (Rash)  Eye cream from the 1960s Neocortef ?spelling caused eyes to becomes swollen (Unknown)  neomycin (Unknown)    Intolerances        SOCIAL HISTORY:  Denies ETOh,Smoking,     FAMILY HISTORY:  No pertinent family history in first degree relatives        REVIEW OF SYSTEMS:  CONSTITUTIONAL: No weakness, fevers or chills  EYES/ENT: No visual changes;  No vertigo or throat pain   NECK: No pain or stiffness  RESPIRATORY: No cough, wheezing, hemoptysis; No shortness of breath  CARDIOVASCULAR: No chest pain or palpitations  GASTROINTESTINAL: No abdominal or epigastric pain. No nausea, vomiting, or hematemesis; No diarrhea or constipation. No melena or hematochezia.  GENITOURINARY: No dysuria, frequency or hematuria  NEUROLOGICAL: No numbness or weakness  SKIN: No itching, burning, rashes, or lesions   All other review of systems is negative unless indicated above.    +thirst  VITAL:  T(C): , Max: 37 (12-19-23 @ 20:30)  T(F): , Max: 98.6 (12-19-23 @ 20:30)  HR: 126 (12-20-23 @ 13:00)  BP: 103/72 (12-20-23 @ 13:00)  BP(mean): --  RR: 18 (12-20-23 @ 05:04)  SpO2: 98% (12-20-23 @ 05:04)  Wt(kg): --    PHYSICAL EXAM:  Constitutional: NAD, Alert, old cachectic   HEENT: NCAT, MMM  Neck: Supple, No JVD  Respiratory: CTA-b/l  Cardiovascular: RRR s1s2, no m/r/g  Gastrointestinal: BS+, soft, NT/ND  Extremities: No peripheral edema b/l  Neurological: no focal deficits; strength grossly intact  Back: no CVAT b/l  Skin: No rashes, no nevi    LABS:                        12.2   19.62 )-----------( 449      ( 20 Dec 2023 12:24 )             40.1     Na(136)/K(5.3)/Cl(98)/HCO3(15)/BUN(60)/Cr(2.83)Glu(83)/Ca(8.8)/Mg(--)/PO4(--)    12-20 @ 12:24  Na(138)/K(5.1)/Cl(100)/HCO3(16)/BUN(60)/Cr(2.56)Glu(43)/Ca(9.2)/Mg(--)/PO4(--)    12-20 @ 06:41  Na(136)/K(4.6)/Cl(99)/HCO3(24)/BUN(51)/Cr(2.18)Glu(130)/Ca(8.9)/Mg(--)/PO4(--)    12-19 @ 12:03    Urinalysis Basic - ( 20 Dec 2023 12:24 )    Color: x / Appearance: x / SG: x / pH: x  Gluc: 83 mg/dL / Ketone: x  / Bili: x / Urobili: x   Blood: x / Protein: x / Nitrite: x   Leuk Esterase: x / RBC: x / WBC x   Sq Epi: x / Non Sq Epi: x / Bacteria: x            IMAGING:    ASSESSMENT:  89-year-old female on Eliquis for PE prophylaxis and A-fib presenting to the emergency department due to fall with head trauma. 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  -BL Cr 1.1-1.3  -CKD likely 2/2 HTN and age related changes  -ALLAN likely 2/2 severe volume depletion, sepsis, reduced effective circulating volume, hypotension    Severe Sepsis  -WBC 19K  -Lactic 9.1  -BP okay, soft, improved s/p x1L fluids    Hypoglycemia  -on Bicarb w/D5    elevated LFT's  -likely shock liver    RECOMMEND:  -uric acid, Avis  -recommend BC x2, 30cc/kg of IVF (rec 1L already today,getting more fluids)   -change fluids to 150mEq sodium bicarbonate in 1L sterile water at 100cc/hr  -IV Abx  -a/w MICU consultation  -hold metoprolol   -monitor RF with daily BMP + phos  -maintain MAP >65  -Dose Rx for Crcl 10-20 mL/min    Thank you for involving [facility] in this patient's care.    With warm regards,    Bert Mckeon DO   [facility]  Office: [phone]           Patient is a 89y old  Female who presents with a chief complaint of     HPI:  89-year-old female on Eliquis for PE prophylaxis and A-fib presenting to the emergency department due to fall with head trauma this morning.  Patient states she was being bathed and fell but does not remember the details of the fall.  She does not member if she hit her head but denies any changes in visions, headache, weakness numbness to extremities.  She  also states that she has left foot pain. as per Dr. Diaz from [facility] ptn  was found to have an elevated creatinine of 2.18 this morning which was abnormal for her.  She would like to be evaluated for ALLAN.  Denies fevers, chills, bodies, nausea, vomiting, diarrhea. (19 Dec 2023 21:26)    Pt states that she feels okay today, no new complaints aside from thirst.     As far as renal hx is concerned, the patient states that she does not know of any renal issues in herself or in her family. She has not noticed any change in UOP or hematuria and does occasionally use NSAIDs at home however doesnt remember how often.     per the patient she has not been eating or drinking for several months now.    Lactic acid 9.1 today.       PAST MEDICAL & SURGICAL HISTORY:  Anxiety      Breast Lump      Insomnia      PE (pulmonary thromboembolism)      PAF (paroxysmal atrial fibrillation)  On Xarelto      HTN (hypertension)      HLD (hyperlipidemia)      Endometrial cancer  S/P LYNETTE with SBO      [facility]      History of Colonoscopy- 2011/Sept      History of D&C- 8/12/11      Status post hysterectomy  endometrial cancer      Status post cataract extraction  OS      Leg fracture, right  Hip fx repair in Nov 2019          MEDICATIONS  (STANDING):  apixaban 2.5 milliGRAM(s) Oral two times a day  artificial  tears Solution 1 Drop(s) Both EYES two times a day  atorvastatin 80 milliGRAM(s) Oral at bedtime  cefepime   IVPB 1000 milliGRAM(s) IV Intermittent every 24 hours  cholecalciferol 1000 Unit(s) Oral daily  dextrose 50% Injectable 25 Gram(s) IV Push once  metoprolol succinate ER 25 milliGRAM(s) Oral <User Schedule>  mirtazapine 15 milliGRAM(s) Oral at bedtime  multivitamin 1 Tablet(s) Oral daily  pantoprazole    Tablet 40 milliGRAM(s) Oral two times a day  sodium bicarbonate  Infusion 0.254 mEq/kG/Hr (100 mL/Hr) IV Continuous <Continuous>  vancomycin  IVPB 1000 milliGRAM(s) IV Intermittent once  venlafaxine XR. 75 milliGRAM(s) Oral daily      Allergies    piperacillin-tazobactam (Rash)  soaps and creams causes rash uses only sensitive skin soap (Rash)  sulfa drugs (Unknown)  Voltaren (Rash)  Eye cream from the 1960s Neocortef ?spelling caused eyes to becomes swollen (Unknown)  neomycin (Unknown)    Intolerances        SOCIAL HISTORY:  Denies ETOh,Smoking,     FAMILY HISTORY:  No pertinent family history in first degree relatives        REVIEW OF SYSTEMS:  CONSTITUTIONAL: No weakness, fevers or chills  EYES/ENT: No visual changes;  No vertigo or throat pain   NECK: No pain or stiffness  RESPIRATORY: No cough, wheezing, hemoptysis; No shortness of breath  CARDIOVASCULAR: No chest pain or palpitations  GASTROINTESTINAL: No abdominal or epigastric pain. No nausea, vomiting, or hematemesis; No diarrhea or constipation. No melena or hematochezia.  GENITOURINARY: No dysuria, frequency or hematuria  NEUROLOGICAL: No numbness or weakness  SKIN: No itching, burning, rashes, or lesions   All other review of systems is negative unless indicated above.    +thirst  VITAL:  T(C): , Max: 37 (12-19-23 @ 20:30)  T(F): , Max: 98.6 (12-19-23 @ 20:30)  HR: 126 (12-20-23 @ 13:00)  BP: 103/72 (12-20-23 @ 13:00)  BP(mean): --  RR: 18 (12-20-23 @ 05:04)  SpO2: 98% (12-20-23 @ 05:04)  Wt(kg): --    PHYSICAL EXAM:  Constitutional: NAD, Alert, old cachectic   HEENT: NCAT, MMM  Neck: Supple, No JVD  Respiratory: CTA-b/l  Cardiovascular: RRR s1s2, no m/r/g  Gastrointestinal: BS+, soft, NT/ND  Extremities: No peripheral edema b/l  Neurological: no focal deficits; strength grossly intact  Back: no CVAT b/l  Skin: No rashes, no nevi    LABS:                        12.2   19.62 )-----------( 449      ( 20 Dec 2023 12:24 )             40.1     Na(136)/K(5.3)/Cl(98)/HCO3(15)/BUN(60)/Cr(2.83)Glu(83)/Ca(8.8)/Mg(--)/PO4(--)    12-20 @ 12:24  Na(138)/K(5.1)/Cl(100)/HCO3(16)/BUN(60)/Cr(2.56)Glu(43)/Ca(9.2)/Mg(--)/PO4(--)    12-20 @ 06:41  Na(136)/K(4.6)/Cl(99)/HCO3(24)/BUN(51)/Cr(2.18)Glu(130)/Ca(8.9)/Mg(--)/PO4(--)    12-19 @ 12:03    Urinalysis Basic - ( 20 Dec 2023 12:24 )    Color: x / Appearance: x / SG: x / pH: x  Gluc: 83 mg/dL / Ketone: x  / Bili: x / Urobili: x   Blood: x / Protein: x / Nitrite: x   Leuk Esterase: x / RBC: x / WBC x   Sq Epi: x / Non Sq Epi: x / Bacteria: x            IMAGING:    ASSESSMENT:  89-year-old female on Eliquis for PE prophylaxis and A-fib presenting to the emergency department due to fall with head trauma. 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  -BL Cr 1.1-1.3  -CKD likely 2/2 HTN and age related changes  -ALLAN likely 2/2 severe volume depletion, sepsis, reduced effective circulating volume, hypotension    Severe Sepsis  -WBC 19K  -Lactic 9.1  -BP okay, soft, improved s/p x1L fluids    Hypoglycemia  -on Bicarb w/D5    elevated LFT's  -likely shock liver    RECOMMEND:  -uric acid, Avis  -recommend BC x2, 30cc/kg of IVF (rec 1L already today,getting more fluids)   -change fluids to 150mEq sodium bicarbonate in 1L sterile water at 100cc/hr  -IV Abx  -a/w MICU consultation  -hold metoprolol   -monitor RF with daily BMP + phos  -maintain MAP >65  -Dose Rx for Crcl 10-20 mL/min    Thank you for involving [facility] in this patient's care.    With warm regards,    Bert Mckeon DO   [facility]  Office: [phone]

## 2023-12-20 NOTE — CONSULT NOTE ADULT - SUBJECTIVE AND OBJECTIVE BOX
HPI: 89y Female  HPI:  89 yo F with HTN, HLD, atrial fibrillation on eliquis, HFrEF (EF 30%), SVT s/p ablation, endometrial CA s/p LYNETTE presenting to the emergency department due to fall with head trauma this morning.  Patient states she was being bathed and fell but does not remember the details of the fall.  She does not member if she hit her head but denies any changes in visions, headache, weakness numbness to extremities.  She  also states that she has left foot pain. as per Dr. Diaz from [facility] ptn  was found to have an elevated creatinine of 2.18 this morning which was abnormal for her.  She would like to be evaluated for ALLAN.  Denies fevers, chills, bodies, nausea, vomiting, diarrhea. (19 Dec 2023 21:26)    On 12/20, patient noted to have atrial fibrillation with -150s, hypotension 80s/40s and hypoglycemia 20s and had RRT called. During RRT, patient was given dextrose and IVF with improvement in /60s and -120s. Labs during during RRT with evidence of lactic acidosis with lactate 9.1, AG 23 and pH 7.18 and pCO2 46. Labs also with concern for shock liver AST 1482/ . Patient was started on cefepime and vancomycin x 1 for sepsis.     MICU consulted given electrolyte abnormalities.       PAST MEDICAL & SURGICAL HISTORY:  Anxiety      Breast Lump      Insomnia      PE (pulmonary thromboembolism)      PAF (paroxysmal atrial fibrillation)  On Xarelto      HTN (hypertension)      HLD (hyperlipidemia)      Endometrial cancer  S/P LYNETTE with SBO      [facility]      History of Colonoscopy- 2011/Sept      History of D&C- 8/12/11      Status post hysterectomy  endometrial cancer      Status post cataract extraction  OS      Leg fracture, right  Hip fx repair in Nov 2019        Home Meds: Home Medications:  acetaminophen 325 mg oral tablet: 2 tab(s) orally every 6 hours, As needed, Temp greater or equal to 38C (100.4F), Mild Pain (1 - 3) (19 Dec 2023 19:28)  Artificial Tears ophthalmic solution: 1 drop(s) in each eye once a day (19 Dec 2023 19:28)  atorvastatin 80 mg oral tablet: 1 tab(s) orally once a day (at bedtime) (19 Dec 2023 19:28)  Arnold-Protect topical cream: Apply topically to affected area 2 times a day (sacrum) (19 Dec 2023 19:32)  Eliquis 2.5 mg oral tablet: 1 tab(s) orally 2 times a day (19 Dec 2023 19:32)  metoprolol succinate 25 mg oral tablet, extended release: 3 tab(s) orally once a day (19 Dec 2023 19:32)  mirtazapine 15 mg oral tablet: 1 tab(s) orally once a day (at bedtime) (19 Dec 2023 19:28)  Multiple Vitamins oral tablet: 1 tab(s) orally once a day (19 Dec 2023 19:32)  pantoprazole 40 mg oral delayed release tablet: 1 tab(s) orally 2 times a day (19 Dec 2023 19:28)  polyethylene glycol 3350 oral powder for reconstitution: 17 gram(s) orally once a day as needed for  constipation (19 Dec 2023 19:28)  tolnaftate 1% topical powder: Apply topically to affected area 2 times a day (groin, abdomen, chest, back) (19 Dec 2023 19:32)  venlafaxine 75 mg oral capsule, extended release: 1 cap(s) orally once a day (19 Dec 2023 19:28)  Vitamin D3 1250 mcg (50,000 intl units) oral capsule: 1 cap(s) orally once a week (19 Dec 2023 19:28)    Allergies: Allergies    piperacillin-tazobactam (Rash)  soaps and creams causes rash uses only sensitive skin soap (Rash)  sulfa drugs (Unknown)  Voltaren (Rash)  Eye cream from the 1960s Neocortef ?spelling caused eyes to becomes swollen (Unknown)  neomycin (Unknown)    Intolerances        CURRENT MEDICATIONS:   --------------------------------------------------------------------------------------  Neurologic Medications  acetaminophen     Tablet .. 650 milliGRAM(s) Oral every 6 hours PRN Temp greater or equal to 38C (100.4F), Mild Pain (1 - 3)  mirtazapine 15 milliGRAM(s) Oral at bedtime  venlafaxine XR. 75 milliGRAM(s) Oral daily    Respiratory Medications    Cardiovascular Medications  metoprolol succinate ER 25 milliGRAM(s) Oral <User Schedule>    Gastrointestinal Medications  cholecalciferol 1000 Unit(s) Oral daily  multivitamin 1 Tablet(s) Oral daily  pantoprazole    Tablet 40 milliGRAM(s) Oral two times a day  polyethylene glycol 3350 17 Gram(s) Oral daily PRN for constipation  sodium bicarbonate  Infusion 0.254 mEq/kG/Hr IV Continuous <Continuous>    Genitourinary Medications    Hematologic/Oncologic Medications  apixaban 2.5 milliGRAM(s) Oral two times a day    Antimicrobial/Immunologic Medications  cefepime   IVPB 1000 milliGRAM(s) IV Intermittent every 24 hours    Endocrine/Metabolic Medications  atorvastatin 80 milliGRAM(s) Oral at bedtime  dextrose 50% Injectable 25 Gram(s) IV Push once    Topical/Other Medications  artificial  tears Solution 1 Drop(s) Both EYES two times a day    --------------------------------------------------------------------------------------    VITAL SIGNS, INS/OUTS (last 24 hours):  --------------------------------------------------------------------------------------  ICU Vital Signs Last 24 Hrs  T(C): 36.3 (20 Dec 2023 15:19), Max: 37 (19 Dec 2023 20:30)  T(F): 97.4 (20 Dec 2023 15:19), Max: 98.6 (19 Dec 2023 20:30)  HR: 103 (20 Dec 2023 15:19) (97 - 160)  BP: 95/62 (20 Dec 2023 15:19) (95/62 - 126/93)  BP(mean): --  ABP: --  ABP(mean): --  RR: 18 (20 Dec 2023 15:19) (17 - 19)  SpO2: 100% (20 Dec 2023 15:19) (96% - 100%)    O2 Parameters below as of 20 Dec 2023 15:19  Patient On (Oxygen Delivery Method): nasal cannula, 4          I&O's Summary    --------------------------------------------------------------------------------------    PHYSICAL EXAM:  GENERAL: NAD, Resting in bed  EYES: EOMI, PERRLA, conjunctiva and sclera clear  HENT: Head atraumatic; Moist mucous membranes, normal oropharynx  NECK: Supple, No JVD, no lymphadenopathy, no thyroid nodules or enlargement  CHEST/LUNG: CTAB; on 6L NC and appears comfortable   HEART: RRR, normal S1 and S2; no murmurs, rubs or gallops  ABDOMEN: Bowel sounds present; soft, nontender, nondistended. no rebound or gaurding  EXTREMITIES: 2+ peripheral pulses although extremities cold to touch  NERVOUS SYSTEM: AAO x 3, nonfocal and spontaneous movement of all extremities  PSYCH: normal behavior, normal affect, normal speech    LABS  --------------------------------------------------------------------------------------  Labs:  CAPILLARY BLOOD GLUCOSE      POCT Blood Glucose.: 128 mg/dL (20 Dec 2023 12:46)  POCT Blood Glucose.: 65 mg/dL (20 Dec 2023 12:19)  POCT Blood Glucose.: 100 mg/dL (20 Dec 2023 09:52)  POCT Blood Glucose.: 120 mg/dL (20 Dec 2023 09:14)  POCT Blood Glucose.: 24 mg/dL (20 Dec 2023 08:57)  POCT Blood Glucose.: 26 mg/dL (20 Dec 2023 08:56)                          12.2   19.62 )-----------( 449      ( 20 Dec 2023 12:24 )             40.1       Auto Neutrophil %: 89.3 % (12-20-23 @ 12:24)  Auto Immature Granulocyte %: 1.4 % (12-20-23 @ 12:24)    12-20    136  |  98  |  60<H>  ----------------------------<  83  5.3   |  15<L>  |  2.83<H>      Calcium: 8.8 mg/dL (12-20-23 @ 12:24)      LFTs:             7.0  | 1.5  | 1482     ------------------[196     ( 20 Dec 2023 12:24 )  3.3  | x    | 745         Lipase:x      Amylase:x         Lactate, Blood: 9.1 mmol/L (12-20-23 @ 12:24)  Blood Gas Venous - Lactate: 9.3 mmol/L (12-20-23 @ 12:24)      Coags:     38.7   ----< 3.65    ( 19 Dec 2023 12:03 )     32.9                Urinalysis Basic - ( 20 Dec 2023 12:24 )    Color: x / Appearance: x / SG: x / pH: x  Gluc: 83 mg/dL / Ketone: x  / Bili: x / Urobili: x   Blood: x / Protein: x / Nitrite: x   Leuk Esterase: x / RBC: x / WBC x   Sq Epi: x / Non Sq Epi: x / Bacteria: x          --------------------------------------------------------------------------------------    OTHER LABS    IMAGING RESULTS  ****************   HPI: 89y Female  HPI:  89 yo F with HTN, HLD, atrial fibrillation on eliquis, HFrEF (EF 30%), SVT s/p ablation, endometrial CA s/p LYNETTE presenting to the emergency department due to fall with head trauma this morning.  Patient states she was being bathed and fell but does not remember the details of the fall.  She does not member if she hit her head but denies any changes in visions, headache, weakness numbness to extremities.  She  also states that she has left foot pain. as per Dr. Diaz from [facility] ptn  was found to have an elevated creatinine of 2.18 this morning which was abnormal for her.  She would like to be evaluated for ALLAN.  Denies fevers, chills, bodies, nausea, vomiting, diarrhea. (19 Dec 2023 21:26)    On 12/20, patient noted to have atrial fibrillation with -150s, hypotension 80s/40s and hypoglycemia 20s and had RRT called. During RRT, patient was given dextrose and IVF with improvement in /60s and -120s. Labs during during RRT with evidence of lactic acidosis with lactate 9.1, AG 23 and pH 7.18 and pCO2 46. Labs also with concern for shock liver AST 1482/ . Patient was started on cefepime and vancomycin x 1 for sepsis.     MICU consulted given electrolyte abnormalities.       PAST MEDICAL & SURGICAL HISTORY:  Anxiety      Breast Lump      Insomnia      PE (pulmonary thromboembolism)      PAF (paroxysmal atrial fibrillation)  On Xarelto      HTN (hypertension)      HLD (hyperlipidemia)      Endometrial cancer  S/P LYNETTE with SBO      [facility]      History of Colonoscopy- 2011/Sept      History of D&C- 8/12/11      Status post hysterectomy  endometrial cancer      Status post cataract extraction  OS      Leg fracture, right  Hip fx repair in Nov 2019        Home Meds: Home Medications:  acetaminophen 325 mg oral tablet: 2 tab(s) orally every 6 hours, As needed, Temp greater or equal to 38C (100.4F), Mild Pain (1 - 3) (19 Dec 2023 19:28)  Artificial Tears ophthalmic solution: 1 drop(s) in each eye once a day (19 Dec 2023 19:28)  atorvastatin 80 mg oral tablet: 1 tab(s) orally once a day (at bedtime) (19 Dec 2023 19:28)  Arnold-Protect topical cream: Apply topically to affected area 2 times a day (sacrum) (19 Dec 2023 19:32)  Eliquis 2.5 mg oral tablet: 1 tab(s) orally 2 times a day (19 Dec 2023 19:32)  metoprolol succinate 25 mg oral tablet, extended release: 3 tab(s) orally once a day (19 Dec 2023 19:32)  mirtazapine 15 mg oral tablet: 1 tab(s) orally once a day (at bedtime) (19 Dec 2023 19:28)  Multiple Vitamins oral tablet: 1 tab(s) orally once a day (19 Dec 2023 19:32)  pantoprazole 40 mg oral delayed release tablet: 1 tab(s) orally 2 times a day (19 Dec 2023 19:28)  polyethylene glycol 3350 oral powder for reconstitution: 17 gram(s) orally once a day as needed for  constipation (19 Dec 2023 19:28)  tolnaftate 1% topical powder: Apply topically to affected area 2 times a day (groin, abdomen, chest, back) (19 Dec 2023 19:32)  venlafaxine 75 mg oral capsule, extended release: 1 cap(s) orally once a day (19 Dec 2023 19:28)  Vitamin D3 1250 mcg (50,000 intl units) oral capsule: 1 cap(s) orally once a week (19 Dec 2023 19:28)    Allergies: Allergies    piperacillin-tazobactam (Rash)  soaps and creams causes rash uses only sensitive skin soap (Rash)  sulfa drugs (Unknown)  Voltaren (Rash)  Eye cream from the 1960s Neocortef ?spelling caused eyes to becomes swollen (Unknown)  neomycin (Unknown)    Intolerances        CURRENT MEDICATIONS:   --------------------------------------------------------------------------------------  Neurologic Medications  acetaminophen     Tablet .. 650 milliGRAM(s) Oral every 6 hours PRN Temp greater or equal to 38C (100.4F), Mild Pain (1 - 3)  mirtazapine 15 milliGRAM(s) Oral at bedtime  venlafaxine XR. 75 milliGRAM(s) Oral daily    Respiratory Medications    Cardiovascular Medications  metoprolol succinate ER 25 milliGRAM(s) Oral <User Schedule>    Gastrointestinal Medications  cholecalciferol 1000 Unit(s) Oral daily  multivitamin 1 Tablet(s) Oral daily  pantoprazole    Tablet 40 milliGRAM(s) Oral two times a day  polyethylene glycol 3350 17 Gram(s) Oral daily PRN for constipation  sodium bicarbonate  Infusion 0.254 mEq/kG/Hr IV Continuous <Continuous>    Genitourinary Medications    Hematologic/Oncologic Medications  apixaban 2.5 milliGRAM(s) Oral two times a day    Antimicrobial/Immunologic Medications  cefepime   IVPB 1000 milliGRAM(s) IV Intermittent every 24 hours    Endocrine/Metabolic Medications  atorvastatin 80 milliGRAM(s) Oral at bedtime  dextrose 50% Injectable 25 Gram(s) IV Push once    Topical/Other Medications  artificial  tears Solution 1 Drop(s) Both EYES two times a day    --------------------------------------------------------------------------------------    VITAL SIGNS, INS/OUTS (last 24 hours):  --------------------------------------------------------------------------------------  ICU Vital Signs Last 24 Hrs  T(C): 36.3 (20 Dec 2023 15:19), Max: 37 (19 Dec 2023 20:30)  T(F): 97.4 (20 Dec 2023 15:19), Max: 98.6 (19 Dec 2023 20:30)  HR: 103 (20 Dec 2023 15:19) (97 - 160)  BP: 95/62 (20 Dec 2023 15:19) (95/62 - 126/93)  BP(mean): --  ABP: --  ABP(mean): --  RR: 18 (20 Dec 2023 15:19) (17 - 19)  SpO2: 100% (20 Dec 2023 15:19) (96% - 100%)    O2 Parameters below as of 20 Dec 2023 15:19  Patient On (Oxygen Delivery Method): nasal cannula, 4          I&O's Summary    --------------------------------------------------------------------------------------    PHYSICAL EXAM:  GENERAL: NAD, Resting in bed  EYES: EOMI, PERRLA, conjunctiva and sclera clear  HENT: Head atraumatic; Moist mucous membranes, normal oropharynx  NECK: Supple, No JVD, no lymphadenopathy, no thyroid nodules or enlargement  CHEST/LUNG: CTAB; on 6L NC and appears comfortable   HEART: RRR, normal S1 and S2; no murmurs, rubs or gallops  ABDOMEN: Bowel sounds present; soft, nontender, nondistended. no rebound or gaurding  EXTREMITIES: 2+ peripheral pulses although extremities cold to touch  NERVOUS SYSTEM: AAO x 3, nonfocal and spontaneous movement of all extremities  PSYCH: normal behavior, normal affect, normal speech    LABS  --------------------------------------------------------------------------------------  Labs:  CAPILLARY BLOOD GLUCOSE      POCT Blood Glucose.: 128 mg/dL (20 Dec 2023 12:46)  POCT Blood Glucose.: 65 mg/dL (20 Dec 2023 12:19)  POCT Blood Glucose.: 100 mg/dL (20 Dec 2023 09:52)  POCT Blood Glucose.: 120 mg/dL (20 Dec 2023 09:14)  POCT Blood Glucose.: 24 mg/dL (20 Dec 2023 08:57)  POCT Blood Glucose.: 26 mg/dL (20 Dec 2023 08:56)                          12.2   19.62 )-----------( 449      ( 20 Dec 2023 12:24 )             40.1       Auto Neutrophil %: 89.3 % (12-20-23 @ 12:24)  Auto Immature Granulocyte %: 1.4 % (12-20-23 @ 12:24)    12-20    136  |  98  |  60<H>  ----------------------------<  83  5.3   |  15<L>  |  2.83<H>      Calcium: 8.8 mg/dL (12-20-23 @ 12:24)      LFTs:             7.0  | 1.5  | 1482     ------------------[196     ( 20 Dec 2023 12:24 )  3.3  | x    | 745         Lipase:x      Amylase:x         Lactate, Blood: 9.1 mmol/L (12-20-23 @ 12:24)  Blood Gas Venous - Lactate: 9.3 mmol/L (12-20-23 @ 12:24)      Coags:     38.7   ----< 3.65    ( 19 Dec 2023 12:03 )     32.9                Urinalysis Basic - ( 20 Dec 2023 12:24 )    Color: x / Appearance: x / SG: x / pH: x  Gluc: 83 mg/dL / Ketone: x  / Bili: x / Urobili: x   Blood: x / Protein: x / Nitrite: x   Leuk Esterase: x / RBC: x / WBC x   Sq Epi: x / Non Sq Epi: x / Bacteria: x          --------------------------------------------------------------------------------------    OTHER LABS    IMAGING RESULTS  ****************

## 2023-12-20 NOTE — CONSULT NOTE ADULT - SUBJECTIVE AND OBJECTIVE BOX
[facility] DIV[facility] INFECTIOUS DISEASES  MONTEZ Chapman S. Shah, Y. Patel, G. [facility]  [phone]  ([phone] - weekdays after 5pm and weekends)    DELVIS MCBRIDE  89y, Female  [id number]    HPI:  Patient is a 89 year old female with PMH of Afib on eliquis, HLD, HTN who presented to the emergency department due to fall with head trauma in the morning.  Patient states she was being bathed and fell but does not remember the details of the fall.  She does not remember if she hit her head but denies any changes in visions, headache, weakness numbness to extremities.  She  also states that she has left foot pain. as per Dr. Diaz from [facility] ptn  was found to have an elevated creatinine of 2.18 this morning which was abnormal for her.  She would like to be evaluated for ALLAN.  Denies fevers, chills, bodies, nausea, vomiting, diarrhea. (19 Dec 2023 21:26)  Patient seen and examined at bedside this morning in the ER. Patient confirms above. States she has no pain at this time. Denies chest pain, dyspnea, cough, abdominal pain, nausea, vomiting, diarrhea, dysuria, increased urinary frequency.   ROS: 14 point review of systems completed, pertinent positives and negatives as per HPI.    Allergies: piperacillin-tazobactam (Rash)  soaps and creams causes rash uses only sensitive skin soap (Rash)  sulfa drugs (Unknown)  Voltaren (Rash)  Eye cream from the 1960s Neocortef ? caused eyes to becomes swollen (Unknown)  neomycin (Unknown)    PMH -- Anxiety  Seasonal Allergies  Breast Lump  Initial Insomnia  Insomnia  Hyperlipidemia  Hypertension  Neuropathy associated with cancer  PE (pulmonary thromboembolism)  PAF (paroxysmal atrial fibrillation)  HTN (hypertension)  HLD (hyperlipidemia)  Endometrial cancer    PSH -- History of Breast Lump/Mass Excision- [facility]- left- 1971  History of Colonoscopy- 2011/Sept  History of D&C- 8/12/11  Status post hysterectomy  Status post cataract extraction  Leg fracture, right    FH -- No pertinent family history in first degree relatives  Social History -- denies tobacco, alcohol or illicit drug use    Physical Exam--  Vital Signs Last 24 Hrs  T(F): 97.5 (20 Dec 2023 05:04), Max: 98.6 (19 Dec 2023 20:30)  HR: 160 (20 Dec 2023 08:45) (92 - 160)  BP: 126/93 (20 Dec 2023 08:45) (100/61 - 126/93)  RR: 18 (20 Dec 2023 05:04) (17 - 19)  SpO2: 98% (20 Dec 2023 05:04) (96% - 98%)  General: no acute distress  HEENT: NC/AT, EOMI, anicteric, neck supple  Lungs: decreased breath sounds b/l  Heart: S1, S2 present, tachycardia   Abdomen: Soft. Nondistended. Nontender.  Neuro: awake, alert, answers/follows  Extremities: b/l foot cyanosis, no edema.   Skin: Warm. Dry.No visible rash.  Lines: PIV    Laboratory & Imaging Data--  CBC:                       11.0   11.03 )-----------( 441      ( 19 Dec 2023 12:03 )             35.5     WBC Count: 11.03 K/uL (12-19-23 @ 12:03)    CMP: 12-20    138  |  100  |  60<H>  ----------------------------<  43<LL>  5.1   |  16<L>  |  2.56<H>    Ca    9.2      20 Dec 2023 06:41    TPro  7.2  /  Alb  3.3  /  TBili  1.3<H>  /  DBili  0.7<H>  /  AST  882<H>  /  ALT  517<H>  /  AlkPhos  196<H>  12-20    LIVER FUNCTIONS - ( 20 Dec 2023 06:41 )  Alb: 3.3 g/dL / Pro: 7.2 g/dL / ALK PHOS: 196 U/L / ALT: 517 U/L / AST: 882 U/L / GGT: x           Urinalysis + Microscopic Examination (12.19.23 @ 17:01)    pH Urine: 5.0   Urine Appearance: Turbid   Color: Dark Yellow   Specific Gravity: 1.021   Protein, Urine: 100 mg/dL   Glucose Qualitative, Urine: Negative mg/dL   Ketone - Urine: Negative mg/dL   Blood, Urine: Negative   Bilirubin: Small   Urobilinogen: 1.0 mg/dL   Leukocyte Esterase Concentration: Small   Nitrite: Negative   Review: Reviewed   White Blood Cell - Urine: 24 /HPF   Red Blood Cell - Urine: 3 /HPF   Bacteria: Many /HPF   Cast: >63 /LPF   Hyaline Casts: Present   Epithelial Cells: 17 /HPF      Microbiology: reviewed    Radiology--reviewed  < from: CT Head No Cont (12.20.23 @ 10:18) >  IMPRESSION: No acute intracranial hemorrhage, mass effect, or shift of   the midline structures.    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    < end of copied text >    < from: POCUS ED Kidney and Bladder (12.19.23 @ 15:47) >  IMPRESSION:  No hydronephrosis present in the left and right kidneys.    < end of copied text >    < from: POCUS ED Abdomen Limited (12.19.23 @ 15:43) >  IMPRESSION:This study is a very limited assessment owing to poor   visualization and contraction of the gallbladder, but there is no gross   evidence of cholecystitis noted. The cbd measures upt o 0.86cm in   diameter (within acceptable limits for this patient's age.    < end of copied text >        Active Medications--  acetaminophen     Tablet .. 650 milliGRAM(s) Oral every 6 hours PRN  apixaban 2.5 milliGRAM(s) Oral two times a day  artificial  tears Solution 1 Drop(s) Both EYES two times a day  atorvastatin 80 milliGRAM(s) Oral at bedtime  cefTRIAXone   IVPB 1000 milliGRAM(s) IV Intermittent every 24 hours  cholecalciferol 1000 Unit(s) Oral daily  < from: Xray Foot AP + Lateral + Oblique, Left (12.19.23 @ 12:18) >  IMPRESSION:  Old healed distal fibular fracture deformity. No dislocations or acute   appearing fractures.    Congruent ankle mortise with smooth intact talar dome. Tarsometatarsal   alignment maintained without evidence for a Lisfranc injury. Preserved   joint spaces and no joint margin erosions.    Posterosuperior calcaneal Iban deformity. Posterior calcaneal   enthesophyte.    Generalized osteopenia otherwise no discrete lytic or blastic lesions.    Vascular calcifications.    < end of copied text >  dextrose 5% + lactated ringers. 1000 milliLiter(s) IV Continuous <Continuous>  dextrose 50% Injectable 25 Gram(s) IV Push once  metoprolol succinate ER 25 milliGRAM(s) Oral <User Schedule>  mirtazapine 15 milliGRAM(s) Oral at bedtime  multivitamin 1 Tablet(s) Oral daily  pantoprazole    Tablet 40 milliGRAM(s) Oral two times a day  polyethylene glycol 3350 17 Gram(s) Oral daily PRN  venlafaxine XR. 75 milliGRAM(s) Oral daily    Current Antimicrobials:   cefTRIAXone   IVPB 1000 milliGRAM(s) IV Intermittent every 24 hours     [facility]EAS[facility]  MONTEZ Chapman S. Shah, Y. Patel, G. [facility]  [phone]  ([phone] - weekdays after 5pm and weekends)    DELVIS MCBRIDE  89y, Female  [id number]    HPI:  Patient is a 89 year old female with PMH of Afib on eliquis, HLD, HTN who presented to the emergency department due to fall with head trauma in the morning.  Patient states she was being bathed and fell but does not remember the details of the fall.  She does not remember if she hit her head but denies any changes in visions, headache, weakness numbness to extremities.  She  also states that she has left foot pain. as per Dr. Diaz from [facility] ptn  was found to have an elevated creatinine of 2.18 this morning which was abnormal for her.  She would like to be evaluated for ALLAN.  Denies fevers, chills, bodies, nausea, vomiting, diarrhea. (19 Dec 2023 21:26)  Patient seen and examined at bedside this morning in the ER. Patient confirms above. States she has no pain at this time. Denies chest pain, dyspnea, cough, abdominal pain, nausea, vomiting, diarrhea, dysuria, increased urinary frequency.   ROS: 14 point review of systems completed, pertinent positives and negatives as per HPI.    Allergies: piperacillin-tazobactam (Rash)  soaps and creams causes rash uses only sensitive skin soap (Rash)  sulfa drugs (Unknown)  Voltaren (Rash)  Eye cream from the 1960s Neocortef ? caused eyes to becomes swollen (Unknown)  neomycin (Unknown)    PMH -- Anxiety  Seasonal Allergies  Breast Lump  Initial Insomnia  Insomnia  Hyperlipidemia  Hypertension  Neuropathy associated with cancer  PE (pulmonary thromboembolism)  PAF (paroxysmal atrial fibrillation)  HTN (hypertension)  HLD (hyperlipidemia)  Endometrial cancer    PSH -- History of Breast Lump/Mass Excision- benighn- left- 1971  History of Colonoscopy- 2011/Sept  History of D&C- 8/12/11  Status post hysterectomy  Status post cataract extraction  Leg fracture, right    FH -- No pertinent family history in first degree relatives  Social History -- denies tobacco, alcohol or illicit drug use    Physical Exam--  Vital Signs Last 24 Hrs  T(F): 97.5 (20 Dec 2023 05:04), Max: 98.6 (19 Dec 2023 20:30)  HR: 160 (20 Dec 2023 08:45) (92 - 160)  BP: 126/93 (20 Dec 2023 08:45) (100/61 - 126/93)  RR: 18 (20 Dec 2023 05:04) (17 - 19)  SpO2: 98% (20 Dec 2023 05:04) (96% - 98%)  General: no acute distress  HEENT: NC/AT, EOMI, anicteric, neck supple  Lungs: decreased breath sounds b/l  Heart: S1, S2 present, tachycardia   Abdomen: Soft. Nondistended. Nontender.  Neuro: awake, alert, answers/follows  Extremities: b/l foot cyanosis, no edema.   Skin: Warm. Dry.No visible rash.  Lines: PIV    Laboratory & Imaging Data--  CBC:                       11.0   11.03 )-----------( 441      ( 19 Dec 2023 12:03 )             35.5     WBC Count: 11.03 K/uL (12-19-23 @ 12:03)    CMP: 12-20    138  |  100  |  60<H>  ----------------------------<  43<LL>  5.1   |  16<L>  |  2.56<H>    Ca    9.2      20 Dec 2023 06:41    TPro  7.2  /  Alb  3.3  /  TBili  1.3<H>  /  DBili  0.7<H>  /  AST  882<H>  /  ALT  517<H>  /  AlkPhos  196<H>  12-20    LIVER FUNCTIONS - ( 20 Dec 2023 06:41 )  Alb: 3.3 g/dL / Pro: 7.2 g/dL / ALK PHOS: 196 U/L / ALT: 517 U/L / AST: 882 U/L / GGT: x           Urinalysis + Microscopic Examination (12.19.23 @ 17:01)    pH Urine: 5.0   Urine Appearance: Turbid   Color: Dark Yellow   Specific Gravity: 1.021   Protein, Urine: 100 mg/dL   Glucose Qualitative, Urine: Negative mg/dL   Ketone - Urine: Negative mg/dL   Blood, Urine: Negative   Bilirubin: Small   Urobilinogen: 1.0 mg/dL   Leukocyte Esterase Concentration: Small   Nitrite: Negative   Review: Reviewed   White Blood Cell - Urine: 24 /HPF   Red Blood Cell - Urine: 3 /HPF   Bacteria: Many /HPF   Cast: >63 /LPF   Hyaline Casts: Present   Epithelial Cells: 17 /HPF      Microbiology: reviewed    Radiology--reviewed  < from: CT Head No Cont (12.20.23 @ 10:18) >  IMPRESSION: No acute intracranial hemorrhage, mass effect, or shift of   the midline structures.    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    < end of copied text >    < from: POCUS ED Kidney and Bladder (12.19.23 @ 15:47) >  IMPRESSION:  No hydronephrosis present in the left and right kidneys.    < end of copied text >    < from: POCUS ED Abdomen Limited (12.19.23 @ 15:43) >  IMPRESSION:This study is a very limited assessment owing to poor   visualization and contraction of the gallbladder, but there is no gross   evidence of cholecystitis noted. The cbd measures upt o 0.86cm in   diameter (within acceptable limits for this patient's age.    < end of copied text >        Active Medications--  acetaminophen     Tablet .. 650 milliGRAM(s) Oral every 6 hours PRN  apixaban 2.5 milliGRAM(s) Oral two times a day  artificial  tears Solution 1 Drop(s) Both EYES two times a day  atorvastatin 80 milliGRAM(s) Oral at bedtime  cefTRIAXone   IVPB 1000 milliGRAM(s) IV Intermittent every 24 hours  cholecalciferol 1000 Unit(s) Oral daily  < from: Xray Foot AP + Lateral + Oblique, Left (12.19.23 @ 12:18) >  IMPRESSION:  Old healed distal fibular fracture deformity. No dislocations or acute   appearing fractures.    Congruent ankle mortise with smooth intact talar dome. Tarsometatarsal   alignment maintained without evidence for a Lisfranc injury. Preserved   joint spaces and no joint margin erosions.    Posterosuperior calcaneal Iban deformity. Posterior calcaneal   enthesophyte.    Generalized osteopenia otherwise no discrete lytic or blastic lesions.    Vascular calcifications.    < end of copied text >  dextrose 5% + lactated ringers. 1000 milliLiter(s) IV Continuous <Continuous>  dextrose 50% Injectable 25 Gram(s) IV Push once  metoprolol succinate ER 25 milliGRAM(s) Oral <User Schedule>  mirtazapine 15 milliGRAM(s) Oral at bedtime  multivitamin 1 Tablet(s) Oral daily  pantoprazole    Tablet 40 milliGRAM(s) Oral two times a day  polyethylene glycol 3350 17 Gram(s) Oral daily PRN  venlafaxine XR. 75 milliGRAM(s) Oral daily    Current Antimicrobials:   cefTRIAXone   IVPB 1000 milliGRAM(s) IV Intermittent every 24 hours

## 2023-12-20 NOTE — CONSULT NOTE ADULT - TIME BILLING
Patient seen and examined, agree with the above assessment and plan by FRANCES Ortiz  89-year-old female on Eliquis for PE prophylaxis and A-fib presenting to the emergency department due to fall with head trauma this morning.      #Fall  -Per pt, she was dropped at NH while being cleaned  -CTH no acute findings  -Trop w/ mild elevation likely demand i/s/o afib RVR and ALLAN - trend to peak  -EKG afib with RVR  -Dry on exam - would give gentle ivf  -RRT called for hypoglycemia - cont BG mgmt per med  -F/u infectious w/u  -Abx per med    #Afib  -Rapid afib on tele in ED to 130s  -Cont tele monitoring  -Continue eliquis (Has hx xarelto failure)  -Resume metoprolol 75mg qd if bp is able to tolerate   -If bp unable to tolerate po meds, start cardizem gtt 5mg/hr    #Cardiomyopathy  -Deferred eval in the past given advanced age and functional status  -Echo with mildly decreased lvef (47%), mod MR, mod-severe MS, mod TR  -Resume metoprolol 75mg qd if bp is able to tolerate   -Entresto on hold 2/2 soft bp   -Dry on exam - ivf as above     #CVA  -recent workup revealed cerebellar infarcts on brain imaging   -switched from xarelto to eliquis  -cont low dose a/c    #HTN  -Mgmt as above     #ALLAN  -IVF  -Renal eval

## 2023-12-20 NOTE — RAPID RESPONSE TEAM SUMMARY - NSSITUATIONBACKGROUNDRRT_GEN_ALL_CORE
89 yo F with HTN, HLD, atrial fibrillation on eliquis, HFrEF (EF 30%), SVT s/p ablation, endometrial CA s/p LYNETTE presenting to initially with head trauma but CT head without gross findings. Course c/b hypotension and hypoglycemia likely iso septic shock with potential urinary source.    RRT called for hypotension. On arrival to floor, patient was found to be hypotensive 70s/50s, HR 110s, SpO2 unable to be obtained w/ multiple monitors), BG 140s. The patient had a strong pulse and was mentating well, stating that she was very thirsty and wanted something to drink. On review of chart, MICU stated that a GOC conversation was had with the patient's daughter Tari, and the patient's care was in line with comfort based measures, however ok for IV fluid and antibiotics, but no pressors. Had group call w/ MICU resident, myself and primary team ACP, all were in agreement w/ GOC. Official MOLST to be placed in chart.    Plan is to continue IV fluids w/ bicarb as ordered, check blood pressures as routine, and avoid any unnecessary blood draws. Suspect that while numbers are demonstrating hypotension, patient was mentating well w/ strong pulse suggestive of a higher blood pressure than on our monitors.  Additionally, recommended allowing patient to have water and liquids as patient was requesting this, and this would be in line w/ comfort based treatment.      89 yo F with HTN, HLD, atrial fibrillation on eliquis, HFrEF (EF 30%), SVT s/p ablation, endometrial CA s/p LYNETTE presenting to initially with head trauma but CT head without gross findings. Course c/b hypotension and hypoglycemia likely iso septic shock with potential urinary source.    RRT called for hypotension. On arrival to floor, patient was found to be hypotensive 70s/50s, HR 110s, SpO2 unable to be obtained w/ multiple monitors), BG 140s. The patient had a strong pulse and was mentating well, stating that she was very thirsty and wanted something to drink. On review of chart, MICU stated that a GOC conversation was had with the patient's daughter Tari, and the patient's care was in line with comfort based measures, however ok for IV fluid and antibiotics, but no pressors. Had group call w/ MICU resident, myself and primary team ACP, all were in agreement w/ GOC. Official MOLST to be placed in chart.    Plan is to continue IV fluids w/ bicarb as ordered, check blood pressures as routine, and avoid any unnecessary blood draws. Suspect that while numbers are demonstrating hypotension, patient was mentating well w/ strong pulse suggestive of a higher blood pressure than on our monitors.  Additionally, recommended allowing patient to have water and liquids as patient was requesting this, and this would be in line w/ comfort based treatment.

## 2023-12-20 NOTE — CONSULT NOTE ADULT - ATTENDING COMMENTS
AIDE Macdonald MD have participated in the daily care of this patient and have performed a history and physical exam of the patient and discussed  the findings and plan with the house officer. I reviewed the resident note and agree with the findings and plan   I Enrike Macdonald MD have personally seen and examined the patient at bedside today at  6 pm

## 2023-12-20 NOTE — CONSULT NOTE ADULT - SUBJECTIVE AND OBJECTIVE BOX
CARDIOLOGY CONSULT - Dr. Mccartney         HPI:  89-year-old female on Eliquis for PE prophylaxis and A-fib presenting to the emergency department due to fall with head trauma this morning.  Patient states she was being bathed and fell but does not remember the details of the fall.  She does not member if she hit her head but denies any changes in visions, headache, weakness numbness to extremities.  She  also states that she has left foot pain. as per Dr. Diaz from [facility] ptn  was found to have an elevated creatinine of 2.18 this morning which was abnormal for her.  She would like to be evaluated for ALLAN.  Denies fevers, chills, bodies, nausea, vomiting, diarrhea. (19 Dec 2023 21:26)      PAST MEDICAL & SURGICAL HISTORY:  Anxiety      Breast Lump      Insomnia      PE (pulmonary thromboembolism)      PAF (paroxysmal atrial fibrillation)  On Xarelto      HTN (hypertension)      HLD (hyperlipidemia)      Endometrial cancer  S/P LYNETTE with SBO      [facility]      History of Colonoscopy- 2011/Sept      History of D&C- 8/12/11      Status post hysterectomy  endometrial cancer      Status post cataract extraction  OS      Leg fracture, right  Hip fx repair in Nov 2019      PREVIOUS DIAGNOSTIC TESTING:    [x] Echocardiogram:  < from: TTE Congenital Anomalies W or WO Ultrasound Enhancing Agent (10.18.23 @ 11:39) >  CONCLUSIONS:      1. Left ventricular systolic function is mildly decreased with an ejection fraction of 47 % by Beaulieu's method of disks.   2. Normal right ventricular cavity size and reduced systolic function. Tricuspid annular tissue Doppler S' is 11.0 cm/s (normal >10 cm/s).   3. Moderate mitral regurgitation.   4. No pericardial effusion seen.   5. Mitral stenosis cannot be properly assessed by mean gradient with heart rate of 98 bpm.     6. Moderate calcification of the aortic valve leaflets.   7. Moderate mitral valve leaflet calcification.   8. Moderate to severe mitral valve stenosis.   9. Moderate tricuspid regurgitation.  10. No prior echocardiogram is available for comparison.  11. There isnormal LV mass and concentric remodeling.    < end of copied text >    [ ]  Catheterization:  [ ] Stress Test:  	    MEDICATIONS:  Home Medications:  acetaminophen 325 mg oral tablet: 2 tab(s) orally every 6 hours, As needed, Temp greater or equal to 38C (100.4F), Mild Pain (1 - 3) (19 Dec 2023 19:28)  Artificial Tears ophthalmic solution: 1 drop(s) in each eye once a day (19 Dec 2023 19:28)  atorvastatin 80 mg oral tablet: 1 tab(s) orally once a day (at bedtime) (19 Dec 2023 19:28)  Arnold-Protect topical cream: Apply topically to affected area 2 times a day (sacrum) (19 Dec 2023 19:32)  Eliquis 2.5 mg oral tablet: 1 tab(s) orally 2 times a day (19 Dec 2023 19:32)  metoprolol succinate 25 mg oral tablet, extended release: 3 tab(s) orally once a day (19 Dec 2023 19:32)  mirtazapine 15 mg oral tablet: 1 tab(s) orally once a day (at bedtime) (19 Dec 2023 19:28)  Multiple Vitamins oral tablet: 1 tab(s) orally once a day (19 Dec 2023 19:32)  pantoprazole 40 mg oral delayed release tablet: 1 tab(s) orally 2 times a day (19 Dec 2023 19:28)  polyethylene glycol 3350 oral powder for reconstitution: 17 gram(s) orally once a day as needed for  constipation (19 Dec 2023 19:28)  tolnaftate 1% topical powder: Apply topically to affected area 2 times a day (groin, abdomen, chest, back) (19 Dec 2023 19:32)  venlafaxine 75 mg oral capsule, extended release: 1 cap(s) orally once a day (19 Dec 2023 19:28)  Vitamin D3 1250 mcg (50,000 intl units) oral capsule: 1 cap(s) orally once a week (19 Dec 2023 19:28)      MEDICATIONS  (STANDING):  apixaban 2.5 milliGRAM(s) Oral two times a day  artificial  tears Solution 1 Drop(s) Both EYES two times a day  atorvastatin 80 milliGRAM(s) Oral at bedtime  cefTRIAXone   IVPB 1000 milliGRAM(s) IV Intermittent every 24 hours  cholecalciferol 1000 Unit(s) Oral daily  dextrose 50% Injectable 25 Gram(s) IV Push once  metoprolol succinate ER 25 milliGRAM(s) Oral <User Schedule>  mirtazapine 15 milliGRAM(s) Oral at bedtime  multivitamin 1 Tablet(s) Oral daily  pantoprazole    Tablet 40 milliGRAM(s) Oral two times a day  sodium chloride 0.45%. 1000 milliLiter(s) (75 mL/Hr) IV Continuous <Continuous>  venlafaxine XR. 75 milliGRAM(s) Oral daily      FAMILY HISTORY:  No pertinent family history in first degree relatives        SOCIAL HISTORY:    [x] Non-smoker  [ ] Smoker  [ ] Alcohol    Allergies    piperacillin-tazobactam (Rash)  soaps and creams causes rash uses only sensitive skin soap (Rash)  sulfa drugs (Unknown)  Voltaren (Rash)  Eye cream from the 1960s Neocortef ?spelling caused eyes to becomes swollen (Unknown)  neomycin (Unknown)    Intolerances    	    REVIEW OF SYSTEMS:  CONSTITUTIONAL: No fever, weight loss, or fatigue  EYES: No eye pain, visual disturbances, or discharge  ENMT:  No difficulty hearing, tinnitus, vertigo; No sinus or throat pain  NECK: No pain or stiffness  RESPIRATORY: No cough, wheezing, chills or hemoptysis; No Shortness of Breath  CARDIOVASCULAR: No chest pain, palpitations, passing out, dizziness, or leg swelling  GASTROINTESTINAL: No abdominal or epigastric pain. No nausea, vomiting, or hematemesis; No diarrhea or constipation. No melena or hematochezia.  GENITOURINARY: No dysuria, frequency, hematuria, or incontinence  NEUROLOGICAL: No headaches, memory loss, loss of strength, numbness, or tremors  SKIN: No itching, burning, rashes, or lesions   	    [x] All others negative	  [ ] Unable to obtain    PHYSICAL EXAM:  T(C): 36.4 (12-20-23 @ 05:04), Max: 37 (12-19-23 @ 20:30)  HR: 160 (12-20-23 @ 08:45) (92 - 160)  BP: 126/93 (12-20-23 @ 08:45) (100/61 - 126/93)  RR: 18 (12-20-23 @ 05:04) (17 - 20)  SpO2: 98% (12-20-23 @ 05:04) (95% - 98%)  Wt(kg): --  I&O's Summary      Appearance: Elderly female	  Psychiatry: A & O x 3, Mood & affect appropriate  HEENT:   Normal oral mucosa, PERRL, EOMI	  Lymphatic: No lymphadenopathy  Cardiovascular: Normal S1 S2,Irregular rhythm, No JVD, No murmurs  Respiratory: Lungs clear to auscultation b/l  Gastrointestinal:  Soft, Non-tender, + BS	  Skin: No rashes, No ecchymoses, No cyanosis	  Neurologic: Non-focal  Extremities: Normal range of motion, No clubbing, cyanosis or edema  Vascular: Peripheral pulses palpable 2+ bilaterally    TELEMETRY: Afib rvr 127  ECG:  Afib w/ 	  RADIOLOGY:  < from: CT Head No Cont (12.19.23 @ 12:20) >    IMPRESSION: No acute intracranial hemorrhage, mass effect, or shift of   the midline structures.    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    --- End of Report ---    < end of copied text >    < from: Xray Foot AP + Lateral + Oblique, Left (12.19.23 @ 12:18) >    IMPRESSION:  Old healed distal fibular fracture deformity. No dislocations or acute   appearing fractures.    Congruent ankle mortise with smooth intact talar dome. Tarsometatarsal   alignment maintained without evidence for a Lisfranc injury. Preserved   joint spaces and no joint margin erosions.    Posterosuperior calcaneal Iban deformity. Posterior calcaneal   enthesophyte.    Generalized osteopenia otherwise no discrete lytic or blastic lesions.    Vascular calcifications.    --- End of Report ---    < end of copied text >      OTHER: 	  	  LABS:	 	    CARDIAC MARKERS:  Troponin T, High Sensitivity Result: 110 ng/L (12-19 @ 19:25)  Troponin T, High Sensitivity Result: 109 ng/L (12-19 @ 12:03)                                  11.0   11.03 )-----------( 441      ( 19 Dec 2023 12:03 )             35.5     12-20    138  |  100  |  60<H>  ----------------------------<  43<LL>  5.1   |  16<L>  |  2.56<H>    Ca    9.2      20 Dec 2023 06:41    TPro  7.2  /  Alb  3.3  /  TBili  1.3<H>  /  DBili  0.7<H>  /  AST  882<H>  /  ALT  517<H>  /  AlkPhos  196<H>  12-20    PT/INR - ( 19 Dec 2023 12:03 )   PT: 38.7 sec;   INR: 3.65 ratio         PTT - ( 19 Dec 2023 12:03 )  PTT:32.9 sec  proBNP:   Lipid Profile:   HgA1c:   TSH:        CARDIOLOGY CONSULT - Dr. Mccartney         HPI:  89-year-old female on Eliquis for PE prophylaxis and A-fib presenting to the emergency department due to fall with head trauma this morning.  Patient states she was being bathed and fell but does not remember the details of the fall.  She does not member if she hit her head but denies any changes in visions, headache, weakness numbness to extremities.  She  also states that she has left foot pain. as per Dr. Diaz from [facility] ptn  was found to have an elevated creatinine of 2.18 this morning which was abnormal for her.  She would like to be evaluated for ALLAN.  Denies fevers, chills, bodies, nausea, vomiting, diarrhea. (19 Dec 2023 21:26)      PAST MEDICAL & SURGICAL HISTORY:  Anxiety      Breast Lump      Insomnia      PE (pulmonary thromboembolism)      PAF (paroxysmal atrial fibrillation)  On Xarelto      HTN (hypertension)      HLD (hyperlipidemia)      Endometrial cancer  S/P LYNETTE with SBO      [facility]      History of Colonoscopy- 2011/Sept      History of D&C- 8/12/11      Status post hysterectomy  endometrial cancer      Status post cataract extraction  OS      Leg fracture, right  Hip fx repair in Nov 2019      PREVIOUS DIAGNOSTIC TESTING:    [x] Echocardiogram:  < from: TTE Congenital Anomalies W or WO Ultrasound Enhancing Agent (10.18.23 @ 11:39) >  CONCLUSIONS:      1. Left ventricular systolic function is mildly decreased with an ejection fraction of 47 % by Beaulieu's method of disks.   2. Normal right ventricular cavity size and reduced systolic function. Tricuspid annular tissue Doppler S' is 11.0 cm/s (normal >10 cm/s).   3. Moderate mitral regurgitation.   4. No pericardial effusion seen.   5. Mitral stenosis cannot be properly assessed by mean gradient with heart rate of 98 bpm.     6. Moderate calcification of the aortic valve leaflets.   7. Moderate mitral valve leaflet calcification.   8. Moderate to severe mitral valve stenosis.   9. Moderate tricuspid regurgitation.  10. No prior echocardiogram is available for comparison.  11. There isnormal LV mass and concentric remodeling.    < end of copied text >    [ ]  Catheterization:  [ ] Stress Test:  	    MEDICATIONS:  Home Medications:  acetaminophen 325 mg oral tablet: 2 tab(s) orally every 6 hours, As needed, Temp greater or equal to 38C (100.4F), Mild Pain (1 - 3) (19 Dec 2023 19:28)  Artificial Tears ophthalmic solution: 1 drop(s) in each eye once a day (19 Dec 2023 19:28)  atorvastatin 80 mg oral tablet: 1 tab(s) orally once a day (at bedtime) (19 Dec 2023 19:28)  Arnold-Protect topical cream: Apply topically to affected area 2 times a day (sacrum) (19 Dec 2023 19:32)  Eliquis 2.5 mg oral tablet: 1 tab(s) orally 2 times a day (19 Dec 2023 19:32)  metoprolol succinate 25 mg oral tablet, extended release: 3 tab(s) orally once a day (19 Dec 2023 19:32)  mirtazapine 15 mg oral tablet: 1 tab(s) orally once a day (at bedtime) (19 Dec 2023 19:28)  Multiple Vitamins oral tablet: 1 tab(s) orally once a day (19 Dec 2023 19:32)  pantoprazole 40 mg oral delayed release tablet: 1 tab(s) orally 2 times a day (19 Dec 2023 19:28)  polyethylene glycol 3350 oral powder for reconstitution: 17 gram(s) orally once a day as needed for  constipation (19 Dec 2023 19:28)  tolnaftate 1% topical powder: Apply topically to affected area 2 times a day (groin, abdomen, chest, back) (19 Dec 2023 19:32)  venlafaxine 75 mg oral capsule, extended release: 1 cap(s) orally once a day (19 Dec 2023 19:28)  Vitamin D3 1250 mcg (50,000 intl units) oral capsule: 1 cap(s) orally once a week (19 Dec 2023 19:28)      MEDICATIONS  (STANDING):  apixaban 2.5 milliGRAM(s) Oral two times a day  artificial  tears Solution 1 Drop(s) Both EYES two times a day  atorvastatin 80 milliGRAM(s) Oral at bedtime  cefTRIAXone   IVPB 1000 milliGRAM(s) IV Intermittent every 24 hours  cholecalciferol 1000 Unit(s) Oral daily  dextrose 50% Injectable 25 Gram(s) IV Push once  metoprolol succinate ER 25 milliGRAM(s) Oral <User Schedule>  mirtazapine 15 milliGRAM(s) Oral at bedtime  multivitamin 1 Tablet(s) Oral daily  pantoprazole    Tablet 40 milliGRAM(s) Oral two times a day  sodium chloride 0.45%. 1000 milliLiter(s) (75 mL/Hr) IV Continuous <Continuous>  venlafaxine XR. 75 milliGRAM(s) Oral daily      FAMILY HISTORY:  No pertinent family history in first degree relatives        SOCIAL HISTORY:    [x] Non-smoker  [ ] Smoker  [ ] Alcohol    Allergies    piperacillin-tazobactam (Rash)  soaps and creams causes rash uses only sensitive skin soap (Rash)  sulfa drugs (Unknown)  Voltaren (Rash)  Eye cream from the 1960s Neocortef ?spelling caused eyes to becomes swollen (Unknown)  neomycin (Unknown)    Intolerances    	    REVIEW OF SYSTEMS:  CONSTITUTIONAL: No fever, weight loss, or fatigue  EYES: No eye pain, visual disturbances, or discharge  ENMT:  No difficulty hearing, tinnitus, vertigo; No sinus or throat pain  NECK: No pain or stiffness  RESPIRATORY: No cough, wheezing, chills or hemoptysis; No Shortness of Breath  CARDIOVASCULAR: No chest pain, palpitations, passing out, dizziness, or leg swelling  GASTROINTESTINAL: No abdominal or epigastric pain. No nausea, vomiting, or hematemesis; No diarrhea or constipation. No melena or hematochezia.  GENITOURINARY: No dysuria, frequency, hematuria, or incontinence  NEUROLOGICAL: No headaches, memory loss, loss of strength, numbness, or tremors  SKIN: No itching, burning, rashes, or lesions   	    [x] All others negative	  [ ] Unable to obtain    PHYSICAL EXAM:  T(C): 36.4 (12-20-23 @ 05:04), Max: 37 (12-19-23 @ 20:30)  HR: 160 (12-20-23 @ 08:45) (92 - 160)  BP: 126/93 (12-20-23 @ 08:45) (100/61 - 126/93)  RR: 18 (12-20-23 @ 05:04) (17 - 20)  SpO2: 98% (12-20-23 @ 05:04) (95% - 98%)  Wt(kg): --  I&O's Summary      Appearance: Elderly female	  Psychiatry: A & O x 3, Mood & affect appropriate  HEENT:   Normal oral mucosa, PERRL, EOMI	  Lymphatic: No lymphadenopathy  Cardiovascular: Normal S1 S2,Irregular rhythm, No JVD, No murmurs  Respiratory: Lungs clear to auscultation b/l  Gastrointestinal:  Soft, Non-tender, + BS	  Skin: No rashes, No ecchymoses, No cyanosis	  Neurologic: Non-focal  Extremities: Normal range of motion, No clubbing, cyanosis or edema  Vascular: Peripheral pulses palpable 2+ bilaterally    TELEMETRY: Afib rvr 127  ECG:  Afib w/ 	  RADIOLOGY:  < from: CT Head No Cont (12.19.23 @ 12:20) >    IMPRESSION: No acute intracranial hemorrhage, mass effect, or shift of   the midline structures.    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    --- End of Report ---    < end of copied text >    < from: Xray Foot AP + Lateral + Oblique, Left (12.19.23 @ 12:18) >    IMPRESSION:  Old healed distal fibular fracture deformity. No dislocations or acute   appearing fractures.    Congruent ankle mortise with smooth intact talar dome. Tarsometatarsal   alignment maintained without evidence for a Lisfranc injury. Preserved   joint spaces and no joint margin erosions.    Posterosuperior calcaneal Iban deformity. Posterior calcaneal   enthesophyte.    Generalized osteopenia otherwise no discrete lytic or blastic lesions.    Vascular calcifications.    --- End of Report ---    < end of copied text >      OTHER: 	  	  LABS:	 	    CARDIAC MARKERS:  Troponin T, High Sensitivity Result: 110 ng/L (12-19 @ 19:25)  Troponin T, High Sensitivity Result: 109 ng/L (12-19 @ 12:03)                                  11.0   11.03 )-----------( 441      ( 19 Dec 2023 12:03 )             35.5     12-20    138  |  100  |  60<H>  ----------------------------<  43<LL>  5.1   |  16<L>  |  2.56<H>    Ca    9.2      20 Dec 2023 06:41    TPro  7.2  /  Alb  3.3  /  TBili  1.3<H>  /  DBili  0.7<H>  /  AST  882<H>  /  ALT  517<H>  /  AlkPhos  196<H>  12-20    PT/INR - ( 19 Dec 2023 12:03 )   PT: 38.7 sec;   INR: 3.65 ratio         PTT - ( 19 Dec 2023 12:03 )  PTT:32.9 sec  proBNP:   Lipid Profile:   HgA1c:   TSH:

## 2023-12-20 NOTE — CHART NOTE - NSCHARTNOTEFT_GEN_A_CORE
Called by the nurse to report that O2 saturation is 88% on o2 and oxygen level not improving on NRB. RRT called for hypoxia. Patient is awake, alert, speaking in full sentences. Please see RRT note. Dr Tapia awake

## 2023-12-20 NOTE — PROGRESS NOTE ADULT - SUBJECTIVE AND OBJECTIVE BOX
Patient is a 89y old  Female who presents with a chief complaint of fall (20 Dec 2023 14:26)      SUBJECTIVE / OVERNIGHT EVENTS: ptn w severe dyspnea and hypotension this am. ptn is DNR/DNI w comfort measures. will cont treating w IV Abx and IVF. she has pain in LE w poor perfusion. seen by vascular, PLETAL was recommended.     MEDICATIONS  (STANDING):  apixaban 2.5 milliGRAM(s) Oral two times a day  artificial  tears Solution 1 Drop(s) Both EYES two times a day  atorvastatin 80 milliGRAM(s) Oral at bedtime  cefepime   IVPB 1000 milliGRAM(s) IV Intermittent every 24 hours  cholecalciferol 1000 Unit(s) Oral daily  cilostazol 50 milliGRAM(s) Oral <User Schedule>  dextrose 50% Injectable 25 Gram(s) IV Push once  metoprolol succinate ER 25 milliGRAM(s) Oral <User Schedule>  midodrine. 10 milliGRAM(s) Oral three times a day  mirtazapine 15 milliGRAM(s) Oral at bedtime  multivitamin 1 Tablet(s) Oral daily  pantoprazole    Tablet 40 milliGRAM(s) Oral two times a day  senna 2 Tablet(s) Oral at bedtime  sodium bicarbonate  Infusion 0.254 mEq/kG/Hr (100 mL/Hr) IV Continuous <Continuous>  venlafaxine XR. 75 milliGRAM(s) Oral daily    MEDICATIONS  (PRN):  acetaminophen     Tablet .. 650 milliGRAM(s) Oral every 6 hours PRN Temp greater or equal to 38C (100.4F), Mild Pain (1 - 3)  glycopyrrolate Injectable 0.4 milliGRAM(s) IV Push every 4 hours PRN for secretions  HYDROmorphone  Injectable 0.2 milliGRAM(s) IV Push every 2 hours PRN dyspnea or pain  LORazepam   Injectable 0.2 milliGRAM(s) IV Push every 2 hours PRN pain  polyethylene glycol 3350 17 Gram(s) Oral daily PRN for constipation      Vital Signs Last 24 Hrs  T(F): 97.9 (12-20-23 @ 20:42), Max: 98.2 (12-20-23 @ 08:30)  HR: 121 (12-20-23 @ 20:42) (98 - 160)  BP: 79/53 (12-20-23 @ 20:42) (76/40 - 126/93)  RR: 18 (12-20-23 @ 20:42) (17 - 19)  SpO2: 98% (12-20-23 @ 20:42) (96% - 100%)  Telemetry:   CAPILLARY BLOOD GLUCOSE      POCT Blood Glucose.: 153 mg/dL (20 Dec 2023 16:50)  POCT Blood Glucose.: 128 mg/dL (20 Dec 2023 12:46)  POCT Blood Glucose.: 65 mg/dL (20 Dec 2023 12:19)  POCT Blood Glucose.: 100 mg/dL (20 Dec 2023 09:52)  POCT Blood Glucose.: 120 mg/dL (20 Dec 2023 09:14)  POCT Blood Glucose.: 24 mg/dL (20 Dec 2023 08:57)  POCT Blood Glucose.: 26 mg/dL (20 Dec 2023 08:56)    I&O's Summary      PHYSICAL EXAM:  GENERAL: NAD, well-developed  HEAD:  Atraumatic, Normocephalic  EYES: EOMI, PERRLA, conjunctiva and sclera clear  NECK: Supple, No JVD  CHEST/LUNG: Clear to auscultation bilaterally; No wheeze  HEART: Regular rate and rhythm; No murmurs, rubs, or gallops  ABDOMEN: Soft, Nontender, Nondistended; Bowel sounds present  EXTREMITIES:  2+ Peripheral Pulses, No clubbing, cyanosis, or edema  PSYCH: AAOx3  NEUROLOGY: non-focal  SKIN: No rashes or lesions    LABS:                        12.2   19.62 )-----------( 449      ( 20 Dec 2023 12:24 )             40.1     12-20    136  |  98  |  60<H>  ----------------------------<  83  5.3   |  15<L>  |  2.83<H>    Ca    8.8      20 Dec 2023 12:24    TPro  7.0  /  Alb  3.3  /  TBili  1.5<H>  /  DBili  x   /  AST  1482<H>  /  ALT  745<H>  /  AlkPhos  196<H>  12-20    PT/INR - ( 19 Dec 2023 12:03 )   PT: 38.7 sec;   INR: 3.65 ratio         PTT - ( 19 Dec 2023 12:03 )  PTT:32.9 sec      Urinalysis Basic - ( 20 Dec 2023 12:24 )    Color: x / Appearance: x / SG: x / pH: x  Gluc: 83 mg/dL / Ketone: x  / Bili: x / Urobili: x   Blood: x / Protein: x / Nitrite: x   Leuk Esterase: x / RBC: x / WBC x   Sq Epi: x / Non Sq Epi: x / Bacteria: x        RADIOLOGY & ADDITIONAL TESTS:    Imaging Personally Reviewed:    Consultant(s) Notes Reviewed:      Care Discussed with Consultants/Other Providers:

## 2023-12-20 NOTE — CHART NOTE - NSCHARTNOTEFT_GEN_A_CORE
Labs review post RRT that is concerning for liver shock and sepsis. Patient is awake , alert, no resp distress and afebrile. [facility] consult called. Infectious disease called  and pt given Vancomycin 1 gram x 1 and Cefepime started. [facility] resident spoke at length with family regarding GOC- daughter requested DNR/DNI, no fulther lab draws and agreed to antibiotics if indicated. D/W DR Tapia                           12.2   19.62 )-----------( 449      ( 20 Dec 2023 12:24 )             40.1     20 Dec 2023 12:24    136    |  98     |  60     ----------------------------<  83     5.3     |  15     |  2.83     Ca    8.8        20 Dec 2023 12:24    TPro  7.0    /  Alb  3.3    /  TBili  1.5    /  DBili  x      /  AST  1482   /  ALT  745    /  AlkPhos  196    20 Dec 2023 12:24    PT/INR - ( 19 Dec 2023 12:03 )   PT: 38.7 sec;   INR: 3.65 ratio         PTT - ( 19 Dec 2023 12:03 )  PTT:32.9 sec  CAPILLARY BLOOD GLUCOSE      POCT Blood Glucose.: 153 mg/dL (20 Dec 2023 16:50)  POCT Blood Glucose.: 128 mg/dL (20 Dec 2023 12:46)  POCT Blood Glucose.: 65 mg/dL (20 Dec 2023 12:19)  POCT Blood Glucose.: 100 mg/dL (20 Dec 2023 09:52)  POCT Blood Glucose.: 120 mg/dL (20 Dec 2023 09:14)  POCT Blood Glucose.: 24 mg/dL (20 Dec 2023 08:57)  POCT Blood Glucose.: 26 mg/dL (20 Dec 2023 08:56)    LIVER FUNCTIONS - ( 20 Dec 2023 12:24 )  Alb: 3.3 g/dL / Pro: 7.0 g/dL / ALK PHOS: 196 U/L / ALT: 745 U/L / AST: 1482 U/L / GGT: x           Urinalysis Basic - ( 20 Dec 2023 12:24 )    Color: x / Appearance: x / SG: x / pH: x  Gluc: 83 mg/dL / Ketone: x  / Bili: x / Urobili: x   Blood: x / Protein: x / Nitrite: x   Leuk Esterase: x / RBC: x / WBC x   Sq Epi: x / Non Sq Epi: x / Bacteria: x

## 2023-12-20 NOTE — CONSULT NOTE ADULT - ASSESSMENT
Echo 10/18/23: mildly decreased lvef (47%), mod MR, mod-severe MS, mod TR  ECHO 8/11/23: mild to mod MR , mild AR,  Severe global left ventricular systolic dysfunction. LVEF in the 25-30% decreased right ventricular systolic function.severe pulmonary hypertension.  ECHO 4/17/18: EF 55-60 % moderate to severe mitral stenosis. Normal left ventricular systolic function. mild aortic stenosis. Mild-moderate aortic regurgitation.  Normal right ventricular size and function.      A/p  89-year-old female on Eliquis for PE prophylaxis and A-fib presenting to the emergency department due to fall with head trauma this morning.      #Fall  -Per pt, she was dropped at NH while being cleaned  -CT no acute findings  -Trop w/ mild elevation likely demand i/s/o afib RVR and ALLAN - trend to peak  -EKG afib with RVR  -Dry on exam - would give gentle ivf  -RRT called for hypoglycemia - cont BG mgmt per med  -F/u infectious w/u  -Abx per med    #Afib  -Rapid afib on tele in ED to 130s  -Cont tele monitoring  -Continue eliquis (Has hx xarelto failure)  -Resume metoprolol 75mg qd if bp is able to tolerate   -If bp unable to tolerate po meds, start cardizem gtt 5mg/hr    #Cardiomyopathy  -Deferred eval in the past given advanced age and functional status  -Echo with mildly decreased lvef (47%), mod MR, mod-severe MS, mod TR  -Resume metoprolol 75mg qd if bp is able to tolerate   -Entresto on hold 2/2 soft bp   -Dry on exam - ivf as above     #CVA  -recent workup revealed cerebellar infarcts on brain imaging   -switched from xarelto to eliquis  -cont low dose a/c    #HTN  -Mgmt as above     #ALLAN  -IVF  -Renal eval

## 2023-12-20 NOTE — CONSULT NOTE ADULT - SKIN/BREAST
Weeks 32 to 34 of Your Pregnancy: Care Instructions  Your Care Instructions    During the last few weeks of your pregnancy, you may have more aches and pains. It's important to rest when you can. Your growing baby is putting more pressure on your bladder. So you may need to urinate more often. Hemorrhoids are also common. These are painful, itchy veins in the rectal area. In the 36th week, most women have a test for group B streptococcus (GBS). GBS is a common bacteria that can live in the vagina and rectum. It can make your baby sick after birth. If you test positive, you will get antibiotics during labor. These will keep your baby from getting the bacteria. You may want to talk with your doctor about banking your baby's umbilical cord blood. This is the blood left in the cord after birth. If you want to save this blood, you must arrange it ahead of time. You can't decide at the last minute. If you haven't already had the Tdap shot during this pregnancy, talk to your doctor about getting it. It will help protect your  against pertussis infection. Follow-up care is a key part of your treatment and safety. Be sure to make and go to all appointments, and call your doctor if you are having problems. It's also a good idea to know your test results and keep a list of the medicines you take. How can you care for yourself at home? Ease hemorrhoids  · Get more liquids, fruits, vegetables, and fiber in your diet. This will help keep your stools soft. · Avoid sitting for too long. Lie on your left side several times a day. · Clean yourself with soft, moist toilet paper. Or you can use witch hazel pads or personal hygiene pads. · If you are uncomfortable, try ice packs. Or you can sit in a warm sitz bath. Do these for 20 minutes at a time, as needed. · Use hydrocortisone cream for pain and itching. Two examples are Anusol and Preparation H Hydrocortisone.   · Ask your doctor about taking an over-the-counter stool softener. Consider breastfeeding  · Experts recommend that women breastfeed for 1 year or longer. Breast milk is the perfect food for babies. · Breast milk is easier for babies to digest than formula. And it is always available, just the right temperature, and free. · In general, babies who are  are healthier than formula-fed babies. ¨  babies are less likely to get ear infections, colds, diarrhea, and pneumonia. ¨  babies who are fed only breast milk are less likely to get asthma and allergies. ¨  babies are less likely to be obese. ¨  babies are less likely to get diabetes or heart disease. · Women who breastfeed have less bleeding after the birth. Their uteruses also shrink back faster. · Some women who breastfeed lose weight faster. Making milk burns calories. · Breastfeeding can lower your risk of breast cancer, ovarian cancer, and osteoporosis. Decide about circumcision for boys  · As you make this decision, it may help to think about your personal, Advent, and family traditions. You get to decide if you will keep your son's penis natural or if he will be circumcised. · If you decide that you would like to have your baby circumcised, talk with your doctor. You can share your concerns about pain. And you can discuss your preferences for anesthesia. Where can you learn more? Go to http://diana-abimael.info/. Enter I002 in the search box to learn more about \"Weeks 32 to 34 of Your Pregnancy: Care Instructions. \"  Current as of: March 16, 2017  Content Version: 11.3  © 0090-0395 [employer]. Care instructions adapted under license by [employer] (which disclaims liability or warranty for this information).  If you have questions about a medical condition or this instruction, always ask your healthcare professional. [place] [address] any warranty or liability for your use of this information. negative

## 2023-12-20 NOTE — CONSULT NOTE ADULT - CONVERSATION DETAILS
Discussed with patient code status which entails chest compressions if patients heart stops and intubation if patient is unable to breathe on her own or protect her airway.  Patient with capacity at the time of discussion. Conveyed she would not want chest compressions or intubation.  Had further discussions with patients daughter Leonie Devries (daughter and surrogate decision maker) who was able to relay these wishes from her mother and states they have had these discussions. Leonie also states that patient has had multiple hospitalizations in the past and is tired. Patient herself states that she is no longer taking PO at [facility] due to poor mood and unwillingness to eat. We discussed with Leonie this as part of the patients overall prognosis. After extensive discussions with multiple providers and daughter, ultimately Leonie states that her mom would benefit from pursuing a comfort directed approach and symptom directed approach. I explained this means stopping unnecessary blood draws, stopping unnecessary medications and potentially continuing vs stopping antibiotics if it makes patient comfortable. MOLST filled and placed in chart/ Discussed with patient code status which entails chest compressions if patients heart stops and intubation if patient is unable to breathe on her own or protect her airway.  Patient with capacity at the time of discussion. Conveyed she would not want chest compressions or intubation.  Had further discussions with patients daughter Leonie Dervies (daughter and surrogate decision maker) who was able to relay these wishes from her mother and states they have had these discussions. Leonie also states that patient has had multiple hospitalizations in the past and is tired. Patient herself states that she is no longer taking PO at [facility] due to poor mood and unwillingness to eat. We discussed with Leonie this as part of the patients overall prognosis. Ultimately decided on no IV pressor medications and avoiding any sort of central line placement however can continue with IVF as needed for blood pressure temporization. After extensive discussions with multiple providers and daughter, ultimately Leonie states that her mom would benefit from pursuing a comfort directed approach and symptom directed approach. I explained this means stopping unnecessary blood draws, stopping unnecessary medications and potentially continuing vs stopping antibiotics if it makes patient comfortable. RODRÍGUEZ filled and placed in chart. Discussed with patient code status which entails chest compressions if patients heart stops and intubation if patient is unable to breathe on her own or protect her airway.  Patient with capacity at the time of discussion. Conveyed she would not want chest compressions or intubation.  Had further discussions with patients daughter Leonie Devries (daughter and surrogate decision maker) who was able to relay these wishes from her mother and states they have had these discussions. Leonie also states that patient has had multiple hospitalizations in the past and is tired. Patient herself states that she is no longer taking PO at [facility] due to poor mood and unwillingness to eat. We discussed with Leonie this as part of the patients overall prognosis. Ultimately decided on no IV pressor medications and avoiding any sort of central line placement however can continue with IVF as needed for blood pressure temporization. After extensive discussions with multiple providers and daughter, ultimately Leonie states that her mom would benefit from pursuing a comfort directed approach and symptom directed approach. I explained this means stopping unnecessary blood draws, stopping unnecessary medications and potentially continuing vs stopping antibiotics if it makes patient comfortable. RODRÍGUEZ filled and placed in chart.

## 2023-12-21 NOTE — PROGRESS NOTE ADULT - SUBJECTIVE AND OBJECTIVE BOX
Surgery Progress Note    S: RRT called for hypotension overnight    O:  General: AAOx3, no acute distress.  Respiratory: breathing comfortably, no increased WOB   Abdomen: soft, nontender, nondistended, no rebound, no guarding  Extremities: Moves all four.   - RLE: blue discoloration of ties, dopplerable PT, popliteal palpable femoral. No DP signals  - LLE: blue discoloration of ties, dopplerable PT, popliteal palpable femoral. No DP signals    Vital Signs Last 24 Hrs  T(C): 36.4 (21 Dec 2023 06:01), Max: 36.8 (20 Dec 2023 08:30)  T(F): 97.6 (21 Dec 2023 06:01), Max: 98.2 (20 Dec 2023 08:30)  HR: 105 (21 Dec 2023 06:01) (98 - 160)  BP: 91/64 (21 Dec 2023 06:01) (76/40 - 126/93)  BP(mean): --  RR: 20 (21 Dec 2023 06:01) (18 - 20)  SpO2: 100% (21 Dec 2023 06:01) (97% - 100%)    Parameters below as of 21 Dec 2023 06:01  Patient On (Oxygen Delivery Method): nasal cannula  O2 Flow (L/min): 4      I&O's Detail                            12.2   19.62 )-----------( 449      ( 20 Dec 2023 12:24 )             40.1       12-20    136  |  98  |  60<H>  ----------------------------<  83  5.3   |  15<L>  |  2.83<H>    Ca    8.8      20 Dec 2023 12:24    TPro  7.0  /  Alb  3.3  /  TBili  1.5<H>  /  DBili  x   /  AST  1482<H>  /  ALT  745<H>  /  AlkPhos  196<H>  12-20       Surgery Progress Note    S: RRT called for hypotension overnight    O:  General: AAOx3, no acute distress.  Respiratory: breathing comfortably, no increased WOB   Abdomen: soft, nontender, nondistended, no rebound, no guarding  Extremities: Moves all four.   - RLE: blue discoloration of toes, dopplerable PT, popliteal palpable femoral. No DP signals  - LLE: blue discoloration of toes, dopplerable PT, popliteal palpable femoral. No DP signals    Vital Signs Last 24 Hrs  T(C): 36.4 (21 Dec 2023 06:01), Max: 36.8 (20 Dec 2023 08:30)  T(F): 97.6 (21 Dec 2023 06:01), Max: 98.2 (20 Dec 2023 08:30)  HR: 105 (21 Dec 2023 06:01) (98 - 160)  BP: 91/64 (21 Dec 2023 06:01) (76/40 - 126/93)  BP(mean): --  RR: 20 (21 Dec 2023 06:01) (18 - 20)  SpO2: 100% (21 Dec 2023 06:01) (97% - 100%)    Parameters below as of 21 Dec 2023 06:01  Patient On (Oxygen Delivery Method): nasal cannula  O2 Flow (L/min): 4      I&O's Detail                            12.2   19.62 )-----------( 449      ( 20 Dec 2023 12:24 )             40.1       12-20    136  |  98  |  60<H>  ----------------------------<  83  5.3   |  15<L>  |  2.83<H>    Ca    8.8      20 Dec 2023 12:24    TPro  7.0  /  Alb  3.3  /  TBili  1.5<H>  /  DBili  x   /  AST  1482<H>  /  ALT  745<H>  /  AlkPhos  196<H>  12-20       Surgery Progress Note    S: RRT called for hypotension overnight    O:  General: AAOx3, no acute distress.  Respiratory: breathing comfortably, no increased WOB   Abdomen: soft, nontender, nondistended, no rebound, no guarding  Extremities: Moves all four.   - RLE: blue discoloration of toes, dopplerable PT, popliteal palpable femoral. No DP signals  - LLE: blue discoloration of toes, dopplerable PT, popliteal palpable femoral. No DP signals  viable no   acute changes     Vital Signs Last 24 Hrs  T(C): 36.4 (21 Dec 2023 06:01), Max: 36.8 (20 Dec 2023 08:30)  T(F): 97.6 (21 Dec 2023 06:01), Max: 98.2 (20 Dec 2023 08:30)  HR: 105 (21 Dec 2023 06:01) (98 - 160)  BP: 91/64 (21 Dec 2023 06:01) (76/40 - 126/93)  BP(mean): --  RR: 20 (21 Dec 2023 06:01) (18 - 20)  SpO2: 100% (21 Dec 2023 06:01) (97% - 100%)    Parameters below as of 21 Dec 2023 06:01  Patient On (Oxygen Delivery Method): nasal cannula  O2 Flow (L/min): 4      I&O's Detail                            12.2   19.62 )-----------( 449      ( 20 Dec 2023 12:24 )             40.1       12-20    136  |  98  |  60<H>  ----------------------------<  83  5.3   |  15<L>  |  2.83<H>    Ca    8.8      20 Dec 2023 12:24    TPro  7.0  /  Alb  3.3  /  TBili  1.5<H>  /  DBili  x   /  AST  1482<H>  /  ALT  745<H>  /  AlkPhos  196<H>  12-20

## 2023-12-21 NOTE — DIETITIAN INITIAL EVALUATION ADULT - PHYSCIAL ASSESSMENT
Unable to complete full nutrition focused physical exam secondary to pt's request; RD will continue to reassess as able/appropriate.

## 2023-12-21 NOTE — ADVANCED PRACTICE NURSE CONSULT - ASSESSMENT
Patient encountered on 4 Mo. When wound care RN arrived on unit, patient was found lying in a low air loss pressure redistribution support surface style bed. Patient kept her eyes closed during visit but was alert and oriented and gave consent to skin consult. Ms Srinivasan is unable to turn independently and staff assistance x 2 was provided. Once turned, urinary and fecal incontinence was apparent and pericare was provided. Once cleaned, the wound care RN was able to visualize an area of persistent nonblanchable hyperpigmentation noted over B/L buttocks/sacral skin, area measures approximately 4cm x 4cm x 0cm- cannot rule out a deep tissue injury at this time.     Once consult was complete, patient was educated regarding the need for routine turning and positioning to prevent pressure injuries and patient was placed in a left side-lying position utilizing pillow positioner assistive devices. Patient encountered on 4 Mo. When wound care RN arrived on unit, patient was found lying in a low air loss pressure redistribution support surface style bed. Patient kept her eyes closed during visit but was alert and gave consent to skin consult. Ms Srinivasan is unable to turn independently and staff assistance x 2 was provided. Once turned, urinary and fecal incontinence was apparent and pericare was provided. Once cleaned, the wound care RN was able to visualize an area of persistent nonblanchable hyperpigmentation noted over B/L buttocks/sacral skin, area measures approximately 4cm x 4cm x 0cm- cannot rule out a deep tissue injury at this time. Perirectally, moist denuded skin is noted with scattered areas of superficial partial thickness skin loss - presentation is consistent with incontinence associated dermatitis. B/L feet are mottled and cold to the touch with purple hues present over heels and toes- vascular team onboard. Toenails are thickened and elongated, consider podiatry consult.    Once consult was complete, patient was educated regarding the need for routine turning and positioning to prevent pressure injuries and patient was placed in a left side-lying position utilizing pillow positioner assistive devices. Patient encountered on 4 Mo. When wound care RN arrived on unit, patient was found lying in a low air loss pressure redistribution support surface style bed. Patient kept her eyes closed during visit but was alert and gave consent to skin consult. Ms Srinivaasn is unable to turn independently and staff assistance x 2 was provided. Once turned, urinary and fecal incontinence was apparent and pericare was provided. Once cleaned, the wound care RN was able to visualize an area of persistent nonblanchable hyperpigmentation noted over B/L buttocks/sacral skin, area measures approximately 4cm x 4cm x 0cm- cannot rule out a deep tissue injury at this time. Perirectally, moist denuded skin is noted with scattered areas of superficial partial thickness skin loss - presentation is consistent with incontinence associated dermatitis. B/L feet are mottled and cold to the touch with purple hues present over heels and toes- cannot rule out a deep tissue injury present on admission, vascular team onboard. Toenails are thickened and elongated, consider podiatry consult. Once consult was complete, patient was educated regarding the need for routine turning and positioning to prevent pressure injuries and patient was placed in a left side-lying position utilizing pillow positioner assistive devices. Patient encountered on 4 Mo. When wound care RN arrived on unit, patient was found lying in a low air loss pressure redistribution support surface style bed. Patient kept her eyes closed during visit but was alert and gave consent to skin consult. Ms Srinivasan is unable to turn independently and staff assistance x 2 was provided. Once turned, urinary and fecal incontinence was apparent and pericare was provided. Once cleaned, the wound care RN was able to visualize an area of persistent nonblanchable hyperpigmentation noted over B/L buttocks/sacral skin, area measures approximately 4cm x 4cm x 0cm- cannot rule out a deep tissue injury at this time. Perirectally, moist denuded skin is noted with scattered areas of superficial partial thickness skin loss - presentation is consistent with incontinence associated dermatitis. B/L feet are mottled and cold to the touch with purple hues present over heels and toes- cannot rule out a deep tissue injury present on admission, vascular team onboard. Toenails are thickened and elongated, consider podiatry consult. Once consult was complete, patient was educated regarding the need for routine turning and positioning to prevent pressure injuries and patient was placed in a left side-lying position utilizing pillow positioner assistive devices.

## 2023-12-21 NOTE — ADVANCED PRACTICE NURSE CONSULT - RECOMMEDATIONS
Impression:    B/L buttocks/sacral hyperpigmentation, cannot rule out a deep tissue injury present on admission  B/L heel purple discoloration, cannot rule out a deep tissue injury present on admission  fecal incontinence  urinary incontinence  incontinence assocated dermatitis       Recommendations:    1) continue turning and positioning q2 and PRN utilizing offloading assistive devices  2) continue with routine pericare daily and PRN soiling  3) encourage optimal nutrition  4) waffle cushion when oob to chair  5) B/L LE complete cair air fluidized boots OR lisa lock pillow to offload heels/feet  6) triad protective barrier cream to B/L buttocks/sacrum daily and PRN soiling  7) incontinence management - continue external female urinary catheter to divert urine from skin  8)   9)    Plan discussed with OMAR Razo at bedside    For questions or comments regarding this consult please call Urmila at [phone]. Thank you. Impression:    B/L buttocks/sacral hyperpigmentation, cannot rule out a deep tissue injury present on admission  B/L heel purple discoloration, cannot rule out a deep tissue injury present on admission  fecal incontinence  urinary incontinence  incontinence assocated dermatitis       Recommendations:    1) continue turning and positioning q2 and PRN utilizing offloading assistive devices  2) continue with routine pericare daily and PRN soiling  3) encourage optimal nutrition  4) waffle cushion when oob to chair  5) B/L LE complete cair air fluidized boots OR lisa lock pillow to offload heels/feet  6) triad protective barrier cream to B/L buttocks/sacrum daily and PRN soiling  7) incontinence management - continue external female urinary catheter to divert urine from skin  8)   9)    Plan discussed with OMAR Razo at bedside    For questions or comments regarding this consult please call Urmila at [phone]. Thank you. Impression:    B/L buttocks/sacral hyperpigmentation, cannot rule out a deep tissue injury present on admission  B/L heel purple discoloration, cannot rule out a deep tissue injury present on admission  fecal incontinence  urinary incontinence  incontinence assocated dermatitis       Recommendations:    1) continue turning and positioning q2 and PRN utilizing offloading assistive devices  2) continue with routine pericare daily and PRN soiling  3) encourage optimal nutrition  4) waffle cushion when oob to chair  5) B/L LE complete cair air fluidized boots OR lisa lock pillow to offload heels/feet  6) triad protective barrier cream to B/L buttocks/sacrum daily and PRN soiling  7) incontinence management - continue external female urinary catheter to divert urine from skin  8) consider podiatry for toenails   9)     Plan discussed with OMAR Razo at bedside    For questions or comments regarding this consult please call Urmila at [phone]. Thank you. Impression:    B/L buttocks/sacral hyperpigmentation, cannot rule out a deep tissue injury present on admission  B/L heel purple discoloration, cannot rule out a deep tissue injury present on admission  fecal incontinence  urinary incontinence  incontinence assocated dermatitis       Recommendations:    1) continue turning and positioning q2 and PRN utilizing offloading assistive devices  2) continue with routine pericare daily and PRN soiling  3) encourage optimal nutrition  4) waffle cushion when oob to chair  5) B/L LE complete cair air fluidized boots OR lisa lock pillow to offload heels/feet  6) triad protective barrier cream to B/L buttocks/sacrum daily and PRN soiling  7) incontinence management - continue external female urinary catheter to divert urine from skin  8) consider podiatry for toenails   9)     Plan discussed with OMAR Razo at bedside    For questions or comments regarding this consult please call Urmila at [phone]. Thank you. Impression:    B/L buttocks/sacral hyperpigmentation, cannot rule out a deep tissue injury present on admission  B/L heel purple discoloration, cannot rule out a deep tissue injury present on admission  fecal incontinence  urinary incontinence  incontinence assocated dermatitis       Recommendations:    1) continue turning and positioning q2 and PRN utilizing offloading assistive devices  2) continue with routine pericare daily and PRN soiling  3) encourage optimal nutrition  4) waffle cushion when oob to chair  5) B/L LE complete cair air fluidized boots OR lisa lock pillow to offload heels/feet  6) triad protective barrier cream to B/L buttocks/sacrum daily and PRN soiling  7) incontinence management - continue external female urinary catheter to divert urine from skin  8) consider podiatry for toenails     Plan discussed with OMAR Razo at bedside    For questions or comments regarding this consult please call Urmila at [phone]. Thank you. Impression:    B/L buttocks/sacral hyperpigmentation, cannot rule out a deep tissue injury present on admission  B/L heel purple discoloration, cannot rule out a deep tissue injury present on admission  fecal incontinence  urinary incontinence  incontinence assocated dermatitis       Recommendations:    1) continue turning and positioning q2 and PRN utilizing offloading assistive devices  2) continue with routine pericare daily and PRN soiling  3) encourage optimal nutrition  4) waffle cushion when oob to chair  5) B/L LE complete cair air fluidized boots OR lisa lock pillow to offload heels/feet  6) triad protective barrier cream to B/L buttocks/sacrum daily and PRN soiling  7) incontinence management - continue external female urinary catheter to divert urine from skin  8) consider podiatry for toenails     Plan discussed with OMAR Razo at bedside    For questions or comments regarding this consult please call Urmila at [phone]. Thank you.

## 2023-12-21 NOTE — PROGRESS NOTE ADULT - SUBJECTIVE AND OBJECTIVE BOX
CARDIOLOGY FOLLOW UP - Dr. Mccartney  DATE OF SERVICE: 12/21/23    CC  CV events noted  C/o "pain"  Denies chest pain, sob     REVIEW OF SYSTEMS:  CONSTITUTIONAL: No fever, weight loss, or fatigue  RESPIRATORY: No cough, wheezing, chills or hemoptysis; No Shortness of Breath  CARDIOVASCULAR: No chest pain, palpitations, passing out, dizziness, or leg swelling  GASTROINTESTINAL: No abdominal or epigastric pain. No nausea, vomiting, or hematemesis; No diarrhea or constipation. No melena or hematochezia.  VASCULAR: No edema     PHYSICAL EXAM:  T(C): 36.4 (12-21-23 @ 06:01), Max: 36.6 (12-20-23 @ 20:42)  HR: 105 (12-21-23 @ 06:01) (98 - 126)  BP: 91/64 (12-21-23 @ 06:01) (76/40 - 103/72)  RR: 20 (12-21-23 @ 06:01) (18 - 20)  SpO2: 100% (12-21-23 @ 06:01) (97% - 100%)  Wt(kg): --  I&O's Summary      Appearance: Elderly female 	  Cardiovascular: Normal S1 S2,Irregular rhythm, No JVD, No murmurs  Respiratory: Lungs clear to auscultation b/l   Gastrointestinal:  Soft, Non-tender, + BS	  Extremities: Normal range of motion, No clubbing, cyanosis or edema      Home Medications:  acetaminophen 325 mg oral tablet: 2 tab(s) orally every 6 hours, As needed, Temp greater or equal to 38C (100.4F), Mild Pain (1 - 3) (19 Dec 2023 19:28)  Artificial Tears ophthalmic solution: 1 drop(s) in each eye once a day (19 Dec 2023 19:28)  atorvastatin 80 mg oral tablet: 1 tab(s) orally once a day (at bedtime) (19 Dec 2023 19:28)  Arnold-Protect topical cream: Apply topically to affected area 2 times a day (sacrum) (19 Dec 2023 19:32)  Eliquis 2.5 mg oral tablet: 1 tab(s) orally 2 times a day (19 Dec 2023 19:32)  metoprolol succinate 25 mg oral tablet, extended release: 3 tab(s) orally once a day (19 Dec 2023 19:32)  mirtazapine 15 mg oral tablet: 1 tab(s) orally once a day (at bedtime) (19 Dec 2023 19:28)  Multiple Vitamins oral tablet: 1 tab(s) orally once a day (19 Dec 2023 19:32)  pantoprazole 40 mg oral delayed release tablet: 1 tab(s) orally 2 times a day (19 Dec 2023 19:28)  polyethylene glycol 3350 oral powder for reconstitution: 17 gram(s) orally once a day as needed for  constipation (19 Dec 2023 19:28)  tolnaftate 1% topical powder: Apply topically to affected area 2 times a day (groin, abdomen, chest, back) (19 Dec 2023 19:32)  venlafaxine 75 mg oral capsule, extended release: 1 cap(s) orally once a day (19 Dec 2023 19:28)  Vitamin D3 1250 mcg (50,000 intl units) oral capsule: 1 cap(s) orally once a week (19 Dec 2023 19:28)      MEDICATIONS  (STANDING):  apixaban 2.5 milliGRAM(s) Oral two times a day  artificial  tears Solution 1 Drop(s) Both EYES two times a day  atorvastatin 80 milliGRAM(s) Oral at bedtime  cefepime   IVPB 1000 milliGRAM(s) IV Intermittent every 24 hours  chlorhexidine 2% Cloths 1 Application(s) Topical <User Schedule>  cholecalciferol 1000 Unit(s) Oral daily  cilostazol 50 milliGRAM(s) Oral two times a day  dextrose 50% Injectable 25 Gram(s) IV Push once  metoprolol succinate ER 25 milliGRAM(s) Oral <User Schedule>  midodrine. 10 milliGRAM(s) Oral three times a day  mirtazapine 15 milliGRAM(s) Oral at bedtime  multivitamin 1 Tablet(s) Oral daily  pantoprazole    Tablet 40 milliGRAM(s) Oral two times a day  senna 2 Tablet(s) Oral at bedtime  sodium bicarbonate  Infusion 0.254 mEq/kG/Hr (100 mL/Hr) IV Continuous <Continuous>  venlafaxine XR. 75 milliGRAM(s) Oral daily      TELEMETRY: Afib up to 130s 	    ECG:  	  RADIOLOGY:   DIAGNOSTIC TESTING:  [ ] Echocardiogram:  [ ]  Catheterization:  [ ] Stress Test:    OTHER: 	    LABS:	 	    Troponin T, High Sensitivity Result: 110 ng/L [0 - 51] (12-19 @ 19:25)  Troponin T, High Sensitivity Result: 109 ng/L [0 - 51] (12-19 @ 12:03)                          12.2   19.62 )-----------( 449      ( 20 Dec 2023 12:24 )             40.1     12-20    136  |  98  |  60<H>  ----------------------------<  83  5.3   |  15<L>  |  2.83<H>    Ca    8.8      20 Dec 2023 12:24    TPro  7.0  /  Alb  3.3  /  TBili  1.5<H>  /  DBili  x   /  AST  1482<H>  /  ALT  745<H>  /  AlkPhos  196<H>  12-20    PT/INR - ( 19 Dec 2023 12:03 )   PT: 38.7 sec;   INR: 3.65 ratio         PTT - ( 19 Dec 2023 12:03 )  PTT:32.9 sec

## 2023-12-21 NOTE — DIETITIAN INITIAL EVALUATION ADULT - PERTINENT LABORATORY DATA
12-20    136  |  98  |  60<H>  ----------------------------<  83  5.3   |  15<L>  |  2.83<H>    Ca    8.8      20 Dec 2023 12:24    TPro  7.0  /  Alb  3.3  /  TBili  1.5<H>  /  DBili  x   /  AST  1482<H>  /  ALT  745<H>  /  AlkPhos  196<H>  12-20  POCT Blood Glucose.: 153 mg/dL (12-20-23 @ 16:50)

## 2023-12-21 NOTE — ADVANCED PRACTICE NURSE CONSULT - REASON FOR CONSULT
Wound care consult initiated by RN to assess patient's skin for a possible bilateral buttocks deep tissue injury present on admission     History of Present Illness:   89-year-old female on Eliquis for PE prophylaxis and A-fib presenting to the emergency department due to fall with head trauma this morning.  Patient states she was being bathed and fell but does not remember the details of the fall.  She does not member if she hit her head but denies any changes in visions, headache, weakness numbness to extremities.  She  also states that she has left foot pain. as per Dr. Diaz from [facility] ptn  was found to have an elevated creatinine of 2.18 this morning which was abnormal for her.  She would like to be evaluated for ALLAN.  Denies fevers, chills, bodies, nausea, vomiting, diarrhea. Wound care consult initiated by RN to assess patient's skin for a possible bilateral buttocks deep tissue injury present on admission     History of Present Illness:   89-year-old female on Eliquis for PE prophylaxis and A-fib presenting to the emergency department due to fall with head trauma this morning.  Patient states she was being bathed and fell but does not remember the details of the fall.  She does not member if she hit her head but denies any changes in visions, headache, weakness numbness to extremities.  She  also states that she has left foot pain. as per Dr. Diaz from [facility] ptn  was found to have an elevated creatinine of 2.18 this morning which was abnormal for her.  She would like to be evaluated for ALLAN.  Denies fevers, chills, bodies, nausea, vomiting, diarrhea.

## 2023-12-21 NOTE — PROGRESS NOTE ADULT - ASSESSMENT
Echo 10/18/23: mildly decreased lvef (47%), mod MR, mod-severe MS, mod TR  ECHO 8/11/23: mild to mod MR , mild AR,  Severe global left ventricular systolic dysfunction. LVEF in the 25-30% decreased right ventricular systolic function.severe pulmonary hypertension.  ECHO 4/17/18: EF 55-60 % moderate to severe mitral stenosis. Normal left ventricular systolic function. mild aortic stenosis. Mild-moderate aortic regurgitation.  Normal right ventricular size and function.      A/p  89-year-old female on Eliquis for PE prophylaxis and A-fib presenting to the emergency department due to fall with head trauma this morning.      #Fall  -Per pt, she was dropped at NH while being cleaned  -CT no acute findings  -Trop w/ mild elevation likely demand i/s/o afib RVR and ALLAN - trend to peak  -EKG afib with RVR  -Dry on exam - would give gentle ivf  -RRT called for hypoglycemia - cont BG mgmt per med  -F/u infectious w/u - likely UTI  -Abx per med    #Afib  -Rapid afib on tele in ED to 130s  -Rates still intermittently elevated i/s/o sepsis  -Continue metoprolol for now  -Cont tele monitoring  -Continue eliquis (Has hx xarelto failure)    #Cardiomyopathy  -Deferred eval in the past given advanced age and functional status  -Echo with mildly decreased lvef (47%), mod MR, mod-severe MS, mod TR  -Continue metoprolol 75mg qd  -Entresto on hold 2/2 soft bp     #CVA  -recent workup revealed cerebellar infarcts on brain imaging   -switched from xarelto to eliquis  -cont low dose a/c    #HTN  -RRT 12/20 for hypotension  -Continue midodrine  -Continue gentle ivf     #ALLAN  -Renal following  -Sodium bicarb gtt per renal    #Urosepsis  -Ucx noted   -Abx per ID     #B/l foot cyanosis  -Vascular sx eval noted  -F/u CHRISTIANO, PVR

## 2023-12-21 NOTE — PROGRESS NOTE ADULT - SUBJECTIVE AND OBJECTIVE BOX
Neurology    S: patient seen. multiple RRTs for hypotension/hypotglycemia       Medications: MEDICATIONS  (STANDING):  apixaban 2.5 milliGRAM(s) Oral two times a day  artificial  tears Solution 1 Drop(s) Both EYES two times a day  atorvastatin 80 milliGRAM(s) Oral at bedtime  cefepime   IVPB 1000 milliGRAM(s) IV Intermittent every 24 hours  chlorhexidine 2% Cloths 1 Application(s) Topical <User Schedule>  cholecalciferol 1000 Unit(s) Oral daily  cilostazol 50 milliGRAM(s) Oral two times a day  dextrose 50% Injectable 25 Gram(s) IV Push once  metoprolol succinate ER 25 milliGRAM(s) Oral <User Schedule>  midodrine. 10 milliGRAM(s) Oral three times a day  mirtazapine 15 milliGRAM(s) Oral at bedtime  multivitamin 1 Tablet(s) Oral daily  Nephro-mirella 1 Tablet(s) Oral daily  pantoprazole    Tablet 40 milliGRAM(s) Oral two times a day  senna 2 Tablet(s) Oral at bedtime  sodium bicarbonate  Infusion 0.191 mEq/kG/Hr (75 mL/Hr) IV Continuous <Continuous>  venlafaxine XR. 75 milliGRAM(s) Oral daily    MEDICATIONS  (PRN):  acetaminophen     Tablet .. 650 milliGRAM(s) Oral every 6 hours PRN Temp greater or equal to 38C (100.4F), Mild Pain (1 - 3)  glycopyrrolate Injectable 0.4 milliGRAM(s) IV Push every 4 hours PRN for secretions  HYDROmorphone  Injectable 0.2 milliGRAM(s) IV Push every 2 hours PRN dyspnea or pain  LORazepam   Injectable 0.2 milliGRAM(s) IV Push every 2 hours PRN pain  ondansetron Injectable 4 milliGRAM(s) IV Push every 4 hours PRN Nausea and/or Vomiting  polyethylene glycol 3350 17 Gram(s) Oral daily PRN for constipation       Vitals:  Vital Signs Last 24 Hrs  T(C): 36.4 (21 Dec 2023 21:03), Max: 36.4 (21 Dec 2023 06:01)  T(F): 97.6 (21 Dec 2023 21:03), Max: 97.6 (21 Dec 2023 06:01)  HR: 101 (21 Dec 2023 21:03) (101 - 119)  BP: 107/76 (21 Dec 2023 21:03) (91/64 - 107/76)  BP(mean): --  RR: 18 (21 Dec 2023 21:03) (18 - 20)  SpO2: 100% (21 Dec 2023 21:03) (99% - 100%)    Parameters below as of 21 Dec 2023 21:03  Patient On (Oxygen Delivery Method): nasal cannula  O2 Flow (L/min): 4          General Exam:   General Appearance: Appropriately dressed and in no acute distress       Head: Normocephalic, atraumatic and no dysmorphic features  Ear, Nose, and Throat: Moist mucous membranes  CVS: S1S2+  Resp: No SOB, no wheeze or rhonchi  GI: soft NT/ND  Extremities: No edema or cyanosis  Skin:  distal LE discoloration but |+ pulse      Neurological Exam:  Mental Status: Awake, alert and oriented x 2.  Able to follow simple and complex verbal commands. Able to name and repeat. fluent speech. No obvious aphasia or dysarthria noted.   Cranial Nerves: PERRL, EOMI, VFFC, sensation V1-V3 intact,  no obvious facial asymmetry, equal elevation of palate, scm/trap 5/5, tongue is midline on protrusion. no obvious papilledema on fundoscopic exam. hearing is grossly intact.   Motor:  HENAO uppers 4, lowers 2/5   Sensation: Intact to light touch and pinprick throughout. no right/left confusion. no extinction to tactile on DSS.   Reflexes: 1+ throughout at biceps, brachioradialis, triceps, patellars and ankles bilaterally and equal. No clonus. R toe and L toe were both downgoing.  Coordination: No dysmetria on FNF    Gait: deferred     Data/Labs/Imaging which I personally reviewed.     Labs:     \  LABS:                          12.2   19.62 )-----------( 449      ( 20 Dec 2023 12:24 )             40.1     12-20    136  |  98  |  60<H>  ----------------------------<  83  5.3   |  15<L>  |  2.83<H>    Ca    8.8      20 Dec 2023 12:24    TPro  7.0  /  Alb  3.3  /  TBili  1.5<H>  /  DBili  x   /  AST  1482<H>  /  ALT  745<H>  /  AlkPhos  196<H>  12-20    LIVER FUNCTIONS - ( 20 Dec 2023 12:24 )  Alb: 3.3 g/dL / Pro: 7.0 g/dL / ALK PHOS: 196 U/L / ALT: 745 U/L / AST: 1482 U/L / GGT: x             Urinalysis Basic - ( 20 Dec 2023 12:24 )    Color: x / Appearance: x / SG: x / pH: x  Gluc: 83 mg/dL / Ketone: x  / Bili: x / Urobili: x   Blood: x / Protein: x / Nitrite: x   Leuk Esterase: x / RBC: x / WBC x   Sq Epi: x / Non Sq Epi: x / Bacteria: x        Sq Epi: x / Non Sq Epi: x / Bacteria: x      < from: CT Head No Cont (12.20.23 @ 10:18) >    ACC: [id number] EXAM:  CT BRAIN   ORDERED BY:  CHAVEZ CESAR     PROCEDURE DATE:  12/20/2023          INTERPRETATION:  .    CLINICAL INFORMATION: Altered mental status.    TECHNIQUE: Multiple axial CT images of the head were obtained without   contrast. Sagittal and coronal reconstructed images were acquired from   the source data.    COMPARISON: Prior CT study of the head from 12/19/2023. Prior brain MRI   study from 10/18/2023.    FINDINGS: There is no acute intracranial hemorrhage, mass effect, shift   of the midline structures, herniation, extra-axial fluid collection, or   hydrocephalus.    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    There is diffuse cerebral volume loss with prominence of the sulci,   fissures, and cisternal spaces which is normal for the patient's age.   Mild ventriculomegaly appears unchanged. 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    The paranasal sinuses and tympanomastoid cavities are clear. The   calvarium is intact. There is evidence of bilateral cataract removal.   Bilateral senile scleral plaques are noted.    IMPRESSION: No acute intracranial hemorrhage, mass effect, or shift of   the midline structures.    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    --- End of Report ---             KATE REYNOSO MD; Attending Radiologist  This document has been electronically signed. Dec 20 2023 10:39AM    < end of copied text >       Neurology    S: patient seen. multiple RRTs for hypotension/hypotglycemia       Medications: MEDICATIONS  (STANDING):  apixaban 2.5 milliGRAM(s) Oral two times a day  artificial  tears Solution 1 Drop(s) Both EYES two times a day  atorvastatin 80 milliGRAM(s) Oral at bedtime  cefepime   IVPB 1000 milliGRAM(s) IV Intermittent every 24 hours  chlorhexidine 2% Cloths 1 Application(s) Topical <User Schedule>  cholecalciferol 1000 Unit(s) Oral daily  cilostazol 50 milliGRAM(s) Oral two times a day  dextrose 50% Injectable 25 Gram(s) IV Push once  metoprolol succinate ER 25 milliGRAM(s) Oral <User Schedule>  midodrine. 10 milliGRAM(s) Oral three times a day  mirtazapine 15 milliGRAM(s) Oral at bedtime  multivitamin 1 Tablet(s) Oral daily  Nephro-mirella 1 Tablet(s) Oral daily  pantoprazole    Tablet 40 milliGRAM(s) Oral two times a day  senna 2 Tablet(s) Oral at bedtime  sodium bicarbonate  Infusion 0.191 mEq/kG/Hr (75 mL/Hr) IV Continuous <Continuous>  venlafaxine XR. 75 milliGRAM(s) Oral daily    MEDICATIONS  (PRN):  acetaminophen     Tablet .. 650 milliGRAM(s) Oral every 6 hours PRN Temp greater or equal to 38C (100.4F), Mild Pain (1 - 3)  glycopyrrolate Injectable 0.4 milliGRAM(s) IV Push every 4 hours PRN for secretions  HYDROmorphone  Injectable 0.2 milliGRAM(s) IV Push every 2 hours PRN dyspnea or pain  LORazepam   Injectable 0.2 milliGRAM(s) IV Push every 2 hours PRN pain  ondansetron Injectable 4 milliGRAM(s) IV Push every 4 hours PRN Nausea and/or Vomiting  polyethylene glycol 3350 17 Gram(s) Oral daily PRN for constipation       Vitals:  Vital Signs Last 24 Hrs  T(C): 36.4 (21 Dec 2023 21:03), Max: 36.4 (21 Dec 2023 06:01)  T(F): 97.6 (21 Dec 2023 21:03), Max: 97.6 (21 Dec 2023 06:01)  HR: 101 (21 Dec 2023 21:03) (101 - 119)  BP: 107/76 (21 Dec 2023 21:03) (91/64 - 107/76)  BP(mean): --  RR: 18 (21 Dec 2023 21:03) (18 - 20)  SpO2: 100% (21 Dec 2023 21:03) (99% - 100%)    Parameters below as of 21 Dec 2023 21:03  Patient On (Oxygen Delivery Method): nasal cannula  O2 Flow (L/min): 4          General Exam:   General Appearance: Appropriately dressed and in no acute distress       Head: Normocephalic, atraumatic and no dysmorphic features  Ear, Nose, and Throat: Moist mucous membranes  CVS: S1S2+  Resp: No SOB, no wheeze or rhonchi  GI: soft NT/ND  Extremities: No edema or cyanosis  Skin:  distal LE discoloration but |+ pulse      Neurological Exam:  Mental Status: Awake, alert and oriented x 2.  Able to follow simple and complex verbal commands. Able to name and repeat. fluent speech. No obvious aphasia or dysarthria noted.   Cranial Nerves: PERRL, EOMI, VFFC, sensation V1-V3 intact,  no obvious facial asymmetry, equal elevation of palate, scm/trap 5/5, tongue is midline on protrusion. no obvious papilledema on fundoscopic exam. hearing is grossly intact.   Motor:  HENAO uppers 4, lowers 2/5   Sensation: Intact to light touch and pinprick throughout. no right/left confusion. no extinction to tactile on DSS.   Reflexes: 1+ throughout at biceps, brachioradialis, triceps, patellars and ankles bilaterally and equal. No clonus. R toe and L toe were both downgoing.  Coordination: No dysmetria on FNF    Gait: deferred     Data/Labs/Imaging which I personally reviewed.     Labs:     \  LABS:                          12.2   19.62 )-----------( 449      ( 20 Dec 2023 12:24 )             40.1     12-20    136  |  98  |  60<H>  ----------------------------<  83  5.3   |  15<L>  |  2.83<H>    Ca    8.8      20 Dec 2023 12:24    TPro  7.0  /  Alb  3.3  /  TBili  1.5<H>  /  DBili  x   /  AST  1482<H>  /  ALT  745<H>  /  AlkPhos  196<H>  12-20    LIVER FUNCTIONS - ( 20 Dec 2023 12:24 )  Alb: 3.3 g/dL / Pro: 7.0 g/dL / ALK PHOS: 196 U/L / ALT: 745 U/L / AST: 1482 U/L / GGT: x             Urinalysis Basic - ( 20 Dec 2023 12:24 )    Color: x / Appearance: x / SG: x / pH: x  Gluc: 83 mg/dL / Ketone: x  / Bili: x / Urobili: x   Blood: x / Protein: x / Nitrite: x   Leuk Esterase: x / RBC: x / WBC x   Sq Epi: x / Non Sq Epi: x / Bacteria: x        Sq Epi: x / Non Sq Epi: x / Bacteria: x      < from: CT Head No Cont (12.20.23 @ 10:18) >    ACC: [id number] EXAM:  CT BRAIN   ORDERED BY:  CHAVEZ CESAR     PROCEDURE DATE:  12/20/2023          INTERPRETATION:  .    CLINICAL INFORMATION: Altered mental status.    TECHNIQUE: Multiple axial CT images of the head were obtained without   contrast. Sagittal and coronal reconstructed images were acquired from   the source data.    COMPARISON: Prior CT study of the head from 12/19/2023. Prior brain MRI   study from 10/18/2023.    FINDINGS: There is no acute intracranial hemorrhage, mass effect, shift   of the midline structures, herniation, extra-axial fluid collection, or   hydrocephalus.    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    There is diffuse cerebral volume loss with prominence of the sulci,   fissures, and cisternal spaces which is normal for the patient's age.   Mild ventriculomegaly appears unchanged. 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    The paranasal sinuses and tympanomastoid cavities are clear. The   calvarium is intact. There is evidence of bilateral cataract removal.   Bilateral senile scleral plaques are noted.    IMPRESSION: No acute intracranial hemorrhage, mass effect, or shift of   the midline structures.    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    --- End of Report ---             KATE REYNOSO MD; Attending Radiologist  This document has been electronically signed. Dec 20 2023 10:39AM    < end of copied text >

## 2023-12-21 NOTE — CHART NOTE - NSCHARTNOTEFT_GEN_A_CORE
Consult received for Geriatrics and Palliative Medicine Team for PCU evaluation. Case d/w Dr. Gomez who does not feel our consultation is needed at this time. Consultation will be cancelled, we are available should needs arise.     Xiomara Culver MD  [facility] Team Consults  Please call if we can be of assistance, [phone] Consult received for Geriatrics and Palliative Medicine Team for PCU evaluation. Case d/w Dr. Gomez who does not feel our consultation is needed at this time. Consultation will be cancelled, we are available should needs arise.     Xiomara Culver MD  [facility] Team Consults  Please call if we can be of assistance, [phone]

## 2023-12-21 NOTE — DIETITIAN INITIAL EVALUATION ADULT - OTHER INFO
- Ischemic liver injury  - Severe sepsis; ordered for abx.   - Noted updated GOC conversation (12/20); pt and family request for comfort measures. RD will continue to follow pt's clinical course and provide appropriate recommendations within pt's further GOC.     Endo:  - No hx of DM. POCTs x 24 hrs: 128 - 153 mg/dL. Noted pt with hypoglycemic episodes on admission. RD will continue to monitor blood glucose levels prn.     Renal:  - ALLAN on CKD Stage 3 likely secondary to severe volume depletion, sepsis, hypotension. Noted Na, K+ WNL today (12/21). IVF: Sodium bicarbonate @ 75 mL/hr. RD will continue to monitor electrolytes prn.    Wt Hx:  - Pt reports UBW of xx kg. Per chart, dosing wt of 59 kg (12/19). Per past RD note (11/24), pt's previous dosing wt of 49.9 (11/24).   - Wt hx in kg ([facility] HIE) as follows: 66.4 (8/11/23), 59 (1/10/20), 59 (11/20/19). RD will continue to monitor weight trends as available/able.   - IBW: 54.5 kg (based on ht of 64 in)  - Ischemic liver injury  - Severe sepsis; ordered for abx.   - Noted updated GOC conversation (12/20); pt and family request for comfort measures. RD will continue to follow pt's clinical course and provide appropriate recommendations within pt's further GOC.     Endo:  - No hx of DM. POCTs x 24 hrs: 128 - 153 mg/dL. Noted pt with hypoglycemic episodes on admission. RD will continue to monitor blood glucose levels prn.     Renal:  - ALLAN on CKD Stage 3 likely secondary to severe volume depletion, sepsis, hypotension. Noted Na, K+ WNL today (12/21). IVF: Sodium bicarbonate @ 75 mL/hr. RD will continue to monitor electrolytes prn.    Wt Hx:  - Pt reports UBW of xx kg. Per chart, dosing wt of 59 kg (12/19). Per past RD note (11/24), pt's previous dosing wt of 49.9 (11/24).   - Wt hx in kg ([facility] HIE) as follows: 66.4 (8/11/23), 59 (1/10/20), 59 (11/20/19). RD will continue to monitor weight trends as available/able.   - IBW: 54.5 kg (based on ht of 64 in)  - Ischemic liver injury  - Severe sepsis; ordered for abx.   - Noted updated GOC conversation (12/20); pt and family request for comfort measures; updated MOLST, family and pt report NG/PEG tube does not fall within the pt's GOC at this time. RD will continue to follow pt's clinical course and provide appropriate recommendations within pt's further GOC.     Endo:  - No hx of DM. POCTs x 24 hrs: 128 - 153 mg/dL. Noted pt with hypoglycemic episodes on admission. RD will continue to monitor blood glucose levels prn.     Renal:  - ALLAN on CKD Stage 3 likely secondary to severe volume depletion, sepsis, hypotension. Noted Na, K+ WNL today (12/21). IVF: Sodium bicarbonate @ 75 mL/hr. RD will continue to monitor electrolytes prn.    Wt Hx:  - Unable to obtain pt's UBW. Per chart, dosing wt of 59 kg (12/19). RD obtained bedscale wt of 71.5 kg (12/21); noted pt with edema which may mask true current wt/appearance and skew wt loss.   - Wt hx in kg ([facility] HIE) as follows: 63.8 (12/19 - per NH chart), 66.4 (8/11/23), 59 (1/10/20), 59 (11/20/19). Per past RD note (11/24), pt's previous dosing wt of 49.9 (11/24).  Noted hx of weight discrepancies. RD will continue to monitor weight trends as available/able.   - IBW: 54.5 kg (based on ht of 64 in)  - Ischemic liver injury  - Severe sepsis; ordered for abx.   - Noted updated GOC conversation (12/20); pt and family request for comfort measures; updated MOLST, family and pt report NG/PEG tube does not fall within the pt's GOC at this time. RD will continue to follow pt's clinical course and provide appropriate recommendations within pt's further GOC.     Endo:  - No hx of DM. POCTs x 24 hrs: 128 - 153 mg/dL. Noted pt with hypoglycemic episodes on admission. RD will continue to monitor blood glucose levels prn.     Renal:  - ALLAN on CKD Stage 3 likely secondary to severe volume depletion, sepsis, hypotension. Noted Na, K+ WNL today (12/21). IVF: Sodium bicarbonate @ 75 mL/hr. RD will continue to monitor electrolytes prn.    Wt Hx:  - Unable to obtain pt's UBW. Per chart, dosing wt of 59 kg (12/19). RD obtained bedscale wt of 71.5 kg (12/21); noted pt with edema which may mask true current wt/appearance and skew wt loss.   - Wt hx in kg ([facility] HIE) as follows: 63.8 (12/19 - per NH chart), 66.4 (8/11/23), 59 (1/10/20), 59 (11/20/19). Per past RD note (11/24), pt's previous dosing wt of 49.9 (11/24).  Noted hx of weight discrepancies. RD will continue to monitor weight trends as available/able.   - IBW: 54.5 kg (based on ht of 64 in)  - Ischemic liver injury  - Severe sepsis; ordered for abx.   - Noted updated GOC conversation (12/20); pt and family request for comfort measures; updated MOLST (12/20), family and pt documented NG/PEG tube does not fall within the pt's GOC at this time. RD will continue to follow pt's clinical course and provide appropriate recommendations within pt's further GOC.     Endo:  - No hx of DM. POCTs x 24 hrs: 128 - 153 mg/dL. Noted pt with hypoglycemic episodes on admission. RD will continue to monitor blood glucose levels prn.     Renal:  - ALLAN on CKD Stage 3 likely secondary to severe volume depletion, sepsis, hypotension. Noted Na, K+ WNL today (12/21). IVF: Sodium bicarbonate @ 75 mL/hr. RD will continue to monitor electrolytes prn.    Wt Hx:  - Unable to obtain pt's UBW. Per chart, dosing wt of 59 kg (12/19). RD obtained bedscale wt of 71.5 kg (12/21); noted pt with edema which may mask true current wt/appearance and skew wt loss.   - Wt hx in kg ([facility] HIE) as follows: 63.8 (12/19 - per NH chart), 66.4 (8/11/23), 59 (1/10/20), 59 (11/20/19). Per past RD note (11/24), pt's previous dosing wt of 49.9 (11/24).  Noted hx of weight discrepancies; RD will continue to monitor weight trends as available/able.   - IBW: 54.5 kg (based on ht of 64 in)  - Ischemic liver injury  - Severe sepsis; ordered for abx.   - Noted updated GOC conversation (12/20); pt and family request for comfort measures; updated MOLST (12/20), family and pt documented NG/PEG tube does not fall within the pt's GOC at this time. RD will continue to follow pt's clinical course and provide appropriate recommendations within pt's further GOC.     Endo:  - No hx of DM. POCTs x 24 hrs: 128 - 153 mg/dL. Noted pt with hypoglycemic episodes on admission. RD will continue to monitor blood glucose levels prn.     Renal:  - ALLAN on CKD Stage 3 likely secondary to severe volume depletion, sepsis, hypotension. Noted Na, K+ WNL today (12/21). IVF: Sodium bicarbonate @ 75 mL/hr. RD will continue to monitor electrolytes prn.    Wt Hx:  - Unable to obtain pt's UBW. Per chart, dosing wt of 59 kg (12/19). RD obtained bedscale wt of 71.5 kg (12/21); noted pt with edema which may mask true current wt/appearance and skew wt loss.   - Wt hx in kg ([facility] HIE) as follows: 63.8 (12/19 - per NH chart), 66.4 (8/11/23), 59 (1/10/20), 59 (11/20/19). Per past RD note (11/24), pt's previous dosing wt of 49.9 (11/24).  Noted hx of weight discrepancies; RD will continue to monitor weight trends as available/able.   - IBW: 54.5 kg (based on ht of 64 in)

## 2023-12-21 NOTE — DIETITIAN INITIAL EVALUATION ADULT - FACTORS AFF FOOD INTAKE
persistent lack of appetite/Congregation/ethnic/cultural/personal food preferences persistent lack of appetite/Amish/ethnic/cultural/personal food preferences

## 2023-12-21 NOTE — DIETITIAN INITIAL EVALUATION ADULT - ORAL INTAKE PTA/DIET HISTORY
Per pt PTA: Pt is from [facility]. Reports poor appetite/PO intake; per chart, pt refusing meals secondary to poor mood and unwillingness to eat. Endorses pt does not follow any therapeutic diets. Reports micronutrient supplementation: multivitamin, vitamin D3; no protein supplementation reported. No known food allergies/intolerances reported. Hx of chewing/swallowing difficulties; recommended soft and bite sized, thin liquids per speech language pathologist evaluation (11/29) on previous admission. Per pt PTA: Pt is from [facility]. Reports poor appetite/PO intake; per chart, pt refusing meals secondary to poor mood and unwillingness to eat. Endorses pt does not follow any therapeutic diets. Reports micronutrient supplementation: multivitamin, vitamin D3; no protein supplementation reported. No known food allergies/intolerances reported. Hx of chewing/swallowing difficulties; recommended soft and bite sized, thin liquids per speech language pathologist evaluation (11/29) on previous admission. Per pt PTA: Pt is from [facility]. Reports poor appetite/PO intake; per chart, pt refusing meals at [facility] secondary to poor mood and unwillingness to eat. Per NH chart, pt on regular, easy to chew diet with thin liquids; ordered for Ensure Plus High Protein (Provides 20g, 350 Wyatt per serving) 2x/day and Liquid Protein Supplement (per serving 15 g PRO, 100 kcal) 1x/day. Noted micronutrient supplementation: multivitamin, vitamin D3; no protein supplementation reported. No known food allergies/intolerances reported. Hx of chewing/swallowing difficulties; recommended soft and bite sized, thin liquids per speech language pathologist evaluation (11/29) on previous admission. Per pt PTA: Pt is from [facility]. Reports poor appetite/PO intake; per chart, pt refusing meals at [facility] secondary to poor mood and unwillingness to eat. Per NH chart, pt on regular, easy to chew diet with thin liquids; ordered for Ensure Plus High Protein (Provides 20g, 350 Wyatt per serving) 2x/day and Liquid Protein Supplement (per serving 15 g PRO, 100 kcal) 1x/day. Noted micronutrient supplementation: multivitamin, vitamin D3; no protein supplementation reported. No known food allergies/intolerances reported. Hx of chewing/swallowing difficulties; recommended soft and bite sized, thin liquids per speech language pathologist evaluation (11/29) on previous admission. Per pt PTA: Pt is from [facility]. Reports poor appetite/PO intake; per chart, pt refusing meals at [facility] secondary to poor mood and unwillingness to eat. Per NH chart, pt on regular, easy to chew diet with thin liquids; ordered for Ensure Plus High Protein (Provides 20g, 350 Wyatt per serving) 2x/day and Liquid Protein Supplement (per serving 15 g PRO, 100 kcal) 1x/day. Noted micronutrient supplementation: multivitamin, vitamin D3. No known food allergies/intolerances reported. Hx of chewing/swallowing difficulties; per previous speech language pathologist evaluation (11/29) on last admission, pt was recommended for soft and bite sized, thin liquids.  Per pt PTA: Pt is from [facility]. Reports poor appetite/PO intake; per chart, pt refusing meals at [facility] secondary to poor mood and unwillingness to eat. Per NH chart, pt on regular, easy to chew diet with thin liquids; ordered for Ensure Plus High Protein (Provides 20g, 350 Wyatt per serving) 2x/day and Liquid Protein Supplement (per serving 15 g PRO, 100 kcal) 1x/day. Noted micronutrient supplementation: multivitamin, vitamin D3. No known food allergies/intolerances reported. Hx of chewing/swallowing difficulties; per previous speech language pathologist evaluation (11/29) on last admission, pt was recommended for soft and bite sized, thin liquids.

## 2023-12-21 NOTE — DIETITIAN INITIAL EVALUATION ADULT - ENERGY INTAKE
- Pt was previously ordered for soft and bite sized diet, but was downgraded to purees today (12/21).  - Ordered for Remeron, PPI, multivitamin.  - Pt was previously ordered for soft and bite sized diet, but was downgraded to purees today (12/21). Consider placing speech language pathologist consult to conduct a formal bedside swallow evaluation to determine appropriate diet texture/consistency.   - Pt endorses poor appetite/PO intake, consuming <50% of meals in-house. Per team, pt primarily consuming liquids (i.e, water, juice, soda). RD to add Mighty Shakes (per serving provides 6 g PRO, 220 kcal) 2x/day and High Protein Apple Juice (per serving provides ~15 g PRO, ~100 kcal). Trial Ensure Plus High Protein Shake (Provides 20g, 350 Wyatt per serving) 1x/day. Ordered for Remeron, PPI, multivitamin.  Poor (<50%)

## 2023-12-21 NOTE — PROGRESS NOTE ADULT - SUBJECTIVE AND OBJECTIVE BOX
[facility] DIVISION OF INFECTIOUS DISEASES  MONTEZ Chapman Y. Patel, S. Shah, G. Mack  [phone]  ([phone] - weekdays after 5pm and weekends)    Name: DELVIS MCBRIDE  Age/Gender: 89y Female  MRN: [id number]    Interval History:  Patient seen and examined this morning.   Denies fever, chest pain, dyspnea, cough.  Denies abd pain, n/v/d.   Notes reviewed, events noted.   Afebrile     Allergies: piperacillin-tazobactam (Rash)  soaps and creams causes rash uses only sensitive skin soap (Rash)  sulfa drugs (Unknown)  Voltaren (Rash)  Eye cream from the 1960s Neocortef ?spelling caused eyes to becomes swollen (Unknown)  neomycin (Unknown)    Objective:  Vitals:   T(F): 97.5 (12-21-23 @ 12:03), Max: 97.9 (12-20-23 @ 20:42)  HR: 119 (12-21-23 @ 12:03) (98 - 121)  BP: 105/62 (12-21-23 @ 12:03) (76/40 - 105/62)  RR: 18 (12-21-23 @ 12:03) (18 - 20)  SpO2: 99% (12-21-23 @ 12:03) (97% - 100%)  Physical Examination:  General: no acute distress, NC  HEENT: NC/AT, anicteric, neck supple  Respiratory: clear to auscultation bilaterally  Cardiovascular: S1 and S2 present, tachycardia  Gastrointestinal: soft, nontender, nondistended  Extremities: no edema, no cyanosis  Skin: no visible rash    Laboratory Studies:  CBC:                       12.2   19.62 )-----------( 449      ( 20 Dec 2023 12:24 )             40.1     WBC Trend:  19.62 12-20-23 @ 12:24  11.03 12-19-23 @ 12:03    CMP: 12-20    136  |  98  |  60<H>  ----------------------------<  83  5.3   |  15<L>  |  2.83<H>    Ca    8.8      20 Dec 2023 12:24    TPro  7.0  /  Alb  3.3  /  TBili  1.5<H>  /  DBili  x   /  AST  1482<H>  /  ALT  745<H>  /  AlkPhos  196<H>  12-20    Creatinine: 2.83 mg/dL (12-20-23 @ 12:24)  Creatinine: 2.56 mg/dL (12-20-23 @ 06:41)  Creatinine: 2.18 mg/dL (12-19-23 @ 12:03)    LIVER FUNCTIONS - ( 20 Dec 2023 12:24 )  Alb: 3.3 g/dL / Pro: 7.0 g/dL / ALK PHOS: 196 U/L / ALT: 745 U/L / AST: 1482 U/L / GGT: x           Urinalysis + Microscopic Examination (12.19.23 @ 17:01)    pH Urine: 5.0   Urine Appearance: Turbid   Color: Dark Yellow   Specific Gravity: 1.021   Protein, Urine: 100 mg/dL   Glucose Qualitative, Urine: Negative mg/dL   Ketone - Urine: Negative mg/dL   Blood, Urine: Negative   Bilirubin: Small   Urobilinogen: 1.0 mg/dL   Leukocyte Esterase Concentration: Small   Nitrite: Negative   Review: Reviewed   White Blood Cell - Urine: 24 /HPF   Red Blood Cell - Urine: 3 /HPF   Bacteria: Many /HPF   Cast: >63 /LPF   Hyaline Casts: Present   Epithelial Cells: 17 /HPF    Microbiology: reviewed   Culture - Urine (collected 12-19-23 @ 17:01)  Source: Clean Catch Clean Catch (Midstream)  Preliminary Report (12-21-23 @ 10:46):    >100,000 CFU/ml Gram positive organisms    10,000 - 49,000 CFU/mL Gram Negative Rods    SARS-CoV-2 Result: Detected (22 Nov 2023 12:42)    Radiology: reviewed     Medications:  acetaminophen     Tablet .. 650 milliGRAM(s) Oral every 6 hours PRN  apixaban 2.5 milliGRAM(s) Oral two times a day  artificial  tears Solution 1 Drop(s) Both EYES two times a day  atorvastatin 80 milliGRAM(s) Oral at bedtime  cefepime   IVPB 1000 milliGRAM(s) IV Intermittent every 24 hours  chlorhexidine 2% Cloths 1 Application(s) Topical <User Schedule>  cholecalciferol 1000 Unit(s) Oral daily  cilostazol 50 milliGRAM(s) Oral two times a day  dextrose 50% Injectable 25 Gram(s) IV Push once  glycopyrrolate Injectable 0.4 milliGRAM(s) IV Push every 4 hours PRN  HYDROmorphone  Injectable 0.2 milliGRAM(s) IV Push every 2 hours PRN  LORazepam   Injectable 0.2 milliGRAM(s) IV Push every 2 hours PRN  metoprolol succinate ER 25 milliGRAM(s) Oral <User Schedule>  midodrine. 10 milliGRAM(s) Oral three times a day  mirtazapine 15 milliGRAM(s) Oral at bedtime  multivitamin 1 Tablet(s) Oral daily  ondansetron Injectable 4 milliGRAM(s) IV Push every 4 hours PRN  pantoprazole    Tablet 40 milliGRAM(s) Oral two times a day  polyethylene glycol 3350 17 Gram(s) Oral daily PRN  senna 2 Tablet(s) Oral at bedtime  sodium bicarbonate  Infusion 0.191 mEq/kG/Hr IV Continuous <Continuous>  venlafaxine XR. 75 milliGRAM(s) Oral daily    Current Antimicrobials:  cefepime   IVPB 1000 milliGRAM(s) IV Intermittent every 24 hours    Prior/Completed Antimicrobials:  vancomycin  IVPB   [facility] INFECTIOUS DISEASES  MONTEZ Chapman Y. Patel, S. Shah, G. Mack  [phone]  ([phone] - weekdays after 5pm and weekends)    Name: DELVIS MCBRIDE  Age/Gender: 89y Female  MRN: [id number]    Interval History:  Patient seen and examined this morning.   Denies fever, chest pain, dyspnea, cough.  Denies abd pain, n/v/d.   Notes reviewed, events noted.   Afebrile     Allergies: piperacillin-tazobactam (Rash)  soaps and creams causes rash uses only sensitive skin soap (Rash)  sulfa drugs (Unknown)  Voltaren (Rash)  Eye cream from the 1960s Neocortef ?spelling caused eyes to becomes swollen (Unknown)  neomycin (Unknown)    Objective:  Vitals:   T(F): 97.5 (12-21-23 @ 12:03), Max: 97.9 (12-20-23 @ 20:42)  HR: 119 (12-21-23 @ 12:03) (98 - 121)  BP: 105/62 (12-21-23 @ 12:03) (76/40 - 105/62)  RR: 18 (12-21-23 @ 12:03) (18 - 20)  SpO2: 99% (12-21-23 @ 12:03) (97% - 100%)  Physical Examination:  General: no acute distress, NC  HEENT: NC/AT, anicteric, neck supple  Respiratory: clear to auscultation bilaterally  Cardiovascular: S1 and S2 present, tachycardia  Gastrointestinal: soft, nontender, nondistended  Extremities: no edema, no cyanosis  Skin: no visible rash    Laboratory Studies:  CBC:                       12.2   19.62 )-----------( 449      ( 20 Dec 2023 12:24 )             40.1     WBC Trend:  19.62 12-20-23 @ 12:24  11.03 12-19-23 @ 12:03    CMP: 12-20    136  |  98  |  60<H>  ----------------------------<  83  5.3   |  15<L>  |  2.83<H>    Ca    8.8      20 Dec 2023 12:24    TPro  7.0  /  Alb  3.3  /  TBili  1.5<H>  /  DBili  x   /  AST  1482<H>  /  ALT  745<H>  /  AlkPhos  196<H>  12-20    Creatinine: 2.83 mg/dL (12-20-23 @ 12:24)  Creatinine: 2.56 mg/dL (12-20-23 @ 06:41)  Creatinine: 2.18 mg/dL (12-19-23 @ 12:03)    LIVER FUNCTIONS - ( 20 Dec 2023 12:24 )  Alb: 3.3 g/dL / Pro: 7.0 g/dL / ALK PHOS: 196 U/L / ALT: 745 U/L / AST: 1482 U/L / GGT: x           Urinalysis + Microscopic Examination (12.19.23 @ 17:01)    pH Urine: 5.0   Urine Appearance: Turbid   Color: Dark Yellow   Specific Gravity: 1.021   Protein, Urine: 100 mg/dL   Glucose Qualitative, Urine: Negative mg/dL   Ketone - Urine: Negative mg/dL   Blood, Urine: Negative   Bilirubin: Small   Urobilinogen: 1.0 mg/dL   Leukocyte Esterase Concentration: Small   Nitrite: Negative   Review: Reviewed   White Blood Cell - Urine: 24 /HPF   Red Blood Cell - Urine: 3 /HPF   Bacteria: Many /HPF   Cast: >63 /LPF   Hyaline Casts: Present   Epithelial Cells: 17 /HPF    Microbiology: reviewed   Culture - Urine (collected 12-19-23 @ 17:01)  Source: Clean Catch Clean Catch (Midstream)  Preliminary Report (12-21-23 @ 10:46):    >100,000 CFU/ml Gram positive organisms    10,000 - 49,000 CFU/mL Gram Negative Rods    SARS-CoV-2 Result: Detected (22 Nov 2023 12:42)    Radiology: reviewed     Medications:  acetaminophen     Tablet .. 650 milliGRAM(s) Oral every 6 hours PRN  apixaban 2.5 milliGRAM(s) Oral two times a day  artificial  tears Solution 1 Drop(s) Both EYES two times a day  atorvastatin 80 milliGRAM(s) Oral at bedtime  cefepime   IVPB 1000 milliGRAM(s) IV Intermittent every 24 hours  chlorhexidine 2% Cloths 1 Application(s) Topical <User Schedule>  cholecalciferol 1000 Unit(s) Oral daily  cilostazol 50 milliGRAM(s) Oral two times a day  dextrose 50% Injectable 25 Gram(s) IV Push once  glycopyrrolate Injectable 0.4 milliGRAM(s) IV Push every 4 hours PRN  HYDROmorphone  Injectable 0.2 milliGRAM(s) IV Push every 2 hours PRN  LORazepam   Injectable 0.2 milliGRAM(s) IV Push every 2 hours PRN  metoprolol succinate ER 25 milliGRAM(s) Oral <User Schedule>  midodrine. 10 milliGRAM(s) Oral three times a day  mirtazapine 15 milliGRAM(s) Oral at bedtime  multivitamin 1 Tablet(s) Oral daily  ondansetron Injectable 4 milliGRAM(s) IV Push every 4 hours PRN  pantoprazole    Tablet 40 milliGRAM(s) Oral two times a day  polyethylene glycol 3350 17 Gram(s) Oral daily PRN  senna 2 Tablet(s) Oral at bedtime  sodium bicarbonate  Infusion 0.191 mEq/kG/Hr IV Continuous <Continuous>  venlafaxine XR. 75 milliGRAM(s) Oral daily    Current Antimicrobials:  cefepime   IVPB 1000 milliGRAM(s) IV Intermittent every 24 hours    Prior/Completed Antimicrobials:  vancomycin  IVPB

## 2023-12-21 NOTE — DIETITIAN INITIAL EVALUATION ADULT - COLLABORATION WITH OTHER PROVIDERS
Consider placing speech language pathologist consult for formal bedside swallow evaluation to confirm appropriate diet texture/consistency prn.

## 2023-12-21 NOTE — DIETITIAN INITIAL EVALUATION ADULT - NSFNSGIASSESSMENTFT_GEN_A_CORE
Per chart, pt c/o nausea, no vomiting reported. Ordered for odansetron. Last BM 12/21 (+3 counts; +4 counts yesterday). Ordered for bowel regimen: Miralax, Senna.

## 2023-12-21 NOTE — PROGRESS NOTE ADULT - SUBJECTIVE AND OBJECTIVE BOX
Patient is a 89y old  Female who presents with a chief complaint of Acute renal failure     (21 Dec 2023 14:49)      SUBJECTIVE / OVERNIGHT EVENTS: ptn is alert, awake, family at bedside, GOC d/w daughter x 45 min. overall looks better today. on ABx, Midodrine, VSS, on bicarb drip    MEDICATIONS  (STANDING):  apixaban 2.5 milliGRAM(s) Oral two times a day  artificial  tears Solution 1 Drop(s) Both EYES two times a day  atorvastatin 80 milliGRAM(s) Oral at bedtime  cefepime   IVPB 1000 milliGRAM(s) IV Intermittent every 24 hours  chlorhexidine 2% Cloths 1 Application(s) Topical <User Schedule>  cholecalciferol 1000 Unit(s) Oral daily  cilostazol 50 milliGRAM(s) Oral two times a day  dextrose 50% Injectable 25 Gram(s) IV Push once  metoprolol succinate ER 25 milliGRAM(s) Oral <User Schedule>  midodrine. 10 milliGRAM(s) Oral three times a day  mirtazapine 15 milliGRAM(s) Oral at bedtime  multivitamin 1 Tablet(s) Oral daily  pantoprazole    Tablet 40 milliGRAM(s) Oral two times a day  senna 2 Tablet(s) Oral at bedtime  sodium bicarbonate  Infusion 0.191 mEq/kG/Hr (75 mL/Hr) IV Continuous <Continuous>  venlafaxine XR. 75 milliGRAM(s) Oral daily    MEDICATIONS  (PRN):  acetaminophen     Tablet .. 650 milliGRAM(s) Oral every 6 hours PRN Temp greater or equal to 38C (100.4F), Mild Pain (1 - 3)  glycopyrrolate Injectable 0.4 milliGRAM(s) IV Push every 4 hours PRN for secretions  HYDROmorphone  Injectable 0.2 milliGRAM(s) IV Push every 2 hours PRN dyspnea or pain  LORazepam   Injectable 0.2 milliGRAM(s) IV Push every 2 hours PRN pain  ondansetron Injectable 4 milliGRAM(s) IV Push every 4 hours PRN Nausea and/or Vomiting  polyethylene glycol 3350 17 Gram(s) Oral daily PRN for constipation      Vital Signs Last 24 Hrs  T(F): 97.5 (12-21-23 @ 12:03), Max: 97.9 (12-20-23 @ 20:42)  HR: 119 (12-21-23 @ 12:03) (98 - 121)  BP: 105/62 (12-21-23 @ 12:03) (76/40 - 105/62)  RR: 18 (12-21-23 @ 12:03) (18 - 20)  SpO2: 99% (12-21-23 @ 12:03) (97% - 100%)  Telemetry:   CAPILLARY BLOOD GLUCOSE      POCT Blood Glucose.: 153 mg/dL (20 Dec 2023 16:50)    I&O's Summary      PHYSICAL EXAM:  GENERAL: NAD, well-developed  HEAD:  Atraumatic, Normocephalic  EYES: EOMI, PERRLA, conjunctiva and sclera clear  NECK: Supple, No JVD  CHEST/LUNG: Clear to auscultation bilaterally; No wheeze  HEART: Regular rate and rhythm; No murmurs, rubs, or gallops  ABDOMEN: Soft, Nontender, Nondistended; Bowel sounds present  EXTREMITIES:  2+ Peripheral Pulses, No clubbing, cyanosis, or edema  PSYCH: AAOx3  NEUROLOGY: non-focal  SKIN: No rashes or lesions    LABS:                        12.2   19.62 )-----------( 449      ( 20 Dec 2023 12:24 )             40.1     12-20    136  |  98  |  60<H>  ----------------------------<  83  5.3   |  15<L>  |  2.83<H>    Ca    8.8      20 Dec 2023 12:24    TPro  7.0  /  Alb  3.3  /  TBili  1.5<H>  /  DBili  x   /  AST  1482<H>  /  ALT  745<H>  /  AlkPhos  196<H>  12-20          Urinalysis Basic - ( 20 Dec 2023 12:24 )    Color: x / Appearance: x / SG: x / pH: x  Gluc: 83 mg/dL / Ketone: x  / Bili: x / Urobili: x   Blood: x / Protein: x / Nitrite: x   Leuk Esterase: x / RBC: x / WBC x   Sq Epi: x / Non Sq Epi: x / Bacteria: x        RADIOLOGY & ADDITIONAL TESTS:    Imaging Personally Reviewed:    Consultant(s) Notes Reviewed:      Care Discussed with Consultants/Other Providers:

## 2023-12-21 NOTE — DIETITIAN INITIAL EVALUATION ADULT - ORAL NUTRITION SUPPLEMENTS
RD to add Mighty Shakes (per serving provides 6 g PRO, 220 kcal) 2x/day and High Protein Apple Juice (per serving provides ~15 g PRO, ~100 kcal) 2x/day to optimize protein and caloric intake.

## 2023-12-21 NOTE — PROGRESS NOTE ADULT - ASSESSMENT
Patient is a 89 year old female with PMH of Afib on eliquis, HLD, HTN who presented to the emergency department due to fall with possible head trauma in the morning at [place] while being bathed.     S/p fall  Rule out UTI  Afib with RVR   ALLAN on CKD  - UA on admission with WBC 24 with small LE, no nitrites, many bacteria   -- did note it was collected via straight cath but has 17 sq epithelial cells c/w likely contaminated specimen  - denies having any gu symptoms, afebrile, WBC noted   - renal U/S in ER with no hydronephrosis  - transaminitis noted, abd ultrasound limited but no gross evidence of cholecystitis, CBD wnl for pts age  - CTH with no acute findings, noted with multiple chronic infarcts within b/l cerebellar hemispheres   - Neurology following, recs noted   - Cardiology following for Afib and cardiomyopathy   - prior cultures reviewed, no positive cultures noted   - abd U/S with no infectious source   - CT chest with small b/l effusions with atelectasis, no pna  - 12/20 noted RRT later this morning for hypotension, severe hypoglycemia, leukocytosis uptrended, s/p vancomycin   - GOC discussion noted, pt DNR/DNR with comfort measures, ok with IVF and abx  H/o PCN allergy-rash, tolerating cephalosporin    Recommendations:  Follow up urine culture for GNR and GPO ID/sensitivities    Conitnue on cefepime 1g IV Q24h (renally adjusted)  Continue rest of care per primary team      Betsy Melgar M.D.  [facility], Division of Infectious Diseases  [phone]  After 5pm on weekdays and all day on weekends - please call [phone]       Patient is a 89 year old female with PMH of Afib on eliquis, HLD, HTN who presented to the emergency department due to fall with possible head trauma in the morning at [place] while being bathed.     S/p fall  Rule out UTI  Afib with RVR   ALLAN on CKD  - UA on admission with WBC 24 with small LE, no nitrites, many bacteria   -- did note it was collected via straight cath but has 17 sq epithelial cells c/w likely contaminated specimen  - denies having any gu symptoms, afebrile, WBC noted   - renal U/S in ER with no hydronephrosis  - transaminitis noted, abd ultrasound limited but no gross evidence of cholecystitis, CBD wnl for pts age  - CTH with no acute findings, noted with multiple chronic infarcts within b/l cerebellar hemispheres   - Neurology following, recs noted   - Cardiology following for Afib and cardiomyopathy   - prior cultures reviewed, no positive cultures noted   - abd U/S with no infectious source   - CT chest with small b/l effusions with atelectasis, no pna  - 12/20 noted RRT later this morning for hypotension, severe hypoglycemia, leukocytosis uptrended, s/p vancomycin   - GOC discussion noted, pt DNR/DNR with comfort measures, ok with IVF and abx  H/o PCN allergy-rash, tolerating cephalosporin    Recommendations:  Follow up urine culture for GNR and GPO ID/sensitivities    Conitnue on cefepime 1g IV Q24h (renally adjusted)  Continue rest of care per primary team      Betsy Melgar M.D.  [facility], Division of Infectious Diseases  [phone]  After 5pm on weekdays and all day on weekends - please call [phone]

## 2023-12-21 NOTE — PROGRESS NOTE ADULT - ASSESSMENT
89-year-old female with pAF and PE on DOAC, anxiety, HTN, HLD, insomnia endometrial Ca s/p LYNETTE and SBO,,  presenting to the emergency department due to fall with head trauma.  Patient states she was being bathed and fell but does not remember the details of the fall.  She does not member if she hit her head but denies any changes in visions, headache, weakness numbness to extremities.  She  also states that she has left foot pain. as per Dr. Diaz from [facility] ptn  was found to have an elevated creatinine of 2.18 this morning which was abnormal for her.  She would like to be evaluated for ALLAN.     CTH with chronic changes noted.  multiple chronic bilateral cerebellar infarcts ; no actue findings   UA+     Imprssion:   1) Fall likely related to hypotension/dehydration on top of old bilateral cerebellar strokes   2) HA, stable   3) AMS toxic metabolic on top of mild dementia   4) chronic bilateral cerebelalr strokes     - RRT eventually called for hypotension multiuple times, midodrine started   - b12, RPR, TSH  - IVF   - CTX for infection, changed to cefepime. montior mental status as cefepime can cause fluctuations in mentals tauts   - DOAC for AF and PE  - statin therapy  - check orthostatics   - check vessels carotid duplex    - Hemoglobin A1c and lipid panel  - can defer routine EEG for now.  doubt seizure   - telemetry  - PT/OT  - TTE last done 10'18/23   - cardio eval recs appreciated   - ID called   - check FS, glucose control <180  - GI/DVT ppx   - Thank you for allowing me to participate in the care of this patient. Call with questions.   DNR/I  Ayo Novak MD  Vascular Neurology  Office: [phone]  89-year-old female with pAF and PE on DOAC, anxiety, HTN, HLD, insomnia endometrial Ca s/p LYNETTE and SBO,,  presenting to the emergency department due to fall with head trauma.  Patient states she was being bathed and fell but does not remember the details of the fall.  She does not member if she hit her head but denies any changes in visions, headache, weakness numbness to extremities.  She  also states that she has left foot pain. as per Dr. Diaz from [facility] ptn  was found to have an elevated creatinine of 2.18 this morning which was abnormal for her.  She would like to be evaluated for ALLAN.     CTH with chronic changes noted.  multiple chronic bilateral cerebellar infarcts ; no actue findings   UA+     Imprssion:   1) Fall likely related to hypotension/dehydration on top of old bilateral cerebellar strokes   2) HA, stable   3) AMS toxic metabolic on top of mild dementia   4) chronic bilateral cerebelalr strokes     - RRT eventually called for hypotension multiuple times, midodrine started   - b12, RPR, TSH  - IVF   - CTX for infection, changed to cefepime. montior mental status as cefepime can cause fluctuations in mentals tauts   - DOAC for AF and PE  - statin therapy  - check orthostatics   - check vessels carotid duplex    - Hemoglobin A1c and lipid panel  - can defer routine EEG for now.  doubt seizure   - telemetry  - PT/OT  - TTE last done 10'18/23   - cardio eval recs appreciated   - ID called   - check FS, glucose control <180  - GI/DVT ppx   - Thank you for allowing me to participate in the care of this patient. Call with questions.   DNR/I  Ayo Novak MD  Vascular Neurology  Office: [phone]

## 2023-12-21 NOTE — DIETITIAN INITIAL EVALUATION ADULT - NSICDXPASTSURGICALHX_GEN_ALL_CORE_FT
PAST SURGICAL HISTORY:  [facility]     History of Colonoscopy- 2011/Sept     History of D&C- 8/12/11     Leg fracture, right Hip fx repair in Nov 2019    Status post cataract extraction OS    Status post hysterectomy endometrial cancer     PAST SURGICAL HISTORY:  [facility]     History of Colonoscopy- 2011/Sept     History of D&C- 8/12/11     Leg fracture, right Hip fx repair in Nov 2019    Status post cataract extraction OS    Status post hysterectomy endometrial cancer

## 2023-12-21 NOTE — PATIENT PROFILE ADULT - FALL HARM RISK - HARM RISK INTERVENTIONS
Assistance with ambulation/Assistance OOB with selected safe patient handling equipment/Communicate Risk of Fall with Harm to all staff/Reinforce activity limits and safety measures with patient and family/Tailored Fall Risk Interventions/Visual Cue: Yellow wristband and red socks/Bed in lowest position, wheels locked, appropriate side rails in place/Call bell, personal items and telephone in reach/Instruct patient to call for assistance before getting out of bed or chair/Non-slip footwear when patient is out of bed/[place] to call system/Physically safe environment - no spills, clutter or unnecessary equipment/Purposeful Proactive Rounding/Room/bathroom lighting operational, light cord in reach Assistance with ambulation/Assistance OOB with selected safe patient handling equipment/Communicate Risk of Fall with Harm to all staff/Reinforce activity limits and safety measures with patient and family/Tailored Fall Risk Interventions/Visual Cue: Yellow wristband and red socks/Bed in lowest position, wheels locked, appropriate side rails in place/Call bell, personal items and telephone in reach/Instruct patient to call for assistance before getting out of bed or chair/Non-slip footwear when patient is out of bed/[place] to call system/Physically safe environment - no spills, clutter or unnecessary equipment/Purposeful Proactive Rounding/Room/bathroom lighting operational, light cord in reach

## 2023-12-21 NOTE — DIETITIAN INITIAL EVALUATION ADULT - ADD RECOMMEND
[X] Continue with regular diet (pureed diet); Defer texture/consistency to Speech Language Pathologist prn.   [X] Consider adding Nephro-Domingo and vitamin C once daily for micronutrient coverage/promote wound healing, pending no medical contraindications.   [X] Malnutrition sticker placed in chart   [X] RD will continue to monitor PO intake, GI tolerance, weight trends, skin integrity, BMs, labs/electrolytes prn.   RD remains available upon request.  [X] Continue with regular diet (pureed diet); Defer texture/consistency to Speech Language Pathologist prn.   [X] Consider adding Nephro-Domingo once daily for micronutrient coverage/promote wound healing, pending no medical contraindications.   [X] Malnutrition sticker placed in chart   [X] RD will continue to monitor PO intake, GI tolerance, weight trends, skin integrity, BMs, labs/electrolytes prn.   RD remains available upon request.

## 2023-12-21 NOTE — PROGRESS NOTE ADULT - SUBJECTIVE AND OBJECTIVE BOX
Patient is a 89y old  Female     Subjective:  PT seen and examined today. GOC discussion noted with MICU team and decision for conservative measures. palliative consult pendings. no labs drawn.     PAST MEDICAL & SURGICAL HISTORY:  Anxiety      Breast Lump      Insomnia      PE (pulmonary thromboembolism)      PAF (paroxysmal atrial fibrillation)  On Xarelto      HTN (hypertension)      HLD (hyperlipidemia)      Endometrial cancer  S/P LYNETTE with SBO      [facility] of [facility] L[facility]- [facility]- [facility]      History of Colonoscopy- 2011/Sept      History of D&C- 8/12/11      Status post hysterectomy  endometrial cancer      Status post cataract extraction  OS      Leg fracture, right  Hip fx repair in Nov 2019          MEDICATIONS  (STANDING):  apixaban 2.5 milliGRAM(s) Oral two times a day  artificial  tears Solution 1 Drop(s) Both EYES two times a day  atorvastatin 80 milliGRAM(s) Oral at bedtime  cefepime   IVPB 1000 milliGRAM(s) IV Intermittent every 24 hours  cholecalciferol 1000 Unit(s) Oral daily  dextrose 50% Injectable 25 Gram(s) IV Push once  metoprolol succinate ER 25 milliGRAM(s) Oral <User Schedule>  mirtazapine 15 milliGRAM(s) Oral at bedtime  multivitamin 1 Tablet(s) Oral daily  pantoprazole    Tablet 40 milliGRAM(s) Oral two times a day  sodium bicarbonate  Infusion 0.254 mEq/kG/Hr (100 mL/Hr) IV Continuous <Continuous>  vancomycin  IVPB 1000 milliGRAM(s) IV Intermittent once  venlafaxine XR. 75 milliGRAM(s) Oral daily      Allergies    piperacillin-tazobactam (Rash)  soaps and creams causes rash uses only sensitive skin soap (Rash)  sulfa drugs (Unknown)  Voltaren (Rash)  Eye cream from the 1960s Neocortef ?spelling caused eyes to becomes swollen (Unknown)  neomycin (Unknown)    Intolerances        SOCIAL HISTORY:  Denies ETOh,Smoking,     FAMILY HISTORY:  No pertinent family history in first degree relatives        REVIEW OF SYSTEMS:  UTO    VITAL:  T(C): , Max: 37 (12-19-23 @ 20:30)  T(F): , Max: 98.6 (12-19-23 @ 20:30)  HR: 126 (12-20-23 @ 13:00)  BP: 103/72 (12-20-23 @ 13:00)  BP(mean): --  RR: 18 (12-20-23 @ 05:04)  SpO2: 98% (12-20-23 @ 05:04)  Wt(kg): --    PHYSICAL EXAM:  Constitutional: NAD,  old cachectic, sleepy  HEENT: NCAT, MMM  Neck: Supple, No JVD  Respiratory: CTA-b/l  Cardiovascular: RRR s1s2, no m/r/g  Gastrointestinal: BS+, soft, NT/ND  Extremities: No peripheral edema b/l  Neurological: no focal deficits; strength grossly intact  Back: no CVAT b/l  Skin: No rashes, no nevi    LABS:                        12.2   19.62 )-----------( 449      ( 20 Dec 2023 12:24 )             40.1     Na(136)/K(5.3)/Cl(98)/HCO3(15)/BUN(60)/Cr(2.83)Glu(83)/Ca(8.8)/Mg(--)/PO4(--)    12-20 @ 12:24  Na(138)/K(5.1)/Cl(100)/HCO3(16)/BUN(60)/Cr(2.56)Glu(43)/Ca(9.2)/Mg(--)/PO4(--)    12-20 @ 06:41  Na(136)/K(4.6)/Cl(99)/HCO3(24)/BUN(51)/Cr(2.18)Glu(130)/Ca(8.9)/Mg(--)/PO4(--)    12-19 @ 12:03    Urinalysis Basic - ( 20 Dec 2023 12:24 )    Color: x / Appearance: x / SG: x / pH: x  Gluc: 83 mg/dL / Ketone: x  / Bili: x / Urobili: x   Blood: x / Protein: x / Nitrite: x   Leuk Esterase: x / RBC: x / WBC x   Sq Epi: x / Non Sq Epi: x / Bacteria: x            IMAGING:    ASSESSMENT:  89-year-old female on Eliquis for PE prophylaxis and A-fib presenting to the emergency department due to fall with head trauma. Nephrology consulted for CAMMY     Cammy stage II on CKD stage 3B  -BL Cr 1.1-1.3  -CKD likely 2/2 HTN and age related changes  -CAMMY likely 2/2 severe volume depletion, sepsis, reduced effective circulating volume, hypotension    Severe Sepsis  -WBC 19K  -Lactic 9.1  -BP okay, soft, improved s/p x1L fluids    Hypoglycemia  -on Bicarb w/D5    elevated LFT's  -likely shock liver    RECOMMEND:  -GOC discussion noted, no updated labs or lab draws today.   -Please DC bicarb based fluids  -IV Abx  -a/w supportive care  -monitor RF with daily BMP + phos  -maintain MAP >65  -Dose Rx for Crcl 10-20 mL/min    Nephrology will sign off at this point in time, please do not hesitate to reach out with any questions or re-consult should the goals of care change.     With warm regards,    Bert Mckeon DO   [facility]  Office: [phone]           Patient is a 89y old  Female     Subjective:  PT seen and examined today. GOC discussion noted with MICU team and decision for conservative measures. palliative consult pendings. no labs drawn.     PAST MEDICAL & SURGICAL HISTORY:  Anxiety      Breast Lump      Insomnia      PE (pulmonary thromboembolism)      PAF (paroxysmal atrial fibrillation)  On Xarelto      HTN (hypertension)      HLD (hyperlipidemia)      Endometrial cancer  S/P LYNETTE with SBO      History of Breast Lump/[facility]- [facility]- [facility]- [facility]      History of Colonoscopy- 2011/Sept      History of D&C- 8/12/11      Status post hysterectomy  endometrial cancer      Status post cataract extraction  OS      Leg fracture, right  Hip fx repair in Nov 2019          MEDICATIONS  (STANDING):  apixaban 2.5 milliGRAM(s) Oral two times a day  artificial  tears Solution 1 Drop(s) Both EYES two times a day  atorvastatin 80 milliGRAM(s) Oral at bedtime  cefepime   IVPB 1000 milliGRAM(s) IV Intermittent every 24 hours  cholecalciferol 1000 Unit(s) Oral daily  dextrose 50% Injectable 25 Gram(s) IV Push once  metoprolol succinate ER 25 milliGRAM(s) Oral <User Schedule>  mirtazapine 15 milliGRAM(s) Oral at bedtime  multivitamin 1 Tablet(s) Oral daily  pantoprazole    Tablet 40 milliGRAM(s) Oral two times a day  sodium bicarbonate  Infusion 0.254 mEq/kG/Hr (100 mL/Hr) IV Continuous <Continuous>  vancomycin  IVPB 1000 milliGRAM(s) IV Intermittent once  venlafaxine XR. 75 milliGRAM(s) Oral daily      Allergies    piperacillin-tazobactam (Rash)  soaps and creams causes rash uses only sensitive skin soap (Rash)  sulfa drugs (Unknown)  Voltaren (Rash)  Eye cream from the 1960s Neocortef ?spelling caused eyes to becomes swollen (Unknown)  neomycin (Unknown)    Intolerances        SOCIAL HISTORY:  Denies ETOh,Smoking,     FAMILY HISTORY:  No pertinent family history in first degree relatives        REVIEW OF SYSTEMS:  UTO    VITAL:  T(C): , Max: 37 (12-19-23 @ 20:30)  T(F): , Max: 98.6 (12-19-23 @ 20:30)  HR: 126 (12-20-23 @ 13:00)  BP: 103/72 (12-20-23 @ 13:00)  BP(mean): --  RR: 18 (12-20-23 @ 05:04)  SpO2: 98% (12-20-23 @ 05:04)  Wt(kg): --    PHYSICAL EXAM:  Constitutional: NAD,  old cachectic, sleepy  HEENT: NCAT, MMM  Neck: Supple, No JVD  Respiratory: CTA-b/l  Cardiovascular: RRR s1s2, no m/r/g  Gastrointestinal: BS+, soft, NT/ND  Extremities: No peripheral edema b/l  Neurological: no focal deficits; strength grossly intact  Back: no CVAT b/l  Skin: No rashes, no nevi    LABS:                        12.2   19.62 )-----------( 449      ( 20 Dec 2023 12:24 )             40.1     Na(136)/K(5.3)/Cl(98)/HCO3(15)/BUN(60)/Cr(2.83)Glu(83)/Ca(8.8)/Mg(--)/PO4(--)    12-20 @ 12:24  Na(138)/K(5.1)/Cl(100)/HCO3(16)/BUN(60)/Cr(2.56)Glu(43)/Ca(9.2)/Mg(--)/PO4(--)    12-20 @ 06:41  Na(136)/K(4.6)/Cl(99)/HCO3(24)/BUN(51)/Cr(2.18)Glu(130)/Ca(8.9)/Mg(--)/PO4(--)    12-19 @ 12:03    Urinalysis Basic - ( 20 Dec 2023 12:24 )    Color: x / Appearance: x / SG: x / pH: x  Gluc: 83 mg/dL / Ketone: x  / Bili: x / Urobili: x   Blood: x / Protein: x / Nitrite: x   Leuk Esterase: x / RBC: x / WBC x   Sq Epi: x / Non Sq Epi: x / Bacteria: x            IMAGING:    ASSESSMENT:  89-year-old female on Eliquis for PE prophylaxis and A-fib presenting to the emergency department due to fall with head trauma. Nephrology consulted for CAMMY     Cammy stage II on CKD stage 3B  -BL Cr 1.1-1.3  -CKD likely 2/2 HTN and age related changes  -CAMMY likely 2/2 severe volume depletion, sepsis, reduced effective circulating volume, hypotension    Severe Sepsis  -WBC 19K  -Lactic 9.1  -BP okay, soft, improved s/p x1L fluids    Hypoglycemia  -on Bicarb w/D5    elevated LFT's  -likely shock liver    RECOMMEND:  -GOC discussion noted, no updated labs or lab draws today.   -Please DC bicarb based fluids  -IV Abx  -a/w supportive care  -monitor RF with daily BMP + phos  -maintain MAP >65  -Dose Rx for Crcl 10-20 mL/min    Nephrology will sign off at this point in time, please do not hesitate to reach out with any questions or re-consult should the goals of care change.     With warm regards,    Bert Mckeon DO   [facility]  Office: [phone]

## 2023-12-21 NOTE — PROGRESS NOTE ADULT - ASSESSMENT
89F PMH A-fib on eliquis, HTN, HLD, bed bound, presenting to the emergency department due to fall with head trauma this morning.  Patient states she was being bathed and fell but does not remember the details of the fall.  She does not member if she hit her head but denies any changes in visions, headache, weakness numbness to extremities. RRT called for hypotension and hypoxia. Improved with fluid resuscitation and non-rebreather, noted to have increase lactic acidosis iso increasing WBC. Vascular surgery called for evaluation of lower extremities   Impression   pt's le sx  c/w mild rest pain     Recommendations:   - No acute surgical intervention  - CHRISTIANO/PVR with toe pressures  - Venous duplex of bilateral lower extremities  recommend trial of pletal 50 bid   - Will follow    Discussed with fellow on behalf of attending     Vascular 9000  	  89F PMH A-fib on eliquis, HTN, HLD, bed bound, presenting to the emergency department due to fall with head trauma this morning.  Patient states she was being bathed and fell but does not remember the details of the fall.  She does not member if she hit her head but denies any changes in visions, headache, weakness numbness to extremities. RRT called for hypotension and hypoxia. Improved with fluid resuscitation and non-rebreather, noted to have increase lactic acidosis iso increasing WBC. Vascular surgery called for evaluation of lower extremities   Impression   pt's le sx  c/w mild rest pain     Recommendations:   - No acute surgical intervention  - CHRISTIANO/PVR with toe pressures  - Venous duplex of bilateral lower extremities  recommend trial of pletal 50 bid   - Will follow    Discussed with fellow on behalf of attending     Vascular 9006  	  89F PMH A-fib on eliquis, HTN, HLD, bed bound, presenting to the emergency department due to fall with head trauma this morning.  Patient states she was being bathed and fell but does not remember the details of the fall.  She does not member if she hit her head but denies any changes in visions, headache, weakness numbness to extremities. RRT called for hypotension and hypoxia. Improved with fluid resuscitation and non-rebreather, noted to have increase lactic acidosis iso increasing WBC. Vascular surgery called for evaluation of lower extremities   Impression   pt's le sx  c/w mild rest pain     Recommendations:   - No acute surgical intervention  - F/u pt GOC  - CHRISTIANO/PVR with toe pressures  - Venous duplex of bilateral lower extremities  recommend trial of pletal 50 bid   - Will follow    Discussed with fellow on behalf of attending     Vascular 9008  	  89F PMH A-fib on eliquis, HTN, HLD, bed bound, presenting to the emergency department due to fall with head trauma this morning.  Patient states she was being bathed and fell but does not remember the details of the fall.  She does not member if she hit her head but denies any changes in visions, headache, weakness numbness to extremities. RRT called for hypotension and hypoxia. Improved with fluid resuscitation and non-rebreather, noted to have increase lactic acidosis iso increasing WBC. Vascular surgery called for evaluation of lower extremities   Impression   pt's le sx  c/w mild rest pain     Recommendations:   - No acute surgical intervention  - F/u pt GOC  - CHRISTIANO/PVR with toe pressures  - Venous duplex of bilateral lower extremities  recommend trial of pletal 50 bid   - Will follow    Discussed with fellow on behalf of attending     Vascular 9002  	  89F PMH A-fib on eliquis, HTN, HLD, bed bound, presenting to the emergency department due to fall with head trauma this morning.  Patient states she was being bathed and fell but does not remember the details of the fall.  She does not member if she hit her head but denies any changes in visions, headache, weakness numbness to extremities. RRT called for hypotension and hypoxia. Improved with fluid resuscitation and non-rebreather, noted to have increase lactic acidosis iso increasing WBC. Vascular surgery called for evaluation of lower extremities   Impression   pt's le sx  c/w mild rest pain     Recommendations:   - No acute surgical intervention  - F/u pt GOC  - CHRISTIANO/PVR with toe pressures  - Venous duplex of bilateral lower extremities  recommend trial of pletal 50 bid   - Please reconsult vascular surgery for any additional questions    Discussed with fellow on behalf of attending     Vascular 9006  	  89F PMH A-fib on eliquis, HTN, HLD, bed bound, presenting to the emergency department due to fall with head trauma this morning.  Patient states she was being bathed and fell but does not remember the details of the fall.  She does not member if she hit her head but denies any changes in visions, headache, weakness numbness to extremities. RRT called for hypotension and hypoxia. Improved with fluid resuscitation and non-rebreather, noted to have increase lactic acidosis iso increasing WBC. Vascular surgery called for evaluation of lower extremities   Impression   pt's le sx  c/w mild rest pain     Recommendations:   - No acute surgical intervention  - F/u pt GOC  - CHRISTIANO/PVR with toe pressures  - Venous duplex of bilateral lower extremities  recommend trial of pletal 50 bid   - Please reconsult vascular surgery for any additional questions    Discussed with fellow on behalf of attending     Vascular 9001  	89F PMH A-fib on eliquis, HTN, HLD, bed bound, presenting to the emergency department due to fall with head trauma this morning.  Patient states she was being bathed and fell but does not remember the details of the fall.  She does not member if she hit her head but denies any changes in visions, headache, weakness numbness to extremities. RRT called for hypotension and hypoxia. Improved with fluid resuscitation and non-rebreather, noted to have increase lactic acidosis iso increasing WBC. Vascular surgery called for evaluation of lower extremities   Impression   pt's le sx  c/w mild rest pain     Recommendations:   - No acute surgical intervention  - F/u pt GOC  - CHRISTIANO/PVR with toe pressures  - continue pletal 50 bid   - will follow

## 2023-12-21 NOTE — DIETITIAN INITIAL EVALUATION ADULT - OTHER CALCULATIONS
Used current dosing wt of 59 kg (12/19) for caloric and protein needs in consideration of advanced age, ALLAN on CKD Stage 3.  Used current wt of 63.8 kg (12/19) for caloric and protein needs in consideration of advanced age, ALLAN on CKD Stage 3.

## 2023-12-21 NOTE — DIETITIAN INITIAL EVALUATION ADULT - PERTINENT MEDS FT
MEDICATIONS  (STANDING):  apixaban 2.5 milliGRAM(s) Oral two times a day  artificial  tears Solution 1 Drop(s) Both EYES two times a day  atorvastatin 80 milliGRAM(s) Oral at bedtime  cefepime   IVPB 1000 milliGRAM(s) IV Intermittent every 24 hours  chlorhexidine 2% Cloths 1 Application(s) Topical <User Schedule>  cholecalciferol 1000 Unit(s) Oral daily  cilostazol 50 milliGRAM(s) Oral two times a day  dextrose 50% Injectable 25 Gram(s) IV Push once  metoprolol succinate ER 25 milliGRAM(s) Oral <User Schedule>  midodrine. 10 milliGRAM(s) Oral three times a day  mirtazapine 15 milliGRAM(s) Oral at bedtime  multivitamin 1 Tablet(s) Oral daily  pantoprazole    Tablet 40 milliGRAM(s) Oral two times a day  senna 2 Tablet(s) Oral at bedtime  sodium bicarbonate  Infusion 0.191 mEq/kG/Hr (75 mL/Hr) IV Continuous <Continuous>  venlafaxine XR. 75 milliGRAM(s) Oral daily    MEDICATIONS  (PRN):  acetaminophen     Tablet .. 650 milliGRAM(s) Oral every 6 hours PRN Temp greater or equal to 38C (100.4F), Mild Pain (1 - 3)  glycopyrrolate Injectable 0.4 milliGRAM(s) IV Push every 4 hours PRN for secretions  HYDROmorphone  Injectable 0.2 milliGRAM(s) IV Push every 2 hours PRN dyspnea or pain  LORazepam   Injectable 0.2 milliGRAM(s) IV Push every 2 hours PRN pain  ondansetron Injectable 4 milliGRAM(s) IV Push every 4 hours PRN Nausea and/or Vomiting  polyethylene glycol 3350 17 Gram(s) Oral daily PRN for constipation

## 2023-12-21 NOTE — PROGRESS NOTE ADULT - ASSESSMENT
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  ptn was deemed to be at risk of CVA, has h/o Afib, eliquis was cont but at a lower dose 2.5 mg bid,   iron level was low, s/p 1 gm IV IRON over 4 days.   covid +:  on remdesivir x 3 days, not hypoxic. was asymptomatic    12/19 return to the ED post  a mechanical fall at [facility]  possible head trauma. has no HA.   Patient states she was being bathed and fell but does not remember the details of the fall.    She does not member if she hit her head but denies any changes in visions, headache, weakness numbness to extremities.  She  also states that she has left foot pain. as per Dr. Diaz from [facility] ptn  was found to have an elevated creatinine of 2.18 this morning which was abnormal for her.  She would like to be evaluated for ALLAN.  Denies fevers, chills, bodies, nausea, vomiting, diarrhea.  Pt's only complaint is that her L foot hurts, chronic since CVA during previous admission. Pt reports she has been bed bound x 1 month. Denies fever/chills, dizziness, cough, CP, SOB, abdominal pain, BRBPR, dysuria, hematuria, HA.    - renal sono in the ED: no hydro  - hypotension resolved on IVF, resolution of shock and septic shock, cont  Midodrine,   - poor LE perfusion, seen by vascular, has pain, started on pletal  - h/o severe MS,    - on  IVF  - UA w pyuria, UCx sent  - on Cefepime  - cont outptn meds  -  ptn w severe dyspnea and hypotension this am. GOC d/w daughter x 45 min:  ptn is DNR/DNI w comfort measures. will cont treating w IV Abx and IVF. she has pain in LE w poor perfusion. seen by vascular, PLETAL was recommended.     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  ptn was deemed to be at risk of CVA, has h/o Afib, eliquis was cont but at a lower dose 2.5 mg bid,   iron level was low, s/p 1 gm IV IRON over 4 days.   covid +:  on remdesivir x 3 days, not hypoxic. was asymptomatic    12/19 return to the ED post  a mechanical fall at [facility]  possible head trauma. has no HA.   Patient states she was being bathed and fell but does not remember the details of the fall.    She does not member if she hit her head but denies any changes in visions, headache, weakness numbness to extremities.  She  also states that she has left foot pain. as per Dr. Diaz from [facility] ptn  was found to have an elevated creatinine of 2.18 this morning which was abnormal for her.  She would like to be evaluated for ALLAN.  Denies fevers, chills, bodies, nausea, vomiting, diarrhea.  Pt's only complaint is that her L foot hurts, chronic since CVA during previous admission. Pt reports she has been bed bound x 1 month. Denies fever/chills, dizziness, cough, CP, SOB, abdominal pain, BRBPR, dysuria, hematuria, HA.    - renal sono in the ED: no hydro  - hypotension resolved on IVF, resolution of shock and septic shock, cont  Midodrine,   - poor LE perfusion, seen by vascular, has pain, started on pletal  - h/o severe MS,    - on  IVF  - UA w pyuria, UCx sent  - on Cefepime  - cont outptn meds  -  ptn w severe dyspnea and hypotension this am. GOC d/w daughter x 45 min:  ptn is DNR/DNI w comfort measures. will cont treating w IV Abx and IVF. she has pain in LE w poor perfusion. seen by vascular, PLETAL was recommended.     DVT ppx not necessary 2/2 chronic AC

## 2023-12-21 NOTE — DIETITIAN INITIAL EVALUATION ADULT - REASON INDICATOR FOR ASSESSMENT
RD Consult Indicated for: MST Score 2 or >; Pressure Injury Stage 2 or >   Source: Pt, Team, Electronic Medical Record   Chart reviewed, events noted.

## 2023-12-21 NOTE — DIETITIAN INITIAL EVALUATION ADULT - SIGNS/SYMPTOMS
B/l buttocks DTI noted per nursing documentation.  <75% EER x >/= 1 mos, moderate muscle wasting, mild fat wasting

## 2023-12-21 NOTE — PROGRESS NOTE ADULT - ATTENDING COMMENTS
I Enrike Macdonald MD have participated in the daily care of this patient and agree with  the  evaluation, assessment and plan of the surgical house officer

## 2023-12-22 NOTE — CHART NOTE - NSCHARTNOTEFT_GEN_A_CORE
Medicine PA Death Note    Called to bedside to  pronounce  pt with cardiopulmonary  arrest secondary to hypotension.     On physical exam, patient did not respond to verbal or noxious stimuli.  No spontaneous respirations.  Absent heart and breath sounds.  Absent radial and carotid pulses.   Pupils are fixed and dilated, no corneal reflex.  EKG rhythm strip shows asystole.   Patient pronounced dead at 21:10 on 12/22/2023.  Attending notified.  Discussed case with medical examiner, Reid Real, patient is not a candidate. Family declined autopsy.    Beatriz Dolan PA-C  Department of Medicine  [id number] Medicine PA Death Note    Called to bedside to  pronounce  pt with cardiopulmonary  arrest secondary to hypotension.     On physical exam, patient did not respond to verbal or noxious stimuli.  No spontaneous respirations.  Absent heart and breath sounds.  Absent radial and carotid pulses.   Pupils are fixed and dilated, no corneal reflex.  EKG rhythm strip shows asystole.   Patient pronounced dead at 21:10 on 12/22/2023.  Attending notified.  Discussed case with medical examiner, Reid Real, patient is not a candidate. Family declined autopsy.    Beatriz Dolan PA-C  Department of Medicine  [id number]

## 2023-12-22 NOTE — PROGRESS NOTE ADULT - TIME BILLING
Agree with above PA note.  ivf  monitor BP  cont a/c
Agree with above NP note.  events noted  cont current tx in line with GOC  med/pall notes reviewed

## 2023-12-22 NOTE — PROVIDER CONTACT NOTE (OTHER) - ASSESSMENT
Pt. is AOX3, lethargic, able to follow command. Pt denies Chest pain. Currently on 4L NC.
pt seen in bed, lethargic but arousable to touch/voice. pt anxious, verbalizes she's "scared." A&Ox3-4 on 4LNC. BP rechecked 97/68, HR still 120-140s. RR 20 w/ 96% o2 sat.

## 2023-12-22 NOTE — CONSULT NOTE ADULT - PROBLEM SELECTOR RECOMMENDATION 9
AIDE Macdonald MD have participated in the daily care of this patient and have performed a history and physical exam of the patient and discussed  the findings and plan with the house officer. I reviewed the resident note and agree with the findings and plan   I Enrike Macdonald MD have personally seen and examined the patient at bedside today at  6 pm
Uncontrolled.   Start Dilaudid 0.2-0.4mgIV q 2 PRN moderate to severe pain  Dulcolax x 1 today.  DO NOT HOLD MEDICATIONS FOR Hypotension

## 2023-12-22 NOTE — PROGRESS NOTE ADULT - ASSESSMENT
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  ptn was deemed to be at risk of CVA, has h/o Afib, eliquis was cont but at a lower dose 2.5 mg bid,   iron level was low, s/p 1 gm IV IRON over 4 days.   covid +:  on remdesivir x 3 days, not hypoxic. was asymptomatic    12/19 return to the ED post  a mechanical fall at [facility]  possible head trauma. has no HA.   Patient states she was being bathed and fell but does not remember the details of the fall.    She does not member if she hit her head but denies any changes in visions, headache, weakness numbness to extremities.  She  also states that she has left foot pain. as per Dr. Diaz from [email or web address] ptn  was found to have an elevated creatinine of 2.18 this morning which was abnormal for her.  She would like to be evaluated for ALLAN.  Denies fevers, chills, bodies, nausea, vomiting, diarrhea.  Pt's only complaint is that her L foot hurts, chronic since CVA during previous admission. Pt reports she has been bed bound x 1 month. Denies fever/chills, dizziness, cough, CP, SOB, abdominal pain, BRBPR, dysuria, hematuria, HA.      -   ptn w worsening hypotension today w worsening dyspnea. RRT was called and terminated, ptn is on comfort measures only. spoke w ACP, RN, ptn seen by Dr. Bauer from [facility] discussed again w the daughter, she wants ABx DC'ed will only concentrate on comfort. no further blood draws.      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  ptn was deemed to be at risk of CVA, has h/o Afib, eliquis was cont but at a lower dose 2.5 mg bid,   iron level was low, s/p 1 gm IV IRON over 4 days.   covid +:  on remdesivir x 3 days, not hypoxic. was asymptomatic    12/19 return to the ED post  a mechanical fall at [facility]  possible head trauma. has no HA.   Patient states she was being bathed and fell but does not remember the details of the fall.    She does not member if she hit her head but denies any changes in visions, headache, weakness numbness to extremities.  She  also states that she has left foot pain. as per Dr. Diaz from [facility] ptn  was found to have an elevated creatinine of 2.18 this morning which was abnormal for her.  She would like to be evaluated for ALLAN.  Denies fevers, chills, bodies, nausea, vomiting, diarrhea.  Pt's only complaint is that her L foot hurts, chronic since CVA during previous admission. Pt reports she has been bed bound x 1 month. Denies fever/chills, dizziness, cough, CP, SOB, abdominal pain, BRBPR, dysuria, hematuria, HA.      -   ptn w worsening hypotension today w worsening dyspnea. RRT was called and terminated, ptn is on comfort measures only. spoke w ACP, RN, ptn seen by Dr. Bauer from [facility] discussed again w the daughter, she wants ABx DC'ed will only concentrate on comfort. no further blood draws.      DVT ppx not necessary 2/2 chronic AC

## 2023-12-22 NOTE — PROGRESS NOTE ADULT - ASSESSMENT
Echo 10/18/23: mildly decreased lvef (47%), mod MR, mod-severe MS, mod TR  ECHO 8/11/23: mild to mod MR , mild AR,  Severe global left ventricular systolic dysfunction. LVEF in the 25-30% decreased right ventricular systolic function.severe pulmonary hypertension.  ECHO 4/17/18: EF 55-60 % moderate to severe mitral stenosis. Normal left ventricular systolic function. mild aortic stenosis. Mild-moderate aortic regurgitation.  Normal right ventricular size and function.      A/p  89-year-old female on Eliquis for PE prophylaxis and A-fib presenting to the emergency department due to fall with head trauma this morning.      #Fall  -Per pt, she was dropped at NH while being cleaned  -[facility] no acute findings  -Trop w/ mild elevation likely demand i/s/o afib RVR and ALLAN   -EKG afib with RVR  -RRT 12/20 for  for hypotension  -sepsis( wbs 18k, lactate increase 4114)  -worsening BMP noted   -Abx per med  -per renal not a candidate for HD  -per discussion with daughter at bedside -pt DNR/DNI, on comfort measures only  -tachypneic-crackles on exam -would hold IVF,  consider trail IVP lasix 20 mg    #Afib  -Rapid afib  -Rates still intermittently elevated i/s/o sepsis  -hypotensive--now off tele -- comfort measures  -can HOLD BB for now  -cont MIDO -- increase  as needed   -Continue eliquis (Has hx xarelto failure)    #Cardiomyopathy  -Deferred eval in the past given advanced age and functional status  -Echo with mildly decreased lvef (47%), mod MR, mod-severe MS, mod TR  -Entresto on hold 2/2 soft bp   -can hold BB if needed     #CVA  -recent workup revealed cerebellar infarcts on brain imaging   -switched from xarelto to eliquis  -cont low dose a/c    #HTN  -RRT 12/20 for hypotension  -Continue midodrine    #ALLAN  -Renal following  -Sodium bicarb gtt per renal    #Urosepsis  -Ucx noted   -Abx per ID     #B/l foot cyanosis  -Vascular sx eval noted     Echo 10/18/23: mildly decreased lvef (47%), mod MR, mod-severe MS, mod TR  ECHO 8/11/23: mild to mod MR , mild AR,  Severe global left ventricular systolic dysfunction. LVEF in the 25-30% decreased right ventricular systolic function.severe pulmonary hypertension.  ECHO 4/17/18: EF 55-60 % moderate to severe mitral stenosis. Normal left ventricular systolic function. mild aortic stenosis. Mild-moderate aortic regurgitation.  Normal right ventricular size and function.      A/p  89-year-old female on Eliquis for PE prophylaxis and A-fib presenting to the emergency department due to fall with head trauma this morning.      #Fall  -Per pt, she was dropped at NH while being cleaned  -[facility] no acute findings  -Trop w/ mild elevation likely demand i/s/o afib RVR and ALLAN   -EKG afib with RVR  -RRT 12/20 for  for hypotension  -sepsis( wbs 18k, lactate increase 4114)  -worsening BMP noted   -Abx per med  -per renal not a candidate for HD  -per discussion with daughter at bedside -pt DNR/DNI, on comfort measures only  -tachypneic-crackles on exam -would hold IVF,  consider trail IVP lasix 20 mg    #Afib  -Rapid afib  -Rates still intermittently elevated i/s/o sepsis  -hypotensive--now off tele -- comfort measures  -can HOLD BB for now  -cont MIDO -- increase  as needed   -Continue eliquis (Has hx xarelto failure)    #Cardiomyopathy  -Deferred eval in the past given advanced age and functional status  -Echo with mildly decreased lvef (47%), mod MR, mod-severe MS, mod TR  -Entresto on hold 2/2 soft bp   -can hold BB if needed     #CVA  -recent workup revealed cerebellar infarcts on brain imaging   -switched from xarelto to eliquis  -cont low dose a/c    #HTN  -RRT 12/20 for hypotension  -Continue midodrine    #ALLAN  -Renal following  -Sodium bicarb gtt per renal    #Urosepsis  -Ucx noted   -Abx per ID     #B/l foot cyanosis  -Vascular sx eval noted

## 2023-12-22 NOTE — PROGRESS NOTE ADULT - SUBJECTIVE AND OBJECTIVE BOX
[facility]  MONTEZ Chapman Y. Patel, S. Shah, G. Mack  [phone]  ([phone] - weekdays after 5pm and weekends)    Name: DELVIS MCBRIDE  Age/Gender: 89y Female  MRN: [id number]    Interval History:  Patient seen and examined this morning.   Feels tired, denies pain, n/v, fever.   Notes reviewed. Afebrile     Allergies: piperacillin-tazobactam (Rash)  soaps and creams causes rash uses only sensitive skin soap (Rash)  sulfa drugs (Unknown)  Voltaren (Rash)  Eye cream from the 1960s Neocortef ?spelling caused eyes to becomes swollen (Unknown)  neomycin (Unknown)      Objective:  Vitals:   T(F): 98.2 (12-22-23 @ 11:41), Max: 98.3 (12-22-23 @ 05:14)  HR: 132 (12-22-23 @ 08:53) (101 - 136)  BP: 93/66 (12-22-23 @ 11:41) (74/44 - 117/83)  RR: 18 (12-22-23 @ 11:41) (18 - 18)  SpO2: 92% (12-22-23 @ 11:41) (92% - 100%)  Physical Examination:  General: no acute distress  HEENT: NC/AT, anicteric, neck supple  Respiratory: decreased breath sounds b/l  Cardiovascular: S1 and S2 present, tachycardia  Gastrointestinal: soft, nontender, nondistended  Extremities: no edema, no cyanosis  Skin: no visible rash    Laboratory Studies:  CBC:                       12.1   18.32 )-----------( 337      ( 22 Dec 2023 05:57 )             38.7     WBC Trend:  18.32 12-22-23 @ 05:57  19.62 12-20-23 @ 12:24  11.03 12-19-23 @ 12:03    CMP: 12-22    131<L>  |  91<L>  |  68<H>  ----------------------------<  119<H>  5.0   |  21<L>  |  4.07<H>    Ca    7.4<L>      22 Dec 2023 05:56      Creatinine: 4.07 mg/dL (12-22-23 @ 05:56)  Creatinine: 2.83 mg/dL (12-20-23 @ 12:24)  Creatinine: 2.56 mg/dL (12-20-23 @ 06:41)  Creatinine: 2.18 mg/dL (12-19-23 @ 12:03)    Vancomycin Level, Random: 8.7 ug/mL (12-22-23 @ 05:58)    Urinalysis + Microscopic Examination (12.19.23 @ 17:01)    pH Urine: 5.0   Urine Appearance: Turbid   Color: Dark Yellow   Specific Gravity: 1.021   Protein, Urine: 100 mg/dL   Glucose Qualitative, Urine: Negative mg/dL   Ketone - Urine: Negative mg/dL   Blood, Urine: Negative   Bilirubin: Small   Urobilinogen: 1.0 mg/dL   Leukocyte Esterase Concentration: Small   Nitrite: Negative   Review: Reviewed   White Blood Cell - Urine: 24 /HPF   Red Blood Cell - Urine: 3 /HPF   Bacteria: Many /HPF   Cast: >63 /LPF   Hyaline Casts: Present   Epithelial Cells: 17 /HPF    Microbiology: reviewed  Culture - Urine (collected 12-19-23 @ 17:01)  Source: Clean Catch Clean Catch (Midstream)  Preliminary Report (12-21-23 @ 21:28):    >100,000 CFU/ml Enterococcus faecalis    10,000 - 49,000 CFU/mL Gram Negative Rods    Radiology: reviewed     Medications:  acetaminophen     Tablet .. 650 milliGRAM(s) Oral every 6 hours PRN  apixaban 2.5 milliGRAM(s) Oral two times a day  artificial  tears Solution 1 Drop(s) Both EYES two times a day  atorvastatin 80 milliGRAM(s) Oral at bedtime  cefepime   IVPB 1000 milliGRAM(s) IV Intermittent every 24 hours  chlorhexidine 2% Cloths 1 Application(s) Topical <User Schedule>  cholecalciferol 1000 Unit(s) Oral daily  cilostazol 50 milliGRAM(s) Oral two times a day  dextrose 50% Injectable 25 Gram(s) IV Push once  furosemide   Injectable 20 milliGRAM(s) IV Push once  glycopyrrolate Injectable 0.4 milliGRAM(s) IV Push every 4 hours PRN  HYDROmorphone  Injectable 0.2 milliGRAM(s) IV Push every 2 hours PRN  LORazepam   Injectable 0.2 milliGRAM(s) IV Push every 2 hours PRN  melatonin 3 milliGRAM(s) Oral at bedtime  metoprolol succinate ER 25 milliGRAM(s) Oral <User Schedule>  midodrine. 10 milliGRAM(s) Oral three times a day  mirtazapine 15 milliGRAM(s) Oral at bedtime  multivitamin 1 Tablet(s) Oral daily  Nephro-mirella 1 Tablet(s) Oral daily  ondansetron Injectable 4 milliGRAM(s) IV Push every 4 hours PRN  pantoprazole    Tablet 40 milliGRAM(s) Oral two times a day  polyethylene glycol 3350 17 Gram(s) Oral daily PRN  senna 2 Tablet(s) Oral at bedtime  venlafaxine XR. 75 milliGRAM(s) Oral daily    Current Antimicrobials:  cefepime   IVPB 1000 milliGRAM(s) IV Intermittent every 24 hours    Prior/Completed Antimicrobials:  vancomycin  IVPB   [facility]  MONTEZ Chapman Y. Patel, S. Shah, G. Mack  [phone]  ([phone] - weekdays after 5pm and weekends)    Name: DELVIS MCBRIDE  Age/Gender: 89y Female  MRN: [id number]    Interval History:  Patient seen and examined this morning.   Feels tired, denies pain, n/v, fever.   Notes reviewed. Afebrile     Allergies: piperacillin-tazobactam (Rash)  soaps and creams causes rash uses only sensitive skin soap (Rash)  sulfa drugs (Unknown)  Voltaren (Rash)  Eye cream from the 1960s Neocortef ?spelling caused eyes to becomes swollen (Unknown)  neomycin (Unknown)      Objective:  Vitals:   T(F): 98.2 (12-22-23 @ 11:41), Max: 98.3 (12-22-23 @ 05:14)  HR: 132 (12-22-23 @ 08:53) (101 - 136)  BP: 93/66 (12-22-23 @ 11:41) (74/44 - 117/83)  RR: 18 (12-22-23 @ 11:41) (18 - 18)  SpO2: 92% (12-22-23 @ 11:41) (92% - 100%)  Physical Examination:  General: no acute distress  HEENT: NC/AT, anicteric, neck supple  Respiratory: decreased breath sounds b/l  Cardiovascular: S1 and S2 present, tachycardia  Gastrointestinal: soft, nontender, nondistended  Extremities: no edema, no cyanosis  Skin: no visible rash    Laboratory Studies:  CBC:                       12.1   18.32 )-----------( 337      ( 22 Dec 2023 05:57 )             38.7     WBC Trend:  18.32 12-22-23 @ 05:57  19.62 12-20-23 @ 12:24  11.03 12-19-23 @ 12:03    CMP: 12-22    131<L>  |  91<L>  |  68<H>  ----------------------------<  119<H>  5.0   |  21<L>  |  4.07<H>    Ca    7.4<L>      22 Dec 2023 05:56      Creatinine: 4.07 mg/dL (12-22-23 @ 05:56)  Creatinine: 2.83 mg/dL (12-20-23 @ 12:24)  Creatinine: 2.56 mg/dL (12-20-23 @ 06:41)  Creatinine: 2.18 mg/dL (12-19-23 @ 12:03)    Vancomycin Level, Random: 8.7 ug/mL (12-22-23 @ 05:58)    Urinalysis + Microscopic Examination (12.19.23 @ 17:01)    pH Urine: 5.0   Urine Appearance: Turbid   Color: Dark Yellow   Specific Gravity: 1.021   Protein, Urine: 100 mg/dL   Glucose Qualitative, Urine: Negative mg/dL   Ketone - Urine: Negative mg/dL   Blood, Urine: Negative   Bilirubin: Small   Urobilinogen: 1.0 mg/dL   Leukocyte Esterase Concentration: Small   Nitrite: Negative   Review: Reviewed   White Blood Cell - Urine: 24 /HPF   Red Blood Cell - Urine: 3 /HPF   Bacteria: Many /HPF   Cast: >63 /LPF   Hyaline Casts: Present   Epithelial Cells: 17 /HPF    Microbiology: reviewed  Culture - Urine (collected 12-19-23 @ 17:01)  Source: Clean Catch Clean Catch (Midstream)  Preliminary Report (12-21-23 @ 21:28):    >100,000 CFU/ml Enterococcus faecalis    10,000 - 49,000 CFU/mL Gram Negative Rods    Radiology: reviewed     Medications:  acetaminophen     Tablet .. 650 milliGRAM(s) Oral every 6 hours PRN  apixaban 2.5 milliGRAM(s) Oral two times a day  artificial  tears Solution 1 Drop(s) Both EYES two times a day  atorvastatin 80 milliGRAM(s) Oral at bedtime  cefepime   IVPB 1000 milliGRAM(s) IV Intermittent every 24 hours  chlorhexidine 2% Cloths 1 Application(s) Topical <User Schedule>  cholecalciferol 1000 Unit(s) Oral daily  cilostazol 50 milliGRAM(s) Oral two times a day  dextrose 50% Injectable 25 Gram(s) IV Push once  furosemide   Injectable 20 milliGRAM(s) IV Push once  glycopyrrolate Injectable 0.4 milliGRAM(s) IV Push every 4 hours PRN  HYDROmorphone  Injectable 0.2 milliGRAM(s) IV Push every 2 hours PRN  LORazepam   Injectable 0.2 milliGRAM(s) IV Push every 2 hours PRN  melatonin 3 milliGRAM(s) Oral at bedtime  metoprolol succinate ER 25 milliGRAM(s) Oral <User Schedule>  midodrine. 10 milliGRAM(s) Oral three times a day  mirtazapine 15 milliGRAM(s) Oral at bedtime  multivitamin 1 Tablet(s) Oral daily  Nephro-mirella 1 Tablet(s) Oral daily  ondansetron Injectable 4 milliGRAM(s) IV Push every 4 hours PRN  pantoprazole    Tablet 40 milliGRAM(s) Oral two times a day  polyethylene glycol 3350 17 Gram(s) Oral daily PRN  senna 2 Tablet(s) Oral at bedtime  venlafaxine XR. 75 milliGRAM(s) Oral daily    Current Antimicrobials:  cefepime   IVPB 1000 milliGRAM(s) IV Intermittent every 24 hours    Prior/Completed Antimicrobials:  vancomycin  IVPB

## 2023-12-22 NOTE — CHART NOTE - NSCHARTNOTESELECT_GEN_ALL_CORE
Palliative Care/Off Service Note
RRT/Event Note
Death Note/Event Note
Event Note
RRT Hypotension / MICU reconsulted/Event Note

## 2023-12-22 NOTE — CONSULT NOTE ADULT - PROBLEM SELECTOR RECOMMENDATION 4
See [facility] note above. However, in summary, At this point, goals of care are for prioritizing on symptoms Rx. Her daughter agreed with stopping Abx and Midodrine as well as any other meds that may be prolonging the patient's life unnecessarily. In this case medications for symptoms Rx are a priority over prolongation of life. Hence, no need to check vitals before or after giving Benzos or opioids, or to run RRTs. See [facility] note above. However, in summary, At this point, goals of care are for prioritizing on symptoms Rx. Her daughter agreed with stopping Abx and Midodrine as well as any other meds that may be prolonging the patient's life unnecessarily. In this case medications for symptoms Rx are a priority over prolongation of life. Hence, no need to check vitals before or after giving Benzos or opioids, or to run RRTs.

## 2023-12-22 NOTE — PROGRESS NOTE ADULT - PROBLEM SELECTOR PLAN 2
AIDE Macdonald MD have participated in the daily care and management of this patient and have personally  seen and examined the patient today and have noted the findings and formulated the plan of care.  I Enrike Macdonald MD have personally seen and examined the patient at bedside today at  8  pm
I Enrike Macdonald MD have participated in the daily care of this patient and agree with  the  evaluation, assessment and plan of the surgical house officer

## 2023-12-22 NOTE — PROGRESS NOTE ADULT - SUBJECTIVE AND OBJECTIVE BOX
Patient is a 89y old  Female who presents with a chief complaint of fall (22 Dec 2023 15:53)      SUBJECTIVE / OVERNIGHT EVENTS: ptn w worsening hypotension today w worsening dyspnea. RRT was called and terminated, ptn is on comfort measures only. spoke w PATRICIA, RN, ptn seen by Dr. Bauer from [facility], [facility] discussed again w the daughter, she wants ABx DC'ed will only concentrate on comfort. no further blood draws.    MEDICATIONS  (STANDING):  apixaban 2.5 milliGRAM(s) Oral two times a day  artificial  tears Solution 1 Drop(s) Both EYES two times a day  pantoprazole    Tablet 40 milliGRAM(s) Oral two times a day  senna 2 Tablet(s) Oral at bedtime  venlafaxine XR. 75 milliGRAM(s) Oral daily    MEDICATIONS  (PRN):  acetaminophen     Tablet .. 650 milliGRAM(s) Oral every 6 hours PRN Temp greater or equal to 38C (100.4F), Mild Pain (1 - 3)  bisacodyl Suppository 10 milliGRAM(s) Rectal daily PRN Constipation  glycopyrrolate Injectable 0.4 milliGRAM(s) IV Push every 4 hours PRN for secretions  HYDROmorphone  Injectable 0.4 milliGRAM(s) IV Push every 2 hours PRN Severe Pain (7 - 10)  HYDROmorphone  Injectable 0.2 milliGRAM(s) IV Push every 2 hours PRN Moderate Pain (4 - 6)  HYDROmorphone  Injectable 0.4 milliGRAM(s) IV Push every 2 hours PRN Respiratory distress or RR>28  LORazepam   Injectable 0.25 milliGRAM(s) IV Push every 4 hours PRN Anxiety, Agitation, orintractablerespiratory distress  ondansetron Injectable 4 milliGRAM(s) IV Push every 4 hours PRN Nausea and/or Vomiting  polyethylene glycol 3350 17 Gram(s) Oral daily PRN for constipation      Vital Signs Last 24 Hrs  T(F): 97.4 (12-22-23 @ 16:41), Max: 98.3 (12-22-23 @ 05:14)  HR: 53 (12-22-23 @ 16:41) (53 - 136)  BP: 79/52 (12-22-23 @ 16:41) (74/44 - 117/83)  RR: 18 (12-22-23 @ 11:41) (18 - 18)  SpO2: 92% (12-22-23 @ 11:41) (92% - 100%)  Telemetry:   CAPILLARY BLOOD GLUCOSE      POCT Blood Glucose.: 114 mg/dL (22 Dec 2023 14:09)    I&O's Summary    21 Dec 2023 07:01  -  22 Dec 2023 07:00  --------------------------------------------------------  IN: 900 mL / OUT: 25 mL / NET: 875 mL    22 Dec 2023 07:01  -  22 Dec 2023 18:55  --------------------------------------------------------  IN: 30 mL / OUT: 0 mL / NET: 30 mL        PHYSICAL EXAM:  GENERAL: NAD, well-developed  HEAD:  Atraumatic, Normocephalic  EYES: EOMI, PERRLA, conjunctiva and sclera clear  NECK: Supple, No JVD  CHEST/LUNG: Clear to auscultation bilaterally; No wheeze  HEART: Regular rate and rhythm; No murmurs, rubs, or gallops  ABDOMEN: Soft, Nontender, Nondistended; Bowel sounds present  EXTREMITIES:  2+ Peripheral Pulses, No clubbing, cyanosis, or edema  PSYCH: AAOx3  NEUROLOGY: non-focal  SKIN: No rashes or lesions    LABS:                        12.1   18.32 )-----------( 337      ( 22 Dec 2023 05:57 )             38.7     12-22    131<L>  |  91<L>  |  68<H>  ----------------------------<  119<H>  5.0   |  21<L>  |  4.07<H>    Ca    7.4<L>      22 Dec 2023 05:56            Urinalysis Basic - ( 22 Dec 2023 05:56 )    Color: x / Appearance: x / SG: x / pH: x  Gluc: 119 mg/dL / Ketone: x  / Bili: x / Urobili: x   Blood: x / Protein: x / Nitrite: x   Leuk Esterase: x / RBC: x / WBC x   Sq Epi: x / Non Sq Epi: x / Bacteria: x        RADIOLOGY & ADDITIONAL TESTS:    Imaging Personally Reviewed:    Consultant(s) Notes Reviewed:      Care Discussed with Consultants/Other Providers:   Patient is a 89y old  Female who presents with a chief complaint of fall (22 Dec 2023 15:53)      SUBJECTIVE / OVERNIGHT EVENTS: ptn w worsening hypotension today w worsening dyspnea. RRT was called and terminated, ptn is on comfort measures only. spoke w PATRICIA, RN, ptn seen by Dr. Bauer from [facility], [facility] discussed again w the daughter, she wants ABx DC'ed will only concentrate on comfort. no further blood draws.    MEDICATIONS  (STANDING):  apixaban 2.5 milliGRAM(s) Oral two times a day  artificial  tears Solution 1 Drop(s) Both EYES two times a day  pantoprazole    Tablet 40 milliGRAM(s) Oral two times a day  senna 2 Tablet(s) Oral at bedtime  venlafaxine XR. 75 milliGRAM(s) Oral daily    MEDICATIONS  (PRN):  acetaminophen     Tablet .. 650 milliGRAM(s) Oral every 6 hours PRN Temp greater or equal to 38C (100.4F), Mild Pain (1 - 3)  bisacodyl Suppository 10 milliGRAM(s) Rectal daily PRN Constipation  glycopyrrolate Injectable 0.4 milliGRAM(s) IV Push every 4 hours PRN for secretions  HYDROmorphone  Injectable 0.4 milliGRAM(s) IV Push every 2 hours PRN Severe Pain (7 - 10)  HYDROmorphone  Injectable 0.2 milliGRAM(s) IV Push every 2 hours PRN Moderate Pain (4 - 6)  HYDROmorphone  Injectable 0.4 milliGRAM(s) IV Push every 2 hours PRN Respiratory distress or RR>28  LORazepam   Injectable 0.25 milliGRAM(s) IV Push every 4 hours PRN Anxiety, Agitation, orintractablerespiratory distress  ondansetron Injectable 4 milliGRAM(s) IV Push every 4 hours PRN Nausea and/or Vomiting  polyethylene glycol 3350 17 Gram(s) Oral daily PRN for constipation      Vital Signs Last 24 Hrs  T(F): 97.4 (12-22-23 @ 16:41), Max: 98.3 (12-22-23 @ 05:14)  HR: 53 (12-22-23 @ 16:41) (53 - 136)  BP: 79/52 (12-22-23 @ 16:41) (74/44 - 117/83)  RR: 18 (12-22-23 @ 11:41) (18 - 18)  SpO2: 92% (12-22-23 @ 11:41) (92% - 100%)  Telemetry:   CAPILLARY BLOOD GLUCOSE      POCT Blood Glucose.: 114 mg/dL (22 Dec 2023 14:09)    I&O's Summary    21 Dec 2023 07:01  -  22 Dec 2023 07:00  --------------------------------------------------------  IN: 900 mL / OUT: 25 mL / NET: 875 mL    22 Dec 2023 07:01  -  22 Dec 2023 18:55  --------------------------------------------------------  IN: 30 mL / OUT: 0 mL / NET: 30 mL        PHYSICAL EXAM:  GENERAL: NAD, well-developed  HEAD:  Atraumatic, Normocephalic  EYES: EOMI, PERRLA, conjunctiva and sclera clear  NECK: Supple, No JVD  CHEST/LUNG: Clear to auscultation bilaterally; No wheeze  HEART: Regular rate and rhythm; No murmurs, rubs, or gallops  ABDOMEN: Soft, Nontender, Nondistended; Bowel sounds present  EXTREMITIES:  2+ Peripheral Pulses, No clubbing, cyanosis, or edema  PSYCH: AAOx3  NEUROLOGY: non-focal  SKIN: No rashes or lesions    LABS:                        12.1   18.32 )-----------( 337      ( 22 Dec 2023 05:57 )             38.7     12-22    131<L>  |  91<L>  |  68<H>  ----------------------------<  119<H>  5.0   |  21<L>  |  4.07<H>    Ca    7.4<L>      22 Dec 2023 05:56            Urinalysis Basic - ( 22 Dec 2023 05:56 )    Color: x / Appearance: x / SG: x / pH: x  Gluc: 119 mg/dL / Ketone: x  / Bili: x / Urobili: x   Blood: x / Protein: x / Nitrite: x   Leuk Esterase: x / RBC: x / WBC x   Sq Epi: x / Non Sq Epi: x / Bacteria: x        RADIOLOGY & ADDITIONAL TESTS:    Imaging Personally Reviewed:    Consultant(s) Notes Reviewed:      Care Discussed with Consultants/Other Providers:

## 2023-12-22 NOTE — RAPID RESPONSE TEAM SUMMARY - NSSITUATIONBACKGROUNDRRT_GEN_ALL_CORE
See prior RRT notes x3 regarding details of pt history.    RRT called for hypotension. Patient well known to RRT team. Patient sleepy but otherwise responding to questions stating that she was feeling thirsty. Per primary nurse patient w/ poor PO intake over the past few days. BP 80s/60s checked on pt leg (seemed to be more reliable than arms which did not have good readings during prior RRTs), HR 130s, SpO2 100% on 2L NC, Afebrile, .   Attending Dr. Tapia at bedside during RRT, confirmed no changes in patient's goals of care previously established by MICU (see prior MICU note) clearly detailing that family had agreed w/ DNR/ DNI, no pressors. Patient w/ comfort measures, w/ IVF and abx as with respect to her comfort. Recommended 250 cc fluid bolus given that patient clinically appearing hypo/euvolemic, no tachypnea to suggest pulmonary edema from prior bicarb fluids.  Repeat BP during RRT was 97/63 and prior to conclusion of RRT 110s/80s. This was all prior to initiation of fluids.   No labs drawn with respect to patient's prior goals of care. Recommend primary team clarify overall goals of care especially in the setting of hypotension, as it would be beneficial for patient to get supportive treatment that is primarily based on her comfort rather than blood pressure or heart rate, as this is in line with previously established goals of care.

## 2023-12-22 NOTE — PROGRESS NOTE ADULT - SUBJECTIVE AND OBJECTIVE BOX
CARDIOLOGY FOLLOW UP - Dr. Mccartney  DATE OF SERVICE: 12/22/23     CC pt states she does not feel right  -appear tachypneic on exam      REVIEW OF SYSTEMS:  CONSTITUTIONAL: No fever, weight loss, or fatigue  RESPIRATORY: No cough, wheezing, chills or hemoptysis; No Shortness of Breath  CARDIOVASCULAR: No chest pain, palpitations, passing out, dizziness, or leg swelling  GASTROINTESTINAL: No abdominal or epigastric pain. No nausea, vomiting, or hematemesis; No diarrhea or constipation. No melena or hematochezia.  VASCULAR: No edema     PHYSICAL EXAM:  T(C): 36.8 (12-22-23 @ 11:41), Max: 36.8 (12-22-23 @ 05:14)  HR: 132 (12-22-23 @ 08:53) (101 - 136)  BP: 93/66 (12-22-23 @ 11:41) (74/44 - 117/83)  RR: 18 (12-22-23 @ 11:41) (18 - 18)  SpO2: 92% (12-22-23 @ 11:41) (92% - 100%)  Wt(kg): --  I&O's Summary    21 Dec 2023 07:01  -  22 Dec 2023 07:00  --------------------------------------------------------  IN: 900 mL / OUT: 25 mL / NET: 875 mL        Appearance: Normal	  Cardiovascular: Normal S1 S2 irreg tachy   Respiratory: crackles   Gastrointestinal:  Soft, Non-tender, + BS	  Extremities: Normal range of motion, No clubbing, cyanosis or edema      Home Medications:  acetaminophen 325 mg oral tablet: 2 tab(s) orally every 6 hours, As needed, Temp greater or equal to 38C (100.4F), Mild Pain (1 - 3) (19 Dec 2023 19:28)  Artificial Tears ophthalmic solution: 1 drop(s) in each eye once a day (19 Dec 2023 19:28)  atorvastatin 80 mg oral tablet: 1 tab(s) orally once a day (at bedtime) (19 Dec 2023 19:28)  Arnold-Protect topical cream: Apply topically to affected area 2 times a day (sacrum) (19 Dec 2023 19:32)  Eliquis 2.5 mg oral tablet: 1 tab(s) orally 2 times a day (19 Dec 2023 19:32)  metoprolol succinate 25 mg oral tablet, extended release: 3 tab(s) orally once a day (19 Dec 2023 19:32)  mirtazapine 15 mg oral tablet: 1 tab(s) orally once a day (at bedtime) (19 Dec 2023 19:28)  Multiple Vitamins oral tablet: 1 tab(s) orally once a day (19 Dec 2023 19:32)  pantoprazole 40 mg oral delayed release tablet: 1 tab(s) orally 2 times a day (19 Dec 2023 19:28)  polyethylene glycol 3350 oral powder for reconstitution: 17 gram(s) orally once a day as needed for  constipation (19 Dec 2023 19:28)  tolnaftate 1% topical powder: Apply topically to affected area 2 times a day (groin, abdomen, chest, back) (19 Dec 2023 19:32)  venlafaxine 75 mg oral capsule, extended release: 1 cap(s) orally once a day (19 Dec 2023 19:28)  Vitamin D3 1250 mcg (50,000 intl units) oral capsule: 1 cap(s) orally once a week (19 Dec 2023 19:28)      MEDICATIONS  (STANDING):  apixaban 2.5 milliGRAM(s) Oral two times a day  artificial  tears Solution 1 Drop(s) Both EYES two times a day  atorvastatin 80 milliGRAM(s) Oral at bedtime  cefepime   IVPB 1000 milliGRAM(s) IV Intermittent every 24 hours  chlorhexidine 2% Cloths 1 Application(s) Topical <User Schedule>  cholecalciferol 1000 Unit(s) Oral daily  cilostazol 50 milliGRAM(s) Oral two times a day  dextrose 50% Injectable 25 Gram(s) IV Push once  melatonin 3 milliGRAM(s) Oral at bedtime  metoprolol succinate ER 25 milliGRAM(s) Oral <User Schedule>  midodrine. 10 milliGRAM(s) Oral three times a day  mirtazapine 15 milliGRAM(s) Oral at bedtime  multivitamin 1 Tablet(s) Oral daily  Nephro-mirella 1 Tablet(s) Oral daily  pantoprazole    Tablet 40 milliGRAM(s) Oral two times a day  senna 2 Tablet(s) Oral at bedtime  sodium bicarbonate  Infusion 0.191 mEq/kG/Hr (75 mL/Hr) IV Continuous <Continuous>  venlafaxine XR. 75 milliGRAM(s) Oral daily      TELEMETRY: 	    ECG:  	  RADIOLOGY:   DIAGNOSTIC TESTING:  [ ] Echocardiogram:  [ ]  Catheterization:  [ ] Stress Test:    OTHER: 	    LABS:	 	    Troponin T, High Sensitivity Result: 110 ng/L [0 - 51] (12-19 @ 19:25)  Troponin T, High Sensitivity Result: 109 ng/L [0 - 51] (12-19 @ 12:03)                          12.1   18.32 )-----------( 337      ( 22 Dec 2023 05:57 )             38.7     12-22    131<L>  |  91<L>  |  68<H>  ----------------------------<  119<H>  5.0   |  21<L>  |  4.07<H>    Ca    7.4<L>      22 Dec 2023 05:56

## 2023-12-22 NOTE — PROGRESS NOTE ADULT - ASSESSMENT
89F PMH A-fib on eliquis, HTN, HLD, bed bound, presenting to the emergency department due to fall with head trauma this morning.  Patient states she was being bathed and fell but does not remember the details of the fall.  She does not member if she hit her head but denies any changes in visions, headache, weakness numbness to extremities. RRT called for hypotension and hypoxia. Improved with fluid resuscitation and non-rebreather, noted to have increase lactic acidosis iso increasing WBC. Vascular surgery called for evaluation of lower extremities   Impression   pt's le sx  c/w mild rest pain     Recommendations:   - No acute surgical intervention  - F/u pt GOC  - reconsult prn

## 2023-12-22 NOTE — DISCHARGE NOTE FOR THE EXPIRED PATIENT - HOSPITAL COURSE
89-year-old female on Eliquis for PE prophylaxis and A-fib presenting to the emergency department due to fall with head trauma this morning.      #Fall  -Per pt, she was dropped at NH while being cleaned  -CTH no acute findings  -Trop w/ mild elevation likely demand i/s/o afib RVR and ALLAN   -EKG afib with RVR  -RRT 12/20 for  for hypotension  -sepsis( wbs 18k, lactate increase 4114)  -worsening BMP noted   -Abx per med  -per renal not a candidate for HD  -per discussion with daughter at bedside -pt DNR/DNI, on comfort measures only  -tachypneic-crackles on exam -would hold IVF,  consider trail IVP lasix 20 mg    #Afib  -Rapid afib  -Rates still intermittently elevated i/s/o sepsis  -hypotensive--now off tele -- comfort measures  -can HOLD BB for now  -cont MIDO -- increase  as needed   -Continue eliquis (Has hx xarelto failure)    #Cardiomyopathy  -Deferred eval in the past given advanced age and functional status  -Echo with mildly decreased lvef (47%), mod MR, mod-severe MS, mod TR  -Entresto on hold 2/2 soft bp   -can hold BB if needed     #CVA  -recent workup revealed cerebellar infarcts on brain imaging   -switched from xarelto to eliquis  -cont low dose a/c    #HTN  -RRT 12/20 for hypotension  -Continue midodrine    #ALLAN  -Renal following  -Sodium bicarb gtt per renal    #Urosepsis  -Ucx noted   -Abx per ID     #B/l foot cyanosis  -Vascular sx eval noted

## 2023-12-22 NOTE — PROGRESS NOTE ADULT - NUTRITIONAL ASSESSMENT
This patient has been assessed with a concern for Malnutrition and has been determined to have a diagnosis/diagnoses of Moderate protein-calorie malnutrition.    This patient is being managed with:   Diet Pureed-  Entered: Dec 21 2023  2:38PM  
This patient has been assessed with a concern for Malnutrition and has been determined to have a diagnosis/diagnoses of Moderate protein-calorie malnutrition.    This patient is being managed with:   Diet Pureed-  Entered: Dec 21 2023  2:38PM

## 2023-12-22 NOTE — CHART NOTE - NSCHARTNOTEFT_GEN_A_CORE
RRT called for hypotension. Please refer to RRT note for further details.   Attending Dr. Tapia notified of RRT and at bedside- RRT cancelled.   Continue with comfort measures with Dilaudid and Ativan for pain/dysnpea. Continue midodrine at current dosing.     13004 RRT called for hypotension. Please refer to RRT note for further details.   Attending Dr. Tapia notified of RRT and at bedside- RRT cancelled.   Continue with comfort measures with Dilaudid and Ativan for pain/dysnpea. Continue midodrine at current dosing.     01122

## 2023-12-22 NOTE — CONSULT NOTE ADULT - PROBLEM SELECTOR RECOMMENDATION 5
Her daughter refused a transfer to PCU. She believed her mother was tiered from being moved from one place to another and rather her staying on 4 Mo.   d/w Dr. Willie Bauer MD  Associate Chief Geriatrics and Palliative Care ([facility]   [facility]   , [employer] at [facility]      Please contact me via Teams from Monday through Friday between 9am-5pm. If not answering, please call the palliative care pager [phone]    After 5pm and on weekends, please see the contact information below:    In the event of newly developing, evolving, or worsening symptoms, please contact the Palliative Medicine team via pager (if the patient is at [facility] #[phone] or if the patient is at [facility] #[phone]) The Geriatric and Palliative Medicine service has coverage 24 hours a day/ 7 days a week to provide medical recommendations regarding symptom management needs via telephone Her daughter refused a transfer to PCU. She believed her mother was tiered from being moved from one place to another and rather her staying on 4 Mo.   d/w Dr. Willie Bauer MD  Associate Chief Geriatrics and Palliative Care ([facility]) [facility]   [facility]   , [employer] at [facility]      Please contact me via Teams from Monday through Friday between 9am-5pm. If not answering, please call the palliative care pager [phone]    After 5pm and on weekends, please see the contact information below:    In the event of newly developing, evolving, or worsening symptoms, please contact the Palliative Medicine team via pager (if the patient is at [facility] #[phone] or if the patient is at [facility] #[phone]) The Geriatric and Palliative Medicine service has coverage 24 hours a day/ 7 days a week to provide medical recommendations regarding symptom management needs via telephone

## 2023-12-22 NOTE — DISCHARGE NOTE FOR THE EXPIRED PATIENT - NS DECEASED NEXTOFKIN_RELATIONSHIP
Daughter Metronidazole Counseling:  I discussed with the patient the risks of metronidazole including but not limited to seizures, nausea/vomiting, a metallic taste in the mouth, nausea/vomiting and severe allergy.

## 2023-12-22 NOTE — PROGRESS NOTE ADULT - SUBJECTIVE AND OBJECTIVE BOX
Patient is a 89y old  Female     Subjective:  PT seen and examined today. GOC discussion noted with MICU team and decision for conservative measures> now with new labs today and midodrine and Abx on board.     PAST MEDICAL & SURGICAL HISTORY:  Anxiety      Breast Lump      Insomnia      PE (pulmonary thromboembolism)      PAF (paroxysmal atrial fibrillation)  On Xarelto      HTN (hypertension)      HLD (hyperlipidemia)      Endometrial cancer  S/P LYNETTE with SBO      History of Breast Lump/Mass Excision- benighn- left- 1971      History of Colonoscopy- 2011/Sept      History of D&C- 8/12/11      Status post hysterectomy  endometrial cancer      Status post cataract extraction  OS      Leg fracture, right  Hip fx repair in Nov 2019          MEDICATIONS  (STANDING):  apixaban 2.5 milliGRAM(s) Oral two times a day  artificial  tears Solution 1 Drop(s) Both EYES two times a day  atorvastatin 80 milliGRAM(s) Oral at bedtime  cefepime   IVPB 1000 milliGRAM(s) IV Intermittent every 24 hours  cholecalciferol 1000 Unit(s) Oral daily  dextrose 50% Injectable 25 Gram(s) IV Push once  metoprolol succinate ER 25 milliGRAM(s) Oral <User Schedule>  mirtazapine 15 milliGRAM(s) Oral at bedtime  multivitamin 1 Tablet(s) Oral daily  pantoprazole    Tablet 40 milliGRAM(s) Oral two times a day  sodium bicarbonate  Infusion 0.254 mEq/kG/Hr (100 mL/Hr) IV Continuous <Continuous>  vancomycin  IVPB 1000 milliGRAM(s) IV Intermittent once  venlafaxine XR. 75 milliGRAM(s) Oral daily      Allergies    piperacillin-tazobactam (Rash)  soaps and creams causes rash uses only sensitive skin soap (Rash)  sulfa drugs (Unknown)  Voltaren (Rash)  Eye cream from the 1960s Neocortef ?spelling caused eyes to becomes swollen (Unknown)  neomycin (Unknown)    Intolerances        SOCIAL HISTORY:  Denies ETOh,Smoking,     FAMILY HISTORY:  No pertinent family history in first degree relatives        REVIEW OF SYSTEMS:  UTO    VITAL:  T(C): , Max: 37 (12-19-23 @ 20:30)  T(F): , Max: 98.6 (12-19-23 @ 20:30)  HR: 126 (12-20-23 @ 13:00)  BP: 103/72 (12-20-23 @ 13:00)  BP(mean): --  RR: 18 (12-20-23 @ 05:04)  SpO2: 98% (12-20-23 @ 05:04)  Wt(kg): --    PHYSICAL EXAM:  Constitutional: cacectic, anxious  HEENT: NCAT, MMM  Neck: Supple, No JVD  Respiratory: CTA-b/l  Cardiovascular: RRR s1s2, no m/r/g  Gastrointestinal: BS+, soft, NT/ND  Extremities: No peripheral edema b/l  Neurological: no focal deficits; strength grossly intact  Back: no CVAT b/l  Skin: No rashes, no nevi    LABS:                        12.2   19.62 )-----------( 449      ( 20 Dec 2023 12:24 )             40.1     Na(136)/K(5.3)/Cl(98)/HCO3(15)/BUN(60)/Cr(2.83)Glu(83)/Ca(8.8)/Mg(--)/PO4(--)    12-20 @ 12:24  Na(138)/K(5.1)/Cl(100)/HCO3(16)/BUN(60)/Cr(2.56)Glu(43)/Ca(9.2)/Mg(--)/PO4(--)    12-20 @ 06:41  Na(136)/K(4.6)/Cl(99)/HCO3(24)/BUN(51)/Cr(2.18)Glu(130)/Ca(8.9)/Mg(--)/PO4(--)    12-19 @ 12:03    Urinalysis Basic - ( 20 Dec 2023 12:24 )    Color: x / Appearance: x / SG: x / pH: x  Gluc: 83 mg/dL / Ketone: x  / Bili: x / Urobili: x   Blood: x / Protein: x / Nitrite: x   Leuk Esterase: x / RBC: x / WBC x   Sq Epi: x / Non Sq Epi: x / Bacteria: x            IMAGING:    ASSESSMENT:  89-year-old female on Eliquis for PE prophylaxis and A-fib presenting to the emergency department due to fall with head trauma. 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 She does not have any indication to start currently, and is a poor candidate moving forward given her significant comorbid conditions and requirement of BP supporting Rx, as well as frailty. recommend GOC discussion.     Severe Sepsis  -WBC 19K  -Lactic 9.1  -BP okay, soft, improved s/p x1L fluids    Hypoglycemia  -on Bicarb w/D5    elevated LFT's  -likely shock liver    HF  -EF 47%, mod-severe MS      RECOMMEND:  -recommend GOC discussion   -recommend decrease fluids to 60cc/hr given pt already on 4L O2  -c/w IV Abx  -monitor RF with daily BMP + phos  -maintain MAP >65  -Dose Rx for Crcl 5-15mL/min      With warm regards,    Bert Mckeon DO   [facility]  Office: [phone]           Patient is a 89y old  Female     Subjective:  PT seen and examined today. GOC discussion noted with MICU team and decision for conservative measures> now with new labs today and midodrine and Abx on board.     PAST MEDICAL & SURGICAL HISTORY:  Anxiety      Breast Lump      Insomnia      PE (pulmonary thromboembolism)      PAF (paroxysmal atrial fibrillation)  On Xarelto      HTN (hypertension)      HLD (hyperlipidemia)      Endometrial cancer  S/P LYNETTE with SBO      History of Breast Lump/Mass Excision- benighn- left- 1971      History of Colonoscopy- 2011/Sept      History of D&C- 8/12/11      Status post hysterectomy  endometrial cancer      Status post cataract extraction  OS      Leg fracture, right  Hip fx repair in Nov 2019          MEDICATIONS  (STANDING):  apixaban 2.5 milliGRAM(s) Oral two times a day  artificial  tears Solution 1 Drop(s) Both EYES two times a day  atorvastatin 80 milliGRAM(s) Oral at bedtime  cefepime   IVPB 1000 milliGRAM(s) IV Intermittent every 24 hours  cholecalciferol 1000 Unit(s) Oral daily  dextrose 50% Injectable 25 Gram(s) IV Push once  metoprolol succinate ER 25 milliGRAM(s) Oral <User Schedule>  mirtazapine 15 milliGRAM(s) Oral at bedtime  multivitamin 1 Tablet(s) Oral daily  pantoprazole    Tablet 40 milliGRAM(s) Oral two times a day  sodium bicarbonate  Infusion 0.254 mEq/kG/Hr (100 mL/Hr) IV Continuous <Continuous>  vancomycin  IVPB 1000 milliGRAM(s) IV Intermittent once  venlafaxine XR. 75 milliGRAM(s) Oral daily      Allergies    piperacillin-tazobactam (Rash)  soaps and creams causes rash uses only sensitive skin soap (Rash)  sulfa drugs (Unknown)  Voltaren (Rash)  Eye cream from the 1960s Neocortef ?spelling caused eyes to becomes swollen (Unknown)  neomycin (Unknown)    Intolerances        SOCIAL HISTORY:  Denies ETOh,Smoking,     FAMILY HISTORY:  No pertinent family history in first degree relatives        REVIEW OF SYSTEMS:  UTO    VITAL:  T(C): , Max: 37 (12-19-23 @ 20:30)  T(F): , Max: 98.6 (12-19-23 @ 20:30)  HR: 126 (12-20-23 @ 13:00)  BP: 103/72 (12-20-23 @ 13:00)  BP(mean): --  RR: 18 (12-20-23 @ 05:04)  SpO2: 98% (12-20-23 @ 05:04)  Wt(kg): --    PHYSICAL EXAM:  Constitutional: cacectic, anxious  HEENT: NCAT, MMM  Neck: Supple, No JVD  Respiratory: CTA-b/l  Cardiovascular: RRR s1s2, no m/r/g  Gastrointestinal: BS+, soft, NT/ND  Extremities: No peripheral edema b/l  Neurological: no focal deficits; strength grossly intact  Back: no CVAT b/l  Skin: No rashes, no nevi    LABS:                        12.2   19.62 )-----------( 449      ( 20 Dec 2023 12:24 )             40.1     Na(136)/K(5.3)/Cl(98)/HCO3(15)/BUN(60)/Cr(2.83)Glu(83)/Ca(8.8)/Mg(--)/PO4(--)    12-20 @ 12:24  Na(138)/K(5.1)/Cl(100)/HCO3(16)/BUN(60)/Cr(2.56)Glu(43)/Ca(9.2)/Mg(--)/PO4(--)    12-20 @ 06:41  Na(136)/K(4.6)/Cl(99)/HCO3(24)/BUN(51)/Cr(2.18)Glu(130)/Ca(8.9)/Mg(--)/PO4(--)    12-19 @ 12:03    Urinalysis Basic - ( 20 Dec 2023 12:24 )    Color: x / Appearance: x / SG: x / pH: x  Gluc: 83 mg/dL / Ketone: x  / Bili: x / Urobili: x   Blood: x / Protein: x / Nitrite: x   Leuk Esterase: x / RBC: x / WBC x   Sq Epi: x / Non Sq Epi: x / Bacteria: x            IMAGING:    ASSESSMENT:  89-year-old female on Eliquis for PE prophylaxis and A-fib presenting to the emergency department due to fall with head trauma. 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 She does not have any indication to start currently, and is a poor candidate moving forward given her significant comorbid conditions and requirement of BP supporting Rx, as well as frailty. recommend GOC discussion.     Severe Sepsis  -WBC 19K  -Lactic 9.1  -BP okay, soft, improved s/p x1L fluids    Hypoglycemia  -on Bicarb w/D5    elevated LFT's  -likely shock liver    HF  -EF 47%, mod-severe MS      RECOMMEND:  -recommend GOC discussion   -recommend decrease fluids to 60cc/hr given pt already on 4L O2  -c/w IV Abx  -monitor RF with daily BMP + phos  -maintain MAP >65  -Dose Rx for Crcl 5-15mL/min      With warm regards,    Betr Mckeon DO   [facility]  Office: [phone]           Patient is a 89y old  Female     Subjective:  PT seen and examined today. GOC discussion noted with MICU team and decision for conservative measures> now with new labs today and midodrine and Abx on board.     PAST MEDICAL & SURGICAL HISTORY:  Anxiety      Breast Lump      Insomnia      PE (pulmonary thromboembolism)      PAF (paroxysmal atrial fibrillation)  On Xarelto      HTN (hypertension)      HLD (hyperlipidemia)      Endometrial cancer  S/P LYNETTE with SBO      History of Breast Lump/Mass Excision- benighn- left- 1971      History of Colonoscopy- 2011/Sept      History of D&C- 8/12/11      Status post hysterectomy  endometrial cancer      Status post cataract extraction  OS      Leg fracture, right  Hip fx repair in Nov 2019          MEDICATIONS  (STANDING):  apixaban 2.5 milliGRAM(s) Oral two times a day  artificial  tears Solution 1 Drop(s) Both EYES two times a day  atorvastatin 80 milliGRAM(s) Oral at bedtime  cefepime   IVPB 1000 milliGRAM(s) IV Intermittent every 24 hours  cholecalciferol 1000 Unit(s) Oral daily  dextrose 50% Injectable 25 Gram(s) IV Push once  metoprolol succinate ER 25 milliGRAM(s) Oral <User Schedule>  mirtazapine 15 milliGRAM(s) Oral at bedtime  multivitamin 1 Tablet(s) Oral daily  pantoprazole    Tablet 40 milliGRAM(s) Oral two times a day  sodium bicarbonate  Infusion 0.254 mEq/kG/Hr (100 mL/Hr) IV Continuous <Continuous>  vancomycin  IVPB 1000 milliGRAM(s) IV Intermittent once  venlafaxine XR. 75 milliGRAM(s) Oral daily      Allergies    piperacillin-tazobactam (Rash)  soaps and creams causes rash uses only sensitive skin soap (Rash)  sulfa drugs (Unknown)  Voltaren (Rash)  Eye cream from the 1960s Neocortef ?spelling caused eyes to becomes swollen (Unknown)  neomycin (Unknown)    Intolerances        SOCIAL HISTORY:  Denies ETOh,Smoking,     FAMILY HISTORY:  No pertinent family history in first degree relatives        REVIEW OF SYSTEMS:  UTO    VITAL:  T(C): , Max: 37 (12-19-23 @ 20:30)  T(F): , Max: 98.6 (12-19-23 @ 20:30)  HR: 126 (12-20-23 @ 13:00)  BP: 103/72 (12-20-23 @ 13:00)  BP(mean): --  RR: 18 (12-20-23 @ 05:04)  SpO2: 98% (12-20-23 @ 05:04)  Wt(kg): --    PHYSICAL EXAM:  Constitutional: cacectic, anxious  HEENT: NCAT, MMM  Neck: Supple, No JVD  Respiratory: CTA-b/l  Cardiovascular: RRR s1s2, no m/r/g  Gastrointestinal: BS+, soft, NT/ND  Extremities: No peripheral edema b/l  Neurological: no focal deficits  Back: no CVAT b/l  Skin: No rashes, no nevi    LABS:                        12.2   19.62 )-----------( 449      ( 20 Dec 2023 12:24 )             40.1     Na(136)/K(5.3)/Cl(98)/HCO3(15)/BUN(60)/Cr(2.83)Glu(83)/Ca(8.8)/Mg(--)/PO4(--)    12-20 @ 12:24  Na(138)/K(5.1)/Cl(100)/HCO3(16)/BUN(60)/Cr(2.56)Glu(43)/Ca(9.2)/Mg(--)/PO4(--)    12-20 @ 06:41  Na(136)/K(4.6)/Cl(99)/HCO3(24)/BUN(51)/Cr(2.18)Glu(130)/Ca(8.9)/Mg(--)/PO4(--)    12-19 @ 12:03    Urinalysis Basic - ( 20 Dec 2023 12:24 )    Color: x / Appearance: x / SG: x / pH: x  Gluc: 83 mg/dL / Ketone: x  / Bili: x / Urobili: x   Blood: x / Protein: x / Nitrite: x   Leuk Esterase: x / RBC: x / WBC x   Sq Epi: x / Non Sq Epi: x / Bacteria: x            IMAGING:    ASSESSMENT:  89-year-old female on Eliquis for PE prophylaxis and A-fib presenting to the emergency department due to fall with head trauma. 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 She does not have any indication to start currently, and is a poor candidate moving forward given her significant comorbid conditions and requirement of BP supporting Rx, as well as frailty. recommend GOC discussion.     Severe Sepsis  -WBC 19K  -Lactic 9.1  -BP okay, soft, improved s/p x1L fluids    Hypoglycemia  -on Bicarb w/D5    elevated LFT's  -likely shock liver    HF  -EF 47%, mod-severe MS      RECOMMEND:  -recommend GOC discussion   -recommend decrease fluids to 60cc/hr given pt already on 4L O2  -c/w IV Abx  -monitor RF with daily BMP + phos  -maintain MAP >65  -Dose Rx for Crcl 5-15mL/min      With warm regards,    Bert Mckeon DO   [facility]  Office: [phone]           Patient is a 89y old  Female     Subjective:  PT seen and examined today. GOC discussion noted with MICU team and decision for conservative measures> now with new labs today and midodrine and Abx on board.     PAST MEDICAL & SURGICAL HISTORY:  Anxiety      Breast Lump      Insomnia      PE (pulmonary thromboembolism)      PAF (paroxysmal atrial fibrillation)  On Xarelto      HTN (hypertension)      HLD (hyperlipidemia)      Endometrial cancer  S/P LYNETTE with SBO      History of Breast Lump/Mass Excision- benighn- left- 1971      History of Colonoscopy- 2011/Sept      History of D&C- 8/12/11      Status post hysterectomy  endometrial cancer      Status post cataract extraction  OS      Leg fracture, right  Hip fx repair in Nov 2019          MEDICATIONS  (STANDING):  apixaban 2.5 milliGRAM(s) Oral two times a day  artificial  tears Solution 1 Drop(s) Both EYES two times a day  atorvastatin 80 milliGRAM(s) Oral at bedtime  cefepime   IVPB 1000 milliGRAM(s) IV Intermittent every 24 hours  cholecalciferol 1000 Unit(s) Oral daily  dextrose 50% Injectable 25 Gram(s) IV Push once  metoprolol succinate ER 25 milliGRAM(s) Oral <User Schedule>  mirtazapine 15 milliGRAM(s) Oral at bedtime  multivitamin 1 Tablet(s) Oral daily  pantoprazole    Tablet 40 milliGRAM(s) Oral two times a day  sodium bicarbonate  Infusion 0.254 mEq/kG/Hr (100 mL/Hr) IV Continuous <Continuous>  vancomycin  IVPB 1000 milliGRAM(s) IV Intermittent once  venlafaxine XR. 75 milliGRAM(s) Oral daily      Allergies    piperacillin-tazobactam (Rash)  soaps and creams causes rash uses only sensitive skin soap (Rash)  sulfa drugs (Unknown)  Voltaren (Rash)  Eye cream from the 1960s Neocortef ?spelling caused eyes to becomes swollen (Unknown)  neomycin (Unknown)    Intolerances        SOCIAL HISTORY:  Denies ETOh,Smoking,     FAMILY HISTORY:  No pertinent family history in first degree relatives        REVIEW OF SYSTEMS:  UTO    VITAL:  T(C): , Max: 37 (12-19-23 @ 20:30)  T(F): , Max: 98.6 (12-19-23 @ 20:30)  HR: 126 (12-20-23 @ 13:00)  BP: 103/72 (12-20-23 @ 13:00)  BP(mean): --  RR: 18 (12-20-23 @ 05:04)  SpO2: 98% (12-20-23 @ 05:04)  Wt(kg): --    PHYSICAL EXAM:  Constitutional: cacectic, anxious  HEENT: NCAT, MMM  Neck: Supple, No JVD  Respiratory: CTA-b/l  Cardiovascular: RRR s1s2, no m/r/g  Gastrointestinal: BS+, soft, NT/ND  Extremities: No peripheral edema b/l  Neurological: no focal deficits  Back: no CVAT b/l  Skin: No rashes, no nevi    LABS:                        12.2   19.62 )-----------( 449      ( 20 Dec 2023 12:24 )             40.1     Na(136)/K(5.3)/Cl(98)/HCO3(15)/BUN(60)/Cr(2.83)Glu(83)/Ca(8.8)/Mg(--)/PO4(--)    12-20 @ 12:24  Na(138)/K(5.1)/Cl(100)/HCO3(16)/BUN(60)/Cr(2.56)Glu(43)/Ca(9.2)/Mg(--)/PO4(--)    12-20 @ 06:41  Na(136)/K(4.6)/Cl(99)/HCO3(24)/BUN(51)/Cr(2.18)Glu(130)/Ca(8.9)/Mg(--)/PO4(--)    12-19 @ 12:03    Urinalysis Basic - ( 20 Dec 2023 12:24 )    Color: x / Appearance: x / SG: x / pH: x  Gluc: 83 mg/dL / Ketone: x  / Bili: x / Urobili: x   Blood: x / Protein: x / Nitrite: x   Leuk Esterase: x / RBC: x / WBC x   Sq Epi: x / Non Sq Epi: x / Bacteria: x            IMAGING:    ASSESSMENT:  89-year-old female on Eliquis for PE prophylaxis and A-fib presenting to the emergency department due to fall with head trauma. 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 She does not have any indication to start currently, and is a poor candidate moving forward given her significant comorbid conditions and requirement of BP supporting Rx, as well as frailty. recommend GOC discussion.     Severe Sepsis  -WBC 19K  -Lactic 9.1  -BP okay, soft, improved s/p x1L fluids    Hypoglycemia  -on Bicarb w/D5    elevated LFT's  -likely shock liver    HF  -EF 47%, mod-severe MS      RECOMMEND:  -recommend GOC discussion   -recommend decrease fluids to 60cc/hr given pt already on 4L O2  -c/w IV Abx  -monitor RF with daily BMP + phos  -maintain MAP >65  -Dose Rx for Crcl 5-15mL/min      With warm regards,    Bert Mckeon DO   [facility]  Office: [phone]           Patient is a 89y old  Female     Subjective:  PT seen and examined today. GOC discussion noted with MICU team and decision for conservative measures> now with new labs today and midodrine and Abx on board.     PAST MEDICAL & SURGICAL HISTORY:  Anxiety      Breast Lump      Insomnia      PE (pulmonary thromboembolism)      PAF (paroxysmal atrial fibrillation)  On Xarelto      HTN (hypertension)      HLD (hyperlipidemia)      Endometrial cancer  S/P LYNETTE with SBO      History of Breast Lump/Mass Excision- benighn- left- 1971      History of Colonoscopy- 2011/Sept      History of D&C- 8/12/11      Status post hysterectomy  endometrial cancer      Status post cataract extraction  OS      Leg fracture, right  Hip fx repair in Nov 2019          MEDICATIONS  (STANDING):  apixaban 2.5 milliGRAM(s) Oral two times a day  artificial  tears Solution 1 Drop(s) Both EYES two times a day  atorvastatin 80 milliGRAM(s) Oral at bedtime  cefepime   IVPB 1000 milliGRAM(s) IV Intermittent every 24 hours  cholecalciferol 1000 Unit(s) Oral daily  dextrose 50% Injectable 25 Gram(s) IV Push once  metoprolol succinate ER 25 milliGRAM(s) Oral <User Schedule>  mirtazapine 15 milliGRAM(s) Oral at bedtime  multivitamin 1 Tablet(s) Oral daily  pantoprazole    Tablet 40 milliGRAM(s) Oral two times a day  sodium bicarbonate  Infusion 0.254 mEq/kG/Hr (100 mL/Hr) IV Continuous <Continuous>  vancomycin  IVPB 1000 milliGRAM(s) IV Intermittent once  venlafaxine XR. 75 milliGRAM(s) Oral daily      Allergies    piperacillin-tazobactam (Rash)  soaps and creams causes rash uses only sensitive skin soap (Rash)  sulfa drugs (Unknown)  Voltaren (Rash)  Eye cream from the 1960s Neocortef ?spelling caused eyes to becomes swollen (Unknown)  neomycin (Unknown)    Intolerances        SOCIAL HISTORY:  Denies ETOh,Smoking,     FAMILY HISTORY:  No pertinent family history in first degree relatives        REVIEW OF SYSTEMS:  UTO    VITAL:  T(C): , Max: 37 (12-19-23 @ 20:30)  T(F): , Max: 98.6 (12-19-23 @ 20:30)  HR: 126 (12-20-23 @ 13:00)  BP: 103/72 (12-20-23 @ 13:00)  BP(mean): --  RR: 18 (12-20-23 @ 05:04)  SpO2: 98% (12-20-23 @ 05:04)  Wt(kg): --    PHYSICAL EXAM:  Constitutional: cacectic, anxious  HEENT: NCAT, MMM  Neck: Supple, No JVD  Respiratory: CTA-b/l  Cardiovascular: RRR s1s2, no m/r/g  Gastrointestinal: BS+, soft, NT/ND  Extremities: No peripheral edema b/l  Neurological: no focal deficits  Back: no CVAT b/l  Skin: No rashes, no nevi    LABS:                        12.2   19.62 )-----------( 449      ( 20 Dec 2023 12:24 )             40.1     Na(136)/K(5.3)/Cl(98)/HCO3(15)/BUN(60)/Cr(2.83)Glu(83)/Ca(8.8)/Mg(--)/PO4(--)    12-20 @ 12:24  Na(138)/K(5.1)/Cl(100)/HCO3(16)/BUN(60)/Cr(2.56)Glu(43)/Ca(9.2)/Mg(--)/PO4(--)    12-20 @ 06:41  Na(136)/K(4.6)/Cl(99)/HCO3(24)/BUN(51)/Cr(2.18)Glu(130)/Ca(8.9)/Mg(--)/PO4(--)    12-19 @ 12:03    Urinalysis Basic - ( 20 Dec 2023 12:24 )    Color: x / Appearance: x / SG: x / pH: x  Gluc: 83 mg/dL / Ketone: x  / Bili: x / Urobili: x   Blood: x / Protein: x / Nitrite: x   Leuk Esterase: x / RBC: x / WBC x   Sq Epi: x / Non Sq Epi: x / Bacteria: x            IMAGING:    ASSESSMENT:  89-year-old female on Eliquis for PE prophylaxis and A-fib presenting to the emergency department due to fall with head trauma. 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 She does not have any indication to start currently, and is a poor candidate moving forward given her significant comorbid conditions and requirement of BP supporting Rx, as well as frailty. recommend GOC discussion.     Severe Sepsis  -WBC 19K  -Lactic 9.1  -BP okay, soft, improved s/p x1L fluids    Hypoglycemia  -on Bicarb w/D5    elevated LFT's  -likely shock liver    HF  -EF 47%, mod-severe MS      RECOMMEND:  -recommend GOC discussion   -recommend decrease fluids to 60cc/hr given pt already on 4L O2  -recheck uric acid  -a/w midodrine for BP support  -c/w IV Abx  -monitor RF with daily BMP + phos  -maintain MAP >65  -Dose Rx for Crcl 5-15mL/min      With warm regards,    Bert Mckeon DO   [facility]  Office: [phone]           Patient is a 89y old  Female     Subjective:  PT seen and examined today. GOC discussion noted with MICU team and decision for conservative measures> now with new labs today and midodrine and Abx on board.     PAST MEDICAL & SURGICAL HISTORY:  Anxiety      Breast Lump      Insomnia      PE (pulmonary thromboembolism)      PAF (paroxysmal atrial fibrillation)  On Xarelto      HTN (hypertension)      HLD (hyperlipidemia)      Endometrial cancer  S/P LYNETTE with SBO      History of Breast Lump/Mass Excision- benighn- left- 1971      History of Colonoscopy- 2011/Sept      History of D&C- 8/12/11      Status post hysterectomy  endometrial cancer      Status post cataract extraction  OS      Leg fracture, right  Hip fx repair in Nov 2019          MEDICATIONS  (STANDING):  apixaban 2.5 milliGRAM(s) Oral two times a day  artificial  tears Solution 1 Drop(s) Both EYES two times a day  atorvastatin 80 milliGRAM(s) Oral at bedtime  cefepime   IVPB 1000 milliGRAM(s) IV Intermittent every 24 hours  cholecalciferol 1000 Unit(s) Oral daily  dextrose 50% Injectable 25 Gram(s) IV Push once  metoprolol succinate ER 25 milliGRAM(s) Oral <User Schedule>  mirtazapine 15 milliGRAM(s) Oral at bedtime  multivitamin 1 Tablet(s) Oral daily  pantoprazole    Tablet 40 milliGRAM(s) Oral two times a day  sodium bicarbonate  Infusion 0.254 mEq/kG/Hr (100 mL/Hr) IV Continuous <Continuous>  vancomycin  IVPB 1000 milliGRAM(s) IV Intermittent once  venlafaxine XR. 75 milliGRAM(s) Oral daily      Allergies    piperacillin-tazobactam (Rash)  soaps and creams causes rash uses only sensitive skin soap (Rash)  sulfa drugs (Unknown)  Voltaren (Rash)  Eye cream from the 1960s Neocortef ?spelling caused eyes to becomes swollen (Unknown)  neomycin (Unknown)    Intolerances        SOCIAL HISTORY:  Denies ETOh,Smoking,     FAMILY HISTORY:  No pertinent family history in first degree relatives        REVIEW OF SYSTEMS:  UTO    VITAL:  T(C): , Max: 37 (12-19-23 @ 20:30)  T(F): , Max: 98.6 (12-19-23 @ 20:30)  HR: 126 (12-20-23 @ 13:00)  BP: 103/72 (12-20-23 @ 13:00)  BP(mean): --  RR: 18 (12-20-23 @ 05:04)  SpO2: 98% (12-20-23 @ 05:04)  Wt(kg): --    PHYSICAL EXAM:  Constitutional: cacectic, anxious  HEENT: NCAT, MMM  Neck: Supple, No JVD  Respiratory: CTA-b/l  Cardiovascular: RRR s1s2, no m/r/g  Gastrointestinal: BS+, soft, NT/ND  Extremities: No peripheral edema b/l  Neurological: no focal deficits  Back: no CVAT b/l  Skin: No rashes, no nevi    LABS:                        12.2   19.62 )-----------( 449      ( 20 Dec 2023 12:24 )             40.1     Na(136)/K(5.3)/Cl(98)/HCO3(15)/BUN(60)/Cr(2.83)Glu(83)/Ca(8.8)/Mg(--)/PO4(--)    12-20 @ 12:24  Na(138)/K(5.1)/Cl(100)/HCO3(16)/BUN(60)/Cr(2.56)Glu(43)/Ca(9.2)/Mg(--)/PO4(--)    12-20 @ 06:41  Na(136)/K(4.6)/Cl(99)/HCO3(24)/BUN(51)/Cr(2.18)Glu(130)/Ca(8.9)/Mg(--)/PO4(--)    12-19 @ 12:03    Urinalysis Basic - ( 20 Dec 2023 12:24 )    Color: x / Appearance: x / SG: x / pH: x  Gluc: 83 mg/dL / Ketone: x  / Bili: x / Urobili: x   Blood: x / Protein: x / Nitrite: x   Leuk Esterase: x / RBC: x / WBC x   Sq Epi: x / Non Sq Epi: x / Bacteria: x            IMAGING:    ASSESSMENT:  89-year-old female on Eliquis for PE prophylaxis and A-fib presenting to the emergency department due to fall with head trauma. 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 She does not have any indication to start currently, and is a poor candidate moving forward given her significant comorbid conditions and requirement of BP supporting Rx, as well as frailty. recommend GOC discussion.     Severe Sepsis  -WBC 19K  -Lactic 9.1  -BP okay, soft, improved s/p x1L fluids    Hypoglycemia  -on Bicarb w/D5    elevated LFT's  -likely shock liver    HF  -EF 47%, mod-severe MS      RECOMMEND:  -recommend GOC discussion   -recommend decrease fluids to 60cc/hr given pt already on 4L O2  -recheck uric acid  -a/w midodrine for BP support  -c/w IV Abx  -monitor RF with daily BMP + phos  -maintain MAP >65  -Dose Rx for Crcl 5-15mL/min      With warm regards,    Bert Mckeon DO   [facility]  Office: [phone]

## 2023-12-22 NOTE — PROGRESS NOTE ADULT - ASSESSMENT
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  Patient states she was being bathed and fell but does not remember the details of the fall.  She does not member if she hit her head but denies any changes in visions, headache, weakness numbness to extremities.  She  also states that she has left foot pain. as per Dr. Diaz from [facility] ptn  was found to have an elevated creatinine of 2.18 this morning which was abnormal for her.  She would like to be evaluated for ALLAN.     CTH with chronic changes noted.  multiple chronic bilateral cerebellar infarcts ; no actue findings   UA+   Prior MRI: MRI brain with embolic appearing infarcts noted R frontal as well as in left hemisphere.  old bialtereal cerebellar infarcts       Impression   1) Fall likely related to hypotension/dehydration on top of old bilateral cerebellar strokes   2) HA, stable   3) AMS toxic metabolic on top of mild dementia   4) chronic bilateral cerebelalr strokes        - RRT eventually called for hypotension multiuple times, midodrine started   - CTX for infection, changed to cefepime. montior mental status as cefepime can cause fluctuations in mentals tauts   - DOAC for AF and PE; also on cilostazol   - statin therapy  - check vessels carotid duplex    - can defer routine EEG for now.  doubt seizure   - telemetry  - PT/OT  - cardio eval recs appreciated   - ID called   - check FS, glucose control <180  - GI/DVT ppx   - Thank you for allowing me to participate in the care of this patient. Call with questions.   DNR/I  GOC?   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  Patient states she was being bathed and fell but does not remember the details of the fall.  She does not member if she hit her head but denies any changes in visions, headache, weakness numbness to extremities.  She  also states that she has left foot pain. as per Dr. Diaz from [facility] ptn  was found to have an elevated creatinine of 2.18 this morning which was abnormal for her.  She would like to be evaluated for ALLAN.     CTH with chronic changes noted.  multiple chronic bilateral cerebellar infarcts ; no actue findings   UA+   Prior MRI: MRI brain with embolic appearing infarcts noted R frontal as well as in left hemisphere.  old bialtereal cerebellar infarcts       Impression   1) Fall likely related to hypotension/dehydration on top of old bilateral cerebellar strokes   2) HA, stable   3) AMS toxic metabolic on top of mild dementia   4) chronic bilateral cerebelalr strokes        - RRT eventually called for hypotension multiuple times, midodrine started   - CTX for infection, changed to cefepime. montior mental status as cefepime can cause fluctuations in mentals tauts   - DOAC for AF and PE; also on cilostazol   - statin therapy  - check vessels carotid duplex    - can defer routine EEG for now.  doubt seizure   - telemetry  - PT/OT  - cardio eval recs appreciated   - ID called   - check FS, glucose control <180  - GI/DVT ppx   - Thank you for allowing me to participate in the care of this patient. Call with questions.   DNR/I  GOC?   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  Patient states she was being bathed and fell but does not remember the details of the fall.  She does not member if she hit her head but denies any changes in visions, headache, weakness numbness to extremities.  She  also states that she has left foot pain. as per Dr. Diaz from [facility] ptn  was found to have an elevated creatinine of 2.18 this morning which was abnormal for her.  She would like to be evaluated for ALLAN.     CTH with chronic changes noted.  multiple chronic bilateral cerebellar infarcts ; no actue findings   UA+   Prior MRI: MRI brain with embolic appearing infarcts noted R frontal as well as in left hemisphere.  old bialtereal cerebellar infarcts       Impression   1) Fall likely related to hypotension/dehydration on top of old bilateral cerebellar strokes   2) HA, stable   3) AMS toxic metabolic on top of mild dementia   4) chronic bilateral cerebelalr strokes      - can try remeron QHS to help with sleep/wake cycle   - RRT eventually called for hypotension multiuple times, midodrine started   - CTX for infection, changed to cefepime. montior mental status as cefepime can cause fluctuations in mentals tauts   - DOAC for AF and PE; also on cilostazol   - statin therapy  - check vessels carotid duplex    - can defer routine EEG for now.  doubt seizure   - telemetry  - PT/OT  - cardio eval recs appreciated   - ID called   - check FS, glucose control <180  - GI/DVT ppx   - Thank you for allowing me to participate in the care of this patient. Call with questions.   DNR/I  GOC?   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  Patient states she was being bathed and fell but does not remember the details of the fall.  She does not member if she hit her head but denies any changes in visions, headache, weakness numbness to extremities.  She  also states that she has left foot pain. as per Dr. Diaz from [facility] ptn  was found to have an elevated creatinine of 2.18 this morning which was abnormal for her.  She would like to be evaluated for ALLAN.     CTH with chronic changes noted.  multiple chronic bilateral cerebellar infarcts ; no actue findings   UA+   Prior MRI: MRI brain with embolic appearing infarcts noted R frontal as well as in left hemisphere.  old bialtereal cerebellar infarcts       Impression   1) Fall likely related to hypotension/dehydration on top of old bilateral cerebellar strokes   2) HA, stable   3) AMS toxic metabolic on top of mild dementia   4) chronic bilateral cerebelalr strokes      - can try remeron QHS to help with sleep/wake cycle   - RRT eventually called for hypotension multiuple times, midodrine started   - CTX for infection, changed to cefepime. montior mental status as cefepime can cause fluctuations in mentals tauts   - DOAC for AF and PE; also on cilostazol   - statin therapy  - check vessels carotid duplex    - can defer routine EEG for now.  doubt seizure   - telemetry  - PT/OT  - cardio eval recs appreciated   - ID called   - check FS, glucose control <180  - GI/DVT ppx   - Thank you for allowing me to participate in the care of this patient. Call with questions.   DNR/I  GOC?   Ayo Novak MD  Vascular Neurology  Office: [phone]

## 2023-12-22 NOTE — CONSULT NOTE ADULT - CONVERSATION DETAILS
The patient was overwhelmed due to her symptoms and not really able to participate in a discussion about her illness or Rx options. She asked me to reach out to her daughter. I called the patient's daughter that gave verbalized understanding about the patient's debilitated stated and multiple medical issues (including but not limited to ALLAN, sepsis, encephalopathy, and liver dysfunction). The patient's daughter indicated the patient has gone through a lot and that she has had a multiple medical issues throughout the last 10 years. Her daughter stated, in the past, she has had d/w the patient about her health and what was acceptable to her and that based on substituted judgement, she believed, currently, the patient may not have wanted to prolong her life. Hence, that we should be focusing on symptoms Rx rather than prolongation of life. She agreed with stopping Abx and Midodrine as well as any other meds that may be prolonging the patient's life unnecessarily. In this case medications for symptoms Rx are a priority over prolongation of life.

## 2023-12-22 NOTE — PHARMACOTHERAPY INTERVENTION NOTE - COMMENTS
Counseled patient on discharge medication doses, indications, and possible side effects. Provided and reviewed medication cards. Answered all of the patient's questions to the best of my ability. Patient exhibited understanding of discharge medication regimen.    Counseled Medications:  Apixaban 5mg: one tablet twice a day  Aspirin 81mg: one tablet daily  atorvastatin 40mg: one tablet everyday at bedtime  metoprolol succinate ER 100mg: one tablet daily at bedtime    Serafin Gage, PharmD  PGY1 Pharmacy Resident   Available on [facility]  SpectraLink: [id number]   Counseled patient on discharge medication doses, indications, and possible side effects. Provided and reviewed medication cards. Answered all of the patient's questions to the best of my ability. Patient exhibited understanding of discharge medication regimen.    Counseled Medications:  Apixaban 5mg: one tablet twice a day  Aspirin 81mg: one tablet daily  atorvastatin 40mg: one tablet everyday at bedtime  metoprolol succinate ER 100mg: one tablet daily at bedtime    Serafin Gage, PharmD  PGY1 Pharmacy Resident   Available on [facility]  SpectraLink: [phone]   MG is a 89 year old female with has difficulty sleeping at night. Patient is currently managed on mirtazapine 15mg daily every night for sleep.    Recommendations:  1) Start melatonin 3 mg daily at bedtime for sleep, can titrate up dose as needed based on response.     Serafin Gage, PharmD  PGY1 Pharmacy Resident   Available on [facility]  SpectraLink: [phone]   MG is a 89 year old female with has difficulty sleeping at night. Patient is currently managed on mirtazapine 15mg daily every night for sleep.    Recommendations:  1) Start melatonin 3 mg daily at bedtime for sleep, can titrate up dose as needed based on response.     Serafin Gage, PharmD  PGY1 Pharmacy Resident   Available on [facility]  SpectraLink: [id number]   MG is a 89 year old female with has difficulty sleeping at night. Patient is currently managed on mirtazapine 15mg daily every night for sleep.    Recommendations:  1) Start melatonin 3 mg daily at bedtime for sleep, can titrate up dose as needed based on response.   If needed, can also attempt to go down on the dose of mirtazapine to 7.5mg daily. Lower doses have more antihistaminic effects and have more sedating effects compared to higher doses of the medication (which hits more 5HT3 serotonin receptors).    Serafin Gage, PharmD  PGY1 Pharmacy Resident   Available on teams  SpectraLink: [phone]    Padmini Chaney, PharmD, BCPS  Clinical Pharmacy Specialist  Teams (preferred) or [phone]    MG is a 89 year old female with has difficulty sleeping at night. Patient is currently managed on mirtazapine 15mg daily every night for sleep.    Recommendations:  1) Start melatonin 3 mg daily at bedtime for sleep, can titrate up dose as needed based on response.   If needed, can also attempt to go down on the dose of mirtazapine to 7.5mg daily. Lower doses have more antihistaminic effects and have more sedating effects compared to higher doses of the medication (which hits more 5HT3 serotonin receptors).    Serafin Gage, PharmD  PGY1 Pharmacy Resident   Available on teams  SpectraLink: [phone]    Padmini Chaney, PharmD, BCPS  Clinical Pharmacy Specialist  Teams (preferred) or [phone]

## 2023-12-22 NOTE — PROGRESS NOTE ADULT - ASSESSMENT
Patient is a 89 year old female with PMH of Afib on eliquis, HLD, HTN who presented to the emergency department due to fall with possible head trauma in the morning at [place] while being bathed.     S/p fall  Rule out UTI  Afib with RVR   ALLAN on CKD  - UA on admission with WBC 24 with small LE, no nitrites, many bacteria   -- did note it was collected via straight cath but has 17 sq epithelial cells c/w likely contaminated specimen   -- Ucx with E. faecalis and some GNR  - denies having any gu symptoms, afebrile, WBC noted   - renal U/S in ER with no hydronephrosis  - transaminitis noted, abd ultrasound limited but no gross evidence of cholecystitis, CBD wnl for pts age  - CTH with no acute findings, noted with multiple chronic infarcts within b/l cerebellar hemispheres   - Neurology following, recs noted   - Cardiology following for Afib and cardiomyopathy   - prior cultures reviewed, no positive cultures noted   - abd U/S with no infectious source   - CT chest with small b/l effusions with atelectasis, no pna  - 12/20 noted RRT later this morning for hypotension, severe hypoglycemia, leukocytosis uptrended, s/p vancomycin   H/o PCN allergy-rash, tolerating cephalosporin    Recommendations:  GOC discussion noted, pt DNR/DNR with comfort measures, ok with IVF and abx  Continue on cefepime 1g IV Q24h (renally adjusted) - complete 5d course tomorrow 12/23  Will give one dose vancomycin for E. faecalis, last dose 12/20  Continue rest of care per primary team    Dr. Jesse Gibson will be covering for our group from 12/23-12/25. If you have any questions, concerns or new micro data, please reach out to them at [phone].    Betsy Melgar M.D.  [facility], Division of Infectious Diseases  [phone]  After 5pm on weekdays and all day on weekends - please call [phone]       Patient is a 89 year old female with PMH of Afib on eliquis, HLD, HTN who presented to the emergency department due to fall with possible head trauma in the morning at [place] while being bathed.     S/p fall  Rule out UTI  Afib with RVR   ALLAN on CKD  - UA on admission with WBC 24 with small LE, no nitrites, many bacteria   -- did note it was collected via straight cath but has 17 sq epithelial cells c/w likely contaminated specimen   -- Ucx with E. faecalis and some GNR  - denies having any gu symptoms, afebrile, WBC noted   - renal U/S in ER with no hydronephrosis  - transaminitis noted, abd ultrasound limited but no gross evidence of cholecystitis, CBD wnl for pts age  - CTH with no acute findings, noted with multiple chronic infarcts within b/l cerebellar hemispheres   - Neurology following, recs noted   - Cardiology following for Afib and cardiomyopathy   - prior cultures reviewed, no positive cultures noted   - abd U/S with no infectious source   - CT chest with small b/l effusions with atelectasis, no pna  - 12/20 noted RRT later this morning for hypotension, severe hypoglycemia, leukocytosis uptrended, s/p vancomycin   H/o PCN allergy-rash, tolerating cephalosporin    Recommendations:  GOC discussion noted, pt DNR/DNR with comfort measures, ok with IVF and abx  Continue on cefepime 1g IV Q24h (renally adjusted) - complete 5d course tomorrow 12/23  Will give one dose vancomycin for E. faecalis, last dose 12/20  Continue rest of care per primary team    Dr. Jesse Gibson will be covering for our group from 12/23-12/25. If you have any questions, concerns or new micro data, please reach out to them at [phone].    Betsy Melgar M.D.  [facility], Division of Infectious Diseases  [phone]  After 5pm on weekdays and all day on weekends - please call [phone]       Patient is a 89 year old female with PMH of Afib on eliquis, HLD, HTN who presented to the emergency department due to fall with possible head trauma in the morning at [place] while being bathed.     S/p fall  Rule out UTI  Afib with RVR   ALLAN on CKD  - UA on admission with WBC 24 with small LE, no nitrites, many bacteria   -- did note it was collected via straight cath but has 17 sq epithelial cells c/w likely contaminated specimen   -- Ucx with E. faecalis and some GNR  - denies having any gu symptoms, afebrile, WBC noted   - renal U/S in ER with no hydronephrosis  - transaminitis noted, abd ultrasound limited but no gross evidence of cholecystitis, CBD wnl for pts age  - CTH with no acute findings, noted with multiple chronic infarcts within b/l cerebellar hemispheres   - Neurology following, recs noted   - Cardiology following for Afib and cardiomyopathy   - prior cultures reviewed, no positive cultures noted   - abd U/S with no infectious source   - CT chest with small b/l effusions with atelectasis, no pna  - 12/20 noted RRT later this morning for hypotension, severe hypoglycemia, leukocytosis uptrended, s/p vancomycin   H/o PCN allergy-rash, tolerating cephalosporin    Recommendations:  GOC discussion noted, pt DNR/DNR with comfort measures now, off antibiotics     ID will sign off at this time but remains available for any further questions/concerns.    D/w Dr. Willie Melgar M.D.  [facility], Division of Infectious Diseases  [phone]  After 5pm on weekdays and all day on weekends - please call [phone]       Patient is a 89 year old female with PMH of Afib on eliquis, HLD, HTN who presented to the emergency department due to fall with possible head trauma in the morning at [place] while being bathed.     S/p fall  Rule out UTI  Afib with RVR   ALLAN on CKD  - UA on admission with WBC 24 with small LE, no nitrites, many bacteria   -- did note it was collected via straight cath but has 17 sq epithelial cells c/w likely contaminated specimen   -- Ucx with E. faecalis and some GNR  - denies having any gu symptoms, afebrile, WBC noted   - renal U/S in ER with no hydronephrosis  - transaminitis noted, abd ultrasound limited but no gross evidence of cholecystitis, CBD wnl for pts age  - CTH with no acute findings, noted with multiple chronic infarcts within b/l cerebellar hemispheres   - Neurology following, recs noted   - Cardiology following for Afib and cardiomyopathy   - prior cultures reviewed, no positive cultures noted   - abd U/S with no infectious source   - CT chest with small b/l effusions with atelectasis, no pna  - 12/20 noted RRT later this morning for hypotension, severe hypoglycemia, leukocytosis uptrended, s/p vancomycin   H/o PCN allergy-rash, tolerating cephalosporin    Recommendations:  GOC discussion noted, pt DNR/DNR with comfort measures now, off antibiotics     ID will sign off at this time but remains available for any further questions/concerns.    D/w Dr. Willie Melgar M.D.  [facility], Division of Infectious Diseases  [phone]  After 5pm on weekdays and all day on weekends - please call [phone]

## 2023-12-22 NOTE — PROGRESS NOTE ADULT - SUBJECTIVE AND OBJECTIVE BOX
Neurology    S: patient seen. multiple RRTs for hypotension/hypotglycemia       Medications: MEDICATIONS  (STANDING):  apixaban 2.5 milliGRAM(s) Oral two times a day  artificial  tears Solution 1 Drop(s) Both EYES two times a day  atorvastatin 80 milliGRAM(s) Oral at bedtime  cefepime   IVPB 1000 milliGRAM(s) IV Intermittent every 24 hours  chlorhexidine 2% Cloths 1 Application(s) Topical <User Schedule>  cholecalciferol 1000 Unit(s) Oral daily  cilostazol 50 milliGRAM(s) Oral two times a day  dextrose 50% Injectable 25 Gram(s) IV Push once  metoprolol succinate ER 25 milliGRAM(s) Oral <User Schedule>  midodrine. 10 milliGRAM(s) Oral three times a day  mirtazapine 15 milliGRAM(s) Oral at bedtime  multivitamin 1 Tablet(s) Oral daily  Nephro-mirella 1 Tablet(s) Oral daily  pantoprazole    Tablet 40 milliGRAM(s) Oral two times a day  senna 2 Tablet(s) Oral at bedtime  sodium bicarbonate  Infusion 0.191 mEq/kG/Hr (75 mL/Hr) IV Continuous <Continuous>  venlafaxine XR. 75 milliGRAM(s) Oral daily    MEDICATIONS  (PRN):  acetaminophen     Tablet .. 650 milliGRAM(s) Oral every 6 hours PRN Temp greater or equal to 38C (100.4F), Mild Pain (1 - 3)  glycopyrrolate Injectable 0.4 milliGRAM(s) IV Push every 4 hours PRN for secretions  HYDROmorphone  Injectable 0.2 milliGRAM(s) IV Push every 2 hours PRN dyspnea or pain  LORazepam   Injectable 0.2 milliGRAM(s) IV Push every 2 hours PRN pain  ondansetron Injectable 4 milliGRAM(s) IV Push every 4 hours PRN Nausea and/or Vomiting  polyethylene glycol 3350 17 Gram(s) Oral daily PRN for constipation       Vitals:  Vital Signs Last 24 Hrs  T(C): 36.8 (22 Dec 2023 05:14), Max: 36.8 (22 Dec 2023 05:14)  T(F): 98.3 (22 Dec 2023 05:14), Max: 98.3 (22 Dec 2023 05:14)  HR: 132 (22 Dec 2023 08:53) (101 - 136)  BP: 117/83 (22 Dec 2023 08:53) (74/44 - 117/83)  BP(mean): --  RR: 18 (22 Dec 2023 05:14) (18 - 18)  SpO2: 96% (22 Dec 2023 05:14) (96% - 100%)    Parameters below as of 22 Dec 2023 05:14  Patient On (Oxygen Delivery Method): nasal cannula  O2 Flow (L/min): 4            General Exam:   General Appearance: Appropriately dressed and in no acute distress       Head: Normocephalic, atraumatic and no dysmorphic features  Ear, Nose, and Throat: Moist mucous membranes  CVS: S1S2+  Resp: No SOB, no wheeze or rhonchi  GI: soft NT/ND  Extremities: No edema or cyanosis  Skin:  distal LE discoloration but |+ pulse      Neurological Exam:  Mental Status: Awake, alert and oriented x 1.  Able to follow simple   verbal commands. Able to name and repeat. fluent speech. No obvious aphasia or dysarthria noted.   Cranial Nerves: PERRL, EOMI, VFFC, sensation V1-V3 intact,  no obvious facial asymmetry, equal elevation of palate, scm/trap 5/5, tongue is midline on protrusion. no obvious papilledema on fundoscopic exam. hearing is grossly intact.   Motor:  HENAO uppers 4, lowers 2/5   Sensation: Intact to light touch and pinprick throughout. no right/left confusion. no extinction to tactile on DSS.   Reflexes: 1+ throughout at biceps, brachioradialis, triceps, patellars and ankles bilaterally and equal. No clonus. R toe and L toe were both downgoing.  Coordination: No dysmetria on FNF    Gait: deferred     Data/Labs/Imaging which I personally reviewed.     Labs:     LABS:                          12.1   18.32 )-----------( 337      ( 22 Dec 2023 05:57 )             38.7     12-22    131<L>  |  91<L>  |  68<H>  ----------------------------<  119<H>  5.0   |  21<L>  |  4.07<H>    Ca    7.4<L>      22 Dec 2023 05:56    TPro  7.0  /  Alb  3.3  /  TBili  1.5<H>  /  DBili  x   /  AST  1482<H>  /  ALT  745<H>  /  AlkPhos  196<H>  12-20    LIVER FUNCTIONS - ( 20 Dec 2023 12:24 )  Alb: 3.3 g/dL / Pro: 7.0 g/dL / ALK PHOS: 196 U/L / ALT: 745 U/L / AST: 1482 U/L / GGT: x             Urinalysis Basic - ( 22 Dec 2023 05:56 )    Color: x / Appearance: x / SG: x / pH: x  Gluc: 119 mg/dL / Ketone: x  / Bili: x / Urobili: x   Blood: x / Protein: x / Nitrite: x   Leuk Esterase: x / RBC: x / WBC x   Sq Epi: x / Non Sq Epi: x / Bacteria: x          < from: CT Head No Cont (12.20.23 @ 10:18) >    ACC: [id number] EXAM:  CT BRAIN   ORDERED BY:  CHAVEZ CESAR     PROCEDURE DATE:  12/20/2023          INTERPRETATION:  .    CLINICAL INFORMATION: Altered mental status.    TECHNIQUE: Multiple axial CT images of the head were obtained without   contrast. Sagittal and coronal reconstructed images were acquired from   the source data.    COMPARISON: Prior CT study of the head from 12/19/2023. Prior brain MRI   study from 10/18/2023.    FINDINGS: There is no acute intracranial hemorrhage, mass effect, shift   of the midline structures, herniation, extra-axial fluid collection, or   hydrocephalus.    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    There is diffuse cerebral volume loss with prominence of the sulci,   fissures, and cisternal spaces which is normal for the patient's age.   Mild ventriculomegaly appears unchanged. 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    The paranasal sinuses and tympanomastoid cavities are clear. The   calvarium is intact. There is evidence of bilateral cataract removal.   Bilateral senile scleral plaques are noted.    IMPRESSION: No acute intracranial hemorrhage, mass effect, or shift of   the midline structures.    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    --- End of Report ---             KATE REYNOSO MD; Attending Radiologist  This document has been electronically signed. Dec 20 2023 10:39AM    < end of copied text >       Neurology    S: patient seen. multiple RRTs for hypotension/hypotglycemia       Medications: MEDICATIONS  (STANDING):  apixaban 2.5 milliGRAM(s) Oral two times a day  artificial  tears Solution 1 Drop(s) Both EYES two times a day  atorvastatin 80 milliGRAM(s) Oral at bedtime  cefepime   IVPB 1000 milliGRAM(s) IV Intermittent every 24 hours  chlorhexidine 2% Cloths 1 Application(s) Topical <User Schedule>  cholecalciferol 1000 Unit(s) Oral daily  cilostazol 50 milliGRAM(s) Oral two times a day  dextrose 50% Injectable 25 Gram(s) IV Push once  metoprolol succinate ER 25 milliGRAM(s) Oral <User Schedule>  midodrine. 10 milliGRAM(s) Oral three times a day  mirtazapine 15 milliGRAM(s) Oral at bedtime  multivitamin 1 Tablet(s) Oral daily  Nephro-mirella 1 Tablet(s) Oral daily  pantoprazole    Tablet 40 milliGRAM(s) Oral two times a day  senna 2 Tablet(s) Oral at bedtime  sodium bicarbonate  Infusion 0.191 mEq/kG/Hr (75 mL/Hr) IV Continuous <Continuous>  venlafaxine XR. 75 milliGRAM(s) Oral daily    MEDICATIONS  (PRN):  acetaminophen     Tablet .. 650 milliGRAM(s) Oral every 6 hours PRN Temp greater or equal to 38C (100.4F), Mild Pain (1 - 3)  glycopyrrolate Injectable 0.4 milliGRAM(s) IV Push every 4 hours PRN for secretions  HYDROmorphone  Injectable 0.2 milliGRAM(s) IV Push every 2 hours PRN dyspnea or pain  LORazepam   Injectable 0.2 milliGRAM(s) IV Push every 2 hours PRN pain  ondansetron Injectable 4 milliGRAM(s) IV Push every 4 hours PRN Nausea and/or Vomiting  polyethylene glycol 3350 17 Gram(s) Oral daily PRN for constipation       Vitals:  Vital Signs Last 24 Hrs  T(C): 36.8 (22 Dec 2023 05:14), Max: 36.8 (22 Dec 2023 05:14)  T(F): 98.3 (22 Dec 2023 05:14), Max: 98.3 (22 Dec 2023 05:14)  HR: 132 (22 Dec 2023 08:53) (101 - 136)  BP: 117/83 (22 Dec 2023 08:53) (74/44 - 117/83)  BP(mean): --  RR: 18 (22 Dec 2023 05:14) (18 - 18)  SpO2: 96% (22 Dec 2023 05:14) (96% - 100%)    Parameters below as of 22 Dec 2023 05:14  Patient On (Oxygen Delivery Method): nasal cannula  O2 Flow (L/min): 4            General Exam:   General Appearance: Appropriately dressed and in no acute distress       Head: Normocephalic, atraumatic and no dysmorphic features  Ear, Nose, and Throat: Moist mucous membranes  CVS: S1S2+  Resp: No SOB, no wheeze or rhonchi  GI: soft NT/ND  Extremities: No edema or cyanosis  Skin:  distal LE discoloration but |+ pulse      Neurological Exam:  Mental Status: Awake, alert and oriented x 1.  Able to follow simple   verbal commands. Able to name and repeat. fluent speech. No obvious aphasia or dysarthria noted.   Cranial Nerves: PERRL, EOMI, VFFC, sensation V1-V3 intact,  no obvious facial asymmetry, equal elevation of palate, scm/trap 5/5, tongue is midline on protrusion. no obvious papilledema on fundoscopic exam. hearing is grossly intact.   Motor:  HENAO uppers 4, lowers 2/5   Sensation: Intact to light touch and pinprick throughout. no right/left confusion. no extinction to tactile on DSS.   Reflexes: 1+ throughout at biceps, brachioradialis, triceps, patellars and ankles bilaterally and equal. No clonus. R toe and L toe were both downgoing.  Coordination: No dysmetria on FNF    Gait: deferred     Data/Labs/Imaging which I personally reviewed.     Labs:     LABS:                          12.1   18.32 )-----------( 337      ( 22 Dec 2023 05:57 )             38.7     12-22    131<L>  |  91<L>  |  68<H>  ----------------------------<  119<H>  5.0   |  21<L>  |  4.07<H>    Ca    7.4<L>      22 Dec 2023 05:56    TPro  7.0  /  Alb  3.3  /  TBili  1.5<H>  /  DBili  x   /  AST  1482<H>  /  ALT  745<H>  /  AlkPhos  196<H>  12-20    LIVER FUNCTIONS - ( 20 Dec 2023 12:24 )  Alb: 3.3 g/dL / Pro: 7.0 g/dL / ALK PHOS: 196 U/L / ALT: 745 U/L / AST: 1482 U/L / GGT: x             Urinalysis Basic - ( 22 Dec 2023 05:56 )    Color: x / Appearance: x / SG: x / pH: x  Gluc: 119 mg/dL / Ketone: x  / Bili: x / Urobili: x   Blood: x / Protein: x / Nitrite: x   Leuk Esterase: x / RBC: x / WBC x   Sq Epi: x / Non Sq Epi: x / Bacteria: x          < from: CT Head No Cont (12.20.23 @ 10:18) >    ACC: [id number] EXAM:  CT BRAIN   ORDERED BY:  CHAVEZ CESAR     PROCEDURE DATE:  12/20/2023          INTERPRETATION:  .    CLINICAL INFORMATION: Altered mental status.    TECHNIQUE: Multiple axial CT images of the head were obtained without   contrast. Sagittal and coronal reconstructed images were acquired from   the source data.    COMPARISON: Prior CT study of the head from 12/19/2023. Prior brain MRI   study from 10/18/2023.    FINDINGS: There is no acute intracranial hemorrhage, mass effect, shift   of the midline structures, herniation, extra-axial fluid collection, or   hydrocephalus.    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    There is diffuse cerebral volume loss with prominence of the sulci,   fissures, and cisternal spaces which is normal for the patient's age.   Mild ventriculomegaly appears unchanged. 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    The paranasal sinuses and tympanomastoid cavities are clear. The   calvarium is intact. There is evidence of bilateral cataract removal.   Bilateral senile scleral plaques are noted.    IMPRESSION: No acute intracranial hemorrhage, mass effect, or shift of   the midline structures.    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    --- End of Report ---             KATE REYNOSO MD; Attending Radiologist  This document has been electronically signed. Dec 20 2023 10:39AM    < end of copied text >

## 2023-12-22 NOTE — CONSULT NOTE ADULT - NS ATTEST RISK PROBLEM GEN_ALL_CORE FT
1. Number and complexity of problems addressed for this patient:    1.1 Moderate (At least 1)  [ ] 1 or more chronic illnesses with exacerbation, progression, or side effects of treatment  [ ] 2 or more stable chronic illnesses  [ ] 1 undiagnosed new problem with uncertain prognosis  [ ] 1 acute illness with systemic symptoms  [ ] 1 acute complicated injury  1.2 High (At least 1)   [x ] 1 or more chronic illnesses with severe exacerbation, progression, or side effects of treatment  [x ] 1 acute or chronic illnesses or injuries that may pose a threat to life or bodily function    2. Amount and/or Complexity of Data that was Reviewed and Analyzed for this case:       Moderate (1 out of 3)       High (2 out of 3)  2.1. (Any combination of 3 of the following)   [ ] Prior External notes were reviewed  [ ] Each test result was reviewed (see "LABS" and "RADIOLOGY & ADDITIONAL STUDIES" above)  [ ] The following tests were ordered and/or reviewed (Only count 1 point for ordering or reviewing a unique test):  	[ ]CBC  	[ ] Chemistry   	[ ] Imaging   	[ ] Other:   [ ] Assessment requiring an independent historian   		Name of historian and relationship:   2.2  [ ] Personally review and interpretation of  image or testing   2.3  [ ] Discussion of management or test interpretation with external physician/other qualified health care professional\appropriate source (not separately reported)    3. Risk of Complications and/or Morbidity or Mortality of for this Patient’s Management:  3.1 Moderate risk of morbidity from additional diagnostic testing or treatment (At least 1):   [ ] Prescription drug management   [ ] Decision regarding minor surgery, treatment, or procedure with identified patient or procedure risk factors  [ ] Decision regarding elective major surgery, treatment, or procedure without identified patient or procedure risk factors   [ ] Diagnosis or treatment significantly limited by social determinants of health   [ ] Other:   3.2 High risk of morbidity from additional diagnostic testing or treatment (At least 1):   [x ] Drug therapy requiring intensive monitoring for toxicity   [ ] Decision regarding elective major surgery, treatment, or procedure with identified patient or procedure risk factors   [ ] Decision regarding emergency major surgery, treatment, or procedure   [ x] Decision regarding hospitalization or escalation of hospital-level of care  [ x] Decision not to resuscitate, not to intubate, or to de-escalate care because of poor prognosis   [ ] Decision to proceed or not with artificial nutrition   [x ] Parenteral controlled substance  [ ] Other:

## 2023-12-22 NOTE — PROVIDER CONTACT NOTE (OTHER) - BACKGROUND
Dx Acute Renal Failure
pt admitted for acute renal failure, s/p fall at home. RRT previously called for hypotension (70s/40s), BG 20s. pt lethargic & anxious overnight, PRN Ativan given x2. pt DNR/DNI, on comfort measures.

## 2023-12-22 NOTE — CONSULT NOTE ADULT - SUBJECTIVE AND OBJECTIVE BOX
Date of Service:12-22-23 @ 15:54  HPI:  89-year-old female on Eliquis for PE prophylaxis and A-fib presenting to the emergency department due to fall with head trauma this morning.  Patient states she was being bathed and fell but does not remember the details of the fall.  She does not member if she hit her head but denies any changes in visions, headache, weakness numbness to extremities.  She  also states that she has left foot pain. as per Dr. Diaz from [facility] ptn  was found to have an elevated creatinine of 2.18 this morning which was abnormal for her.  She would like to be evaluated for ALLAN.  Denies fevers, chills, bodies, nausea, vomiting, diarrhea. (19 Dec 2023 21:26)    12/22 During this admission the patient has been treated for hypotension, hypoxemia, hypoglycemia and UTI. She has x4 RRTs. The only available GOC was entered by MICU on 12/20. Based on that note: "We discussed with Leonie this as part of the patients overall prognosis. Ultimately decided on no IV pressor medications and avoiding any sort of central line placement however can continue with IVF as needed for blood pressure temporization. After extensive discussions with multiple providers and daughter, ultimately Leonie states that her mom would benefit from pursuing a comfort directed approach and symptom directed approach. I explained this means stopping unnecessary blood draws, stopping unnecessary medications and potentially continuing vs stopping antibiotics if it makes patient comfortable. MOLST filled and placed in chart."    As per today's RRT note: "Recommend primary team clarify overall goals of care especially in the setting of hypotension, as it would be beneficial for patient to get supportive treatment that is primarily based on her comfort rather than blood pressure or heart rate, as this is in line with previously established goals of care."    This consult is being done to clarify GOC and to advise on symptoms Rx.     PERTINENT PM/SXH:   Anxiety    Hyperlipemia    Seasonal Allergies    Breast Lump    Initial Insomnia    Insomnia    Hyperlipidemia    Hypertension    Neuropathy associated with cancer    PE (pulmonary thromboembolism)    PAF (paroxysmal atrial fibrillation)    HTN (hypertension)    HLD (hyperlipidemia)    Endometrial cancer      History of Breast Lump/Mass Excision- benighn- left- 1971    History of Colonoscopy- 2011/Sept    History of D&C- 8/12/11    Status post hysterectomy    Status post cataract extraction    Leg fracture, right      FAMILY HISTORY:  No pertinent family history in first degree relatives      Family Hx substance abuse [ ]yes [ ]no  ITEMS NOT CHECKED ARE NOT PRESENT    SOCIAL HISTORY:   Significant other/partner[ ]  Children[ ]  Pentecostal/Spirituality:  Substance hx:  [ ]   Tobacco hx:  [ ]   Alcohol hx: [ ]   Home Opioid hx:  [ ] I-Stop Reference No:  Living Situation: [ ]Home  [ ]Long term care  [ ]Rehab [ ]Other  As per care coordination: Confirmed demographics. Discussed role of , medical plan of  care and discharge needs. Pt verbalized understanding, but requested contact to  her daughter as well. Pt states that lives alone at private home with 4 outdoor  steps and 10 steps to get to her bedroom, pt was semi dependent in ADL prior to  hospitalization, pt owns a cane, rolling walker, rollator, lift chair, w/c. Pt  mentioned that pt's family used to hire a private paid aid 4 hrs a day, when pt  was at home.   Pt identified her daughterLeonie ( [phone]) as an emergency contact.    ADVANCE DIRECTIVES:    DNR/MOLST  [ ]  Living Will  [ ]   DECISION MAKER(s):  [ ] Health Care Proxy(s)  [ ] Surrogate(s)  [ ] Guardian           Name(s): Phone Number(s):    BASELINE (I)ADL(s) (prior to admission):  [place]: [ ]Total  [ ] Moderate [ ]Dependent    Allergies    piperacillin-tazobactam (Rash)  soaps and creams causes rash uses only sensitive skin soap (Rash)  sulfa drugs (Unknown)  Voltaren (Rash)  Eye cream from the 1960s Neocortef ?spelling caused eyes to becomes swollen (Unknown)  neomycin (Unknown)    Intolerances    MEDICATIONS  (STANDING):  apixaban 2.5 milliGRAM(s) Oral two times a day  artificial  tears Solution 1 Drop(s) Both EYES two times a day  atorvastatin 80 milliGRAM(s) Oral at bedtime  cefepime   IVPB 1000 milliGRAM(s) IV Intermittent every 24 hours  chlorhexidine 2% Cloths 1 Application(s) Topical <User Schedule>  cholecalciferol 1000 Unit(s) Oral daily  cilostazol 50 milliGRAM(s) Oral two times a day  dextrose 50% Injectable 25 Gram(s) IV Push once  furosemide   Injectable 20 milliGRAM(s) IV Push once  melatonin 3 milliGRAM(s) Oral at bedtime  metoprolol succinate ER 25 milliGRAM(s) Oral <User Schedule>  midodrine. 10 milliGRAM(s) Oral three times a day  mirtazapine 15 milliGRAM(s) Oral at bedtime  multivitamin 1 Tablet(s) Oral daily  Nephro-mirella 1 Tablet(s) Oral daily  pantoprazole    Tablet 40 milliGRAM(s) Oral two times a day  senna 2 Tablet(s) Oral at bedtime  venlafaxine XR. 75 milliGRAM(s) Oral daily    MEDICATIONS  (PRN):  acetaminophen     Tablet .. 650 milliGRAM(s) Oral every 6 hours PRN Temp greater or equal to 38C (100.4F), Mild Pain (1 - 3)  glycopyrrolate Injectable 0.4 milliGRAM(s) IV Push every 4 hours PRN for secretions  HYDROmorphone  Injectable 0.2 milliGRAM(s) IV Push every 2 hours PRN dyspnea or pain  LORazepam   Injectable 0.2 milliGRAM(s) IV Push every 2 hours PRN pain  ondansetron Injectable 4 milliGRAM(s) IV Push every 4 hours PRN Nausea and/or Vomiting  polyethylene glycol 3350 17 Gram(s) Oral daily PRN for constipation    PRESENT SYMPTOMS: [ ]Unable to self-report  [ ] CPOT [ ] PAINADs [ ] RDOS  Source if other than patient:  [ ]Family   [ ]Team     Pain: [ ]yes [ ]no  QOL impact -   Location -                    Aggravating factors -  Quality -  Radiation -  Timing-  Severity (0-10 scale):  Minimal acceptable level (0-10 scale):     CPOT:    https://www.[facility].org/getattachment/[email or web address]/Critical-Care-Pain-Observation-Tool-(CPOT)    PAINAD Score: See PAINAD tool and score below     Dyspnea:                           [ ]Mild [ ]Moderate [ ]Severe    RDOS: See RDOS tool and score below   0 to 2  minimal or no respiratory distress   3  mild distress  4 to 6 moderate distress  >7 severe distress      Anxiety:                             [ ]Mild [ ]Moderate [ ]Severe  Fatigue:                             [ ]Mild [ ]Moderate [ ]Severe  Nausea:                             [ ]Mild [ ]Moderate [ ]Severe  Loss of appetite:              [ ]Mild [ ]Moderate [ ]Severe  Constipation:                    [ ]Mild [ ]Moderate [ ]Severe    PCSSQ[Palliative Care Spiritual Screening Question]   Severity (0-10):  Score of 4 or > indicate consideration of Chaplaincy referral.  Chaplaincy Referral: [ ] yes [ ] refused [ ] following [ ] Deferred     Caregiver [place]? : [ ] yes [ ] no [ ] Deferred [ ] Declined             Social work referral [ ] Patient & Family Centered Care Referral [ ]     Anticipatory Grief present?:  [ ] yes [ ] no  [ ] Deferred                  Social work referral [ ] Chaplaincy Referral [ ]    		  Other Symptoms:  [ ]All other review of systems negative     Palliative Performance Status Version 2:   See PPSv2 tool and score below          PHYSICAL EXAM:  Vital Signs Last 24 Hrs  T(C): 36.8 (22 Dec 2023 11:41), Max: 36.8 (22 Dec 2023 05:14)  T(F): 98.2 (22 Dec 2023 11:41), Max: 98.3 (22 Dec 2023 05:14)  HR: 132 (22 Dec 2023 08:53) (101 - 136)  BP: 93/66 (22 Dec 2023 11:41) (74/44 - 117/83)  BP(mean): --  RR: 18 (22 Dec 2023 11:41) (18 - 18)  SpO2: 92% (22 Dec 2023 11:41) (92% - 100%)    Parameters below as of 22 Dec 2023 11:41  Patient On (Oxygen Delivery Method): nasal cannula  O2 Flow (L/min): 4   I&O's Summary    21 Dec 2023 07:01  -  22 Dec 2023 07:00  --------------------------------------------------------  IN: 900 mL / OUT: 25 mL / NET: 875 mL    22 Dec 2023 07:01  -  22 Dec 2023 15:54  --------------------------------------------------------  IN: 30 mL / OUT: 0 mL / NET: 30 mL      GENERAL: [ ]Cachexia    [ ]Alert  [ ]Oriented x   [ ]Lethargic  [ ]Unarousable  [ ]Verbal  [ ]Non-Verbal  Behavioral:   [ ] Anxiety  [ ] Delirium [ ] Agitation [ ] Other  HEENT:  [ ]Normal   [ ]Dry mouth   [ ]ET Tube/Trach  [ ]Oral lesions  PULMONARY:   [ ]Clear [ ]Tachypnea  [ ]Audible excessive secretions   [ ]Rhonchi        [ ]Right [ ]Left [ ]Bilateral  [ ]Crackles        [ ]Right [ ]Left [ ]Bilateral  [ ]Wheezing     [ ]Right [ ]Left [ ]Bilateral  [ ]Diminished breath sounds [ ]right [ ]left [ ]bilateral  CARDIOVASCULAR:    [ ]Regular [ ]Irregular [ ]Tachy  [ ]Chi [ ]Murmur [ ]Other  GASTROINTESTINAL:  [ ]Soft  [ ]Distended   [ ]+BS  [ ]Non tender [ ]Tender  [ ]Other [ ]PEG [ ]OGT/ NGT  Last BM:  GENITOURINARY:  [ ]Normal [ ] Incontinent   [ ]Oliguria/Anuria   [ ][place]  MUSCULOSKELETAL:   [ ]Normal   [ ]Weakness  [ ]Bed/Wheelchair bound [ ]Edema  NEUROLOGIC:   [ ]No focal deficits  [ ]Cognitive impairment  [ ]Dysphagia [ ]Dysarthria [ ]Paresis [ ]Other   SKIN:   [ ]Normal  [ ]Rash  [ ]Other  [ ]Pressure ulcer(s)       Present on admission [ ]y [ ]n    CRITICAL CARE:  [ ] Shock Present  [ ]Septic [ ]Cardiogenic [ ]Neurologic [ ]Hypovolemic  [ ]  Vasopressors [ ]  Inotropes   [ ]Respiratory failure present [ ]Mechanical ventilation [ ]Non-invasive ventilatory support [ ]High flow    [ ]Acute  [ ]Chronic [ ]Hypoxic  [ ]Hypercarbic [ ]Other  [ ]Other organ failure     LABS:                        12.1   18.32 )-----------( 337      ( 22 Dec 2023 05:57 )             38.7   12-22    131<L>  |  91<L>  |  68<H>  ----------------------------<  119<H>  5.0   |  21<L>  |  4.07<H>    Ca    7.4<L>      22 Dec 2023 05:56        Urinalysis Basic - ( 22 Dec 2023 05:56 )    Color: x / Appearance: x / SG: x / pH: x  Gluc: 119 mg/dL / Ketone: x  / Bili: x / Urobili: x   Blood: x / Protein: x / Nitrite: x   Leuk Esterase: x / RBC: x / WBC x   Sq Epi: x / Non Sq Epi: x / Bacteria: x      RADIOLOGY & ADDITIONAL STUDIES:  < from: CT Chest No Cont (12.21.23 @ 09:47) >  IMPRESSION:    Small bibasilar pleural effusions with adjacent passive atelectasis.  Prominent mediastinal lymph nodes, likely reactive  Trace pericardial effusion  Nodular thickening of the left adrenal gland.  Moderate hiatal hernia    --- End of Report ---          IZABELA OJEDA DO; Resident Radiologist  This document has been electronically signed.  EFREM LENZ MD; Attending Radiologist  This document has been electronically signed. Dec 21 2023 12:46PM    < end of copied text >  < from: CT Head No Cont (12.20.23 @ 10:18) >  IMPRESSION: No acute intracranial hemorrhage, mass effect, or shift of   the midline structures.    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    --- End of Report ---            KATE REYNOSO MD; Attending Radiologist  This document has been electronically signed. Dec 20 2023 10:39AM    < end of copied text >    PROTEIN CALORIE MALNUTRITION PRESENT: [ ]mild [ ]moderate [ ]severe [ ]underweight [ ]morbid obesity  https://www.andeal.org/vault/2440/web/files/ONC/Table_Clinical%20Characteristics%20to%20Document%20Malnutrition-White%20JV%20et%20al%585891.pdf    Height (cm): 162.6 (12-19-23 @ 10:43), 162.6 (11-22-23 @ 12:10), 167.6 (10-16-23 @ 12:51)  Weight (kg): 59 (12-19-23 @ 10:43), 49.9 (11-22-23 @ 12:10), 90.7 (10-16-23 @ 12:51)  BMI (kg/m2): 22.3 (12-19-23 @ 10:43), 18.9 (11-22-23 @ 12:10), 32.3 (10-16-23 @ 12:51)    [ ]PPSV2 < or = to 30% [ ]significant weight loss  [ ]poor nutritional intake  [ ]anasarca[ ]Artificial Nutrition      Other REFERRALS:  [ ]Hospice  [ ]Child Life  [ ]Social Work  [ ]Case management [ ]Holistic Therapy     Goals of Care Document:  Date of Service:12-22-23 @ 15:54  HPI:  89-year-old female on Eliquis for PE prophylaxis and A-fib presenting to the emergency department due to fall with head trauma this morning.  Patient states she was being bathed and fell but does not remember the details of the fall.  She does not member if she hit her head but denies any changes in visions, headache, weakness numbness to extremities.  She  also states that she has left foot pain. as per Dr. Diaz from [facility] ptn  was found to have an elevated creatinine of 2.18 this morning which was abnormal for her.  She would like to be evaluated for ALLAN.  Denies fevers, chills, bodies, nausea, vomiting, diarrhea. (19 Dec 2023 21:26)    12/22 During this admission the patient has been treated for hypotension, hypoxemia, hypoglycemia and UTI. She has x4 RRTs. The only available GOC was entered by MICU on 12/20. Based on that note: "We discussed with Leonie this as part of the patients overall prognosis. Ultimately decided on no IV pressor medications and avoiding any sort of central line placement however can continue with IVF as needed for blood pressure temporization. After extensive discussions with multiple providers and daughter, ultimately Leonie states that her mom would benefit from pursuing a comfort directed approach and symptom directed approach. I explained this means stopping unnecessary blood draws, stopping unnecessary medications and potentially continuing vs stopping antibiotics if it makes patient comfortable. MOLST filled and placed in chart."    As per today's RRT note: "Recommend primary team clarify overall goals of care especially in the setting of hypotension, as it would be beneficial for patient to get supportive treatment that is primarily based on her comfort rather than blood pressure or heart rate, as this is in line with previously established goals of care."    This consult is being done to clarify GOC and to advise on symptoms Rx.     PERTINENT PM/SXH:   Anxiety    Hyperlipemia    Seasonal Allergies    Breast Lump    Initial Insomnia    Insomnia    Hyperlipidemia    Hypertension    Neuropathy associated with cancer    PE (pulmonary thromboembolism)    PAF (paroxysmal atrial fibrillation)    HTN (hypertension)    HLD (hyperlipidemia)    Endometrial cancer      History of Breast Lump/Mass Excision- benighn- left- 1971    History of Colonoscopy- 2011/Sept    History of D&C- 8/12/11    Status post hysterectomy    Status post cataract extraction    Leg fracture, right      FAMILY HISTORY:  No pertinent family history in first degree relatives      Family Hx substance abuse [ ]yes [ ]no  ITEMS NOT CHECKED ARE NOT PRESENT    SOCIAL HISTORY:   Significant other/partner[ ]  Children[ ]  Roman Catholic/Spirituality:  Substance hx:  [ ]   Tobacco hx:  [ ]   Alcohol hx: [ ]   Home Opioid hx:  [ ] I-Stop Reference No:  Living Situation: [ ]Home  [ ]Long term care  [ ]Rehab [ ]Other  As per care coordination: Confirmed demographics. Discussed role of , medical plan of  care and discharge needs. Pt verbalized understanding, but requested contact to  her daughter as well. Pt states that lives alone at private home with 4 outdoor  steps and 10 steps to get to her bedroom, pt was semi dependent in ADL prior to  hospitalization, pt owns a cane, rolling walker, rollator, lift chair, w/c. Pt  mentioned that pt's family used to hire a private paid aid 4 hrs a day, when pt  was at home.   Pt identified her daughterLeonie ( [phone]) as an emergency contact.    ADVANCE DIRECTIVES:    DNR/MOLST  [ ]  Living Will  [ ]   DECISION MAKER(s):  [ ] Health Care Proxy(s)  [ ] Surrogate(s)  [ ] Guardian           Name(s): Phone Number(s):    BASELINE (I)ADL(s) (prior to admission):  [place]: [ ]Total  [ ] Moderate [ ]Dependent    Allergies    piperacillin-tazobactam (Rash)  soaps and creams causes rash uses only sensitive skin soap (Rash)  sulfa drugs (Unknown)  Voltaren (Rash)  Eye cream from the 1960s Neocortef ?spelling caused eyes to becomes swollen (Unknown)  neomycin (Unknown)    Intolerances    MEDICATIONS  (STANDING):  apixaban 2.5 milliGRAM(s) Oral two times a day  artificial  tears Solution 1 Drop(s) Both EYES two times a day  atorvastatin 80 milliGRAM(s) Oral at bedtime  cefepime   IVPB 1000 milliGRAM(s) IV Intermittent every 24 hours  chlorhexidine 2% Cloths 1 Application(s) Topical <User Schedule>  cholecalciferol 1000 Unit(s) Oral daily  cilostazol 50 milliGRAM(s) Oral two times a day  dextrose 50% Injectable 25 Gram(s) IV Push once  furosemide   Injectable 20 milliGRAM(s) IV Push once  melatonin 3 milliGRAM(s) Oral at bedtime  metoprolol succinate ER 25 milliGRAM(s) Oral <User Schedule>  midodrine. 10 milliGRAM(s) Oral three times a day  mirtazapine 15 milliGRAM(s) Oral at bedtime  multivitamin 1 Tablet(s) Oral daily  Nephro-mirella 1 Tablet(s) Oral daily  pantoprazole    Tablet 40 milliGRAM(s) Oral two times a day  senna 2 Tablet(s) Oral at bedtime  venlafaxine XR. 75 milliGRAM(s) Oral daily    MEDICATIONS  (PRN):  acetaminophen     Tablet .. 650 milliGRAM(s) Oral every 6 hours PRN Temp greater or equal to 38C (100.4F), Mild Pain (1 - 3)  glycopyrrolate Injectable 0.4 milliGRAM(s) IV Push every 4 hours PRN for secretions  HYDROmorphone  Injectable 0.2 milliGRAM(s) IV Push every 2 hours PRN dyspnea or pain  LORazepam   Injectable 0.2 milliGRAM(s) IV Push every 2 hours PRN pain  ondansetron Injectable 4 milliGRAM(s) IV Push every 4 hours PRN Nausea and/or Vomiting  polyethylene glycol 3350 17 Gram(s) Oral daily PRN for constipation    PRESENT SYMPTOMS: [ ]Unable to self-report  [ ] CPOT [ ] PAINADs [ ] RDOS  Source if other than patient:  [ ]Family   [ ]Team     Pain: [ ]yes [ ]no  QOL impact -   Location -                    Aggravating factors -  Quality -  Radiation -  Timing-  Severity (0-10 scale):  Minimal acceptable level (0-10 scale):     CPOT:    https://www.[facility].org/getattachment/[email or web address]/Critical-Care-Pain-Observation-Tool-(CPOT)    PAINAD Score: See PAINAD tool and score below     Dyspnea:                           [ ]Mild [ ]Moderate [ ]Severe    RDOS: See RDOS tool and score below   0 to 2  minimal or no respiratory distress   3  mild distress  4 to 6 moderate distress  >7 severe distress      Anxiety:                             [ ]Mild [ ]Moderate [ ]Severe  Fatigue:                             [ ]Mild [ ]Moderate [ ]Severe  Nausea:                             [ ]Mild [ ]Moderate [ ]Severe  Loss of appetite:              [ ]Mild [ ]Moderate [ ]Severe  Constipation:                    [ ]Mild [ ]Moderate [ ]Severe    PCSSQ[Palliative Care Spiritual Screening Question]   Severity (0-10):  Score of 4 or > indicate consideration of Chaplaincy referral.  Chaplaincy Referral: [ ] yes [ ] refused [ ] following [ ] Deferred     Caregiver [place]? : [ ] yes [ ] no [ ] Deferred [ ] Declined             Social work referral [ ] Patient & Family Centered Care Referral [ ]     Anticipatory Grief present?:  [ ] yes [ ] no  [ ] Deferred                  Social work referral [ ] Chaplaincy Referral [ ]    		  Other Symptoms:  [ ]All other review of systems negative     Palliative Performance Status Version 2:   See PPSv2 tool and score below          PHYSICAL EXAM:  Vital Signs Last 24 Hrs  T(C): 36.8 (22 Dec 2023 11:41), Max: 36.8 (22 Dec 2023 05:14)  T(F): 98.2 (22 Dec 2023 11:41), Max: 98.3 (22 Dec 2023 05:14)  HR: 132 (22 Dec 2023 08:53) (101 - 136)  BP: 93/66 (22 Dec 2023 11:41) (74/44 - 117/83)  BP(mean): --  RR: 18 (22 Dec 2023 11:41) (18 - 18)  SpO2: 92% (22 Dec 2023 11:41) (92% - 100%)    Parameters below as of 22 Dec 2023 11:41  Patient On (Oxygen Delivery Method): nasal cannula  O2 Flow (L/min): 4   I&O's Summary    21 Dec 2023 07:01  -  22 Dec 2023 07:00  --------------------------------------------------------  IN: 900 mL / OUT: 25 mL / NET: 875 mL    22 Dec 2023 07:01  -  22 Dec 2023 15:54  --------------------------------------------------------  IN: 30 mL / OUT: 0 mL / NET: 30 mL      GENERAL: [ ]Cachexia    [ ]Alert  [ ]Oriented x   [ ]Lethargic  [ ]Unarousable  [ ]Verbal  [ ]Non-Verbal  Behavioral:   [ ] Anxiety  [ ] Delirium [ ] Agitation [ ] Other  HEENT:  [ ]Normal   [ ]Dry mouth   [ ]ET Tube/Trach  [ ]Oral lesions  PULMONARY:   [ ]Clear [ ]Tachypnea  [ ]Audible excessive secretions   [ ]Rhonchi        [ ]Right [ ]Left [ ]Bilateral  [ ]Crackles        [ ]Right [ ]Left [ ]Bilateral  [ ]Wheezing     [ ]Right [ ]Left [ ]Bilateral  [ ]Diminished breath sounds [ ]right [ ]left [ ]bilateral  CARDIOVASCULAR:    [ ]Regular [ ]Irregular [ ]Tachy  [ ]Chi [ ]Murmur [ ]Other  GASTROINTESTINAL:  [ ]Soft  [ ]Distended   [ ]+BS  [ ]Non tender [ ]Tender  [ ]Other [ ]PEG [ ]OGT/ NGT  Last BM:  GENITOURINARY:  [ ]Normal [ ] Incontinent   [ ]Oliguria/Anuria   [ ][place]  MUSCULOSKELETAL:   [ ]Normal   [ ]Weakness  [ ]Bed/Wheelchair bound [ ]Edema  NEUROLOGIC:   [ ]No focal deficits  [ ]Cognitive impairment  [ ]Dysphagia [ ]Dysarthria [ ]Paresis [ ]Other   SKIN:   [ ]Normal  [ ]Rash  [ ]Other  [ ]Pressure ulcer(s)       Present on admission [ ]y [ ]n    CRITICAL CARE:  [ ] Shock Present  [ ]Septic [ ]Cardiogenic [ ]Neurologic [ ]Hypovolemic  [ ]  Vasopressors [ ]  Inotropes   [ ]Respiratory failure present [ ]Mechanical ventilation [ ]Non-invasive ventilatory support [ ]High flow    [ ]Acute  [ ]Chronic [ ]Hypoxic  [ ]Hypercarbic [ ]Other  [ ]Other organ failure     LABS:                        12.1   18.32 )-----------( 337      ( 22 Dec 2023 05:57 )             38.7   12-22    131<L>  |  91<L>  |  68<H>  ----------------------------<  119<H>  5.0   |  21<L>  |  4.07<H>    Ca    7.4<L>      22 Dec 2023 05:56        Urinalysis Basic - ( 22 Dec 2023 05:56 )    Color: x / Appearance: x / SG: x / pH: x  Gluc: 119 mg/dL / Ketone: x  / Bili: x / Urobili: x   Blood: x / Protein: x / Nitrite: x   Leuk Esterase: x / RBC: x / WBC x   Sq Epi: x / Non Sq Epi: x / Bacteria: x      RADIOLOGY & ADDITIONAL STUDIES:  < from: CT Chest No Cont (12.21.23 @ 09:47) >  IMPRESSION:    Small bibasilar pleural effusions with adjacent passive atelectasis.  Prominent mediastinal lymph nodes, likely reactive  Trace pericardial effusion  Nodular thickening of the left adrenal gland.  Moderate hiatal hernia    --- End of Report ---          IZABELA OJEDA DO; Resident Radiologist  This document has been electronically signed.  EFREM LENZ MD; Attending Radiologist  This document has been electronically signed. Dec 21 2023 12:46PM    < end of copied text >  < from: CT Head No Cont (12.20.23 @ 10:18) >  IMPRESSION: No acute intracranial hemorrhage, mass effect, or shift of   the midline structures.    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    --- End of Report ---            KATE REYNOSO MD; Attending Radiologist  This document has been electronically signed. Dec 20 2023 10:39AM    < end of copied text >    PROTEIN CALORIE MALNUTRITION PRESENT: [ ]mild [ ]moderate [ ]severe [ ]underweight [ ]morbid obesity  https://www.andeal.org/vault/2440/web/files/ONC/Table_Clinical%20Characteristics%20to%20Document%20Malnutrition-White%20JV%20et%20al%954875.pdf    Height (cm): 162.6 (12-19-23 @ 10:43), 162.6 (11-22-23 @ 12:10), 167.6 (10-16-23 @ 12:51)  Weight (kg): 59 (12-19-23 @ 10:43), 49.9 (11-22-23 @ 12:10), 90.7 (10-16-23 @ 12:51)  BMI (kg/m2): 22.3 (12-19-23 @ 10:43), 18.9 (11-22-23 @ 12:10), 32.3 (10-16-23 @ 12:51)    [ ]PPSV2 < or = to 30% [ ]significant weight loss  [ ]poor nutritional intake  [ ]anasarca[ ]Artificial Nutrition      Other REFERRALS:  [ ]Hospice  [ ]Child Life  [ ]Social Work  [ ]Case management [ ]Holistic Therapy     Goals of Care Document:  Date of Service:12-22-23 @ 15:54  HPI:  89-year-old female on Eliquis for PE prophylaxis and A-fib presenting to the emergency department due to fall with head trauma this morning.  Patient states she was being bathed and fell but does not remember the details of the fall.  She does not member if she hit her head but denies any changes in visions, headache, weakness numbness to extremities.  She  also states that she has left foot pain. as per Dr. Diaz from [facility] ptn  was found to have an elevated creatinine of 2.18 this morning which was abnormal for her.  She would like to be evaluated for ALLAN.  Denies fevers, chills, bodies, nausea, vomiting, diarrhea. (19 Dec 2023 21:26)    12/22 During this admission the patient has been treated for hypotension, hypoxemia, hypoglycemia and UTI. She has x4 RRTs. The only available GOC was entered by MICU on 12/20. Based on that note: "We discussed with Leonie this as part of the patients overall prognosis. Ultimately decided on no IV pressor medications and avoiding any sort of central line placement however can continue with IVF as needed for blood pressure temporization. After extensive discussions with multiple providers and daughter, ultimately Leonie states that her mom would benefit from pursuing a comfort directed approach and symptom directed approach. I explained this means stopping unnecessary blood draws, stopping unnecessary medications and potentially continuing vs stopping antibiotics if it makes patient comfortable. MOLST filled and placed in chart."    As per today's RRT note: "Recommend primary team clarify overall goals of care especially in the setting of hypotension, as it would be beneficial for patient to get supportive treatment that is primarily based on her comfort rather than blood pressure or heart rate, as this is in line with previously established goals of care."    This consult is being done to clarify GOC and to advise on symptoms Rx.     PERTINENT PM/SXH:   Anxiety    Hyperlipemia    Seasonal Allergies    Breast Lump    Initial Insomnia    Insomnia    Hyperlipidemia    Hypertension    Neuropathy associated with cancer    PE (pulmonary thromboembolism)    PAF (paroxysmal atrial fibrillation)    HTN (hypertension)    HLD (hyperlipidemia)    Endometrial cancer      History of Breast Lump/Mass Excision- benighn- left- 1971    History of Colonoscopy- 2011/Sept    History of D&C- 8/12/11    Status post hysterectomy    Status post cataract extraction    Leg fracture, right      FAMILY HISTORY:  No pertinent family history in first degree relatives      Family Hx substance abuse [ ]yes [ ]no  ITEMS NOT CHECKED ARE NOT PRESENT    SOCIAL HISTORY:   Significant other/partner[ ]  Children[ ]  Advent/Spirituality:  Substance hx:  [ ]   Tobacco hx:  [ ]   Alcohol hx: [ ]   Home Opioid hx:  [ ] I-Stop Reference No:  Living Situation: [ ]Home  [ ]Long term care  [ ]Rehab [ ]Other  As per care coordination: Confirmed demographics. Discussed role of , medical plan of  care and discharge needs. Pt verbalized understanding, but requested contact to  her daughter as well. Pt states that lives alone at private home with 4 outdoor  steps and 10 steps to get to her bedroom, pt was semi dependent in ADL prior to  hospitalization, pt owns a cane, rolling walker, rollator, lift chair, w/c. Pt  mentioned that pt's family used to hire a private paid aid 4 hrs a day, when pt  was at home.   Pt identified her daughter Leonie ( [phone]) as an emergency contact.    ADVANCE DIRECTIVES:    DNR/MOLST  [ ]  Living Will  [ ]   DECISION MAKER(s):  [ ] Health Care Proxy(s)  [x ] Surrogate(s)  [ ] Guardian           Name(s): Phone Number(s):Daughter as above.     BASELINE (I)ADL(s) (prior to admission):  [place]: [ ]Total  [ ] Moderate [x ]Dependent    Allergies    piperacillin-tazobactam (Rash)  soaps and creams causes rash uses only sensitive skin soap (Rash)  sulfa drugs (Unknown)  Voltaren (Rash)  Eye cream from the 1960s Neocortef ?spelling caused eyes to becomes swollen (Unknown)  neomycin (Unknown)    Intolerances    MEDICATIONS  (STANDING):  apixaban 2.5 milliGRAM(s) Oral two times a day  artificial  tears Solution 1 Drop(s) Both EYES two times a day  atorvastatin 80 milliGRAM(s) Oral at bedtime  cefepime   IVPB 1000 milliGRAM(s) IV Intermittent every 24 hours  chlorhexidine 2% Cloths 1 Application(s) Topical <User Schedule>  cholecalciferol 1000 Unit(s) Oral daily  cilostazol 50 milliGRAM(s) Oral two times a day  dextrose 50% Injectable 25 Gram(s) IV Push once  furosemide   Injectable 20 milliGRAM(s) IV Push once  melatonin 3 milliGRAM(s) Oral at bedtime  metoprolol succinate ER 25 milliGRAM(s) Oral <User Schedule>  midodrine. 10 milliGRAM(s) Oral three times a day  mirtazapine 15 milliGRAM(s) Oral at bedtime  multivitamin 1 Tablet(s) Oral daily  Nephro-mirella 1 Tablet(s) Oral daily  pantoprazole    Tablet 40 milliGRAM(s) Oral two times a day  senna 2 Tablet(s) Oral at bedtime  venlafaxine XR. 75 milliGRAM(s) Oral daily    MEDICATIONS  (PRN):  acetaminophen     Tablet .. 650 milliGRAM(s) Oral every 6 hours PRN Temp greater or equal to 38C (100.4F), Mild Pain (1 - 3)  glycopyrrolate Injectable 0.4 milliGRAM(s) IV Push every 4 hours PRN for secretions  HYDROmorphone  Injectable 0.2 milliGRAM(s) IV Push every 2 hours PRN dyspnea or pain  LORazepam   Injectable 0.2 milliGRAM(s) IV Push every 2 hours PRN pain  ondansetron Injectable 4 milliGRAM(s) IV Push every 4 hours PRN Nausea and/or Vomiting  polyethylene glycol 3350 17 Gram(s) Oral daily PRN for constipation    PRESENT SYMPTOMS: [ ]Unable to self-report  [ ] CPOT [ ] PAINADs [ ] RDOS [x] Limited due to pain and not being comfortable.   Source if other than patient:  [ ]Family   [ ]Team     Pain: [x ]yes [ ]no  QOL impact - not able to be comfortable   Location -                  rectum and abdomen   Aggravating factors - abdominal palpation  Quality - not able to indicate   Radiation - none  Timing- constant   Severity (0-10 scale): Severe   Minimal acceptable level (0-10 scale):     CPOT:    https://www.sccm.org/getattachment/sld00i03-9q5h-4y0l-4c9n-7950t9228l2x/Critical-Care-Pain-Observation-Tool-(CPOT)    PAINAD Score: See PAINAD tool and score below     Dyspnea:                           [ ]Mild [ ]Moderate [ ]Severe    RDOS: See RDOS tool and score below   0 to 2  minimal or no respiratory distress   3  mild distress  4 to 6 moderate distress  >7 severe distress      Anxiety:                             [ ]Mild [ ]Moderate [ ]Severe  Fatigue:                             [ ]Mild [ ]Moderate [ ]Severe  Nausea:                             [ ]Mild [ ]Moderate [ ]Severe  Loss of appetite:              [ ]Mild [ ]Moderate [ ]Severe  Constipation:                    [ ]Mild [x ]Moderate [ ]Severe    PCSSQ[Palliative Care Spiritual Screening Question]   Severity (0-10):  Score of 4 or > indicate consideration of Chaplaincy referral.  Chaplaincy Referral: [ ] yes [x ] refused [ ] following [ ] Deferred     Caregiver [place]? : [ ] yes [x ] no [ ] Deferred [ ] Declined             Social work referral [ ] Patient & Family Centered Care Referral [ ]     Anticipatory Grief present?:  [x ] yes [ ] no  [ ] Deferred                  Social work referral [x ] Chaplaincy Referral [ ]    		  Other Symptoms:  [ ]All other review of systems negative     Palliative Performance Status Version 2:   See PPSv2 tool and score below          PHYSICAL EXAM:  Vital Signs Last 24 Hrs  T(C): 36.8 (22 Dec 2023 11:41), Max: 36.8 (22 Dec 2023 05:14)  T(F): 98.2 (22 Dec 2023 11:41), Max: 98.3 (22 Dec 2023 05:14)  HR: 132 (22 Dec 2023 08:53) (101 - 136)  BP: 93/66 (22 Dec 2023 11:41) (74/44 - 117/83)  BP(mean): --  RR: 18 (22 Dec 2023 11:41) (18 - 18)  SpO2: 92% (22 Dec 2023 11:41) (92% - 100%)    Parameters below as of 22 Dec 2023 11:41  Patient On (Oxygen Delivery Method): nasal cannula  O2 Flow (L/min): 4   I&O's Summary    21 Dec 2023 07:01  -  22 Dec 2023 07:00  --------------------------------------------------------  IN: 900 mL / OUT: 25 mL / NET: 875 mL    22 Dec 2023 07:01  -  22 Dec 2023 15:54  --------------------------------------------------------  IN: 30 mL / OUT: 0 mL / NET: 30 mL      GENERAL: [ ]Cachexia    [x ]Alert  [ ]Oriented x   [ ]Lethargic  [ ]Unarousable  [ ]Verbal  [ ]Non-Verbal  Behavioral:   [x ] Anxiety  [ ] Delirium [ ] Agitation [ ] Other  HEENT:  [x ]Normal   [ ]Dry mouth   [ ]ET Tube/Trach  [ ]Oral lesions  PULMONARY:   [ ]Clear [ ]Tachypnea  [ ]Audible excessive secretions   [ ]Rhonchi        [ ]Right [ ]Left [ ]Bilateral  [ ]Crackles        [ ]Right [ ]Left [ ]Bilateral  [ ]Wheezing     [ ]Right [ ]Left [ ]Bilateral  [x ]Diminished breath sounds [ ]right [ ]left [x ]bilateral  [x]Labored breathing   CARDIOVASCULAR:    [x ]Regular [ ]Irregular [ ]Tachy  [ ]Chi [ ]Murmur [ ]Other  GASTROINTESTINAL:  [ x]Soft  [ ]Distended   [ x]+BS  [ ]Non tender [ x]Tender over lower abdomen. No rebound or guarding  [ ]Other [ ]PEG [ ]OGT/ NGT  Last BM:12/22  GENITOURINARY:  [ ]Normal [ ] Incontinent   [ ]Oliguria/Anuria   [ ]Campo  [x]Exterman female catheter   MUSCULOSKELETAL:   [ ]Normal   [ ]Weakness  [x ]Bed/Wheelchair bound [ ]Edema  NEUROLOGIC:   [ ]No focal deficits  [ ]Cognitive impairment  [ ]Dysphagia [ ]Dysarthria [ ]Paresis [ ]Other   SKIN:   [ ]Normal  [ ]Rash  [ ]Other  [ ]Pressure ulcer(s)       Present on admission [ ]y [ ]n    CRITICAL CARE:  [ ] Shock Present  [ ]Septic [ ]Cardiogenic [ ]Neurologic [ ]Hypovolemic  [ ]  Vasopressors [ ]  Inotropes   [ ]Respiratory failure present [ ]Mechanical ventilation [ ]Non-invasive ventilatory support [ ]High flow    [ ]Acute  [ ]Chronic [ ]Hypoxic  [ ]Hypercarbic [ ]Other  [ ]Other organ failure     LABS:                        12.1   18.32 )-----------( 337      ( 22 Dec 2023 05:57 )             38.7   12-22    131<L>  |  91<L>  |  68<H>  ----------------------------<  119<H>  5.0   |  21<L>  |  4.07<H>    Ca    7.4<L>      22 Dec 2023 05:56        Urinalysis Basic - ( 22 Dec 2023 05:56 )    Color: x / Appearance: x / SG: x / pH: x  Gluc: 119 mg/dL / Ketone: x  / Bili: x / Urobili: x   Blood: x / Protein: x / Nitrite: x   Leuk Esterase: x / RBC: x / WBC x   Sq Epi: x / Non Sq Epi: x / Bacteria: x      RADIOLOGY & ADDITIONAL STUDIES:  < from: CT Chest No Cont (12.21.23 @ 09:47) >  IMPRESSION:    Small bibasilar pleural effusions with adjacent passive atelectasis.  Prominent mediastinal lymph nodes, likely reactive  Trace pericardial effusion  Nodular thickening of the left adrenal gland.  Moderate hiatal hernia    --- End of Report ---          IZABELA OJEDA DO; Resident Radiologist  This document has been electronically signed.  EFREM LENZ MD; Attending Radiologist  This document has been electronically signed. Dec 21 2023 12:46PM    < end of copied text >  < from: CT Head No Cont (12.20.23 @ 10:18) >  IMPRESSION: No acute intracranial hemorrhage, mass effect, or shift of   the midline structures.    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    --- End of Report ---            KATE REYNOSO MD; Attending Radiologist  This document has been electronically signed. Dec 20 2023 10:39AM    < end of copied text >    PROTEIN CALORIE MALNUTRITION PRESENT: [ ]mild [ ]moderate [ ]severe [ ]underweight [ ]morbid obesity  https://www.andeal.org/vault/2440/web/files/ONC/Table_Clinical%20Characteristics%20to%20Document%20Malnutrition-White%20JV%20et%20al%202012.pdf    Height (cm): 162.6 (12-19-23 @ 10:43), 162.6 (11-22-23 @ 12:10), 167.6 (10-16-23 @ 12:51)  Weight (kg): 59 (12-19-23 @ 10:43), 49.9 (11-22-23 @ 12:10), 90.7 (10-16-23 @ 12:51)  BMI (kg/m2): 22.3 (12-19-23 @ 10:43), 18.9 (11-22-23 @ 12:10), 32.3 (10-16-23 @ 12:51)    [ ]PPSV2 < or = to 30% [ ]significant weight loss  [ ]poor nutritional intake  [ ]anasarca[ ]Artificial Nutrition      Other REFERRALS:  [ ]Hospice  [ ]Child Life  [ ]Social Work  [ ]Case management [ ]Holistic Therapy     Goals of Care Document:  Date of Service:12-22-23 @ 15:54  HPI:  89-year-old female on Eliquis for PE prophylaxis and A-fib presenting to the emergency department due to fall with head trauma this morning.  Patient states she was being bathed and fell but does not remember the details of the fall.  She does not member if she hit her head but denies any changes in visions, headache, weakness numbness to extremities.  She  also states that she has left foot pain. as per Dr. Diaz from [facility] ptn  was found to have an elevated creatinine of 2.18 this morning which was abnormal for her.  She would like to be evaluated for ALLAN.  Denies fevers, chills, bodies, nausea, vomiting, diarrhea. (19 Dec 2023 21:26)    12/22 During this admission the patient has been treated for hypotension, hypoxemia, hypoglycemia and UTI. She has x4 RRTs. The only available GOC was entered by MICU on 12/20. Based on that note: "We discussed with Leonie this as part of the patients overall prognosis. Ultimately decided on no IV pressor medications and avoiding any sort of central line placement however can continue with IVF as needed for blood pressure temporization. After extensive discussions with multiple providers and daughter, ultimately Leonie states that her mom would benefit from pursuing a comfort directed approach and symptom directed approach. I explained this means stopping unnecessary blood draws, stopping unnecessary medications and potentially continuing vs stopping antibiotics if it makes patient comfortable. MOLST filled and placed in chart."    As per today's RRT note: "Recommend primary team clarify overall goals of care especially in the setting of hypotension, as it would be beneficial for patient to get supportive treatment that is primarily based on her comfort rather than blood pressure or heart rate, as this is in line with previously established goals of care."    This consult is being done to clarify GOC and to advise on symptoms Rx.     PERTINENT PM/SXH:   Anxiety    Hyperlipemia    Seasonal Allergies    Breast Lump    Initial Insomnia    Insomnia    Hyperlipidemia    Hypertension    Neuropathy associated with cancer    PE (pulmonary thromboembolism)    PAF (paroxysmal atrial fibrillation)    HTN (hypertension)    HLD (hyperlipidemia)    Endometrial cancer      History of Breast Lump/Mass Excision- benighn- left- 1971    History of Colonoscopy- 2011/Sept    History of D&C- 8/12/11    Status post hysterectomy    Status post cataract extraction    Leg fracture, right      FAMILY HISTORY:  No pertinent family history in first degree relatives      Family Hx substance abuse [ ]yes [ ]no  ITEMS NOT CHECKED ARE NOT PRESENT    SOCIAL HISTORY:   Significant other/partner[ ]  Children[ ]  Nondenominational/Spirituality:  Substance hx:  [ ]   Tobacco hx:  [ ]   Alcohol hx: [ ]   Home Opioid hx:  [ ] I-Stop Reference No:  Living Situation: [ ]Home  [ ]Long term care  [ ]Rehab [ ]Other  As per care coordination: Confirmed demographics. Discussed role of , medical plan of  care and discharge needs. Pt verbalized understanding, but requested contact to  her daughter as well. Pt states that lives alone at private home with 4 outdoor  steps and 10 steps to get to her bedroom, pt was semi dependent in ADL prior to  hospitalization, pt owns a cane, rolling walker, rollator, lift chair, w/c. Pt  mentioned that pt's family used to hire a private paid aid 4 hrs a day, when pt  was at home.   Pt identified her daughter Leonie ( [phone]) as an emergency contact.    ADVANCE DIRECTIVES:    DNR/MOLST  [ ]  Living Will  [ ]   DECISION MAKER(s):  [ ] Health Care Proxy(s)  [x ] Surrogate(s)  [ ] Guardian           Name(s): Phone Number(s):Daughter as above.     BASELINE (I)ADL(s) (prior to admission):  [place]: [ ]Total  [ ] Moderate [x ]Dependent    Allergies    piperacillin-tazobactam (Rash)  soaps and creams causes rash uses only sensitive skin soap (Rash)  sulfa drugs (Unknown)  Voltaren (Rash)  Eye cream from the 1960s Neocortef ?spelling caused eyes to becomes swollen (Unknown)  neomycin (Unknown)    Intolerances    MEDICATIONS  (STANDING):  apixaban 2.5 milliGRAM(s) Oral two times a day  artificial  tears Solution 1 Drop(s) Both EYES two times a day  atorvastatin 80 milliGRAM(s) Oral at bedtime  cefepime   IVPB 1000 milliGRAM(s) IV Intermittent every 24 hours  chlorhexidine 2% Cloths 1 Application(s) Topical <User Schedule>  cholecalciferol 1000 Unit(s) Oral daily  cilostazol 50 milliGRAM(s) Oral two times a day  dextrose 50% Injectable 25 Gram(s) IV Push once  furosemide   Injectable 20 milliGRAM(s) IV Push once  melatonin 3 milliGRAM(s) Oral at bedtime  metoprolol succinate ER 25 milliGRAM(s) Oral <User Schedule>  midodrine. 10 milliGRAM(s) Oral three times a day  mirtazapine 15 milliGRAM(s) Oral at bedtime  multivitamin 1 Tablet(s) Oral daily  Nephro-mirella 1 Tablet(s) Oral daily  pantoprazole    Tablet 40 milliGRAM(s) Oral two times a day  senna 2 Tablet(s) Oral at bedtime  venlafaxine XR. 75 milliGRAM(s) Oral daily    MEDICATIONS  (PRN):  acetaminophen     Tablet .. 650 milliGRAM(s) Oral every 6 hours PRN Temp greater or equal to 38C (100.4F), Mild Pain (1 - 3)  glycopyrrolate Injectable 0.4 milliGRAM(s) IV Push every 4 hours PRN for secretions  HYDROmorphone  Injectable 0.2 milliGRAM(s) IV Push every 2 hours PRN dyspnea or pain  LORazepam   Injectable 0.2 milliGRAM(s) IV Push every 2 hours PRN pain  ondansetron Injectable 4 milliGRAM(s) IV Push every 4 hours PRN Nausea and/or Vomiting  polyethylene glycol 3350 17 Gram(s) Oral daily PRN for constipation    PRESENT SYMPTOMS: [ ]Unable to self-report  [ ] CPOT [ ] PAINADs [ ] RDOS [x] Limited due to pain and not being comfortable.   Source if other than patient:  [ ]Family   [ ]Team     Pain: [x ]yes [ ]no  QOL impact - not able to be comfortable   Location -                  rectum and abdomen   Aggravating factors - abdominal palpation  Quality - not able to indicate   Radiation - none  Timing- constant   Severity (0-10 scale): Severe   Minimal acceptable level (0-10 scale):     CPOT:    https://www.sccm.org/getattachment/fpl06i51-5c4w-2v3a-9d1l-8641h2097k3k/Critical-Care-Pain-Observation-Tool-(CPOT)    PAINAD Score: See PAINAD tool and score below     Dyspnea:                           [ ]Mild [ ]Moderate [ ]Severe    RDOS: See RDOS tool and score below   0 to 2  minimal or no respiratory distress   3  mild distress  4 to 6 moderate distress  >7 severe distress      Anxiety:                             [ ]Mild [ ]Moderate [ ]Severe  Fatigue:                             [ ]Mild [ ]Moderate [ ]Severe  Nausea:                             [ ]Mild [ ]Moderate [ ]Severe  Loss of appetite:              [ ]Mild [ ]Moderate [ ]Severe  Constipation:                    [ ]Mild [x ]Moderate [ ]Severe    PCSSQ[Palliative Care Spiritual Screening Question]   Severity (0-10):  Score of 4 or > indicate consideration of Chaplaincy referral.  Chaplaincy Referral: [ ] yes [x ] refused [ ] following [ ] Deferred     Caregiver [place]? : [ ] yes [x ] no [ ] Deferred [ ] Declined             Social work referral [ ] Patient & Family Centered Care Referral [ ]     Anticipatory Grief present?:  [x ] yes [ ] no  [ ] Deferred                  Social work referral [x ] Chaplaincy Referral [ ]    		  Other Symptoms:  [ ]All other review of systems negative     Palliative Performance Status Version 2:   See PPSv2 tool and score below          PHYSICAL EXAM:  Vital Signs Last 24 Hrs  T(C): 36.8 (22 Dec 2023 11:41), Max: 36.8 (22 Dec 2023 05:14)  T(F): 98.2 (22 Dec 2023 11:41), Max: 98.3 (22 Dec 2023 05:14)  HR: 132 (22 Dec 2023 08:53) (101 - 136)  BP: 93/66 (22 Dec 2023 11:41) (74/44 - 117/83)  BP(mean): --  RR: 18 (22 Dec 2023 11:41) (18 - 18)  SpO2: 92% (22 Dec 2023 11:41) (92% - 100%)    Parameters below as of 22 Dec 2023 11:41  Patient On (Oxygen Delivery Method): nasal cannula  O2 Flow (L/min): 4   I&O's Summary    21 Dec 2023 07:01  -  22 Dec 2023 07:00  --------------------------------------------------------  IN: 900 mL / OUT: 25 mL / NET: 875 mL    22 Dec 2023 07:01  -  22 Dec 2023 15:54  --------------------------------------------------------  IN: 30 mL / OUT: 0 mL / NET: 30 mL      GENERAL: [ ]Cachexia    [x ]Alert  [ ]Oriented x   [ ]Lethargic  [ ]Unarousable  [ ]Verbal  [ ]Non-Verbal  Behavioral:   [x ] Anxiety  [ ] Delirium [ ] Agitation [ ] Other  HEENT:  [x ]Normal   [ ]Dry mouth   [ ]ET Tube/Trach  [ ]Oral lesions  PULMONARY:   [ ]Clear [ ]Tachypnea  [ ]Audible excessive secretions   [ ]Rhonchi        [ ]Right [ ]Left [ ]Bilateral  [ ]Crackles        [ ]Right [ ]Left [ ]Bilateral  [ ]Wheezing     [ ]Right [ ]Left [ ]Bilateral  [x ]Diminished breath sounds [ ]right [ ]left [x ]bilateral  [x]Labored breathing   CARDIOVASCULAR:    [x ]Regular [ ]Irregular [ ]Tachy  [ ]Chi [ ]Murmur [ ]Other  GASTROINTESTINAL:  [ x]Soft  [ ]Distended   [ x]+BS  [ ]Non tender [ x]Tender over lower abdomen. No rebound or guarding  [ ]Other [ ]PEG [ ]OGT/ NGT  Last BM:12/22  GENITOURINARY:  [ ]Normal [ ] Incontinent   [ ]Oliguria/Anuria   [ ]Campo  [x]Exterman female catheter   MUSCULOSKELETAL:   [ ]Normal   [ ]Weakness  [x ]Bed/Wheelchair bound [ ]Edema  NEUROLOGIC:   [ ]No focal deficits  [ ]Cognitive impairment  [ ]Dysphagia [ ]Dysarthria [ ]Paresis [ ]Other   SKIN:   [ ]Normal  [ ]Rash  [ ]Other  [ ]Pressure ulcer(s)       Present on admission [ ]y [ ]n    CRITICAL CARE:  [ ] Shock Present  [ ]Septic [ ]Cardiogenic [ ]Neurologic [ ]Hypovolemic  [ ]  Vasopressors [ ]  Inotropes   [ ]Respiratory failure present [ ]Mechanical ventilation [ ]Non-invasive ventilatory support [ ]High flow    [ ]Acute  [ ]Chronic [ ]Hypoxic  [ ]Hypercarbic [ ]Other  [ ]Other organ failure     LABS:                        12.1   18.32 )-----------( 337      ( 22 Dec 2023 05:57 )             38.7   12-22    131<L>  |  91<L>  |  68<H>  ----------------------------<  119<H>  5.0   |  21<L>  |  4.07<H>    Ca    7.4<L>      22 Dec 2023 05:56        Urinalysis Basic - ( 22 Dec 2023 05:56 )    Color: x / Appearance: x / SG: x / pH: x  Gluc: 119 mg/dL / Ketone: x  / Bili: x / Urobili: x   Blood: x / Protein: x / Nitrite: x   Leuk Esterase: x / RBC: x / WBC x   Sq Epi: x / Non Sq Epi: x / Bacteria: x      RADIOLOGY & ADDITIONAL STUDIES:  < from: CT Chest No Cont (12.21.23 @ 09:47) >  IMPRESSION:    Small bibasilar pleural effusions with adjacent passive atelectasis.  Prominent mediastinal lymph nodes, likely reactive  Trace pericardial effusion  Nodular thickening of the left adrenal gland.  Moderate hiatal hernia    --- End of Report ---          IZABELA OJEDA DO; Resident Radiologist  This document has been electronically signed.  EFREM LENZ MD; Attending Radiologist  This document has been electronically signed. Dec 21 2023 12:46PM    < end of copied text >  < from: CT Head No Cont (12.20.23 @ 10:18) >  IMPRESSION: No acute intracranial hemorrhage, mass effect, or shift of   the midline structures.    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    --- End of Report ---            KATE REYNOSO MD; Attending Radiologist  This document has been electronically signed. Dec 20 2023 10:39AM    < end of copied text >    PROTEIN CALORIE MALNUTRITION PRESENT: [ ]mild [ ]moderate [ ]severe [ ]underweight [ ]morbid obesity  https://www.andeal.org/vault/2440/web/files/ONC/Table_Clinical%20Characteristics%20to%20Document%20Malnutrition-White%20JV%20et%20al%202012.pdf    Height (cm): 162.6 (12-19-23 @ 10:43), 162.6 (11-22-23 @ 12:10), 167.6 (10-16-23 @ 12:51)  Weight (kg): 59 (12-19-23 @ 10:43), 49.9 (11-22-23 @ 12:10), 90.7 (10-16-23 @ 12:51)  BMI (kg/m2): 22.3 (12-19-23 @ 10:43), 18.9 (11-22-23 @ 12:10), 32.3 (10-16-23 @ 12:51)    [ ]PPSV2 < or = to 30% [ ]significant weight loss  [ ]poor nutritional intake  [ ]anasarca[ ]Artificial Nutrition      Other REFERRALS:  [ ]Hospice  [ ]Child Life  [ ]Social Work  [ ]Case management [ ]Holistic Therapy     Goals of Care Document:

## 2023-12-22 NOTE — CONSULT NOTE ADULT - ASSESSMENT
89-year-old female on Eliquis for PE prophylaxis and A-fib presenting to the emergency department due to fall with head trauma. During this admission the patient has been treated for hypotension, hypoxemia, hypoglycemia and UTI. She has x4 RRTs.  She has developed ALLAN, Lactic Acidosis, encephalopathy and liver dysfunction This consult is being done to clarify GOC and to advise on symptoms Rx.

## 2023-12-22 NOTE — PROVIDER CONTACT NOTE (OTHER) - ACTION/TREATMENT ORDERED:
provider made aware. give Midodrine STAT, hold Metoprolol.
RTT was call, Please see RRT sheet. Safety maintained

## 2023-12-22 NOTE — CONSULT NOTE ADULT - CONSULT REQUESTED DATE/TIME
20-Dec-2023 14:26
22-Dec-2023 15:54
20-Dec-2023 11:29
20-Dec-2023 10:19
20-Dec-2023 08:57
20-Dec-2023 14:18
20-Dec-2023 16:15

## 2023-12-22 NOTE — PROGRESS NOTE ADULT - SUBJECTIVE AND OBJECTIVE BOX
Patient is a 89y old  Female who presents with a chief complaint of fall (22 Dec 2023 15:53)      Vascular Surgery Attending Progress Note    Interval HPI: pt resting   states  wendie  ada foot discomfort     Medications:  acetaminophen     Tablet .. 650 milliGRAM(s) Oral every 6 hours PRN  apixaban 2.5 milliGRAM(s) Oral two times a day  artificial  tears Solution 1 Drop(s) Both EYES two times a day  bisacodyl Suppository 10 milliGRAM(s) Rectal daily PRN  glycopyrrolate Injectable 0.4 milliGRAM(s) IV Push every 4 hours PRN  HYDROmorphone  Injectable 0.4 milliGRAM(s) IV Push every 2 hours PRN  HYDROmorphone  Injectable 0.2 milliGRAM(s) IV Push every 2 hours PRN  HYDROmorphone  Injectable 0.4 milliGRAM(s) IV Push every 2 hours PRN  LORazepam   Injectable 0.25 milliGRAM(s) IV Push every 4 hours PRN  ondansetron Injectable 4 milliGRAM(s) IV Push every 4 hours PRN  pantoprazole    Tablet 40 milliGRAM(s) Oral two times a day  polyethylene glycol 3350 17 Gram(s) Oral daily PRN  senna 2 Tablet(s) Oral at bedtime  venlafaxine XR. 75 milliGRAM(s) Oral daily      Vital Signs Last 24 Hrs  T(C): 36.3 (22 Dec 2023 16:41), Max: 36.8 (22 Dec 2023 05:14)  T(F): 97.4 (22 Dec 2023 16:41), Max: 98.3 (22 Dec 2023 05:14)  HR: 53 (22 Dec 2023 16:41) (53 - 136)  BP: 79/52 (22 Dec 2023 16:41) (74/44 - 117/83)  BP(mean): --  RR: 18 (22 Dec 2023 11:41) (18 - 18)  SpO2: 92% (22 Dec 2023 11:41) (92% - 100%)    Parameters below as of 22 Dec 2023 11:41  Patient On (Oxygen Delivery Method): nasal cannula  O2 Flow (L/min): 4    I&O's Summary    21 Dec 2023 07:01  -  22 Dec 2023 07:00  --------------------------------------------------------  IN: 900 mL / OUT: 25 mL / NET: 875 mL    22 Dec 2023 07:01  -  22 Dec 2023 20:07  --------------------------------------------------------  IN: 30 mL / OUT: 0 mL / NET: 30 mL        Physical Exam:  Neuro  appears confused  Vascular:  ada feet and toes stable w mod cap refill and perfusion     LABS:                        12.1   18.32 )-----------( 337      ( 22 Dec 2023 05:57 )             38.7     12-22    131<L>  |  91<L>  |  68<H>  ----------------------------<  119<H>  5.0   |  21<L>  |  4.07<H>    Ca    7.4<L>      22 Dec 2023 05:56      CHRISTIANO/PVR reviewed     HONORIO HIGUERA MD  [phone] Cell [phone] Patient is a 89y old  Female who presents with a chief complaint of fall (22 Dec 2023 15:53)      Vascular Surgery Attending Progress Note    Interval HPI: pt resting   states  wendie  ada foot discomfort     Medications:  acetaminophen     Tablet .. 650 milliGRAM(s) Oral every 6 hours PRN  apixaban 2.5 milliGRAM(s) Oral two times a day  artificial  tears Solution 1 Drop(s) Both EYES two times a day  bisacodyl Suppository 10 milliGRAM(s) Rectal daily PRN  glycopyrrolate Injectable 0.4 milliGRAM(s) IV Push every 4 hours PRN  HYDROmorphone  Injectable 0.4 milliGRAM(s) IV Push every 2 hours PRN  HYDROmorphone  Injectable 0.2 milliGRAM(s) IV Push every 2 hours PRN  HYDROmorphone  Injectable 0.4 milliGRAM(s) IV Push every 2 hours PRN  LORazepam   Injectable 0.25 milliGRAM(s) IV Push every 4 hours PRN  ondansetron Injectable 4 milliGRAM(s) IV Push every 4 hours PRN  pantoprazole    Tablet 40 milliGRAM(s) Oral two times a day  polyethylene glycol 3350 17 Gram(s) Oral daily PRN  senna 2 Tablet(s) Oral at bedtime  venlafaxine XR. 75 milliGRAM(s) Oral daily      Vital Signs Last 24 Hrs  T(C): 36.3 (22 Dec 2023 16:41), Max: 36.8 (22 Dec 2023 05:14)  T(F): 97.4 (22 Dec 2023 16:41), Max: 98.3 (22 Dec 2023 05:14)  HR: 53 (22 Dec 2023 16:41) (53 - 136)  BP: 79/52 (22 Dec 2023 16:41) (74/44 - 117/83)  BP(mean): --  RR: 18 (22 Dec 2023 11:41) (18 - 18)  SpO2: 92% (22 Dec 2023 11:41) (92% - 100%)    Parameters below as of 22 Dec 2023 11:41  Patient On (Oxygen Delivery Method): nasal cannula  O2 Flow (L/min): 4    I&O's Summary    21 Dec 2023 07:01  -  22 Dec 2023 07:00  --------------------------------------------------------  IN: 900 mL / OUT: 25 mL / NET: 875 mL    22 Dec 2023 07:01  -  22 Dec 2023 20:07  --------------------------------------------------------  IN: 30 mL / OUT: 0 mL / NET: 30 mL        Physical Exam:  Neuro  appears confused  Vascular:  ada feet and toes stable w mod cap refill and perfusion     LABS:                        12.1   18.32 )-----------( 337      ( 22 Dec 2023 05:57 )             38.7     12-22    131<L>  |  91<L>  |  68<H>  ----------------------------<  119<H>  5.0   |  21<L>  |  4.07<H>    Ca    7.4<L>      22 Dec 2023 05:56      CHRISTIANO/PVR reviewed     HONORIO HIGUERA MD  [phone] Cell [phone]

## 2023-12-22 NOTE — CONSULT NOTE ADULT - PROBLEM SELECTOR RECOMMENDATION 2
AIDE Macdonald MD have participated in the daily care of this patient and have performed a history and physical exam of the patient and discussed  the findings and plan with the house officer. I reviewed the resident note and agree with the findings and plan   I Enrike Macdonald MD have personally seen and examined the patient at bedside today at  6 pm
Though denying dyspnea, she appears labored breathing.   Will add Dilaudid 0.4mg IV q 2PRN   Will also add Ativan 0.25mg IV q 4 PRN for intractable symptoms.

## 2023-12-22 NOTE — PROGRESS NOTE ADULT - PROVIDER SPECIALTY LIST ADULT
Cardiology
Nephrology
Infectious Disease
Infectious Disease
Internal Medicine
Nephrology
Neurology
Vascular Surgery
Neurology
Cardiology
Internal Medicine
Internal Medicine
Vascular Surgery

## 2023-12-23 LAB
-  AMOXICILLIN/CLAVULANIC ACID: SIGNIFICANT CHANGE UP
-  AMOXICILLIN/CLAVULANIC ACID: SIGNIFICANT CHANGE UP
-  AMPICILLIN/SULBACTAM: SIGNIFICANT CHANGE UP
-  AMPICILLIN/SULBACTAM: SIGNIFICANT CHANGE UP
-  AMPICILLIN: SIGNIFICANT CHANGE UP
-  AMPICILLIN: SIGNIFICANT CHANGE UP
-  AZTREONAM: SIGNIFICANT CHANGE UP
-  AZTREONAM: SIGNIFICANT CHANGE UP
-  CEFAZOLIN: SIGNIFICANT CHANGE UP
-  CEFAZOLIN: SIGNIFICANT CHANGE UP
-  CEFEPIME: SIGNIFICANT CHANGE UP
-  CEFEPIME: SIGNIFICANT CHANGE UP
-  CEFOXITIN: SIGNIFICANT CHANGE UP
-  CEFOXITIN: SIGNIFICANT CHANGE UP
-  CEFTRIAXONE: SIGNIFICANT CHANGE UP
-  CEFTRIAXONE: SIGNIFICANT CHANGE UP
-  CEFUROXIME: SIGNIFICANT CHANGE UP
-  CEFUROXIME: SIGNIFICANT CHANGE UP
-  CIPROFLOXACIN: SIGNIFICANT CHANGE UP
-  CIPROFLOXACIN: SIGNIFICANT CHANGE UP
-  ERTAPENEM: SIGNIFICANT CHANGE UP
-  ERTAPENEM: SIGNIFICANT CHANGE UP
-  GENTAMICIN: SIGNIFICANT CHANGE UP
-  GENTAMICIN: SIGNIFICANT CHANGE UP
-  IMIPENEM: SIGNIFICANT CHANGE UP
-  IMIPENEM: SIGNIFICANT CHANGE UP
-  LEVOFLOXACIN: SIGNIFICANT CHANGE UP
-  LEVOFLOXACIN: SIGNIFICANT CHANGE UP
-  MEROPENEM: SIGNIFICANT CHANGE UP
-  MEROPENEM: SIGNIFICANT CHANGE UP
-  NITROFURANTOIN: SIGNIFICANT CHANGE UP
-  NITROFURANTOIN: SIGNIFICANT CHANGE UP
-  PIPERACILLIN/TAZOBACTAM: SIGNIFICANT CHANGE UP
-  PIPERACILLIN/TAZOBACTAM: SIGNIFICANT CHANGE UP
-  TOBRAMYCIN: SIGNIFICANT CHANGE UP
-  TOBRAMYCIN: SIGNIFICANT CHANGE UP
-  TRIMETHOPRIM/SULFAMETHOXAZOLE: SIGNIFICANT CHANGE UP
-  TRIMETHOPRIM/SULFAMETHOXAZOLE: SIGNIFICANT CHANGE UP
CULTURE RESULTS: ABNORMAL
CULTURE RESULTS: ABNORMAL
METHOD TYPE: SIGNIFICANT CHANGE UP
METHOD TYPE: SIGNIFICANT CHANGE UP
ORGANISM # SPEC MICROSCOPIC CNT: ABNORMAL
SPECIMEN SOURCE: SIGNIFICANT CHANGE UP
SPECIMEN SOURCE: SIGNIFICANT CHANGE UP

## 2024-05-14 NOTE — DISCHARGE NOTE NURSING/CASE MANAGEMENT/SOCIAL WORK - CAREGIVER OBTAIN DETAILS
Observation goals PRIOR TO DISCHARGE  Comments: -diagnostic tests and consults completed and resulted - met  -vital signs normal or at patient baseline - met  Nurse to notify provider when observation goals have been met and patient is ready for discharge.     Patient confused

## 2024-05-16 NOTE — ED PROVIDER NOTE - CLINICAL SUMMARY MEDICAL DECISION MAKING FREE TEXT BOX
No
Medical decision making:  Fall out of chair, possibly due to hypoxia, will keep on supplemental O2, and work up for a infectious pulmonary source.

## 2024-05-25 NOTE — ED PROVIDER NOTE - NS_EDPROVIDERDISPOUSERTYPE_ED_A_ED
Pt had a masectomy on Thursday.  The packing keeps falling out and the drain is leaking  
Attending Attestation (For Attendings USE Only)...

## 2024-06-22 NOTE — DISCHARGE NOTE NURSING/CASE MANAGEMENT/SOCIAL WORK - NSDCPEPT PROEDHF_GEN_ALL_CORE
Patient arrives with dysuria and abdominal pain for several days. Was seen at [facility] she was diagnosed with chlamydia but has not taken any medication for this.     Patient also here for right finger injury. Patient is not unable to bend finger and is concerned.      Triage Assessment (Adult)       Row Name 06/22/24 120          Triage Assessment    Airway WDL WDL        Respiratory WDL    Respiratory WDL WDL        Skin Circulation/Temperature WDL    Skin Circulation/Temperature WDL WDL        Cardiac WDL    Cardiac WDL WDL        Peripheral/Neurovascular WDL    Peripheral Neurovascular WDL WDL        Cognitive/Neuro/Behavioral WDL    Cognitive/Neuro/Behavioral WDL X     Level of Consciousness alert     Arousal Level opens eyes spontaneously     Orientation oriented x 4     Speech clear;spontaneous;logical     Mood/Behavior calm;cooperative        Le Coma Scale    Best Eye Response 4-->(E4) spontaneous     Best Motor Response 6-->(M6) obeys commands     Best Verbal Response 5-->(V5) oriented     Le Coma Scale Score 15                     
Call primary care provider for follow up after discharge/Activities as tolerated/Low salt diet/Monitor weight daily/Report signs and symptoms to primary care provider

## 2024-07-22 NOTE — ED PROVIDER NOTE - QTC
We are committed to providing you the best care possible.    If you receive a survey after visiting one of our offices, please take time to share your experience concerning your physician office visit.  These surveys are confidential and no health information about you is shared.    We are eager to improve for you and we are counting on your feedback to help make that happen.        139

## 2024-10-07 NOTE — DISCHARGE NOTE PROVIDER - DISCHARGE DATE
25-Nov-2019 you need help quitting, talk to your doctor about stop-smoking programs and medicines. These can increase your chances of quitting for good.  Talk to your doctor about whether to stop taking aspirin and similar products such as ibuprofen or naproxen.  Get exercise often. It can help improve blood flow to the ear.    Ways to manage/cope with tinnitus  Some tinnitus may last a long time. To manage your tinnitus, try to:  Avoid noises that you think caused your tinnitus. If you can't avoid loud noises, wear earplugs or earmuffs.  Ignore the sound by paying attention to other things.  Keeping your brain busy with other tasks or background noise can help your brain not focus on the tinnitus.  Try to not give the tinnitus an emotional reaction.  Do your best to ignore the sound and not let it bother you. Relax using biofeedback, meditation, or yoga. Feeling stressed and being tired can make tinnitus worse.  Play music or white noise to help you sleep.  Background noise may cover up the noise that you hear in your ears.  You can buy a tabletop machine or a device that sits under your pillow to play soothing sounds, like ocean waves.  Smart phones have free apps, such as Whist, Relax Melodies, ReSound Relief, and White Noise Lite.  These apps have different types of sounds/noise, some of which you can blend together to find sounds that are most soothing to you.  Hearing aid technology, especially when there is some hearing loss, may help reduce tinnitus symptoms by giving your brain better access to the sounds it is missing.  There are some hearing aids with built-in noise generator programs, which may help when amplification alone is not enough.        Additional resources may be found through the [employer] at www.carley.org    When should you call for help?  Call 911 anytime you think you may need emergency care. For example, call if:    You have symptoms of a stroke. These may include:  Sudden numbness,  26-Nov-2019

## 2024-11-02 NOTE — PROGRESS NOTE ADULT - PROBLEM/PLAN-5
6 (moderate pain)
DISPLAY PLAN FREE TEXT

## 2025-04-14 NOTE — DIETITIAN INITIAL EVALUATION ADULT. - PERTINENT MEDS FT
April 14, 2025      [facility]  [address] [place] [address]  Phone: [phone]  Fax: [phone]       Patient: Henrry Aldridge   YOB: 2014  Date of Visit: 04/14/2025    To Whom It May Concern:    Telma Aldridge  was at [facility] on 04/14/2025. The patient may return to work/school on 04/15/2025 with no restrictions. If you have any questions or concerns, or if I can be of further assistance, please do not hesitate to contact me.    Sincerely,          Joselin Armenta MD     
Aspirin, Lipitor, Colace, Lasix, Multivitamin, Protonix, Senna
